# Patient Record
Sex: MALE | Race: WHITE | NOT HISPANIC OR LATINO | Employment: OTHER | URBAN - METROPOLITAN AREA
[De-identification: names, ages, dates, MRNs, and addresses within clinical notes are randomized per-mention and may not be internally consistent; named-entity substitution may affect disease eponyms.]

---

## 2017-10-25 ENCOUNTER — ALLSCRIPTS OFFICE VISIT (OUTPATIENT)
Dept: OTHER | Facility: OTHER | Age: 57
End: 2017-10-25

## 2017-10-25 ENCOUNTER — APPOINTMENT (OUTPATIENT)
Dept: LAB | Facility: MEDICAL CENTER | Age: 57
End: 2017-10-25
Payer: MEDICARE

## 2017-10-25 DIAGNOSIS — R74.02 NONSPECIFIC ELEVATION OF LEVELS OF TRANSAMINASE AND LACTIC ACID DEHYDROGENASE (LDH): ICD-10-CM

## 2017-10-25 DIAGNOSIS — I10 ESSENTIAL (PRIMARY) HYPERTENSION: ICD-10-CM

## 2017-10-25 DIAGNOSIS — Z11.59 ENCOUNTER FOR SCREENING FOR OTHER VIRAL DISEASES (CODE): ICD-10-CM

## 2017-10-25 DIAGNOSIS — Z12.11 ENCOUNTER FOR SCREENING FOR MALIGNANT NEOPLASM OF COLON: ICD-10-CM

## 2017-10-25 DIAGNOSIS — Z12.5 ENCOUNTER FOR SCREENING FOR MALIGNANT NEOPLASM OF PROSTATE: ICD-10-CM

## 2017-10-25 DIAGNOSIS — R01.1 CARDIAC MURMUR: ICD-10-CM

## 2017-10-25 DIAGNOSIS — R74.01 NONSPECIFIC ELEVATION OF LEVELS OF TRANSAMINASE AND LACTIC ACID DEHYDROGENASE (LDH): ICD-10-CM

## 2017-10-25 LAB
ALBUMIN SERPL BCP-MCNC: 2.9 G/DL (ref 3.5–5)
ALP SERPL-CCNC: 116 U/L (ref 46–116)
ALT SERPL W P-5'-P-CCNC: 45 U/L (ref 12–78)
ANION GAP SERPL CALCULATED.3IONS-SCNC: 9 MMOL/L (ref 4–13)
AST SERPL W P-5'-P-CCNC: 70 U/L (ref 5–45)
BILIRUB SERPL-MCNC: 2.7 MG/DL (ref 0.2–1)
BUN SERPL-MCNC: 10 MG/DL (ref 5–25)
CALCIUM SERPL-MCNC: 8.5 MG/DL (ref 8.3–10.1)
CHLORIDE SERPL-SCNC: 106 MMOL/L (ref 100–108)
CHOLEST SERPL-MCNC: 167 MG/DL (ref 50–200)
CO2 SERPL-SCNC: 28 MMOL/L (ref 21–32)
CREAT SERPL-MCNC: 0.82 MG/DL (ref 0.6–1.3)
EST. AVERAGE GLUCOSE BLD GHB EST-MCNC: 80 MG/DL
GFR SERPL CREATININE-BSD FRML MDRD: 99 ML/MIN/1.73SQ M
GLUCOSE P FAST SERPL-MCNC: 91 MG/DL (ref 65–99)
HBA1C MFR BLD: 4.4 % (ref 4.2–6.3)
HCV AB SER QL: NORMAL
HDLC SERPL-MCNC: 61 MG/DL (ref 40–60)
LDLC SERPL CALC-MCNC: 92 MG/DL (ref 0–100)
POTASSIUM SERPL-SCNC: 3.8 MMOL/L (ref 3.5–5.3)
PROT SERPL-MCNC: 6.7 G/DL (ref 6.4–8.2)
PSA SERPL-MCNC: 0.3 NG/ML (ref 0–4)
SODIUM SERPL-SCNC: 143 MMOL/L (ref 136–145)
TRIGL SERPL-MCNC: 72 MG/DL

## 2017-10-25 PROCEDURE — 80053 COMPREHEN METABOLIC PANEL: CPT

## 2017-10-25 PROCEDURE — 83036 HEMOGLOBIN GLYCOSYLATED A1C: CPT

## 2017-10-25 PROCEDURE — 80061 LIPID PANEL: CPT

## 2017-10-25 PROCEDURE — 86803 HEPATITIS C AB TEST: CPT

## 2017-10-25 PROCEDURE — G0103 PSA SCREENING: HCPCS

## 2017-10-25 PROCEDURE — 36415 COLL VENOUS BLD VENIPUNCTURE: CPT

## 2017-10-26 ENCOUNTER — GENERIC CONVERSION - ENCOUNTER (OUTPATIENT)
Dept: OTHER | Facility: OTHER | Age: 57
End: 2017-10-26

## 2017-10-26 NOTE — PROGRESS NOTES
Assessment  1  Hypertension (401 9) (I10)   2  Lower back pain (724 2) (M54 5)   3  Joint pain, knee (719 46) (M25 569)   4  Heart murmur on physical examination (785 2) (R01 1)    Plan  Colon cancer screening    · *1 -  [facility]  *  Status:  Active  Requested for: 01PBD0576  Care Summary provided  : Yes  Flu vaccine need    · Fluzone Quadrivalent Intramuscular Suspension  Health Maintenance    · Follow-up visit in 6 months Evaluation and Treatment  Follow-up  Status: Hold For -  Scheduling  Requested for: 24KCG2730  Heart murmur on physical examination    · ECHO COMPLETE WITH CONTRAST IF INDICATED; Status:Hold For - Scheduling;  Requested MGR:20POP3777;   Hypertension    · (1) COMPREHENSIVE METABOLIC PANEL; Status:Active; Requested YZO:[id number];    · (1) HEMOGLOBIN A1C; Status:Active; Requested LHP:[id number];    · (1) LIPID PANEL, FASTING; Status:Active; Requested GJE:[id number];    · *VB-Depression Screening; Status:Complete;   Done: 02FUP7138 10:40AM  Need for hepatitis C screening test    · (1) HEP C ANTIBODY; Status:Active; Requested UNW:[id number];   Prostate cancer screening    · (1) PSA (SCREEN) (Dx V76 44 Screen for Prostate Cancer); Status:Active; Requested  HHW:[id number];     Discussion/Summary  Discussion Summary:   Hypertension: Well controlled  Continue medication  Check labs  back pain: Likely due to obesity  I recommended patient exercise more  He states he cannot due to the knee pain  I recommend diet  He states him and his wife only once a day  I asked what a normal meal is  He states stable split a large pizza  Patient obviously getting too many calories and binge eating  I told him I would no longer keep him on Celebrex due to his high blood pressure and potential for heart problems  I offered referral back to his back doctor  Patient will think about it   pain: Likely due to obesity again  Again I will not continue his Celebrex   I offered him referral back to his knee doctor  Patient will think about this as well  On exam  Check echo   has not yet had a colonoscopy  Risks and benefits of colonoscopy discussed  Patient agreeable  Order placed  discussed prostate cancer screening with patient  Risks and benefits of obtaining a PSA discussed  Patient agreeable  Order placed  discussed hepatitis-C screening with patient  Risks and benefits of obtaining hepatitis C testing discussed  Patient agreeable  Order placed  in 6 months or sooner if needed  Medication SE Review and Pt Understands Tx: Possible side effects of new medications were reviewed with the patient/guardian today  The treatment plan was reviewed with the patient/guardian  The patient/guardian understands and agrees with the treatment plan      History of Present Illness  HPI: Patient presents to establish care   high blood pressure  Has been on medication since 1993  Currently on lisinopril with hydrochlorothiazide 20/25 mg  Tolerating medication well  Does not need refills at this time  also has back pain  Has had for many years  Was being followed by a back specialist, Dr Radhika Alfonso, but no longer seeing him  Takes Celebrex daily  Was getting shots in the back  bilateral knee pain  Has had this for many years as well  Was being followed by an orthopedic surgeon, Dr Terence Mann, but no longer seen  Has had right knee ACL repair and left knee arthroscopic surgery  Takes Celebrex for this as well  Review of Systems  Complete-Male:   Constitutional: no fever  Cardiovascular: no chest pain  Respiratory: no shortness of breath  Gastrointestinal: no abdominal pain-- and-- no blood in stools  Genitourinary: no dysuria  Neurological: no headache  Psychiatric: no anxiety-- and-- no depression  PHQ-9 Depression Scale: Over the past 2 weeks, how often have you been bothered by the following problems? 1 ) Little interest or pleasure in doing things?  Not at all    2 ) Feeling down, depressed or hopeless? Not at all  Active Problems  1  Hyperlipidemia (272 4) (E78 5)   2  Hypertension (401 9) (I10)   3  Joint pain, knee (719 46) (M25 569)   4  Lower back pain (724 2) (M54 5)   5  Morbid or severe obesity due to excess calories (278 01) (E66 01)   6  Sleep apnea (780 57) (G47 30)   7  Vitamin D deficiency (268 9) (E55 9)    Past Medical History  1  History of Abnormal glucose (790 29) (R73 09)   2  History of Choledocholithiasis (574 50) (K80 50)   3  History of Fatty liver (571 8) (K76 0)   4  History of Feeling tired (780 79) (R53 83)   5  History of Type 2 diabetes mellitus with hyperglycemia (250 00) (E11 65)  Active Problems And Past Medical History Reviewed: The active problems and past medical history were reviewed and updated today  Surgical History  1  History of Knee Surgery  Surgical History Reviewed: The surgical history was reviewed and updated today  Family History  Father    1  Family history of cardiac disorder (V17 49) (Z82 49)   2  Family history of hypertension (V17 49) (Z82 49)  Uncle    3  Family history of malignant neoplasm (V16 9) (Z80 9)  Family History Reviewed: The family history was reviewed and updated today  Social History   · Being A Social Drinker   · Never A Smoker  Social History Reviewed: The social history was reviewed and updated today  Current Meds   1  Lisinopril-Hydrochlorothiazide 20-25 MG Oral Tablet; TAKE 1 TABLET DAILY; Therapy: 95CFL5690 to (Thad Tavarez)  Requested for: 04Tze4391; Last   Rx:[id number] Ordered    Allergies  1  No Known Drug Allergies    Vitals  Vital Signs    Recorded: 47NHF7091 10:04AM   Heart Rate 60   Respiration 20   Systolic 969   Diastolic 62   Height 5 ft 10 in   Weight 324 lb 2 oz   BMI Calculated 46 51   BSA Calculated 2 56     Physical Exam    Constitutional   General appearance: Abnormal   morbidly obese  Eyes   Conjunctiva and lids: No swelling, erythema, or discharge      Pupils and irises: Equal, round and reactive to light  Ears, Nose, Mouth, and Throat   External inspection of ears and nose: Normal     Otoscopic examination: Tympanic membrance translucent with normal light reflex  Canals patent without erythema  Nasal mucosa, septum, and turbinates: Normal without edema or erythema  Oropharynx: Normal with no erythema, edema, exudate or lesions  Pulmonary   Respiratory effort: No increased work of breathing or signs of respiratory distress  Auscultation of lungs: Clear to auscultation, equal breath sounds bilaterally, no wheezes, no rales, no rhonci  Cardiovascular   Auscultation of heart: Abnormal   The heart rate was normal  The rhythm was regular  Heart sounds: normal S1-- and-- normal S2  A grade 2 systolic murmur was heard at the RUSB  Examination of extremities for edema and/or varicosities: Normal     Abdomen   Abdomen: Abnormal   The abdomen was obese  Lymphatic   Palpation of lymph nodes in neck: No lymphadenopathy  Musculoskeletal   Gait and station: Normal     Neurologic   Cranial nerves: Cranial nerves 2-12 intact      Psychiatric   Orientation to person, place and time: Normal     Mood and affect: Normal          Signatures   Electronically signed by : STEPHANIE Turcios ; Oct 25 2017 10:41AM EST                       (Author)

## 2017-11-09 ENCOUNTER — APPOINTMENT (OUTPATIENT)
Dept: LAB | Facility: CLINIC | Age: 57
End: 2017-11-09
Payer: MEDICARE

## 2017-11-09 ENCOUNTER — ALLSCRIPTS OFFICE VISIT (OUTPATIENT)
Dept: OTHER | Facility: OTHER | Age: 57
End: 2017-11-09

## 2017-11-09 DIAGNOSIS — Z12.11 ENCOUNTER FOR SCREENING FOR MALIGNANT NEOPLASM OF COLON: ICD-10-CM

## 2017-11-09 DIAGNOSIS — R74.01 NONSPECIFIC ELEVATION OF LEVELS OF TRANSAMINASE AND LACTIC ACID DEHYDROGENASE (LDH): ICD-10-CM

## 2017-11-09 DIAGNOSIS — R74.02 NONSPECIFIC ELEVATION OF LEVELS OF TRANSAMINASE AND LACTIC ACID DEHYDROGENASE (LDH): ICD-10-CM

## 2017-11-09 LAB
FERRITIN SERPL-MCNC: 170 NG/ML (ref 8–388)
GGT SERPL-CCNC: 74 U/L (ref 5–85)
IRON SATN MFR SERPL: 50 %
IRON SERPL-MCNC: 134 UG/DL (ref 65–175)
TIBC SERPL-MCNC: 267 UG/DL (ref 250–450)

## 2017-11-09 PROCEDURE — 82977 ASSAY OF GGT: CPT

## 2017-11-09 PROCEDURE — 83550 IRON BINDING TEST: CPT

## 2017-11-09 PROCEDURE — 86038 ANTINUCLEAR ANTIBODIES: CPT

## 2017-11-09 PROCEDURE — 86256 FLUORESCENT ANTIBODY TITER: CPT

## 2017-11-09 PROCEDURE — 83540 ASSAY OF IRON: CPT

## 2017-11-09 PROCEDURE — 87340 HEPATITIS B SURFACE AG IA: CPT

## 2017-11-09 PROCEDURE — 82728 ASSAY OF FERRITIN: CPT

## 2017-11-09 PROCEDURE — 86705 HEP B CORE ANTIBODY IGM: CPT

## 2017-11-09 PROCEDURE — 82103 ALPHA-1-ANTITRYPSIN TOTAL: CPT

## 2017-11-09 PROCEDURE — 36415 COLL VENOUS BLD VENIPUNCTURE: CPT

## 2017-11-10 LAB
A1AT SERPL-MCNC: 145 MG/DL (ref 90–200)
HBV CORE IGM SER QL: NORMAL
HBV SURFACE AG SER QL: NORMAL
MITOCHONDRIA M2 IGG SER-ACNC: 3 UNITS (ref 0–20)
RYE IGE QN: NEGATIVE

## 2017-11-14 ENCOUNTER — ANESTHESIA EVENT (OUTPATIENT)
Dept: PERIOP | Facility: HOSPITAL | Age: 57
End: 2017-11-14
Payer: MEDICARE

## 2017-11-14 RX ORDER — LISINOPRIL AND HYDROCHLOROTHIAZIDE 25; 20 MG/1; MG/1
1 TABLET ORAL DAILY
COMMUNITY
End: 2019-08-15 | Stop reason: ALTCHOICE

## 2017-11-14 NOTE — ANESTHESIA PREPROCEDURE EVALUATION
Review of Systems/Medical History  Patient summary reviewed  Chart reviewed  No history of anesthetic complications     Cardiovascular  Exercise tolerance: good,  Hyperlipidemia, Hypertension on > 1 medication,    Pulmonary  Smoker cigarette smoker more than 10 packs per year , No recent URI , Sleep apnea CPAP, ,        GI/Hepatic    Bowel prep       Negative  ROS        Endo/Other  Negative endo/other ROS      GYN       Hematology   Musculoskeletal  Obesity  super morbid obesity, Back pain , lumbar pain,        Neurology  Negative neurology ROS      Psychology   Negative psychology ROS            Physical Exam    Airway    Mallampati score: II  TM Distance: >3 FB  Neck ROM: full     Dental       Cardiovascular  Rhythm: regular, Rate: normal,     Pulmonary  Breath sounds clear to auscultation,     Other Findings        Anesthesia Plan  ASA Score- 3       Anesthesia Type- IV sedation with anesthesia with ASA Monitors  Additional Monitors:   Airway Plan:           Induction- intravenous  Informed Consent- Anesthetic plan and risks discussed with patient  I personally reviewed this patient with the CRNA  Discussed and agreed on the Anesthesia Plan with the CRNA  Antonia Thornton      No results found for: WBC, HGB, HCT, MCV, PLT  Lab Results   Component Value Date    CALCIUM 8 5 10/25/2017     10/25/2017    K 3 8 10/25/2017    CO2 28 10/25/2017     10/25/2017    BUN 10 10/25/2017    CREATININE 0 82 10/25/2017     No results found for: INR, PROTIME  No results found for: PTT

## 2017-11-15 ENCOUNTER — GENERIC CONVERSION - ENCOUNTER (OUTPATIENT)
Dept: OTHER | Facility: OTHER | Age: 57
End: 2017-11-15

## 2017-11-15 ENCOUNTER — HOSPITAL ENCOUNTER (OUTPATIENT)
Facility: HOSPITAL | Age: 57
Setting detail: OUTPATIENT SURGERY
Discharge: HOME | End: 2017-11-15
Attending: INTERNAL MEDICINE | Admitting: INTERNAL MEDICINE
Payer: MEDICARE

## 2017-11-15 ENCOUNTER — ANESTHESIA (OUTPATIENT)
Dept: PERIOP | Facility: HOSPITAL | Age: 57
End: 2017-11-15
Payer: MEDICARE

## 2017-11-15 VITALS
BODY MASS INDEX: 45.1 KG/M2 | HEART RATE: 62 BPM | SYSTOLIC BLOOD PRESSURE: 124 MMHG | WEIGHT: 315 LBS | HEIGHT: 70 IN | OXYGEN SATURATION: 99 % | DIASTOLIC BLOOD PRESSURE: 67 MMHG | TEMPERATURE: 98 F | RESPIRATION RATE: 19 BRPM

## 2017-11-15 RX ORDER — PROPOFOL 10 MG/ML
INJECTION, EMULSION INTRAVENOUS AS NEEDED
Status: DISCONTINUED | OUTPATIENT
Start: 2017-11-15 | End: 2017-11-15 | Stop reason: SURG

## 2017-11-15 RX ORDER — SODIUM CHLORIDE, SODIUM LACTATE, POTASSIUM CHLORIDE, CALCIUM CHLORIDE 600; 310; 30; 20 MG/100ML; MG/100ML; MG/100ML; MG/100ML
75 INJECTION, SOLUTION INTRAVENOUS CONTINUOUS
Status: DISCONTINUED | OUTPATIENT
Start: 2017-11-15 | End: 2017-11-15 | Stop reason: HOSPADM

## 2017-11-15 RX ADMIN — PROPOFOL 30 MG: 10 INJECTION, EMULSION INTRAVENOUS at 07:45

## 2017-11-15 RX ADMIN — SODIUM CHLORIDE, SODIUM LACTATE, POTASSIUM CHLORIDE, AND CALCIUM CHLORIDE: .6; .31; .03; .02 INJECTION, SOLUTION INTRAVENOUS at 07:12

## 2017-11-15 RX ADMIN — PROPOFOL 20 MG: 10 INJECTION, EMULSION INTRAVENOUS at 07:41

## 2017-11-15 RX ADMIN — PROPOFOL 20 MG: 10 INJECTION, EMULSION INTRAVENOUS at 07:43

## 2017-11-15 RX ADMIN — PROPOFOL 130 MG: 10 INJECTION, EMULSION INTRAVENOUS at 07:39

## 2017-11-15 RX ADMIN — SODIUM CHLORIDE, SODIUM LACTATE, POTASSIUM CHLORIDE, AND CALCIUM CHLORIDE 75 ML/HR: .6; .31; .03; .02 INJECTION, SOLUTION INTRAVENOUS at 06:58

## 2017-11-15 NOTE — ANESTHESIA POSTPROCEDURE EVALUATION
Post-Op Assessment Note      CV Status:  Stable    Mental Status:  Alert and awake    Hydration Status:  Stable    PONV Controlled:  None    Airway Patency:  Patent    Post Op Vitals Reviewed: Yes          Staff: CRNA           /60 (11/15/17 0751)    Temp 98 °F (36 7 °C) (11/15/17 0751)    Pulse 65 (11/15/17 0751)   Resp 17 (11/15/17 0751)    SpO2 96 % (11/15/17 0751)    Post procedure VS noted above, SV non obstructed

## 2017-11-15 NOTE — OP NOTE
**** GI/ENDOSCOPY REPORT ****     PATIENT NAME: Jonna Smith ------ VISIT ID:  Patient ID:   [id number] YOB: 1960     INTRODUCTION: Colonoscopy - A 65 male patient presents for an outpatient   Colonoscopy at [facility]  PREVIOUS COLONOSCOPY: none     INDICATIONS: Surveillance  CONSENT:  The benefits, risks, and alternatives to the procedure were   discussed and informed consent was obtained from the patient  PREPARATION: EKG, pulse, pulse oximetry and blood pressure were monitored   throughout the procedure  The patient was identified by myself both   verbally and by visual inspection of ID band  ASA Classification: Class 3   - Patient has severe systemic disturbance that may or may not be related   to the disorder requiring surgery  Airway Assessment Classification:   Airway class 3 - Visualization of the soft palate and the base of the   uvula  MEDICATIONS: Anesthesia-check records     PROCEDURE:  The endoscope was passed without difficulty through the anus   under direct visualization and advanced to the cecum, confirmed by   appendiceal orifice and ileocecal valve  The scope was withdrawn and the   mucosa was carefully examined  The quality of the preparation was adequate   prep  Cecal Intubation Time: 2 minutes(s) Scope Withdrawal Time: 4   minutes(s)     RECTAL EXAM: Normal rectal exam      FINDINGS:  There was evidence of moderately severe diverticulosis in the   sigmoid colon and descending colon  Otherwise, the colon appeared to be   normal  Normal retroversion     COMPLICATIONS: There were no complications  IMPRESSIONS: Moderately severe diverticulosis found in the sigmoid colon   and descending colon  RECOMMENDATIONS: Colonoscopy recommended in 10 years  Yearly FIT test with   PCP Every 3 year Cologuard test with PCP     ESTIMATED BLOOD LOSS: None       PATHOLOGY SPECIMENS: No     PROCEDURE CODES: Colonoscopy     ICD-9 Codes: 562 10 Diverticulosis of colon (without mention of hemorrhage)     ICD-10 Codes: K57 Diverticular disease of intestine     PERFORMED BY: STEPHANIE Dumont  on 11/15/2017  Version 1, electronically signed by STEPHANIE Zamudio , D O  on   11/15/2017 at 07:51

## 2017-11-15 NOTE — DISCHARGE INSTRUCTIONS
Colonoscopy   WHAT YOU NEED TO KNOW:   A colonoscopy is a procedure to examine the inside of your colon (intestine) with a scope  Polyps or tissue growths may have been removed during your colonoscopy  It is normal to feel bloated and to have some abdominal discomfort  You should be passing gas  If you have hemorrhoids or you had polyps removed, you may have a small amount of bleeding  DISCHARGE INSTRUCTIONS:   Seek care immediately if:   · You have a large amount of bright red blood in your bowel movements  · Your abdomen is hard and firm and you have severe pain  · You have sudden trouble breathing  Contact your healthcare provider if:   · You develop a rash or hives  · You have a fever within 24 hours of your procedure       · You have not had a bowel movement for 3 days after your procedure  · You have questions or concerns about your condition or care  Activity:   · Do not lift, strain, or run  for 3 days after your procedure  · Rest after your procedure  You have been given medicine to relax you  Do not  drive or make important decisions until the day after your procedure  Return to your normal activity as directed  · Relieve gas and discomfort from bloating  by lying on your right side with a heating pad on your abdomen  You may need to take short walks to help the gas move out  Eat small meals until bloating is relieved  If you had polyps removed: For 7 days after your procedure:  · Do not  take aspirin  · Do not  go on long car rides  Follow up with your healthcare provider as directed:  Write down your questions so you remember to ask them during your visits  © 2017 [employer] is for End User's use only and may not be sold, redistributed or otherwise used for commercial purposes  All illustrations and images included in CareNotes® are the copyrighted property of [employer]  or Jono Hernandez    The above information is an  only  It is not intended as medical advice for individual conditions or treatments  Talk to your doctor, nurse or pharmacist before following any medical regimen to see if it is safe and effective for you

## 2017-11-22 ENCOUNTER — HOSPITAL ENCOUNTER (OUTPATIENT)
Dept: ULTRASOUND IMAGING | Facility: HOSPITAL | Age: 57
Discharge: HOME | End: 2017-11-22
Attending: INTERNAL MEDICINE
Payer: MEDICARE

## 2017-11-22 DIAGNOSIS — Z12.11 ENCOUNTER FOR SCREENING FOR MALIGNANT NEOPLASM OF COLON: ICD-10-CM

## 2017-11-22 PROCEDURE — 76705 ECHO EXAM OF ABDOMEN: CPT

## 2017-12-12 ENCOUNTER — GENERIC CONVERSION - ENCOUNTER (OUTPATIENT)
Dept: FAMILY MEDICINE CLINIC | Facility: MEDICAL CENTER | Age: 57
End: 2017-12-12

## 2017-12-12 ENCOUNTER — HOSPITAL ENCOUNTER (OUTPATIENT)
Dept: NON INVASIVE DIAGNOSTICS | Facility: HOSPITAL | Age: 57
Discharge: HOME | End: 2017-12-12
Payer: MEDICARE

## 2017-12-12 DIAGNOSIS — R01.1 CARDIAC MURMUR: ICD-10-CM

## 2017-12-12 PROCEDURE — 93306 TTE W/DOPPLER COMPLETE: CPT

## 2017-12-18 ENCOUNTER — GENERIC CONVERSION - ENCOUNTER (OUTPATIENT)
Dept: OTHER | Facility: OTHER | Age: 57
End: 2017-12-18

## 2018-01-04 ENCOUNTER — GENERIC CONVERSION - ENCOUNTER (OUTPATIENT)
Dept: OTHER | Facility: OTHER | Age: 58
End: 2018-01-04

## 2018-01-10 NOTE — CONSULTS
I had the pleasure of evaluating your patient, Vu Scott  My full evaluation follows:      Chief Complaint  Abnormal liver functions screening colonoscopy      History of Present Illness  60-year-old male who was referred for screening colonoscopy  Of note is that his liver functions are abnormal  Bilirubin of 10 7, AST 70, albumin of 2 9  Hepatitis C study was negative  According to the patient has never had any history of significant alcohol use  Absolutely no history of intravenous or intranasal drug use  No travel to an endemic areas  No history of hepatitis or hepatitis prodrome, no yellow jaundice  No history of transfusions  No other exposure to any hepatotoxins that he knows of  No other possible exposure to hepatitis  Patient has no symptomatology referable to his liver  No jaundice, change in the color of urine or stool, confusion, or any other significant problems  He also requires a screening colonoscopy  No family history of colon cancer  Patient has no abdominal pain, change in bowel habits, change in stool caliber, melanotic stool, hematochezia, rectal bleeding, tenesmus, weight loss  Review of Systems    Constitutional: No fever or chills, feels well, no tiredness, no recent weight gain or weight loss  Eyes: No complaints of eye pain, no red eyes, no discharge from eyes, no itchy eyes  ENT: no complaints of earache, no hearing loss, no nosebleeds, no nasal discharge, no sore throat, no hoarseness  Cardiovascular: No complaints of slow heart rate, no fast heart rate, no chest pain, no palpitations, no leg claudication, no lower extremity  Respiratory: No complaints of shortness of breath, no wheezing, no cough, no SOB on exertion, no orthopnea or PND  Gastrointestinal: as noted in HPI  Genitourinary: No complaints of dysuria, no incontinence, no hesitancy, no nocturia, no genital lesion, no testicular pain  Musculoskeletal: arthralgias and Back leg and neck pain  Integumentary: No complaints of skin rash or skin lesions, no itching, no skin wound, no dry skin  Neurological: No compliants of headache, no confusion, no convulsions, no numbness or tingling, no dizziness or fainting, no limb weakness, no difficulty walking  Psychiatric: Is not suicidal, no sleep disturbances, no anxiety or depression, no change in personality, no emotional problems  Endocrine: No complaints of proptosis, no hot flashes, no muscle weakness, no erectile dysfunction, no deepening of the voice, no feelings of weakness  Hematologic/Lymphatic: No complaints of swollen glands, no swollen glands in the neck, does not bleed easily, no easy bruising  Active Problems    1  Colon cancer screening (V76 51) (Z12 11)   2  Elevated AST (SGOT) (790 4) (R74 0)   3  Flu vaccine need (V04 81) (Z23)   4  Heart murmur on physical examination (785 2) (R01 1)   5  Hyperlipidemia (272 4) (E78 5)   6  Hypertension (401 9) (I10)   7  Joint pain, knee (719 46) (M25 569)   8  Lower back pain (724 2) (M54 5)   9  Morbid or severe obesity due to excess calories (278 01) (E66 01)   10  Sleep apnea (780 57) (G47 30)   11  Vitamin D deficiency (268 9) (E55 9)    Past Medical History    · History of Abnormal glucose (790 29) (R73 09)   · History of Choledocholithiasis (574 50) (K80 50)   · History of Fatty liver (571 8) (K76 0)   · History of Feeling tired (780 79) (R53 83)   · History of Type 2 diabetes mellitus with hyperglycemia (250 00) (E11 65)    The active problems and past medical history were reviewed and updated today  Surgical History    · History of Knee Surgery    The surgical history was reviewed and updated today  Family History    · Family history of cardiac disorder (V17 49) (Z82 49)   · Family history of hypertension (V17 49) (Z82 49)    · Family history of malignant neoplasm (V16 9) (Z80 9)    The family history was reviewed and updated today         Social History    · Being A Social Drinker   · Never A Smoker  The social history was reviewed and updated today  The social history was reviewed and is unchanged  Current Meds   1  Lisinopril-Hydrochlorothiazide 20-25 MG Oral Tablet; TAKE 1 TABLET DAILY; Therapy: 73FAW3495 to (Sean Coffee)  Requested for: 55Scv5828; Last   Rx:[id number] Ordered    The medication list was reviewed and updated today  Allergies    1  No Known Drug Allergies    Vitals   Recorded: 04BED4803 21:29YQ   Systolic 354   Diastolic 62   Height 5 ft 10 in   Weight 321 lb    BMI Calculated 46 06   BSA Calculated 2 55     Physical Exam    Constitutional   General appearance: Abnormal   Obese  Eyes   Conjunctiva and lids: No swelling, erythema, or discharge  Pupils and irises: Equal, round and reactive to light  Ears, Nose, Mouth, and Throat   External inspection of ears and nose: Normal     Nasal mucosa, septum, and turbinates: Normal without edema or erythema  Oropharynx: Abnormal   Poor dentition  Pulmonary   Respiratory effort: No increased work of breathing or signs of respiratory distress  Auscultation of lungs: Clear to auscultation, equal breath sounds bilaterally, no wheezes, no rales, no rhonci  Cardiovascular   Auscultation of heart: Normal rate and rhythm, normal S1 and S2, without murmurs  Examination of extremities for edema and/or varicosities: Normal     Carotid pulses: Normal     Abdomen   Abdomen: Non-tender, no masses  Liver and spleen: No hepatomegaly or splenomegaly  Lymphatic   Palpation of lymph nodes in neck: No lymphadenopathy  Musculoskeletal   Gait and station: Normal     Digits and nails: Normal without clubbing or cyanosis  Inspection/palpation of joints, bones, and muscles: Normal     Skin   Skin and subcutaneous tissue: Normal without rashes or lesions  Neurologic No nystagmus or asterixis     Psychiatric   Orientation to person, place and time: Normal     Mood and affect: Normal          Assessment 1  Elevated AST (SGOT) (790 4) (R74 0)   2  Colon cancer screening (V76 51) (Z12 11)    Plan  Colon cancer screening    · (1) ALPHA 1 ANTITRYPSIN; Status:Active; Requested XXW:[id number];    Perform:[facility]; FRT:[id number]; Ordered; For:Colon cancer screening; Ordered By:Stan Jones;   · (1) NADIA SCREEN W/REFLEX TO TITER/PATTERN; Status:Active; Requested  SMW:[id number];    Perform:[facility]; KMJ:[id number]; Ordered; For:Colon cancer screening; Ordered By:Stan Jones;   · (1) HEP B CORE AB, IgM; Status:Active; Requested BHH:[id number];    Perform:[facility]; IRN:[id number]; Ordered; For:Colon cancer screening; Ordered By:Stan Jones;   · (1) HEP B SURFACE ANTIGEN; Status:Active; Requested [id number];    Perform:[facility]; KBT:[id number]; Ordered; For:Colon cancer screening; Ordered By:Stan Jones;   · (1) IRON SATURATION %, TIBC; Status:Active; Requested CYW:[id number];    Perform:[facility]; MWF:[id number]; Ordered; For:Colon cancer screening; Ordered By:Stan Jones;   · (1) MITOCHONDRIAL ANTIBODY; Status:Active; Requested SFZ:[id number];    Perform:[facility]; HNM:[id number]; Ordered; For:Colon cancer screening; Ordered By:Sahara Jones;   · COLONOSCOPY; Status:Active; Requested CZR:[id number];    Perform:[facility]; DSN:[id number]; Ordered; For:Colon cancer screening; Ordered By:Sahara Jones;   · [address] [place] [address]; Status:Hold For - Scheduling; Requested HHH:[id number];    Perform:[facility]; SGE:[id number]; Ordered; For:Colon cancer screening; Ordered By:Sahara Jones;   · ENDOSCOPY - PROCEDURE, RISKS, AND BENEFITS; Status:Complete;   Done:  72NRK5884   Ordered; For:Colon cancer screening; Ordered By:Stan Jones;  Colon cancer screening, Elevated AST (SGOT)    · (1) FERRITIN; Status:Active; Requested ZDA:[id number];    Perform:[facility]; IOV:[id number]; Ordered; For:Colon cancer screening, Elevated AST (SGOT); Ordered By:Stan Jones;   · (1) GGT; Status:Active; Requested DBN:[id number];    Perform:[facility]; AMW:[id number]; Ordered; For:Colon cancer screening, Elevated AST (SGOT); Ordered By:Stan Jones;    Discussion/Summary    Problem 1 , elevated liver functions, decreased albumin  Negative hepatitis C  Plan, liver ultrasound, hepatitis-B surface antigen and core antibody, mitochondrial antibody, antinuclear antibody, GGT, iron studies, alpha-1 antitrypsin  This may turn out to be non alcoholic steatohepatitis, cirrhosis to be ruled  Problem 2 , need for screening colonoscopy  Plan, colonoscopy  Patient asymptomatic  Continue other current medical management  Patient is able to Self-Care  Educational resources provided: Thank you very much for allowing me to participate in the care of this patient  If you have any questions, please do not hesitate to contact me        Future Appointments    Signatures   Electronically signed by : STEPHANIE Giordano ; Nov 9 2017  2:14PM EST                       (Author)

## 2018-01-13 VITALS
HEART RATE: 60 BPM | RESPIRATION RATE: 20 BRPM | HEIGHT: 70 IN | BODY MASS INDEX: 45.1 KG/M2 | DIASTOLIC BLOOD PRESSURE: 62 MMHG | WEIGHT: 315 LBS | SYSTOLIC BLOOD PRESSURE: 128 MMHG

## 2018-01-13 VITALS
WEIGHT: 315 LBS | HEIGHT: 70 IN | BODY MASS INDEX: 45.1 KG/M2 | DIASTOLIC BLOOD PRESSURE: 62 MMHG | SYSTOLIC BLOOD PRESSURE: 126 MMHG

## 2018-01-13 NOTE — RESULT NOTES
Verified Results  (1) HEP C ANTIBODY [id number] 10:57AM [facility] Order Number: [id number]     Test Name Result Flag Reference   HEPATITIS C ANTIBODY Non-reactive  Non-reactive     (1) PSA (SCREEN) (Dx V76 44 Screen for Prostate Cancer) [id number] 10:57AM [facility] Order Number: [id number]     Test Name Result Flag Reference   PROSTATE SPECIFIC ANTIGEN 0 3 ng/mL  0 0-4 0   American Urological Association Guidelines define biochemical recurrence of prostate cancer as a detectable or rising PSA value post-radical prostatectomy that is greater than or equal to 0 2 ng/mL with a second confirmatory level of greater than or equal to 0 2 ng/mL  (1) COMPREHENSIVE METABOLIC PANEL 90FBQ0625 45:16QN [facility] Order Number: [id number]     Test Name Result Flag Reference   SODIUM 143 mmol/L  136-145   POTASSIUM 3 8 mmol/L  3 5-5 3   CHLORIDE 106 mmol/L  100-108   CARBON DIOXIDE 28 mmol/L  21-32   ANION GAP (CALC) 9 mmol/L  4-13   BLOOD UREA NITROGEN 10 mg/dL  5-25   CREATININE 0 82 mg/dL  0 60-1 30   Standardized to IDMS reference method   CALCIUM 8 5 mg/dL  8 3-10 1   BILI, TOTAL 2 70 mg/dL H 0 20-1 00   ALK PHOSPHATAS 116 U/L     ALT (SGPT) 45 U/L  12-78   Specimen collection should occur prior to Sulfasalazine administration due to the potential for falsely depressed results  AST(SGOT) 70 U/L H 5-45   Specimen collection should occur prior to Sulfasalazine administration due to the potential for falsely depressed results  ALBUMIN 2 9 g/dL L 3 5-5 0   TOTAL PROTEIN 6 7 g/dL  6 4-8 2   eGFR 99 ml/min/1 73sq m     National Kidney Disease Education Program recommendations are as follows:  GFR calculation is accurate only with a steady state creatinine  Chronic Kidney disease less than 60 ml/min/1 73 sq  meters  Kidney failure less than 15 ml/min/1 73 sq  meters     GLUCOSE FASTING 91 mg/dL  65-99   Specimen collection should occur prior to Sulfasalazine administration due to the potential for falsely depressed results  Specimen collection should occur prior to Sulfapyridine administration due to the potential for falsely elevated results  (1) LIPID PANEL, FASTING 25Oct2017 10:57AM [facility] Order Number: [id number]     Test Name Result Flag Reference   CHOLESTEROL 167 mg/dL     HDL,DIRECT 61 mg/dL H 40-60   Specimen collection should occur prior to Metamizole administration due to the potential for falsley depressed results  LDL CHOLESTEROL CALCULATED 92 mg/dL  0-100   Triglyceride:        Normal <150 mg/dl   Borderline High 150-199 mg/dl   High 200-499 mg/dl   Very High >499 mg/dl      Cholesterol:       Desirable <200 mg/dl    Borderline High 200-239 mg/dl    High >239 mg/dl      HDL Cholesterol:       High>59 mg/dL    Low <41 mg/dL      This screening LDL is a calculated result  It does not have the accuracy of the Direct Measured LDL in the monitoring of patients with hyperlipidemia and/or statin therapy  Direct Measure LDL (QCQ864) must be ordered separately in these patients  TRIGLYCERIDES 72 mg/dL  <=150   Specimen collection should occur prior to N-Acetylcysteine or Metamizole administration due to the potential for falsely depressed results  (1) HEMOGLOBIN A1C 25Oct2017 10:57AM [facility] Order Number: [id number]     Test Name Result Flag Reference   HEMOGLOBIN A1C 4 4 %  4 2-6 3   EST  AVG   GLUCOSE 80 mg/dl

## 2018-01-23 NOTE — RESULT NOTES
Verified Results  ECHO COMPLETE WITH CONTRAST IF INDICATED 25Svw8022 07:39AM [facility] Order Number: [id number]    - Patient Instructions: To schedule this appointment, please contact Central Scheduling at [phone]  Test Name Result Flag Reference   ECHO COMPLETE WITH CONTRAST IF INDICATED (Report)     [address] [place] [address] Maggie Mckenzie [address]   [phone]     Transthoracic Echocardiogram   2D, M-mode, Doppler, and Color Doppler     Study date: 12-Dec-2017     Patient: Ron Bowman   MR number: [id number]   Account number: [de-identified]   : 1960   Age: 65 years   Gender: Male   Status: Outpatient   Location: Echo lab   Height: 70 in   Weight: 315 lb   BP: 122/ 60 mmHg     Indications: Murmur  Diagnoses: R01 1 - Cardiac murmur, unspecified     Sonographer:  [facility] Millie Fry   Interpreting Physician: Marcie Damon MD   Referring Physician: Apurva De Souza MD   Group: [facility]     SUMMARY     LEFT VENTRICLE:   Ejection fraction was estimated to be 60 %  Although no diagnostic regional wall motion abnormality was identified, this possibility cannot be completely excluded on the basis of this study  LEFT ATRIUM:   The atrium was mildly dilated  RIGHT ATRIUM:   The atrium was mildly dilated  MITRAL VALVE:   There was trace regurgitation  TRICUSPID VALVE:   There was trace regurgitation  PULMONIC VALVE:   There was trace regurgitation  HISTORY: PRIOR HISTORY: murmur, Risk factors: hypertension, hypercholesterolemia, and morbid obesity  PROCEDURE: The procedure was performed in the echo lab  This was a routine study  The transthoracic approach was used  The study included complete 2D imaging, M-mode, complete spectral Doppler, and color Doppler  The heart rate was 62 bpm,   at the start of the study   Images were obtained from the parasternal, apical, subcostal, and suprasternal notch acoustic windows  Echocardiographic views were limited due to poor acoustic window availability, decreased penetration, and   lung interference  This was a technically difficult study  LEFT VENTRICLE: Size was normal  Ejection fraction was estimated to be 60 %  Although no diagnostic regional wall motion abnormality was identified, this possibility cannot be completely excluded on the basis of this study  DOPPLER: Left   ventricular diastolic function parameters were normal      RIGHT VENTRICLE: The size was normal  Systolic function was normal  Wall thickness was normal      LEFT ATRIUM: The atrium was mildly dilated  RIGHT ATRIUM: The atrium was mildly dilated  MITRAL VALVE: Valve structure was normal  There was normal leaflet separation  DOPPLER: The transmitral velocity was within the normal range  There was no evidence for stenosis  There was trace regurgitation  AORTIC VALVE: The valve was probably trileaflet  The valve was not well visualized  DOPPLER: There was no evidence for stenosis  TRICUSPID VALVE: The valve structure was normal  There was normal leaflet separation  DOPPLER: The transtricuspid velocity was within the normal range  There was no evidence for stenosis  There was trace regurgitation  PULMONIC VALVE: Leaflets exhibited normal thickness, no calcification, and normal cuspal separation  DOPPLER: The transpulmonic velocity was within the normal range  There was trace regurgitation  PERICARDIUM: There was no pericardial effusion  The pericardium was normal in appearance  AORTA: The root exhibited normal size       SYSTEM MEASUREMENT TABLES     2D   %FS: 30 7 %   Ao Diam: 3 5 cm   EDV(Teich): 110 6 ml   EF(Teich): 58 1 %   ESV(Teich): 46 3 ml   IVSd: 0 9 cm   LA Area: 30 4 cm2   LA Diam: 3 7 cm   LVEDV MOD A4C: 240 ml   LVEF MOD A4C: 43 %   LVESV MOD A4C: 136 9 ml   LVIDd: 4 9 cm   LVIDs: 3 4 cm   LVLd A4C: 10 9 cm   LVLs A4C: 9 cm   LVPWd: 0 9 cm   RA Area: 21 3 cm2   RVIDd: 3 8 cm   SV MOD A4C: 103 2 ml   SV(Teich): 64 2 ml     CW   AV Env  Ti: 321 6 ms   AV VTI: 41 8 cm   AV Vmax: 1 9 m/s   AV Vmean: 1 3 m/s   AV maxP 4 mmHg   AV meanP 4 mmHg     MM   TAPSE: 2 7 cm     PW   AVC: 390 8 ms   E': 0 1 m/s   E/E': 11   LVOT Env  Ti: 353 9 ms   LVOT VTI: 37 3 cm   LVOT Vmax: 1 6 m/s   LVOT Vmean: 1 1 m/s   LVOT maxPG: 10 3 mmHg   LVOT meanP 1 mmHg   MV A Robin: 0 8 m/s   MV Dec [place]: 3 7 m/s2   MV DecT: 326 4 ms   MV E Robin: 1 2 m/s   MV E/A Ratio: 1 5   MV PHT: 94 7 ms   MVA By PHT: 2 3 cm2     [facility] Accredited Echocardiography Laboratory     Prepared and electronically signed by     Rena Ring MD   Signed 12-Dec-2017 13:10:00

## 2018-01-23 NOTE — MISCELLANEOUS
Dear Laurie Alvarez,  Please call the cardiologist office to schedule an appointment  They have notified us that they have tried to reach you        Electronically signed by:Jud Stevens OM  Jan 4 2018 10:08AM EST

## 2018-11-14 ENCOUNTER — APPOINTMENT (EMERGENCY)
Dept: RADIOLOGY | Facility: HOSPITAL | Age: 58
DRG: 699 | End: 2018-11-14
Payer: COMMERCIAL

## 2018-11-14 ENCOUNTER — HOSPITAL ENCOUNTER (INPATIENT)
Facility: HOSPITAL | Age: 58
LOS: 1 days | Discharge: HOME | DRG: 699 | End: 2018-11-15
Attending: EMERGENCY MEDICINE | Admitting: INTERNAL MEDICINE
Payer: COMMERCIAL

## 2018-11-14 ENCOUNTER — APPOINTMENT (EMERGENCY)
Dept: ULTRASOUND IMAGING | Facility: HOSPITAL | Age: 58
DRG: 699 | End: 2018-11-14
Payer: COMMERCIAL

## 2018-11-14 DIAGNOSIS — R60.1 ANASARCA: Primary | ICD-10-CM

## 2018-11-14 DIAGNOSIS — N17.9 ACUTE RENAL FAILURE (ARF) (HCC): ICD-10-CM

## 2018-11-14 PROBLEM — K75.81 LIVER CIRRHOSIS SECONDARY TO NASH (HCC): Status: ACTIVE | Noted: 2018-10-24

## 2018-11-14 PROBLEM — G47.33 OSA ON CPAP: Status: ACTIVE | Noted: 2018-04-26

## 2018-11-14 PROBLEM — Z99.89 OSA ON CPAP: Status: ACTIVE | Noted: 2018-04-26

## 2018-11-14 PROBLEM — E80.6 HYPERBILIRUBINEMIA: Status: ACTIVE | Noted: 2018-11-14

## 2018-11-14 PROBLEM — K74.60 LIVER CIRRHOSIS SECONDARY TO NASH (HCC): Status: ACTIVE | Noted: 2018-10-24

## 2018-11-14 LAB
ALBUMIN SERPL BCP-MCNC: 3.1 G/DL (ref 3.5–5.7)
ALP SERPL-CCNC: 96 U/L (ref 40–150)
ALT SERPL W P-5'-P-CCNC: 29 U/L (ref 7–52)
AMMONIA PLAS-SCNC: 59 UMOL/L (ref 25–90)
ANION GAP SERPL CALCULATED.3IONS-SCNC: 5 MMOL/L (ref 4–13)
APTT PPP: 30 SECONDS (ref 26–38)
ARTERIAL PATENCY WRIST A: YES
AST SERPL W P-5'-P-CCNC: 64 U/L (ref 13–39)
ATRIAL RATE: 54 BPM
BASE EXCESS BLDA CALC-SCNC: 0.7 MMOL/L (ref -2–3)
BASOPHILS # BLD AUTO: 0 THOUSANDS/ΜL (ref 0–0.1)
BASOPHILS NFR BLD AUTO: 0 % (ref 0–2)
BILIRUB SERPL-MCNC: 5.1 MG/DL (ref 0.2–1)
BILIRUB UR QL STRIP: NEGATIVE
BNP SERPL-MCNC: 365 PG/ML (ref 1–100)
BUN SERPL-MCNC: 18 MG/DL (ref 7–25)
CALCIUM SERPL-MCNC: 8.5 MG/DL (ref 8.6–10.5)
CHLORIDE SERPL-SCNC: 102 MMOL/L (ref 98–107)
CLARITY UR: CLEAR
CO2 SERPL-SCNC: 28 MMOL/L (ref 21–31)
COLOR UR: YELLOW
CREAT SERPL-MCNC: 1.1 MG/DL (ref 0.7–1.3)
EOSINOPHIL # BLD AUTO: 0 THOUSAND/ΜL (ref 0–0.61)
EOSINOPHIL NFR BLD AUTO: 0 % (ref 0–5)
ERYTHROCYTE [DISTWIDTH] IN BLOOD BY AUTOMATED COUNT: 14.8 % (ref 11.5–14.5)
GFR SERPL CREATININE-BSD FRML MDRD: 74 ML/MIN/1.73SQ M
GLUCOSE SERPL-MCNC: 122 MG/DL (ref 65–99)
GLUCOSE UR STRIP-MCNC: NEGATIVE MG/DL
HCO3 BLDA-SCNC: 24.3 MMOL/L (ref 22–28)
HCT VFR BLD AUTO: 37.1 % (ref 36.5–49.3)
HGB BLD-MCNC: 12.7 G/DL (ref 14–18)
HGB UR QL STRIP.AUTO: NEGATIVE
INR PPP: 1.37 (ref 0.9–1.5)
KETONES UR STRIP-MCNC: NEGATIVE MG/DL
LEUKOCYTE ESTERASE UR QL STRIP: NEGATIVE
LYMPHOCYTES # BLD AUTO: 0.3 THOUSANDS/ΜL (ref 0.6–4.47)
LYMPHOCYTES NFR BLD AUTO: 6 % (ref 21–51)
MACROCYTES BLD QL AUTO: PRESENT
MAGNESIUM SERPL-MCNC: 2.1 MG/DL (ref 1.9–2.7)
MCH RBC QN AUTO: 35.6 PG (ref 26–34)
MCHC RBC AUTO-ENTMCNC: 34.3 G/DL (ref 31–37)
MCV RBC AUTO: 104 FL (ref 81–99)
MONOCYTES # BLD AUTO: 0.4 THOUSAND/ΜL (ref 0.17–1.22)
MONOCYTES NFR BLD AUTO: 7 % (ref 2–12)
NEUTROPHILS # BLD AUTO: 4.7 THOUSANDS/ΜL (ref 1.4–6.5)
NEUTS SEG NFR BLD AUTO: 86 % (ref 42–75)
NITRITE UR QL STRIP: NEGATIVE
NON VENT ROOM AIR: 21 %
NRBC BLD AUTO-RTO: 0 /100 WBCS
O2 CT BLDA-SCNC: 14.8 ML/DL
OXYHGB MFR BLDA: 94.4 % (ref 94–100)
P AXIS: 48 DEGREES
PCO2 BLDA: 36.4 MM HG (ref 35–45)
PH BLDA: 7.44 [PH] (ref 7.35–7.45)
PH UR STRIP.AUTO: 6.5 [PH] (ref 5–8)
PLATELET # BLD AUTO: 51 THOUSANDS/UL (ref 149–390)
PLATELET BLD QL SMEAR: ABNORMAL
PMV BLD AUTO: 10.9 FL (ref 8.6–11.7)
PO2 BLDA: 70 MM HG (ref 80–100)
POTASSIUM SERPL-SCNC: 3.7 MMOL/L (ref 3.5–5.5)
PR INTERVAL: 202 MS
PROT SERPL-MCNC: 6.2 G/DL (ref 6.4–8.9)
PROT UR STRIP-MCNC: NEGATIVE MG/DL
PROTHROMBIN TIME: 16 SECONDS (ref 10.2–13)
QRS AXIS: -54 DEGREES
QRSD INTERVAL: 118 MS
QT INTERVAL: 492 MS
QTC INTERVAL: 467 MS
RBC # BLD AUTO: 3.58 MILLION/UL (ref 4.3–5.9)
RBC MORPH BLD: PRESENT
SODIUM SERPL-SCNC: 135 MMOL/L (ref 134–143)
SP GR UR STRIP.AUTO: 1.01 (ref 1–1.03)
SPECIMEN SOURCE: ABNORMAL
T WAVE AXIS: 55 DEGREES
T4 FREE SERPL-MCNC: 0.8 NG/DL (ref 0.76–1.46)
TROPONIN I SERPL-MCNC: <0.03 NG/ML
TSH SERPL DL<=0.05 MIU/L-ACNC: 2.98 UIU/ML (ref 0.45–5.33)
UROBILINOGEN UR QL STRIP.AUTO: 4 E.U./DL
VENTRICULAR RATE: 54 BPM
WBC # BLD AUTO: 5.4 THOUSAND/UL (ref 4.8–10.8)

## 2018-11-14 PROCEDURE — 85025 COMPLETE CBC W/AUTO DIFF WBC: CPT | Performed by: EMERGENCY MEDICINE

## 2018-11-14 PROCEDURE — 84484 ASSAY OF TROPONIN QUANT: CPT | Performed by: EMERGENCY MEDICINE

## 2018-11-14 PROCEDURE — 84439 ASSAY OF FREE THYROXINE: CPT | Performed by: EMERGENCY MEDICINE

## 2018-11-14 PROCEDURE — 82805 BLOOD GASES W/O2 SATURATION: CPT | Performed by: EMERGENCY MEDICINE

## 2018-11-14 PROCEDURE — 99223 1ST HOSP IP/OBS HIGH 75: CPT | Performed by: INTERNAL MEDICINE

## 2018-11-14 PROCEDURE — 85730 THROMBOPLASTIN TIME PARTIAL: CPT | Performed by: EMERGENCY MEDICINE

## 2018-11-14 PROCEDURE — 93005 ELECTROCARDIOGRAM TRACING: CPT

## 2018-11-14 PROCEDURE — 84443 ASSAY THYROID STIM HORMONE: CPT | Performed by: EMERGENCY MEDICINE

## 2018-11-14 PROCEDURE — 82140 ASSAY OF AMMONIA: CPT | Performed by: EMERGENCY MEDICINE

## 2018-11-14 PROCEDURE — 96374 THER/PROPH/DIAG INJ IV PUSH: CPT

## 2018-11-14 PROCEDURE — 76705 ECHO EXAM OF ABDOMEN: CPT

## 2018-11-14 PROCEDURE — 85610 PROTHROMBIN TIME: CPT | Performed by: EMERGENCY MEDICINE

## 2018-11-14 PROCEDURE — 36600 WITHDRAWAL OF ARTERIAL BLOOD: CPT

## 2018-11-14 PROCEDURE — 93010 ELECTROCARDIOGRAM REPORT: CPT | Performed by: INTERNAL MEDICINE

## 2018-11-14 PROCEDURE — 99285 EMERGENCY DEPT VISIT HI MDM: CPT

## 2018-11-14 PROCEDURE — 80053 COMPREHEN METABOLIC PANEL: CPT | Performed by: EMERGENCY MEDICINE

## 2018-11-14 PROCEDURE — 81003 URINALYSIS AUTO W/O SCOPE: CPT | Performed by: EMERGENCY MEDICINE

## 2018-11-14 PROCEDURE — 83735 ASSAY OF MAGNESIUM: CPT | Performed by: EMERGENCY MEDICINE

## 2018-11-14 PROCEDURE — 36415 COLL VENOUS BLD VENIPUNCTURE: CPT | Performed by: EMERGENCY MEDICINE

## 2018-11-14 PROCEDURE — 83880 ASSAY OF NATRIURETIC PEPTIDE: CPT | Performed by: EMERGENCY MEDICINE

## 2018-11-14 PROCEDURE — 71045 X-RAY EXAM CHEST 1 VIEW: CPT

## 2018-11-14 RX ORDER — CELECOXIB 50 MG/1
50 CAPSULE ORAL AS NEEDED
COMMUNITY
End: 2019-06-10 | Stop reason: HOSPADM

## 2018-11-14 RX ORDER — FUROSEMIDE 10 MG/ML
40 INJECTION INTRAMUSCULAR; INTRAVENOUS
Status: DISCONTINUED | OUTPATIENT
Start: 2018-11-15 | End: 2018-11-15 | Stop reason: HOSPADM

## 2018-11-14 RX ORDER — FUROSEMIDE 10 MG/ML
40 INJECTION INTRAMUSCULAR; INTRAVENOUS ONCE
Status: COMPLETED | OUTPATIENT
Start: 2018-11-14 | End: 2018-11-14

## 2018-11-14 RX ORDER — ACETAMINOPHEN 325 MG/1
650 TABLET ORAL EVERY 6 HOURS PRN
Status: DISCONTINUED | OUTPATIENT
Start: 2018-11-14 | End: 2018-11-15 | Stop reason: HOSPADM

## 2018-11-14 RX ORDER — ONDANSETRON 2 MG/ML
4 INJECTION INTRAMUSCULAR; INTRAVENOUS EVERY 6 HOURS PRN
Status: DISCONTINUED | OUTPATIENT
Start: 2018-11-14 | End: 2018-11-15 | Stop reason: HOSPADM

## 2018-11-14 RX ADMIN — FUROSEMIDE 40 MG: 10 INJECTION, SOLUTION INTRAVENOUS at 17:58

## 2018-11-14 NOTE — ED PROVIDER NOTES
History  Chief Complaint   Patient presents with    Shortness of Breath    Eye Swelling     From home, for past week has progressive fluid retention, in legs, abdominal wall, now eyelids, w/fatigue and mild SOB/LOCO, no fever or chest pain, starting apparently after starting two medicaitons (propranolol and torsemide) two weeks ago but details unclear  Denies prior pulmonary edema or renal failure  Family reports he "might have cirrhosis" but may be referring to fatty liver and details again unclear  Prior to Admission Medications   Prescriptions Last Dose Informant Patient Reported? Taking?   celecoxib (CeleBREX) 50 MG capsule 11/13/2018 at Unknown time  Yes Yes   Sig: Take 50 mg by mouth as needed for mild pain   lisinopril-hydrochlorothiazide (PRINZIDE,ZESTORETIC) 20-25 MG per tablet 11/14/2018 at Unknown time  Yes Yes   Sig: Take 1 tablet by mouth daily      Facility-Administered Medications: None       Past Medical History:   Diagnosis Date    Chronic pain disorder     lower back    CPAP (continuous positive airway pressure) dependence     Heart murmur     Hypertension     Myocardial infarction ([facility] [address] )     Sleep apnea     Vitamin D deficiency        Past Surgical History:   Procedure Laterality Date    KNEE ARTHROSCOPY Bilateral     KNEE SURGERY      [place] COLONOSCOPY FLX DX W/COLLJ SPEC WHEN PFRMD N/A 11/15/2017    Procedure: COLONOSCOPY;  Surgeon: Ray Heller MD;  Location: [facility]; Service: Gastroenterology       History reviewed  No pertinent family history  I have reviewed and agree with the history as documented  Social History   Substance Use Topics    Smoking status: Former Smoker     Quit date: 11/15/1990    Smokeless tobacco: Never Used    Alcohol use Yes      Comment: RARE        Review of Systems   Constitutional: Positive for fatigue  Negative for chills and fever  HENT: Negative for rhinorrhea and sore throat  Eyes: Negative for visual disturbance  Respiratory: Positive for shortness of breath  Negative for cough  Cardiovascular: Positive for leg swelling  Negative for chest pain  Gastrointestinal: Negative for abdominal pain, diarrhea, nausea and vomiting  Genitourinary: Negative for dysuria  Musculoskeletal: Negative for back pain and myalgias  Skin: Negative for rash  Neurological: Negative for dizziness and headaches  Psychiatric/Behavioral: Negative for confusion  All other systems reviewed and are negative  Physical Exam  Physical Exam   Constitutional: He is oriented to person, place, and time  He appears well-developed and well-nourished  HENT:   Nose: Nose normal    Mouth/Throat: Oropharynx is clear and moist  No oropharyngeal exudate  Eyes: Pupils are equal, round, and reactive to light  Conjunctivae and EOM are normal  No scleral icterus  Bilateral eyelid edema present    Neck: Normal range of motion  Neck supple  No JVD present  No tracheal deviation present  Cardiovascular: Regular rhythm and normal heart sounds  No murmur heard  Bradycardia at 55   Pulmonary/Chest: Effort normal  No respiratory distress  He has no wheezes  He has no rales  Trace bi-basal rales present    Abdominal: Soft  Bowel sounds are normal  There is no tenderness  There is no guarding  Abdominal wall pitting edema present    Musculoskeletal: Normal range of motion  He exhibits no tenderness  Right shoulder: He exhibits normal range of motion and no tenderness  2+ pitting edema of bilat  LEs   Neurological: He is alert and oriented to person, place, and time  No cranial nerve deficit or sensory deficit  He exhibits normal muscle tone  5/5 motor, nl sens   Skin: Skin is warm and dry  Psychiatric: He has a normal mood and affect  His behavior is normal    Nursing note and vitals reviewed        Vital Signs  ED Triage Vitals [11/14/18 1554]   Temperature Pulse Respirations Blood Pressure SpO2   99 2 °F (37 3 °C) 55 16 (!) 176/86 93 %      Temp Source Heart Rate Source Patient Position - Orthostatic VS BP Location FiO2 (%)   Temporal Monitor Lying Right arm --      Pain Score       No Pain           Vitals:    11/14/18 2340 11/15/18 0304 11/15/18 0708 11/15/18 1211   BP: 109/54 (!) 93/41 123/60 139/64   Pulse: 62 64 62 63   Patient Position - Orthostatic VS: Lying Lying Lying Lying       Visual Acuity      ED Medications  Medications   furosemide (LASIX) injection 40 mg (40 mg Intravenous Given 11/14/18 1758)       Diagnostic Studies  Results Reviewed     Procedure Component Value Units Date/Time    T4, free [[id number]]  (Normal) Collected:  11/14/18 1624    Lab Status:  Final result Specimen:  Blood from Arm, Left Updated:  11/14/18 2305     Free T4 0 80 ng/dL     UA w Reflex to Microscopic [[id number]]  (Abnormal) Collected:  11/14/18 1905    Lab Status:  Final result Specimen:  Urine from Urine, Clean Catch Updated:  11/14/18 1928     Color, UA Yellow     Clarity, UA Clear     Specific Gravity, UA 1 010     pH, UA 6 5     Leukocytes, UA Negative     Nitrite, UA Negative     Protein, UA Negative mg/dl      Glucose, UA Negative mg/dl      Ketones, UA Negative mg/dl      Urobilinogen, UA 4 0 (A) E U /dl      Bilirubin, UA Negative     Blood, UA Negative    Ammonia [[id number]]  (Normal) Collected:  11/14/18 1717    Lab Status:  Final result Specimen:  Blood from Arm, Right Updated:  11/14/18 1745     Ammonia 59 umol/L     TSH [[id number]]  (Normal) Collected:  11/14/18 1624    Lab Status:  Final result Specimen:  Blood from Arm, Left Updated:  11/14/18 1718     TSH 3RD GENERATON 2 980 uIU/mL     Narrative:         Patients undergoing fluorescein dye angiography may retain small amounts of fluorescein in the body for 48-72 hours post procedure  Samples containing fluorescein can produce falsely depressed TSH values  If the patient had this procedure,a specimen should be resubmitted post fluorescein clearance      B-Type Natriuretic Peptide [facility] ONLY) [[id number]]  (Abnormal) Collected:  11/14/18 1624    Lab Status:  Final result Specimen:  Blood from Arm, Left Updated:  11/14/18 1710      (H) pg/mL     Blood gas, arterial [[id number]]  (Abnormal) Collected:  11/14/18 1648    Lab Status:  Final result Specimen:  Blood, Arterial from Radial, Left Updated:  11/14/18 1706     pH, Arterial 7 440     pCO2, Arterial 36 4 mm Hg      pO2, Arterial 70 0 (L) mm Hg      HCO3, Arterial 24 3 mmol/L      Base Excess, Arterial 0 7 mmol/L      O2 Content, Arterial 14 8 mL/dL      O2 HGB,Arterial  94 4 %      SOURCE Radial, Left     SIMONE TEST Yes     ROOM AIR FIO2 21 %     Troponin I [[id number]]  (Normal) Collected:  11/14/18 1624    Lab Status:  Final result Specimen:  Blood from Arm, Left Updated:  11/14/18 1704     Troponin I <0 03 ng/mL     Comprehensive metabolic panel [[id number]]  (Abnormal) Collected:  11/14/18 1624    Lab Status:  Final result Specimen:  Blood from Arm, Left Updated:  11/14/18 1700     Sodium 135 mmol/L      Potassium 3 7 mmol/L      Chloride 102 mmol/L      CO2 28 mmol/L      ANION GAP 5 mmol/L      BUN 18 mg/dL      Creatinine 1 10 mg/dL      Glucose 122 (H) mg/dL      Calcium 8 5 (L) mg/dL      AST 64 (H) U/L      ALT 29 U/L      Alkaline Phosphatase 96 U/L      Total Protein 6 2 (L) g/dL      Albumin 3 1 (L) g/dL      Total Bilirubin 5 10 (H) mg/dL      eGFR 74 ml/min/1 73sq m     Narrative:         National Kidney Disease Education Program recommendations are as follows:  GFR calculation is accurate only with a steady state creatinine  Chronic Kidney disease less than 60 ml/min/1 73 sq  meters  Kidney failure less than 15 ml/min/1 73 sq  meters      Magnesium [[id number]]  (Normal) Collected:  11/14/18 1624    Lab Status:  Final result Specimen:  Blood from Arm, Left Updated:  11/14/18 1700     Magnesium 2 1 mg/dL     APTT [[id number]]  (Normal) Collected:  11/14/18 1624    Lab Status:  Final result Specimen:  Blood from Arm, Left Updated:  11/14/18 1654     PTT 30 seconds     Protime-INR [[id number]]  (Abnormal) Collected:  11/14/18 1624    Lab Status:  Final result Specimen:  Blood from Arm, Left Updated:  11/14/18 1654     Protime 16 0 (H) seconds      INR 1 37    CBC and differential [[id number]]  (Abnormal) Collected:  11/14/18 1624    Lab Status:  Final result Specimen:  Blood from Arm, Left Updated:  11/14/18 1648     WBC 5 40 Thousand/uL      RBC 3 58 (L) Million/uL      Hemoglobin 12 7 (L) g/dL      Hematocrit 37 1 %       (H) fL      MCH 35 6 (H) pg      MCHC 34 3 g/dL      RDW 14 8 (H) %      MPV 10 9 fL      Platelets 51 (L) Thousands/uL      nRBC 0 /100 WBCs      Neutrophils Relative 86 (H) %      Lymphocytes Relative 6 (L) %      Monocytes Relative 7 %      Eosinophils Relative 0 %      Basophils Relative 0 %      Neutrophils Absolute 4 70 Thousands/µL      Lymphocytes Absolute 0 30 (L) Thousands/µL      Monocytes Absolute 0 40 Thousand/µL      Eosinophils Absolute 0 00 Thousand/µL      Basophils Absolute 0 00 Thousands/µL                  US gallbladder   Final Result by Yanni Ferrari (45/98 1907)   Cholelithiasis  No gallbladder wall thickening or biliary dilatation is   appreciated  No significant change compared to the prior ultrasound except that the   mild wall thickening in the gallbladder seen on the prior study is not   seen today  Signed by Karmen Franco MD      XR chest 1 view portable   Final Result by Andrew Gonzalez (11/14 7911)   Cardiomegaly with very small bilateral effusions and increased vascular   congestion  These findings may represent CHF  Clinical correlation is   recommended           Signed by Andrew Gonzalez MD                 Procedures  ECG 12 Lead Documentation  Date/Time: 11/14/2018 4:55 PM  Performed by: Noah Gomez  Authorized by: Noah Gomez     ECG reviewed by me, the ED Provider: yes    Patient location:  ED  Comments:      Sinus bradycardia at 55, w/1st degree AVB, no acute ST segment changes, no peaked T waves, no PVCs           Phone Contacts  ED Phone Contact    ED Course  ED Course as of Nov 18 0818   Wed Nov 14, 2018   Hrbarbara 21 Prior records reviewed: ECHO 12/12/2017 showed LVEF=60%; Labs 10/25/2017 showed T bili-2 7; US abdomen 11/22/2017 suggested cirrhosis     1746 Ammonia: 61   1814 Patient's PMD Dr Jay, notes start of propranolol and torsemine 2 weeks ago for portal hypertension and that recent elevation of T bili likely "indirect" so has concern for hemolytic anemia of unclear etiology; agrees w/admission; requests call back from admitting team at  Stable overall; has CHF/pulmonary edema, normal renal function and TSH, w/mild increase in T bili; discussed w/Dr Clint Brothers (Hospitalist) who will admit here pending US of RUQ to r/o biliary obstruction (if present would need transfer to facility w/appropriate GI capabilities, eg [facility])    1850 Signed out to Dr Memo Santos pending GB ultrasound report, w/plan for admission her if no biliary obstruction                                   MDM  Number of Diagnoses or Management Options  Diagnosis management comments: Initial Impression: progressive generalized edema/anasarca, no hx renal failure, per labs on 10/25/2018 had normal renal function (Cr=0 82); ddx includes acute renal failure, hypothyroid state, CHF; will screen w/CXR and labs, keep on cardiac monitor, then re-evaluate    CritCare Time    Disposition  Final diagnoses:   Anasarca   Acute renal failure (ARF) ([address] )     Time reflects when diagnosis was documented in both MDM as applicable and the Disposition within this note     Time User Action Codes Description Comment    11/14/2018  7:31 PM Dakota Goldstein [R60 1] Anasarca     11/14/2018  7:32 PM Dakota Goldstein [N17 9] Acute renal failure (ARF) [facility])       ED Disposition     ED Disposition Condition Comment    Admit  Case was discussed with Dr Clint Brothers and the patient's admission status was agreed to be Admission Status: inpatient status to the service of Dr Kyaw Dubon   Follow-up Information    None         Discharge Medication List as of 11/15/2018 12:41 PM      START taking these medications    Details   !! furosemide (LASIX) 20 mg tablet Take 1 tablet (20 mg total) by mouth 2 (two) times a day for 4 days, Starting Thu 11/15/2018, Until Mon 11/19/2018, Normal      !! furosemide (LASIX) 20 mg tablet Take 1 tablet (20 mg total) by mouth daily for 4 days, Starting Mon 11/19/2018, Until Fri 11/23/2018, Normal       !! - Potential duplicate medications found  Please discuss with provider        CONTINUE these medications which have NOT CHANGED    Details   celecoxib (CeleBREX) 50 MG capsule Take 50 mg by mouth as needed for mild pain, Historical Med      lisinopril-hydrochlorothiazide (PRINZIDE,ZESTORETIC) 20-25 MG per tablet Take 1 tablet by mouth daily, Historical Med             Outpatient Discharge Orders  Activity as tolerated     Call provider for:         ED Provider  Electronically Signed by           Tonny Jennings MD  11/18/18 3433

## 2018-11-15 VITALS
DIASTOLIC BLOOD PRESSURE: 64 MMHG | SYSTOLIC BLOOD PRESSURE: 139 MMHG | TEMPERATURE: 97.4 F | RESPIRATION RATE: 18 BRPM | BODY MASS INDEX: 44.1 KG/M2 | WEIGHT: 315 LBS | HEIGHT: 71 IN | OXYGEN SATURATION: 96 % | HEART RATE: 63 BPM

## 2018-11-15 PROBLEM — K75.81 LIVER CIRRHOSIS SECONDARY TO NASH (HCC): Chronic | Status: ACTIVE | Noted: 2018-10-24

## 2018-11-15 PROBLEM — Z99.89 OSA ON CPAP: Chronic | Status: ACTIVE | Noted: 2018-04-26

## 2018-11-15 PROBLEM — K76.9 LIVER LESION: Status: ACTIVE | Noted: 2018-11-15

## 2018-11-15 PROBLEM — E80.6 HYPERBILIRUBINEMIA: Chronic | Status: ACTIVE | Noted: 2018-11-14

## 2018-11-15 PROBLEM — R06.02 SHORTNESS OF BREATH: Status: ACTIVE | Noted: 2018-11-15

## 2018-11-15 PROBLEM — K74.60 LIVER CIRRHOSIS SECONDARY TO NASH (HCC): Chronic | Status: ACTIVE | Noted: 2018-10-24

## 2018-11-15 PROBLEM — K76.9 LIVER LESION: Chronic | Status: ACTIVE | Noted: 2018-11-15

## 2018-11-15 PROBLEM — K80.20 CALCULUS OF GALLBLADDER WITHOUT CHOLECYSTITIS WITHOUT OBSTRUCTION: Status: ACTIVE | Noted: 2018-11-15

## 2018-11-15 PROBLEM — G47.33 OSA ON CPAP: Chronic | Status: ACTIVE | Noted: 2018-04-26

## 2018-11-15 PROBLEM — R60.1 ANASARCA: Chronic | Status: ACTIVE | Noted: 2018-11-14

## 2018-11-15 PROBLEM — M15.9 PRIMARY OSTEOARTHRITIS INVOLVING MULTIPLE JOINTS: Chronic | Status: ACTIVE | Noted: 2018-11-15

## 2018-11-15 PROBLEM — R06.02 SHORTNESS OF BREATH: Chronic | Status: ACTIVE | Noted: 2018-11-15

## 2018-11-15 PROBLEM — K80.20 CALCULUS OF GALLBLADDER WITHOUT CHOLECYSTITIS WITHOUT OBSTRUCTION: Chronic | Status: ACTIVE | Noted: 2018-11-15

## 2018-11-15 LAB
ALBUMIN SERPL BCP-MCNC: 2.7 G/DL (ref 3.5–5.7)
ALP SERPL-CCNC: 88 U/L (ref 40–150)
ALT SERPL W P-5'-P-CCNC: 25 U/L (ref 7–52)
ANION GAP SERPL CALCULATED.3IONS-SCNC: 6 MMOL/L (ref 4–13)
ANISOCYTOSIS BLD QL SMEAR: PRESENT
AST SERPL W P-5'-P-CCNC: 49 U/L (ref 13–39)
BILIRUB SERPL-MCNC: 4.2 MG/DL (ref 0.2–1)
BUN SERPL-MCNC: 18 MG/DL (ref 7–25)
CALCIUM SERPL-MCNC: 8.3 MG/DL (ref 8.6–10.5)
CHLORIDE SERPL-SCNC: 104 MMOL/L (ref 98–107)
CO2 SERPL-SCNC: 28 MMOL/L (ref 21–31)
CREAT SERPL-MCNC: 1 MG/DL (ref 0.7–1.3)
ERYTHROCYTE [DISTWIDTH] IN BLOOD BY AUTOMATED COUNT: 15.1 % (ref 11.5–14.5)
GFR SERPL CREATININE-BSD FRML MDRD: 83 ML/MIN/1.73SQ M
GLUCOSE SERPL-MCNC: 104 MG/DL (ref 65–99)
HCT VFR BLD AUTO: 34.2 % (ref 36.5–49.3)
HGB BLD-MCNC: 11.7 G/DL (ref 14–18)
LG PLATELETS BLD QL SMEAR: PRESENT
MAGNESIUM SERPL-MCNC: 2.1 MG/DL (ref 1.9–2.7)
MCH RBC QN AUTO: 35.6 PG (ref 26–34)
MCHC RBC AUTO-ENTMCNC: 34.1 G/DL (ref 31–37)
MCV RBC AUTO: 104 FL (ref 81–99)
PLATELET # BLD AUTO: 45 THOUSANDS/UL (ref 149–390)
PLATELET BLD QL SMEAR: ABNORMAL
PMV BLD AUTO: 10.7 FL (ref 8.6–11.7)
POTASSIUM SERPL-SCNC: 3.2 MMOL/L (ref 3.5–5.5)
PROT SERPL-MCNC: 5.8 G/DL (ref 6.4–8.9)
RBC # BLD AUTO: 3.28 MILLION/UL (ref 4.3–5.9)
RBC MORPH BLD: ABNORMAL
SODIUM SERPL-SCNC: 138 MMOL/L (ref 134–143)
WBC # BLD AUTO: 6 THOUSAND/UL (ref 4.8–10.8)

## 2018-11-15 PROCEDURE — 94760 N-INVAS EAR/PLS OXIMETRY 1: CPT

## 2018-11-15 PROCEDURE — 80053 COMPREHEN METABOLIC PANEL: CPT | Performed by: INTERNAL MEDICINE

## 2018-11-15 PROCEDURE — 99238 HOSP IP/OBS DSCHRG MGMT 30/<: CPT | Performed by: NURSE PRACTITIONER

## 2018-11-15 PROCEDURE — 83735 ASSAY OF MAGNESIUM: CPT | Performed by: INTERNAL MEDICINE

## 2018-11-15 PROCEDURE — 85027 COMPLETE CBC AUTOMATED: CPT | Performed by: INTERNAL MEDICINE

## 2018-11-15 RX ORDER — FUROSEMIDE 20 MG/1
20 TABLET ORAL DAILY
Qty: 4 TABLET | Refills: 0 | Status: SHIPPED | OUTPATIENT
Start: 2018-11-19 | End: 2019-06-10 | Stop reason: HOSPADM

## 2018-11-15 RX ORDER — FUROSEMIDE 20 MG/1
20 TABLET ORAL 2 TIMES DAILY
Qty: 8 TABLET | Refills: 0 | Status: ON HOLD | OUTPATIENT
Start: 2018-11-15 | End: 2019-06-10

## 2018-11-15 RX ADMIN — FUROSEMIDE 40 MG: 10 INJECTION, SOLUTION INTRAVENOUS at 09:07

## 2018-11-15 NOTE — SOCIAL WORK
CM met with pot at bedside and explained role  Pt lives with his wife in a mobile home; 2 MIKA  Pt reports he is independent with ADLs  Pt reports he has CPAP at home, no other DME  Pt PCP is Dr Carlos Melo  He uses [facility] in [place]  Pt denies hte need for any discharge services at this time  He indicates family will transport him home upon discharge  CM to follow as needed  CM reviewed d/c planning process including the following: identifying help at home, patient preference for d/c planning needs, availability of treatment team to discuss questions or concerns patient and/or family may have regarding understanding medications and recognizing signs and symptoms once discharged  CM also encouraged patient to follow up with all recommended appointments after discharge  Patient advised of importance for patient and family to participate in managing patients medical well being

## 2018-11-15 NOTE — PHYSICAL THERAPY NOTE
Physical Therapy Screen    Patient Name: Alberta Jerome    QMHRW'M Date: 11/15/2018     Problem List  Patient Active Problem List   Diagnosis    Anasarca    CROW on CPAP    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 [facility]    Hypertension    Hyperbilirubinemia        Past Medical History  Past Medical History:   Diagnosis Date    Chronic pain disorder     lower back    CPAP (continuous positive airway pressure) dependence     Heart murmur     Hypertension     [facility] infarction [facility]     Sleep apnea     Vitamin D deficiency         Past Surgical History  Past Surgical History:   Procedure Laterality Date    KNEE ARTHROSCOPY Bilateral     KNEE SURGERY      [place] COLONOSCOPY FLX DX W/COLLJ SPEC WHEN PFRMD N/A 11/15/2017    Procedure: COLONOSCOPY;  Surgeon: Gregoria Suazo MD;  Location: [facility]; Service: Gastroenterology        SUBJECT:   "I feel just fine getting around  I've been up to the bathroom a few times today because they are giving me Lasix "  OBJECTIVE:  Pt reporting he is at independent level at this time, does not require therapy services 2/2 such  Pt's RN made aware  PLAN:   D/C PT, please reconsult if patient requires PT services at later time

## 2018-11-15 NOTE — ASSESSMENT & PLAN NOTE
· Unclear etiology, possible diastolic CHF or nephrotic syndrome  · Will monitor I&Os daily weight - patient did have a 3 lb weight loss over night  · Check echo - patient will be discharged prior to echo completion  · Lasix - patient did receive IV Lasix overnight, he will be sent home on oral Lasix  Dosing will be 20 mg twice a day for 4 days, then 20 mg once a day for 4 days  The patient does have a history of eye swelling secondary to a diuretic use in the past, patient educated on signs and symptoms and instructed to stop taking Lasix this occurs

## 2018-11-15 NOTE — ASSESSMENT & PLAN NOTE
· BP initially elevated in the ER however after Lasix dose has improved  · Will continue Lasix  · Lisinopril/HCTZ held

## 2018-11-15 NOTE — ASSESSMENT & PLAN NOTE
· Possibly related to patient's history of cirrhosis  · No abdominal pain or cholangitic signs  · No biliary dilatation noted on right upper quadrant ultrasound  · Ultrasound did show limited focal liver lesion due to poor acoustic window  · Patient should follow up with PCP and GI

## 2018-11-15 NOTE — ASSESSMENT & PLAN NOTE
· Possibly related to patient's history of cirrhosis  · No abdominal pain or cholangitic signs  · No biliary dilatation noted on right upper quadrant ultrasound  · Repeat bilirubin level in the morning

## 2018-11-15 NOTE — PLAN OF CARE
Problem: DISCHARGE PLANNING - CARE MANAGEMENT  Goal: Discharge to post-acute care or home with appropriate resources  INTERVENTIONS:  - Conduct assessment to determine patient/family and health care team treatment goals, and need for post-acute services based on payer coverage, community resources, and patient preferences, and barriers to discharge  - Address psychosocial, clinical, and financial barriers to discharge as identified in assessment in conjunction with the patient/family and health care team  - Arrange appropriate level of post-acute services according to patient's   needs and preference and payer coverage in collaboration with the physician and health care team  - Communicate with and update the patient/family, physician, and health care team regarding progress on the discharge plan  - Arrange appropriate transportation to post-acute venues  - patients goal is to return home with family upon discharge   Outcome: Progressing

## 2018-11-15 NOTE — H&P
H&P- Regan Covert 1960, 63 y o  male MRN: [id number]    Unit/Bed#: -01 Encounter: [id number]    Primary Care Provider: Danish Arechiga DO   Date and time admitted to hospital: 11/14/2018  3:49 PM        * Anasarca   Assessment & Plan    · Unclear etiology, possible diastolic CHF or nephrotic syndrome  · Will monitor I&Os daily weight  · Check echo  · Lasix     Hyperbilirubinemia   Assessment & Plan    · Possibly related to patient's history of cirrhosis  · No abdominal pain or cholangitic signs  · No biliary dilatation noted on right upper quadrant ultrasound  · Repeat bilirubin level in the morning     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 [facility])   Assessment & Plan    · No signs of acute decompensated liver failure at this time  · Will continue supportive care  · Patient states he has not seen a gastroenterologist in the past and is unaware of diagnosis of cirrhosis  Cirrhosis is listed in his past medical history on previous chart review  · Follow up with GI as outpatient     Hypertension   Assessment & Plan    · BP initially elevated in the ER however after Lasix dose has improved  · Will continue Lasix  · Lisinopril/HCTZ held     CROW on CPAP   Assessment & Plan    · Patient noncompliant with CPAP at home  · Will continue as needed       VTE Prophylaxis: Enoxaparin (Lovenox)  Code Status: full  POLST: There is no POLST form on file for this patient (pre-hospital)  Discussion with family: wife    Anticipated Length of Stay:  Patient will be admitted on an Inpatient basis with an anticipated length of stay of  > 2 midnights  Justification for Hospital Stay: anasarca    Total Time for Visit, including Counseling / Coordination of Care: 45 minutes  Greater than 50% of this total time spent on direct patient counseling and coordination of care  Chief Complaint:         Generalized Edema    History of Present Illness:    Regan Stylest is a 63 y o  male who presents with generalized edema   Patient states that over the last week he has noticed increased swelling of his lower extremities that has progressed to include his abdomen as well as face  States that at 1 point his swelling got bad and he could barely open his eyes  Patient recently had some medications adjusted by his PCP and thinks that this may be the reason  Patient was taken off combination pill lisinopril/HCTZ and started on torsemide and propanolol  Patient states he is compliant with low-salt diet  Denies any excessive fluid intake  No history of any heart failure  Review of Systems:  Review of Systems   Constitutional: Positive for activity change, fatigue and unexpected weight change  Respiratory: Negative for cough and shortness of breath  Cardiovascular: Positive for leg swelling  Negative for chest pain and palpitations  Gastrointestinal: Positive for abdominal distention  Negative for abdominal pain and nausea  Genitourinary: Negative for difficulty urinating and dysuria  Neurological: Positive for weakness  All other systems reviewed and are negative  Past Medical and Surgical History:   Past Medical History:   Diagnosis Date    Chronic pain disorder     lower back    CPAP (continuous positive airway pressure) dependence     Heart murmur     Hypertension     Myocardial infarction ([facility] [address] )     Sleep apnea     Vitamin D deficiency        Past Surgical History:   Procedure Laterality Date    KNEE ARTHROSCOPY Bilateral     KNEE SURGERY      AL COLONOSCOPY FLX DX W/COLLJ SPEC WHEN PFRMD N/A 11/15/2017    Procedure: COLONOSCOPY;  Surgeon: Sean Jain MD;  Location: [facility]; Service: Gastroenterology       Meds/Allergies:  Prior to Admission medications    Medication Sig Start Date End Date Taking?  Authorizing Provider   celecoxib (CeleBREX) 50 MG capsule Take 50 mg by mouth as needed for mild pain   Yes Historical Provider, MD   lisinopril-hydrochlorothiazide (PRINZIDE,ZESTORETIC) 20-25 MG per tablet Take 1 tablet by mouth daily   Yes Historical Provider, MD     I have reviewed home medications with patient personally  Allergies: Allergies   Allergen Reactions    Propranolol Eye Swelling    Torsemide Eye Swelling       Social History:  Marital Status: /Civil Union   Patient Pre-hospital Living Situation: lives with wife  Patient Pre-hospital Level of Mobility: ambulatory  Patient Pre-hospital Diet Restrictions: none  Substance Use History:     History   Alcohol Use    Yes     Comment: RARE     History   Smoking Status    Former Smoker    Quit date: 11/15/1990   Smokeless Tobacco    Never Used     History   Drug Use No       Family History:  Father with history of early coronary artery disease    Physical Exam:   Vitals:   Blood Pressure: 124/60 (11/14/18 2043)  Pulse: 60 (11/14/18 2043)  Temperature: 99 2 °F (37 3 °C) (11/14/18 2043)  Temp Source: Temporal (11/14/18 2043)  Respirations: 22 (11/14/18 2043)  Height: 5' 11" (180 3 cm) (Stated) (11/14/18 2043)  Weight - Scale: (!) 159 kg (350 lb 8 5 oz) (11/14/18 2108)  SpO2: 96 % (11/14/18 2043)    Physical Exam   Constitutional: He is oriented to person, place, and time  He appears well-developed  No distress  HENT:   Head: Normocephalic and atraumatic  Eyes: Pupils are equal, round, and reactive to light  Conjunctivae and EOM are normal    Neck: Normal range of motion  Neck supple  Cardiovascular: Normal rate and regular rhythm  Pulmonary/Chest: Effort normal  No respiratory distress  He has no wheezes  Abdominal: Soft  He exhibits distension  There is no tenderness  Musculoskeletal:   Patient has bilateral lower extremity edema as well as abdominal wall edema   Neurological: He is alert and oriented to person, place, and time  No cranial nerve deficit  Skin: Skin is warm and dry  Psychiatric: He has a normal mood and affect  Additional Data:   Lab Results: I have personally reviewed pertinent reports          Results from last 7 days  Lab Units 11/14/18  1624   WBC Thousand/uL 5 40   HEMOGLOBIN g/dL 12 7*   HEMATOCRIT % 37 1   PLATELETS Thousands/uL 51*   NEUTROS PCT % 86*   LYMPHS PCT % 6*   MONOS PCT % 7   EOS PCT % 0       Results from last 7 days  Lab Units 11/14/18  1624   POTASSIUM mmol/L 3 7   CHLORIDE mmol/L 102   CO2 mmol/L 28   BUN mg/dL 18   CREATININE mg/dL 1 10   CALCIUM mg/dL 8 5*   ALK PHOS U/L 96   ALT U/L 29   AST U/L 64*       Results from last 7 days  Lab Units 11/14/18  1624   INR  1 37               Imaging: I have personally reviewed pertinent reports  US gallbladder   Final Result by Bolivar Monreal (34/65 [id number])   Cholelithiasis  No gallbladder wall thickening or biliary dilatation is   appreciated  No significant change compared to the prior ultrasound except that the   mild wall thickening in the gallbladder seen on the prior study is not   seen today  Signed by Demarco Rice MD      XR chest 1 view portable   Final Result by Hemal Wharton (11/14 4001)   Cardiomegaly with very small bilateral effusions and increased vascular   congestion  These findings may represent CHF  Clinical correlation is   recommended  Signed by Hemal Wharton MD          [facility] Reviewed: Yes     ** Please Note: This note has been constructed using a voice recognition system   **

## 2018-11-15 NOTE — ED CARE HANDOFF
Emergency Department Sign Out Note        Sign out and transfer of care from Dr Nestor Torres See Separate Emergency Department note  The patient, Ronnie Guy, was evaluated by the previous provider for anasarca and eye swelling   Workup Completed:  Labs CT and Gallbladder US    ED Course / Workup Pending (followup): Pt was found to have as Dr Maday Schroeder to liver cirrhosis  Ultrasound is normal   Will admit  Procedures  MDM  CritCare Time      Disposition  Final diagnoses:   Anasarca   Acute renal failure (ARF) ([facility] [address] )     Time reflects when diagnosis was documented in both MDM as applicable and the Disposition within this note     Time User Action Codes Description Comment    11/14/2018  7:31 PM Darcy Langford [R60 1] [address]     11/14/2018  7:32 PM Cliff Tegan Add [N17 9] Acute renal failure (ARF) [facility])       ED Disposition     None      Follow-up Information    None       Patient's Medications   Discharge Prescriptions    No medications on file     No discharge procedures on file         ED Provider  Electronically Signed by     Ana Silveira MD  11/14/18 2145

## 2018-11-15 NOTE — PLAN OF CARE
DISCHARGE PLANNING     Discharge to home or other facility with appropriate resources Progressing        INFECTION - ADULT     Absence of fever/infection during neutropenic period Progressing        Knowledge Deficit     Patient/family/caregiver demonstrates understanding of disease process, treatment plan, medications, and discharge instructions Progressing        PAIN - ADULT     Verbalizes/displays adequate comfort level or baseline comfort level Progressing        Potential for Falls     Patient will remain free of falls Progressing        SAFETY ADULT     Patient will remain free of falls Progressing     Maintain or return to baseline ADL function Progressing     Maintain or return mobility status to optimal level Progressing

## 2018-11-15 NOTE — DISCHARGE SUMMARY
Discharge- Good Zuleyma 1960, 63 y o  male MRN: [id number]    Unit/Bed#: -01 Encounter: [id number]    Primary Care Provider: Sharmila Arthur DO   Date and time admitted to hospital: 11/14/2018  3:49 PM        * Anasarca   Assessment & Plan    · Unclear etiology, possible diastolic CHF or nephrotic syndrome  · Will monitor I&Os daily weight - patient did have a 3 lb weight loss over night  · Check echo - patient will be discharged prior to echo completion  · Lasix - patient did receive IV Lasix overnight, he will be sent home on oral Lasix  Dosing will be 20 mg twice a day for 4 days, then 20 mg once a day for 4 days  The patient does have a history of eye swelling secondary to a diuretic use in the past, patient educated on signs and symptoms and instructed to stop taking Lasix this occurs  Hyperbilirubinemia   Assessment & Plan    · Possibly related to patient's history of cirrhosis  · No abdominal pain or cholangitic signs  · No biliary dilatation noted on right upper quadrant ultrasound  · Ultrasound did show limited focal liver lesion due to poor acoustic window  · Patient should follow up with PCP and GI  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 [facility])   Assessment & Plan    · No signs of acute decompensated liver failure at this time  · Will continue supportive care  · Patient states he has not seen a gastroenterologist in the past and is unaware of diagnosis of cirrhosis    Cirrhosis is listed in his past medical history on previous chart review  · Follow up with GI as outpatient     Hypertension   Assessment & Plan    · BP initially elevated in the ER however after Lasix dose has improved  · Will continue Lasix - continue as previously mentioned  · Lisinopril/HCTZ held - will restart as an outpatient     CROW on CPAP   Assessment & Plan    · Patient noncompliant with CPAP at home  · Will continue as needed     Shortness of breath   Assessment & Plan    · Resolved     Liver lesion   Assessment & Plan    · Found on ultrasound of abdomen:  Limited focal liver lesion due to poor acoustic window  · Patient should follow up with outpatient GI     Primary osteoarthritis involving multiple joints   Assessment & Plan    · Patient has been taking p r n  Celebrex           Discharging Physician / Practitioner: Jaden Narvaez  PCP: Christie Mac DO  Admission Date:   Admission Orders     Ordered        11/14/18 1928  Inpatient Admission (expected length of stay for this patient is greater than two midnights)  Once             Discharge Date: 11/15/18    Resolved Problems  Date Reviewed: 11/15/2018    None          Consultations During Hospital Stay:  · None    Procedures Performed:   · Chest x-ray showed:  Cardiomegaly with very small bilateral effusions and increased vascular congestion  These findings may represent CHF  · Ultrasound of the gallbladder:  Cholelithiasis  No gallbladder wall thickening or biliary dilatation is appreciated  No significant change compared to the prior ultrasound except that the mild wall thickening in the gallbladder seen on a prior study is not seen today  Significant Findings / Test Results:   · None    Incidental Findings:   · Limited focal liver lesion due to poor acoustic window with a history of 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     Test Results Pending at Discharge (will require follow up): · None     Outpatient Tests Requested:  · Patient should follow up with outpatient GI    Complications:  None    Reason for Admission:  Generalized edema    Hospital Course:     Johnathon Damon is a 63 y o  male patient who originally presented to the hospital on 11/14/2018 due to generalized edema  Patient states that over the last week he has noticed increased swelling of his lower extremities that has progressed to include his abdomen as well as face  States that at one point his swelling got bad and he could barely open his eyes    Patient recently had some medications adjusted by his PCP and thinks that this may be the reason  Patient was taken off combination pill lisinopril/HCTZ and started on torsemide and propanolol  but lists both as an allergy with eye swelling  Patient states he is compliant with low-salt diet  Denies any excessive fluid intake  No history of any heart failure  Patient also has a history of pancytopenia his current platelet count is 45, white cell count is 6 00  Patient does take Celebrex p r n  For his osteoarthritis  Patient should follow up with outpatient GI for history of 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  Please see above list of diagnoses and related plan for additional information  Condition at Discharge: fair     Discharge Day Visit / Exam:     Subjective:  Patient is sitting in his bed, awaiting lunch and his daughter  Patient states that he is leaving when his daughter gets here because "snow is coming"  The daughter and the patient state that his edema is much less, to the point where yesterday he was unable to even open up his eyes today he has full movement of his eyelids  Vitals: Blood Pressure: 139/64 (11/15/18 1211)  Pulse: 63 (11/15/18 1211)  Temperature: (!) 97 4 °F (36 3 °C) (11/15/18 1211)  Temp Source: Tympanic (11/15/18 1211)  Respirations: 18 (11/15/18 1211)  Height: 5' 11" (180 3 cm) (Stated) (11/14/18 2043)  Weight - Scale: (!) 159 kg (350 lb 4 8 oz) (11/15/18 0600)  SpO2: 96 % (11/15/18 1211)  Exam:   Physical Exam   Constitutional: He is oriented to person, place, and time  Vital signs are normal  He appears well-developed  He is cooperative  HENT:   Head: Normocephalic and atraumatic  Nose: Nose normal    Mouth/Throat: Mucous membranes are normal    Eyes: Pupils are equal, round, and reactive to light  Conjunctivae and EOM are normal    Neck: Normal range of motion and full passive range of motion without pain  Neck supple  Cardiovascular: Regular rhythm  Bradycardia present      Murmur heard    Systolic murmur is present with a grade of 1/6   Pulses:       Dorsalis pedis pulses are 1+ on the right side, and 1+ on the left side  Patient states he no longer has edema, his legs are back to baseline  Pulmonary/Chest: Effort normal  He has decreased breath sounds in the right lower field and the left lower field  Abdominal: Soft  Normal appearance and bowel sounds are normal    Musculoskeletal: Normal range of motion  Neurological: He is alert and oriented to person, place, and time  Skin: Skin is warm and dry  Psychiatric: He has a normal mood and affect  His speech is normal and behavior is normal        Discussion with Family:  Reasons for follow up with outpatient GI, use of Celebrex, use of Lasix  Discharge instructions/Information to patient and family:   See after visit summary for information provided to patient and family  Provisions for Follow-Up Care:  See after visit summary for information related to follow-up care and any pertinent home health orders  Disposition:     Home    For Discharges to [facility] SNF:   · Not Applicable to this Patient - Not Applicable to this Patient    Planned Readmission:  No     Discharge Statement:  I spent 30 minutes discharging the patient  This time was spent on the day of discharge  I had direct contact with the patient on the day of discharge  Greater than 50% of the total time was spent examining patient, answering all patient questions, arranging and discussing plan of care with patient as well as directly providing post-discharge instructions  Additional time then spent on discharge activities  Discharge Medications:  See after visit summary for reconciled discharge medications provided to patient and family        ** Please Note: This note has been constructed using a voice recognition system **

## 2018-11-15 NOTE — ASSESSMENT & PLAN NOTE
· BP initially elevated in the ER however after Lasix dose has improved  · Will continue Lasix - continue as previously mentioned  · Lisinopril/HCTZ held - will restart as an outpatient

## 2018-11-15 NOTE — DISCHARGE INSTRUCTIONS
Furosemide (By mouth)   Furosemide ([id number]-mide)  Treats fluid retention (edema) and high blood pressure  This medicine is a diuretic (water pill)  Brand Name(s): Active-Medicated Specimen Collection Kit, Diuscreen Multi-Drug Medicated Test Kit, Lasix, RX-Specimen Collection Kit, Specimen Collection Kit   There may be other brand names for this medicine  When This Medicine Should Not Be Used: This medicine is not right for everyone  Do not use it if you had an allergic reaction to furosemide  How to Use This Medicine:   Liquid, Tablet  · Take your medicine as directed  Your dose may need to be changed several times to find what works best for you  · You may take this medicine with food if it upsets your stomach  · Oral liquid: Measure the oral liquid medicine with a marked measuring spoon, oral syringe, or medicine cup  · Tablet: Swallow the tablet whole  Do not crush, break, or chew it  · Missed dose: Take a dose as soon as you remember  If it is almost time for your next dose, wait until then and take a regular dose  Do not take extra medicine to make up for a missed dose  · Store the medicine in a closed container at room temperature, away from heat, moisture, and direct light  Drugs and Foods to Avoid:   Ask your doctor or pharmacist before using any other medicine, including over-the-counter medicines, vitamins, and herbal products  · Some medicines can affect how furosemide works   Tell your doctor if you are also using any of the following:  ¨ Cisplatin, cyclosporine, digoxin, ethacrynic acid, licorice, lithium, methotrexate, or phenytoin  ¨ Adrenocorticotropic hormone (ACTH)  ¨ Laxative  ¨ Medicine to treat an infection  ¨ NSAID pain or arthritis medicine (including aspirin, diclofenac, ibuprofen, indomethacin, naproxen)  ¨ Other blood pressure medicines  ¨ Steroid medicine (including dexamethasone, hydrocortisone, methylprednisolone, prednisolone, prednisone)  ¨ Thyroid medicine  · If you also take sucralfate, allow at least 2 hours between the time you take furosemide and the time you take sucralfate  · Alcohol, narcotic pain medicine, or sleeping pills may cause you to feel more lightheaded, dizzy, or faint when used with this medicine  Warnings While Using This Medicine:   · Tell your doctor if you are pregnant or breastfeeding, or if you have kidney disease, liver disease (including cirrhosis), diabetes, gout, low blood pressure, lupus, an enlarged prostate, trouble urinating, or an allergy to sulfa drugs  Tell your doctor if you are on a low-salt diet  · This medicine may cause the following problems:   ¨ Low levels of minerals in your blood, such as potassium and sodium  ¨ Blood sugar level changes  ¨ Hearing problems  · Make sure any doctor or dentist who treats you knows that you are using this medicine  · This medicine could lower your blood pressure too much, especially when you first use it or if you are dehydrated  Stand or sit up slowly if you feel lightheaded or dizzy  · This medicine may make your skin more sensitive to sunlight  Wear sunscreen  Do not use sunlamps or tanning beds  · Your doctor will do lab tests at regular visits to check on the effects of this medicine  Keep all appointments  · Keep all medicine out of the reach of children  Never share your medicine with anyone    Possible Side Effects While Using This Medicine:   Call your doctor right away if you notice any of these side effects:  · Allergic reaction: Itching or hives, swelling in your face or hands, swelling or tingling in your mouth or throat, chest tightness, trouble breathing  · Blistering, peeling, red skin rash  · Confusion, weakness, muscle twitching  · Dry mouth, increased thirst, muscle cramps, uneven heartbeat  · Sudden and severe stomach pain, nausea, vomiting, fever, lightheadedness  · Hearing loss, ringing in the ears  · Lightheadedness, dizziness, fainting  · Severe diarrhea  · Unusual bleeding or bruising  · Yellow skin or eyes  If you notice these less serious side effects, talk with your doctor:   · Loss of appetite, stomach cramps  If you notice other side effects that you think are caused by this medicine, tell your doctor  Call your doctor for medical advice about side effects  You may report side effects to FDA at 4-266-FDA-3923  © 2017 [employer] Information is for End User's use only and may not be sold, redistributed or otherwise used for commercial purposes  The above information is an  only  It is not intended as medical advice for individual conditions or treatments  Talk to your doctor, nurse or pharmacist before following any medical regimen to see if it is safe and effective for you

## 2018-11-15 NOTE — NURSING NOTE
Pt anxious about being d/c'd to home and doesn't want to stay in the hospital, hospitalist was in to see and assess the pt and he is ok for d/c to home, iv site removed with the tip intact and the tele monitor was removed, nsr noted prior to d/c, avs reviewed with the pt, all questions answered and the pt verbalized understanding, taken to his family car via w/c by the pca and the pt was d/c'd from the hospital

## 2018-11-15 NOTE — ASSESSMENT & PLAN NOTE
· No signs of acute decompensated liver failure at this time  · Will continue supportive care  · Patient states he has not seen a gastroenterologist in the past and is unaware of diagnosis of cirrhosis    Cirrhosis is listed in his past medical history on previous chart review  · Follow up with GI as outpatient

## 2018-11-15 NOTE — PLAN OF CARE
DISCHARGE PLANNING     Discharge to home or other facility with appropriate resources Adequate for Discharge        DISCHARGE PLANNING - CARE MANAGEMENT     Discharge to post-acute care or home with appropriate resources Adequate for Discharge        INFECTION - ADULT     Absence of fever/infection during neutropenic period Adequate for Discharge        Knowledge Deficit     Patient/family/caregiver demonstrates understanding of disease process, treatment plan, medications, and discharge instructions Adequate for Discharge        PAIN - ADULT     Verbalizes/displays adequate comfort level or baseline comfort level Adequate for Discharge    No pain    Potential for Falls     Patient will remain free of falls Adequate for Discharge    No falls    SAFETY ADULT     Patient will remain free of falls Adequate for Discharge     Maintain or return to baseline ADL function Adequate for Discharge     Maintain or return mobility status to optimal level Adequate for Discharge

## 2018-11-15 NOTE — ASSESSMENT & PLAN NOTE
· Found on ultrasound of abdomen:  Limited focal liver lesion due to poor acoustic window  · Patient should follow up with outpatient GI

## 2018-11-15 NOTE — ASSESSMENT & PLAN NOTE
· Unclear etiology, possible diastolic CHF or nephrotic syndrome  · Will monitor I&Os daily weight  · Check echo  · Lasix

## 2018-11-15 NOTE — UTILIZATION REVIEW
Noemi authorization attempted without success  Noemi unable to select facility     Initial Clinical Review    Admission: Date/Time/Statement: 11/14/18 @ 1928     Orders Placed This Encounter   Procedures    Inpatient Admission (expected length of stay for this patient is greater than two midnights)     Standing Status:   Standing     Number of Occurrences:   1     Order Specific Question:   Admitting Physician     Answer:   Reyna Crew     Order Specific Question:   Level of Care     Answer:   Med Surg [16]     Order Specific Question:   Estimated length of stay     Answer:   More than 2 Midnights     Order Specific Question:   Certification     Answer:   I certify that inpatient services are medically necessary for this patient for a duration of greater than two midnights  See H&P and MD Progress Notes for additional information about the patient's course of treatment  ED: Date/Time/Mode of Arrival:   ED Arrival Information     Expected Arrival Acuity Means of Arrival Escorted By Service Admission Type    - 11/14/2018 15:41 Emergent [facility] General Medicine Emergency    Arrival Complaint    allergic reaction to meds          Chief Complaint   Patient presents with    Shortness of Breath    Eye Swelling       History of Illness:  63year old male presents to ed with severe generalized edema affecting his legs abdomen and face  Recent medication adjustment by his PCP  Lisinopril and hct discontinued and torsemide and propanolol started  He is compliant with low salt diet and flid restrictions          ED Triage Vitals [11/14/18 1554]   99 2 °F (37 3 °C) 55 16 (!) 176/86 93 %      No Pain       11/15/18 (!) 159 kg (350 lb 4 8 oz)       Vital Signs (abnormal):     11/14/18 1800  55   27  110/57  91 %       11/15/18 0304  64  18   93/41   64 %           Abnormal Labs/Diagnostic Test Results:       XR chest 1 view portable   Cardiomegaly with very small bilateral effusions and increased vascular congestion  These findings may represent CHF  Clinical correlation is recommended  US gallbladder   Cholelithiasis  No gallbladder wall thickening or biliary dilatation is  appreciated  No significant change compared to the prior ultrasound except that the mild wall thickening in the gallbladder seen on the prior study is not  seen today          TOTAL BILIRUBIN  5 10    ABG  PO2  70 0    PLATELET ESTIMATE  DECREASED    PLATELETS  45       ED Treatment:   Medication Administration from 11/14/2018 1541 to 11/14/2018 2042       Date/Time Order Dose Route     11/14/2018 9638 furosemide (LASIX) injection 40 mg 40 mg Intravenous           Past Medical History:    Chronic pain disorder    CPAP (continuous positive airway pressure) dependence    Heart murmur    Hypertension    Myocardial infarction ([facility] [address] )    Sleep apnea    Vitamin D deficiency       Admitting Diagnosis: Anasarca [R60 1]  Acute renal failure (ARF) (HCC) [N17 9]  Shortness of breath [R06 02]    Age/Sex: 63 y o  male    Assessment/Plan:    Anasarca   Assessment & Plan     · Unclear etiology, possible diastolic CHF or nephrotic syndrome  · Will monitor I&Os daily weight  · Check echo  · Lasix         Admission Orders:    Telemetry  PT eval and treat  ECHO      Scheduled Meds:   acetaminophen 650 mg Oral Q6H PRN   furosemide 40 mg Intravenous BID (diuretic)   ondansetron 4 mg Intravenous Q6H PRN

## 2018-11-16 NOTE — UTILIZATION REVIEW
Notification of Discharge  This is a Notification of Discharge from our facility [address]  Please be advised that this patient has been discharge from our facility  Below you will find the admission and discharge date and time including the patients disposition  PRESENTATION DATE: 11/14/2018  3:49 PM  IP ADMISSION DATE: 11/14/18 1928  DISCHARGE DATE: 11/15/2018 12:50 PM  DISPOSITION: [id number] Utilization Review Department  Phone: [phone]; Fax [phone]  ATTENTION: Please call with any questions or concerns to [phone]  and carefully listen to the prompts so that you are directed to the right person  Send all requests for admission clinical reviews, approved or denied determinations and any other requests to fax [phone]   All voicemails are confidential

## 2018-11-26 ENCOUNTER — TRANSCRIBE ORDERS (OUTPATIENT)
Dept: ADMINISTRATIVE | Facility: HOSPITAL | Age: 58
End: 2018-11-26

## 2018-11-26 DIAGNOSIS — Z23 NEED FOR PROPHYLACTIC VACCINATION AND INOCULATION AGAINST CHOLERA ALONE: Primary | ICD-10-CM

## 2018-12-04 ENCOUNTER — HOSPITAL ENCOUNTER (OUTPATIENT)
Dept: NON INVASIVE DIAGNOSTICS | Facility: HOSPITAL | Age: 58
Discharge: HOME | End: 2018-12-04
Payer: COMMERCIAL

## 2018-12-04 DIAGNOSIS — Z23 NEED FOR PROPHYLACTIC VACCINATION AND INOCULATION AGAINST CHOLERA ALONE: ICD-10-CM

## 2018-12-04 PROCEDURE — 93306 TTE W/DOPPLER COMPLETE: CPT | Performed by: INTERNAL MEDICINE

## 2018-12-04 PROCEDURE — 93306 TTE W/DOPPLER COMPLETE: CPT

## 2019-05-05 ENCOUNTER — HOSPITAL ENCOUNTER (EMERGENCY)
Facility: HOSPITAL | Age: 59
Discharge: HOME | End: 2019-05-05
Payer: COMMERCIAL

## 2019-05-05 ENCOUNTER — APPOINTMENT (EMERGENCY)
Dept: NON INVASIVE DIAGNOSTICS | Facility: HOSPITAL | Age: 59
End: 2019-05-05
Payer: COMMERCIAL

## 2019-05-05 VITALS
OXYGEN SATURATION: 97 % | RESPIRATION RATE: 16 BRPM | BODY MASS INDEX: 44.1 KG/M2 | WEIGHT: 315 LBS | HEIGHT: 71 IN | DIASTOLIC BLOOD PRESSURE: 63 MMHG | HEART RATE: 69 BPM | TEMPERATURE: 98.1 F | SYSTOLIC BLOOD PRESSURE: 136 MMHG
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
## 2019-05-05 DIAGNOSIS — M79.604 BILATERAL LEG PAIN: Primary | ICD-10-CM

## 2019-05-05 DIAGNOSIS — M79.605 BILATERAL LEG PAIN: Primary | ICD-10-CM

## 2019-05-05 PROCEDURE — 99283 EMERGENCY DEPT VISIT LOW MDM: CPT

## 2019-05-05 PROCEDURE — 93970 EXTREMITY STUDY: CPT

## 2019-05-05 RX ORDER — DOXYCYCLINE HYCLATE 100 MG/1
100 CAPSULE ORAL 2 TIMES DAILY
Qty: 28 CAPSULE | Refills: 0 | Status: SHIPPED | OUTPATIENT
Start: 2019-05-05 | End: 2019-05-19

## 2019-05-06 PROCEDURE — 93970 EXTREMITY STUDY: CPT | Performed by: SURGERY

## 2019-06-07 ENCOUNTER — APPOINTMENT (EMERGENCY)
Dept: CT IMAGING | Facility: HOSPITAL | Age: 59
DRG: 433 | End: 2019-06-07
Payer: COMMERCIAL

## 2019-06-07 ENCOUNTER — APPOINTMENT (INPATIENT)
Dept: ULTRASOUND IMAGING | Facility: HOSPITAL | Age: 59
DRG: 433 | End: 2019-06-07
Payer: COMMERCIAL

## 2019-06-07 ENCOUNTER — HOSPITAL ENCOUNTER (INPATIENT)
Facility: HOSPITAL | Age: 59
LOS: 3 days | Discharge: HOME | DRG: 433 | End: 2019-06-10
Attending: EMERGENCY MEDICINE | Admitting: INTERNAL MEDICINE
Payer: COMMERCIAL

## 2019-06-07 DIAGNOSIS — K74.60 LIVER CIRRHOSIS SECONDARY TO NASH (HCC): Chronic | ICD-10-CM

## 2019-06-07 DIAGNOSIS — I10 ESSENTIAL HYPERTENSION: Chronic | ICD-10-CM

## 2019-06-07 DIAGNOSIS — D64.9 ANEMIA: ICD-10-CM

## 2019-06-07 DIAGNOSIS — E80.6 HYPERBILIRUBINEMIA: Chronic | ICD-10-CM

## 2019-06-07 DIAGNOSIS — K75.81 LIVER CIRRHOSIS SECONDARY TO NASH (HCC): Chronic | ICD-10-CM

## 2019-06-07 DIAGNOSIS — R60.1 ANASARCA: ICD-10-CM

## 2019-06-07 DIAGNOSIS — R60.9 FLUID RETENTION: Primary | ICD-10-CM

## 2019-06-07 PROBLEM — N28.9 RENAL INSUFFICIENCY: Status: ACTIVE | Noted: 2019-06-07

## 2019-06-07 PROBLEM — E66.01 MORBID OBESITY (HCC): Status: ACTIVE | Noted: 2019-06-07

## 2019-06-07 PROBLEM — D69.6 THROMBOCYTOPENIA (HCC): Status: ACTIVE | Noted: 2019-06-07

## 2019-06-07 LAB
ALBUMIN SERPL BCP-MCNC: 2.6 G/DL (ref 3.5–5)
ALP SERPL-CCNC: 116 U/L (ref 46–116)
ALT SERPL W P-5'-P-CCNC: 63 U/L (ref 12–78)
ANION GAP SERPL CALCULATED.3IONS-SCNC: 7 MMOL/L (ref 4–13)
APTT PPP: 37 SECONDS (ref 26–38)
AST SERPL W P-5'-P-CCNC: 77 U/L (ref 5–45)
ATRIAL RATE: 72 BPM
BASOPHILS # BLD AUTO: 0.01 THOUSANDS/ΜL (ref 0–0.1)
BASOPHILS NFR BLD AUTO: 0 % (ref 0–1)
BILIRUB DIRECT SERPL-MCNC: 1.42 MG/DL (ref 0–0.2)
BILIRUB SERPL-MCNC: 4.2 MG/DL (ref 0.2–1)
BILIRUB UR QL STRIP: NEGATIVE
BUN SERPL-MCNC: 28 MG/DL (ref 5–25)
CALCIUM SERPL-MCNC: 8.6 MG/DL (ref 8.3–10.1)
CHLORIDE SERPL-SCNC: 100 MMOL/L (ref 100–108)
CLARITY UR: CLEAR
CO2 SERPL-SCNC: 29 MMOL/L (ref 21–32)
COLOR UR: YELLOW
CREAT SERPL-MCNC: 1.34 MG/DL (ref 0.6–1.3)
EOSINOPHIL # BLD AUTO: 0.15 THOUSAND/ΜL (ref 0–0.61)
EOSINOPHIL NFR BLD AUTO: 3 % (ref 0–6)
ERYTHROCYTE [DISTWIDTH] IN BLOOD BY AUTOMATED COUNT: 15.3 % (ref 11.6–15.1)
GFR SERPL CREATININE-BSD FRML MDRD: 58 ML/MIN/1.73SQ M
GLUCOSE SERPL-MCNC: 141 MG/DL (ref 65–140)
GLUCOSE UR STRIP-MCNC: NEGATIVE MG/DL
HCT VFR BLD AUTO: 29.5 % (ref 36.5–49.3)
HGB BLD-MCNC: 9.8 G/DL (ref 12–17)
HGB UR QL STRIP.AUTO: NEGATIVE
IMM GRANULOCYTES # BLD AUTO: 0.04 THOUSAND/UL (ref 0–0.2)
IMM GRANULOCYTES NFR BLD AUTO: 1 % (ref 0–2)
INR PPP: 1.52 (ref 0.86–1.17)
KETONES UR STRIP-MCNC: NEGATIVE MG/DL
LEUKOCYTE ESTERASE UR QL STRIP: NEGATIVE
LYMPHOCYTES # BLD AUTO: 0.62 THOUSANDS/ΜL (ref 0.6–4.47)
LYMPHOCYTES NFR BLD AUTO: 13 % (ref 14–44)
MCH RBC QN AUTO: 35.4 PG (ref 26.8–34.3)
MCHC RBC AUTO-ENTMCNC: 33.2 G/DL (ref 31.4–37.4)
MCV RBC AUTO: 107 FL (ref 82–98)
MONOCYTES # BLD AUTO: 0.6 THOUSAND/ΜL (ref 0.17–1.22)
MONOCYTES NFR BLD AUTO: 13 % (ref 4–12)
NEUTROPHILS # BLD AUTO: 3.32 THOUSANDS/ΜL (ref 1.85–7.62)
NEUTS SEG NFR BLD AUTO: 70 % (ref 43–75)
NITRITE UR QL STRIP: NEGATIVE
NRBC BLD AUTO-RTO: 0 /100 WBCS
NT-PROBNP SERPL-MCNC: 535 PG/ML
P AXIS: 52 DEGREES
PH UR STRIP.AUTO: 5.5 [PH]
PLATELET # BLD AUTO: 57 THOUSANDS/UL (ref 149–390)
PMV BLD AUTO: 11.7 FL (ref 8.9–12.7)
POTASSIUM SERPL-SCNC: 3.7 MMOL/L (ref 3.5–5.3)
PR INTERVAL: 204 MS
PROT SERPL-MCNC: 6.3 G/DL (ref 6.4–8.2)
PROT UR STRIP-MCNC: NEGATIVE MG/DL
PROTHROMBIN TIME: 18.1 SECONDS (ref 11.8–14.2)
QRS AXIS: -42 DEGREES
QRSD INTERVAL: 116 MS
QT INTERVAL: 434 MS
QTC INTERVAL: 475 MS
RBC # BLD AUTO: 2.77 MILLION/UL (ref 3.88–5.62)
SODIUM SERPL-SCNC: 136 MMOL/L (ref 136–145)
SP GR UR STRIP.AUTO: 1.01 (ref 1–1.03)
T WAVE AXIS: 53 DEGREES
T4 FREE SERPL-MCNC: 0.89 NG/DL (ref 0.76–1.46)
TROPONIN I SERPL-MCNC: 0.03 NG/ML
TSH SERPL DL<=0.05 MIU/L-ACNC: 3.84 UIU/ML (ref 0.36–3.74)
TSH SERPL DL<=0.05 MIU/L-ACNC: 4.19 UIU/ML (ref 0.36–3.74)
UROBILINOGEN UR QL STRIP.AUTO: 0.2 E.U./DL
VENTRICULAR RATE: 72 BPM
WBC # BLD AUTO: 4.74 THOUSAND/UL (ref 4.31–10.16)

## 2019-06-07 PROCEDURE — 85610 PROTHROMBIN TIME: CPT | Performed by: INTERNAL MEDICINE

## 2019-06-07 PROCEDURE — 85025 COMPLETE CBC W/AUTO DIFF WBC: CPT | Performed by: EMERGENCY MEDICINE

## 2019-06-07 PROCEDURE — 93005 ELECTROCARDIOGRAM TRACING: CPT

## 2019-06-07 PROCEDURE — 80048 BASIC METABOLIC PNL TOTAL CA: CPT | Performed by: EMERGENCY MEDICINE

## 2019-06-07 PROCEDURE — 93971 EXTREMITY STUDY: CPT

## 2019-06-07 PROCEDURE — 82607 VITAMIN B-12: CPT | Performed by: INTERNAL MEDICINE

## 2019-06-07 PROCEDURE — 74177 CT ABD & PELVIS W/CONTRAST: CPT

## 2019-06-07 PROCEDURE — 85730 THROMBOPLASTIN TIME PARTIAL: CPT | Performed by: INTERNAL MEDICINE

## 2019-06-07 PROCEDURE — 94760 N-INVAS EAR/PLS OXIMETRY 1: CPT

## 2019-06-07 PROCEDURE — 99223 1ST HOSP IP/OBS HIGH 75: CPT | Performed by: INTERNAL MEDICINE

## 2019-06-07 PROCEDURE — 82747 ASSAY OF FOLIC ACID RBC: CPT | Performed by: INTERNAL MEDICINE

## 2019-06-07 PROCEDURE — 80076 HEPATIC FUNCTION PANEL: CPT | Performed by: EMERGENCY MEDICINE

## 2019-06-07 PROCEDURE — 84443 ASSAY THYROID STIM HORMONE: CPT | Performed by: INTERNAL MEDICINE

## 2019-06-07 PROCEDURE — 83550 IRON BINDING TEST: CPT | Performed by: INTERNAL MEDICINE

## 2019-06-07 PROCEDURE — 82728 ASSAY OF FERRITIN: CPT | Performed by: INTERNAL MEDICINE

## 2019-06-07 PROCEDURE — 99285 EMERGENCY DEPT VISIT HI MDM: CPT | Performed by: EMERGENCY MEDICINE

## 2019-06-07 PROCEDURE — 81003 URINALYSIS AUTO W/O SCOPE: CPT | Performed by: EMERGENCY MEDICINE

## 2019-06-07 PROCEDURE — 93971 EXTREMITY STUDY: CPT | Performed by: SURGERY

## 2019-06-07 PROCEDURE — 83880 ASSAY OF NATRIURETIC PEPTIDE: CPT | Performed by: INTERNAL MEDICINE

## 2019-06-07 PROCEDURE — 93010 ELECTROCARDIOGRAM REPORT: CPT | Performed by: INTERNAL MEDICINE

## 2019-06-07 PROCEDURE — 84443 ASSAY THYROID STIM HORMONE: CPT | Performed by: EMERGENCY MEDICINE

## 2019-06-07 PROCEDURE — 83540 ASSAY OF IRON: CPT | Performed by: INTERNAL MEDICINE

## 2019-06-07 PROCEDURE — 71260 CT THORAX DX C+: CPT

## 2019-06-07 PROCEDURE — 99285 EMERGENCY DEPT VISIT HI MDM: CPT

## 2019-06-07 PROCEDURE — 84439 ASSAY OF FREE THYROXINE: CPT | Performed by: EMERGENCY MEDICINE

## 2019-06-07 PROCEDURE — 84484 ASSAY OF TROPONIN QUANT: CPT | Performed by: EMERGENCY MEDICINE

## 2019-06-07 PROCEDURE — 36415 COLL VENOUS BLD VENIPUNCTURE: CPT | Performed by: EMERGENCY MEDICINE

## 2019-06-07 RX ORDER — LISINOPRIL 10 MG/1
10 TABLET ORAL DAILY
Status: DISCONTINUED | OUTPATIENT
Start: 2019-06-08 | End: 2019-06-10 | Stop reason: HOSPADM

## 2019-06-07 RX ORDER — PANTOPRAZOLE SODIUM 40 MG/1
40 TABLET, DELAYED RELEASE ORAL
Status: DISCONTINUED | OUTPATIENT
Start: 2019-06-08 | End: 2019-06-10 | Stop reason: HOSPADM

## 2019-06-07 RX ORDER — BUMETANIDE 0.25 MG/ML
1 INJECTION, SOLUTION INTRAMUSCULAR; INTRAVENOUS EVERY 8 HOURS SCHEDULED
Status: DISCONTINUED | OUTPATIENT
Start: 2019-06-08 | End: 2019-06-08

## 2019-06-07 RX ORDER — FUROSEMIDE 10 MG/ML
80 INJECTION INTRAMUSCULAR; INTRAVENOUS ONCE
Status: COMPLETED | OUTPATIENT
Start: 2019-06-07 | End: 2019-06-07

## 2019-06-07 RX ORDER — LISINOPRIL 10 MG/1
10 TABLET ORAL DAILY
Status: DISCONTINUED | OUTPATIENT
Start: 2019-06-08 | End: 2019-06-07

## 2019-06-07 RX ORDER — BUMETANIDE 0.25 MG/ML
1 INJECTION, SOLUTION INTRAMUSCULAR; INTRAVENOUS EVERY 8 HOURS SCHEDULED
Status: DISCONTINUED | OUTPATIENT
Start: 2019-06-07 | End: 2019-06-07

## 2019-06-07 RX ADMIN — IOHEXOL 100 ML: 350 INJECTION, SOLUTION INTRAVENOUS at 13:06

## 2019-06-07 RX ADMIN — FUROSEMIDE 80 MG: 10 INJECTION, SOLUTION INTRAMUSCULAR; INTRAVENOUS at 15:48

## 2019-06-08 ENCOUNTER — APPOINTMENT (INPATIENT)
Dept: MRI IMAGING | Facility: HOSPITAL | Age: 59
DRG: 433 | End: 2019-06-08
Payer: COMMERCIAL

## 2019-06-08 LAB
AFP-TM SERPL-MCNC: 3.7 NG/ML (ref 0.5–8)
ALBUMIN SERPL BCP-MCNC: 2.5 G/DL (ref 3.5–5)
ALP SERPL-CCNC: 102 U/L (ref 46–116)
ALT SERPL W P-5'-P-CCNC: 54 U/L (ref 12–78)
ANION GAP SERPL CALCULATED.3IONS-SCNC: 6 MMOL/L (ref 4–13)
AST SERPL W P-5'-P-CCNC: 72 U/L (ref 5–45)
BILIRUB SERPL-MCNC: 4.4 MG/DL (ref 0.2–1)
BUN SERPL-MCNC: 24 MG/DL (ref 5–25)
CALCIUM SERPL-MCNC: 8.2 MG/DL (ref 8.3–10.1)
CHLORIDE SERPL-SCNC: 98 MMOL/L (ref 100–108)
CO2 SERPL-SCNC: 28 MMOL/L (ref 21–32)
CREAT SERPL-MCNC: 1.19 MG/DL (ref 0.6–1.3)
ERYTHROCYTE [DISTWIDTH] IN BLOOD BY AUTOMATED COUNT: 15.4 % (ref 11.6–15.1)
EST. AVERAGE GLUCOSE BLD GHB EST-MCNC: 68 MG/DL
FERRITIN SERPL-MCNC: 335 NG/ML (ref 8–388)
FOLATE BLD-MCNC: >620 NG/ML
FOLATE RBC-MCNC: >2230 NG/ML
GFR SERPL CREATININE-BSD FRML MDRD: 67 ML/MIN/1.73SQ M
GLUCOSE SERPL-MCNC: 106 MG/DL (ref 65–140)
HBA1C MFR BLD: 4 % (ref 4.2–6.3)
HCT VFR BLD AUTO: 27.7 % (ref 36.5–49.3)
HCT VFR BLD AUTO: 27.8 % (ref 37.5–51)
HEMOCCULT STL QL: NEGATIVE
HGB BLD-MCNC: 9.5 G/DL (ref 12–17)
IRON SATN MFR SERPL: 75 %
IRON SERPL-MCNC: 158 UG/DL (ref 65–175)
MCH RBC QN AUTO: 36.1 PG (ref 26.8–34.3)
MCHC RBC AUTO-ENTMCNC: 34.3 G/DL (ref 31.4–37.4)
MCV RBC AUTO: 105 FL (ref 82–98)
PLATELET # BLD AUTO: 56 THOUSANDS/UL (ref 149–390)
PMV BLD AUTO: 11.8 FL (ref 8.9–12.7)
POTASSIUM SERPL-SCNC: 3.6 MMOL/L (ref 3.5–5.3)
PROT SERPL-MCNC: 6 G/DL (ref 6.4–8.2)
RBC # BLD AUTO: 2.63 MILLION/UL (ref 3.88–5.62)
SODIUM SERPL-SCNC: 132 MMOL/L (ref 136–145)
T4 FREE SERPL-MCNC: 0.95 NG/DL (ref 0.76–1.46)
TIBC SERPL-MCNC: 211 UG/DL (ref 250–450)
VIT B12 SERPL-MCNC: 2597 PG/ML (ref 100–900)
WBC # BLD AUTO: 5.04 THOUSAND/UL (ref 4.31–10.16)

## 2019-06-08 PROCEDURE — 80053 COMPREHEN METABOLIC PANEL: CPT | Performed by: INTERNAL MEDICINE

## 2019-06-08 PROCEDURE — 82272 OCCULT BLD FECES 1-3 TESTS: CPT | Performed by: INTERNAL MEDICINE

## 2019-06-08 PROCEDURE — BW30Y0Z MAGNETIC RESONANCE IMAGING (MRI) OF ABDOMEN USING OTHER CONTRAST, UNENHANCED AND ENHANCED: ICD-10-PCS | Performed by: INTERNAL MEDICINE

## 2019-06-08 PROCEDURE — 82105 ALPHA-FETOPROTEIN SERUM: CPT | Performed by: PHYSICIAN ASSISTANT

## 2019-06-08 PROCEDURE — A9585 GADOBUTROL INJECTION: HCPCS | Performed by: INTERNAL MEDICINE

## 2019-06-08 PROCEDURE — 99222 1ST HOSP IP/OBS MODERATE 55: CPT | Performed by: INTERNAL MEDICINE

## 2019-06-08 PROCEDURE — 84439 ASSAY OF FREE THYROXINE: CPT | Performed by: PHYSICIAN ASSISTANT

## 2019-06-08 PROCEDURE — 85027 COMPLETE CBC AUTOMATED: CPT | Performed by: INTERNAL MEDICINE

## 2019-06-08 PROCEDURE — 83036 HEMOGLOBIN GLYCOSYLATED A1C: CPT | Performed by: PHYSICIAN ASSISTANT

## 2019-06-08 PROCEDURE — 99233 SBSQ HOSP IP/OBS HIGH 50: CPT | Performed by: INTERNAL MEDICINE

## 2019-06-08 PROCEDURE — 74183 MRI ABD W/O CNTR FLWD CNTR: CPT

## 2019-06-08 RX ORDER — BUMETANIDE 0.25 MG/ML
1 INJECTION, SOLUTION INTRAMUSCULAR; INTRAVENOUS EVERY 8 HOURS SCHEDULED
Status: DISCONTINUED | OUTPATIENT
Start: 2019-06-08 | End: 2019-06-10

## 2019-06-08 RX ADMIN — BUMETANIDE 1 MG: 0.25 INJECTION INTRAMUSCULAR; INTRAVENOUS at 19:51

## 2019-06-08 RX ADMIN — PANTOPRAZOLE SODIUM 40 MG: 40 TABLET, DELAYED RELEASE ORAL at 06:04

## 2019-06-08 RX ADMIN — BUMETANIDE 1 MG: 0.25 INJECTION INTRAMUSCULAR; INTRAVENOUS at 06:04

## 2019-06-08 RX ADMIN — GADOBUTROL 15 ML: 604.72 INJECTION INTRAVENOUS at 15:47

## 2019-06-09 ENCOUNTER — APPOINTMENT (INPATIENT)
Dept: NON INVASIVE DIAGNOSTICS | Facility: HOSPITAL | Age: 59
DRG: 433 | End: 2019-06-09
Payer: COMMERCIAL

## 2019-06-09 LAB
ALBUMIN SERPL BCP-MCNC: 2.5 G/DL (ref 3.5–5)
ALP SERPL-CCNC: 105 U/L (ref 46–116)
ALT SERPL W P-5'-P-CCNC: 54 U/L (ref 12–78)
ANION GAP SERPL CALCULATED.3IONS-SCNC: 4 MMOL/L (ref 4–13)
AST SERPL W P-5'-P-CCNC: 65 U/L (ref 5–45)
BILIRUB SERPL-MCNC: 4.7 MG/DL (ref 0.2–1)
BUN SERPL-MCNC: 27 MG/DL (ref 5–25)
CALCIUM SERPL-MCNC: 8.3 MG/DL (ref 8.3–10.1)
CHLORIDE SERPL-SCNC: 100 MMOL/L (ref 100–108)
CHOLEST SERPL-MCNC: 133 MG/DL (ref 50–200)
CO2 SERPL-SCNC: 30 MMOL/L (ref 21–32)
CREAT SERPL-MCNC: 1.25 MG/DL (ref 0.6–1.3)
ERYTHROCYTE [DISTWIDTH] IN BLOOD BY AUTOMATED COUNT: 15.2 % (ref 11.6–15.1)
GFR SERPL CREATININE-BSD FRML MDRD: 64 ML/MIN/1.73SQ M
GLUCOSE SERPL-MCNC: 99 MG/DL (ref 65–140)
HBV CORE AB SER QL: NORMAL
HBV CORE IGM SER QL: NORMAL
HBV SURFACE AG SER QL: NORMAL
HCT VFR BLD AUTO: 28.1 % (ref 36.5–49.3)
HCV AB SER QL: NORMAL
HDLC SERPL-MCNC: 53 MG/DL (ref 40–60)
HEMOCCULT STL QL: NEGATIVE
HGB BLD-MCNC: 9.5 G/DL (ref 12–17)
LDLC SERPL CALC-MCNC: 72 MG/DL (ref 0–100)
MCH RBC QN AUTO: 35.7 PG (ref 26.8–34.3)
MCHC RBC AUTO-ENTMCNC: 33.8 G/DL (ref 31.4–37.4)
MCV RBC AUTO: 106 FL (ref 82–98)
NONHDLC SERPL-MCNC: 80 MG/DL
PLATELET # BLD AUTO: 55 THOUSANDS/UL (ref 149–390)
PMV BLD AUTO: 11.2 FL (ref 8.9–12.7)
POTASSIUM SERPL-SCNC: 4 MMOL/L (ref 3.5–5.3)
PROT SERPL-MCNC: 6 G/DL (ref 6.4–8.2)
RBC # BLD AUTO: 2.66 MILLION/UL (ref 3.88–5.62)
SODIUM SERPL-SCNC: 134 MMOL/L (ref 136–145)
TRIGL SERPL-MCNC: 40 MG/DL
WBC # BLD AUTO: 4.77 THOUSAND/UL (ref 4.31–10.16)

## 2019-06-09 PROCEDURE — 82272 OCCULT BLD FECES 1-3 TESTS: CPT | Performed by: INTERNAL MEDICINE

## 2019-06-09 PROCEDURE — 82784 ASSAY IGA/IGD/IGG/IGM EACH: CPT | Performed by: PHYSICIAN ASSISTANT

## 2019-06-09 PROCEDURE — 86705 HEP B CORE ANTIBODY IGM: CPT | Performed by: PHYSICIAN ASSISTANT

## 2019-06-09 PROCEDURE — 80061 LIPID PANEL: CPT | Performed by: PHYSICIAN ASSISTANT

## 2019-06-09 PROCEDURE — 86255 FLUORESCENT ANTIBODY SCREEN: CPT | Performed by: PHYSICIAN ASSISTANT

## 2019-06-09 PROCEDURE — 85027 COMPLETE CBC AUTOMATED: CPT | Performed by: INTERNAL MEDICINE

## 2019-06-09 PROCEDURE — 86803 HEPATITIS C AB TEST: CPT | Performed by: PHYSICIAN ASSISTANT

## 2019-06-09 PROCEDURE — 80053 COMPREHEN METABOLIC PANEL: CPT | Performed by: INTERNAL MEDICINE

## 2019-06-09 PROCEDURE — 93306 TTE W/DOPPLER COMPLETE: CPT

## 2019-06-09 PROCEDURE — 83516 IMMUNOASSAY NONANTIBODY: CPT | Performed by: PHYSICIAN ASSISTANT

## 2019-06-09 PROCEDURE — 99232 SBSQ HOSP IP/OBS MODERATE 35: CPT | Performed by: INTERNAL MEDICINE

## 2019-06-09 PROCEDURE — 87340 HEPATITIS B SURFACE AG IA: CPT | Performed by: PHYSICIAN ASSISTANT

## 2019-06-09 PROCEDURE — 86235 NUCLEAR ANTIGEN ANTIBODY: CPT | Performed by: PHYSICIAN ASSISTANT

## 2019-06-09 PROCEDURE — 86704 HEP B CORE ANTIBODY TOTAL: CPT | Performed by: PHYSICIAN ASSISTANT

## 2019-06-09 PROCEDURE — 81256 HFE GENE: CPT | Performed by: PHYSICIAN ASSISTANT

## 2019-06-09 RX ORDER — SPIRONOLACTONE 25 MG/1
100 TABLET ORAL DAILY
Status: DISCONTINUED | OUTPATIENT
Start: 2019-06-09 | End: 2019-06-10 | Stop reason: HOSPADM

## 2019-06-09 RX ADMIN — LISINOPRIL 10 MG: 10 TABLET ORAL at 08:39

## 2019-06-09 RX ADMIN — PANTOPRAZOLE SODIUM 40 MG: 40 TABLET, DELAYED RELEASE ORAL at 06:02

## 2019-06-09 RX ADMIN — BUMETANIDE 1 MG: 0.25 INJECTION INTRAMUSCULAR; INTRAVENOUS at 21:56

## 2019-06-09 RX ADMIN — BUMETANIDE 1 MG: 0.25 INJECTION INTRAMUSCULAR; INTRAVENOUS at 13:42

## 2019-06-09 RX ADMIN — SPIRONOLACTONE 100 MG: 25 TABLET ORAL at 11:49

## 2019-06-09 RX ADMIN — BUMETANIDE 1 MG: 0.25 INJECTION INTRAMUSCULAR; INTRAVENOUS at 06:02

## 2019-06-10 ENCOUNTER — APPOINTMENT (INPATIENT)
Dept: NON INVASIVE DIAGNOSTICS | Facility: HOSPITAL | Age: 59
DRG: 433 | End: 2019-06-10
Payer: COMMERCIAL

## 2019-06-10 ENCOUNTER — TELEPHONE (OUTPATIENT)
Dept: GASTROENTEROLOGY | Facility: CLINIC | Age: 59
End: 2019-06-10

## 2019-06-10 VITALS
WEIGHT: 315 LBS | HEART RATE: 71 BPM | TEMPERATURE: 97.5 F | DIASTOLIC BLOOD PRESSURE: 45 MMHG | BODY MASS INDEX: 44.1 KG/M2 | SYSTOLIC BLOOD PRESSURE: 105 MMHG | OXYGEN SATURATION: 96 % | HEIGHT: 71 IN | RESPIRATION RATE: 18 BRPM

## 2019-06-10 LAB
ALBUMIN SERPL BCP-MCNC: 2.5 G/DL (ref 3.5–5)
ALP SERPL-CCNC: 105 U/L (ref 46–116)
ALT SERPL W P-5'-P-CCNC: 49 U/L (ref 12–78)
ANION GAP SERPL CALCULATED.3IONS-SCNC: 7 MMOL/L (ref 4–13)
AST SERPL W P-5'-P-CCNC: 62 U/L (ref 5–45)
BILIRUB SERPL-MCNC: 3.6 MG/DL (ref 0.2–1)
BUN SERPL-MCNC: 32 MG/DL (ref 5–25)
CALCIUM SERPL-MCNC: 8 MG/DL (ref 8.3–10.1)
CHLORIDE SERPL-SCNC: 98 MMOL/L (ref 100–108)
CO2 SERPL-SCNC: 27 MMOL/L (ref 21–32)
CREAT SERPL-MCNC: 1.51 MG/DL (ref 0.6–1.3)
ERYTHROCYTE [DISTWIDTH] IN BLOOD BY AUTOMATED COUNT: 15.1 % (ref 11.6–15.1)
GFR SERPL CREATININE-BSD FRML MDRD: 51 ML/MIN/1.73SQ M
GLUCOSE SERPL-MCNC: 95 MG/DL (ref 65–140)
HCT VFR BLD AUTO: 28.3 % (ref 36.5–49.3)
HEMOCCULT STL QL: NEGATIVE
HGB BLD-MCNC: 9.5 G/DL (ref 12–17)
MCH RBC QN AUTO: 36 PG (ref 26.8–34.3)
MCHC RBC AUTO-ENTMCNC: 33.6 G/DL (ref 31.4–37.4)
MCV RBC AUTO: 107 FL (ref 82–98)
NEGATIVE CONTROL: NEGATIVE
PLATELET # BLD AUTO: 54 THOUSANDS/UL (ref 149–390)
PMV BLD AUTO: 11.4 FL (ref 8.9–12.7)
POSITIVE CONTROL: POSITIVE
POTASSIUM SERPL-SCNC: 4.4 MMOL/L (ref 3.5–5.3)
PROT SERPL-MCNC: 6 G/DL (ref 6.4–8.2)
RBC # BLD AUTO: 2.64 MILLION/UL (ref 3.88–5.62)
SODIUM SERPL-SCNC: 132 MMOL/L (ref 136–145)
WBC # BLD AUTO: 5.09 THOUSAND/UL (ref 4.31–10.16)

## 2019-06-10 PROCEDURE — 80053 COMPREHEN METABOLIC PANEL: CPT | Performed by: INTERNAL MEDICINE

## 2019-06-10 PROCEDURE — 93308 TTE F-UP OR LMTD: CPT

## 2019-06-10 PROCEDURE — 82272 OCCULT BLD FECES 1-3 TESTS: CPT | Performed by: INTERNAL MEDICINE

## 2019-06-10 PROCEDURE — 99239 HOSP IP/OBS DSCHRG MGMT >30: CPT | Performed by: INTERNAL MEDICINE

## 2019-06-10 PROCEDURE — 93321 DOPPLER ECHO F-UP/LMTD STD: CPT | Performed by: INTERNAL MEDICINE

## 2019-06-10 PROCEDURE — B24BZZZ ULTRASONOGRAPHY OF HEART WITH AORTA: ICD-10-PCS | Performed by: INTERNAL MEDICINE

## 2019-06-10 PROCEDURE — 85027 COMPLETE CBC AUTOMATED: CPT | Performed by: INTERNAL MEDICINE

## 2019-06-10 PROCEDURE — 93325 DOPPLER ECHO COLOR FLOW MAPG: CPT | Performed by: INTERNAL MEDICINE

## 2019-06-10 PROCEDURE — 93308 TTE F-UP OR LMTD: CPT | Performed by: INTERNAL MEDICINE

## 2019-06-10 PROCEDURE — 93306 TTE W/DOPPLER COMPLETE: CPT | Performed by: INTERNAL MEDICINE

## 2019-06-10 RX ORDER — SPIRONOLACTONE 100 MG/1
100 TABLET, FILM COATED ORAL DAILY
Qty: 30 TABLET | Refills: 2 | Status: SHIPPED | OUTPATIENT
Start: 2019-06-10 | End: 2019-08-21 | Stop reason: HOSPADM

## 2019-06-10 RX ORDER — PANTOPRAZOLE SODIUM 40 MG/1
40 TABLET, DELAYED RELEASE ORAL
Qty: 30 TABLET | Refills: 2 | Status: SHIPPED | OUTPATIENT
Start: 2019-06-11 | End: 2019-08-22 | Stop reason: SDUPTHER

## 2019-06-10 RX ORDER — FUROSEMIDE 40 MG/1
40 TABLET ORAL 2 TIMES DAILY
Qty: 60 TABLET | Refills: 2 | Status: SHIPPED | OUTPATIENT
Start: 2019-06-10 | End: 2019-08-21 | Stop reason: HOSPADM

## 2019-06-10 RX ORDER — FUROSEMIDE 40 MG/1
40 TABLET ORAL
Status: DISCONTINUED | OUTPATIENT
Start: 2019-06-11 | End: 2019-06-10 | Stop reason: HOSPADM

## 2019-06-10 RX ADMIN — BUMETANIDE 1 MG: 0.25 INJECTION INTRAMUSCULAR; INTRAVENOUS at 06:01

## 2019-06-10 RX ADMIN — PERFLUTREN 0.8 ML/MIN: 6.52 INJECTION, SUSPENSION INTRAVENOUS at 14:10

## 2019-06-10 RX ADMIN — SPIRONOLACTONE 100 MG: 25 TABLET ORAL at 13:34

## 2019-06-10 RX ADMIN — PANTOPRAZOLE SODIUM 40 MG: 40 TABLET, DELAYED RELEASE ORAL at 06:01

## 2019-06-11 LAB
ACTIN IGG SERPL-ACNC: 21 UNITS (ref 0–19)
ENDOMYSIUM IGA SER QL: NEGATIVE
GLIADIN PEPTIDE IGA SER-ACNC: 12 UNITS (ref 0–19)
GLIADIN PEPTIDE IGG SER-ACNC: 3 UNITS (ref 0–19)
IGA SERPL-MCNC: 560 MG/DL (ref 90–386)
TTG IGA SER-ACNC: <2 U/ML (ref 0–3)
TTG IGG SER-ACNC: 6 U/ML (ref 0–5)

## 2019-06-17 LAB — HFE GENE MUT ANL BLD/T: NORMAL

## 2019-08-15 ENCOUNTER — HOSPITAL ENCOUNTER (INPATIENT)
Facility: HOSPITAL | Age: 59
LOS: 6 days | Discharge: HOME HEALTH | DRG: 433 | End: 2019-08-21
Attending: EMERGENCY MEDICINE | Admitting: STUDENT IN AN ORGANIZED HEALTH CARE EDUCATION/TRAINING PROGRAM
Payer: COMMERCIAL

## 2019-08-15 ENCOUNTER — APPOINTMENT (EMERGENCY)
Dept: RADIOLOGY | Facility: HOSPITAL | Age: 59
DRG: 433 | End: 2019-08-15
Payer: COMMERCIAL

## 2019-08-15 DIAGNOSIS — M79.604 BILATERAL LEG PAIN: ICD-10-CM

## 2019-08-15 DIAGNOSIS — D64.9 ANEMIA: ICD-10-CM

## 2019-08-15 DIAGNOSIS — E66.01 MORBID OBESITY (HCC): ICD-10-CM

## 2019-08-15 DIAGNOSIS — M15.9 PRIMARY OSTEOARTHRITIS INVOLVING MULTIPLE JOINTS: Chronic | ICD-10-CM

## 2019-08-15 DIAGNOSIS — R60.1 ANASARCA: Primary | ICD-10-CM

## 2019-08-15 DIAGNOSIS — R53.1 GENERALIZED WEAKNESS: ICD-10-CM

## 2019-08-15 DIAGNOSIS — M79.605 BILATERAL LEG PAIN: ICD-10-CM

## 2019-08-15 DIAGNOSIS — S81.802A WOUND OF LEFT LOWER EXTREMITY, INITIAL ENCOUNTER: ICD-10-CM

## 2019-08-15 PROBLEM — R78.81 BACTEREMIA: Status: ACTIVE | Noted: 2019-08-15

## 2019-08-15 LAB
ALBUMIN SERPL BCP-MCNC: 2.6 G/DL (ref 3.5–5)
ALP SERPL-CCNC: 89 U/L (ref 46–116)
ALT SERPL W P-5'-P-CCNC: 26 U/L (ref 12–78)
ANION GAP SERPL CALCULATED.3IONS-SCNC: 8 MMOL/L (ref 4–13)
APTT PPP: 39 SECONDS (ref 23–37)
AST SERPL W P-5'-P-CCNC: 54 U/L (ref 5–45)
BACTERIA UR QL AUTO: ABNORMAL /HPF
BASOPHILS # BLD MANUAL: 0 THOUSAND/UL (ref 0–0.1)
BASOPHILS NFR MAR MANUAL: 0 % (ref 0–1)
BILIRUB DIRECT SERPL-MCNC: 1.52 MG/DL (ref 0–0.2)
BILIRUB SERPL-MCNC: 4.5 MG/DL (ref 0.2–1)
BILIRUB UR QL STRIP: NEGATIVE
BUN SERPL-MCNC: 30 MG/DL (ref 5–25)
CALCIUM SERPL-MCNC: 8.3 MG/DL (ref 8.3–10.1)
CHLORIDE SERPL-SCNC: 102 MMOL/L (ref 100–108)
CLARITY UR: CLEAR
CO2 SERPL-SCNC: 23 MMOL/L (ref 21–32)
COLOR UR: YELLOW
CREAT SERPL-MCNC: 1.32 MG/DL (ref 0.6–1.3)
EOSINOPHIL # BLD MANUAL: 0.37 THOUSAND/UL (ref 0–0.4)
EOSINOPHIL NFR BLD MANUAL: 5 % (ref 0–6)
ERYTHROCYTE [DISTWIDTH] IN BLOOD BY AUTOMATED COUNT: 16.9 % (ref 11.6–15.1)
GFR SERPL CREATININE-BSD FRML MDRD: 59 ML/MIN/1.73SQ M
GLUCOSE SERPL-MCNC: 161 MG/DL (ref 65–140)
GLUCOSE UR STRIP-MCNC: NEGATIVE MG/DL
HCT VFR BLD AUTO: 24.7 % (ref 36.5–49.3)
HGB BLD-MCNC: 8 G/DL (ref 12–17)
HGB UR QL STRIP.AUTO: ABNORMAL
INR PPP: 1.82 (ref 0.84–1.19)
KETONES UR STRIP-MCNC: NEGATIVE MG/DL
LEUKOCYTE ESTERASE UR QL STRIP: ABNORMAL
LYMPHOCYTES # BLD AUTO: 0.82 THOUSAND/UL (ref 0.6–4.47)
LYMPHOCYTES # BLD AUTO: 11 % (ref 14–44)
MAGNESIUM SERPL-MCNC: 1.9 MG/DL (ref 1.6–2.6)
MCH RBC QN AUTO: 35.6 PG (ref 26.8–34.3)
MCHC RBC AUTO-ENTMCNC: 32.4 G/DL (ref 31.4–37.4)
MCV RBC AUTO: 110 FL (ref 82–98)
METAMYELOCYTES NFR BLD MANUAL: 1 % (ref 0–1)
MONOCYTES # BLD AUTO: 0.37 THOUSAND/UL (ref 0–1.22)
MONOCYTES NFR BLD: 5 % (ref 4–12)
NEUTROPHILS # BLD MANUAL: 5.81 THOUSAND/UL (ref 1.85–7.62)
NEUTS BAND NFR BLD MANUAL: 3 % (ref 0–8)
NEUTS SEG NFR BLD AUTO: 75 % (ref 43–75)
NITRITE UR QL STRIP: NEGATIVE
NON-SQ EPI CELLS URNS QL MICRO: ABNORMAL /HPF
NRBC BLD AUTO-RTO: 0 /100 WBCS
NT-PROBNP SERPL-MCNC: 1227 PG/ML
PH UR STRIP.AUTO: 6 [PH]
PLATELET # BLD AUTO: 50 THOUSANDS/UL (ref 149–390)
PLATELET BLD QL SMEAR: ABNORMAL
PMV BLD AUTO: 10.6 FL (ref 8.9–12.7)
POTASSIUM SERPL-SCNC: 3.4 MMOL/L (ref 3.5–5.3)
PROT SERPL-MCNC: 6 G/DL (ref 6.4–8.2)
PROT UR STRIP-MCNC: NEGATIVE MG/DL
PROTHROMBIN TIME: 21.2 SECONDS (ref 11.6–14.5)
RBC # BLD AUTO: 2.25 MILLION/UL (ref 3.88–5.62)
RBC #/AREA URNS AUTO: ABNORMAL /HPF
SODIUM SERPL-SCNC: 133 MMOL/L (ref 136–145)
SP GR UR STRIP.AUTO: 1.01 (ref 1–1.03)
TOTAL CELLS COUNTED SPEC: 100
TROPONIN I SERPL-MCNC: 0.03 NG/ML
UROBILINOGEN UR QL STRIP.AUTO: 1 E.U./DL
WBC # BLD AUTO: 7.45 THOUSAND/UL (ref 4.31–10.16)
WBC #/AREA URNS AUTO: ABNORMAL /HPF

## 2019-08-15 PROCEDURE — 85007 BL SMEAR W/DIFF WBC COUNT: CPT | Performed by: EMERGENCY MEDICINE

## 2019-08-15 PROCEDURE — 99285 EMERGENCY DEPT VISIT HI MDM: CPT

## 2019-08-15 PROCEDURE — 84484 ASSAY OF TROPONIN QUANT: CPT | Performed by: EMERGENCY MEDICINE

## 2019-08-15 PROCEDURE — 85027 COMPLETE CBC AUTOMATED: CPT | Performed by: EMERGENCY MEDICINE

## 2019-08-15 PROCEDURE — 83735 ASSAY OF MAGNESIUM: CPT | Performed by: EMERGENCY MEDICINE

## 2019-08-15 PROCEDURE — 71046 X-RAY EXAM CHEST 2 VIEWS: CPT

## 2019-08-15 PROCEDURE — 99223 1ST HOSP IP/OBS HIGH 75: CPT | Performed by: INTERNAL MEDICINE

## 2019-08-15 PROCEDURE — 85610 PROTHROMBIN TIME: CPT | Performed by: EMERGENCY MEDICINE

## 2019-08-15 PROCEDURE — 87040 BLOOD CULTURE FOR BACTERIA: CPT | Performed by: PHYSICIAN ASSISTANT

## 2019-08-15 PROCEDURE — 85730 THROMBOPLASTIN TIME PARTIAL: CPT | Performed by: EMERGENCY MEDICINE

## 2019-08-15 PROCEDURE — 36415 COLL VENOUS BLD VENIPUNCTURE: CPT | Performed by: EMERGENCY MEDICINE

## 2019-08-15 PROCEDURE — 80076 HEPATIC FUNCTION PANEL: CPT | Performed by: EMERGENCY MEDICINE

## 2019-08-15 PROCEDURE — 99285 EMERGENCY DEPT VISIT HI MDM: CPT | Performed by: EMERGENCY MEDICINE

## 2019-08-15 PROCEDURE — 96374 THER/PROPH/DIAG INJ IV PUSH: CPT

## 2019-08-15 PROCEDURE — 83880 ASSAY OF NATRIURETIC PEPTIDE: CPT | Performed by: EMERGENCY MEDICINE

## 2019-08-15 PROCEDURE — 81001 URINALYSIS AUTO W/SCOPE: CPT | Performed by: EMERGENCY MEDICINE

## 2019-08-15 PROCEDURE — 80048 BASIC METABOLIC PNL TOTAL CA: CPT | Performed by: EMERGENCY MEDICINE

## 2019-08-15 PROCEDURE — 94660 CPAP INITIATION&MGMT: CPT

## 2019-08-15 PROCEDURE — 94760 N-INVAS EAR/PLS OXIMETRY 1: CPT

## 2019-08-15 RX ORDER — LACTULOSE 10 G/15ML
20 SOLUTION ORAL 2 TIMES DAILY
COMMUNITY
End: 2019-09-25 | Stop reason: HOSPADM

## 2019-08-15 RX ORDER — LACTULOSE 20 G/30ML
20 SOLUTION ORAL 2 TIMES DAILY
Status: DISCONTINUED | OUTPATIENT
Start: 2019-08-15 | End: 2019-08-21 | Stop reason: HOSPADM

## 2019-08-15 RX ORDER — ALBUMIN (HUMAN) 12.5 G/50ML
25 SOLUTION INTRAVENOUS EVERY 12 HOURS SCHEDULED
Status: COMPLETED | OUTPATIENT
Start: 2019-08-15 | End: 2019-08-18

## 2019-08-15 RX ORDER — PANTOPRAZOLE SODIUM 40 MG/1
40 TABLET, DELAYED RELEASE ORAL
Status: DISCONTINUED | OUTPATIENT
Start: 2019-08-16 | End: 2019-08-21 | Stop reason: HOSPADM

## 2019-08-15 RX ORDER — ACETAMINOPHEN 325 MG/1
650 TABLET ORAL EVERY 6 HOURS PRN
Status: DISCONTINUED | OUTPATIENT
Start: 2019-08-15 | End: 2019-08-21 | Stop reason: HOSPADM

## 2019-08-15 RX ORDER — HEPARIN SODIUM 5000 [USP'U]/ML
5000 INJECTION, SOLUTION INTRAVENOUS; SUBCUTANEOUS EVERY 8 HOURS SCHEDULED
Status: DISCONTINUED | OUTPATIENT
Start: 2019-08-15 | End: 2019-08-18

## 2019-08-15 RX ORDER — POLYETHYLENE GLYCOL 3350 17 G/17G
17 POWDER, FOR SOLUTION ORAL DAILY PRN
Status: DISCONTINUED | OUTPATIENT
Start: 2019-08-15 | End: 2019-08-21 | Stop reason: HOSPADM

## 2019-08-15 RX ORDER — POTASSIUM CHLORIDE 20 MEQ/1
40 TABLET, EXTENDED RELEASE ORAL 2 TIMES DAILY
Status: DISCONTINUED | OUTPATIENT
Start: 2019-08-15 | End: 2019-08-21 | Stop reason: HOSPADM

## 2019-08-15 RX ORDER — BUMETANIDE 0.25 MG/ML
2 INJECTION, SOLUTION INTRAMUSCULAR; INTRAVENOUS 2 TIMES DAILY
Status: DISCONTINUED | OUTPATIENT
Start: 2019-08-15 | End: 2019-08-18

## 2019-08-15 RX ORDER — SPIRONOLACTONE 25 MG/1
100 TABLET ORAL DAILY
Status: DISCONTINUED | OUTPATIENT
Start: 2019-08-15 | End: 2019-08-21 | Stop reason: HOSPADM

## 2019-08-15 RX ORDER — FUROSEMIDE 10 MG/ML
40 INJECTION INTRAMUSCULAR; INTRAVENOUS ONCE
Status: COMPLETED | OUTPATIENT
Start: 2019-08-15 | End: 2019-08-15

## 2019-08-15 RX ORDER — ONDANSETRON 2 MG/ML
4 INJECTION INTRAMUSCULAR; INTRAVENOUS EVERY 6 HOURS PRN
Status: DISCONTINUED | OUTPATIENT
Start: 2019-08-15 | End: 2019-08-21 | Stop reason: HOSPADM

## 2019-08-15 RX ORDER — MAGNESIUM HYDROXIDE/ALUMINUM HYDROXICE/SIMETHICONE 120; 1200; 1200 MG/30ML; MG/30ML; MG/30ML
30 SUSPENSION ORAL EVERY 6 HOURS PRN
Status: DISCONTINUED | OUTPATIENT
Start: 2019-08-15 | End: 2019-08-21 | Stop reason: HOSPADM

## 2019-08-15 RX ORDER — POTASSIUM CHLORIDE 20 MEQ/1
20 TABLET, EXTENDED RELEASE ORAL 3 TIMES DAILY
COMMUNITY
End: 2019-09-25 | Stop reason: HOSPADM

## 2019-08-15 RX ORDER — POTASSIUM CHLORIDE 20 MEQ/1
40 TABLET, EXTENDED RELEASE ORAL ONCE
Status: COMPLETED | OUTPATIENT
Start: 2019-08-15 | End: 2019-08-15

## 2019-08-15 RX ADMIN — BUMETANIDE 2 MG: 0.25 INJECTION INTRAMUSCULAR; INTRAVENOUS at 18:00

## 2019-08-15 RX ADMIN — SPIRONOLACTONE 100 MG: 25 TABLET ORAL at 17:53

## 2019-08-15 RX ADMIN — HEPARIN SODIUM 5000 UNITS: 5000 INJECTION INTRAVENOUS; SUBCUTANEOUS at 17:54

## 2019-08-15 RX ADMIN — HEPARIN SODIUM 5000 UNITS: 5000 INJECTION INTRAVENOUS; SUBCUTANEOUS at 22:01

## 2019-08-15 RX ADMIN — POTASSIUM CHLORIDE 40 MEQ: 1500 TABLET, EXTENDED RELEASE ORAL at 17:53

## 2019-08-15 RX ADMIN — POTASSIUM CHLORIDE 40 MEQ: 1500 TABLET, EXTENDED RELEASE ORAL at 14:03

## 2019-08-15 RX ADMIN — ALBUMIN (HUMAN) 25 G: 12.5 SOLUTION INTRAVENOUS at 22:01

## 2019-08-15 RX ADMIN — FUROSEMIDE 40 MG: 10 INJECTION, SOLUTION INTRAMUSCULAR; INTRAVENOUS at 14:05

## 2019-08-15 NOTE — ASSESSMENT & PLAN NOTE
· Recurrent, patient reports he continues to gain weight despite use of diuretics at home  Recently switched from torsemide to furosemide without any improvement    · Admit patient, telemetry monitor for aggressive IV diuresis  · Switch from furosemide to Bumex 2 mg b i d , will also albumin for several doses secondary to hypoalbuminemia  · If no significant output, will consult nephrology for additional fluid management  · Strict I/O, daily STANDING weights, low-sodium diet, fluid restrict to 1500 mL  · Continue to monitor electrolytes closely, replete as needed  · PT and OT consults for deconditioning

## 2019-08-15 NOTE — H&P
H&P- Lo Leblanc 1960, 63 y o  male MRN: [id number]    Unit/Bed#: ED 15 Encounter: [id number]    Primary Care Provider: No primary care provider on file  Date and time admitted to hospital: 8/15/2019 11:49 AM      * Anasarca  Assessment & Plan  · Recurrent, patient reports he continues to gain weight despite use of diuretics at home  Recently switched from torsemide to furosemide without any improvement    · Admit patient, telemetry monitor for aggressive IV diuresis  · Switch from furosemide to Bumex 2 mg b i d , will also albumin for several doses secondary to hypoalbuminemia  · If no significant output, will consult nephrology for additional fluid management  · Strict I/O, daily STANDING weights, low-sodium diet, fluid restrict to 1500 mL  · Continue to monitor electrolytes closely, replete as needed  · PT and OT consults for deconditioning    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 [facility])  Assessment & Plan  · Stable, in no indication for GI consultation at this time  · Continue to monitor daily CMP, platelets, INR  · If any acute worsening, will consult GI for further recommendations    Essential hypertension  Assessment & Plan  · Blood pressure well controlled, continue to monitor in the setting of aggressive diuresis    Bacteremia  Assessment & Plan  · Patient reportedly diagnosed with bacteremia and discharged on Friday 8/9 with PICC line to the left upper extremity  · Continue IV ceftriaxone 1000 mg daily  · We will need to obtain the records from [facility]  · Monitor for any fevers, he denies any at home    CROW on CPAP  Assessment & Plan  · Continue CPAP at night    Hyperbilirubinemia  Assessment & Plan  · Secondary to liver cirrhosis, stable, continue to monitor    Thrombocytopenia ([facility] [address] )  Assessment & Plan  · Secondary to liver cirrhosis, stable, continue to monitor on heparin prophylaxis    Morbid obesity ([facility] [address] )  Assessment & Plan  · Nutrition consult, dietary and lifestyle modifications recommended    Renal insufficiency  Assessment & Plan  · Baseline unclear, continue to monitor closely with daily renal functions      VTE Prophylaxis: Heparin  / sequential compression device   Code Status: FULL CODE   POLST: POLST form is not discussed and not completed at this time  Discussion with family: wife present at bedside     Anticipated Length of Stay:  Patient will be admitted on an Inpatient basis with an anticipated length of stay of  > 2 midnights  Justification for Hospital Stay: anasarca     Total Time for Visit, including Counseling / Coordination of Care: 45 minutes  Greater than 50% of this total time spent on direct patient counseling and coordination of care  Chief Complaint:   Swelling all over    History of Present Illness:    Rm Galdamez is a 63 y o  male who presents with diffuse anasarca  Patient's past medical history significant for cirrhosis secondary to ROSALES, essential hypertension, morbid obesity, obstructive sleep apnea on CPAP, previously diagnosed with anasarca  He presents the hospital secondary to worsening swelling and weight gain along with inability to move and mild shortness of breath  His wife is present at the bedside, they report he was just discharged last Friday after being diagnosed with a blood infection which she was told was secondary to diabetes, of which the patient has no diagnosis  They have been doing well from an infectious standpoint at home, he has a PICC line to the left upper extremity and his wife has been giving him the ceftriaxone every morning  He reports continued weight gain, he saw his PCP within the last few days and his diuretic regimen was changed however the patient saw no improvement  They presented to the hospital for further evaluation workup, stating that they have been to multiple offices and have had multiple hospitalizations at outside facilities without any improvement      Patient's chart does have listed that he has heart disease and CHF, however he adamantly denies any history of heart problems  Review of Systems:    Review of Systems   Constitutional: Positive for activity change and unexpected weight change (gain)  Respiratory: Positive for shortness of breath  Cardiovascular: Positive for leg swelling  Negative for chest pain and palpitations  Gastrointestinal: Positive for abdominal distention and diarrhea (on lactulose)  Negative for abdominal pain  Endocrine: Negative for polydipsia and polyuria  Genitourinary: Positive for scrotal swelling  Negative for difficulty urinating  Skin: Positive for wound (LLE, present on admit)  Neurological: Positive for weakness (generalized)  All other systems reviewed and are negative  Past Medical and Surgical History:     Past Medical History:   Diagnosis Date    Chronic pain disorder     lower back    CPAP (continuous positive airway pressure) dependence     Heart murmur     Hypertension     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)     [facility] [address] [facility]     Renal insufficiency 6/7/2019    Sleep apnea     Vitamin D deficiency        Past Surgical History:   Procedure Laterality Date    KNEE ARTHROSCOPY Bilateral     KNEE SURGERY      MA COLONOSCOPY FLX DX W/COLLJ SPEC WHEN PFRMD N/A 11/15/2017    Procedure: COLONOSCOPY;  Surgeon: Delmar Olivas MD;  Location: [facility]; Service: Gastroenterology       Meds/Allergies:    Prior to Admission medications    Medication Sig Start Date End Date Taking?  Authorizing Provider   cefTRIAXone 1,000 mg in dextrose 5 % 50 mL IVPB Infuse 1,000 mg into a venous catheter every 24 hours Patient gives 30mL through port   Yes Historical Provider, MD   furosemide (LASIX) 40 mg tablet Take 1 tablet (40 mg total) by mouth 2 (two) times a day  Patient taking differently: Take 80 mg by mouth 3 (three) times a day  6/10/19  Yes Chante Mustafa MD   lactulose (CONSTULOSE) 10 g/15 mL solution Take 20 g by mouth 2 (two) times a day   Yes Historical Provider, MD   pantoprazole (PROTONIX) 40 mg tablet Take 1 tablet (40 mg total) by mouth daily in the early morning 19  Yes Avis Olivera MD   potassium chloride (K-DUR,KLOR-CON) 20 mEq tablet Take 20 mEq by mouth 3 (three) times a day   Yes Historical Provider, MD   spironolactone (ALDACTONE) 100 mg tablet Take 1 tablet (100 mg total) by mouth daily  Patient not taking: Reported on 8/15/2019 6/10/19   Avis Olivera MD   lisinopril-hydrochlorothiazide (PRINZIDE,ZESTORETIC) 20-25 MG per tablet Take 1 tablet by mouth daily  8/15/19  Historical Provider, MD     I have reviewed home medications with patient personally  Allergies: Allergies   Allergen Reactions    Propranolol Eye Swelling    Torsemide Eye Swelling       Social History:     Marital Status: /Civil Union   Occupation:   Patient Pre-hospital Living Situation:  Home  Patient Pre-hospital Level of Mobility:  Previously pretty independent  Patient Pre-hospital Diet Restrictions:  Low-sodium  Substance Use History:   Social History     Substance and Sexual Activity   Alcohol Use Yes    Comment: RARE     Social History     Tobacco Use   Smoking Status Former Smoker    Last attempt to quit: 11/15/1990    Years since quittin 7   Smokeless Tobacco Never Used     Social History     Substance and Sexual Activity   Drug Use No       Family History:    History reviewed  No pertinent family history  Physical Exam:     Vitals:   Blood Pressure: 131/59 (08/15/19 1408)  Pulse: 64 (08/15/19 1408)  Temperature: 97 8 °F (36 6 °C) (08/15/19 1037)  Temp Source: Oral (08/15/19 1037)  Respirations: 21 (08/15/19 1408)  Height: 5' 11" (180 3 cm) (08/15/19 1037)  SpO2: 97 % (08/15/19 1408)    Physical Exam   Constitutional: He is oriented to person, place, and time  He appears well-developed and well-nourished  No distress  HENT:   Head: Normocephalic and atraumatic     Mouth/Throat: Mucous membranes are normal  Eyes: Pupils are equal, round, and reactive to light  EOM are normal    Neck: Neck supple  Cardiovascular: Normal rate, regular rhythm, S1 normal, S2 normal, normal heart sounds and intact distal pulses  Pulmonary/Chest: Effort normal and breath sounds normal  No respiratory distress  He has no wheezes  He has no rhonchi  He has no rales  Abdominal: Soft  Normal appearance and bowel sounds are normal  He exhibits no distension  There is no tenderness  Musculoskeletal: He exhibits edema (diffuse 4+ pitting edema BLEs, scrotum, abdomen)  Lymphadenopathy:     He has no cervical adenopathy  Neurological: He is alert and oriented to person, place, and time  No cranial nerve deficit  Skin: Skin is warm, dry and intact  There is erythema (BLEs without increased warmth, mild serous weeping)  Psychiatric: He has a normal mood and affect  Nursing note and vitals reviewed  Additional Data:     Lab Results: I have personally reviewed pertinent reports  Results from last 7 days   Lab Units 08/15/19  1225   WBC Thousand/uL 7 45   HEMOGLOBIN g/dL 8 0*   HEMATOCRIT % 24 7*   PLATELETS Thousands/uL 50*   BANDS PCT % 3   LYMPHO PCT % 11*   MONO PCT % 5   EOS PCT % 5     Results from last 7 days   Lab Units 08/15/19  1225   SODIUM mmol/L 133*   POTASSIUM mmol/L 3 4*   CHLORIDE mmol/L 102   CO2 mmol/L 23   BUN mg/dL 30*   CREATININE mg/dL 1 32*   ANION GAP mmol/L 8   CALCIUM mg/dL 8 3   ALBUMIN g/dL 2 6*   TOTAL BILIRUBIN mg/dL 4 50*   ALK PHOS U/L 89   ALT U/L 26   AST U/L 54*   GLUCOSE RANDOM mg/dL 161*     Results from last 7 days   Lab Units 08/15/19  1225   INR  1 82*                   Imaging: I have personally reviewed pertinent reports  XR chest 2 views   Final Result by Tamara Christina MD (08/15 1608)      No active pulmonary disease on examination which is somewhat limited secondary to low lung volumes              Workstation performed: [id number]             EKG, Pathology, and Other Studies Reviewed on Admission:   · EKG:  Sinus rhythm    Allscripts / Epic Records Reviewed: Yes     ** Please Note: This note has been constructed using a voice recognition system   **

## 2019-08-15 NOTE — ASSESSMENT & PLAN NOTE
· Stable, in no indication for GI consultation at this time  · Continue to monitor daily CMP, platelets, INR  · If any acute worsening, will consult GI for further recommendations

## 2019-08-15 NOTE — ASSESSMENT & PLAN NOTE
· Patient reportedly diagnosed with bacteremia and discharged on Friday 8/9 with PICC line to the left upper extremity  · Continue IV ceftriaxone 1000 mg daily  · We will need to obtain the records from [facility]  · Monitor for any fevers, he denies any at home

## 2019-08-15 NOTE — ED NOTES
CC-  Testicle swelling  Admission related to injury?- no  Orientation status- A&Ox4  Abnormal labs/abnormal focused assessment/vitals- bnp 1227, albumin 2 6, creat 1 32, BUN 30, glucose 161, bilirubin 4 5, K 3 4, AST 54, testicle swelling; lower extremity edema +2  Medication/drips- bumex, potassium, heparin, aldactone, lasix,  Last time narcotics given- n/a  IV lines/drains/etc-  PICC L arm  Isolation status- n/a  Skin- n/a  Ambulation- assist x2  ED nurse's name and phone numberAltacasa Marquis RN  08/15/19 [id number]

## 2019-08-15 NOTE — ED PROVIDER NOTES
History  Chief Complaint   Patient presents with    Testicle Swelling     Pt presents to ED with c/o b/l testicle swelling, pt being treated at home with IV abx for spesis, pt also has b/l LE swelling which he reports is new to him  Patient is a 50-year-old male with past medical history of cirrhosis, chronic kidney disease, chronic low back pain, coronary artery disease, hypertension, questionable congestive heart failure history, sleep apnea on CPAP at night, presents to the emergency department complaining of diffuse lower extremity, scrotal and abdominal swelling  Patient states he recently got out of [facility] for sepsis (unclear what the source of infection was)  He was sent home with a PICC line for IV Rocephin which she continues to take up until 8/19  He states he has chronic lower extremity edema but over the past 1 month it is been getting progressively worse  He states since Monday he has noticed progressively worsening scrotal swelling and this is new when he denies ever having scrotal swelling before  He states now he feels as though his abdomen is more swollen and distended but he denies any abdominal pain  He states he is having difficulty walking due to the swelling and is essentially bed-bound because of it  He also feels generally weak and fatigued  He reports recently being switched off of Lasix to torsemide however since yesterday he was switched back to Lasix  He is compliant with lactulose  He denies any fever, shaking chills, chest pain, dyspnea worse than baseline, headache, dizziness or near syncope, cough or URI symptoms, palpitations, abdominal pain, nausea, vomiting, blood per rectum or melena  He does have diarrhea secondary to lactulose  He reports he has been having more difficulty urinating and has had decreased urination overall over the past several days  Denies any burning sensation with urination or hematuria    He does report slightly darkened urine   He denies any lateralizing weakness or extremity paresthesia or other focal neurologic deficits  History provided by:  Patient, medical records and spouse   used: No        Prior to Admission Medications   Prescriptions Last Dose Informant Patient Reported? Taking? cefTRIAXone 1,000 mg in dextrose 5 % 50 mL IVPB 8/15/2019 at Unknown time  Yes Yes   Sig: Infuse 1,000 mg into a venous catheter every 24 hours Patient gives 30mL through port   furosemide (LASIX) 40 mg tablet 8/15/2019 at Unknown time  No Yes   Sig: Take 1 tablet (40 mg total) by mouth 2 (two) times a day   Patient taking differently: Take 80 mg by mouth 3 (three) times a day    lactulose (CONSTULOSE) 10 g/15 mL solution 8/15/2019 at Unknown time  Yes Yes   Sig: Take 20 g by mouth 2 (two) times a day   pantoprazole (PROTONIX) 40 mg tablet 8/15/2019 at Unknown time  No Yes   Sig: Take 1 tablet (40 mg total) by mouth daily in the early morning   potassium chloride (K-DUR,KLOR-CON) 20 mEq tablet 8/15/2019 at Unknown time  Yes Yes   Sig: Take 20 mEq by mouth 3 (three) times a day   spironolactone (ALDACTONE) 100 mg tablet Not Taking at Unknown time  No No   Sig: Take 1 tablet (100 mg total) by mouth daily   Patient not taking: Reported on 8/15/2019      Facility-Administered Medications: None       Past Medical History:   Diagnosis Date    Chronic pain disorder     lower back    CPAP (continuous positive airway pressure) dependence     Heart murmur     Hypertension     Liver cirrhosis [facility] [address] [facility]     Myocardial infarction ([facility] [address] [facility]     Renal insufficiency 6/7/2019    Sleep apnea     Vitamin D deficiency        Past Surgical History:   Procedure Laterality Date    KNEE ARTHROSCOPY Bilateral     KNEE SURGERY      MD COLONOSCOPY FLX DX W/COLLJ SPEC WHEN PFRMD N/A 11/15/2017    Procedure: COLONOSCOPY;  Surgeon: Macho Alfonso MD;  Location: [facility]; Service: Gastroenterology       History reviewed   No pertinent family history  I have reviewed and agree with the history as documented  Social History     Tobacco Use    Smoking status: Former Smoker     Last attempt to quit: 11/15/1990     Years since quittin 7    Smokeless tobacco: Never Used   Substance Use Topics    Alcohol use: Yes     Comment: RARE    Drug use: No        Review of Systems   Constitutional: Positive for fatigue  Negative for chills and fever  HENT: Negative for congestion, ear pain, rhinorrhea and sore throat  Eyes: Negative for photophobia, pain and visual disturbance  Respiratory: Negative for cough, chest tightness, shortness of breath and wheezing  Cardiovascular: Positive for leg swelling  Negative for chest pain and palpitations  Gastrointestinal: Negative for abdominal pain, constipation, diarrhea, nausea and vomiting  Genitourinary: Positive for decreased urine volume, difficulty urinating and scrotal swelling  Negative for dysuria, flank pain, frequency and hematuria  Musculoskeletal: Negative for back pain, neck pain and neck stiffness  Skin: Negative for color change, pallor, rash and wound  Allergic/Immunologic: Negative for immunocompromised state  Neurological: Positive for weakness  Negative for dizziness, syncope, facial asymmetry, speech difficulty, light-headedness, numbness and headaches  Hematological: Negative for adenopathy  Psychiatric/Behavioral: Negative for confusion and decreased concentration  All other systems reviewed and are negative  Physical Exam  Physical Exam   Constitutional: He is oriented to person, place, and time  He appears well-developed and well-nourished  No distress  HENT:   Head: Normocephalic and atraumatic  Mouth/Throat: Oropharynx is clear and moist    Eyes: Pupils are equal, round, and reactive to light  Conjunctivae and EOM are normal    Neck: Normal range of motion  Neck supple  No JVD present     Cardiovascular: Normal rate, regular rhythm and intact distal pulses  Exam reveals no gallop and no friction rub  Murmur heard  Pulmonary/Chest: Effort normal  No respiratory distress  He has no wheezes  He has rales  He exhibits no tenderness  Abdominal: Soft  Bowel sounds are normal  He exhibits distension  There is no tenderness  There is no rebound and no guarding  Musculoskeletal: Normal range of motion  He exhibits edema  He exhibits no tenderness  Neurological: He is alert and oriented to person, place, and time  No cranial nerve deficit  No focal gross motor or sensory deficits  5/5 strength bilateral upper extremities  3/5 strength bilateral lower extremities  Skin: Skin is warm and dry  No rash noted  He is not diaphoretic  There is erythema  No pallor  There is 3+ pitting edema bilateral feet, bilateral lower extremities, scrotum, abdominal wall and posterior trunk  There is mild erythema and warmth as well as skin weeping over bilateral anterior lower legs  Psychiatric: He has a normal mood and affect  His behavior is normal    Nursing note and vitals reviewed        Vital Signs  ED Triage Vitals [08/15/19 1037]   Temperature Pulse Respirations Blood Pressure SpO2   97 8 °F (36 6 °C) 66 20 115/55 99 %      Temp Source Heart Rate Source Patient Position - Orthostatic VS BP Location FiO2 (%)   Oral Monitor Sitting Right arm --      Pain Score       --         Vitals:    08/15/19 1037 08/15/19 1242 08/15/19 1408   BP: 115/55 132/62 131/59   BP Location: Right arm Right arm Right arm   Pulse: 66 67 64   Resp: 20 22 21   Temp: 97 8 °F (36 6 °C)     TempSrc: Oral     SpO2: 99% 96% 97%   Height: 5' 11" (1 803 m)         Visual Acuity      ED Medications  Medications   bumetanide (BUMEX) injection 2 mg (has no administration in time range)   furosemide (LASIX) injection 40 mg (40 mg Intravenous Given 8/15/19 1405)   potassium chloride (K-DUR,KLOR-CON) CR tablet 40 mEq (40 mEq Oral Given 8/15/19 1403)       Diagnostic Studies  Results Reviewed     Procedure Component Value Units Date/Time    Blood culture #1 [[id number]]     Lab Status:  No result Specimen:  Blood     Blood culture #2 [[id number]]     Lab Status:  No result Specimen:  Blood     Troponin I [[id number]]  (Normal) Collected:  08/15/19 1225    Lab Status:  Final result Specimen:  Blood from Arm, Left Updated:  08/15/19 1350     Troponin I 0 03 ng/mL     Basic metabolic panel [[id number]]  (Abnormal) Collected:  08/15/19 1225    Lab Status:  Final result Specimen:  Blood from Arm, Left Updated:  08/15/19 1321     Sodium 133 mmol/L      Potassium 3 4 mmol/L      Chloride 102 mmol/L      CO2 23 mmol/L      ANION GAP 8 mmol/L      BUN 30 mg/dL      Creatinine 1 32 mg/dL      Glucose 161 mg/dL      Calcium 8 3 mg/dL      eGFR 59 ml/min/1 73sq m     Narrative:       [place] guidelines for Chronic Kidney Disease (CKD):     Stage 1 with normal or high GFR (GFR > 90 mL/min/1 73 square meters)    Stage 2 Mild CKD (GFR = 60-89 mL/min/1 73 square meters)    Stage 3A Moderate CKD (GFR = 45-59 mL/min/1 73 square meters)    Stage 3B Moderate CKD (GFR = 30-44 mL/min/1 73 square meters)    Stage 4 Severe CKD (GFR = 15-29 mL/min/1 73 square meters)    Stage 5 End Stage CKD (GFR <15 mL/min/1 73 square meters)  Note: GFR calculation is accurate only with a steady state creatinine    Hepatic function panel [[id number]]  (Abnormal) Collected:  08/15/19 1225    Lab Status:  Final result Specimen:  Blood from Arm, Left Updated:  08/15/19 1321     Total Bilirubin 4 50 mg/dL      Bilirubin, Direct 1 52 mg/dL      Alkaline Phosphatase 89 U/L      AST 54 U/L      ALT 26 U/L      Total Protein 6 0 g/dL      Albumin 2 6 g/dL     Magnesium [[id number]]  (Normal) Collected:  08/15/19 1225    Lab Status:  Final result Specimen:  Blood from Arm, Left Updated:  08/15/19 1321     Magnesium 1 9 mg/dL     B-type natriuretic peptide [[id number]]  (Abnormal) Collected:  08/15/19 1225    Lab Status:  Final result Specimen:  Blood from Arm, Left Updated:  08/15/19 1321     NT-proBNP 1,227 pg/mL     CBC and differential [[id number]]  (Abnormal) Collected:  08/15/19 1225    Lab Status:  Final result Specimen:  Blood from Arm, Left Updated:  08/15/19 1302     WBC 7 45 Thousand/uL      RBC 2 25 Million/uL      Hemoglobin 8 0 g/dL      Hematocrit 24 7 %       fL      MCH 35 6 pg      MCHC 32 4 g/dL      RDW 16 9 %      MPV 10 6 fL      Platelets 50 Thousands/uL      nRBC 0 /100 WBCs     Urine Microscopic [[id number]]  (Abnormal) Collected:  08/15/19 1240    Lab Status:  Final result Specimen:  Urine, Other Updated:  08/15/19 1259     RBC, UA 0-1 /hpf      WBC, UA 2-4 /hpf      Epithelial Cells None Seen /hpf      Bacteria, UA Occasional /hpf     Protime-INR [[id number]]  (Abnormal) Collected:  08/15/19 1225    Lab Status:  Final result Specimen:  Blood from Arm, Left Updated:  08/15/19 1257     Protime 21 2 seconds      INR 1 82    APTT [[id number]]  (Abnormal) Collected:  08/15/19 1225    Lab Status:  Final result Specimen:  Blood from Arm, Left Updated:  08/15/19 1257     PTT 39 seconds     UA w Reflex to Microscopic [[id number]]  (Abnormal) Collected:  08/15/19 1240    Lab Status:  Final result Specimen:  Urine, Other Updated:  08/15/19 1247     Color, UA Yellow     Clarity, UA Clear     Specific Gravity, UA 1 015     pH, UA 6 0     Leukocytes, UA Trace     Nitrite, UA Negative     Protein, UA Negative mg/dl      Glucose, UA Negative mg/dl      Ketones, UA Negative mg/dl      Urobilinogen, UA 1 0 E U /dl      Bilirubin, UA Negative     Blood, UA Trace-Intact                 XR chest 2 views   Final Result by Adair Grady MD (08/15 2538)      No active pulmonary disease on examination which is somewhat limited secondary to low lung volumes              Workstation performed: [id number]                    Procedures  ECG 12 Lead Documentation Only  Date/Time: 8/15/2019 12:39 PM  Performed by: Cody Serrano Cameron Salazar DO  Authorized by: Libby Browne DO     ECG reviewed by me, the ED Provider: yes    Patient location:  ED  Previous ECG:     Previous ECG:  Compared to current    Comparison ECG info:  Nonspecific intraventricular conduction block is new compared to 06/07/19  Rate:     ECG rate:  65    ECG rate assessment: normal    Rhythm:     Rhythm: sinus rhythm    Ectopy:     Ectopy: none    QRS:     QRS axis:  Left    QRS intervals: Wide  Conduction:     Conduction: abnormal      Abnormal conduction: non-specific intraventricular conduction delay    ST segments:     ST segments:  Normal  T waves:     T waves: normal             ED Course  ED Course as of Aug 15 1635   Thu Aug 15, 2019   1339 Significantly more elevated than 2 months ago  NT-proBNP(!): 1,227   1339 LFTs and bilirubin chronically elevated  MDM  Number of Diagnoses or Management Options  Diagnosis management comments: 61-year-old male presents for worsening edema involving the lower extremities, scrotum and abdominal wall consistent with anasarca  According to medical records he had been admitted for similar symptoms several months ago to [facility]   According to patient he was recently discharged from [facility] for infection and sepsis and is currently receiving IV ceftriaxone via PICC line  Most likely this is uncontrolled/decompensated liver disease, possibly renal disease or heart failure  Will do full cardiac workup, chest x-ray  Pending kidney function will give IV Lasix  Patient to be admitted         Amount and/or Complexity of Data Reviewed  Clinical lab tests: ordered and reviewed  Tests in the radiology section of CPT®: ordered and reviewed  Tests in the medicine section of CPT®: ordered and reviewed  Independent visualization of images, tracings, or specimens: yes        Disposition  Final diagnoses:   Anasarca   Generalized weakness     Time reflects when diagnosis was documented in both MDM as applicable and the Disposition within this note     Time User Action Codes Description Comment    8/15/2019  2:33 PM Lisbeth WOLFE Add [R60 1] Anasarca     8/15/2019  2:33 PM Lisbeth WOLFE Add [R53 1] Generalized weakness       ED Disposition     ED Disposition Condition Date/Time Comment    Admit Stable Thu Aug 15, 2019  2:33 PM Case was discussed with JUVENTINO and the patient's admission status was agreed to be Admission Status: inpatient status to the service of Dr Ronald Johnson   Follow-up Information    None         Patient's Medications   Discharge Prescriptions    No medications on file     No discharge procedures on file      ED Provider  Electronically Signed by           Serapio Schilder, DO  08/15/19 8177

## 2019-08-16 ENCOUNTER — APPOINTMENT (INPATIENT)
Dept: CT IMAGING | Facility: HOSPITAL | Age: 59
DRG: 433 | End: 2019-08-16
Payer: COMMERCIAL

## 2019-08-16 LAB
ALBUMIN SERPL BCP-MCNC: 2.8 G/DL (ref 3.5–5)
ALP SERPL-CCNC: 88 U/L (ref 46–116)
ALT SERPL W P-5'-P-CCNC: 24 U/L (ref 12–78)
ANION GAP SERPL CALCULATED.3IONS-SCNC: 8 MMOL/L (ref 4–13)
AST SERPL W P-5'-P-CCNC: 52 U/L (ref 5–45)
BILIRUB SERPL-MCNC: 5 MG/DL (ref 0.2–1)
BUN SERPL-MCNC: 29 MG/DL (ref 5–25)
CALCIUM SERPL-MCNC: 8.7 MG/DL (ref 8.3–10.1)
CHLORIDE SERPL-SCNC: 101 MMOL/L (ref 100–108)
CO2 SERPL-SCNC: 24 MMOL/L (ref 21–32)
CREAT SERPL-MCNC: 1.24 MG/DL (ref 0.6–1.3)
ERYTHROCYTE [DISTWIDTH] IN BLOOD BY AUTOMATED COUNT: 17 % (ref 11.6–15.1)
GFR SERPL CREATININE-BSD FRML MDRD: 64 ML/MIN/1.73SQ M
GLUCOSE SERPL-MCNC: 106 MG/DL (ref 65–140)
HCT VFR BLD AUTO: 24.2 % (ref 36.5–49.3)
HGB BLD-MCNC: 7.8 G/DL (ref 12–17)
INR PPP: 1.91 (ref 0.84–1.19)
MAGNESIUM SERPL-MCNC: 2 MG/DL (ref 1.6–2.6)
MCH RBC QN AUTO: 35.3 PG (ref 26.8–34.3)
MCHC RBC AUTO-ENTMCNC: 32.2 G/DL (ref 31.4–37.4)
MCV RBC AUTO: 110 FL (ref 82–98)
PHOSPHATE SERPL-MCNC: 3.2 MG/DL (ref 2.7–4.5)
PLATELET # BLD AUTO: 54 THOUSANDS/UL (ref 149–390)
PMV BLD AUTO: 10.9 FL (ref 8.9–12.7)
POTASSIUM SERPL-SCNC: 3.9 MMOL/L (ref 3.5–5.3)
PROT SERPL-MCNC: 6.2 G/DL (ref 6.4–8.2)
PROTHROMBIN TIME: 22 SECONDS (ref 11.6–14.5)
RBC # BLD AUTO: 2.21 MILLION/UL (ref 3.88–5.62)
SODIUM SERPL-SCNC: 133 MMOL/L (ref 136–145)
WBC # BLD AUTO: 6.96 THOUSAND/UL (ref 4.31–10.16)

## 2019-08-16 PROCEDURE — 80053 COMPREHEN METABOLIC PANEL: CPT | Performed by: PHYSICIAN ASSISTANT

## 2019-08-16 PROCEDURE — 99232 SBSQ HOSP IP/OBS MODERATE 35: CPT | Performed by: PHYSICIAN ASSISTANT

## 2019-08-16 PROCEDURE — 85027 COMPLETE CBC AUTOMATED: CPT | Performed by: PHYSICIAN ASSISTANT

## 2019-08-16 PROCEDURE — 83735 ASSAY OF MAGNESIUM: CPT | Performed by: PHYSICIAN ASSISTANT

## 2019-08-16 PROCEDURE — 74176 CT ABD & PELVIS W/O CONTRAST: CPT

## 2019-08-16 PROCEDURE — 85610 PROTHROMBIN TIME: CPT | Performed by: INTERNAL MEDICINE

## 2019-08-16 PROCEDURE — 84100 ASSAY OF PHOSPHORUS: CPT | Performed by: PHYSICIAN ASSISTANT

## 2019-08-16 PROCEDURE — 99223 1ST HOSP IP/OBS HIGH 75: CPT | Performed by: INTERNAL MEDICINE

## 2019-08-16 RX ADMIN — ACETAMINOPHEN 650 MG: 325 TABLET, FILM COATED ORAL at 16:45

## 2019-08-16 RX ADMIN — SPIRONOLACTONE 100 MG: 25 TABLET ORAL at 08:24

## 2019-08-16 RX ADMIN — POTASSIUM CHLORIDE 40 MEQ: 1500 TABLET, EXTENDED RELEASE ORAL at 17:05

## 2019-08-16 RX ADMIN — BUMETANIDE 2 MG: 0.25 INJECTION INTRAMUSCULAR; INTRAVENOUS at 17:05

## 2019-08-16 RX ADMIN — HEPARIN SODIUM 5000 UNITS: 5000 INJECTION INTRAVENOUS; SUBCUTANEOUS at 21:30

## 2019-08-16 RX ADMIN — BUMETANIDE 2 MG: 0.25 INJECTION INTRAMUSCULAR; INTRAVENOUS at 10:51

## 2019-08-16 RX ADMIN — ALBUMIN (HUMAN) 25 G: 12.5 SOLUTION INTRAVENOUS at 08:25

## 2019-08-16 RX ADMIN — ALBUMIN (HUMAN) 25 G: 12.5 SOLUTION INTRAVENOUS at 21:30

## 2019-08-16 RX ADMIN — CEFTRIAXONE SODIUM 1000 MG: 10 INJECTION, POWDER, FOR SOLUTION INTRAVENOUS at 11:36

## 2019-08-16 RX ADMIN — POTASSIUM CHLORIDE 40 MEQ: 1500 TABLET, EXTENDED RELEASE ORAL at 10:52

## 2019-08-16 RX ADMIN — HEPARIN SODIUM 5000 UNITS: 5000 INJECTION INTRAVENOUS; SUBCUTANEOUS at 15:19

## 2019-08-16 RX ADMIN — PANTOPRAZOLE SODIUM 40 MG: 40 TABLET, DELAYED RELEASE ORAL at 06:08

## 2019-08-16 RX ADMIN — HEPARIN SODIUM 5000 UNITS: 5000 INJECTION INTRAVENOUS; SUBCUTANEOUS at 06:08

## 2019-08-16 RX ADMIN — LACTULOSE 20 G: 10 SOLUTION ORAL at 08:25

## 2019-08-16 NOTE — DISCHARGE INSTR - OTHER ORDERS
1  Apply hydraguard to b/l heels, buttocks and sacrum BID and PRN for prevention and protection  2  Apply skin nourishing cream the entire skin daily for moisture  3  Turn and reposition patient every  2 hours   4  Elevate heels off of bed with pillows to offload pressure   5  Apply EHOB waffle cushion to chair when OOB, if able  6  Elevate b/l lower extremities as patient tolerates  7  Apply ACE wraps to b/l lower extremities  Apply 4inch ace wrap beginning at base of toes to ankle  Then apply 6inch ace wrap beginning at the ankle to the knee gatch  Apply in the morning and remove at bedtime  8  Cleanse L posterior lower extremity wound with NSS, pat dry, apply small bordered foam dressing every other day and as needed for soilage/dislodgement

## 2019-08-16 NOTE — PLAN OF CARE
Problem: Potential for Falls  Goal: Patient will remain free of falls  Description  INTERVENTIONS:  - Assess patient frequently for physical needs  -  Identify cognitive and physical deficits and behaviors that affect risk of falls    -  [place] fall precautions as indicated by assessment   - Educate patient/family on patient safety including physical limitations  - Instruct patient to call for assistance with activity based on assessment  - Modify environment to reduce risk of injury  - Consider OT/PT consult to assist with strengthening/mobility  8/15/2019 2330 by Grecia Cali RN  Outcome: Progressing  8/15/2019 2329 by Grecia Cali RN  Outcome: Progressing     Problem: PAIN - ADULT  Goal: Verbalizes/displays adequate comfort level or baseline comfort level  Description  Interventions:  - Encourage patient to monitor pain and request assistance  - Assess pain using appropriate pain scale  - Administer analgesics based on type and severity of pain and evaluate response  - Implement non-pharmacological measures as appropriate and evaluate response  - Consider cultural and social influences on pain and pain management  - Notify physician/advanced practitioner if interventions unsuccessful or patient reports new pain  8/15/2019 2330 by Grecia Cali RN  Outcome: Progressing  8/15/2019 2329 by Grecia Cali RN  Outcome: Progressing     Problem: INFECTION - ADULT  Goal: Absence or prevention of progression during hospitalization  Description  INTERVENTIONS:  - Assess and monitor for signs and symptoms of infection  - Monitor lab/diagnostic results  - Monitor all insertion sites, i e  indwelling lines, tubes, and drains  - Monitor endotracheal if appropriate and nasal secretions for changes in amount and color  - [place] appropriate cooling/warming therapies per order  - Administer medications as ordered  - Instruct and encourage patient and family to use good hand hygiene technique  - Identify and instruct in appropriate isolation precautions for identified infection/condition  8/15/2019 2330 by Sandra Cassidy RN  Outcome: Progressing  8/15/2019 2329 by Sandra Cassidy RN  Outcome: Progressing     Problem: SAFETY ADULT  Goal: Patient will remain free of falls  Description  INTERVENTIONS:  - Assess patient frequently for physical needs  -  Identify cognitive and physical deficits and behaviors that affect risk of falls  -  [place] fall precautions as indicated by assessment   - Educate patient/family on patient safety including physical limitations  - Instruct patient to call for assistance with activity based on assessment  - Modify environment to reduce risk of injury  - Consider OT/PT consult to assist with strengthening/mobility  8/15/2019 2330 by Sandra Cassidy RN  Outcome: Progressing  8/15/2019 2329 by Sandra Cassidy RN  Outcome: Progressing     Problem: DISCHARGE PLANNING  Goal: Discharge to home or other facility with appropriate resources  Description  INTERVENTIONS:  - Identify barriers to discharge w/patient and caregiver  - Arrange for needed discharge resources and transportation as appropriate  - Identify discharge learning needs (meds, wound care, etc )  - Arrange for interpretive services to assist at discharge as needed  - Refer to Case Management Department for coordinating discharge planning if the patient needs post-hospital services based on physician/advanced practitioner order or complex needs related to functional status, cognitive ability, or social support system  8/15/2019 2330 by Sandra Cassidy RN  Outcome: Progressing  8/15/2019 2329 by Sandra Cassidy RN  Outcome: Progressing     Problem: Knowledge Deficit  Goal: Patient/family/caregiver demonstrates understanding of disease process, treatment plan, medications, and discharge instructions  Description  Complete learning assessment and assess knowledge base    Interventions:  - Provide teaching at level of understanding  - Provide teaching via preferred learning methods  8/15/2019 2330 by Lidia Desai RN  Outcome: Progressing  8/15/2019 2329 by Lidia Desai RN  Outcome: Progressing     Problem: NEUROSENSORY - ADULT  Goal: Achieves stable or improved neurological status  Description  INTERVENTIONS  - Monitor and report changes in neurological status  - Monitor vital signs such as temperature, blood pressure, glucose, and any other labs ordered   - Initiate measures to prevent increased intracranial pressure  - Monitor for seizure activity and implement precautions if appropriate      8/15/2019 2330 by Lidia Desai RN  Outcome: Progressing  8/15/2019 2329 by Lidia Desai RN  Outcome: Progressing     Problem: CARDIOVASCULAR - ADULT  Goal: Maintains optimal cardiac output and hemodynamic stability  Description  INTERVENTIONS:  - Monitor I/O, vital signs and rhythm  - Monitor for S/S and trends of decreased cardiac output  - Administer and titrate ordered vasoactive medications to optimize hemodynamic stability  - Assess quality of pulses, skin color and temperature  - Assess for signs of decreased coronary artery perfusion  - Instruct patient to report change in severity of symptoms  8/15/2019 2330 by Lidia Desai RN  Outcome: Progressing  8/15/2019 2329 by Lidia Desai RN  Outcome: Progressing     Problem: SKIN/TISSUE INTEGRITY - ADULT  Goal: Incision(s), wounds(s) or drain site(s) healing without S/S of infection  Description  INTERVENTIONS  - Assess and document risk factors for skin impairment   - Assess and document dressing, incision, wound bed, drain sites and surrounding tissue  - Consider nutrition services referral as needed  - Oral mucous membranes remain intact  - Provide patient/ family education  8/15/2019 2330 by Lidia Desai RN  Outcome: Progressing  8/15/2019 2329 by Lidia Desai RN  Outcome: Progressing     Problem: MUSCULOSKELETAL - ADULT  Goal: Maintain or return mobility to safest level of function  Description  INTERVENTIONS:  - Assess patient's ability to carry out ADLs; assess patient's baseline for ADL function and identify physical deficits which impact ability to perform ADLs (bathing, care of mouth/teeth, toileting, grooming, dressing, etc )  - Assess/evaluate cause of self-care deficits   - Assess range of motion  - Assess patient's mobility  - Assess patient's need for assistive devices and provide as appropriate  - Encourage maximum independence but intervene and supervise when necessary  - Involve family in performance of ADLs  - Assess for home care needs following discharge   - Consider OT consult to assist with ADL evaluation and planning for discharge  - Provide patient education as appropriate  8/15/2019 2330 by Ronny Arango RN  Outcome: Progressing  8/15/2019 2329 by Ronny Arango RN  Outcome: Progressing
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
## 2019-08-16 NOTE — CONSULTS
NEPHROLOGY CONSULTATION NOTE    Patient: Baldemar Lance               Sex: male          DOA: 8/15/2019 11:49 AM   Date of Birth: @        Age: @        LOS:  LOS: 1 day     REFERRING PHYSICIAN: Caridad Prasad PA-C     [address] / CONSULTATION:  Further management of anasarca    DATE OF CONSULTATION / SERVICE: 8/16/2019    ADMISSION DIAGNOSIS: Anasarca     CHIEF COMPLAINT     I have worsening leg swelling    HPI     This is a 60-year-old male with past medical history of ROSALES liver cirrhosis, sleep apnea, came into the ER for further evaluation of worsening leg swelling  Nephrology were consulted for further management of anasarca  Upon review of old medical record patient's previously known baseline creatinine seems to be fluctuating between 1 1-1 2 and current creatinine is 1 24 which is close to baseline  Urine analysis done on admission did not show proteinuria either  Patient said that he was recently admitted to [facility] where he was treated with blood stream infection  Patient was discharge almost 1 week back and since that time he has been noticing to have worsening leg swelling, patient said that he has call his primary care doctor and was started on some diuretics orally but seems that it has not worked and decided to come to the ER for further evaluation  According to patient he has also been followed by GI and currently does not need liver transplant  Currently patient denies nausea, vomiting, headache, dizziness, abdominal pain, constipation or rash      PAST MEDICAL HISTORY     Past Medical History:   Diagnosis Date    Chronic pain disorder     lower back    CPAP (continuous positive airway pressure) dependence     Heart murmur     Hypertension     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 [facility] [address] [facility]     Myocardial infarction ([facility] [address] [facility]     Renal insufficiency 6/7/2019    Sleep apnea     Vitamin D deficiency        PAST SURGICAL HISTORY     Past Surgical History: Procedure Laterality Date    KNEE ARTHROSCOPY Bilateral     KNEE SURGERY      SD COLONOSCOPY FLX DX W/COLLJ SPEC WHEN PFRMD N/A 11/15/2017    Procedure: COLONOSCOPY;  Surgeon: Sav Ratliff MD;  Location: [facility]; Service: Gastroenterology       ALLERGIES     Allergies   Allergen Reactions    Propranolol Eye Swelling    Torsemide Eye Swelling       SOCIAL HISTORY     Social History     Substance and Sexual Activity   Alcohol Use Yes    Comment: RARE     Social History     Substance and Sexual Activity   Drug Use No     Social History     Tobacco Use   Smoking Status Former Smoker    Last attempt to quit: 11/15/1990    Years since quittin 7   Smokeless Tobacco Never Used       FAMILY HISTORY     History reviewed  No pertinent family history      CURRENT MEDICATIONS       Current Facility-Administered Medications:     acetaminophen (TYLENOL) tablet 650 mg, 650 mg, Oral, Q6H PRN, BIENVENIDO Silva-MIRTA    albumin human (FLEXBUMIN) 25 % injection 25 g, 25 g, Intravenous, Q12H DANA, BIENVENIDO Silva-C, 25 g at 19 0825    aluminum-magnesium hydroxide-simethicone (MYLANTA) 200-200-20 mg/5 mL oral suspension 30 mL, 30 mL, Oral, Q6H PRN, Kasey Saldivar PA-C    bumetanide (BUMEX) injection 2 mg, 2 mg, Intravenous, BID, BIENVENIDO Silva-MIRTA, 2 mg at 19 1051    cefTRIAXone (ROCEPHIN) 1,000 mg in dextrose 5 % 50 mL IVPB, 1,000 mg, Intravenous, Q24H, Kasey Saldivar PA-C, Last Rate: 100 mL/hr at 19 1136, 1,000 mg at 19 1136    heparin (porcine) subcutaneous injection 5,000 Units, 5,000 Units, Subcutaneous, Q8H [address], 5,000 Units at 19 0608 **AND** Platelet count, , , Once, Kasey Saldivar PA-C    lactulose 20 g/30 mL oral solution 20 g, 20 g, Oral, BID, BIENVENIDO Silva-C, 20 g at 19 0825    ondansetron (ZOFRAN) injection 4 mg, 4 mg, Intravenous, Q6H PRN, BIENVENIDO Silva-C    pantoprazole (PROTONIX) EC tablet 40 mg, 40 mg, Oral, Early Morning, Charolett Locks, PA-C, 40 mg at 08/16/19 0608    polyethylene glycol (MIRALAX) packet 17 g, 17 g, Oral, Daily PRN, Kasey Saldivar PA-C    potassium chloride (K-DUR,KLOR-CON) CR tablet 40 mEq, 40 mEq, Oral, BID, Kasey YANETH Saldivar, PA-C, 40 mEq at 08/16/19 1052    spironolactone (ALDACTONE) tablet 100 mg, 100 mg, Oral, Daily, Kasey WOLFE BIENVENIDO Saldivar-C, 100 mg at 08/16/19 0372    REVIEW OF SYSTEMS     Complete 10 points of review of systems were obtained and discussed in length with patient today  Complete 10 points of review of systems were negative/unremarkable except mentioned in the HPI section  OBJECTIVE     Current Weight: Weight - Scale: (!) 180 kg (397 lb 11 4 oz)  Vitals:    08/16/19 0656   BP: 117/52   Pulse: 72   Resp: 18   Temp: 98 °F (36 7 °C)   SpO2: 95%     Body mass index is 55 47 kg/m²  Intake/Output Summary (Last 24 hours) at 8/16/2019 1143  Last data filed at 8/16/2019 0900  Gross per 24 hour   Intake 240 ml   Output 1345 ml   Net -1105 ml       PHYSICAL EXAMINATION     Physical Exam   Constitutional: No distress  Obese   HENT:   Head: Normocephalic and atraumatic  Eyes: No scleral icterus  Neck: Neck supple  No JVD present  Cardiovascular: Normal rate  Pulmonary/Chest: No accessory muscle usage  No respiratory distress  He has no wheezes  Abdominal: Soft  There is no tenderness  Musculoskeletal: He exhibits edema  Right hand: He exhibits no laceration  Left hand: He exhibits no laceration  Neurological: He is alert  Skin: Skin is warm  No cyanosis  Psychiatric: He is not combative  He expresses no suicidal ideation           LAB RESULTS        Results from last 7 days   Lab Units 08/16/19  0443 08/15/19  1225   WBC Thousand/uL 6 96 7 45   HEMOGLOBIN g/dL 7 8* 8 0*   HEMATOCRIT % 24 2* 24 7*   PLATELETS Thousands/uL 54* 50*   SODIUM mmol/L 133* 133*   POTASSIUM mmol/L 3 9 3 4*   CHLORIDE mmol/L 101 102   CO2 mmol/L 24 23   BUN mg/dL 29* 30*   CREATININE mg/dL 1 24 1 32*   EGFR ml/min/1 73sq m 64 59   CALCIUM mg/dL 8 7 8 3   MAGNESIUM mg/dL 2 0 1 9   PHOSPHORUS mg/dL 3 2  --        I have personally reviewed the old medical records and patient's previously known baseline creatinine level is ~ 1 1-1 2    RADIOLOGY RESULTS     XR chest 2 views   Final Result by Albin Lam MD (08/15 1744)      No active pulmonary disease on examination which is somewhat limited secondary to low lung volumes  Workstation performed: [id number]           2D echocardiogram done on 6/10/2019 showed EF of 60%  PLAN / RECOMMENDATIONS      1  Anasarca  Suspected due to hypoalbuminemia in the setting of underlying ROSALES liver cirrhosis  Patient was admitted with worsening anasarca  According to patient he was taking oral Lasix at home which has not worked since recent discharge from [facility] 1 week back  Patient's current albumin level is 2 8  Urine analysis done on admission did not show proteinuria suggested anasarca is not renal in origin  I am suspecting anasarca/edema would be most likely secondary to underlying ROSALES liver cirrhosis leading to hypoalbuminemia  Currently patient is receiving Bumex 2 mg IV BID along with spironolactone 100 mg PO daily and was also started on IV albumin 25 g q 12 hours x4 doses  Continue 1 5 L of fluid restriction  Check bladder scan to rule out urinary retention  Monitor strict Is&Os  Consider bilateral thigh-high compression stocking  Upon review of old medical record patient's baseline creatinine is between 1 1-1 2 with current creatinine of 1 24 which is close to baseline but if found to have worsening renal function, may need to back off on diuretic dose     Plan to recheck renal function with AM labs  Plan to check BNP with AM labs    Thank you for the consultation to participate in patient's care  I have personally discussed my overall above mentioned plan with the current [address] [place] [address]       Dano Ybarra, MD  Nephrology  8/16/2019        Portions of the record may have been created with voice recognition software  Occasional wrong word or "sound a like" substitutions may have occurred due to the inherent limitations of voice recognition software  Read the chart carefully and recognize, using context, where substitutions have occurred

## 2019-08-16 NOTE — PROGRESS NOTES
Progress Note [place] Exon 1960, 63 y o  male MRN: [id number]    Unit/Bed#: -01 Encounter: [id number]    Primary Care Provider: No primary care provider on file  Date and time admitted to hospital: 8/15/2019 11:49 AM        * Anasarca  Assessment & Plan  · Recurrent severe anasarca: patient reports he continues to gain weight despite use of diuretics at home  Recently switched from Torsemide to Furosemide without any improvement  In the setting of decompensated ROSALES cirrhosis  · Switched from Furosemide to Bumex 2 mg IV BID, will also albumin for several doses secondary to hypoalbuminemia  Continue Aldactone  · Nephrology input appreciated  · Strict I/Os, daily STANDING weights, low-sodium diet, fluid restriction  · Continue to monitor electrolytes closely, replete as needed  · PT and OT consults for deconditioning  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 [facility])  Assessment & Plan  · Patient with decompensated ROSALES cirrhosis  MELD calculated at 24  · Ascites: Continue diuresis of anasarca with Bumex IV and Aldactone  Low sodium diet  Follow up CT   · HE: AO and no asterixis  Continue Lactulose for goal of 3 soft BMs a day  · Varices: Will need EGD as outpatient  · [facility] [address]  Screen: AFP in June normal   Follow up CT  · Transplant: No evaluation at transplant center has been performed; needs outpatient follow up  Bacteremia  Assessment & Plan  · Patient reportedly diagnosed with a bacteremia and discharged on Friday 8/9 from [facility] with PICC line to the left upper extremity  · Continue IV Ceftriaxone 1000 mg daily - he reports he is on this medication through the 19th  Will need to clarify/confirm with [facility] ID  Renal insufficiency  Assessment & Plan  · Baseline unclear, continue to monitor closely with daily renal functions in setting of aggressive diuresis  Morbid obesity ([address] )  Assessment & Plan  · Nutrition consult, dietary and lifestyle modifications recommended      Essential hypertension  Assessment & Plan  · Blood pressure well controlled, continue to monitor in the setting of aggressive diuresis  CROW on CPAP  Assessment & Plan  · Continue CPAP at night  VTE Pharmacologic Prophylaxis:   Pharmacologic: Heparin    Patient Centered Rounds: Discussed with nurse outside of patient's room  Discussions with Specialists or Other Care Team Provider: Discussed with nephrology  Discussed with wound care  Education and Discussions with Family / Patient: Discussed with patient and family at bedside  Time Spent for Care: 30 minutes  More than 50% of total time spent on counseling and coordination of care as described above  Current Length of Stay: 1 day(s)    Current Patient Status: Inpatient   Certification Statement: The patient will continue to require additional inpatient hospital stay due to treatment of anasarca with IV Bumex  Discharge Plan: Not medically cleared  Code Status: Level 1 - Full Code      Subjective:   Patient reports increasing edema up through his scrotum prior to admission  He reports some improvement today  No SOB  No abdominal pain  No vomiting  No fevers  Family at bedside  Objective:     Vitals:   Temp (24hrs), Av 1 °F (36 7 °C), Min:98 °F (36 7 °C), Max:98 2 °F (36 8 °C)    Temp:  [98 °F (36 7 °C)-98 2 °F (36 8 °C)] 98 °F (36 7 °C)  HR:  [67-73] 72  Resp:  [18] 18  BP: (113-143)/(52-65) 117/52  SpO2:  [95 %-98 %] 95 %  Body mass index is 55 47 kg/m²  Input and Output Summary (last 24 hours): Intake/Output Summary (Last 24 hours) at 2019 1454  Last data filed at 2019 0900  Gross per 24 hour   Intake 240 ml   Output 1025 ml   Net -785 ml       Physical Exam:     Physical Exam   Constitutional: He is oriented to person, place, and time  No distress  Morbidly obese  HENT:   Head: Normocephalic and atraumatic  Eyes:   Scant Icterus  Cardiovascular: Normal rate and regular rhythm     Pulmonary/Chest: Effort normal  No respiratory distress  He has no wheezes  He has no rales  Abdominal: Soft  Bowel sounds are normal  He exhibits distension  There is no tenderness  Obese  Musculoskeletal:   Severe edema bilaterally/ACE wrapped  Neurological: He is alert and oriented to person, place, and time  No asterixis  Skin: He is not diaphoretic  Vitals reviewed  Additional Data:     Labs:    Results from last 7 days   Lab Units 08/16/19  0443 08/15/19  1225   WBC Thousand/uL 6 96 7 45   HEMOGLOBIN g/dL 7 8* 8 0*   HEMATOCRIT % 24 2* 24 7*   PLATELETS Thousands/uL 54* 50*   LYMPHO PCT %  --  11*   MONO PCT %  --  5   EOS PCT %  --  5     Results from last 7 days   Lab Units 08/16/19  0443   POTASSIUM mmol/L 3 9   CHLORIDE mmol/L 101   CO2 mmol/L 24   BUN mg/dL 29*   CREATININE mg/dL 1 24   CALCIUM mg/dL 8 7   ALK PHOS U/L 88   ALT U/L 24   AST U/L 52*     Results from last 7 days   Lab Units 08/16/19  0443   INR  1 91*       * I Have Reviewed All Lab Data Listed Above  * Additional Pertinent Lab Tests Reviewed: All Labs Within Last 24 Hours Reviewed    Imaging:    Imaging Reports Reviewed Today Include: CT A/P pending      Recent Cultures (last 7 days):           Last 24 Hours Medication List:     Current Facility-Administered Medications:  acetaminophen 650 mg Oral Q6H PRN Adrian Forte PA-C    albumin human 25 g Intravenous Q12H [facility] Kasey Saldivar PA-C    aluminum-magnesium hydroxide-simethicone 30 mL Oral Q6H PRN Kasey Saldivar PA-C    bumetanide 2 mg Intravenous BID Kasey Saldivar PA-C    cefTRIAXone (ROCEPHIN) 1g in 50 mL IVPB 1,000 mg Intravenous Q24H Kasey Saldivar PA-C Last Rate: 1,000 mg (08/16/19 1136)   heparin (porcine) 5,000 Units Subcutaneous Q8H [facility] Kasey Saldivar PA-C    lactulose 20 g Oral BID Kasey Saldivar PA-C    ondansetron 4 mg Intravenous Q6H PRN Kasey Saldivar PA-C    pantoprazole 40 mg Oral Early Morning Kasey Saldivar PA-C    polyethylene glycol 17 g Oral Daily PRN Marte Deeth YANTEH MARILYN Saldivar    potassium chloride 40 mEq Oral BID Kasey Saldivar PA-C    spironolactone 100 mg Oral Daily Reji Moreland PA-C         Today, Patient Was Seen By: Beatrice Mcmahon PA-C    ** Please Note: Dictation voice to text software may have been used in the creation of this document   **

## 2019-08-16 NOTE — PHYSICAL THERAPY NOTE
Physical Therapy Cancellation Note    PT orders received + chart review completed  Pt currently off floor for CT  Will cont to follow + perform PT eval as appropriate       Char Alexander, PT, DPT

## 2019-08-16 NOTE — ASSESSMENT & PLAN NOTE
· Baseline unclear, continue to monitor closely with daily renal functions in setting of aggressive diuresis

## 2019-08-16 NOTE — ASSESSMENT & PLAN NOTE
· Patient with decompensated ROSALES cirrhosis  MELD calculated at 24  · Ascites: Continue diuresis of anasarca with Bumex IV and Aldactone  Low sodium diet  Follow up CT   · HE: AO and no asterixis  Continue Lactulose for goal of 3 soft BMs a day  · Varices: Will need EGD as outpatient  · [facility] [address]  Screen: AFP in June normal   Follow up CT  · Transplant: No evaluation at transplant center has been performed; needs outpatient follow up

## 2019-08-16 NOTE — NURSING NOTE
BLE too swollen to apply compression stockings  At this time they are ACE wrapped and patient has been taught to elevate his legs to aid in decreasing swelling

## 2019-08-16 NOTE — ASSESSMENT & PLAN NOTE
· Recurrent severe anasarca: patient reports he continues to gain weight despite use of diuretics at home  Recently switched from Torsemide to Furosemide without any improvement  In the setting of decompensated ROSALES cirrhosis  · Switched from Furosemide to Bumex 2 mg IV BID, will also albumin for several doses secondary to hypoalbuminemia  Continue Aldactone  · Nephrology input appreciated  · Strict I/Os, daily STANDING weights, low-sodium diet, fluid restriction  · Continue to monitor electrolytes closely, replete as needed  · PT and OT consults for deconditioning

## 2019-08-16 NOTE — PLAN OF CARE
Problem: Potential for Falls  Goal: Patient will remain free of falls  Description  INTERVENTIONS:  - Assess patient frequently for physical needs  -  Identify cognitive and physical deficits and behaviors that affect risk of falls    -  [place] fall precautions as indicated by assessment   - Educate patient/family on patient safety including physical limitations  - Instruct patient to call for assistance with activity based on assessment  - Modify environment to reduce risk of injury  - Consider OT/PT consult to assist with strengthening/mobility  Outcome: Progressing     Problem: PAIN - ADULT  Goal: Verbalizes/displays adequate comfort level or baseline comfort level  Description  Interventions:  - Encourage patient to monitor pain and request assistance  - Assess pain using appropriate pain scale  - Administer analgesics based on type and severity of pain and evaluate response  - Implement non-pharmacological measures as appropriate and evaluate response  - Consider cultural and social influences on pain and pain management  - Notify physician/advanced practitioner if interventions unsuccessful or patient reports new pain  Outcome: Progressing     Problem: INFECTION - ADULT  Goal: Absence or prevention of progression during hospitalization  Description  INTERVENTIONS:  - Assess and monitor for signs and symptoms of infection  - Monitor lab/diagnostic results  - Monitor all insertion sites, i e  indwelling lines, tubes, and drains  - Monitor endotracheal if appropriate and nasal secretions for changes in amount and color  - [place] appropriate cooling/warming therapies per order  - Administer medications as ordered  - Instruct and encourage patient and family to use good hand hygiene technique  - Identify and instruct in appropriate isolation precautions for identified infection/condition  Outcome: Progressing     Problem: SAFETY ADULT  Goal: Patient will remain free of falls  Description  INTERVENTIONS:  - Assess patient frequently for physical needs  -  Identify cognitive and physical deficits and behaviors that affect risk of falls  -  [place] fall precautions as indicated by assessment   - Educate patient/family on patient safety including physical limitations  - Instruct patient to call for assistance with activity based on assessment  - Modify environment to reduce risk of injury  - Consider OT/PT consult to assist with strengthening/mobility  Outcome: Progressing     Problem: DISCHARGE PLANNING  Goal: Discharge to home or other facility with appropriate resources  Description  INTERVENTIONS:  - Identify barriers to discharge w/patient and caregiver  - Arrange for needed discharge resources and transportation as appropriate  - Identify discharge learning needs (meds, wound care, etc )  - Arrange for interpretive services to assist at discharge as needed  - Refer to Case Management Department for coordinating discharge planning if the patient needs post-hospital services based on physician/advanced practitioner order or complex needs related to functional status, cognitive ability, or social support system  Outcome: Progressing     Problem: Knowledge Deficit  Goal: Patient/family/caregiver demonstrates understanding of disease process, treatment plan, medications, and discharge instructions  Description  Complete learning assessment and assess knowledge base    Interventions:  - Provide teaching at level of understanding  - Provide teaching via preferred learning methods  Outcome: Progressing     Problem: NEUROSENSORY - ADULT  Goal: Achieves stable or improved neurological status  Description  INTERVENTIONS  - Monitor and report changes in neurological status  - Monitor vital signs such as temperature, blood pressure, glucose, and any other labs ordered   - Initiate measures to prevent increased intracranial pressure  - Monitor for seizure activity and implement precautions if appropriate      Outcome: Progressing     Problem: CARDIOVASCULAR - ADULT  Goal: Maintains optimal cardiac output and hemodynamic stability  Description  INTERVENTIONS:  - Monitor I/O, vital signs and rhythm  - Monitor for S/S and trends of decreased cardiac output  - Administer and titrate ordered vasoactive medications to optimize hemodynamic stability  - Assess quality of pulses, skin color and temperature  - Assess for signs of decreased coronary artery perfusion  - Instruct patient to report change in severity of symptoms  Outcome: Progressing     Problem: SKIN/TISSUE INTEGRITY - ADULT  Goal: Incision(s), wounds(s) or drain site(s) healing without S/S of infection  Description  INTERVENTIONS  - Assess and document risk factors for skin impairment   - Assess and document dressing, incision, wound bed, drain sites and surrounding tissue  - Consider nutrition services referral as needed  - Oral mucous membranes remain intact  - Provide patient/ family education  Outcome: Progressing     Problem: MUSCULOSKELETAL - ADULT  Goal: Maintain or return mobility to safest level of function  Description  INTERVENTIONS:  - Assess patient's ability to carry out ADLs; assess patient's baseline for ADL function and identify physical deficits which impact ability to perform ADLs (bathing, care of mouth/teeth, toileting, grooming, dressing, etc )  - Assess/evaluate cause of self-care deficits   - Assess range of motion  - Assess patient's mobility  - Assess patient's need for assistive devices and provide as appropriate  - Encourage maximum independence but intervene and supervise when necessary  - Involve family in performance of ADLs  - Assess for home care needs following discharge   - Consider OT consult to assist with ADL evaluation and planning for discharge  - Provide patient education as appropriate  Outcome: Progressing

## 2019-08-16 NOTE — UTILIZATION REVIEW
Initial Clinical Review    Admission: Date/Time/Statement: 8/15/19 @ 1435     Orders Placed This Encounter   Procedures    Inpatient Admission     Standing Status:   Standing     Number of Occurrences:   1     Order Specific Question:   Admitting Physician     Answer:   Gamal Jennings     Order Specific Question:   Level of Care     Answer:   Med Surg [16]     Order Specific Question:   Bed request comments     Answer:   tele     Order Specific Question:   Estimated length of stay     Answer:   More than 2 Midnights     Order Specific Question:   Certification     Answer:   I certify that inpatient services are medically necessary for this patient for a duration of greater than two midnights  See H&P and MD Progress Notes for additional information about the patient's course of treatment  ED Arrival Information     Expected Arrival Acuity Means of Arrival Escorted By Service Admission Type    - 8/15/2019 10:29 Urgent Wheelchair Spouse General Medicine Urgent    Arrival Complaint    TESTICULAR SWELLING        Chief Complaint   Patient presents with    Testicle Swelling     Pt presents to ED with c/o b/l testicle swelling, pt being treated at home with IV abx for spesis, pt also has b/l LE swelling which he reports is new to him  Assessment/Plan: 62 yo male to ED from home w/ diffuse anasarca   C/o worsening swelling and weight gain along w/ inability to move and mild SOB   Recent admission for blood infection for which he has a PICC line and is getting iv ceftriaxone QD   Admitted IP status for anasarca plan to admit to tele , treat w/ iv diuresis, switch from lasix to bumex and give albumin   Strict I&O and fluid restriction and monitor labs closely   Liver cirrhosis sec to ROSALES con to monitor and if worsens consult GI > bacteremia cont iv ceftriaxone        PE : + 4 pitting diffuse edema BLEs , scrotum and adb, erythema BLEs w/o inc warmth mild serous weeping , rales    ED Triage Vitals Temperature Pulse Respirations Blood Pressure SpO2   08/15/19 1037 08/15/19 1037 08/15/19 1037 08/15/19 1037 08/15/19 1037   97 8 °F (36 6 °C) 66 20 115/55 99 %      Temp Source Heart Rate Source Patient Position - Orthostatic VS BP Location FiO2 (%)   08/15/19 1037 08/15/19 1037 08/15/19 1037 08/15/19 1037 --   Oral Monitor Sitting Right arm       Pain Score       08/15/19 1905       No Pain        Wt Readings from Last 1 Encounters:   08/16/19 (!) 180 kg (397 lb 11 4 oz)     Additional Vital Signs:   08/16/19 0656  98 °F (36 7 °C)  72  18  117/52    95 %  None (Room air)  Lying   08/16/19 0300  98 1 °F (36 7 °C)  73  18  118/54    95 %  None (Room air)  Lying   08/15/19 2246            96 %       08/15/19 2231  98 2 °F (36 8 °C)  72  18  113/53  76  96 %  None (Room air)  Lying   08/15/19 1905  98 °F (36 7 °C)  70  18  143/65  94  98 %  None (Room air)  Lying   08/15/19 1800    67  18  120/56    97 %  None (Room air)  Lying   08/15/19 1408    64  21  131/59    97 %  None (Room air)  Lying   08/15/19 1242    67  22  132/62    96 %  None (Room air)  Sitting       Pertinent Labs/Diagnostic Test Results:   8/15 CXR -   No active pulmonary disease on examination which is somewhat limited secondary to low lung volumes        8/15 EKG NSR     Results from last 7 days   Lab Units 08/16/19  0443 08/15/19  1225   WBC Thousand/uL 6 96 7 45   HEMOGLOBIN g/dL 7 8* 8 0*   HEMATOCRIT % 24 2* 24 7*   PLATELETS Thousands/uL 54* 50*   BANDS PCT %  --  3     Results from last 7 days   Lab Units 08/16/19  0443 08/15/19  1225   SODIUM mmol/L 133* 133*   POTASSIUM mmol/L 3 9 3 4*   CHLORIDE mmol/L 101 102   CO2 mmol/L 24 23   ANION GAP mmol/L 8 8   BUN mg/dL 29* 30*   CREATININE mg/dL 1 24 1 32*   EGFR ml/min/1 73sq m 64 59   CALCIUM mg/dL 8 7 8 3   MAGNESIUM mg/dL 2 0 1 9   PHOSPHORUS mg/dL 3 2  --      Results from last 7 days   Lab Units 08/16/19  0443 08/15/19  1225   AST U/L 52* 54*   ALT U/L 24 26   ALK PHOS U/L 88 89   TOTAL PROTEIN g/dL 6 2* 6 0*   ALBUMIN g/dL 2 8* 2 6*   TOTAL BILIRUBIN mg/dL 5 00* 4 50*   BILIRUBIN DIRECT mg/dL  --  1 52*     Results from last 7 days   Lab Units 08/16/19  0443 08/15/19  1225   GLUCOSE RANDOM mg/dL 106 161*     Results from last 7 days   Lab Units 08/15/19  1225   TROPONIN I ng/mL 0 03     Results from last 7 days   Lab Units 08/16/19  0443 08/15/19  1225   PROTIME seconds 22 0* 21 2*   INR  1 91* 1 82*   PTT seconds  --  39*     Results from last 7 days   Lab Units 08/15/19  1225   NT-PRO BNP pg/mL 1,227*     Results from last 7 days   Lab Units 08/15/19  1240   CLARITY UA  Clear   COLOR UA  Yellow   SPEC GRAV UA  1 015   PH UA  6 0   GLUCOSE UA mg/dl Negative   KETONES UA mg/dl Negative   BLOOD UA  Trace-Intact*   PROTEIN UA mg/dl Negative   NITRITE UA  Negative   BILIRUBIN UA  Negative   UROBILINOGEN UA E U /dl 1 0   LEUKOCYTES UA  Trace*   WBC UA /hpf 2-4*   RBC UA /hpf 0-1*   BACTERIA UA /hpf Occasional   EPITHELIAL CELLS WET PREP /hpf None Seen       Results from last 7 days   Lab Units 08/15/19  1225   TOTAL COUNTED  100       ED Treatment:   Medication Administration from 08/15/2019 1029 to 08/15/2019 1902       Date/Time Order Dose Route Action     08/15/2019 1405 furosemide (LASIX) injection 40 mg 40 mg Intravenous Given     08/15/2019 1403 potassium chloride (K-DUR,KLOR-CON) CR tablet 40 mEq 40 mEq Oral Given     08/15/2019 1754 heparin (porcine) subcutaneous injection 5,000 Units 5,000 Units Subcutaneous Given     08/15/2019 1753 spironolactone (ALDACTONE) tablet 100 mg 100 mg Oral Given     08/15/2019 1800 bumetanide (BUMEX) injection 2 mg 2 mg Intravenous Given     08/15/2019 1753 potassium chloride (K-DUR,KLOR-CON) CR tablet 40 mEq 40 mEq Oral Given        Past Medical History:   Diagnosis Date    Chronic pain disorder     lower back    CPAP (continuous positive airway pressure) dependence     Heart murmur     Hypertension     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 ([address] )  Myocardial infarction [facility])     Renal insufficiency 6/7/2019    Sleep apnea     Vitamin D deficiency      Present on Admission:   Anasarca   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 ([address] )   Essential hypertension   Hyperbilirubinemia   Thrombocytopenia (HCC)   Morbid obesity (HCC)   Renal insufficiency   Bacteremia      Admitting Diagnosis: Testicular swelling [N50 89]  Anasarca [R60 1]  Generalized weakness [R53 1]  Wound of left lower extremity, initial encounter [S81 592A]  Age/Sex: 63 y o  male  Admission Orders:    Current Facility-Administered Medications:  acetaminophen 650 mg Oral Q6H PRN    albumin human 25 g Intravenous Q12H [address]    aluminum-magnesium hydroxide-simethicone 30 mL Oral Q6H PRN    bumetanide 2 mg Intravenous BID    cefTRIAXone (ROCEPHIN) 1g in 50 mL IVPB 1,000 mg Intravenous Q24H    heparin (porcine) 5,000 Units Subcutaneous Q8H [address]    lactulose 20 g Oral BID    ondansetron 4 mg Intravenous Q6H PRN    pantoprazole 40 mg Oral Early Morning    polyethylene glycol 17 g Oral Daily PRN    potassium chloride 40 mEq Oral BID    spironolactone 100 mg Oral Daily      Wound care   Reg diet   SCD  I&O   Daily weight   PT OT eval   Tele   Fluid restriction 1500 ml     IP CONSULT TO WOUND CARE  IP CONSULT TO NEPHROLOGY    Network Utilization Review Department  Phone: [phone]; Fax [phone]  [email or web address]  ATTENTION: Please call with any questions or concerns to [phone]  and carefully listen to the prompts so that you are directed to the right person  Send all requests for admission clinical reviews, approved or denied determinations and any other requests to fax [phone]   All voicemails are confidential

## 2019-08-16 NOTE — CONSULTS
Progress Note - Wound   Ethyl Harry 63 y o  male MRN: [id number]  Unit/Bed#: -01 Encounter: [id number]      Assessment:  Wound care consulted for assessment of lower extremity wound  Patient seen in bed, cooperative with assessment  Reports he is continent and ambulatory at baseline  Declined full skin assessment  B/l heels are intact with no redness or wounds noted  Recommend offloading and hydraguard cream      Patient admitted with anasarca - b/l lower extremities with +4 pitting edema  Bounding pedal pulses  Patient states he sustained the wound to his LLE from "hitting it on the darn RV on my way here!"  B/l lower extremities with pink colored skin that has no increased warmth  L posterior lower extremity with partial thickness wound  Irregular shaped  Wound bed is 100% pink  Edges fragile and dry  Sol-wound with edema  Small serous drainage noted on old dressing  Will recommend foam dressing to protect / absorb  Dr Perlita Grimaldo with nephrology at bedside - recommend compression via ACE wraps to help  Patient reports he uses 2 layer wraps at home for chronic edema issues, states, "I can wear them when I can get them on"  Will recommend elevation of the lower extremities and ACE wraps for gentle compression during hospital stay  Educated patient on the importance of frequent offloading of pressure via turning, repositioning and weight-shifting  Verbalized understanding of plan of care  No induration, fluctuance, odor, redness, or purulence noted to the above noted wounds  New dressings applied  Patient tolerated well - denies pain to wound or lower extremities  Primary nurse aware of plan of care  See flow sheets for more detailed assessment findings  Will follow along  Discussed assessment and plan of care with Dr Perlita Grimaldo (nephrology) and Aaron Oliva  Plan:   1  Apply hydraguard to b/l heels, buttocks and sacrum BID and PRN for prevention and protection  2   Apply skin nourishing cream the entire skin daily for moisture  3  Turn and reposition patient every  2 hours   4  Elevate heels off of bed with pillows to offload pressure   5  Apply EHOB waffle cushion to chair when OOB, if able  6  Elevate b/l lower extremities as patient tolerates  7  Apply ACE wraps to b/l lower extremities  Apply 4inch ace wrap beginning at base of toes to ankle  Then apply 6inch ace wrap beginning at the ankle to the knee gatch  Apply in the morning and remove at bedtime  8  Cleanse L posterior lower extremity wound with NSS, pat dry, apply small bordered foam dressing every other day and as needed for soilage/dislodgement  9  Wound care will follow along with patient weekly, please call with questions and concerns    Objective:    Vitals: Blood pressure 117/52, pulse 72, temperature 98 °F (36 7 °C), temperature source Oral, resp  rate 18, height 5' 11" (1 803 m), weight (!) 180 kg (397 lb 11 4 oz), SpO2 95 %  ,Body mass index is 55 47 kg/m²  Wound 08/16/19 Skin tear Abrasion(s) Leg Left;Posterior (Active)   Wound Description Pink 8/16/2019  9:00 AM   Sol-wound Assessment Dry; Intact;Edema 8/16/2019  9:00 AM   Wound Length (cm) 1 cm 8/16/2019  9:00 AM   Wound Width (cm) 1 cm 8/16/2019  9:00 AM   Wound Depth (cm) 0 1 8/16/2019  9:00 AM   Calculated Wound Area (cm^2) 1 cm^2 8/16/2019  9:00 AM   Calculated Wound Volume (cm^3) 0 1 cm^3 8/16/2019  9:00 AM   Tunneling 0 cm 8/16/2019  9:00 AM   Tunneling in depth located at 0 8/16/2019  9:00 AM   Undermining 0 8/16/2019  9:00 AM   Undermining is depth extending from 0 8/16/2019  9:00 AM   Closure None 8/16/2019  9:00 AM   Drainage Amount Small 8/16/2019  9:00 AM   Drainage Description Serous 8/16/2019  9:00 AM   Non-staged Wound Description Partial thickness 8/16/2019  9:00 AM   Treatments Cleansed;Site care;Elevated 8/16/2019  9:00 AM   Dressing Foam, Silicon (eg  Allevyn, etc) 8/16/2019  9:00 AM   Wound packed?  No 8/16/2019  9:00 AM   Packing- # removed 0 8/16/2019  9:00 AM   Packing- # inserted 0 8/16/2019  9:00 AM   Dressing Changed New 8/16/2019  9:00 AM   Patient Tolerance Tolerated well 8/16/2019  9:00 AM   Dressing Status Clean;Dry; Intact 8/16/2019  9:00 AM     Recommendations written as orders  AVS updated    Donita Escobedo RN BSN CWON

## 2019-08-16 NOTE — ASSESSMENT & PLAN NOTE
· Patient reportedly diagnosed with a bacteremia and discharged on Friday 8/9 from [facility] with PICC line to the left upper extremity  · Continue IV Ceftriaxone 1000 mg daily - he reports he is on this medication through the 19th    Will need to clarify/confirm with [facility] ID

## 2019-08-17 PROBLEM — E87.1 HYPONATREMIA: Status: ACTIVE | Noted: 2019-08-17

## 2019-08-17 LAB
ALBUMIN SERPL BCP-MCNC: 3.2 G/DL (ref 3.5–5)
ALP SERPL-CCNC: 87 U/L (ref 46–116)
ALT SERPL W P-5'-P-CCNC: 25 U/L (ref 12–78)
ANION GAP SERPL CALCULATED.3IONS-SCNC: 10 MMOL/L (ref 4–13)
AST SERPL W P-5'-P-CCNC: 49 U/L (ref 5–45)
BASOPHILS # BLD AUTO: 0.01 THOUSANDS/ΜL (ref 0–0.1)
BASOPHILS NFR BLD AUTO: 0 % (ref 0–1)
BILIRUB DIRECT SERPL-MCNC: 1.61 MG/DL (ref 0–0.2)
BILIRUB SERPL-MCNC: 5.6 MG/DL (ref 0.2–1)
BUN SERPL-MCNC: 31 MG/DL (ref 5–25)
CALCIUM SERPL-MCNC: 8.6 MG/DL (ref 8.3–10.1)
CHLORIDE SERPL-SCNC: 100 MMOL/L (ref 100–108)
CO2 SERPL-SCNC: 23 MMOL/L (ref 21–32)
CREAT SERPL-MCNC: 1.38 MG/DL (ref 0.6–1.3)
EOSINOPHIL # BLD AUTO: 0.25 THOUSAND/ΜL (ref 0–0.61)
EOSINOPHIL NFR BLD AUTO: 4 % (ref 0–6)
ERYTHROCYTE [DISTWIDTH] IN BLOOD BY AUTOMATED COUNT: 17.1 % (ref 11.6–15.1)
GFR SERPL CREATININE-BSD FRML MDRD: 56 ML/MIN/1.73SQ M
GLUCOSE SERPL-MCNC: 97 MG/DL (ref 65–140)
HCT VFR BLD AUTO: 23.6 % (ref 36.5–49.3)
HGB BLD-MCNC: 7.8 G/DL (ref 12–17)
IMM GRANULOCYTES # BLD AUTO: 0.1 THOUSAND/UL (ref 0–0.2)
IMM GRANULOCYTES NFR BLD AUTO: 2 % (ref 0–2)
INR PPP: 1.87 (ref 0.84–1.19)
LYMPHOCYTES # BLD AUTO: 0.61 THOUSANDS/ΜL (ref 0.6–4.47)
LYMPHOCYTES NFR BLD AUTO: 11 % (ref 14–44)
MCH RBC QN AUTO: 36.4 PG (ref 26.8–34.3)
MCHC RBC AUTO-ENTMCNC: 33.1 G/DL (ref 31.4–37.4)
MCV RBC AUTO: 110 FL (ref 82–98)
MONOCYTES # BLD AUTO: 0.91 THOUSAND/ΜL (ref 0.17–1.22)
MONOCYTES NFR BLD AUTO: 16 % (ref 4–12)
NEUTROPHILS # BLD AUTO: 3.8 THOUSANDS/ΜL (ref 1.85–7.62)
NEUTS SEG NFR BLD AUTO: 67 % (ref 43–75)
NRBC BLD AUTO-RTO: 0 /100 WBCS
NT-PROBNP SERPL-MCNC: 1875 PG/ML
PLATELET # BLD AUTO: 51 THOUSANDS/UL (ref 149–390)
PMV BLD AUTO: 10.9 FL (ref 8.9–12.7)
POTASSIUM SERPL-SCNC: 4.3 MMOL/L (ref 3.5–5.3)
PROT SERPL-MCNC: 6.4 G/DL (ref 6.4–8.2)
PROTHROMBIN TIME: 21.7 SECONDS (ref 11.6–14.5)
RBC # BLD AUTO: 2.14 MILLION/UL (ref 3.88–5.62)
SODIUM SERPL-SCNC: 133 MMOL/L (ref 136–145)
WBC # BLD AUTO: 5.68 THOUSAND/UL (ref 4.31–10.16)

## 2019-08-17 PROCEDURE — 97163 PT EVAL HIGH COMPLEX 45 MIN: CPT

## 2019-08-17 PROCEDURE — G8979 MOBILITY GOAL STATUS: HCPCS

## 2019-08-17 PROCEDURE — 99232 SBSQ HOSP IP/OBS MODERATE 35: CPT | Performed by: PHYSICIAN ASSISTANT

## 2019-08-17 PROCEDURE — G8978 MOBILITY CURRENT STATUS: HCPCS

## 2019-08-17 PROCEDURE — 80048 BASIC METABOLIC PNL TOTAL CA: CPT | Performed by: INTERNAL MEDICINE

## 2019-08-17 PROCEDURE — 99233 SBSQ HOSP IP/OBS HIGH 50: CPT | Performed by: INTERNAL MEDICINE

## 2019-08-17 PROCEDURE — 85025 COMPLETE CBC W/AUTO DIFF WBC: CPT | Performed by: PHYSICIAN ASSISTANT

## 2019-08-17 PROCEDURE — 80076 HEPATIC FUNCTION PANEL: CPT | Performed by: PHYSICIAN ASSISTANT

## 2019-08-17 PROCEDURE — 83880 ASSAY OF NATRIURETIC PEPTIDE: CPT | Performed by: INTERNAL MEDICINE

## 2019-08-17 PROCEDURE — 85610 PROTHROMBIN TIME: CPT | Performed by: PHYSICIAN ASSISTANT

## 2019-08-17 RX ADMIN — CEFTRIAXONE SODIUM 1000 MG: 10 INJECTION, POWDER, FOR SOLUTION INTRAVENOUS at 09:45

## 2019-08-17 RX ADMIN — BUMETANIDE 2 MG: 0.25 INJECTION INTRAMUSCULAR; INTRAVENOUS at 18:17

## 2019-08-17 RX ADMIN — HEPARIN SODIUM 5000 UNITS: 5000 INJECTION INTRAVENOUS; SUBCUTANEOUS at 21:27

## 2019-08-17 RX ADMIN — LACTULOSE 20 G: 10 SOLUTION ORAL at 13:06

## 2019-08-17 RX ADMIN — HEPARIN SODIUM 5000 UNITS: 5000 INJECTION INTRAVENOUS; SUBCUTANEOUS at 13:33

## 2019-08-17 RX ADMIN — POTASSIUM CHLORIDE 40 MEQ: 1500 TABLET, EXTENDED RELEASE ORAL at 09:07

## 2019-08-17 RX ADMIN — HEPARIN SODIUM 5000 UNITS: 5000 INJECTION INTRAVENOUS; SUBCUTANEOUS at 05:07

## 2019-08-17 RX ADMIN — SPIRONOLACTONE 100 MG: 25 TABLET ORAL at 09:07

## 2019-08-17 RX ADMIN — POTASSIUM CHLORIDE 40 MEQ: 1500 TABLET, EXTENDED RELEASE ORAL at 18:17

## 2019-08-17 RX ADMIN — ALBUMIN (HUMAN) 25 G: 12.5 SOLUTION INTRAVENOUS at 10:49

## 2019-08-17 RX ADMIN — PANTOPRAZOLE SODIUM 40 MG: 40 TABLET, DELAYED RELEASE ORAL at 05:05

## 2019-08-17 RX ADMIN — BUMETANIDE 2 MG: 0.25 INJECTION INTRAMUSCULAR; INTRAVENOUS at 09:07

## 2019-08-17 NOTE — PLAN OF CARE
Problem: PHYSICAL THERAPY ADULT  Goal: Performs mobility at highest level of function for planned discharge setting  See evaluation for individualized goals  Description  Treatment/Interventions: Functional transfer training, LE strengthening/ROM, Elevations, Therapeutic exercise, Endurance training, Patient/family training, Equipment eval/education, Bed mobility, Gait training, Spoke to nursing, Spoke to case management  Equipment Recommended: Walker(Bariatric Rolling walker)       See flowsheet documentation for full assessment, interventions and recommendations  Note:   Prognosis: Good  Problem List: Decreased strength, Decreased endurance, Impaired balance, Decreased mobility, Obesity, Decreased skin integrity  Assessment: Pt is 63 y o  male seen for PT evaluation s/p admit to [facility] on 8/15/2019 w/ Anasarca  PT consulted to assess pt's functional mobility and d/c needs  Order placed for PT eval and tx, w/ up w/ A order  Performed at least 2 patient identifiers during session: Name and   Comorbidities affecting pt's physical performance at time of assessment include: CROW on CPAP (Chronic), essential hypertension (Chronic), bacteremia, shortness of breath (Chronic), morbid obesity, Primary osteoarthritis involving multiple joints, Renal insufficiency  PTA, pt was independent w/ all functional mobility w/ QC, ambulates community distances and elevations, has 3 MIKA, lives w/ spouse in one level house (RV) and retired  Personal factors affecting pt at time of IE include: ambulating w/ assistive device, stairs to enter home, inability to navigate community distances, inability to navigate level surfaces w/o external assistance, unable to perform dynamic tasks in community, inability to perform IADLs and inability to perform ADLs   Please find objective findings from PT assessment regarding body systems outlined above with impairments and limitations including weakness, impaired balance, decreased endurance, gait deviations, decreased activity tolerance, decreased functional mobility tolerance, fall risk, SOB upon exertion, decreased skin integrity and obesity  The following objective measures performed on IE also reveal limitations: Barthel Index: 50/100 and Modified Almas: 4 (moderate/severe disability)  Pt's clinical presentation is currently unstable/unpredictable seen in pt's presentation of ongoing medical management/monitoring, evident in need for assist w/ all phases of mobility when usually mobilizing independently, and fatigue impacting overall mobility status  Pt to benefit from continued PT tx to address deficits as defined above and maximize level of functional independent mobility and consistency  From PT/mobility standpoint, recommendation at time of d/c would be STR pending progress in order to facilitate return to PLOF  Barriers to Discharge: Inaccessible home environment     Recommendation: Short-term skilled PT     PT - OK to Discharge: Yes(when medically cleared; if to STR)    See flowsheet documentation for full assessment

## 2019-08-17 NOTE — ASSESSMENT & PLAN NOTE
· Patient reportedly diagnosed with a bacteremia and discharged on Friday 8/9 from [facility] with PICC line to the left upper extremity  · Continue IV Ceftriaxone 1000 mg daily - he reports he is on this medication through the 19th  Will need to clarify/confirm with [facility] ID Monday  · Repeat blood cultures here show no growth at 24 hours

## 2019-08-17 NOTE — ASSESSMENT & PLAN NOTE
· Recurrent severe anasarca: patient reports he continues to gain weight despite use of diuretics at home  Recently switched from Torsemide to Furosemide without any improvement  In the setting of decompensated ROSALES cirrhosis  · Switched from Furosemide to Bumex 2 mg IV BID, will also albumin for several doses secondary to hypoalbuminemia  Continue Aldactone  Monitor Cr closely - mild bump up to 1 38 today  · Nephrology input appreciated  · Strict I/Os, daily STANDING weights, low-sodium diet, fluid restriction  · Continue to monitor electrolytes closely, replete as needed  · PT and OT consults for deconditioning

## 2019-08-17 NOTE — ASSESSMENT & PLAN NOTE
· Baseline unclear, continue to monitor closely with daily renal functions in setting of aggressive diuresis  Mild increase in Cr to 1 38 today

## 2019-08-17 NOTE — PROGRESS NOTES
NEPHROLOGY PROGRESS NOTE    Patient: Walter Cheney               Sex: male          DOA: 8/15/2019 11:49 AM   Date of Birth: @        Age: @        LOS:  LOS: 2 days   8/17/2019    REASON FOR THE CONSULTATION:  Further management of anasarca  HPI     This is a 63 y o  male admitted for [facility]     - reviewed physical therapist note with the recommendations  Patient seems nonoliguric overnight and also told me that he feels better and able to walk better now  Patient denies nausea, vomiting, headache or dizziness today    - Reviewed last 24 hrs events     CURRENT MEDICATIONS       Current Facility-Administered Medications:     acetaminophen (TYLENOL) tablet 650 mg, 650 mg, Oral, Q6H PRN, Kasey Saldivar PA-C, 650 mg at 08/16/19 1645    aluminum-magnesium hydroxide-simethicone (MYLANTA) 200-200-20 mg/5 mL oral suspension 30 mL, 30 mL, Oral, Q6H PRN, Kasey Saldivar PA-C    bumetanide (BUMEX) injection 2 mg, 2 mg, Intravenous, BID, Kasey Saldivar PA-C, 2 mg at 08/17/19 0907    cefTRIAXone (ROCEPHIN) 1,000 mg in dextrose 5 % 50 mL IVPB, 1,000 mg, Intravenous, Q24H, Kasey Saldivar PA-C, Last Rate: 100 mL/hr at 08/17/19 0945, 1,000 mg at 08/17/19 0945    heparin (porcine) subcutaneous injection 5,000 Units, 5,000 Units, Subcutaneous, Q8H [address], 5,000 Units at 08/17/19 0507 **AND** Platelet count, , , Once, Kasey Saldivar PA-C    lactulose 20 g/30 mL oral solution 20 g, 20 g, Oral, BID, Kasey Saldivar PA-C, 20 g at 08/16/19 0825    ondansetron (ZOFRAN) injection 4 mg, 4 mg, Intravenous, Q6H PRN, Kasey Saldivar PA-C    pantoprazole (PROTONIX) EC tablet 40 mg, 40 mg, Oral, Early Morning, Kasey Saldivar PA-C, 40 mg at 08/17/19 0505    polyethylene glycol (MIRALAX) packet 17 g, 17 g, Oral, Daily PRN, Kasey Saldivar PA-C    potassium chloride (K-DUR,KLOR-CON) CR tablet 40 mEq, 40 mEq, Oral, BID, Kasey Saldivar PA-C, 40 mEq at 08/17/19 0907    spironolactone (ALDACTONE) tablet 100 mg, 100 mg, Oral, Daily, BIENVENIDO Guzmán-C, 100 mg at 08/17/19 2511    OBJECTIVE     Current Weight: Weight - Scale: (!) 180 kg (397 lb 7 8 oz)  Vitals:    08/17/19 1100   BP: 142/78   Pulse: 72   Resp: 20   Temp: 97 7 °F (36 5 °C)   SpO2: 95%     Body mass index is 55 44 kg/m²  Intake/Output Summary (Last 24 hours) at 8/17/2019 1301  Last data filed at 8/17/2019 6804  Gross per 24 hour   Intake 360 ml   Output 1523 ml   Net -1163 ml       PHYSICAL EXAMINATION     Physical Exam   Constitutional: No distress  Obese   HENT:   Head: Normocephalic and atraumatic  Eyes: No scleral icterus  Neck: Neck supple  No JVD present  Cardiovascular: Normal rate  Pulmonary/Chest: No accessory muscle usage  No respiratory distress  He has no wheezes  Abdominal: Soft  He exhibits no distension  Musculoskeletal: He exhibits edema  Right hand: He exhibits no laceration  Left hand: He exhibits no laceration  Neurological: He is alert  Skin: Skin is warm  No cyanosis  Psychiatric: He is not combative  He expresses no suicidal ideation  LAB RESULTS     Results from last 7 days   Lab Units 08/17/19  0513 08/16/19  0443 08/15/19  1225   WBC Thousand/uL 5 68 6 96 7 45   HEMOGLOBIN g/dL 7 8* 7 8* 8 0*   HEMATOCRIT % 23 6* 24 2* 24 7*   PLATELETS Thousands/uL 51* 54* 50*   SODIUM mmol/L 133* 133* 133*   POTASSIUM mmol/L 4 3 3 9 3 4*   CHLORIDE mmol/L 100 101 102   CO2 mmol/L 23 24 23   BUN mg/dL 31* 29* 30*   CREATININE mg/dL 1 38* 1 24 1 32*   EGFR ml/min/1 73sq m 56 64 59   CALCIUM mg/dL 8 6 8 7 8 3   MAGNESIUM mg/dL  --  2 0 1 9   PHOSPHORUS mg/dL  --  3 2  --        I have personally reviewed the old medical records and patient's previously known baseline creatinine level is ~ 1 -1 2    RADIOLOGY RESULTS      CT abdomen pelvis wo contrast   Final Result by Moreno Andrade MD (08/16 2697)      1  Appearance of the liver consistent with severe cirrhotic change    Splenomegaly an additional findings suggestive of portal hypertension  2  Small amount of ascites  Diffuse body wall edema  Markedly enlargement of the scrotum likely related to hydroceles and scrotal wall edema and small right-sided pleural effusion  Retroperitoneal and mesenteric root edema  Findings most consistent    with anasarca  3  No evidence of bowel obstruction   4  Bilateral nephrolithiasis but no evidence of hydronephrosis  5  Cholelithiasis               Workstation performed: [id number]         XR chest 2 views   Final Result by Savage Slade MD (08/15 1608)      No active pulmonary disease on examination which is somewhat limited secondary to low lung volumes  Workstation performed: [id number]             PLAN / RECOMMENDATIONS      1  Anasarca  Multifactorial and was suspected due to hypoalbuminemia in the setting of ROSALES liver cirrhosis  Patient did not have proteinuria and anasarca is not renal in origin  Currently receiving IV Bumex along with spironolactone and also receiving IV albumin q 12 hours  Overnight patient has voided 1 8 L of urine  Edema looks slightly better  Will continue current dose of diuretics today  Follow 1 5 L of fluid restriction  Upon review of old medical records patient's previously known baseline creatinine is around 1 1-1 2 and noticed to have slight rise in creatinine of 1 38 today  As mentioned earlier, plan to continue current dose of diuretics but if renal function continue to worsen, would consider decreasing the dose of diuretics  2  Anemia  Multifactorial with current hemoglobin of 7 8, no active signs of bleeding seen overnight  Consider blood transfusion if hemoglobin level drops below 7     3  Hyponatremia  Multifactorial and suspected due to underlying ROSALES liver cirrhosis  Current sodium is 133 which is stable  Continue diuretics as mentioned earlier and monitor sodium level today  Overall above mentioned plan was also d/w [facility] physician      Dimitrios Vo Anitha Jaimes MD  Nephrology  8/17/2019        Portions of the record may have been created with voice recognition software  Occasional wrong word or "sound a like" substitutions may have occurred due to the inherent limitations of voice recognition software  Read the chart carefully and recognize, using context, where substitutions have occurred

## 2019-08-17 NOTE — ASSESSMENT & PLAN NOTE
· Patient with decompensated ROSALES cirrhosis  MELD calculated at 25  · Ascites: Continue diuresis of anasarca with Bumex IV and Aldactone  Low sodium diet  Mild Ascites on CT   · HE: AO and no asterixis  Continue Lactulose for goal of 3 soft BMs a day  · Varices: Will need EGD as outpatient  · [facility] [address]  Screen: AFP in June normal  CT A/P showed no obvious focal mass on unenhanced CT  MRI in June did not show a suspicious lesion in the liver but was limited by motion artifact  · Transplant: No evaluation at transplant center has been performed; needs close outpatient follow up/evaluation  Consider inpatient GI consult if MELD continues to rise (increasing TB and INR)

## 2019-08-17 NOTE — PLAN OF CARE
Problem: Potential for Falls  Goal: Patient will remain free of falls  Description  INTERVENTIONS:  - Assess patient frequently for physical needs  -  Identify cognitive and physical deficits and behaviors that affect risk of falls    -  [place] fall precautions as indicated by assessment   - Educate patient/family on patient safety including physical limitations  - Instruct patient to call for assistance with activity based on assessment  - Modify environment to reduce risk of injury  - Consider OT/PT consult to assist with strengthening/mobility  Outcome: Progressing     Problem: PAIN - ADULT  Goal: Verbalizes/displays adequate comfort level or baseline comfort level  Description  Interventions:  - Encourage patient to monitor pain and request assistance  - Assess pain using appropriate pain scale  - Administer analgesics based on type and severity of pain and evaluate response  - Implement non-pharmacological measures as appropriate and evaluate response  - Consider cultural and social influences on pain and pain management  - Notify physician/advanced practitioner if interventions unsuccessful or patient reports new pain  Outcome: Progressing     Problem: INFECTION - ADULT  Goal: Absence or prevention of progression during hospitalization  Description  INTERVENTIONS:  - Assess and monitor for signs and symptoms of infection  - Monitor lab/diagnostic results  - Monitor all insertion sites, i e  indwelling lines, tubes, and drains  - Monitor endotracheal if appropriate and nasal secretions for changes in amount and color  - [place] appropriate cooling/warming therapies per order  - Administer medications as ordered  - Instruct and encourage patient and family to use good hand hygiene technique  - Identify and instruct in appropriate isolation precautions for identified infection/condition  Outcome: Progressing     Problem: SAFETY ADULT  Goal: Patient will remain free of falls  Description  INTERVENTIONS:  - Assess patient frequently for physical needs  -  Identify cognitive and physical deficits and behaviors that affect risk of falls  -  [place] fall precautions as indicated by assessment   - Educate patient/family on patient safety including physical limitations  - Instruct patient to call for assistance with activity based on assessment  - Modify environment to reduce risk of injury  - Consider OT/PT consult to assist with strengthening/mobility  Outcome: Progressing     Problem: DISCHARGE PLANNING  Goal: Discharge to home or other facility with appropriate resources  Description  INTERVENTIONS:  - Identify barriers to discharge w/patient and caregiver  - Arrange for needed discharge resources and transportation as appropriate  - Identify discharge learning needs (meds, wound care, etc )  - Arrange for interpretive services to assist at discharge as needed  - Refer to Case Management Department for coordinating discharge planning if the patient needs post-hospital services based on physician/advanced practitioner order or complex needs related to functional status, cognitive ability, or social support system  Outcome: Progressing     Problem: Knowledge Deficit  Goal: Patient/family/caregiver demonstrates understanding of disease process, treatment plan, medications, and discharge instructions  Description  Complete learning assessment and assess knowledge base    Interventions:  - Provide teaching at level of understanding  - Provide teaching via preferred learning methods  Outcome: Progressing     Problem: NEUROSENSORY - ADULT  Goal: Achieves stable or improved neurological status  Description  INTERVENTIONS  - Monitor and report changes in neurological status  - Monitor vital signs such as temperature, blood pressure, glucose, and any other labs ordered   - Initiate measures to prevent increased intracranial pressure  - Monitor for seizure activity and implement precautions if appropriate      Outcome: Progressing     Problem: CARDIOVASCULAR - ADULT  Goal: Maintains optimal cardiac output and hemodynamic stability  Description  INTERVENTIONS:  - Monitor I/O, vital signs and rhythm  - Monitor for S/S and trends of decreased cardiac output  - Administer and titrate ordered vasoactive medications to optimize hemodynamic stability  - Assess quality of pulses, skin color and temperature  - Assess for signs of decreased coronary artery perfusion  - Instruct patient to report change in severity of symptoms  Outcome: Progressing     Problem: SKIN/TISSUE INTEGRITY - ADULT  Goal: Incision(s), wounds(s) or drain site(s) healing without S/S of infection  Description  INTERVENTIONS  - Assess and document risk factors for skin impairment   - Assess and document dressing, incision, wound bed, drain sites and surrounding tissue  - Consider nutrition services referral as needed  - Oral mucous membranes remain intact  - Provide patient/ family education  Outcome: Progressing     Problem: MUSCULOSKELETAL - ADULT  Goal: Maintain or return mobility to safest level of function  Description  INTERVENTIONS:  - Assess patient's ability to carry out ADLs; assess patient's baseline for ADL function and identify physical deficits which impact ability to perform ADLs (bathing, care of mouth/teeth, toileting, grooming, dressing, etc )  - Assess/evaluate cause of self-care deficits   - Assess range of motion  - Assess patient's mobility  - Assess patient's need for assistive devices and provide as appropriate  - Encourage maximum independence but intervene and supervise when necessary  - Involve family in performance of ADLs  - Assess for home care needs following discharge   - Consider OT consult to assist with ADL evaluation and planning for discharge  - Provide patient education as appropriate  Outcome: Progressing     Problem: Prexisting or High Potential for Compromised Skin Integrity  Goal: Skin integrity is maintained or improved  Description  INTERVENTIONS:  - Identify patients at risk for skin breakdown  - Assess and monitor skin integrity  - Assess and monitor nutrition and hydration status  - Monitor labs   - Assess for incontinence   - Turn and reposition patient  - Assist with mobility/ambulation  - Relieve pressure over bony prominences  - Avoid friction and shearing  - Provide appropriate hygiene as needed including keeping skin clean and dry  - Evaluate need for skin moisturizer/barrier cream  - Collaborate with interdisciplinary team   - Patient/family teaching  - Consider wound care consult   Outcome: Progressing

## 2019-08-17 NOTE — PROGRESS NOTES
Progress Note Radu Friend 1960, 63 y o  male MRN: [id number]    Unit/Bed#: -01 Encounter: [id number]    Primary Care Provider: No primary care provider on file  Date and time admitted to hospital: 8/15/2019 11:49 AM        * Anasarca  Assessment & Plan  · Recurrent severe anasarca: patient reports he continues to gain weight despite use of diuretics at home  Recently switched from Torsemide to Furosemide without any improvement  In the setting of decompensated ROSALES cirrhosis  · Switched from Furosemide to Bumex 2 mg IV BID, will also albumin for several doses secondary to hypoalbuminemia  Continue Aldactone  Monitor Cr closely - mild bump up to 1 38 today  · Nephrology input appreciated  · Strict I/Os, daily STANDING weights, low-sodium diet, fluid restriction  · Continue to monitor electrolytes closely, replete as needed  · PT and OT consults for deconditioning  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 [facility])  Assessment & Plan  · Patient with decompensated ROSALES cirrhosis  MELD calculated at 25  · Ascites: Continue diuresis of anasarca with Bumex IV and Aldactone  Low sodium diet  Mild Ascites on CT   · HE: AO and no asterixis  Continue Lactulose for goal of 3 soft BMs a day  · Varices: Will need EGD as outpatient  · [facility] [address]  Screen: AFP in June normal  CT A/P showed no obvious focal mass on unenhanced CT  MRI in June did not show a suspicious lesion in the liver but was limited by motion artifact  · Transplant: No evaluation at transplant center has been performed; needs close outpatient follow up/evaluation  Consider inpatient GI consult if MELD continues to rise (increasing TB and INR)  Bacteremia  Assessment & Plan  · Patient reportedly diagnosed with a bacteremia and discharged on Friday 8/9 from [facility] with PICC line to the left upper extremity  · Continue IV Ceftriaxone 1000 mg daily - he reports he is on this medication through the 19th    Will need to clarify/confirm with [facility] ID Monday  · Repeat blood cultures here show no growth at 24 hours  Renal insufficiency  Assessment & Plan  · Baseline unclear, continue to monitor closely with daily renal functions in setting of aggressive diuresis  Mild increase in Cr to 1 38 today  Essential hypertension  Assessment & Plan  · Blood pressure well controlled, continue to monitor in the setting of aggressive diuresis  CROW on CPAP  Assessment & Plan  · Continue CPAP at night  VTE Pharmacologic Prophylaxis:   Pharmacologic: Heparin    Patient Centered Rounds: Discussed with nurse outside of patient's room  Discussions with Specialists or Other Care Team Provider: Discussed with Nephrology  Education and Discussions with Family / Patient: Discussed with patient  Time Spent for Care: 30 minutes  More than 50% of total time spent on counseling and coordination of care as described above  Current Length of Stay: 2 day(s)    Current Patient Status: Inpatient   Certification Statement: The patient will continue to require additional inpatient hospital stay due to treamtment of above/IV diuresis  Discharge Plan: Not medically cleared  Code Status: Level 1 - Full Code      Subjective:   Patient denies SOB or CP  No abdominal pain  No vomiting  He is down 2 2 L since admission  Objective:     Vitals:   Temp (24hrs), Av 1 °F (36 7 °C), Min:97 7 °F (36 5 °C), Max:98 5 °F (36 9 °C)    Temp:  [97 7 °F (36 5 °C)-98 5 °F (36 9 °C)] 97 7 °F (36 5 °C)  HR:  [69-73] 72  Resp:  [18-22] 20  BP: (109-157)/(49-78) 142/78  SpO2:  [95 %-97 %] 95 %  Body mass index is 55 44 kg/m²  Input and Output Summary (last 24 hours): Intake/Output Summary (Last 24 hours) at 2019 1249  Last data filed at 2019 7458  Gross per 24 hour   Intake 360 ml   Output 1523 ml   Net -1163 ml       Physical Exam:     Physical Exam   Constitutional: He is oriented to person, place, and time  No distress  Morbidly obese     Eyes: Scant icterus  Pulmonary/Chest: Effort normal  No respiratory distress  He has no wheezes  He has no rales  Abdominal: Soft  Bowel sounds are normal  He exhibits distension  There is no tenderness  Musculoskeletal:   Severe edema/ACE wrapped  Neurological: He is alert and oriented to person, place, and time  No asterixis  Skin: He is not diaphoretic  Vitals reviewed  Additional Data:     Labs:    Results from last 7 days   Lab Units 08/17/19  0513   WBC Thousand/uL 5 68   HEMOGLOBIN g/dL 7 8*   HEMATOCRIT % 23 6*   PLATELETS Thousands/uL 51*   NEUTROS PCT % 67   LYMPHS PCT % 11*   MONOS PCT % 16*   EOS PCT % 4     Results from last 7 days   Lab Units 08/17/19  0513   POTASSIUM mmol/L 4 3   CHLORIDE mmol/L 100   CO2 mmol/L 23   BUN mg/dL 31*   CREATININE mg/dL 1 38*   CALCIUM mg/dL 8 6   ALK PHOS U/L 87   ALT U/L 25   AST U/L 49*     Results from last 7 days   Lab Units 08/17/19  0513   INR  1 87*       * I Have Reviewed All Lab Data Listed Above  * Additional Pertinent Lab Tests Reviewed: All Labs Within Last 24 Hours Reviewed    Imaging:    Imaging Reports Reviewed Today Include: CT A/P  Recent Cultures (last 7 days):     Results from last 7 days   Lab Units 08/15/19  2132   BLOOD CULTURE  No Growth at 24 hrs  No Growth at 24 hrs         Last 24 Hours Medication List:     Current Facility-Administered Medications:  acetaminophen 650 mg Oral Q6H PRN BIENVENIDO Lozada-MIRTA    aluminum-magnesium hydroxide-simethicone 30 mL Oral Q6H PRN Leroy Dominguez PA-C    bumetanide 2 mg Intravenous BID BIENVENIDO Silva-MIRTA    cefTRIAXone (ROCEPHIN) 1g in 50 mL IVPB 1,000 mg Intravenous Q24H Kasey Saldivar PA-C Last Rate: 1,000 mg (08/17/19 0945)   heparin (porcine) 5,000 Units Subcutaneous Q8H [address] BIENVENIDO Silva-MIRTA    lactulose 20 g Oral BID BIENVENIDO Silva-C    ondansetron 4 mg Intravenous Q6H PRN BIENVENIDO Silva-C    pantoprazole 40 mg Oral Early Morning BIENVENIDO Silva-MIRTA    polyethylene glycol 17 g Oral Daily PRN Malika Jensen PA-C    potassium chloride 40 mEq Oral BID Kasey Saldivar PA-C    spironolactone 100 mg Oral Daily Malika Jensen PA-C         Today, Patient Was Seen By: Everlyn Sandhoff, PA-C    ** Please Note: Dictation voice to text software may have been used in the creation of this document   **

## 2019-08-17 NOTE — PLAN OF CARE
Problem: Potential for Falls  Goal: Patient will remain free of falls  Description  INTERVENTIONS:  - Assess patient frequently for physical needs  -  Identify cognitive and physical deficits and behaviors that affect risk of falls    -  [place] fall precautions as indicated by assessment   - Educate patient/family on patient safety including physical limitations  - Instruct patient to call for assistance with activity based on assessment  - Modify environment to reduce risk of injury  - Consider OT/PT consult to assist with strengthening/mobility  Outcome: Progressing     Problem: PAIN - ADULT  Goal: Verbalizes/displays adequate comfort level or baseline comfort level  Description  Interventions:  - Encourage patient to monitor pain and request assistance  - Assess pain using appropriate pain scale  - Administer analgesics based on type and severity of pain and evaluate response  - Implement non-pharmacological measures as appropriate and evaluate response  - Consider cultural and social influences on pain and pain management  - Notify physician/advanced practitioner if interventions unsuccessful or patient reports new pain  Outcome: Progressing     Problem: INFECTION - ADULT  Goal: Absence or prevention of progression during hospitalization  Description  INTERVENTIONS:  - Assess and monitor for signs and symptoms of infection  - Monitor lab/diagnostic results  - Monitor all insertion sites, i e  indwelling lines, tubes, and drains  - Monitor endotracheal if appropriate and nasal secretions for changes in amount and color  - [place] appropriate cooling/warming therapies per order  - Administer medications as ordered  - Instruct and encourage patient and family to use good hand hygiene technique  - Identify and instruct in appropriate isolation precautions for identified infection/condition  Outcome: Progressing     Problem: SAFETY ADULT  Goal: Patient will remain free of falls  Description  INTERVENTIONS:  - Assess patient frequently for physical needs  -  Identify cognitive and physical deficits and behaviors that affect risk of falls  -  [place] fall precautions as indicated by assessment   - Educate patient/family on patient safety including physical limitations  - Instruct patient to call for assistance with activity based on assessment  - Modify environment to reduce risk of injury  - Consider OT/PT consult to assist with strengthening/mobility  Outcome: Progressing     Problem: DISCHARGE PLANNING  Goal: Discharge to home or other facility with appropriate resources  Description  INTERVENTIONS:  - Identify barriers to discharge w/patient and caregiver  - Arrange for needed discharge resources and transportation as appropriate  - Identify discharge learning needs (meds, wound care, etc )  - Arrange for interpretive services to assist at discharge as needed  - Refer to Case Management Department for coordinating discharge planning if the patient needs post-hospital services based on physician/advanced practitioner order or complex needs related to functional status, cognitive ability, or social support system  Outcome: Progressing     Problem: Knowledge Deficit  Goal: Patient/family/caregiver demonstrates understanding of disease process, treatment plan, medications, and discharge instructions  Description  Complete learning assessment and assess knowledge base    Interventions:  - Provide teaching at level of understanding  - Provide teaching via preferred learning methods  Outcome: Progressing     Problem: NEUROSENSORY - ADULT  Goal: Achieves stable or improved neurological status  Description  INTERVENTIONS  - Monitor and report changes in neurological status  - Monitor vital signs such as temperature, blood pressure, glucose, and any other labs ordered   - Initiate measures to prevent increased intracranial pressure  - Monitor for seizure activity and implement precautions if appropriate      Outcome: Progressing     Problem: CARDIOVASCULAR - ADULT  Goal: Maintains optimal cardiac output and hemodynamic stability  Description  INTERVENTIONS:  - Monitor I/O, vital signs and rhythm  - Monitor for S/S and trends of decreased cardiac output  - Administer and titrate ordered vasoactive medications to optimize hemodynamic stability  - Assess quality of pulses, skin color and temperature  - Assess for signs of decreased coronary artery perfusion  - Instruct patient to report change in severity of symptoms  Outcome: Progressing     Problem: SKIN/TISSUE INTEGRITY - ADULT  Goal: Incision(s), wounds(s) or drain site(s) healing without S/S of infection  Description  INTERVENTIONS  - Assess and document risk factors for skin impairment   - Assess and document dressing, incision, wound bed, drain sites and surrounding tissue  - Consider nutrition services referral as needed  - Oral mucous membranes remain intact  - Provide patient/ family education  Outcome: Progressing     Problem: MUSCULOSKELETAL - ADULT  Goal: Maintain or return mobility to safest level of function  Description  INTERVENTIONS:  - Assess patient's ability to carry out ADLs; assess patient's baseline for ADL function and identify physical deficits which impact ability to perform ADLs (bathing, care of mouth/teeth, toileting, grooming, dressing, etc )  - Assess/evaluate cause of self-care deficits   - Assess range of motion  - Assess patient's mobility  - Assess patient's need for assistive devices and provide as appropriate  - Encourage maximum independence but intervene and supervise when necessary  - Involve family in performance of ADLs  - Assess for home care needs following discharge   - Consider OT consult to assist with ADL evaluation and planning for discharge  - Provide patient education as appropriate  Outcome: Progressing     Problem: Prexisting or High Potential for Compromised Skin Integrity  Goal: Skin integrity is maintained or improved  Description  INTERVENTIONS:  - Identify patients at risk for skin breakdown  - Assess and monitor skin integrity  - Assess and monitor nutrition and hydration status  - Monitor labs   - Assess for incontinence   - Turn and reposition patient  - Assist with mobility/ambulation  - Relieve pressure over bony prominences  - Avoid friction and shearing  - Provide appropriate hygiene as needed including keeping skin clean and dry  - Evaluate need for skin moisturizer/barrier cream  - Collaborate with interdisciplinary team   - Patient/family teaching  - Consider wound care consult   Outcome: Progressing

## 2019-08-17 NOTE — PHYSICAL THERAPY NOTE
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 ([facility]    Essential hypertension    Hyperbilirubinemia    Shortness of breath    Calculus of gallbladder without cholecystitis without obstruction    Liver lesion    Primary osteoarthritis involving multiple joints    Bilateral leg pain    Tick bite    Morbid obesity ([facility]    [facility]ocytopenia [facility]    Anemia    Renal insufficiency    Bacteremia       Past Medical History  Past Medical History:   Diagnosis Date    Chronic pain disorder     lower back    CPAP (continuous positive airway pressure) dependence     Heart murmur     Hypertension     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 [facility]     Myocardial infarction ([facility] )     Renal insufficiency 6/7/2019    Sleep apnea     Vitamin D deficiency        Past Surgical History  Past Surgical History:   Procedure Laterality Date    KNEE ARTHROSCOPY Bilateral     KNEE SURGERY      [place] COLONOSCOPY FLX DX W/COLLJ SPEC WHEN PFRMD N/A 11/15/2017    Procedure: COLONOSCOPY;  Surgeon: Evan Palacios MD;  Location: [facility]; Service: Gastroenterology          08/17/19 1017   Note Type   Note type Eval only   Pain Assessment   Pain Assessment No/denies pain   Pain Score No Pain   Home Living   Type of Home Mobile home  (RV)   Home Layout One level;Performs ADLs on one level; Able to live on main level with bedroom/bathroom;Stairs to enter with rails  (3 MIKA)   Bathroom Shower/Tub Walk-in shower   Bathroom Toilet Standard   Bathroom Equipment Other (Comment)  (no DME)   [address] Quad cane   Prior Function   Level of [place] Independent with ADLs and functional mobility   Lives With Spouse   Receives Help From Family   ADL Assistance Independent   IADLs Independent   Falls in the last 6 months 0   Vocational Retired   Comments pt drives   Restrictions/Precautions   [place] Bearing Precautions Per Order No   Braces or Orthoses Other (Comment)  (H/O compression stockings)   Other Precautions Limb alert; Fall Risk;Multiple lines   General   Family/Caregiver Present No   Cognition   Overall Cognitive Status WFL   Arousal/Participation Cooperative   Orientation Level Oriented X4   Memory Within functional limits   Following Commands Follows all commands and directions without difficulty   Comments pt agreeable to PT eval   RUE Assessment   RUE Assessment WFL   LUE Assessment   LUE Assessment WFL   RLE Assessment   RLE Assessment WFL  (grossly assessed with functional mobility: at least 3+/5)   LLE Assessment   LLE Assessment WFL  (grossly assessed with functional mobility: at least 3+/5)   Coordination   Movements are Fluid and Coordinated 1   Sensation [facility]   Light Touch   RLE Light Touch Grossly intact   LLE Light Touch Grossly intact   Bed Mobility   Additional Comments pt sitting OOB in recliner chair upon arrival   Transfers   Sit to Stand 4  Minimal assistance   Additional items Assist x 1; Increased time required;Verbal cues;Armrests   Stand to Sit 4  Minimal assistance   Additional items Assist x 1; Increased time required;Verbal cues;Armrests   Ambulation/Elevation   Gait pattern Excessively slow; Step to;Short stride;Decreased foot clearance; Wide YUMIKO; Improper Weight shift   Gait Assistance 4  Minimal assist   Additional items Assist x 1;Verbal cues   Assistive Device Bariatric Rolling walker   Distance 20'   Stair Management Assistance Not tested   Balance   Static Sitting Good   Dynamic Sitting Fair +   Static Standing Fair   Dynamic Standing Fair -   Ambulatory Poor +   Endurance Deficit   Endurance Deficit Yes   Activity Tolerance   Activity Tolerance Patient limited by fatigue  (SOB upon exertion)   Nurse Made Aware RN [address] verbalized pt appropriate for PT eval; present and aware of session outcomes; post-session: pt returned to recliner chair, all needs within reach and pt educated on use of call bell for enhanced safety/fall prevention w/ verbal understanding   Assessment   Prognosis Good   Problem List Decreased strength;Decreased endurance; Impaired balance;Decreased mobility;Obesity; Decreased skin integrity   Assessment Pt is 63 y o  male seen for PT evaluation s/p admit to [facility] on 8/15/2019 w/ Anasarca  PT consulted to assess pt's functional mobility and d/c needs  Order placed for PT eval and tx, w/ up w/ A order  Performed at least 2 patient identifiers during session: Name and   Comorbidities affecting pt's physical performance at time of assessment include: CROW on CPAP (Chronic), essential hypertension (Chronic), bacteremia, shortness of breath (Chronic), morbid obesity, Primary osteoarthritis involving multiple joints, Renal insufficiency  PTA, pt was independent w/ all functional mobility w/ QC, ambulates community distances and elevations, has 3 MIKA, lives w/ spouse in one level house (RV) and retired  Personal factors affecting pt at time of IE include: ambulating w/ assistive device, stairs to enter home, inability to navigate community distances, inability to navigate level surfaces w/o external assistance, unable to perform dynamic tasks in community, inability to perform IADLs and inability to perform ADLs  Please find objective findings from PT assessment regarding body systems outlined above with impairments and limitations including weakness, impaired balance, decreased endurance, gait deviations, decreased activity tolerance, decreased functional mobility tolerance, fall risk, SOB upon exertion, decreased skin integrity and obesity   The following objective measures performed on IE also reveal limitations: Barthel Index: 50/100 and Modified [place]: 4 (moderate/severe disability)  Pt's clinical presentation is currently unstable/unpredictable seen in pt's presentation of ongoing medical management/monitoring, evident in need for assist w/ all phases of mobility when usually mobilizing independently, and fatigue impacting overall mobility status  Pt to benefit from continued PT tx to address deficits as defined above and maximize level of functional independent mobility and consistency  From PT/mobility standpoint, recommendation at time of d/c would be STR pending progress in order to facilitate return to PLOF  Barriers to Discharge Inaccessible home environment   Goals   Patient Goals to return home   STG Expiration Date 08/27/19   Short Term Goal #1 In 7-10 days: Increase bilateral LE strength 1/2 grade to facilitate independent mobility, Perform all bed mobility tasks modified independent to decrease caregiver burden, Perform all transfers modified independent to improve independence, Ambulate > 150 ft  with Bariatric Rolling Walker modified independent w/o LOB and w/ normalized gait pattern 100% of the time, Navigate 3 stairs modified independent with unilateral handrail to facilitate return to previous living environment, Increase all balance 1 grade to decrease risk for falls, Complete exercise program independently and Complete % of the time   Treatment Day 0   Plan   Treatment/Interventions Functional transfer training;LE strengthening/ROM; Elevations; Therapeutic exercise; Endurance training;Patient/family training;Equipment eval/education; Bed mobility;Gait training;Spoke to nursing;Spoke to case management   PT Frequency Other (Comment)  (3-5x/wk)   Recommendation   Recommendation Short-term skilled PT   Equipment Recommended Walker  (Bariatric Rolling walker)   PT - OK to Discharge Yes  (when medically cleared; if to STR)   Modified Crockett Scale   Modified Crockett Scale 4   Barthel Index   Feeding 10   Bathing 0   Grooming Score 5   Dressing Score 5   Bladder Score 10   Bowels Score 10   Toilet Use Score 5   Transfers (Bed/Chair) Score 5   Mobility (Level Surface) Score 0   Stairs Score 0   Barthel Index Score 50         Trav Beatty, PT, DPT

## 2019-08-18 LAB
ANION GAP SERPL CALCULATED.3IONS-SCNC: 9 MMOL/L (ref 4–13)
BASOPHILS # BLD AUTO: 0.02 THOUSANDS/ΜL (ref 0–0.1)
BASOPHILS NFR BLD AUTO: 0 % (ref 0–1)
BUN SERPL-MCNC: 35 MG/DL (ref 5–25)
CALCIUM SERPL-MCNC: 8.6 MG/DL (ref 8.3–10.1)
CHLORIDE SERPL-SCNC: 101 MMOL/L (ref 100–108)
CO2 SERPL-SCNC: 25 MMOL/L (ref 21–32)
CREAT SERPL-MCNC: 1.38 MG/DL (ref 0.6–1.3)
EOSINOPHIL # BLD AUTO: 0.17 THOUSAND/ΜL (ref 0–0.61)
EOSINOPHIL NFR BLD AUTO: 3 % (ref 0–6)
ERYTHROCYTE [DISTWIDTH] IN BLOOD BY AUTOMATED COUNT: 17.2 % (ref 11.6–15.1)
GFR SERPL CREATININE-BSD FRML MDRD: 56 ML/MIN/1.73SQ M
GLUCOSE SERPL-MCNC: 94 MG/DL (ref 65–140)
HCT VFR BLD AUTO: 24 % (ref 36.5–49.3)
HGB BLD-MCNC: 7.8 G/DL (ref 12–17)
IMM GRANULOCYTES # BLD AUTO: 0.09 THOUSAND/UL (ref 0–0.2)
IMM GRANULOCYTES NFR BLD AUTO: 2 % (ref 0–2)
LYMPHOCYTES # BLD AUTO: 0.45 THOUSANDS/ΜL (ref 0.6–4.47)
LYMPHOCYTES NFR BLD AUTO: 8 % (ref 14–44)
MCH RBC QN AUTO: 36.8 PG (ref 26.8–34.3)
MCHC RBC AUTO-ENTMCNC: 32.5 G/DL (ref 31.4–37.4)
MCV RBC AUTO: 113 FL (ref 82–98)
MONOCYTES # BLD AUTO: 1.04 THOUSAND/ΜL (ref 0.17–1.22)
MONOCYTES NFR BLD AUTO: 18 % (ref 4–12)
NEUTROPHILS # BLD AUTO: 4.14 THOUSANDS/ΜL (ref 1.85–7.62)
NEUTS SEG NFR BLD AUTO: 69 % (ref 43–75)
NRBC BLD AUTO-RTO: 0 /100 WBCS
PLATELET # BLD AUTO: 45 THOUSANDS/UL (ref 149–390)
PMV BLD AUTO: 11.1 FL (ref 8.9–12.7)
POTASSIUM SERPL-SCNC: 4.6 MMOL/L (ref 3.5–5.3)
RBC # BLD AUTO: 2.12 MILLION/UL (ref 3.88–5.62)
SODIUM SERPL-SCNC: 135 MMOL/L (ref 136–145)
WBC # BLD AUTO: 5.91 THOUSAND/UL (ref 4.31–10.16)

## 2019-08-18 PROCEDURE — 99232 SBSQ HOSP IP/OBS MODERATE 35: CPT | Performed by: PHYSICIAN ASSISTANT

## 2019-08-18 PROCEDURE — 85025 COMPLETE CBC W/AUTO DIFF WBC: CPT | Performed by: PHYSICIAN ASSISTANT

## 2019-08-18 PROCEDURE — 99232 SBSQ HOSP IP/OBS MODERATE 35: CPT | Performed by: INTERNAL MEDICINE

## 2019-08-18 PROCEDURE — 80048 BASIC METABOLIC PNL TOTAL CA: CPT | Performed by: INTERNAL MEDICINE

## 2019-08-18 RX ORDER — BUMETANIDE 1 MG/1
2 TABLET ORAL
Status: DISCONTINUED | OUTPATIENT
Start: 2019-08-18 | End: 2019-08-21 | Stop reason: HOSPADM

## 2019-08-18 RX ADMIN — PANTOPRAZOLE SODIUM 40 MG: 40 TABLET, DELAYED RELEASE ORAL at 05:34

## 2019-08-18 RX ADMIN — LACTULOSE 20 G: 10 SOLUTION ORAL at 08:19

## 2019-08-18 RX ADMIN — LACTULOSE 20 G: 10 SOLUTION ORAL at 17:06

## 2019-08-18 RX ADMIN — HEPARIN SODIUM 5000 UNITS: 5000 INJECTION INTRAVENOUS; SUBCUTANEOUS at 05:34

## 2019-08-18 RX ADMIN — BUMETANIDE 2 MG: 1 TABLET ORAL at 17:06

## 2019-08-18 RX ADMIN — CEFTRIAXONE SODIUM 1000 MG: 10 INJECTION, POWDER, FOR SOLUTION INTRAVENOUS at 08:19

## 2019-08-18 RX ADMIN — SPIRONOLACTONE 100 MG: 25 TABLET ORAL at 08:19

## 2019-08-18 RX ADMIN — BUMETANIDE 2 MG: 0.25 INJECTION INTRAMUSCULAR; INTRAVENOUS at 08:19

## 2019-08-18 RX ADMIN — POTASSIUM CHLORIDE 40 MEQ: 1500 TABLET, EXTENDED RELEASE ORAL at 17:06

## 2019-08-18 RX ADMIN — POTASSIUM CHLORIDE 40 MEQ: 1500 TABLET, EXTENDED RELEASE ORAL at 08:19

## 2019-08-18 NOTE — PROGRESS NOTES
Progress Note Joie Nolasco 1960, 63 y o  male MRN: [id number]    Unit/Bed#: -01 Encounter: [id number]    Primary Care Provider: No primary care provider on file  Date and time admitted to hospital: 8/15/2019 11:49 AM        * Anasarca  Assessment & Plan  · Recurrent severe anasarca: patient reports he continues to gain weight despite use of diuretics at home  Recently switched from Torsemide to Furosemide without any improvement  In the setting of decompensated ROSALES cirrhosis  · Switched from Furosemide to Bumex 2 mg IV BID on admission  Continue Aldactone  Monitor Cr closely - mild bump up to 1 38 yesterday but stable today  Plan to switch to Bumex 2 mg po BID today  · Nephrology input appreciated  · Strict I/Os, daily STANDING weights, low-sodium diet, fluid restriction  · Continue to monitor electrolytes closely, replete as needed  · PT/OT recommending STR  Patient and wife refuse STR  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 [facility])  Assessment & Plan  · Patient with decompensated ROSALES cirrhosis  MELD calculated at 25  · Ascites: On diuresis of anasarca with Bumex IV and Aldactone (Bumex switching to po today)  Low sodium diet  Mild Ascites on CT   · HE: AO and no asterixis  Continue Lactulose for goal of 3 soft BMs a day  · Varices: Will need EGD as outpatient  · [facility]  Screen: AFP in June normal  CT A/P showed no obvious focal mass on unenhanced CT  MRI in June did not show a suspicious lesion in the liver but was limited by motion artifact  · Transplant: No evaluation at transplant center has been performed; needs close outpatient GI follow up  Consider inpatient GI consult if MELD continues to rise  Bacteremia  Assessment & Plan  · Patient reportedly diagnosed with a bacteremia and discharged on Friday 8/9 from [facility] with PICC line to the left upper extremity  · Continue IV Ceftriaxone 1000 mg daily - he reports he is on this medication through the 19th    Will need to clarify/confirm with [facility] ID Monday  · Repeat blood cultures here show no growth at 48 hours  Renal insufficiency  Assessment & Plan  · Baseline unclear, continue to monitor closely with daily renal functions in setting of aggressive diuresis  Cr stable at 1 38 today  Morbid obesity ([address] )  Assessment & Plan  · Nutrition consult, dietary and lifestyle modifications recommended  Essential hypertension  Assessment & Plan  · Blood pressure well controlled, continue to monitor  CROW on CPAP  Assessment & Plan  · Continue CPAP at night  VTE Pharmacologic Prophylaxis:   Pharmacologic: Held today due to worsening thrombocytopenia  Patient Centered Rounds: I have performed bedside rounds with nursing staff today  Discussions with Specialists or Other Care Team Provider: Appreciate Nephrology input  Education and Discussions with Family / Patient: Patient and wife at bedside  Time Spent for Care: 30 minutes  More than 50% of total time spent on counseling and coordination of care as described above  Current Length of Stay: 3 day(s)    Current Patient Status: Inpatient   Certification Statement: The patient will continue to require additional inpatient hospital stay due to treatment of anasarca  Discharge Plan: Not medically cleared  Code Status: Level 1 - Full Code      Subjective:   Patient reports SOB at rest   No CP  No Abdominal pain  No nausea or vomiting  He does report LOCO at baseline  He is urinating well  Objective:     Vitals:   Temp (24hrs), Av 3 °F (36 8 °C), Min:98 °F (36 7 °C), Max:98 7 °F (37 1 °C)    Temp:  [98 °F (36 7 °C)-98 7 °F (37 1 °C)] 98 7 °F (37 1 °C)  HR:  [69-78] 78  Resp:  [18-22] 22  BP: (118-137)/(53-76) 124/53  SpO2:  [92 %-97 %] 92 %  Body mass index is 55 5 kg/m²  Input and Output Summary (last 24 hours):        Intake/Output Summary (Last 24 hours) at 2019 1126  Last data filed at 2019 1045  Gross per 24 hour   Intake 1450 ml Output 1955 ml   Net -505 ml       Physical Exam:     Physical Exam   Constitutional: No distress  Morbidly obese  HENT:   Head: Normocephalic and atraumatic  Eyes:   Scant icterus  Cardiovascular: Normal rate and regular rhythm  Pulmonary/Chest: Effort normal  No respiratory distress  He has no wheezes  Decreased at bases  Abdominal: Soft  Bowel sounds are normal  He exhibits distension  There is no tenderness  Obese  Musculoskeletal:   Significant edema  LE ACE wrapped  Skin: He is not diaphoretic  Vitals reviewed  Additional Data:     Labs:    Results from last 7 days   Lab Units 08/18/19  0535   WBC Thousand/uL 5 91   HEMOGLOBIN g/dL 7 8*   HEMATOCRIT % 24 0*   PLATELETS Thousands/uL 45*   NEUTROS PCT % 69   LYMPHS PCT % 8*   MONOS PCT % 18*   EOS PCT % 3     Results from last 7 days   Lab Units 08/18/19  0535 08/17/19  0513   POTASSIUM mmol/L 4 6 4 3   CHLORIDE mmol/L 101 100   CO2 mmol/L 25 23   BUN mg/dL 35* 31*   CREATININE mg/dL 1 38* 1 38*   CALCIUM mg/dL 8 6 8 6   ALK PHOS U/L  --  87   ALT U/L  --  25   AST U/L  --  49*     Results from last 7 days   Lab Units 08/17/19  0513   INR  1 87*       * I Have Reviewed All Lab Data Listed Above  * Additional Pertinent Lab Tests Reviewed: All Labs Within Last 24 Hours Reviewed    Imaging:    Imaging Reports Reviewed Today Include: No new imaging today  Recent Cultures (last 7 days):     Results from last 7 days   Lab Units 08/15/19  2132   BLOOD CULTURE  No Growth at 48 hrs  No Growth at 48 hrs         Last 24 Hours Medication List:     Current Facility-Administered Medications:  acetaminophen 650 mg Oral Q6H PRN Paulina Mckeon PA-C    aluminum-magnesium hydroxide-simethicone 30 mL Oral Q6H PRN Kasey Saldivar PA-C    bumetanide 2 mg Oral BID (diuretic) Linnea Mcintyre MD    cefTRIAXone (ROCEPHIN) 1g in 50 mL IVPB 1,000 mg Intravenous Q24H Kasey Saldivar PA-C Last Rate: 1,000 mg (08/18/19 0819)   lactulose 20 g Oral BID Ivon Saldivar PA-C    ondansetron 4 mg Intravenous Q6H PRN Kasey Saldivar PA-C    pantoprazole 40 mg Oral Early Morning Kasey Saldivar PA-C    polyethylene glycol 17 g Oral Daily PRN Kasey Saldivar PA-C    potassium chloride 40 mEq Oral BID Kasey Saldivar PA-C    spironolactone 100 mg Oral Daily Abhilash Mccracken PA-C         Today, Patient Was Seen By: Adolfo Rangel PA-C    ** Please Note: Dictation voice to text software may have been used in the creation of this document   **

## 2019-08-18 NOTE — ASSESSMENT & PLAN NOTE
· Patient reportedly diagnosed with a bacteremia and discharged on Friday 8/9 from [facility] with PICC line to the left upper extremity  · Continue IV Ceftriaxone 1000 mg daily - he reports he is on this medication through the 19th  Will need to clarify/confirm with [facility] ID Monday  · Repeat blood cultures here show no growth at 48 hours

## 2019-08-18 NOTE — PROGRESS NOTES
NEPHROLOGY PROGRESS NOTE    Patient: Ana Mayen               Sex: male          DOA: 8/15/2019 11:49 AM   Date of Birth: @        Age: @        LOS:  LOS: 3 days   8/18/2019    REASON FOR THE CONSULTATION:  Further management of anasarca  HPI     This is a 63 y o  male admitted for Anasarca     SUBJECTIVE     - updated patient's wife at bedside today  Overnight patient has remained nonoliguric and breathing is also currently stable and at baseline  Patient denies nausea, vomiting, headache or dizziness today    - Reviewed last 24 hrs events     CURRENT MEDICATIONS       Current Facility-Administered Medications:     acetaminophen (TYLENOL) tablet 650 mg, 650 mg, Oral, Q6H PRN, BIENVENIDO Silva-MIRTA, 650 mg at 08/16/19 1645    aluminum-magnesium hydroxide-simethicone (MYLANTA) 200-200-20 mg/5 mL oral suspension 30 mL, 30 mL, Oral, Q6H PRN, Kasey Saldivar PA-C    bumetanide (BUMEX) tablet 2 mg, 2 mg, Oral, BID (diuretic), Lori Bojorquez MD    cefTRIAXone (ROCEPHIN) 1,000 mg in dextrose 5 % 50 mL IVPB, 1,000 mg, Intravenous, Q24H, Kasey Saldivar PA-C, Last Rate: 100 mL/hr at 08/18/19 0819, 1,000 mg at 08/18/19 0819    lactulose 20 g/30 mL oral solution 20 g, 20 g, Oral, BID, BIENVENIDO Silva-C, 20 g at 08/18/19 0819    ondansetron (ZOFRAN) injection 4 mg, 4 mg, Intravenous, Q6H PRN, Kasey Saldivar PA-C    pantoprazole (PROTONIX) EC tablet 40 mg, 40 mg, Oral, Early Morning, Kasey Saldivar PA-C, 40 mg at 08/18/19 9604    polyethylene glycol (MIRALAX) packet 17 g, 17 g, Oral, Daily PRN, Kasey Saldivar PA-C    potassium chloride (K-DUR,KLOR-CON) CR tablet 40 mEq, 40 mEq, Oral, BID, Kasey Saldivar PA-C, 40 mEq at 08/18/19 6185    spironolactone (ALDACTONE) tablet 100 mg, 100 mg, Oral, Daily, Kasey Saldivar PA-C, 100 mg at 08/18/19 0819    OBJECTIVE     Current Weight: Weight - Scale: (!) 180 kg (397 lb 14 9 oz)  Vitals:    08/18/19 0736   BP: 124/53   Pulse: 78   Resp: 22   Temp: 98 7 °F (37 1 °C)   SpO2: 92%     Body mass index is 55 5 kg/m²  Intake/Output Summary (Last 24 hours) at 8/18/2019 1105  Last data filed at 8/18/2019 1045  Gross per 24 hour   Intake 1450 ml   Output 1955 ml   Net -505 ml       PHYSICAL EXAMINATION     Physical Exam   Constitutional: No distress  Obese   HENT:   Head: Normocephalic and atraumatic  Eyes: No scleral icterus  Neck: Neck supple  No JVD present  Cardiovascular: Normal rate  Pulmonary/Chest: No accessory muscle usage  No respiratory distress  He has no wheezes  Abdominal: Soft  He exhibits no distension  Musculoskeletal: He exhibits edema  Right hand: He exhibits no laceration  Left hand: He exhibits no laceration  Neurological: He is alert  Skin: Skin is warm  No cyanosis  Psychiatric: He is not combative  He expresses no suicidal ideation  LAB RESULTS     Results from last 7 days   Lab Units 08/18/19  0535 08/17/19  0513 08/16/19  0443 08/15/19  1225   WBC Thousand/uL 5 91 5 68 6 96 7 45   HEMOGLOBIN g/dL 7 8* 7 8* 7 8* 8 0*   HEMATOCRIT % 24 0* 23 6* 24 2* 24 7*   PLATELETS Thousands/uL 45* 51* 54* 50*   SODIUM mmol/L 135* 133* 133* 133*   POTASSIUM mmol/L 4 6 4 3 3 9 3 4*   CHLORIDE mmol/L 101 100 101 102   CO2 mmol/L 25 23 24 23   BUN mg/dL 35* 31* 29* 30*   CREATININE mg/dL 1 38* 1 38* 1 24 1 32*   EGFR ml/min/1 73sq m 56 56 64 59   CALCIUM mg/dL 8 6 8 6 8 7 8 3   MAGNESIUM mg/dL  --   --  2 0 1 9   PHOSPHORUS mg/dL  --   --  3 2  --        I have personally reviewed the old medical records and patient's previously known baseline creatinine level is ~ 1 0-1 2    RADIOLOGY RESULTS      CT abdomen pelvis wo contrast   Final Result by Jaime Melchor MD (08/16 1535)      1  Appearance of the liver consistent with severe cirrhotic change  Splenomegaly an additional findings suggestive of portal hypertension  2  Small amount of ascites  Diffuse body wall edema    Markedly enlargement of the scrotum likely related to hydroceles and scrotal wall edema and small right-sided pleural effusion  Retroperitoneal and mesenteric root edema  Findings most consistent    with anasarca  3  No evidence of bowel obstruction   4  Bilateral nephrolithiasis but no evidence of hydronephrosis  5  Cholelithiasis               Workstation performed: [id number]         XR chest 2 views   Final Result by Agustin Barragan MD (08/15 1608)      No active pulmonary disease on examination which is somewhat limited secondary to low lung volumes  Workstation performed: [id number]             PLAN / RECOMMENDATIONS      1  Anasarca  Multifactorial, suspected due to hypoalbuminemia in the setting of ROSALES liver cirrhosis  Patient does not have proteinuria and anasarca is not renal in origin  Currently patient is on IV Bumex along with spironolactone  Patient has voided 1 3 L of urine in last 24 hour and clinically edema also looks better  Plan to change Bumex to 2 mg PO BID today and continue current dose of spironolactone  Continue 1 5 L of fluid restriction  Upon review of old medical record patient's previously known baseline creatinine is between 1 1-1 3 and overnight renal function has also remained stable with current creatinine of 1 38  Monitor renal function while in the hospital     2  Anemia  Multifactorial, overnight hemoglobin level has remained stable at 7 8  No active signs of bleeding seen overnight either  Monitor hemoglobin level and consider blood transfusion if hemoglobin level drops below 7     3  Hyponatremia  Multifactorial, suspected due to underlying ROSALES liver cirrhosis  Currently receiving IV fluid and overnight sodium level has improved to 135  Continue current dose of diuretics and fluid restriction and monitor sodium level      Overall above mentioned plan was also d/w Ni Rivera MD  Nephrology  8/18/2019        Portions of the record may have been created with voice recognition software  Occasional wrong word or "sound a like" substitutions may have occurred due to the inherent limitations of voice recognition software  Read the chart carefully and recognize, using context, where substitutions have occurred

## 2019-08-18 NOTE — ASSESSMENT & PLAN NOTE
· Recurrent severe anasarca: patient reports he continues to gain weight despite use of diuretics at home  Recently switched from Torsemide to Furosemide without any improvement  In the setting of decompensated ROSALES cirrhosis  · Switched from Furosemide to Bumex 2 mg IV BID on admission  Continue Aldactone  Monitor Cr closely - mild bump up to 1 38 yesterday but stable today  Plan to switch to Bumex 2 mg po BID today  · Nephrology input appreciated  · Strict I/Os, daily STANDING weights, low-sodium diet, fluid restriction  · Continue to monitor electrolytes closely, replete as needed  · PT/OT recommending STR  Patient and wife refuse STR

## 2019-08-18 NOTE — ASSESSMENT & PLAN NOTE
· Baseline unclear, continue to monitor closely with daily renal functions in setting of aggressive diuresis  Cr stable at 1 38 today

## 2019-08-19 LAB
ALBUMIN SERPL BCP-MCNC: 3.2 G/DL (ref 3.5–5)
ALP SERPL-CCNC: 74 U/L (ref 46–116)
ALT SERPL W P-5'-P-CCNC: 21 U/L (ref 12–78)
ANION GAP SERPL CALCULATED.3IONS-SCNC: 11 MMOL/L (ref 4–13)
AST SERPL W P-5'-P-CCNC: 42 U/L (ref 5–45)
BASOPHILS # BLD AUTO: 0.02 THOUSANDS/ΜL (ref 0–0.1)
BASOPHILS NFR BLD AUTO: 0 % (ref 0–1)
BILIRUB SERPL-MCNC: 6.9 MG/DL (ref 0.2–1)
BUN SERPL-MCNC: 37 MG/DL (ref 5–25)
CALCIUM SERPL-MCNC: 9 MG/DL (ref 8.3–10.1)
CHLORIDE SERPL-SCNC: 102 MMOL/L (ref 100–108)
CO2 SERPL-SCNC: 24 MMOL/L (ref 21–32)
CREAT SERPL-MCNC: 1.59 MG/DL (ref 0.6–1.3)
EOSINOPHIL # BLD AUTO: 0.13 THOUSAND/ΜL (ref 0–0.61)
EOSINOPHIL NFR BLD AUTO: 2 % (ref 0–6)
ERYTHROCYTE [DISTWIDTH] IN BLOOD BY AUTOMATED COUNT: 17.2 % (ref 11.6–15.1)
GFR SERPL CREATININE-BSD FRML MDRD: 47 ML/MIN/1.73SQ M
GLUCOSE SERPL-MCNC: 98 MG/DL (ref 65–140)
HCT VFR BLD AUTO: 24.3 % (ref 36.5–49.3)
HGB BLD-MCNC: 7.7 G/DL (ref 12–17)
IMM GRANULOCYTES # BLD AUTO: 0.12 THOUSAND/UL (ref 0–0.2)
IMM GRANULOCYTES NFR BLD AUTO: 2 % (ref 0–2)
INR PPP: 1.81 (ref 0.84–1.19)
LYMPHOCYTES # BLD AUTO: 0.41 THOUSANDS/ΜL (ref 0.6–4.47)
LYMPHOCYTES NFR BLD AUTO: 6 % (ref 14–44)
MCH RBC QN AUTO: 35.8 PG (ref 26.8–34.3)
MCHC RBC AUTO-ENTMCNC: 31.7 G/DL (ref 31.4–37.4)
MCV RBC AUTO: 113 FL (ref 82–98)
MONOCYTES # BLD AUTO: 1.31 THOUSAND/ΜL (ref 0.17–1.22)
MONOCYTES NFR BLD AUTO: 20 % (ref 4–12)
NEUTROPHILS # BLD AUTO: 4.56 THOUSANDS/ΜL (ref 1.85–7.62)
NEUTS SEG NFR BLD AUTO: 70 % (ref 43–75)
NRBC BLD AUTO-RTO: 0 /100 WBCS
PLATELET # BLD AUTO: 42 THOUSANDS/UL (ref 149–390)
PMV BLD AUTO: 11.3 FL (ref 8.9–12.7)
POTASSIUM SERPL-SCNC: 4.7 MMOL/L (ref 3.5–5.3)
PROT SERPL-MCNC: 6.3 G/DL (ref 6.4–8.2)
PROTHROMBIN TIME: 21.1 SECONDS (ref 11.6–14.5)
RBC # BLD AUTO: 2.15 MILLION/UL (ref 3.88–5.62)
SODIUM SERPL-SCNC: 137 MMOL/L (ref 136–145)
WBC # BLD AUTO: 6.55 THOUSAND/UL (ref 4.31–10.16)

## 2019-08-19 PROCEDURE — 99232 SBSQ HOSP IP/OBS MODERATE 35: CPT | Performed by: INTERNAL MEDICINE

## 2019-08-19 PROCEDURE — 80053 COMPREHEN METABOLIC PANEL: CPT | Performed by: PHYSICIAN ASSISTANT

## 2019-08-19 PROCEDURE — 99232 SBSQ HOSP IP/OBS MODERATE 35: CPT | Performed by: NURSE PRACTITIONER

## 2019-08-19 PROCEDURE — 85610 PROTHROMBIN TIME: CPT | Performed by: PHYSICIAN ASSISTANT

## 2019-08-19 PROCEDURE — 85025 COMPLETE CBC W/AUTO DIFF WBC: CPT | Performed by: PHYSICIAN ASSISTANT

## 2019-08-19 RX ADMIN — PANTOPRAZOLE SODIUM 40 MG: 40 TABLET, DELAYED RELEASE ORAL at 05:45

## 2019-08-19 RX ADMIN — POTASSIUM CHLORIDE 40 MEQ: 1500 TABLET, EXTENDED RELEASE ORAL at 10:04

## 2019-08-19 RX ADMIN — CEFTRIAXONE SODIUM 1000 MG: 10 INJECTION, POWDER, FOR SOLUTION INTRAVENOUS at 10:01

## 2019-08-19 RX ADMIN — POTASSIUM CHLORIDE 40 MEQ: 1500 TABLET, EXTENDED RELEASE ORAL at 17:32

## 2019-08-19 RX ADMIN — BUMETANIDE 2 MG: 1 TABLET ORAL at 10:07

## 2019-08-19 RX ADMIN — SPIRONOLACTONE 100 MG: 25 TABLET ORAL at 10:03

## 2019-08-19 RX ADMIN — BUMETANIDE 2 MG: 1 TABLET ORAL at 17:31

## 2019-08-19 NOTE — PROGRESS NOTES
Progress Note Tylor Lnua 1960, 63 y o  male MRN: [id number]    Unit/Bed#: -01 Encounter: [id number]    Primary Care Provider: No primary care provider on file  Date and time admitted to hospital: 8/15/2019 11:49 AM        * Anasarca  Assessment & Plan  · Recurrent severe anasarca: patient reports he continues to gain weight despite use of diuretics at home  Recently switched from Torsemide to Furosemide without any improvement  In the setting of decompensated ROSALES cirrhosis  · Switched from Furosemide to Bumex 2 mg IV BID on admission  Continue Aldactone  Monitor Cr closely - creatinine has bumped up overnight to 1 59 appreciate Nephrology recommendations  · Patient has been transition to Bumex 2 mg po BID 8/18  · Nephrology input appreciated  · Strict I/Os, daily STANDING weights, low-sodium diet, fluid restriction  · Continue to monitor electrolytes closely, replete as needed  · PT/OT recommending STR  Patient and wife refuse STR  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 [facility])  Assessment & Plan  · Patient with decompensated ROSALES cirrhosis  MELD calculated and remains at 25  · Ascites: On diuresis of anasarca initially with Bumex IV and Aldactone and was switched to oral Bumex 8/18  · Continue Low sodium diet  Mild Ascites on CT  · Continue Lactulose for goal of 3 soft BMs a day  · Varices: Will need EGD as outpatient  · [facility] [address]  Screen: AFP in June normal  CT A/P showed no obvious focal mass on unenhanced CT  MRI in June did not show a suspicious lesion in the liver but was limited by motion artifact  · Transplant: No evaluation at transplant center has been performed; needs close outpatient GI follow up  Consider inpatient GI consult if MELD continues to rise  Bacteremia  Assessment & Plan  · Patient reportedly diagnosed with a bacteremia and discharged on Friday 8/9 from [facility] with PICC line to the left upper extremity    · Continue IV Ceftriaxone 1000 mg daily - he reports he is on this medication through the 19th  · Confirmed via epic records and call longer from [facility] Infectious Disease last dose of antibiotics are to be today and PICC line can be discontinued  · Repeat blood cultures here show no growth at 72 hours  Hyperbilirubinemia  Assessment & Plan  · Secondary to liver cirrhosis  · Patient had a slight bump to 6 90  · MELD score remains at 25  · Continue to monitor for now  · CMP in a m  · Appreciate Nephrology recommendations    Thrombocytopenia [facility])  Assessment & Plan  · Secondary to liver cirrhosis, stable, continue to monitor   · CBC in a m  Renal insufficiency  Assessment & Plan  · Baseline unclear, continue to monitor closely with daily renal functions in setting of aggressive diuresis  · Cr stable at 1 59  · Recommendations per Nephrology    Morbid obesity [facility])  Assessment & Plan  · Lifestyle modifications recommended  · Nutrition consult recommendations appreciated    Essential hypertension  Assessment & Plan  · Blood pressure well controlled, continue to monitor  CROW on CPAP  Assessment & Plan  · Continue CPAP at night  VTE Pharmacologic Prophylaxis:   Pharmacologic: Pharmacologic VTE Prophylaxis contraindicated due to Thrombocytopenia  Mechanical VTE Prophylaxis in Place: Yes    Patient Centered Rounds: I have performed bedside rounds with nursing staff today  Discussions with Specialists or Other Care Team Provider:  Nephrology note reviewed will have further discussion to get updated plan of care    Education and Discussions with Family / Patient:  Patient and significant other at bedside    Time Spent for Care: 30 minutes  More than 50% of total time spent on counseling and coordination of care as described above      Current Length of Stay: 4 day(s)    Current Patient Status: Inpatient   Certification Statement: The patient will continue to require additional inpatient hospital stay due to Ongoing treatment in setting of anasarca acute kidney injury and hyperbilirubinemia/liver cirrhosis    Discharge Plan:  Not medically cleared    Code Status: Level 1 - Full Code      Subjective:   Patient reports his breathing and his swelling or continue to improve especially in his scrotal region  Patient reports he is still having shortness of breath when he walks but overall was clinically starting to feel better  Patient denies fever or chills patient made aware that he would need ongoing care in setting of his renal functioning bumping up ongoing need for diuresis and Nephrology recommendations  Objective:     Vitals:   Temp (24hrs), Av 6 °F (37 °C), Min:98 5 °F (36 9 °C), Max:98 8 °F (37 1 °C)    Temp:  [98 5 °F (36 9 °C)-98 8 °F (37 1 °C)] 98 5 °F (36 9 °C)  HR:  [74-82] 82  Resp:  [20-22] 20  BP: (118-134)/(57-60) 118/57  SpO2:  [92 %-94 %] 94 %  Body mass index is 55 68 kg/m²  Input and Output Summary (last 24 hours): Intake/Output Summary (Last 24 hours) at 2019 1112  Last data filed at 2019 0929  Gross per 24 hour   Intake 1320 ml   Output 1925 ml   Net -605 ml       Physical Exam:     Physical Exam   Constitutional: He is oriented to person, place, and time  He appears well-developed and well-nourished  HENT:   Head: Normocephalic and atraumatic  Eyes: Conjunctivae and EOM are normal    Neck: Normal range of motion  Cardiovascular: Normal rate, regular rhythm and normal heart sounds  Pulmonary/Chest: Effort normal  He has decreased breath sounds in the right lower field and the left lower field  Abdominal: Soft  Bowel sounds are normal    Morbidly obese   Genitourinary:   Genitourinary Comments: Scrotal edema   Musculoskeletal: Normal range of motion  He exhibits tenderness  Neurological: He is alert and oriented to person, place, and time  Skin: Skin is warm and dry  Generalized jaundice   Psychiatric: He has a normal mood and affect   His behavior is normal          Additional Data: Labs:    Results from last 7 days   Lab Units 08/19/19  0610  08/15/19  1225   WBC Thousand/uL 6 55   < > 7 45   HEMOGLOBIN g/dL 7 7*   < > 8 0*   HEMATOCRIT % 24 3*   < > 24 7*   PLATELETS Thousands/uL 42*   < > 50*   BANDS PCT %  --   --  3   NEUTROS PCT % 70   < >  --    LYMPHS PCT % 6*   < >  --    LYMPHO PCT %  --   --  11*   MONOS PCT % 20*   < >  --    MONO PCT %  --   --  5   EOS PCT % 2   < > 5    < > = values in this interval not displayed  Results from last 7 days   Lab Units 08/19/19  0610   SODIUM mmol/L 137   POTASSIUM mmol/L 4 7   CHLORIDE mmol/L 102   CO2 mmol/L 24   BUN mg/dL 37*   CREATININE mg/dL 1 59*   ANION GAP mmol/L 11   CALCIUM mg/dL 9 0   ALBUMIN g/dL 3 2*   TOTAL BILIRUBIN mg/dL 6 90*   ALK PHOS U/L 74   ALT U/L 21   AST U/L 42   GLUCOSE RANDOM mg/dL 98     Results from last 7 days   Lab Units 08/19/19  0610   INR  1 81*                       * I Have Reviewed All Lab Data Listed Above  * Additional Pertinent Lab Tests Reviewed: All Labs Within Last 24 Hours Reviewed      Recent Cultures (last 7 days):     Results from last 7 days   Lab Units 08/15/19  2132   BLOOD CULTURE  No Growth at 72 hrs  No Growth at 72 hrs         Last 24 Hours Medication List:     Current Facility-Administered Medications:  acetaminophen 650 mg Oral Q6H PRN BIENVENIDO Schrader-MIRTA   aluminum-magnesium hydroxide-simethicone 30 mL Oral Q6H PRN Maia Obrien PA-C   bumetanide 2 mg Oral BID (diuretic) Micki Wilkerson MD   lactulose 20 g Oral BID Kasey Saldivar PA-C   ondansetron 4 mg Intravenous Q6H PRN BIENVENIDO Silva-MIRTA   pantoprazole 40 mg Oral Early Morning BIENVENIDO Silva-MIRTA   polyethylene glycol 17 g Oral Daily PRN BIENVENIDO Silva-C   potassium chloride 40 mEq Oral BID BIENVENDIO Silva-MIRTA   spironolactone 100 mg Oral Daily Maia Obrien PA-C        Today, Patient Was Seen By: MARKOS Zhang    ** Please Note: Dictation voice to text software may have been used in the creation of this document   **

## 2019-08-19 NOTE — ASSESSMENT & PLAN NOTE
· Patient reportedly diagnosed with a bacteremia and discharged on Friday 8/9 from [facility] with PICC line to the left upper extremity  · Continue IV Ceftriaxone 1000 mg daily - he reports he is on this medication through the 19th  · Confirmed via [facility] records and call longer from [facility] Infectious Disease last dose of antibiotics are to be today and PICC line can be discontinued  · Repeat blood cultures here show no growth at 72 hours

## 2019-08-19 NOTE — PROGRESS NOTES
NEPHROLOGY PROGRESS NOTE    Patient: Meena Nails               Sex: male          DOA: 8/15/2019 11:49 AM   YOB: 1960        Age:  63 y o         LOS:  LOS: 4 days       HPI     Patient with cirrhosis of liver admitted with anasarca    SUBJECTIVE     Feeling better  Diuresing well with IV diuretic    No chest pain no palpitation  Still appear to be having anasarca    CURRENT MEDICATIONS       Current Facility-Administered Medications:     acetaminophen (TYLENOL) tablet 650 mg, 650 mg, Oral, Q6H PRN, BIENVENIDO Silva-C, 650 mg at 08/16/19 1645    aluminum-magnesium hydroxide-simethicone (MYLANTA) 200-200-20 mg/5 mL oral suspension 30 mL, 30 mL, Oral, Q6H PRN, BIENVENIDO Silva-C    bumetanide (BUMEX) tablet 2 mg, 2 mg, Oral, BID (diuretic), Margie Rowe MD, 2 mg at 08/19/19 1007    lactulose 20 g/30 mL oral solution 20 g, 20 g, Oral, BID, Kasey Saldivar PA-C, 20 g at 08/18/19 1706    ondansetron (ZOFRAN) injection 4 mg, 4 mg, Intravenous, Q6H PRN, Kasey Saldivar PA-C    pantoprazole (PROTONIX) EC tablet 40 mg, 40 mg, Oral, Early Morning, Kasey Saldivar PA-C, 40 mg at 08/19/19 0545    polyethylene glycol (MIRALAX) packet 17 g, 17 g, Oral, Daily PRN, Kasey Saldivar PA-C    potassium chloride (K-DUR,KLOR-CON) CR tablet 40 mEq, 40 mEq, Oral, BID, Kasey Saldivar PA-C, 40 mEq at 08/19/19 1004    spironolactone (ALDACTONE) tablet 100 mg, 100 mg, Oral, Daily, Kasey Saldivar PA-C, 100 mg at 08/19/19 1003    OBJECTIVE     Current Weight: Weight - Scale: (!) 181 kg (399 lb 4 1 oz)  Vitals:    08/19/19 0924   BP: 118/57   Pulse: 82   Resp: 20   Temp: 98 5 °F (36 9 °C)   SpO2: 94%       Intake/Output Summary (Last 24 hours) at 8/19/2019 1448  Last data filed at 8/19/2019 7089  Gross per 24 hour   Intake 910 ml   Output 1925 ml   Net -1015 ml       PHYSICAL EXAMINATION     Physical Exam   Constitutional: He is oriented to person, place, and time  He appears well-developed  No distress  HENT:   Head: Normocephalic  Mouth/Throat: Oropharynx is clear and moist    Eyes: Conjunctivae are normal  No scleral icterus  Neck: Neck supple  No JVD present  Cardiovascular: Normal rate and normal heart sounds  Pulmonary/Chest: Effort normal and breath sounds normal  He has no wheezes  Abdominal: Soft  Bowel sounds are normal  There is no tenderness  Musculoskeletal: Normal range of motion  He exhibits edema  Neurological: He is alert and oriented to person, place, and time  Skin: Skin is warm  No rash noted  Psychiatric: He has a normal mood and affect  His behavior is normal         LAB RESULTS     Results from last 7 days   Lab Units 08/19/19  0610 08/18/19  0535 08/17/19  0513 08/16/19  0443 08/15/19  1225   WBC Thousand/uL 6 55 5 91 5 68 6 96 7 45   HEMOGLOBIN g/dL 7 7* 7 8* 7 8* 7 8* 8 0*   HEMATOCRIT % 24 3* 24 0* 23 6* 24 2* 24 7*   PLATELETS Thousands/uL 42* 45* 51* 54* 50*   POTASSIUM mmol/L 4 7 4 6 4 3 3 9 3 4*   CHLORIDE mmol/L 102 101 100 101 102   CO2 mmol/L 24 25 23 24 23   BUN mg/dL 37* 35* 31* 29* 30*   CREATININE mg/dL 1 59* 1 38* 1 38* 1 24 1 32*   EGFR ml/min/1 73sq m 47 56 56 64 59   CALCIUM mg/dL 9 0 8 6 8 6 8 7 8 3   MAGNESIUM mg/dL  --   --   --  2 0 1 9   PHOSPHORUS mg/dL  --   --   --  3 2  --        RADIOLOGY RESULTS      Results for orders placed during the hospital encounter of 11/14/18   XR chest 1 view portable    Narrative INDICATION:  Shortness of breath and swelling over entire body  ORDERING PROVIDER:  Meseret Rueda  TECHNIQUE:  Frontal chest was obtained at 16:33 hours  COMPARISON:  7/22/17 XR  FINDINGS:    The cardiomediastinal silhouette is enlarged in size  Increased vascular  congestion There is no consolidation or atelectasis in either lung  There  may be small bilateral effusions  There is no pneumothorax  No acute  osseous process          Impression Cardiomegaly with very small bilateral effusions and increased vascular  congestion  These findings may represent CHF  Clinical correlation is  recommended  Signed by Christopher Long MD     Results for orders placed during the hospital encounter of 08/15/19   XR chest 2 views    Narrative CHEST     INDICATION:   anasarca  Pt presents to ED with c/o b/l testicle swelling, pt being treated at home with IV abx for spesis, pt also has b/l LE swelling which he reports is new to him            COMPARISON:  11/14/2018    EXAM PERFORMED/VIEWS:  XR CHEST PA & LATERAL      FINDINGS:    Cardiomediastinal silhouette appears unremarkable  Lung markings are crowded secondary to low lung volumes  Within limitations of this examination there is no focal airspace opacity to suggest pneumonia  No pneumothorax or pleural effusion  Age-appropriate degenerative changes are noted in the spine  Impression No active pulmonary disease on examination which is somewhat limited secondary to low lung volumes  Workstation performed: [id number]       No results found for this or any previous visit  No results found for this or any previous visit  Results for orders placed during the hospital encounter of 08/15/19   CT abdomen pelvis wo contrast    Narrative CT ABDOMEN AND PELVIS WITHOUT IV CONTRAST    INDICATION:   Abdominal distension  History of cirrhosis  Testicular swelling  COMPARISON:  6/7/2019    TECHNIQUE:  CT examination of the abdomen and pelvis was performed without intravenous contrast   Axial, sagittal, and coronal 2D reformatted images were created from the source data and submitted for interpretation  Radiation dose length product (DLP) for this visit:  5827 mGy-cm   This examination, like all CT scans performed in the [facility], was performed utilizing techniques to minimize radiation dose exposure, including the use of iterative   reconstruction and automated exposure control       No oral contrast FINDINGS:    ABDOMEN    LOWER CHEST:  Small right-sided pleural effusion noted    LIVER/BILIARY TREE:  Changes of severe hepatic cirrhosis identified  There is no obvious focal mass appreciated on this unenhanced examination  GALLBLADDER:  There are gallstone(s) within the gallbladder, without pericholecystic inflammatory changes  SPLEEN:  The spleen is enlarged, measuring almost 20 cm in cephalocaudad length    PANCREAS:  Limited assessment without IV contrast   Although there is peripancreatic edema, this also involves the root of the mesentery, and was also present on the 6/7/2019 study  It is not felt to be related to pancreatitis  ADRENAL GLANDS:  Unremarkable  KIDNEYS/URETERS:  Bilateral nonobstructing nephrolithiasis  The largest individual calculus is seen in the right interpolar aspect measuring approximately 7 mm  STOMACH AND BOWEL:  Unremarkable  APPENDIX:  No findings to suggest appendicitis  ABDOMINOPELVIC CAVITY:  Perihepatic ascites  Retroperitoneal and mesenteric root edema  VESSELS:  No AAA    PELVIS    REPRODUCTIVE ORGANS:  Unremarkable for patient's age  URINARY BLADDER:  Unremarkable  ABDOMINAL WALL/INGUINAL REGIONS:  There is diffuse body wall edema  The scrotum is diffusely enlarged and of water density, which may represent a combination of both hydroceles and scrotal wall edema  Dilated anterior abdominal wall collateral vessels  OSSEOUS STRUCTURES:  Advanced multilevel degenerative changes throughout the lumbar spine  Grade 2 anterolisthesis L4-L5      Impression 1  Appearance of the liver consistent with severe cirrhotic change  Splenomegaly an additional findings suggestive of portal hypertension  2  Small amount of ascites  Diffuse body wall edema  Markedly enlargement of the scrotum likely related to hydroceles and scrotal wall edema and small right-sided pleural effusion  Retroperitoneal and mesenteric root edema    Findings most consistent with anasarca  3  No evidence of bowel obstruction  4  Bilateral nephrolithiasis but no evidence of hydronephrosis  5  Cholelithiasis          Workstation performed: [id number]       No results found for this or any previous visit  PLAN / RECOMMENDATIONS      Anasarca:  Likely due to cirrhosis of liver  Responding to diuretic which I will continue    Acute kidney injury:  Likely due to diuresis  I will continue diuretic and monitor kidney function closely    Cirrhosis of liver:  Seems to be stable at this point Will continue to monitor    Kaleigh Wilkins MD  Nephrology  8/19/2019        Portions of the record may have been created with voice recognition software  Occasional wrong word or "sound a like" substitutions may have occurred due to the inherent limitations of voice recognition software  Read the chart carefully and recognize, using context, where substitutions have occurred

## 2019-08-19 NOTE — PLAN OF CARE
Problem: Potential for Falls  Goal: Patient will remain free of falls  Description  INTERVENTIONS:  - Assess patient frequently for physical needs  -  Identify cognitive and physical deficits and behaviors that affect risk of falls    -  Early fall precautions as indicated by assessment   - Educate patient/family on patient safety including physical limitations  - Instruct patient to call for assistance with activity based on assessment  - Modify environment to reduce risk of injury  - Consider OT/PT consult to assist with strengthening/mobility  Outcome: Progressing     Problem: PAIN - ADULT  Goal: Verbalizes/displays adequate comfort level or baseline comfort level  Description  Interventions:  - Encourage patient to monitor pain and request assistance  - Assess pain using appropriate pain scale  - Administer analgesics based on type and severity of pain and evaluate response  - Implement non-pharmacological measures as appropriate and evaluate response  - Consider cultural and social influences on pain and pain management  - Notify physician/advanced practitioner if interventions unsuccessful or patient reports new pain  Outcome: Progressing     Problem: INFECTION - ADULT  Goal: Absence or prevention of progression during hospitalization  Description  INTERVENTIONS:  - Assess and monitor for signs and symptoms of infection  - Monitor lab/diagnostic results  - Monitor all insertion sites, i e  indwelling lines, tubes, and drains  - Monitor endotracheal if appropriate and nasal secretions for changes in amount and color  - Early appropriate cooling/warming therapies per order  - Administer medications as ordered  - Instruct and encourage patient and family to use good hand hygiene technique  - Identify and instruct in appropriate isolation precautions for identified infection/condition  Outcome: Progressing  Goal: Absence of fever/infection during neutropenic period  Description  INTERVENTIONS:  - Monitor WBC    Outcome: Progressing     Problem: SAFETY ADULT  Goal: Patient will remain free of falls  Description  INTERVENTIONS:  - Assess patient frequently for physical needs  -  Identify cognitive and physical deficits and behaviors that affect risk of falls    -  [place] fall precautions as indicated by assessment   - Educate patient/family on patient safety including physical limitations  - Instruct patient to call for assistance with activity based on assessment  - Modify environment to reduce risk of injury  - Consider OT/PT consult to assist with strengthening/mobility  Outcome: Progressing  Goal: Maintain or return to baseline ADL function  Description  INTERVENTIONS:  -  Assess patient's ability to carry out ADLs; assess patient's baseline for ADL function and identify physical deficits which impact ability to perform ADLs (bathing, care of mouth/teeth, toileting, grooming, dressing, etc )  - Assess/evaluate cause of self-care deficits   - Assess range of motion  - Assess patient's mobility; develop plan if impaired  - Assess patient's need for assistive devices and provide as appropriate  - Encourage maximum independence but intervene and supervise when necessary  - Involve family in performance of ADLs  - Assess for home care needs following discharge   - Consider OT consult to assist with ADL evaluation and planning for discharge  - Provide patient education as appropriate  Outcome: Progressing  Goal: Maintain or return mobility status to optimal level  Description  INTERVENTIONS:  - Assess patient's baseline mobility status (ambulation, transfers, stairs, etc )    - Identify cognitive and physical deficits and behaviors that affect mobility  - Identify mobility aids required to assist with transfers and/or ambulation (gait belt, sit-to-stand, lift, walker, cane, etc )  - [place] fall precautions as indicated by assessment  - Record patient progress and toleration of activity level on Mobility SBAR; progress patient to next Phase/Stage  - Instruct patient to call for assistance with activity based on assessment  - Consider rehabilitation consult to assist with strengthening/weightbearing, etc   Outcome: Progressing     Problem: DISCHARGE PLANNING  Goal: Discharge to home or other facility with appropriate resources  Description  INTERVENTIONS:  - Identify barriers to discharge w/patient and caregiver  - Arrange for needed discharge resources and transportation as appropriate  - Identify discharge learning needs (meds, wound care, etc )  - Arrange for interpretive services to assist at discharge as needed  - Refer to Case Management Department for coordinating discharge planning if the patient needs post-hospital services based on physician/advanced practitioner order or complex needs related to functional status, cognitive ability, or social support system  Outcome: Progressing     Problem: Knowledge Deficit  Goal: Patient/family/caregiver demonstrates understanding of disease process, treatment plan, medications, and discharge instructions  Description  Complete learning assessment and assess knowledge base    Interventions:  - Provide teaching at level of understanding  - Provide teaching via preferred learning methods  Outcome: Progressing     Problem: NEUROSENSORY - ADULT  Goal: Achieves stable or improved neurological status  Description  INTERVENTIONS  - Monitor and report changes in neurological status  - Monitor vital signs such as temperature, blood pressure, glucose, and any other labs ordered   - Initiate measures to prevent increased intracranial pressure  - Monitor for seizure activity and implement precautions if appropriate      Outcome: Progressing     Problem: CARDIOVASCULAR - ADULT  Goal: Maintains optimal cardiac output and hemodynamic stability  Description  INTERVENTIONS:  - Monitor I/O, vital signs and rhythm  - Monitor for S/S and trends of decreased cardiac output  - Administer and titrate ordered vasoactive medications to optimize hemodynamic stability  - Assess quality of pulses, skin color and temperature  - Assess for signs of decreased coronary artery perfusion  - Instruct patient to report change in severity of symptoms  Outcome: Progressing     Problem: SKIN/TISSUE INTEGRITY - ADULT  Goal: Incision(s), wounds(s) or drain site(s) healing without S/S of infection  Description  INTERVENTIONS  - Assess and document risk factors for skin impairment   - Assess and document dressing, incision, wound bed, drain sites and surrounding tissue  - Consider nutrition services referral as needed  - Oral mucous membranes remain intact  - Provide patient/ family education  Outcome: Progressing     Problem: MUSCULOSKELETAL - ADULT  Goal: Maintain or return mobility to safest level of function  Description  INTERVENTIONS:  - Assess patient's ability to carry out ADLs; assess patient's baseline for ADL function and identify physical deficits which impact ability to perform ADLs (bathing, care of mouth/teeth, toileting, grooming, dressing, etc )  - Assess/evaluate cause of self-care deficits   - Assess range of motion  - Assess patient's mobility  - Assess patient's need for assistive devices and provide as appropriate  - Encourage maximum independence but intervene and supervise when necessary  - Involve family in performance of ADLs  - Assess for home care needs following discharge   - Consider OT consult to assist with ADL evaluation and planning for discharge  - Provide patient education as appropriate  Outcome: Progressing     Problem: Prexisting or High Potential for Compromised Skin Integrity  Goal: Skin integrity is maintained or improved  Description  INTERVENTIONS:  - Identify patients at risk for skin breakdown  - Assess and monitor skin integrity  - Assess and monitor nutrition and hydration status  - Monitor labs   - Assess for incontinence   - Turn and reposition patient  - Assist with mobility/ambulation  - Relieve pressure over bony prominences  - Avoid friction and shearing  - Provide appropriate hygiene as needed including keeping skin clean and dry  - Evaluate need for skin moisturizer/barrier cream  - Collaborate with interdisciplinary team   - Patient/family teaching  - Consider wound care consult   Outcome: Progressing

## 2019-08-19 NOTE — ASSESSMENT & PLAN NOTE
· Secondary to liver cirrhosis  · Patient had a slight bump to 6 90  · MELD score remains at 25  · Continue to monitor for now  · CMP in a m    · Appreciate Nephrology recommendations

## 2019-08-19 NOTE — PLAN OF CARE
Problem: Potential for Falls  Goal: Patient will remain free of falls  Description  INTERVENTIONS:  - Assess patient frequently for physical needs  -  Identify cognitive and physical deficits and behaviors that affect risk of falls    -  [place] fall precautions as indicated by assessment   - Educate patient/family on patient safety including physical limitations  - Instruct patient to call for assistance with activity based on assessment  - Modify environment to reduce risk of injury  - Consider OT/PT consult to assist with strengthening/mobility  Outcome: Progressing     Problem: PAIN - ADULT  Goal: Verbalizes/displays adequate comfort level or baseline comfort level  Description  Interventions:  - Encourage patient to monitor pain and request assistance  - Assess pain using appropriate pain scale  - Administer analgesics based on type and severity of pain and evaluate response  - Implement non-pharmacological measures as appropriate and evaluate response  - Consider cultural and social influences on pain and pain management  - Notify physician/advanced practitioner if interventions unsuccessful or patient reports new pain  Outcome: Progressing     Problem: INFECTION - ADULT  Goal: Absence or prevention of progression during hospitalization  Description  INTERVENTIONS:  - Assess and monitor for signs and symptoms of infection  - Monitor lab/diagnostic results  - Monitor all insertion sites, i e  indwelling lines, tubes, and drains  - Monitor endotracheal if appropriate and nasal secretions for changes in amount and color  - [place] appropriate cooling/warming therapies per order  - Administer medications as ordered  - Instruct and encourage patient and family to use good hand hygiene technique  - Identify and instruct in appropriate isolation precautions for identified infection/condition  Outcome: Progressing  Goal: Absence of fever/infection during neutropenic period  Description  INTERVENTIONS:  - Monitor WBC    Outcome: Progressing     Problem: SAFETY ADULT  Goal: Patient will remain free of falls  Description  INTERVENTIONS:  - Assess patient frequently for physical needs  -  Identify cognitive and physical deficits and behaviors that affect risk of falls    -  [place] fall precautions as indicated by assessment   - Educate patient/family on patient safety including physical limitations  - Instruct patient to call for assistance with activity based on assessment  - Modify environment to reduce risk of injury  - Consider OT/PT consult to assist with strengthening/mobility  Outcome: Progressing  Goal: Maintain or return to baseline ADL function  Description  INTERVENTIONS:  -  Assess patient's ability to carry out ADLs; assess patient's baseline for ADL function and identify physical deficits which impact ability to perform ADLs (bathing, care of mouth/teeth, toileting, grooming, dressing, etc )  - Assess/evaluate cause of self-care deficits   - Assess range of motion  - Assess patient's mobility; develop plan if impaired  - Assess patient's need for assistive devices and provide as appropriate  - Encourage maximum independence but intervene and supervise when necessary  - Involve family in performance of ADLs  - Assess for home care needs following discharge   - Consider OT consult to assist with ADL evaluation and planning for discharge  - Provide patient education as appropriate  Outcome: Progressing  Goal: Maintain or return mobility status to optimal level  Description  INTERVENTIONS:  - Assess patient's baseline mobility status (ambulation, transfers, stairs, etc )    - Identify cognitive and physical deficits and behaviors that affect mobility  - Identify mobility aids required to assist with transfers and/or ambulation (gait belt, sit-to-stand, lift, walker, cane, etc )  - [place] fall precautions as indicated by assessment  - Record patient progress and toleration of activity level on Mobility SBAR; progress patient to next Phase/Stage  - Instruct patient to call for assistance with activity based on assessment  - Consider rehabilitation consult to assist with strengthening/weightbearing, etc   Outcome: Progressing     Problem: DISCHARGE PLANNING  Goal: Discharge to home or other facility with appropriate resources  Description  INTERVENTIONS:  - Identify barriers to discharge w/patient and caregiver  - Arrange for needed discharge resources and transportation as appropriate  - Identify discharge learning needs (meds, wound care, etc )  - Arrange for interpretive services to assist at discharge as needed  - Refer to Case Management Department for coordinating discharge planning if the patient needs post-hospital services based on physician/advanced practitioner order or complex needs related to functional status, cognitive ability, or social support system  Outcome: Progressing     Problem: Knowledge Deficit  Goal: Patient/family/caregiver demonstrates understanding of disease process, treatment plan, medications, and discharge instructions  Description  Complete learning assessment and assess knowledge base    Interventions:  - Provide teaching at level of understanding  - Provide teaching via preferred learning methods  Outcome: Progressing     Problem: NEUROSENSORY - ADULT  Goal: Achieves stable or improved neurological status  Description  INTERVENTIONS  - Monitor and report changes in neurological status  - Monitor vital signs such as temperature, blood pressure, glucose, and any other labs ordered   - Initiate measures to prevent increased intracranial pressure  - Monitor for seizure activity and implement precautions if appropriate      Outcome: Progressing     Problem: CARDIOVASCULAR - ADULT  Goal: Maintains optimal cardiac output and hemodynamic stability  Description  INTERVENTIONS:  - Monitor I/O, vital signs and rhythm  - Monitor for S/S and trends of decreased cardiac output  - Administer and titrate ordered vasoactive medications to optimize hemodynamic stability  - Assess quality of pulses, skin color and temperature  - Assess for signs of decreased coronary artery perfusion  - Instruct patient to report change in severity of symptoms  Outcome: Progressing     Problem: SKIN/TISSUE INTEGRITY - ADULT  Goal: Incision(s), wounds(s) or drain site(s) healing without S/S of infection  Description  INTERVENTIONS  - Assess and document risk factors for skin impairment   - Assess and document dressing, incision, wound bed, drain sites and surrounding tissue  - Consider nutrition services referral as needed  - Oral mucous membranes remain intact  - Provide patient/ family education  Outcome: Progressing     Problem: MUSCULOSKELETAL - ADULT  Goal: Maintain or return mobility to safest level of function  Description  INTERVENTIONS:  - Assess patient's ability to carry out ADLs; assess patient's baseline for ADL function and identify physical deficits which impact ability to perform ADLs (bathing, care of mouth/teeth, toileting, grooming, dressing, etc )  - Assess/evaluate cause of self-care deficits   - Assess range of motion  - Assess patient's mobility  - Assess patient's need for assistive devices and provide as appropriate  - Encourage maximum independence but intervene and supervise when necessary  - Involve family in performance of ADLs  - Assess for home care needs following discharge   - Consider OT consult to assist with ADL evaluation and planning for discharge  - Provide patient education as appropriate  Outcome: Progressing     Problem: Prexisting or High Potential for Compromised Skin Integrity  Goal: Skin integrity is maintained or improved  Description  INTERVENTIONS:  - Identify patients at risk for skin breakdown  - Assess and monitor skin integrity  - Assess and monitor nutrition and hydration status  - Monitor labs   - Assess for incontinence   - Turn and reposition patient  - Assist with mobility/ambulation  - Relieve pressure over bony prominences  - Avoid friction and shearing  - Provide appropriate hygiene as needed including keeping skin clean and dry  - Evaluate need for skin moisturizer/barrier cream  - Collaborate with interdisciplinary team   - Patient/family teaching  - Consider wound care consult   Outcome: Progressing

## 2019-08-19 NOTE — ASSESSMENT & PLAN NOTE
· Baseline unclear, continue to monitor closely with daily renal functions in setting of aggressive diuresis      · Cr stable at 1 59  · Recommendations per Nephrology

## 2019-08-19 NOTE — NUTRITION
08/19/19 1551   Assessment   Timepoint Initial  (consult: morbid obesity, low Na diet education)   Labs   List Completed Labs   (BUN 37 (H), creat 1 59 (H), alb 3 2 (L), total bilirubin 6 90 (H); meds- bumex, protonix, lactulose, k-dur, aldactone)   Feeding Route   PO Independent   Adequacy of Intake   Nutrition Modality PO  (2g Na, 1500ml FR)   Intake Meals %   Estimated Calorie Intake %   Estimated Protein Intake  %   Estimated Fluid Intake %   Estimated calorie intake compared to estimated need pt has adequate po intake at this time  he was eating a tuna fish sandwich for lunch this afternoon  Nutrition Prognosis   Nutrition Concerns   (per EMR pt admitted for anasarca: +PICC for antibiotics, +3, +4 LE edema, skin tear leg, no GI issues reported per RN assessment)   Comorbid Concerns   (cirrhosis, HTN, morbid obesity, gallstones, ROSALES)   Nutrition Considerations   (provided pt and wife at bedside low Na diet education with handouts and verbal discussion)   PES Statement   Problem Behavioral-Environmental   Knowledge and Beliefs (1) Food- and nutrition-related knowledge deficit NB-1 1   Related to Other (comment)  (CHF exacerbation and low Na diet)   As evidenced by: Per patient interview  (pt reported confusion towards low Na diet; consumption of high sodium, high fat foods)   Patient Nutrition Goals   Goal comprehend education   Goal Status initiated   Timeframe to complete goal by d/c   Recommendations/Interventions   Summary 2g Na, 1500ml FR remains appropriate  Provided pt and wife at bedside with low Na diet education with verbal discussion and handouts for at home use  Pt and wife both asked appropriate questions and verbalized understanding of information provided   Will continue to monitor po intake and any further diet education needs that may arise   Malnutrition/BMI Present Yes  (morbid obesity previously documented in chart by MD)   BMI Classifications Morbid Obesity 50-59 9   Interventions Diet: continued as ordered;Education initiated/completed   Nutrition Recommendations Continue diet as ordered   Nutrition Complexity Risk   Nutrition complexity level Moderate risk   Follow up date 08/26/19

## 2019-08-19 NOTE — ASSESSMENT & PLAN NOTE
· Patient with decompensated ROSALES cirrhosis  MELD calculated and remains at 25  · Ascites: On diuresis of anasarca initially with Bumex IV and Aldactone and was switched to oral Bumex 8/18  · Continue Low sodium diet  Mild Ascites on CT  · Continue Lactulose for goal of 3 soft BMs a day  · Varices: Will need EGD as outpatient  · [facility] [address]  Screen: AFP in June normal  CT A/P showed no obvious focal mass on unenhanced CT  MRI in June did not show a suspicious lesion in the liver but was limited by motion artifact  · Transplant: No evaluation at transplant center has been performed; needs close outpatient GI follow up  Consider inpatient GI consult if MELD continues to rise

## 2019-08-19 NOTE — SOCIAL WORK
CM spoke to PCP's office-Dr Loan Swift and was told that they see the pt on an almost daily basis due to his frequent issues and hospitalizations  Dr Loan Swift wants pt to see a nephrologist-Dr Juan Carlos Lomax before seeing him again  Pt has an appointment with Dr Juan Carlos Lomax on 9/10/10 and that office is reviewing to see if they can see him sooner  CM wanted to set up an appointment with Dr Loan Swift and they will call back with the date and time

## 2019-08-19 NOTE — ASSESSMENT & PLAN NOTE
· Recurrent severe anasarca: patient reports he continues to gain weight despite use of diuretics at home  Recently switched from Torsemide to Furosemide without any improvement  In the setting of decompensated ROSALES cirrhosis  · Switched from Furosemide to Bumex 2 mg IV BID on admission  Continue Aldactone  Monitor Cr closely - creatinine has bumped up overnight to 1 59 appreciate Nephrology recommendations  · Patient has been transition to Bumex 2 mg po BID 8/18  · Nephrology input appreciated  · Strict I/Os, daily STANDING weights, low-sodium diet, fluid restriction  · Continue to monitor electrolytes closely, replete as needed  · PT/OT recommending STR  Patient and wife refuse STR

## 2019-08-20 LAB
ALBUMIN SERPL BCP-MCNC: 3.1 G/DL (ref 3.5–5)
ALP SERPL-CCNC: 73 U/L (ref 46–116)
ALT SERPL W P-5'-P-CCNC: 22 U/L (ref 12–78)
ANION GAP SERPL CALCULATED.3IONS-SCNC: 9 MMOL/L (ref 4–13)
AST SERPL W P-5'-P-CCNC: 42 U/L (ref 5–45)
BILIRUB SERPL-MCNC: 7 MG/DL (ref 0.2–1)
BUN SERPL-MCNC: 37 MG/DL (ref 5–25)
CALCIUM SERPL-MCNC: 9 MG/DL (ref 8.3–10.1)
CHLORIDE SERPL-SCNC: 102 MMOL/L (ref 100–108)
CO2 SERPL-SCNC: 25 MMOL/L (ref 21–32)
CREAT SERPL-MCNC: 1.5 MG/DL (ref 0.6–1.3)
ERYTHROCYTE [DISTWIDTH] IN BLOOD BY AUTOMATED COUNT: 17 % (ref 11.6–15.1)
GFR SERPL CREATININE-BSD FRML MDRD: 51 ML/MIN/1.73SQ M
GLUCOSE SERPL-MCNC: 90 MG/DL (ref 65–140)
HCT VFR BLD AUTO: 24.5 % (ref 36.5–49.3)
HGB BLD-MCNC: 7.8 G/DL (ref 12–17)
INR PPP: 1.73 (ref 0.84–1.19)
MCH RBC QN AUTO: 36.1 PG (ref 26.8–34.3)
MCHC RBC AUTO-ENTMCNC: 31.8 G/DL (ref 31.4–37.4)
MCV RBC AUTO: 113 FL (ref 82–98)
PLATELET # BLD AUTO: 46 THOUSANDS/UL (ref 149–390)
PMV BLD AUTO: 11.4 FL (ref 8.9–12.7)
POTASSIUM SERPL-SCNC: 4.8 MMOL/L (ref 3.5–5.3)
PROT SERPL-MCNC: 6.5 G/DL (ref 6.4–8.2)
PROTHROMBIN TIME: 20.4 SECONDS (ref 11.6–14.5)
RBC # BLD AUTO: 2.16 MILLION/UL (ref 3.88–5.62)
SODIUM SERPL-SCNC: 136 MMOL/L (ref 136–145)
WBC # BLD AUTO: 5.7 THOUSAND/UL (ref 4.31–10.16)

## 2019-08-20 PROCEDURE — 99233 SBSQ HOSP IP/OBS HIGH 50: CPT | Performed by: NURSE PRACTITIONER

## 2019-08-20 PROCEDURE — 85027 COMPLETE CBC AUTOMATED: CPT | Performed by: NURSE PRACTITIONER

## 2019-08-20 PROCEDURE — 99232 SBSQ HOSP IP/OBS MODERATE 35: CPT | Performed by: INTERNAL MEDICINE

## 2019-08-20 PROCEDURE — 85610 PROTHROMBIN TIME: CPT | Performed by: NURSE PRACTITIONER

## 2019-08-20 PROCEDURE — 80053 COMPREHEN METABOLIC PANEL: CPT | Performed by: NURSE PRACTITIONER

## 2019-08-20 RX ADMIN — BUMETANIDE 2 MG: 1 TABLET ORAL at 16:01

## 2019-08-20 RX ADMIN — POTASSIUM CHLORIDE 40 MEQ: 1500 TABLET, EXTENDED RELEASE ORAL at 08:05

## 2019-08-20 RX ADMIN — SPIRONOLACTONE 100 MG: 25 TABLET ORAL at 08:05

## 2019-08-20 RX ADMIN — BUMETANIDE 2 MG: 1 TABLET ORAL at 08:11

## 2019-08-20 RX ADMIN — POTASSIUM CHLORIDE 40 MEQ: 1500 TABLET, EXTENDED RELEASE ORAL at 17:52

## 2019-08-20 RX ADMIN — PANTOPRAZOLE SODIUM 40 MG: 40 TABLET, DELAYED RELEASE ORAL at 05:59

## 2019-08-20 NOTE — ASSESSMENT & PLAN NOTE
· Patient reportedly diagnosed with a bacteremia and discharged on Friday 8/9 from [facility] with PICC line to the left upper extremity  · Continue IV Ceftriaxone 1000 mg daily - he reports he is on this medication through the 19th  IV antibiotics discontinued yesterday   · Repeat blood cultures here show no growth at 72 hours

## 2019-08-20 NOTE — PLAN OF CARE
Problem: Potential for Falls  Goal: Patient will remain free of falls  Description  INTERVENTIONS:  - Assess patient frequently for physical needs  -  Identify cognitive and physical deficits and behaviors that affect risk of falls    -  [facility] fall precautions as indicated by assessment   - Educate patient/family on patient safety including physical limitations  - Instruct patient to call for assistance with activity based on assessment  - Modify environment to reduce risk of injury  - Consider OT/PT consult to assist with strengthening/mobility  Outcome: Progressing     Problem: PAIN - ADULT  Goal: Verbalizes/displays adequate comfort level or baseline comfort level  Description  Interventions:  - Encourage patient to monitor pain and request assistance  - Assess pain using appropriate pain scale  - Administer analgesics based on type and severity of pain and evaluate response  - Implement non-pharmacological measures as appropriate and evaluate response  - Consider cultural and social influences on pain and pain management  - Notify physician/advanced practitioner if interventions unsuccessful or patient reports new pain  Outcome: Progressing     Problem: INFECTION - ADULT  Goal: Absence or prevention of progression during hospitalization  Description  INTERVENTIONS:  - Assess and monitor for signs and symptoms of infection  - Monitor lab/diagnostic results  - Monitor all insertion sites, i e  indwelling lines, tubes, and drains  - Monitor endotracheal if appropriate and nasal secretions for changes in amount and color  - [facility] appropriate cooling/warming therapies per order  - Administer medications as ordered  - Instruct and encourage patient and family to use good hand hygiene technique  - Identify and instruct in appropriate isolation precautions for identified infection/condition  Outcome: Progressing  Goal: Absence of fever/infection during neutropenic period  Description  INTERVENTIONS:  - Monitor WBC    Outcome: Progressing     Problem: SAFETY ADULT  Goal: Patient will remain free of falls  Description  INTERVENTIONS:  - Assess patient frequently for physical needs  -  Identify cognitive and physical deficits and behaviors that affect risk of falls    -  [facility] fall precautions as indicated by assessment   - Educate patient/family on patient safety including physical limitations  - Instruct patient to call for assistance with activity based on assessment  - Modify environment to reduce risk of injury  - Consider OT/PT consult to assist with strengthening/mobility  Outcome: Progressing  Goal: Maintain or return to baseline ADL function  Description  INTERVENTIONS:  -  Assess patient's ability to carry out ADLs; assess patient's baseline for ADL function and identify physical deficits which impact ability to perform ADLs (bathing, care of mouth/teeth, toileting, grooming, dressing, etc )  - Assess/evaluate cause of self-care deficits   - Assess range of motion  - Assess patient's mobility; develop plan if impaired  - Assess patient's need for assistive devices and provide as appropriate  - Encourage maximum independence but intervene and supervise when necessary  - Involve family in performance of ADLs  - Assess for home care needs following discharge   - Consider OT consult to assist with ADL evaluation and planning for discharge  - Provide patient education as appropriate  Outcome: Progressing  Goal: Maintain or return mobility status to optimal level  Description  INTERVENTIONS:  - Assess patient's baseline mobility status (ambulation, transfers, stairs, etc )    - Identify cognitive and physical deficits and behaviors that affect mobility  - Identify mobility aids required to assist with transfers and/or ambulation (gait belt, sit-to-stand, lift, walker, cane, etc )  - [facility] fall precautions as indicated by assessment  - Record patient progress and toleration of activity level on Mobility SBAR; progress patient to next Phase/Stage  - Instruct patient to call for assistance with activity based on assessment  - Consider rehabilitation consult to assist with strengthening/weightbearing, etc   Outcome: Progressing     Problem: DISCHARGE PLANNING  Goal: Discharge to home or other facility with appropriate resources  Description  INTERVENTIONS:  - Identify barriers to discharge w/patient and caregiver  - Arrange for needed discharge resources and transportation as appropriate  - Identify discharge learning needs (meds, wound care, etc )  - Arrange for interpretive services to assist at discharge as needed  - Refer to Case Management Department for coordinating discharge planning if the patient needs post-hospital services based on physician/advanced practitioner order or complex needs related to functional status, cognitive ability, or social support system  Outcome: Progressing     Problem: Knowledge Deficit  Goal: Patient/family/caregiver demonstrates understanding of disease process, treatment plan, medications, and discharge instructions  Description  Complete learning assessment and assess knowledge base    Interventions:  - Provide teaching at level of understanding  - Provide teaching via preferred learning methods  Outcome: Progressing     Problem: NEUROSENSORY - ADULT  Goal: Achieves stable or improved neurological status  Description  INTERVENTIONS  - Monitor and report changes in neurological status  - Monitor vital signs such as temperature, blood pressure, glucose, and any other labs ordered   - Initiate measures to prevent increased intracranial pressure  - Monitor for seizure activity and implement precautions if appropriate      Outcome: Progressing     Problem: CARDIOVASCULAR - ADULT  Goal: Maintains optimal cardiac output and hemodynamic stability  Description  INTERVENTIONS:  - Monitor I/O, vital signs and rhythm  - Monitor for S/S and trends of decreased cardiac output  - Administer and titrate ordered vasoactive medications to optimize hemodynamic stability  - Assess quality of pulses, skin color and temperature  - Assess for signs of decreased coronary artery perfusion  - Instruct patient to report change in severity of symptoms  Outcome: Progressing     Problem: SKIN/TISSUE INTEGRITY - ADULT  Goal: Incision(s), wounds(s) or drain site(s) healing without S/S of infection  Description  INTERVENTIONS  - Assess and document risk factors for skin impairment   - Assess and document dressing, incision, wound bed, drain sites and surrounding tissue  - Consider nutrition services referral as needed  - Oral mucous membranes remain intact  - Provide patient/ family education  Outcome: Progressing     Problem: MUSCULOSKELETAL - ADULT  Goal: Maintain or return mobility to safest level of function  Description  INTERVENTIONS:  - Assess patient's ability to carry out ADLs; assess patient's baseline for ADL function and identify physical deficits which impact ability to perform ADLs (bathing, care of mouth/teeth, toileting, grooming, dressing, etc )  - Assess/evaluate cause of self-care deficits   - Assess range of motion  - Assess patient's mobility  - Assess patient's need for assistive devices and provide as appropriate  - Encourage maximum independence but intervene and supervise when necessary  - Involve family in performance of ADLs  - Assess for home care needs following discharge   - Consider OT consult to assist with ADL evaluation and planning for discharge  - Provide patient education as appropriate  Outcome: Progressing     Problem: Prexisting or High Potential for Compromised Skin Integrity  Goal: Skin integrity is maintained or improved  Description  INTERVENTIONS:  - Identify patients at risk for skin breakdown  - Assess and monitor skin integrity  - Assess and monitor nutrition and hydration status  - Monitor labs   - Assess for incontinence   - Turn and reposition patient  - Assist with mobility/ambulation  - Relieve pressure over bony prominences  - Avoid friction and shearing  - Provide appropriate hygiene as needed including keeping skin clean and dry  - Evaluate need for skin moisturizer/barrier cream  - Collaborate with interdisciplinary team   - Patient/family teaching  - Consider wound care consult   Outcome: Progressing     Problem: Nutrition/Hydration-ADULT  Goal: Nutrient/Hydration intake appropriate for improving, restoring or maintaining nutritional needs  Description  Monitor and assess patient's nutrition/hydration status for malnutrition  Collaborate with interdisciplinary team and initiate plan and interventions as ordered  Monitor patient's weight and dietary intake as ordered or per policy  Utilize nutrition screening tool and intervene as necessary  Determine patient's food preferences and provide high-protein, high-caloric foods as appropriate       INTERVENTIONS:  - Monitor oral intake, urinary output, labs, and treatment plans  - Assess nutrition and hydration status and recommend course of action  - Evaluate amount of meals eaten  - Assist patient with eating if necessary   - Allow adequate time for meals  - Recommend/ encourage appropriate diets, oral nutritional supplements, and vitamin/mineral supplements  - Order, calculate, and assess calorie counts as needed  - Recommend, monitor, and adjust tube feedings and TPN/PPN based on assessed needs  - Assess need for intravenous fluids  - Provide specific nutrition/hydration education as appropriate  - Include patient/family/caregiver in decisions related to nutrition  Outcome: Progressing

## 2019-08-20 NOTE — ASSESSMENT & PLAN NOTE
· Baseline unclear, continue to monitor closely with daily renal functions in setting of aggressive diuresis      · Cr stable at 1 50  · Recommendations per Nephrology

## 2019-08-20 NOTE — UTILIZATION REVIEW
Continued Stay Review    Date: 8/20                          Current Patient Class: IP   Current Level of Care: Tele    HPI:59 y o  male initially admitted on 8/15    Assessment/Plan: admitted w/ recurrent severe anasarca on bumex and aldactone and monitoring creat closely   Pt on fluid restriction   Liver cirrhosis sec to ROSALES ascites cont lactulose , bilirubin stable 7 00 if elevated wo;; discuss w/ GI and cont to trend CMP   IV abx stopped yesterday , bld cx no growth   Cont w/ dec BS , edema and anasarca       Pertinent Labs/Diagnostic Results:   Results from last 7 days   Lab Units 08/20/19  0600 08/19/19  0610 08/18/19  0535 08/17/19  0513 08/16/19  0443 08/15/19  1225   WBC Thousand/uL 5 70 6 55 5 91 5 68 6 96 7 45   HEMOGLOBIN g/dL 7 8* 7 7* 7 8* 7 8* 7 8* 8 0*   HEMATOCRIT % 24 5* 24 3* 24 0* 23 6* 24 2* 24 7*   PLATELETS Thousands/uL 46* 42* 45* 51* 54* 50*   NEUTROS ABS Thousands/µL  --  4 56 4 14 3 80  --   --    BANDS PCT %  --   --   --   --   --  3     Results from last 7 days   Lab Units 08/20/19  0600 08/19/19  0610 08/18/19  0535 08/17/19  0513 08/16/19 0443 08/15/19  1225   SODIUM mmol/L 136 137 135* 133* 133* 133*   POTASSIUM mmol/L 4 8 4 7 4 6 4 3 3 9 3 4*   CHLORIDE mmol/L 102 102 101 100 101 102   CO2 mmol/L 25 24 25 23 24 23   ANION GAP mmol/L 9 11 9 10 8 8   BUN mg/dL 37* 37* 35* 31* 29* 30*   CREATININE mg/dL 1 50* 1 59* 1 38* 1 38* 1 24 1 32*   EGFR ml/min/1 73sq m 51 47 56 56 64 59   CALCIUM mg/dL 9 0 9 0 8 6 8 6 8 7 8 3   MAGNESIUM mg/dL  --   --   --   --  2 0 1 9   PHOSPHORUS mg/dL  --   --   --   --  3 2  --      Results from last 7 days   Lab Units 08/20/19  0600 08/19/19  0610 08/17/19  0513 08/16/19  0443 08/15/19  1225   AST U/L 42 42 49* 52* 54*   ALT U/L 22 21 25 24 26   ALK PHOS U/L 73 74 87 88 89   TOTAL PROTEIN g/dL 6 5 6 3* 6 4 6 2* 6 0*   ALBUMIN g/dL 3 1* 3 2* 3 2* 2 8* 2 6*   TOTAL BILIRUBIN mg/dL 7 00* 6 90* 5 60* 5 00* 4 50*   BILIRUBIN DIRECT mg/dL  --   --  1 61*  -- 1 52*     Results from last 7 days   Lab Units 08/20/19  0600 08/19/19  0610 08/18/19  0535 08/17/19  0513 08/16/19  0443 08/15/19  1225   GLUCOSE RANDOM mg/dL 90 98 94 97 106 161*     Results from last 7 days   Lab Units 08/15/19  1225   TROPONIN I ng/mL 0 03      Results from last 7 days   Lab Units 08/20/19  0600 08/19/19  0610 08/17/19  0513  08/15/19  1225   PROTIME seconds 20 4* 21 1* 21 7*   < > 21 2*   INR  1 73* 1 81* 1 87*   < > 1 82*   PTT seconds  --   --   --   --  39*    < > = values in this interval not displayed  Results from last 7 days   Lab Units 08/17/19  0513 08/15/19  1225   NT-PRO BNP pg/mL 1,875* 1,227*     Results from last 7 days   Lab Units 08/15/19  1240   CLARITY UA  Clear   COLOR UA  Yellow   SPEC GRAV UA  1 015   PH UA  6 0   GLUCOSE UA mg/dl Negative   KETONES UA mg/dl Negative   BLOOD UA  Trace-Intact*   PROTEIN UA mg/dl Negative   NITRITE UA  Negative   BILIRUBIN UA  Negative   UROBILINOGEN UA E U /dl 1 0   LEUKOCYTES UA  Trace*   WBC UA /hpf 2-4*   RBC UA /hpf 0-1*   BACTERIA UA /hpf Occasional   EPITHELIAL CELLS WET PREP /hpf None Seen       Results from last 7 days   Lab Units 08/15/19  2132   BLOOD CULTURE  No Growth After 4 Days  No Growth After 4 Days  Results from last 7 days   Lab Units 08/15/19  1225   TOTAL COUNTED  100     Vital Signs:   08/20/19 0721  97 5 °F (36 4 °C)  74  18  130/63    94 %  None (Room air)         Medications:   Scheduled Meds:   Current Facility-Administered Medications:  acetaminophen 650 mg Oral Q6H PRN    aluminum-magnesium hydroxide-simethicone 30 mL Oral Q6H PRN    bumetanide 2 mg Oral BID (diuretic)    lactulose 20 g Oral BID    ondansetron 4 mg Intravenous Q6H PRN    pantoprazole 40 mg Oral Early Morning    polyethylene glycol 17 g Oral Daily PRN    potassium chloride 40 mEq Oral BID    spironolactone 100 mg Oral Daily        Discharge Plan: TBD     Network Utilization Review Department  Phone: [phone];  Fax [phone]  [email or web address]  ATTENTION: Please call with any questions or concerns to [phone]  and carefully listen to the prompts so that you are directed to the right person  Send all requests for admission clinical reviews, approved or denied determinations and any other requests to fax [phone]   All voicemails are confidential

## 2019-08-20 NOTE — PROGRESS NOTES
Progress Note Jean Suero 1960, 63 y o  male MRN: [id number]    Unit/Bed#: -01 Encounter: [id number]    Primary Care Provider: No primary care provider on file  Date and time admitted to hospital: 8/15/2019 11:49 AM        * Anasarca  Assessment & Plan  · Recurrent severe anasarca: patient reports he continues to gain weight despite use of diuretics at home  Recently switched from Torsemide to Furosemide without any improvement  In the setting of decompensated ROSALES cirrhosis  · Switched from Furosemide to Bumex 2 mg IV BID on admission  Continue Aldactone  Monitor Cr closely - creatinine remained stable currently at 1 50 appreciate Nephrology recommendations  · Patient has been transition to Bumex 2 mg po BID 8/18  · Nephrology input appreciated  · Strict I/Os, daily STANDING weights, low-sodium diet, fluid restriction  · Continue to monitor electrolytes closely, replete as needed  · PT/OT recommending STR  Patient and wife initially refused after likely discussion with patient, wife, and daughter at bedside explaining now with clinical stability after discussion with Nephrology and he is stable to go home realizing that he has some notable limitation he is open to hearing about short-term rehab and options he may have moving forward  Discussion with case management will discuss short-term placement with the patient anticipate discharge in next 24 hours  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 [facility])  Assessment & Plan  · Patient with decompensated ROSALES cirrhosis  MELD calculated and remains at 25  · Ascites: On diuresis of anasarca initially with Bumex IV and Aldactone and was switched to oral Bumex 8/18  · Continue Low sodium diet  Mild Ascites on CT  · Continue Lactulose for goal of 3 soft BMs a day  · Varices: Will need EGD as outpatient  · [facility] [address]  Screen: AFP in June normal  CT A/P showed no obvious focal mass on unenhanced CT    MRI in June did not show a suspicious lesion in the liver but was limited by motion artifact  · Transplant: No evaluation at transplant center has been performed; needs close outpatient GI follow up  Consider inpatient GI consult if MELD continues to rise  · Bilirubin has remained somewhat stable at 7 00 if elevate the next 24 hours will have a discussion with GI continue to trend CMP in a m  Bacteremia  Assessment & Plan  · Patient reportedly diagnosed with a bacteremia and discharged on Friday 8/9 from [facility] with PICC line to the left upper extremity  · Continue IV Ceftriaxone 1000 mg daily - he reports he is on this medication through the 19th  IV antibiotics discontinued yesterday   · Repeat blood cultures here show no growth at 72 hours  Hyperbilirubinemia  Assessment & Plan  · Secondary to liver cirrhosis  · Patient had a slight bump to 7 00  · If continues to trend significantly will have a discussion with GI with possible consultation  · MELD score remains at 25  · Continue to monitor for now  · CMP in a m  · Appreciate Nephrology recommendations    [facility])  Assessment & Plan  · Secondary to liver cirrhosis, stable, continue to monitor   · CBC in a m  Renal insufficiency  Assessment & Plan  · Baseline unclear, continue to monitor closely with daily renal functions in setting of aggressive diuresis  · Cr stable at 1 50  · Recommendations per Nephrology    Morbid obesity [facility])  Assessment & Plan  · Lifestyle modifications recommended  · Nutrition consult recommendations appreciated    Essential hypertension  Assessment & Plan  · Blood pressure well controlled, continue to monitor  CROW on CPAP  Assessment & Plan  · Continue CPAP at night  VTE Pharmacologic Prophylaxis:   Pharmacologic: Pharmacologic VTE Prophylaxis contraindicated due to Thrombocytopenia  Mechanical VTE Prophylaxis in Place: Yes    Patient Centered Rounds: I have performed bedside rounds with nursing staff today      Discussions with Specialists or Other Care Team Provider:  Nephrology    Education and Discussions with Family / Patient:  Patient, wife, daughter at bedside    Time Spent for Care: 40 minutes  More than 50% of total time spent on counseling and coordination of care as described above  Current Length of Stay: 5 day(s)    Current Patient Status: Inpatient   Certification Statement: The patient will continue to require additional inpatient hospital stay due to Need for ongoing treatment for anasarca    Discharge Plan:  Pending safe discharge plan STR versus home PT to be determined    Code Status: Level 1 - Full Code      Subjective:   Patient reports that his scrotal edema is going down his legs continue to feel better  When patient educated that from a nephrology perspective he is stable to go home patient is concerned about his home setting  Patient educated along with the wife and the daughter at the bedside given his ongoing fluid issues which will be a long-standing problem for the patient the benefits of him having therapy would be more extensive in a short-term rehab than him going home where he would only get and likely 3 times a week for 1/2 hour  Patient was not aware that there would be limited access in further going to outpatient would be very difficult given his mobility issues  After much discussion with the patient, wife, and daughter patient is open to considering short-term rehab be near his daughter and if the wife could possibly stay with him  Patient educated that Case Management would have to discuss these options with them and get them a list of facilities near his daughter and if his request of his wife staying with him being an option  Patient stated understanding understands the need for safe discharge planning in that he is clinically stable for discharge      Objective:     Vitals:   Temp (24hrs), Av °F (36 7 °C), Min:97 5 °F (36 4 °C), Max:98 4 °F (36 9 °C)    Temp:  [97 5 °F (36 4 °C)-98 4 °F (36 9 °C)] 97 5 °F (36 4 °C)  HR:  [73-75] 74  Resp:  [18-20] 18  BP: (130-132)/(60-63) 130/63  SpO2:  [94 %-98 %] 94 %  Body mass index is 55 65 kg/m²  Input and Output Summary (last 24 hours): Intake/Output Summary (Last 24 hours) at 8/20/2019 1301  Last data filed at 8/20/2019 1137  Gross per 24 hour   Intake 440 ml   Output 3500 ml   Net -3060 ml       Physical Exam:     Physical Exam   Constitutional: He is oriented to person, place, and time  He appears well-developed and well-nourished  HENT:   Head: Normocephalic and atraumatic  Eyes: Conjunctivae and EOM are normal    Neck: Normal range of motion  Cardiovascular: Normal rate, regular rhythm and normal heart sounds  Pulmonary/Chest: Effort normal  No accessory muscle usage  He has decreased breath sounds in the right lower field and the left lower field  Abdominal: Soft  Bowel sounds are normal    Upper quadrants of the abdomen are soft lower quadrants remained firm in setting of his anasarca   Musculoskeletal: Normal range of motion  He exhibits edema  He exhibits no tenderness  Ace wraps intact bilateral lower extremity continue with persistent firm edema   Neurological: He is alert and oriented to person, place, and time  Skin: Skin is warm and dry  Psychiatric: He has a normal mood and affect  His behavior is normal        Additional Data:     Labs:    Results from last 7 days   Lab Units 08/20/19  0600 08/19/19  0610  08/15/19  1225   WBC Thousand/uL 5 70 6 55   < > 7 45   HEMOGLOBIN g/dL 7 8* 7 7*   < > 8 0*   HEMATOCRIT % 24 5* 24 3*   < > 24 7*   PLATELETS Thousands/uL 46* 42*   < > 50*   BANDS PCT %  --   --   --  3   NEUTROS PCT %  --  70   < >  --    LYMPHS PCT %  --  6*   < >  --    LYMPHO PCT %  --   --   --  11*   MONOS PCT %  --  20*   < >  --    MONO PCT %  --   --   --  5   EOS PCT %  --  2   < > 5    < > = values in this interval not displayed       Results from last 7 days   Lab Units 08/20/19  0600   SODIUM mmol/L 136 POTASSIUM mmol/L 4 8   CHLORIDE mmol/L 102   CO2 mmol/L 25   BUN mg/dL 37*   CREATININE mg/dL 1 50*   ANION GAP mmol/L 9   CALCIUM mg/dL 9 0   ALBUMIN g/dL 3 1*   TOTAL BILIRUBIN mg/dL 7 00*   ALK PHOS U/L 73   ALT U/L 22   AST U/L 42   GLUCOSE RANDOM mg/dL 90     Results from last 7 days   Lab Units 08/20/19  0600   INR  1 73*                       * I Have Reviewed All Lab Data Listed Above  * Additional Pertinent Lab Tests Reviewed: All Labs Within Last 24 Hours Reviewed        Recent Cultures (last 7 days):     Results from last 7 days   Lab Units 08/15/19  2132   BLOOD CULTURE  No Growth After 4 Days  No Growth After 4 Days  Last 24 Hours Medication List:     Current Facility-Administered Medications:  acetaminophen 650 mg Oral Q6H PRN Tali Copping, PA-C   aluminum-magnesium hydroxide-simethicone 30 mL Oral Q6H PRN Tali Copping, PA-C   bumetanide 2 mg Oral BID (diuretic) Dustin Tavarez MD   lactulose 20 g Oral BID Kasey Saldivar, PA-C   ondansetron 4 mg Intravenous Q6H PRN Kasey E Yariel, PA-C   pantoprazole 40 mg Oral Early Morning Kaseyankita Saldivar, PA-C   polyethylene glycol 17 g Oral Daily PRN Kasey E Yariel, PA-C   potassium chloride 40 mEq Oral BID Kaseyankita Saldivar, PA-C   spironolactone 100 mg Oral Daily Tali Copping, PA-C        Today, Patient Was Seen By: MARKOS Peña    ** Please Note: Dictation voice to text software may have been used in the creation of this document   **

## 2019-08-20 NOTE — ASSESSMENT & PLAN NOTE
· Recurrent severe anasarca: patient reports he continues to gain weight despite use of diuretics at home  Recently switched from Torsemide to Furosemide without any improvement  In the setting of decompensated ROSALES cirrhosis  · Switched from Furosemide to Bumex 2 mg IV BID on admission  Continue Aldactone  Monitor Cr closely - creatinine remained stable currently at 1 50 appreciate Nephrology recommendations  · Patient has been transition to Bumex 2 mg po BID 8/18  · Nephrology input appreciated  · Strict I/Os, daily STANDING weights, low-sodium diet, fluid restriction  · Continue to monitor electrolytes closely, replete as needed  · PT/OT recommending STR  Patient and wife initially refused after likely discussion with patient, wife, and daughter at bedside explaining now with clinical stability after discussion with Nephrology and he is stable to go home realizing that he has some notable limitation he is open to hearing about short-term rehab and options he may have moving forward  Discussion with case management will discuss short-term placement with the patient anticipate discharge in next 24 hours

## 2019-08-20 NOTE — SOCIAL WORK
CM spoke to Michael Meeks who reports that pt may be open to STR    Caitlin Levine from [address] [place] [address] will speak to pt regarding their [address]

## 2019-08-20 NOTE — PLAN OF CARE
Problem: Potential for Falls  Goal: Patient will remain free of falls  Description  INTERVENTIONS:  - Assess patient frequently for physical needs  -  Identify cognitive and physical deficits and behaviors that affect risk of falls    -  [place] fall precautions as indicated by assessment   - Educate patient/family on patient safety including physical limitations  - Instruct patient to call for assistance with activity based on assessment  - Modify environment to reduce risk of injury  - Consider OT/PT consult to assist with strengthening/mobility  Outcome: Progressing     Problem: PAIN - ADULT  Goal: Verbalizes/displays adequate comfort level or baseline comfort level  Description  Interventions:  - Encourage patient to monitor pain and request assistance  - Assess pain using appropriate pain scale  - Administer analgesics based on type and severity of pain and evaluate response  - Implement non-pharmacological measures as appropriate and evaluate response  - Consider cultural and social influences on pain and pain management  - Notify physician/advanced practitioner if interventions unsuccessful or patient reports new pain  Outcome: Progressing     Problem: INFECTION - ADULT  Goal: Absence or prevention of progression during hospitalization  Description  INTERVENTIONS:  - Assess and monitor for signs and symptoms of infection  - Monitor lab/diagnostic results  - Monitor all insertion sites, i e  indwelling lines, tubes, and drains  - Monitor endotracheal if appropriate and nasal secretions for changes in amount and color  - [place] appropriate cooling/warming therapies per order  - Administer medications as ordered  - Instruct and encourage patient and family to use good hand hygiene technique  - Identify and instruct in appropriate isolation precautions for identified infection/condition  Outcome: Progressing  Goal: Absence of fever/infection during neutropenic period  Description  INTERVENTIONS:  - Monitor WBC    Outcome: Progressing     Problem: SAFETY ADULT  Goal: Patient will remain free of falls  Description  INTERVENTIONS:  - Assess patient frequently for physical needs  -  Identify cognitive and physical deficits and behaviors that affect risk of falls    -  [place] fall precautions as indicated by assessment   - Educate patient/family on patient safety including physical limitations  - Instruct patient to call for assistance with activity based on assessment  - Modify environment to reduce risk of injury  - Consider OT/PT consult to assist with strengthening/mobility  Outcome: Progressing  Goal: Maintain or return to baseline ADL function  Description  INTERVENTIONS:  -  Assess patient's ability to carry out ADLs; assess patient's baseline for ADL function and identify physical deficits which impact ability to perform ADLs (bathing, care of mouth/teeth, toileting, grooming, dressing, etc )  - Assess/evaluate cause of self-care deficits   - Assess range of motion  - Assess patient's mobility; develop plan if impaired  - Assess patient's need for assistive devices and provide as appropriate  - Encourage maximum independence but intervene and supervise when necessary  - Involve family in performance of ADLs  - Assess for home care needs following discharge   - Consider OT consult to assist with ADL evaluation and planning for discharge  - Provide patient education as appropriate  Outcome: Progressing  Goal: Maintain or return mobility status to optimal level  Description  INTERVENTIONS:  - Assess patient's baseline mobility status (ambulation, transfers, stairs, etc )    - Identify cognitive and physical deficits and behaviors that affect mobility  - Identify mobility aids required to assist with transfers and/or ambulation (gait belt, sit-to-stand, lift, walker, cane, etc )  - [place] fall precautions as indicated by assessment  - Record patient progress and toleration of activity level on Mobility SBAR; progress patient to next Phase/Stage  - Instruct patient to call for assistance with activity based on assessment  - Consider rehabilitation consult to assist with strengthening/weightbearing, etc   Outcome: Progressing     Problem: DISCHARGE PLANNING  Goal: Discharge to home or other facility with appropriate resources  Description  INTERVENTIONS:  - Identify barriers to discharge w/patient and caregiver  - Arrange for needed discharge resources and transportation as appropriate  - Identify discharge learning needs (meds, wound care, etc )  - Arrange for interpretive services to assist at discharge as needed  - Refer to Case Management Department for coordinating discharge planning if the patient needs post-hospital services based on physician/advanced practitioner order or complex needs related to functional status, cognitive ability, or social support system  Outcome: Progressing     Problem: Knowledge Deficit  Goal: Patient/family/caregiver demonstrates understanding of disease process, treatment plan, medications, and discharge instructions  Description  Complete learning assessment and assess knowledge base    Interventions:  - Provide teaching at level of understanding  - Provide teaching via preferred learning methods  Outcome: Progressing     Problem: NEUROSENSORY - ADULT  Goal: Achieves stable or improved neurological status  Description  INTERVENTIONS  - Monitor and report changes in neurological status  - Monitor vital signs such as temperature, blood pressure, glucose, and any other labs ordered   - Initiate measures to prevent increased intracranial pressure  - Monitor for seizure activity and implement precautions if appropriate      Outcome: Progressing     Problem: CARDIOVASCULAR - ADULT  Goal: Maintains optimal cardiac output and hemodynamic stability  Description  INTERVENTIONS:  - Monitor I/O, vital signs and rhythm  - Monitor for S/S and trends of decreased cardiac output  - Administer and titrate ordered vasoactive medications to optimize hemodynamic stability  - Assess quality of pulses, skin color and temperature  - Assess for signs of decreased coronary artery perfusion  - Instruct patient to report change in severity of symptoms  Outcome: Progressing     Problem: SKIN/TISSUE INTEGRITY - ADULT  Goal: Incision(s), wounds(s) or drain site(s) healing without S/S of infection  Description  INTERVENTIONS  - Assess and document risk factors for skin impairment   - Assess and document dressing, incision, wound bed, drain sites and surrounding tissue  - Consider nutrition services referral as needed  - Oral mucous membranes remain intact  - Provide patient/ family education  Outcome: Progressing     Problem: MUSCULOSKELETAL - ADULT  Goal: Maintain or return mobility to safest level of function  Description  INTERVENTIONS:  - Assess patient's ability to carry out ADLs; assess patient's baseline for ADL function and identify physical deficits which impact ability to perform ADLs (bathing, care of mouth/teeth, toileting, grooming, dressing, etc )  - Assess/evaluate cause of self-care deficits   - Assess range of motion  - Assess patient's mobility  - Assess patient's need for assistive devices and provide as appropriate  - Encourage maximum independence but intervene and supervise when necessary  - Involve family in performance of ADLs  - Assess for home care needs following discharge   - Consider OT consult to assist with ADL evaluation and planning for discharge  - Provide patient education as appropriate  Outcome: Progressing     Problem: Prexisting or High Potential for Compromised Skin Integrity  Goal: Skin integrity is maintained or improved  Description  INTERVENTIONS:  - Identify patients at risk for skin breakdown  - Assess and monitor skin integrity  - Assess and monitor nutrition and hydration status  - Monitor labs   - Assess for incontinence   - Turn and reposition patient  - Assist with mobility/ambulation  - Relieve pressure over bony prominences  - Avoid friction and shearing  - Provide appropriate hygiene as needed including keeping skin clean and dry  - Evaluate need for skin moisturizer/barrier cream  - Collaborate with interdisciplinary team   - Patient/family teaching  - Consider wound care consult   Outcome: Progressing     Problem: Nutrition/Hydration-ADULT  Goal: Nutrient/Hydration intake appropriate for improving, restoring or maintaining nutritional needs  Description  Monitor and assess patient's nutrition/hydration status for malnutrition  Collaborate with interdisciplinary team and initiate plan and interventions as ordered  Monitor patient's weight and dietary intake as ordered or per policy  Utilize nutrition screening tool and intervene as necessary  Determine patient's food preferences and provide high-protein, high-caloric foods as appropriate       INTERVENTIONS:  - Monitor oral intake, urinary output, labs, and treatment plans  - Assess nutrition and hydration status and recommend course of action  - Evaluate amount of meals eaten  - Assist patient with eating if necessary   - Allow adequate time for meals  - Recommend/ encourage appropriate diets, oral nutritional supplements, and vitamin/mineral supplements  - Order, calculate, and assess calorie counts as needed  - Recommend, monitor, and adjust tube feedings and TPN/PPN based on assessed needs  - Assess need for intravenous fluids  - Provide specific nutrition/hydration education as appropriate  - Include patient/family/caregiver in decisions related to nutrition  Outcome: Progressing

## 2019-08-20 NOTE — SOCIAL WORK
Muna from [facility] cannot accommodate a jorge bed at the present time  CM spoke to pt and family and they are requesting a referral to first choice[facility] and 2nd choice would be [facility]  CM will place referrals to check on bed availability

## 2019-08-20 NOTE — PROGRESS NOTES
NEPHROLOGY PROGRESS NOTE    Patient: Ho Miles               Sex: male          DOA: 8/15/2019 11:49 AM   YOB: 1960        Age:  63 y o         LOS:  LOS: 5 days       HPI     Patient with cirrhosis of liver and anasarca    SUBJECTIVE     Doing quite well  Diuresing well with help of diuretic    No chest pain no palpitation or shortness of breath still has quite a bit of leg swelling    CURRENT MEDICATIONS       Current Facility-Administered Medications:     acetaminophen (TYLENOL) tablet 650 mg, 650 mg, Oral, Q6H PRN, BIENVENIDO Silva-C, 650 mg at 08/16/19 1645    aluminum-magnesium hydroxide-simethicone (MYLANTA) 200-200-20 mg/5 mL oral suspension 30 mL, 30 mL, Oral, Q6H PRN, BIENVENIDO Silva-C    bumetanide (BUMEX) tablet 2 mg, 2 mg, Oral, BID (diuretic), Don De Leon MD, 2 mg at 08/20/19 7303    lactulose 20 g/30 mL oral solution 20 g, 20 g, Oral, BID, Kasey Saldivar PA-C, 20 g at 08/18/19 1706    ondansetron (ZOFRAN) injection 4 mg, 4 mg, Intravenous, Q6H PRN, Kasey Saldivar PA-C    pantoprazole (PROTONIX) EC tablet 40 mg, 40 mg, Oral, Early Morning, Kasey Saldivar PA-C, 40 mg at 08/20/19 0559    polyethylene glycol (MIRALAX) packet 17 g, 17 g, Oral, Daily PRN, BIENVENIDO Silva-C    potassium chloride (K-DUR,KLOR-CON) CR tablet 40 mEq, 40 mEq, Oral, BID, Kasey Saldivar PA-C, 40 mEq at 08/20/19 0805    spironolactone (ALDACTONE) tablet 100 mg, 100 mg, Oral, Daily, Kasey Saldivar PA-C, 100 mg at 08/20/19 0805    OBJECTIVE     Current Weight: Weight - Scale: (!) 181 kg (399 lb 0 5 oz)  Vitals:    08/20/19 0721   BP: 130/63   Pulse: 74   Resp: 18   Temp: 97 5 °F (36 4 °C)   SpO2: 94%       Intake/Output Summary (Last 24 hours) at 8/20/2019 1144  Last data filed at 8/20/2019 1137  Gross per 24 hour   Intake 440 ml   Output 3500 ml   Net -3060 ml       PHYSICAL EXAMINATION     Physical Exam   Constitutional: He is oriented to person, place, and time   He appears well-developed  No distress  HENT:   Head: Normocephalic  Mouth/Throat: Oropharynx is clear and moist    Eyes: Conjunctivae are normal  No scleral icterus  Neck: Neck supple  No JVD present  Cardiovascular: Normal rate and normal heart sounds  Pulmonary/Chest: Effort normal  He has no wheezes  Abdominal: Soft  There is no tenderness  Musculoskeletal: Normal range of motion  He exhibits edema  Neurological: He is alert and oriented to person, place, and time  Skin: Skin is warm  No rash noted  Psychiatric: He has a normal mood and affect  His behavior is normal         LAB RESULTS     Results from last 7 days   Lab Units 08/20/19  0600 08/19/19  0610 08/18/19  0535 08/17/19  0513 08/16/19  0443 08/15/19  1225   WBC Thousand/uL 5 70 6 55 5 91 5 68 6 96 7 45   HEMOGLOBIN g/dL 7 8* 7 7* 7 8* 7 8* 7 8* 8 0*   HEMATOCRIT % 24 5* 24 3* 24 0* 23 6* 24 2* 24 7*   PLATELETS Thousands/uL 46* 42* 45* 51* 54* 50*   POTASSIUM mmol/L 4 8 4 7 4 6 4 3 3 9 3 4*   CHLORIDE mmol/L 102 102 101 100 101 102   CO2 mmol/L 25 24 25 23 24 23   BUN mg/dL 37* 37* 35* 31* 29* 30*   CREATININE mg/dL 1 50* 1 59* 1 38* 1 38* 1 24 1 32*   EGFR ml/min/1 73sq m 51 47 56 56 64 59   CALCIUM mg/dL 9 0 9 0 8 6 8 6 8 7 8 3   MAGNESIUM mg/dL  --   --   --   --  2 0 1 9   PHOSPHORUS mg/dL  --   --   --   --  3 2  --        RADIOLOGY RESULTS      Results for orders placed during the hospital encounter of 11/14/18   XR chest 1 view portable    Narrative INDICATION:  Shortness of breath and swelling over entire body  ORDERING PROVIDER:  Meseret Rueda  TECHNIQUE:  Frontal chest was obtained at 16:33 hours  COMPARISON:  7/22/17 XR  FINDINGS:    The cardiomediastinal silhouette is enlarged in size  Increased vascular  congestion There is no consolidation or atelectasis in either lung  There  may be small bilateral effusions  There is no pneumothorax  No acute  osseous process          Impression Cardiomegaly with very small bilateral effusions and increased vascular  congestion  These findings may represent CHF  Clinical correlation is  recommended  Signed by Yane Molina MD     Results for orders placed during the hospital encounter of 08/15/19   XR chest 2 views    Narrative CHEST     INDICATION:   anasarca  Pt presents to ED with c/o b/l testicle swelling, pt being treated at home with IV abx for spesis, pt also has b/l LE swelling which he reports is new to him            COMPARISON:  11/14/2018    EXAM PERFORMED/VIEWS:  XR CHEST PA & LATERAL      FINDINGS:    Cardiomediastinal silhouette appears unremarkable  Lung markings are crowded secondary to low lung volumes  Within limitations of this examination there is no focal airspace opacity to suggest pneumonia  No pneumothorax or pleural effusion  Age-appropriate degenerative changes are noted in the spine  Impression No active pulmonary disease on examination which is somewhat limited secondary to low lung volumes  Workstation performed: [id number]       No results found for this or any previous visit  No results found for this or any previous visit  Results for orders placed during the hospital encounter of 08/15/19   CT abdomen pelvis wo contrast    Narrative CT ABDOMEN AND PELVIS WITHOUT IV CONTRAST    INDICATION:   Abdominal distension  History of cirrhosis  Testicular swelling  COMPARISON:  6/7/2019    TECHNIQUE:  CT examination of the abdomen and pelvis was performed without intravenous contrast   Axial, sagittal, and coronal 2D reformatted images were created from the source data and submitted for interpretation  Radiation dose length product (DLP) for this visit:  3730 mGy-cm   This examination, like all CT scans performed in the [facility], was performed utilizing techniques to minimize radiation dose exposure, including the use of iterative   reconstruction and automated exposure control       No oral contrast     FINDINGS:    ABDOMEN    LOWER CHEST:  Small right-sided pleural effusion noted    LIVER/BILIARY TREE:  Changes of severe hepatic cirrhosis identified  There is no obvious focal mass appreciated on this unenhanced examination  GALLBLADDER:  There are gallstone(s) within the gallbladder, without pericholecystic inflammatory changes  SPLEEN:  The spleen is enlarged, measuring almost 20 cm in cephalocaudad length    PANCREAS:  Limited assessment without IV contrast   Although there is peripancreatic edema, this also involves the root of the mesentery, and was also present on the 6/7/2019 study  It is not felt to be related to pancreatitis  ADRENAL GLANDS:  Unremarkable  KIDNEYS/URETERS:  Bilateral nonobstructing nephrolithiasis  The largest individual calculus is seen in the right interpolar aspect measuring approximately 7 mm  STOMACH AND BOWEL:  Unremarkable  APPENDIX:  No findings to suggest appendicitis  ABDOMINOPELVIC CAVITY:  Perihepatic ascites  Retroperitoneal and mesenteric root edema  VESSELS:  No AAA    PELVIS    REPRODUCTIVE ORGANS:  Unremarkable for patient's age  URINARY BLADDER:  Unremarkable  ABDOMINAL WALL/INGUINAL REGIONS:  There is diffuse body wall edema  The scrotum is diffusely enlarged and of water density, which may represent a combination of both hydroceles and scrotal wall edema  Dilated anterior abdominal wall collateral vessels  OSSEOUS STRUCTURES:  Advanced multilevel degenerative changes throughout the lumbar spine  Grade 2 anterolisthesis L4-L5      Impression 1  Appearance of the liver consistent with severe cirrhotic change  Splenomegaly an additional findings suggestive of portal hypertension  2  Small amount of ascites  Diffuse body wall edema  Markedly enlargement of the scrotum likely related to hydroceles and scrotal wall edema and small right-sided pleural effusion  Retroperitoneal and mesenteric root edema    Findings most consistent   with anasarca  3  No evidence of bowel obstruction  4  Bilateral nephrolithiasis but no evidence of hydronephrosis  5  Cholelithiasis          Workstation performed: [id number]       No results found for this or any previous visit  PLAN / RECOMMENDATIONS      Acute kidney injury:  Stable at creatinine 1 5 likely secondary to diuresis    Anasarca:  Likely secondary to cirrhosis of liver and will continue diuretic at present dose    Cirrhosis of liver:  Seems to be stable the bilirubin is fluctuating    Will continue to monitor    Katherine Packer MD  Nephrology  8/20/2019        Portions of the record may have been created with voice recognition software  Occasional wrong word or "sound a like" substitutions may have occurred due to the inherent limitations of voice recognition software  Read the chart carefully and recognize, using context, where substitutions have occurred

## 2019-08-20 NOTE — ASSESSMENT & PLAN NOTE
· Secondary to liver cirrhosis  · Patient had a slight bump to 7 00  · If continues to trend significantly will have a discussion with GI with possible consultation  · MELD score remains at 25  · Continue to monitor for now  · CMP in a m    · Appreciate Nephrology recommendations

## 2019-08-20 NOTE — ASSESSMENT & PLAN NOTE
· Patient with decompensated ROSALES cirrhosis  MELD calculated and remains at 25  · Ascites: On diuresis of anasarca initially with Bumex IV and Aldactone and was switched to oral Bumex 8/18  · Continue Low sodium diet  Mild Ascites on CT  · Continue Lactulose for goal of 3 soft BMs a day  · Varices: Will need EGD as outpatient  · [facility] [address]  Screen: AFP in June normal  CT A/P showed no obvious focal mass on unenhanced CT  MRI in June did not show a suspicious lesion in the liver but was limited by motion artifact  · Transplant: No evaluation at transplant center has been performed; needs close outpatient GI follow up  Consider inpatient GI consult if MELD continues to rise  · Bilirubin has remained somewhat stable at 7 00 if elevate the next 24 hours will have a discussion with GI continue to trend NAEEM in [place]

## 2019-08-21 VITALS
WEIGHT: 315 LBS | HEART RATE: 71 BPM | BODY MASS INDEX: 44.1 KG/M2 | TEMPERATURE: 98.4 F | DIASTOLIC BLOOD PRESSURE: 56 MMHG | RESPIRATION RATE: 22 BRPM | HEIGHT: 71 IN | SYSTOLIC BLOOD PRESSURE: 129 MMHG | OXYGEN SATURATION: 94 %

## 2019-08-21 LAB
ALBUMIN SERPL BCP-MCNC: 3.1 G/DL (ref 3.5–5)
ALP SERPL-CCNC: 80 U/L (ref 46–116)
ALT SERPL W P-5'-P-CCNC: 23 U/L (ref 12–78)
ANION GAP SERPL CALCULATED.3IONS-SCNC: 10 MMOL/L (ref 4–13)
AST SERPL W P-5'-P-CCNC: 43 U/L (ref 5–45)
BACTERIA BLD CULT: NORMAL
BACTERIA BLD CULT: NORMAL
BILIRUB SERPL-MCNC: 6.3 MG/DL (ref 0.2–1)
BUN SERPL-MCNC: 37 MG/DL (ref 5–25)
CALCIUM SERPL-MCNC: 9.1 MG/DL (ref 8.3–10.1)
CHLORIDE SERPL-SCNC: 101 MMOL/L (ref 100–108)
CO2 SERPL-SCNC: 26 MMOL/L (ref 21–32)
CREAT SERPL-MCNC: 1.61 MG/DL (ref 0.6–1.3)
ERYTHROCYTE [DISTWIDTH] IN BLOOD BY AUTOMATED COUNT: 17.1 % (ref 11.6–15.1)
GFR SERPL CREATININE-BSD FRML MDRD: 46 ML/MIN/1.73SQ M
GLUCOSE SERPL-MCNC: 96 MG/DL (ref 65–140)
HCT VFR BLD AUTO: 25.1 % (ref 36.5–49.3)
HGB BLD-MCNC: 7.9 G/DL (ref 12–17)
INR PPP: 1.78 (ref 0.84–1.19)
MCH RBC QN AUTO: 35.9 PG (ref 26.8–34.3)
MCHC RBC AUTO-ENTMCNC: 31.5 G/DL (ref 31.4–37.4)
MCV RBC AUTO: 114 FL (ref 82–98)
PLATELET # BLD AUTO: 51 THOUSANDS/UL (ref 149–390)
PMV BLD AUTO: 11.4 FL (ref 8.9–12.7)
POTASSIUM SERPL-SCNC: 5.1 MMOL/L (ref 3.5–5.3)
PROT SERPL-MCNC: 6.4 G/DL (ref 6.4–8.2)
PROTHROMBIN TIME: 20.9 SECONDS (ref 11.6–14.5)
RBC # BLD AUTO: 2.2 MILLION/UL (ref 3.88–5.62)
SODIUM SERPL-SCNC: 137 MMOL/L (ref 136–145)
WBC # BLD AUTO: 5.57 THOUSAND/UL (ref 4.31–10.16)

## 2019-08-21 PROCEDURE — 99232 SBSQ HOSP IP/OBS MODERATE 35: CPT | Performed by: INTERNAL MEDICINE

## 2019-08-21 PROCEDURE — 80053 COMPREHEN METABOLIC PANEL: CPT | Performed by: NURSE PRACTITIONER

## 2019-08-21 PROCEDURE — 85610 PROTHROMBIN TIME: CPT | Performed by: NURSE PRACTITIONER

## 2019-08-21 PROCEDURE — G8987 SELF CARE CURRENT STATUS: HCPCS

## 2019-08-21 PROCEDURE — 99239 HOSP IP/OBS DSCHRG MGMT >30: CPT | Performed by: NURSE PRACTITIONER

## 2019-08-21 PROCEDURE — 97530 THERAPEUTIC ACTIVITIES: CPT

## 2019-08-21 PROCEDURE — G8988 SELF CARE GOAL STATUS: HCPCS

## 2019-08-21 PROCEDURE — 85027 COMPLETE CBC AUTOMATED: CPT | Performed by: NURSE PRACTITIONER

## 2019-08-21 PROCEDURE — 97116 GAIT TRAINING THERAPY: CPT

## 2019-08-21 PROCEDURE — 97167 OT EVAL HIGH COMPLEX 60 MIN: CPT

## 2019-08-21 RX ORDER — ALBUMIN (HUMAN) 12.5 G/50ML
25 SOLUTION INTRAVENOUS 3 TIMES DAILY
Status: DISCONTINUED | OUTPATIENT
Start: 2019-08-21 | End: 2019-08-21 | Stop reason: HOSPADM

## 2019-08-21 RX ORDER — BUMETANIDE 2 MG/1
2 TABLET ORAL
Qty: 60 TABLET | Refills: 0 | Status: SHIPPED | OUTPATIENT
Start: 2019-08-21 | End: 2019-09-25 | Stop reason: HOSPADM

## 2019-08-21 RX ADMIN — PANTOPRAZOLE SODIUM 40 MG: 40 TABLET, DELAYED RELEASE ORAL at 05:35

## 2019-08-21 RX ADMIN — BUMETANIDE 2 MG: 1 TABLET ORAL at 09:53

## 2019-08-21 RX ADMIN — ALBUMIN (HUMAN) 25 G: 12.5 SOLUTION INTRAVENOUS at 11:49

## 2019-08-21 RX ADMIN — POTASSIUM CHLORIDE 40 MEQ: 1500 TABLET, EXTENDED RELEASE ORAL at 09:54

## 2019-08-21 RX ADMIN — SPIRONOLACTONE 100 MG: 25 TABLET ORAL at 09:54

## 2019-08-21 NOTE — ASSESSMENT & PLAN NOTE
· Baseline unclear, continue to monitor closely with daily renal functions in setting of aggressive diuresis      · Cr stable at 1 61  · Recommendations per Nephrology

## 2019-08-21 NOTE — PROGRESS NOTES
NEPHROLOGY PROGRESS NOTE    Patient: Soledad Pinto               Sex: male          DOA: 8/15/2019 11:49 AM   YOB: 1960        Age:  63 y o         LOS:  LOS: 6 days       HPI     Patient with cirrhosis of liver and anasarca    SUBJECTIVE     Diuresing well with help of diuretic  Overall tolerating well with no complaint    No chest pain no palpitation or shortness of Breath    CURRENT MEDICATIONS       Current Facility-Administered Medications:     acetaminophen (TYLENOL) tablet 650 mg, 650 mg, Oral, Q6H PRN, BIENVENIDO Silva-C, 650 mg at 08/16/19 1645    aluminum-magnesium hydroxide-simethicone (MYLANTA) 200-200-20 mg/5 mL oral suspension 30 mL, 30 mL, Oral, Q6H PRN, BIENVENIDO Silva-C    bumetanide (BUMEX) tablet 2 mg, 2 mg, Oral, BID (diuretic), Reza Conway MD, 2 mg at 08/21/19 0953    lactulose 20 g/30 mL oral solution 20 g, 20 g, Oral, BID, Kasey Saldivar PA-C, 20 g at 08/18/19 1706    ondansetron (ZOFRAN) injection 4 mg, 4 mg, Intravenous, Q6H PRN, Kasey Saldivar PA-C    pantoprazole (PROTONIX) EC tablet 40 mg, 40 mg, Oral, Early Morning, Kasey Saldivar PA-C, 40 mg at 08/21/19 0535    polyethylene glycol (MIRALAX) packet 17 g, 17 g, Oral, Daily PRN, Kasey Saldivar PA-C    potassium chloride (K-DUR,KLOR-CON) CR tablet 40 mEq, 40 mEq, Oral, BID, Kasey Saldivar PA-C, 40 mEq at 08/21/19 1321    spironolactone (ALDACTONE) tablet 100 mg, 100 mg, Oral, Daily, Kasey Saldivar PA-C, 100 mg at 08/21/19 0954    OBJECTIVE     Current Weight: Weight - Scale: (!) 182 kg (400 lb 2 2 oz)  Vitals:    08/21/19 0700   BP: 129/56   Pulse: 71   Resp: 22   Temp: 98 4 °F (36 9 °C)   SpO2: 94%       Intake/Output Summary (Last 24 hours) at 8/21/2019 1025  Last data filed at 8/21/2019 0529  Gross per 24 hour   Intake 1920 ml   Output 2775 ml   Net -855 ml       PHYSICAL EXAMINATION     Physical Exam   Constitutional: He is oriented to person, place, and time  He appears well-developed   No distress  HENT:   Head: Normocephalic  Mouth/Throat: Oropharynx is clear and moist    Eyes: Conjunctivae are normal  No scleral icterus  Neck: Neck supple  No JVD present  Cardiovascular: Normal rate and normal heart sounds  Pulmonary/Chest: Effort normal and breath sounds normal  He has no wheezes  Abdominal: Soft  Bowel sounds are normal  There is no tenderness  Musculoskeletal: Normal range of motion  He exhibits edema  Neurological: He is alert and oriented to person, place, and time  Skin: Skin is warm  No rash noted  Psychiatric: He has a normal mood and affect  His behavior is normal         LAB RESULTS     Results from last 7 days   Lab Units 08/21/19  0442 08/20/19  0600 08/19/19  0610 08/18/19  0535 08/17/19  0513 08/16/19  0443 08/15/19  1225   WBC Thousand/uL 5 57 5 70 6 55 5 91 5 68 6 96 7 45   HEMOGLOBIN g/dL 7 9* 7 8* 7 7* 7 8* 7 8* 7 8* 8 0*   HEMATOCRIT % 25 1* 24 5* 24 3* 24 0* 23 6* 24 2* 24 7*   PLATELETS Thousands/uL 51* 46* 42* 45* 51* 54* 50*   POTASSIUM mmol/L 5 1 4 8 4 7 4 6 4 3 3 9 3 4*   CHLORIDE mmol/L 101 102 102 101 100 101 102   CO2 mmol/L 26 25 24 25 23 24 23   BUN mg/dL 37* 37* 37* 35* 31* 29* 30*   CREATININE mg/dL 1 61* 1 50* 1 59* 1 38* 1 38* 1 24 1 32*   EGFR ml/min/1 73sq m 46 51 47 56 56 64 59   CALCIUM mg/dL 9 1 9 0 9 0 8 6 8 6 8 7 8 3   MAGNESIUM mg/dL  --   --   --   --   --  2 0 1 9   PHOSPHORUS mg/dL  --   --   --   --   --  3 2  --        RADIOLOGY RESULTS      Results for orders placed during the hospital encounter of 11/14/18   XR chest 1 view portable    Narrative INDICATION:  Shortness of breath and swelling over entire body  ORDERING PROVIDER:  Meseret Rueda  TECHNIQUE:  Frontal chest was obtained at 16:33 hours  COMPARISON:  7/22/17 XR  FINDINGS:    The cardiomediastinal silhouette is enlarged in size  Increased vascular  congestion There is no consolidation or atelectasis in either lung  There  may be small bilateral effusions  There is no pneumothorax  No acute  osseous process  Impression Cardiomegaly with very small bilateral effusions and increased vascular  congestion  These findings may represent CHF  Clinical correlation is  recommended  Signed by Tyrell Oglesby MD     Results for orders placed during the hospital encounter of 08/15/19   XR chest 2 views    Narrative CHEST     INDICATION:   anasarca  Pt presents to ED with c/o b/l testicle swelling, pt being treated at home with IV abx for spesis, pt also has b/l LE swelling which he reports is new to him            COMPARISON:  11/14/2018    EXAM PERFORMED/VIEWS:  XR CHEST PA & LATERAL      FINDINGS:    Cardiomediastinal silhouette appears unremarkable  Lung markings are crowded secondary to low lung volumes  Within limitations of this examination there is no focal airspace opacity to suggest pneumonia  No pneumothorax or pleural effusion  Age-appropriate degenerative changes are noted in the spine  Impression No active pulmonary disease on examination which is somewhat limited secondary to low lung volumes  Workstation performed: [id number]       No results found for this or any previous visit  No results found for this or any previous visit  Results for orders placed during the hospital encounter of 08/15/19   CT abdomen pelvis wo contrast    Narrative CT ABDOMEN AND PELVIS WITHOUT IV CONTRAST    INDICATION:   Abdominal distension  History of cirrhosis  Testicular swelling  COMPARISON:  6/7/2019    TECHNIQUE:  CT examination of the abdomen and pelvis was performed without intravenous contrast   Axial, sagittal, and coronal 2D reformatted images were created from the source data and submitted for interpretation  Radiation dose length product (DLP) for this visit:  8189 mGy-cm     This examination, like all CT scans performed in the [facility], was performed utilizing techniques to minimize radiation dose exposure, including the use of iterative   reconstruction and automated exposure control  No oral contrast     FINDINGS:    ABDOMEN    LOWER CHEST:  Small right-sided pleural effusion noted    LIVER/BILIARY TREE:  Changes of severe hepatic cirrhosis identified  There is no obvious focal mass appreciated on this unenhanced examination  GALLBLADDER:  There are gallstone(s) within the gallbladder, without pericholecystic inflammatory changes  SPLEEN:  The spleen is enlarged, measuring almost 20 cm in cephalocaudad length    PANCREAS:  Limited assessment without IV contrast   Although there is peripancreatic edema, this also involves the root of the mesentery, and was also present on the 6/7/2019 study  It is not felt to be related to pancreatitis  ADRENAL GLANDS:  Unremarkable  KIDNEYS/URETERS:  Bilateral nonobstructing nephrolithiasis  The largest individual calculus is seen in the right interpolar aspect measuring approximately 7 mm  STOMACH AND BOWEL:  Unremarkable  APPENDIX:  No findings to suggest appendicitis  ABDOMINOPELVIC CAVITY:  Perihepatic ascites  Retroperitoneal and mesenteric root edema  VESSELS:  No AAA    PELVIS    REPRODUCTIVE ORGANS:  Unremarkable for patient's age  URINARY BLADDER:  Unremarkable  ABDOMINAL WALL/INGUINAL REGIONS:  There is diffuse body wall edema  The scrotum is diffusely enlarged and of water density, which may represent a combination of both hydroceles and scrotal wall edema  Dilated anterior abdominal wall collateral vessels  OSSEOUS STRUCTURES:  Advanced multilevel degenerative changes throughout the lumbar spine  Grade 2 anterolisthesis L4-L5      Impression 1  Appearance of the liver consistent with severe cirrhotic change  Splenomegaly an additional findings suggestive of portal hypertension  2  Small amount of ascites  Diffuse body wall edema    Markedly enlargement of the scrotum likely related to hydroceles and scrotal wall edema and small right-sided pleural effusion  Retroperitoneal and mesenteric root edema  Findings most consistent   with anasarca  3  No evidence of bowel obstruction  4  Bilateral nephrolithiasis but no evidence of hydronephrosis  5  Cholelithiasis          Workstation performed: [id number]       No results found for this or any previous visit  PLAN / RECOMMENDATIONS      Acute kidney injury:  Likely due to diuresis  Will continue diuretic as I think he needs it because of anasarca    Anasarca:  Likely secondary to cirrhosis of liver  I will give IV albumin at this point as part of the volume management    Cirrhosis of liver:  Seems to be stable    Will continue to monitor    Zoe Beasley MD  Nephrology  8/21/2019        Portions of the record may have been created with voice recognition software  Occasional wrong word or "sound a like" substitutions may have occurred due to the inherent limitations of voice recognition software  Read the chart carefully and recognize, using context, where substitutions have occurred
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
## 2019-08-21 NOTE — PHYSICAL THERAPY NOTE
Physical Therapy Treatment Note       08/21/19 1346   Pain Assessment   Pain Assessment No/denies pain   Pain Score No Pain   Restrictions/Precautions   Weight Bearing Precautions Per Order No   Braces or Orthoses Other (Comment)  (H/O compression stockings)   Other Precautions Fall Risk;Multiple lines; Bed Alarm; Chair Alarm;Limb alert  (back safety precautions)   General   Chart Reviewed Yes   Response to Previous Treatment Patient with no complaints from previous session  Family/Caregiver Present Yes   Cognition   Overall Cognitive Status WFL   Arousal/Participation Cooperative   Attention Within functional limits   Orientation Level Oriented X4   Memory Within functional limits   Following Commands Follows all commands and directions without difficulty   Comments pt agreeable to PT treatment   Subjective   Subjective "I will probably stay at my daughter's house"   Bed Mobility   Supine to Sit 5  Supervision  (SBA)   Additional items Assist x 1; Increased time required;Verbal cues;HOB elevated   Transfers   Sit to Stand 4  Minimal assistance  (CGA)   Additional items Assist x 1; Increased time required;Verbal cues;Armrests   Stand to Sit 4  Minimal assistance   Additional items Assist x 1; Increased time required;Verbal cues;Armrests   Ambulation/Elevation   Gait pattern Excessively slow; Short stride;Decreased foot clearance; Wide YUMIKO   Gait Assistance 4  Minimal assist  (CGA)   Additional items Assist x 1;Verbal cues   Assistive Device Bariatric Rolling walker   Distance 5' x 2 + 25' x 2  (pt requiring seated rest breaks between trials)   Stair Management Assistance Not tested   Balance   Static Sitting Good   Dynamic Sitting Fair +   Static Standing Fair   Dynamic Standing Fair -   Ambulatory Fair -   Endurance Deficit   Endurance Deficit Yes   Activity Tolerance   Activity Tolerance Patient limited by fatigue   Nurse Made Aware MADIE Carter verbalized pt appropriate for PT treatment   Assessment   Prognosis Good Problem List Decreased strength;Decreased endurance; Impaired balance;Decreased mobility;Obesity; Decreased skin integrity;Orthopedic restrictions  (back safety precautions)   Assessment Pt seen for PT treatment session this date with interventions consisting of gait training w/ emphasis on improving pt's ability to ambulate level surfaces x 5 ft x 2 + 25 ft x 2 with CGA provided by therapist with bariatric RW and therapeutic activity consisting of training: supine to sit transfers and sit<>stand transfers  Pt agreeable to PT treatment session upon arrival, pt found supine in bed w/ HOB elevated, in no apparent distress, A&O x 4 and responsive  In comparison to previous session, pt with improvements in endurance as evidenced by distance ambulated on level surfaces using bariatric RW  Post session: pt returned back to recliner, chair alarm engaged and all needs in reach  Continue to recommend STR at time of d/c in order to maximize pt's functional independence and safety w/ mobility  Pt continues to be functioning below baseline level, and remains limited 2* factors listed above  PT will continue to see pt while here in order to address the deficits listed above and provide interventions consistent w/ POC in effort to achieve STGs  Barriers to Discharge Inaccessible home environment   Goals   Patient Goals to return home/stay at his daughter's house   STG Expiration Date 08/27/19   Treatment Day 1   Plan   Treatment/Interventions Functional transfer training;LE strengthening/ROM; Elevations; Therapeutic exercise; Endurance training;Patient/family training;Equipment eval/education; Bed mobility;Gait training;Spoke to nursing;OT;Family   Progress Slow progress, decreased activity tolerance   PT Frequency Other (Comment)  (3-5x/wk)   Recommendation   Recommendation Short-term skilled PT   Equipment Recommended Walker  (Bariatric Rolling walker)   PT - OK to Discharge Yes  (when medically cleared; if to STR)       Bk Perry LOURDES Ga, DPT    Time of PT treatment session: 13:04-13:46  42 minutes

## 2019-08-21 NOTE — SOCIAL WORK
CM spoke to numerous local STR's, but they cannot accept because they have no jorge-bed avail  [facility] cannot accept because of the size of the bed and wheelchair is too large to fit through their doorways  CM did get acceptance for a jorge-bed at [facility]  CM had Kit Peoples come in and explain and answer any questions about their facility  Pt is refusing to go to this facility due to the distance his family would have to travel  Pt has decided to go home today w/[facility]  CM contacted [facility] at [phone] of this discharge

## 2019-08-21 NOTE — ASSESSMENT & PLAN NOTE
· Secondary to liver cirrhosis  · Patient had a slight bump to 6 30  · If continues to trend significantly will have a discussion with GI with possible consultation  · MELD score remains at 25  · Continue to monitor for now  · CMP in a m    · Appreciate Nephrology recommendations

## 2019-08-21 NOTE — ASSESSMENT & PLAN NOTE
· Patient with decompensated ROSALES cirrhosis  MELD calculated and remains at 25  · Ascites: On diuresis of anasarca initially with Bumex IV and Aldactone and was switched to oral Bumex 8/18  · Continue Low sodium diet  Mild Ascites on CT  · Continue Lactulose for goal of 3 soft BMs a day  · Varices: Will need EGD as outpatient  · [facility] [address]  Screen: AFP in June normal  CT A/P showed no obvious focal mass on unenhanced CT  MRI in June did not show a suspicious lesion in the liver but was limited by motion artifact  · Transplant: No evaluation at transplant center has been performed; needs close outpatient GI follow up  Consider inpatient GI consult if MELD continues to rise    · Bilirubin has remains stable at 6 30   · Outpatient follow-up
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
## 2019-08-21 NOTE — ASSESSMENT & PLAN NOTE
· Recurrent severe anasarca: patient reports he continues to gain weight despite use of diuretics at home  Recently switched from Torsemide to Furosemide without any improvement  In the setting of decompensated ROSALES cirrhosis  · Switched from Furosemide to Bumex 2 mg IV BID on admission  Continue Aldactone  Monitor Cr closely - creatinine remains stable currently at 1 50 appreciate Nephrology recommendations  · Patient has been transition to Bumex 2 mg po BID 8/18  · Nephrology input appreciated  · Strict I/Os, daily STANDING weights, low-sodium diet, fluid restriction  · Continue to monitor electrolytes closely, replete as needed  · PT/OT recommending STR  Patient and wife initially refused after likely discussion with patient, wife, and daughter at bedside explaining now with clinical stability after discussion with Nephrology and he is stable to go home realizing that he has some notable limitation he is open to hearing about short-term rehab and options he may have moving forward      · Overall medically stable for discharge pending discussion of short-term rehab versus home PT
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
## 2019-08-21 NOTE — PLAN OF CARE
Problem: PHYSICAL THERAPY ADULT  Goal: Performs mobility at highest level of function for planned discharge setting  See evaluation for individualized goals  Description  Treatment/Interventions: Functional transfer training, LE strengthening/ROM, Elevations, Therapeutic exercise, Endurance training, Patient/family training, Equipment eval/education, Bed mobility, Gait training, Spoke to nursing, Spoke to case management  Equipment Recommended: Walker(Bariatric Rolling walker)       See flowsheet documentation for full assessment, interventions and recommendations  Outcome: Progressing  Note:   Prognosis: Good  Problem List: Decreased strength, Decreased endurance, Impaired balance, Decreased mobility, Obesity, Decreased skin integrity, Orthopedic restrictions(back safety precautions)  Assessment: Pt seen for PT treatment session this date with interventions consisting of gait training w/ emphasis on improving pt's ability to ambulate level surfaces x 5 ft x 2 + 25 ft x 2 with CGA provided by therapist with bariatric RW and therapeutic activity consisting of training: supine to sit transfers and sit<>stand transfers  Pt agreeable to PT treatment session upon arrival, pt found supine in bed w/ HOB elevated, in no apparent distress, A&O x 4 and responsive  In comparison to previous session, pt with improvements in endurance as evidenced by distance ambulated on level surfaces using bariatric RW  Post session: pt returned back to recliner, chair alarm engaged and all needs in reach  Continue to recommend STR at time of d/c in order to maximize pt's functional independence and safety w/ mobility  Pt continues to be functioning below baseline level, and remains limited 2* factors listed above  PT will continue to see pt while here in order to address the deficits listed above and provide interventions consistent w/ POC in effort to achieve STGs    Barriers to Discharge: Inaccessible home environment Recommendation: Short-term skilled PT     PT - OK to Discharge: Yes(when medically cleared; if to STR)    See flowsheet documentation for full assessment

## 2019-08-21 NOTE — ASSESSMENT & PLAN NOTE
· Patient reportedly diagnosed with a bacteremia and discharged on Friday 8/9 from [facility] with PICC line to the left upper extremity  · Continue IV Ceftriaxone 1000 mg daily - he reports he is on this medication through the 19th      · Repeat blood cultures here show no growth at 4 days

## 2019-08-22 RX ORDER — PANTOPRAZOLE SODIUM 40 MG/1
40 TABLET, DELAYED RELEASE ORAL
Qty: 30 TABLET | Refills: 0 | Status: SHIPPED | OUTPATIENT
Start: 2019-08-22 | End: 2019-09-25 | Stop reason: HOSPADM

## 2019-08-22 NOTE — UTILIZATION REVIEW
Notification of Discharge  This is a Notification of Discharge from our facility [address]  Please be advised that this patient has been discharge from our facility  Below you will find the admission and discharge date and time including the patients disposition  PRESENTATION DATE: 8/15/2019 11:49 AM  OBS ADMISSION DATE:   IP ADMISSION DATE: 8/15/19 1435   DISCHARGE DATE: 8/21/2019  5:30 PM  DISPOSITION: Home with Critical access hospital with [facility]   Admission Orders listed below:  Admission Orders (From admission, onward)     Ordered        08/15/19 1435  Inpatient Admission  Once                   Please contact the UR Department if additional information is required to close this patient's authorization/case  [address]  Utilization Review Department  Phone: [phone]; Fax [phone]  [email or web address]  ATTENTION: Please call with any questions or concerns to [phone]  and carefully listen to the prompts so that you are directed to the right person  Send all requests for admission clinical reviews, approved or denied determinations and any other requests to fax [phone]   All voicemails are confidential

## 2019-08-29 ENCOUNTER — EVALUATION (OUTPATIENT)
Dept: PHYSICAL THERAPY | Age: 59
End: 2019-08-29
Payer: COMMERCIAL

## 2019-08-29 DIAGNOSIS — R29.898 WEAKNESS OF BOTH LEGS: Primary | ICD-10-CM

## 2019-08-29 DIAGNOSIS — R60.0 LOWER EXTREMITY EDEMA: ICD-10-CM

## 2019-08-29 PROCEDURE — 97110 THERAPEUTIC EXERCISES: CPT | Performed by: PHYSICAL THERAPIST

## 2019-08-29 PROCEDURE — 97163 PT EVAL HIGH COMPLEX 45 MIN: CPT | Performed by: PHYSICAL THERAPIST

## 2019-08-29 NOTE — PROGRESS NOTES
PT Evaluation     Today's date: 2019  Patient name: Christie Colon  : 1960  MRN: [id number]  Referring provider: Matthew Rodriguez MD  Dx:   Encounter Diagnosis     ICD-10-CM    1  Weakness of both legs R29 898    2  Lower extremity edema R60 0        Start Time: 1015  Stop Time: 1115  Total time in clinic (min): 60 minutes    Assessment  Assessment details: Christie Colon is a 63 y o  male who presents with pain, decreased strength, decreased ROM and ambulatory dysfunction  Due to these impairments, Patient has difficulty performing a/iadls  Patient's clinical presentation is consistent with their referring diagnosis of LE weakness with swelling in both LE's  Therapist called and spoke with referring MD regarding patient symptoms and presentation today  MD to see patient this afternoon  Recommended patient to lymphedema therapy and wound care as patient developed another wound on right lateral lower leg  Patient has small blister anterior left lower leg, and weeping of serous fluid right lower leg noted  Patient's RLE is red, hard from increased fluid which limits his ROM and ability to walk and move  Patient was given some HEP in supine, sitting and standing to perform as able  At this time, I feel patient is not appropriate for OPT and informed MD of such via phone conversation  Impairments: abnormal gait, abnormal or restricted ROM, activity intolerance, impaired balance, impaired physical strength, lacks appropriate home exercise program, pain with function, safety issue, weight-bearing intolerance, poor posture  and poor body mechanics  Understanding of Dx/Px/POC: fair   Prognosis: poor    Goals  ST-3 WEEKS  1  Decrease pain in groin by 2 points on VAS at its worst   2   Increase ROM BLE's to [facility]  3  Decrease Swelling in LE's to minimal       LTG 4-6 WEEKS    1  Patient to be independent with a/iadls  2  Increase functional activities for leisure and home activities to previous LOF    3  Independent with HEP and/or fitness program     Plan  Plan details: Spoke with MD regarding patient symptoms with advise to refer to wound care and lymphedema therapy  Patient has a large copayment for OPT also  Patient would benefit from: lymphedema eval  Planned modality interventions: cryotherapy and thermotherapy: hydrocollator packs  Planned therapy interventions: activity modification, body mechanics training, flexibility, functional ROM exercises, home exercise program, IADL retraining, joint mobilization, manual therapy, neuromuscular re-education, patient education, postural training, strengthening, stretching, therapeutic activities, therapeutic exercise and balance/weight bearing training  Frequency: 2-3x week  Duration in weeks: 4  Plan of Care beginning date: 2019  Plan of Care expiration date: 2019  Treatment plan discussed with: patient and family        Subjective Evaluation    History of Present Illness  Mechanism of injury: Patient reports he developed swelling and fluid retention about 2 weeks ago, and was in the hospital but unable to control his swelling  Patient got a wound posterior left lower leg treated in hospital  Patient c/o weakness in both legs and decreased balance  Not a recurrent problem   Pain  Current pain ratin  Location: groin     Social Support  Steps to enter house: yes  3  Stairs in house: no   Lives in: [facility] ()  Lives with: spouse    Employment status: not working  Treatments  Discharged from (in last 30 days): home health care  Patient Goals  Patient goals for therapy: decreased edema, increased strength, increased motion and improved balance  Patient goal: walk better, return to prior life        Objective     Static Posture   General Observations  Asymmetrical weight bearing, flexed and out toed       Comments  Patient obese  See gait section    Postural Observations  Seated posture: poor  Standing posture: poor        Strength/Myotome Testing     Left Hip   Planes of Motion   Flexion: 2+  Abduction: 2  Adduction: 3-    Right Hip   Planes of Motion   Flexion: 2  Abduction: 2  Adduction: 3-    Left Knee   Flexion: 4-  Extension: 4    Right Knee   Flexion: 4-  Extension: 4    Ambulation   Weight-Bearing Status   Weight-Bearing Status (Left): weight-bearing as tolerated   Weight-Bearing Status (Right): weight-bearing as tolerated    Distance in feet: 50  Assistive device used: two-wheeled walker    Ambulation: Level Surfaces   Ambulation with assistive device: contact guard assist    Observational Gait   Gait: antalgic, circumduction and crouched   Decreased walking speed and stride length  Additional Observational Gait Details  LE's open wide due to swelling in groin area    Quality of Movement During Gait   Trunk  Forward lean  Comments   Poor technique with rollator with legs open wide, decreased knee flexion  General Comments:      Lumbar Comments  Patient has multiple wounds on lower legs with weeping skin on lower legs  Patient has large amount of fluid in LE's and especially his testicles making it difficult for patient to sit, or to stand with legs together  Knee Comments  ROM limited due to size but [facility] for ambulation and transfers  Flowsheet Rows      Most Recent Value   PT/OT G-Codes   Current Score  12   Projected Score  44             Precautions: HTN, wounds BLE's,           Exercise Diary  8/29            HEP 15                                                                                                                                                                                                                                                                     Patient instructed in HEP as he is able with blue Tband given for UE's and LE's, sitting, supine and standing  Advised patient to perform as able and to be safe with standing exercises

## 2019-09-03 ENCOUNTER — TRANSCRIBE ORDERS (OUTPATIENT)
Dept: PHYSICAL THERAPY | Age: 59
End: 2019-09-03

## 2019-09-03 DIAGNOSIS — R60.0 LOWER EXTREMITY EDEMA: ICD-10-CM

## 2019-09-03 DIAGNOSIS — R29.898 WEAKNESS OF BOTH LEGS: Primary | ICD-10-CM

## 2019-09-06 ENCOUNTER — HOSPITAL ENCOUNTER (INPATIENT)
Facility: HOSPITAL | Age: 59
LOS: 19 days | Discharge: ANOTHER HOSPITAL | DRG: 177 | End: 2019-09-25
Attending: EMERGENCY MEDICINE | Admitting: STUDENT IN AN ORGANIZED HEALTH CARE EDUCATION/TRAINING PROGRAM
Payer: COMMERCIAL

## 2019-09-06 ENCOUNTER — APPOINTMENT (EMERGENCY)
Dept: RADIOLOGY | Facility: HOSPITAL | Age: 59
DRG: 177 | End: 2019-09-06
Payer: COMMERCIAL

## 2019-09-06 ENCOUNTER — APPOINTMENT (EMERGENCY)
Dept: CT IMAGING | Facility: HOSPITAL | Age: 59
DRG: 177 | End: 2019-09-06
Payer: COMMERCIAL

## 2019-09-06 DIAGNOSIS — K75.81 LIVER CIRRHOSIS SECONDARY TO NASH (HCC): Chronic | ICD-10-CM

## 2019-09-06 DIAGNOSIS — K74.60 LIVER CIRRHOSIS SECONDARY TO NASH (HCC): Chronic | ICD-10-CM

## 2019-09-06 DIAGNOSIS — Z99.89 OSA ON CPAP: ICD-10-CM

## 2019-09-06 DIAGNOSIS — E87.1 HYPONATREMIA: ICD-10-CM

## 2019-09-06 DIAGNOSIS — R60.1 ANASARCA: ICD-10-CM

## 2019-09-06 DIAGNOSIS — K76.7 HEPATORENAL SYNDROME (HCC): ICD-10-CM

## 2019-09-06 DIAGNOSIS — G47.33 OSA ON CPAP: ICD-10-CM

## 2019-09-06 DIAGNOSIS — I95.3 HEMODIALYSIS-ASSOCIATED HYPOTENSION: ICD-10-CM

## 2019-09-06 DIAGNOSIS — L03.115 CELLULITIS OF RIGHT LOWER EXTREMITY: ICD-10-CM

## 2019-09-06 DIAGNOSIS — N18.9 ACUTE ON CHRONIC KIDNEY FAILURE (HCC): Primary | ICD-10-CM

## 2019-09-06 DIAGNOSIS — N49.2 CELLULITIS OF SCROTUM: ICD-10-CM

## 2019-09-06 DIAGNOSIS — J18.9 BILATERAL PNEUMONIA: ICD-10-CM

## 2019-09-06 DIAGNOSIS — N17.9 ACUTE ON CHRONIC KIDNEY FAILURE (HCC): Primary | ICD-10-CM

## 2019-09-06 LAB
ALBUMIN SERPL BCP-MCNC: 3 G/DL (ref 3.5–5)
ALP SERPL-CCNC: 112 U/L (ref 46–116)
ALT SERPL W P-5'-P-CCNC: 37 U/L (ref 12–78)
AMMONIA PLAS-SCNC: 22 UMOL/L (ref 11–35)
ANION GAP SERPL CALCULATED.3IONS-SCNC: 9 MMOL/L (ref 4–13)
APTT PPP: 40 SECONDS (ref 23–37)
AST SERPL W P-5'-P-CCNC: 63 U/L (ref 5–45)
BACTERIA UR QL AUTO: NORMAL /HPF
BASOPHILS # BLD MANUAL: 0.1 THOUSAND/UL (ref 0–0.1)
BASOPHILS NFR MAR MANUAL: 1 % (ref 0–1)
BILIRUB DIRECT SERPL-MCNC: 2.5 MG/DL (ref 0–0.2)
BILIRUB SERPL-MCNC: 6.9 MG/DL (ref 0.2–1)
BILIRUB UR QL STRIP: NEGATIVE
BUN SERPL-MCNC: 47 MG/DL (ref 5–25)
CALCIUM SERPL-MCNC: 8.4 MG/DL (ref 8.3–10.1)
CHLORIDE SERPL-SCNC: 94 MMOL/L (ref 100–108)
CLARITY UR: CLEAR
CO2 SERPL-SCNC: 24 MMOL/L (ref 21–32)
COLOR UR: YELLOW
CREAT SERPL-MCNC: 2.41 MG/DL (ref 0.6–1.3)
EOSINOPHIL # BLD MANUAL: 0.6 THOUSAND/UL (ref 0–0.4)
EOSINOPHIL NFR BLD MANUAL: 6 % (ref 0–6)
ERYTHROCYTE [DISTWIDTH] IN BLOOD BY AUTOMATED COUNT: 15.5 % (ref 11.6–15.1)
GFR SERPL CREATININE-BSD FRML MDRD: 29 ML/MIN/1.73SQ M
GLUCOSE SERPL-MCNC: 126 MG/DL (ref 65–140)
GLUCOSE UR STRIP-MCNC: NEGATIVE MG/DL
HCT VFR BLD AUTO: 26.4 % (ref 36.5–49.3)
HGB BLD-MCNC: 8.6 G/DL (ref 12–17)
HGB UR QL STRIP.AUTO: NEGATIVE
INR PPP: 1.77 (ref 0.84–1.19)
KETONES UR STRIP-MCNC: NEGATIVE MG/DL
LACTATE SERPL-SCNC: 1.3 MMOL/L (ref 0.5–2)
LEUKOCYTE ESTERASE UR QL STRIP: NEGATIVE
LYMPHOCYTES # BLD AUTO: 0.9 THOUSAND/UL (ref 0.6–4.47)
LYMPHOCYTES # BLD AUTO: 9 % (ref 14–44)
MAGNESIUM SERPL-MCNC: 1.9 MG/DL (ref 1.6–2.6)
MCH RBC QN AUTO: 36.3 PG (ref 26.8–34.3)
MCHC RBC AUTO-ENTMCNC: 32.6 G/DL (ref 31.4–37.4)
MCV RBC AUTO: 111 FL (ref 82–98)
METAMYELOCYTES NFR BLD MANUAL: 2 % (ref 0–1)
MONOCYTES # BLD AUTO: 1.3 THOUSAND/UL (ref 0–1.22)
MONOCYTES NFR BLD: 13 % (ref 4–12)
NEUTROPHILS # BLD MANUAL: 6.91 THOUSAND/UL (ref 1.85–7.62)
NEUTS BAND NFR BLD MANUAL: 2 % (ref 0–8)
NEUTS SEG NFR BLD AUTO: 67 % (ref 43–75)
NITRITE UR QL STRIP: NEGATIVE
NON-SQ EPI CELLS URNS QL MICRO: NORMAL /HPF
NRBC BLD AUTO-RTO: 0 /100 WBCS
NRBC BLD AUTO-RTO: 1 /100 WBC (ref 0–2)
NT-PROBNP SERPL-MCNC: 3108 PG/ML
PH UR STRIP.AUTO: 5 [PH]
PLATELET # BLD AUTO: 64 THOUSANDS/UL (ref 149–390)
PLATELET BLD QL SMEAR: ABNORMAL
PMV BLD AUTO: 11.2 FL (ref 8.9–12.7)
POTASSIUM SERPL-SCNC: 4.6 MMOL/L (ref 3.5–5.3)
PROT SERPL-MCNC: 6.6 G/DL (ref 6.4–8.2)
PROT UR STRIP-MCNC: ABNORMAL MG/DL
PROTHROMBIN TIME: 20.8 SECONDS (ref 11.6–14.5)
RBC # BLD AUTO: 2.37 MILLION/UL (ref 3.88–5.62)
RBC #/AREA URNS AUTO: NORMAL /HPF
SODIUM SERPL-SCNC: 127 MMOL/L (ref 136–145)
SP GR UR STRIP.AUTO: 1.01 (ref 1–1.03)
TOTAL CELLS COUNTED SPEC: 100
TROPONIN I SERPL-MCNC: 0.02 NG/ML
UROBILINOGEN UR QL STRIP.AUTO: 0.2 E.U./DL
WBC # BLD AUTO: 10.02 THOUSAND/UL (ref 4.31–10.16)
WBC #/AREA URNS AUTO: NORMAL /HPF

## 2019-09-06 PROCEDURE — 96365 THER/PROPH/DIAG IV INF INIT: CPT

## 2019-09-06 PROCEDURE — 93005 ELECTROCARDIOGRAM TRACING: CPT

## 2019-09-06 PROCEDURE — 82140 ASSAY OF AMMONIA: CPT | Performed by: EMERGENCY MEDICINE

## 2019-09-06 PROCEDURE — 96367 TX/PROPH/DG ADDL SEQ IV INF: CPT

## 2019-09-06 PROCEDURE — 71250 CT THORAX DX C-: CPT

## 2019-09-06 PROCEDURE — 83735 ASSAY OF MAGNESIUM: CPT | Performed by: EMERGENCY MEDICINE

## 2019-09-06 PROCEDURE — 73700 CT LOWER EXTREMITY W/O DYE: CPT

## 2019-09-06 PROCEDURE — 80048 BASIC METABOLIC PNL TOTAL CA: CPT | Performed by: EMERGENCY MEDICINE

## 2019-09-06 PROCEDURE — 36415 COLL VENOUS BLD VENIPUNCTURE: CPT | Performed by: EMERGENCY MEDICINE

## 2019-09-06 PROCEDURE — 85007 BL SMEAR W/DIFF WBC COUNT: CPT | Performed by: EMERGENCY MEDICINE

## 2019-09-06 PROCEDURE — 81001 URINALYSIS AUTO W/SCOPE: CPT | Performed by: EMERGENCY MEDICINE

## 2019-09-06 PROCEDURE — 85610 PROTHROMBIN TIME: CPT | Performed by: EMERGENCY MEDICINE

## 2019-09-06 PROCEDURE — 71046 X-RAY EXAM CHEST 2 VIEWS: CPT

## 2019-09-06 PROCEDURE — 85027 COMPLETE CBC AUTOMATED: CPT | Performed by: EMERGENCY MEDICINE

## 2019-09-06 PROCEDURE — 74176 CT ABD & PELVIS W/O CONTRAST: CPT

## 2019-09-06 PROCEDURE — 84484 ASSAY OF TROPONIN QUANT: CPT | Performed by: EMERGENCY MEDICINE

## 2019-09-06 PROCEDURE — 99285 EMERGENCY DEPT VISIT HI MDM: CPT | Performed by: EMERGENCY MEDICINE

## 2019-09-06 PROCEDURE — 80076 HEPATIC FUNCTION PANEL: CPT | Performed by: EMERGENCY MEDICINE

## 2019-09-06 PROCEDURE — 99285 EMERGENCY DEPT VISIT HI MDM: CPT

## 2019-09-06 PROCEDURE — 85730 THROMBOPLASTIN TIME PARTIAL: CPT | Performed by: EMERGENCY MEDICINE

## 2019-09-06 PROCEDURE — 99223 1ST HOSP IP/OBS HIGH 75: CPT | Performed by: INTERNAL MEDICINE

## 2019-09-06 PROCEDURE — 83930 ASSAY OF BLOOD OSMOLALITY: CPT | Performed by: INTERNAL MEDICINE

## 2019-09-06 PROCEDURE — 83880 ASSAY OF NATRIURETIC PEPTIDE: CPT | Performed by: EMERGENCY MEDICINE

## 2019-09-06 PROCEDURE — 87040 BLOOD CULTURE FOR BACTERIA: CPT | Performed by: EMERGENCY MEDICINE

## 2019-09-06 PROCEDURE — 83605 ASSAY OF LACTIC ACID: CPT | Performed by: EMERGENCY MEDICINE

## 2019-09-06 RX ORDER — GUAIFENESIN 600 MG
600 TABLET, EXTENDED RELEASE 12 HR ORAL EVERY 12 HOURS SCHEDULED
Status: DISCONTINUED | OUTPATIENT
Start: 2019-09-06 | End: 2019-09-14

## 2019-09-06 RX ORDER — HEPARIN SODIUM 5000 [USP'U]/ML
5000 INJECTION, SOLUTION INTRAVENOUS; SUBCUTANEOUS EVERY 8 HOURS SCHEDULED
Status: DISCONTINUED | OUTPATIENT
Start: 2019-09-06 | End: 2019-09-08

## 2019-09-06 RX ORDER — PANTOPRAZOLE SODIUM 40 MG/1
40 TABLET, DELAYED RELEASE ORAL
Status: DISCONTINUED | OUTPATIENT
Start: 2019-09-07 | End: 2019-09-16

## 2019-09-06 RX ORDER — LACTULOSE 20 G/30ML
20 SOLUTION ORAL 2 TIMES DAILY
Status: DISCONTINUED | OUTPATIENT
Start: 2019-09-07 | End: 2019-09-10

## 2019-09-06 RX ORDER — ACETAMINOPHEN 325 MG/1
650 TABLET ORAL EVERY 6 HOURS PRN
Status: DISCONTINUED | OUTPATIENT
Start: 2019-09-06 | End: 2019-09-16

## 2019-09-06 RX ADMIN — CEFEPIME HYDROCHLORIDE 2000 MG: 2 INJECTION, POWDER, FOR SOLUTION INTRAVENOUS at 19:27

## 2019-09-06 RX ADMIN — GUAIFENESIN 600 MG: 600 TABLET, EXTENDED RELEASE ORAL at 23:00

## 2019-09-06 RX ADMIN — VANCOMYCIN HYDROCHLORIDE 1750 MG: 1 INJECTION, POWDER, LYOPHILIZED, FOR SOLUTION INTRAVENOUS at 20:41

## 2019-09-06 RX ADMIN — HEPARIN SODIUM 5000 UNITS: 5000 INJECTION INTRAVENOUS; SUBCUTANEOUS at 23:00

## 2019-09-06 NOTE — ED PROVIDER NOTES
History  Chief Complaint   Patient presents with    Leg Swelling     pt d/c from [facility] 1 week ago; ongoing sob and swelling    Shortness of Breath     Patient is a 52-year-old male with past medical history of chronic low back pain, 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, coronary artery disease status post MI, chronic kidney disease, hypertension, sleep apnea with CPAP dependence, morbid obesity, presents to the emergency department complaining of bilateral lower extremity swelling and pain in the right lower extremity as well as dyspnea  Patient was recently admitted to the hospital from 08/15 to 8/21 for anasarca and presented very similarly at that time complaining of dyspnea and bilateral leg and scrotal swelling  Patient reports he did improve upon discharge but within the past 2 weeks, he has gained 20+lbs, and has had worsening swelling of both legs, R>L  He also has been feeling progressively more short of breath and now any movement even turning in bed causes him to become dyspneic  He states he cannot lay flat due to breathing  He states for the past 2 days he has noticed that his right foot has been very swollen, reddened and painful  He has been wrapping both of his legs because they have been weeping  He also states that his scrotum has swelling as well  He reports his abdomen seems more distended but he denies any abdominal pain  He denies any fever, chills, headache, dizziness or near syncope, cough or URI symptoms, hemoptysis, chest pain, palpitations, abdominal pain, nausea, vomiting, blood per rectum or melena, constipation (has diarrhea from lactulose), dysuria, change in urinary frequency, hematuria, flank pain, extremity weakness or paresthesia or other focal neurologic deficits        History provided by:  Patient, medical records and spouse   used: No    Shortness of Breath   Associated symptoms: no abdominal pain, no chest pain, no cough, no ear pain, no fever, no headaches, no neck pain, no rash, no sore throat, no vomiting and no wheezing        Prior to Admission Medications   Prescriptions Last Dose Informant Patient Reported? Taking? bumetanide (BUMEX) 2 mg tablet   No No   Sig: Take 1 tablet (2 mg total) by mouth 2 (two) times a day   lactulose (CONSTULOSE) 10 g/15 mL solution   Yes No   Sig: Take 20 g by mouth 2 (two) times a day   pantoprazole (PROTONIX) 40 mg tablet   No No   Sig: Take 1 tablet (40 mg total) by mouth daily in the early morning   potassium chloride (K-DUR,KLOR-CON) 20 mEq tablet   Yes No   Sig: Take 20 mEq by mouth 3 (three) times a day      Facility-Administered Medications: None       Past Medical History:   Diagnosis Date    Anasarca     Chronic pain disorder     lower back    CPAP (continuous positive airway pressure) dependence     Heart murmur     Hypertension     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 [facility] [address]     [facility]ocardial infarction ([facility] )     Renal insufficiency 2019    Sleep apnea     Vitamin D deficiency        Past Surgical History:   Procedure Laterality Date    KNEE ARTHROSCOPY Bilateral     KNEE SURGERY      MT COLONOSCOPY FLX DX W/COLLJ SPEC WHEN PFRMD N/A 11/15/2017    Procedure: COLONOSCOPY;  Surgeon: Tejinder Marshall MD;  Location: [facility]; Service: Gastroenterology       History reviewed  No pertinent family history  I have reviewed and agree with the history as documented  Social History     Tobacco Use    Smoking status: Former Smoker     Last attempt to quit: 11/15/1990     Years since quittin 8    Smokeless tobacco: Never Used   Substance Use Topics    Alcohol use: Yes     Comment: RARE    Drug use: No        Review of Systems   Constitutional: Positive for unexpected weight change  Negative for chills and fever  HENT: Negative for congestion, ear pain, rhinorrhea and sore throat  Eyes: Negative for photophobia, pain and visual disturbance  Respiratory: Positive for shortness of breath  Negative for cough, chest tightness and wheezing  Cardiovascular: Positive for leg swelling  Negative for chest pain and palpitations  Gastrointestinal: Positive for diarrhea  Negative for abdominal distention, abdominal pain, blood in stool, constipation, nausea and vomiting  Genitourinary: Positive for scrotal swelling and testicular pain  Negative for dysuria, flank pain, frequency and hematuria  Musculoskeletal: Negative for back pain, neck pain and neck stiffness  Skin: Positive for color change  Negative for pallor, rash and wound  Allergic/Immunologic: Negative for immunocompromised state  Neurological: Negative for dizziness, syncope, weakness, light-headedness, numbness and headaches  Hematological: Negative for adenopathy  Does not bruise/bleed easily  Psychiatric/Behavioral: Negative for confusion and decreased concentration  All other systems reviewed and are negative  Physical Exam  Physical Exam   Constitutional: He is oriented to person, place, and time  He appears well-developed and well-nourished  No distress  Patient morbidly obese  HENT:   Head: Normocephalic and atraumatic  Mouth/Throat: Oropharynx is clear and moist    Eyes: Pupils are equal, round, and reactive to light  Conjunctivae and EOM are normal  Scleral icterus is present  Neck: Normal range of motion  Neck supple  No JVD present  Cardiovascular: Normal rate, regular rhythm, normal heart sounds and intact distal pulses  Exam reveals no gallop and no friction rub  No murmur heard  Pulmonary/Chest: Effort normal  No respiratory distress  He has no wheezes  He has rales  He exhibits no tenderness  Bilateral rales  Abdominal: Soft  Bowel sounds are normal  He exhibits no distension  There is no tenderness  There is no rebound and no guarding  Musculoskeletal: Normal range of motion  He exhibits edema  He exhibits no tenderness  Neurological: He is alert and oriented to person, place, and time  No cranial nerve deficit  No gross motor or sensory deficits  3/5 strength bilateral lower extremities  5/5 strength bilateral upper extremities  Skin: Skin is warm and dry  No rash noted  He is not diaphoretic  There is erythema  No pallor  The entire right lower extremity is erythematous and warm to touch  Erythema extends into the scrotum  Small superficial ulceration on the posterior aspect of the scrotum but no evidence of Cresencio gangrene  There is 3+ pitting edema bilateral feet, lower and upper legs, scrotum and genitalia and abdominal wall  Pitting edema extends to the posterior trunk  Psychiatric: He has a normal mood and affect  His behavior is normal  Thought content normal    Nursing note and vitals reviewed        Vital Signs  ED Triage Vitals [09/06/19 1710]   Temperature Pulse Respirations Blood Pressure SpO2   97 5 °F (36 4 °C) 71 16 125/56 99 %      Temp Source Heart Rate Source Patient Position - Orthostatic VS BP Location FiO2 (%)   Oral Monitor Sitting Left arm --      Pain Score       9         Vitals:    09/06/19 1710 09/06/19 1811 09/06/19 1830 09/06/19 2042   BP: 125/56 131/60 125/57 138/60   BP Location: Left arm Left arm Left arm Right arm   Pulse: 71 72 69 73   Resp: 16 20 20 20   Temp: 97 5 °F (36 4 °C)      TempSrc: Oral      SpO2: 99% 97% 96% 97%   Weight: (!) 185 kg (407 lb 13 6 oz)      Height: 5' 11" (1 803 m)          Visual Acuity      ED Medications  Medications   vancomycin (VANCOCIN) 1,750 mg in sodium chloride 0 9 % 500 mL IVPB (1,750 mg Intravenous New Bag 9/6/19 2041)   vancomycin (VANCOCIN) 2,750 mg in sodium chloride 0 9 % 500 mL IVPB (has no administration in time range)   cefepime (MAXIPIME) 2,000 mg in dextrose 5 % 50 mL IVPB (has no administration in time range)   cefepime (MAXIPIME) 2,000 mg in dextrose 5 % 50 mL IVPB (0 mg Intravenous Stopped 9/6/19 2009)       Diagnostic Studies  Results Reviewed     Procedure Component Value Units Date/Time    Urine Microscopic [[id number]]  (Normal) Collected:  09/06/19 1926    Lab Status:  Final result Specimen:  Urine, Clean Catch Updated:  09/06/19 1955     RBC, UA None Seen /hpf      WBC, UA None Seen /hpf      Epithelial Cells None Seen /hpf      Bacteria, UA None Seen /hpf     CBC and differential [[id number]]  (Abnormal) Collected:  09/06/19 1906    Lab Status:  Final result Specimen:  Blood from Arm, Right Updated:  09/06/19 1952     WBC 10 02 Thousand/uL      RBC 2 37 Million/uL      Hemoglobin 8 6 g/dL      Hematocrit 26 4 %       fL      MCH 36 3 pg      MCHC 32 6 g/dL      RDW 15 5 %      MPV 11 2 fL      Platelets 64 Thousands/uL      nRBC 0 /100 WBCs     Basic metabolic panel [[id number]]  (Abnormal) Collected:  09/06/19 1905    Lab Status:  Final result Specimen:  Blood from Arm, Right Updated:  09/06/19 1949     Sodium 127 mmol/L      Potassium 4 6 mmol/L      Chloride 94 mmol/L      CO2 24 mmol/L      ANION GAP 9 mmol/L      BUN 47 mg/dL      Creatinine 2 41 mg/dL      Glucose 126 mg/dL      Calcium 8 4 mg/dL      eGFR 29 ml/min/1 73sq m     Narrative:       [place] guidelines for Chronic Kidney Disease (CKD):     Stage 1 with normal or high GFR (GFR > 90 mL/min/1 73 square meters)    Stage 2 Mild CKD (GFR = 60-89 mL/min/1 73 square meters)    Stage 3A Moderate CKD (GFR = 45-59 mL/min/1 73 square meters)    Stage 3B Moderate CKD (GFR = 30-44 mL/min/1 73 square meters)    Stage 4 Severe CKD (GFR = 15-29 mL/min/1 73 square meters)    Stage 5 End Stage CKD (GFR <15 mL/min/1 73 square meters)  Note: GFR calculation is accurate only with a steady state creatinine    Hepatic function panel [[id number]]  (Abnormal) Collected:  09/06/19 1905    Lab Status:  Final result Specimen:  Blood from Arm, Right Updated:  09/06/19 1949     Total Bilirubin 6 90 mg/dL      Bilirubin, Direct 2 50 mg/dL      Alkaline Phosphatase 112 U/L      AST 63 U/L      ALT 37 U/L      Total Protein 6 6 g/dL Albumin 3 0 g/dL     Magnesium [[id number]]  (Normal) Collected:  09/06/19 1905    Lab Status:  Final result Specimen:  Blood from Arm, Right Updated:  09/06/19 1949     Magnesium 1 9 mg/dL     B-type natriuretic peptide [[id number]]  (Abnormal) Collected:  09/06/19 1905    Lab Status:  Final result Specimen:  Blood from Arm, Right Updated:  09/06/19 1949     NT-proBNP 3,108 pg/mL     Protime-INR [[id number]]  (Abnormal) Collected:  09/06/19 1905    Lab Status:  Final result Specimen:  Blood from Arm, Right Updated:  09/06/19 1939     Protime 20 8 seconds      INR 1 77    APTT [[id number]]  (Abnormal) Collected:  09/06/19 1905    Lab Status:  Final result Specimen:  Blood from Arm, Right Updated:  09/06/19 1939     PTT 40 seconds     Troponin I [[id number]]  (Normal) Collected:  09/06/19 1906    Lab Status:  Final result Specimen:  Blood from Arm, Right Updated:  09/06/19 1938     Troponin I 0 02 ng/mL     Lactic acid, plasma [[id number]]  (Normal) Collected:  09/06/19 1909    Lab Status:  Final result Specimen:  Blood from Arm, Left Updated:  09/06/19 1937     LACTIC ACID 1 3 mmol/L     Narrative:       Result may be elevated if tourniquet was used during collection  UA w Reflex to Microscopic [[id number]]  (Abnormal) Collected:  09/06/19 1926    Lab Status:  Final result Specimen:  Urine, Clean Catch Updated:  09/06/19 1936     Color, UA Yellow     Clarity, UA Clear     Specific Gravity, UA 1 010     pH, UA 5 0     Leukocytes, UA Negative     Nitrite, UA Negative     Protein, UA Trace mg/dl      Glucose, UA Negative mg/dl      Ketones, UA Negative mg/dl      Urobilinogen, UA 0 2 E U /dl      Bilirubin, UA Negative     Blood, UA Negative    Ammonia [[id number]]  (Normal) Collected:  09/06/19 1910    Lab Status:  Final result Specimen:  Blood from Arm, Left Updated:  09/06/19 1929     Ammonia 22 umol/L     Blood culture #1 [[id number]] Collected:  09/06/19 1904    Lab Status:   In process Specimen:  Blood from Arm, Right Updated:  09/06/19 1914    Blood culture #2 [[id number]] Collected:  09/06/19 1905    Lab Status: In process Specimen:  Blood from Arm, Left Updated:  09/06/19 1914                 CT tibia fibula right wo contrast   Final Result by Nidhi Xavier MD (09/06 2128)   Soft tissue evaluation is limited without IV contrast    1    No evidence of soft tissue gas  2   Generalized subcutaneous edema with associated skin thickening  Edema noted adjacent to the calf musculature  Findings are nonspecific and may be related to generalized anasarca  3   The absence of soft tissue gas does not exclude necrotizing fasciitis particularly at the early stages  If there is persistent concern for necrotizing fasciitis, consider follow-up with MRI with and without contrast          Workstation performed: [id number]         CT femur right wo contrast   Final Result by Nidhi Xavier MD (09/06 2128)   Soft tissue evaluation is limited without IV contrast    1    No evidence of soft tissue gas  2   Generalized subcutaneous edema with associated skin thickening  Edema noted adjacent to the calf musculature  Findings are nonspecific and may be related to generalized anasarca  3   The absence of soft tissue gas does not exclude necrotizing fasciitis particularly at the early stages  If there is persistent concern for necrotizing fasciitis, consider follow-up with MRI with and without contrast          Workstation performed: [id number]         CT chest abdomen pelvis wo contrast   Final Result by Nidhi Xavier MD (09/06 2116)   Exam is limited without IV and oral contrast particularly for soft tissue and bowel evaluation  1   Scattered mild patchy opacities bilaterally suspicious for pneumonia  2  Cirrhotic appearance to the liver  Splenomegaly  3   Mild ascites  4   Diffuse subcutaneous edema compatible with anasarca              Workstation performed: [id number]         XR chest 2 views   ED Interpretation by Fouzia Sam DO (09/06 1949)   Limited film due to body habitus and poor inspiratory effort  Cardiomegaly present  Increased vascular congestion bilaterally  Procedures  ECG 12 Lead Documentation Only  Date/Time: 9/6/2019 8:21 PM  Performed by: Fouzia Sam DO  Authorized by: Fouzia Sam DO     ECG reviewed by me, the ED Provider: yes    Patient location:  ED  Previous ECG:     Previous ECG:  Compared to current    Comparison ECG info:  8-15-19; QRS duration has decreased  Rate:     ECG rate:  73    ECG rate assessment: normal    Rhythm:     Rhythm: sinus rhythm    Ectopy:     Ectopy: none    QRS:     QRS axis:  Left    QRS intervals:  Normal  Conduction:     Conduction: normal    ST segments:     ST segments:  Normal  T waves:     T waves: normal    Comments:      Low voltage QRS complex  ED Course  ED Course as of Sep 06 2144   Fri Sep 06, 2019   1938 LACTIC ACID: 1 3   1948 Stable  TOTAL BILIRUBIN(!): 6 90   1949 Acute on chronic renal failure present  Creatinine(!): 2 41   1950 3x more elevated than normal    NT-proBNP(!): 3,108   2141 Reviewed CT imaging and updated patient of all results thus far  Patient already receiving cefepime and vancomycin which will cover for pneumonia as well  Initial Sepsis Screening     Row Name 09/06/19 2143                Is the patient's history suggestive of a new or worsening infection? (!) Yes (Proceed)  -CE        Suspected source of infection  soft tissue  -CE        Are two or more of the following signs & symptoms of infection both present and new to the patient?           Indicate SIRS criteria          If the answer is yes to both questions, suspicion of sepsis is present          If severe sepsis is present AND tissue hypoperfusion perists in the hour after fluid resuscitation or lactate > 4, the patient meets criteria for SEPTIC SHOCK          Are any of the following organ dysfunction criteria present within 6 hours of suspected infection and SIRS criteria that are NOT considered to be chronic conditions?         Organ dysfunction          Date of presentation of severe sepsis          Time of presentation of severe sepsis          Tissue hypoperfusion persists in the hour after crystalloid fluid administration, evidenced, by either:          Was hypotension present within one hour of the conclusion of crystalloid fluid administration?         Date of presentation of septic shock          Time of presentation of septic shock            User Key  (r) = Recorded By, (t) = Taken By, (c) = Cosigned By    Initials Name Provider Verenice Faith MD Physician                  MDM  Number of Diagnoses or Management Options  Diagnosis management comments: 81-year-old male presents with anasarca  He was recently admitted for the same but has had recurrence of symptoms  He does now also have right lower extremity and scrotal cellulitis  Will obtain CT imaging to rule out neck fascia and do full cardiac and septic workup  Will start cefepime and vancomycin  Patient to be admitted         Amount and/or Complexity of Data Reviewed  Clinical lab tests: ordered and reviewed  Tests in the radiology section of CPT®: ordered and reviewed  Tests in the medicine section of CPT®: ordered and reviewed  Independent visualization of images, tracings, or specimens: yes        Disposition  Final diagnoses:   Anasarca   Acute on chronic kidney failure (HCC)   Bilateral pneumonia   Cellulitis of right lower extremity   Cellulitis of scrotum     Time reflects when diagnosis was documented in both MDM as applicable and the Disposition within this note     Time User Action Codes Description Comment    9/6/2019  9:42 PM Jodean Neighbors E Add [R60 1] Anasarca     9/6/2019  9:42 PM Jodean Neighbors E Add [N17 9,  N18 9] Acute on chronic kidney failure ([facility] [address] )     9/6/2019  9:42 PM Jodean Neighbors E Add [J18 9] Bilateral pneumonia     9/6/2019  9:43 PM Juan WOLFE Add [L03 115] Cellulitis of right lower extremity     9/6/2019  9:43 PM Juan Good [N49 2] Cellulitis of scrotum       ED Disposition     ED Disposition Condition Date/Time Comment    Admit Stable Fri Sep 6, 2019  9:42 PM Case was discussed with JUVENTINO and the patient's admission status was agreed to be Admission Status: inpatient status to the service of Dr Kathy Corey   Follow-up Information    None         Patient's Medications   Discharge Prescriptions    No medications on file     No discharge procedures on file      ED Provider  Electronically Signed by           Shelley Davenport DO  09/06/19 7439

## 2019-09-07 PROBLEM — E87.5 HYPERKALEMIA: Status: ACTIVE | Noted: 2019-09-07

## 2019-09-07 LAB
ALBUMIN SERPL BCP-MCNC: 2.9 G/DL (ref 3.5–5)
ALP SERPL-CCNC: 107 U/L (ref 46–116)
ALT SERPL W P-5'-P-CCNC: 41 U/L (ref 12–78)
ANION GAP SERPL CALCULATED.3IONS-SCNC: 7 MMOL/L (ref 4–13)
AST SERPL W P-5'-P-CCNC: 62 U/L (ref 5–45)
BASOPHILS # BLD AUTO: 0.03 THOUSANDS/ΜL (ref 0–0.1)
BASOPHILS NFR BLD AUTO: 0 % (ref 0–1)
BILIRUB SERPL-MCNC: 7.1 MG/DL (ref 0.2–1)
BUN SERPL-MCNC: 48 MG/DL (ref 5–25)
CALCIUM SERPL-MCNC: 8.7 MG/DL (ref 8.3–10.1)
CHLORIDE SERPL-SCNC: 96 MMOL/L (ref 100–108)
CO2 SERPL-SCNC: 26 MMOL/L (ref 21–32)
CREAT SERPL-MCNC: 2.45 MG/DL (ref 0.6–1.3)
EOSINOPHIL # BLD AUTO: 0.52 THOUSAND/ΜL (ref 0–0.61)
EOSINOPHIL NFR BLD AUTO: 6 % (ref 0–6)
ERYTHROCYTE [DISTWIDTH] IN BLOOD BY AUTOMATED COUNT: 15.4 % (ref 11.6–15.1)
GFR SERPL CREATININE-BSD FRML MDRD: 28 ML/MIN/1.73SQ M
GLUCOSE SERPL-MCNC: 107 MG/DL (ref 65–140)
HCT VFR BLD AUTO: 26.1 % (ref 36.5–49.3)
HGB BLD-MCNC: 8.4 G/DL (ref 12–17)
IMM GRANULOCYTES # BLD AUTO: >0.5 THOUSAND/UL (ref 0–0.2)
IMM GRANULOCYTES NFR BLD AUTO: 9 % (ref 0–2)
L PNEUMO1 AG UR QL IA.RAPID: NEGATIVE
LYMPHOCYTES # BLD AUTO: 0.49 THOUSANDS/ΜL (ref 0.6–4.47)
LYMPHOCYTES NFR BLD AUTO: 5 % (ref 14–44)
MAGNESIUM SERPL-MCNC: 2.1 MG/DL (ref 1.6–2.6)
MCH RBC QN AUTO: 35.7 PG (ref 26.8–34.3)
MCHC RBC AUTO-ENTMCNC: 32.2 G/DL (ref 31.4–37.4)
MCV RBC AUTO: 111 FL (ref 82–98)
MONOCYTES # BLD AUTO: 1.44 THOUSAND/ΜL (ref 0.17–1.22)
MONOCYTES NFR BLD AUTO: 16 % (ref 4–12)
NEUTROPHILS # BLD AUTO: 5.96 THOUSANDS/ΜL (ref 1.85–7.62)
NEUTS SEG NFR BLD AUTO: 64 % (ref 43–75)
NRBC BLD AUTO-RTO: 0 /100 WBCS
OSMOLALITY UR/SERPL-RTO: 282 MMOL/KG (ref 282–298)
OSMOLALITY UR: 303 MMOL/KG
PLATELET # BLD AUTO: 68 THOUSANDS/UL (ref 149–390)
PMV BLD AUTO: 11 FL (ref 8.9–12.7)
POTASSIUM SERPL-SCNC: 5.2 MMOL/L (ref 3.5–5.3)
PROT SERPL-MCNC: 6.5 G/DL (ref 6.4–8.2)
RBC # BLD AUTO: 2.35 MILLION/UL (ref 3.88–5.62)
S PNEUM AG UR QL: NEGATIVE
SODIUM 24H UR-SCNC: 5 MOL/L
SODIUM SERPL-SCNC: 129 MMOL/L (ref 136–145)
WBC # BLD AUTO: 9.27 THOUSAND/UL (ref 4.31–10.16)

## 2019-09-07 PROCEDURE — 99232 SBSQ HOSP IP/OBS MODERATE 35: CPT | Performed by: PHYSICIAN ASSISTANT

## 2019-09-07 PROCEDURE — 99223 1ST HOSP IP/OBS HIGH 75: CPT | Performed by: INTERNAL MEDICINE

## 2019-09-07 PROCEDURE — 87449 NOS EACH ORGANISM AG IA: CPT | Performed by: PHYSICIAN ASSISTANT

## 2019-09-07 PROCEDURE — 83935 ASSAY OF URINE OSMOLALITY: CPT | Performed by: INTERNAL MEDICINE

## 2019-09-07 PROCEDURE — 80053 COMPREHEN METABOLIC PANEL: CPT | Performed by: INTERNAL MEDICINE

## 2019-09-07 PROCEDURE — 85025 COMPLETE CBC W/AUTO DIFF WBC: CPT | Performed by: INTERNAL MEDICINE

## 2019-09-07 PROCEDURE — 84300 ASSAY OF URINE SODIUM: CPT | Performed by: INTERNAL MEDICINE

## 2019-09-07 PROCEDURE — 83735 ASSAY OF MAGNESIUM: CPT | Performed by: INTERNAL MEDICINE

## 2019-09-07 RX ORDER — ALBUMIN (HUMAN) 12.5 G/50ML
25 SOLUTION INTRAVENOUS EVERY 6 HOURS
Status: COMPLETED | OUTPATIENT
Start: 2019-09-07 | End: 2019-09-08

## 2019-09-07 RX ORDER — SODIUM POLYSTYRENE SULFONATE 4.1 MEQ/G
30 POWDER, FOR SUSPENSION ORAL; RECTAL ONCE
Status: COMPLETED | OUTPATIENT
Start: 2019-09-07 | End: 2019-09-07

## 2019-09-07 RX ADMIN — GUAIFENESIN 600 MG: 600 TABLET, EXTENDED RELEASE ORAL at 20:29

## 2019-09-07 RX ADMIN — ALBUMIN (HUMAN) 25 G: 12.5 SOLUTION INTRAVENOUS at 15:18

## 2019-09-07 RX ADMIN — CEFEPIME 2000 MG: 2 INJECTION, POWDER, FOR SOLUTION INTRAVENOUS at 20:29

## 2019-09-07 RX ADMIN — SODIUM POLYSTYRENE SULFONATE 30 G: 1 POWDER ORAL; RECTAL at 09:27

## 2019-09-07 RX ADMIN — HEPARIN SODIUM 5000 UNITS: 5000 INJECTION INTRAVENOUS; SUBCUTANEOUS at 05:35

## 2019-09-07 RX ADMIN — PANTOPRAZOLE SODIUM 40 MG: 40 TABLET, DELAYED RELEASE ORAL at 05:35

## 2019-09-07 RX ADMIN — GUAIFENESIN 600 MG: 600 TABLET, EXTENDED RELEASE ORAL at 09:26

## 2019-09-07 RX ADMIN — CEFEPIME 2000 MG: 2 INJECTION, POWDER, FOR SOLUTION INTRAVENOUS at 09:25

## 2019-09-07 RX ADMIN — ALBUMIN (HUMAN) 25 G: 12.5 SOLUTION INTRAVENOUS at 23:00

## 2019-09-07 RX ADMIN — VANCOMYCIN HYDROCHLORIDE 1500 MG: 1 INJECTION, POWDER, LYOPHILIZED, FOR SOLUTION INTRAVENOUS at 10:44

## 2019-09-07 RX ADMIN — ALBUMIN (HUMAN) 25 G: 12.5 SOLUTION INTRAVENOUS at 09:26

## 2019-09-07 RX ADMIN — LACTULOSE 20 G: 10 SOLUTION ORAL at 09:26

## 2019-09-07 RX ADMIN — HEPARIN SODIUM 5000 UNITS: 5000 INJECTION INTRAVENOUS; SUBCUTANEOUS at 21:45

## 2019-09-07 RX ADMIN — VANCOMYCIN HYDROCHLORIDE 1500 MG: 1 INJECTION, POWDER, LYOPHILIZED, FOR SOLUTION INTRAVENOUS at 21:24

## 2019-09-07 RX ADMIN — HEPARIN SODIUM 5000 UNITS: 5000 INJECTION INTRAVENOUS; SUBCUTANEOUS at 15:18

## 2019-09-07 NOTE — ASSESSMENT & PLAN NOTE
· Patient with decompensated ROSALES cirrhosis  MELD calculated to be up to 32 this admission (calculated at 25 last admission)  · Ascites: CT shows mild ascites and diffuse anasarca  Patient was on diuresis as outpatient however, patient developed BOBBY and diuretics have been stopped at present  · Continue Lactulose for goal of 3 soft BMs a day  · Varices: Will need EGD in future  · [facility] [address]  Screen: AFP in June normal  CT A/P showed no obvious focal mass on unenhanced CT  MRI in June did not show a suspicious lesion in the liver but was limited by motion artifact  · Transplant: No evaluation at transplant center has been performed  GI consult given rising MELD score to 32 (was 25 last admission)

## 2019-09-07 NOTE — PROGRESS NOTES
Vancomycin Assessment    Mathew Nguyen is a 63 y o  male who is currently receiving vancomycin 2750 mg q 12 for skin-soft tissue infection     Relevant clinical data and objective history reviewed:  Creatinine   Date Value Ref Range Status   09/06/2019 2 41 (H) 0 60 - 1 30 mg/dL Final     Comment:     Specimen Icteric; Results May be Affected   08/21/2019 1 61 (H) 0 60 - 1 30 mg/dL Final     Comment:     Specimen Icteric; Results May be Affected   08/20/2019 1 50 (H) 0 60 - 1 30 mg/dL Final     Comment:     Specimen Icteric; Results May be Affected     /60 (BP Location: Right arm)   Pulse 73   Temp 97 5 °F (36 4 °C) (Oral)   Resp 20   Ht 5' 11" (1 803 m)   Wt (!) 185 kg (407 lb 13 6 oz)   SpO2 97%   BMI 56 88 kg/m²   No intake/output data recorded  Lab Results   Component Value Date/Time    BUN 47 (H) 09/06/2019 07:05 PM    WBC 10 02 09/06/2019 07:06 PM    HGB 8 6 (L) 09/06/2019 07:06 PM    HCT 26 4 (L) 09/06/2019 07:06 PM     (H) 09/06/2019 07:06 PM    PLT 64 (L) 09/06/2019 07:06 PM     Temp Readings from Last 3 Encounters:   09/06/19 97 5 °F (36 4 °C) (Oral)   08/21/19 98 4 °F (36 9 °C) (Oral)   06/10/19 97 5 °F (36 4 °C)     Vancomycin Days of Therapy: 1    Assessment/Plan  The patient is currently on vancomycin utilizing scheduled dosing based on adjusted body weight (due to obesity)  Baseline risks associated with therapy include: pre-existing renal impairment and concomitant nephrotoxic medications  The patient is currently receiving 2750 mg q 12 and after clinical evaluation will be changed to 1500 mg q 12  Pharmacy will also follow closely for s/sx of nephrotoxicity, infusion reactions and appropriateness of therapy  BMP and CBC will be ordered per protocol  Plan for trough as patient approaches steady state, prior to the 4th  dose at approximately 0830 9/8  Due to infection severity, will target a trough of 15-20 (appropriate for most indications)     Pharmacy will continue to follow the patients culture results and clinical progress daily      Grace Dickinson, Pharmacist

## 2019-09-07 NOTE — ASSESSMENT & PLAN NOTE
· Patient has been struggling with anasarca in the setting of decompensated cirrhosis  · He was on Bumex which has currently been discontinued due to BOBBY

## 2019-09-07 NOTE — ED NOTES
1  CC- pt c/o worsening leg swelling since he was d/c from here a week ago, pt also c/o worsening sob     2  Orientation status- A&OX4     3  Abnormal labs/ focused assessment/ vitals- BUN/CR 47/2 41, BNP 2108, abnormal CT     4  Medication/drips-  2g cefepime @ 2009, 1750 of vanco @ 2041     5  Narcotic time/ pain medication- n/a     6  IV lines/drain/etc  - #20 R AC     7  Isolation status - n/a     8  Skin- legs were red/swollen and weeping     9  Ambulation status - did not ambulate     10   Phone number- [phone] YANETH Rueda RN  09/06/19 [id number]

## 2019-09-07 NOTE — ASSESSMENT & PLAN NOTE
· CT suggests possible pneumonia  · Patient is already on broad-spectrum antibiotics for presentation with right lower extremity cellulitis  · Supplemental O2 prn, check Procalcitonin, Strep PNA and Legionella, sputum culture

## 2019-09-07 NOTE — H&P
H&P- Pamela [place] 1960, 63 y o  male MRN: [id number]    Unit/Bed#: ED 15 Encounter: [id number]    Primary Care Provider: No primary care provider on file  Date and time admitted to hospital: 9/6/2019  5:35 PM        * Anasarca  Assessment & Plan  Hold diuretic due to BOBBY  Await nephrology consultation and recommendations  Monitor ins and outs    HCAP (healthcare-associated pneumonia)  Assessment & Plan  Patient already on broad-spectrum antibiotics for presentation with right lower extremity cellulitis  Expectorant  O2 sat monitoring with supplemental O2 as needed  Monitor for leukocytosis with CBC in the a m  Monitor for febrile episodes  Supportive care    Cellulitis of right lower extremity  Assessment & Plan  IV vancomycin cefepime  Pharmacy consultation to manage vancomycin levels  CBC in the a m  To monitor leukocytosis  P r n  Antipyretic  P r n  Pain control  Follow-up cultures    Acute kidney injury [facility])  Assessment & Plan  Patient with baseline renal function approximately 1 5 creatinine, now 2 4 presentation  Fluid restriction for hyponatremia, will hold nephrotoxins including diuretic  BMP in the a m   To monitor renal function  Nephrology consultation    Hyponatremia  Assessment & Plan  Fluid restrict 1 5 L daily  Nephrology consultation for anasarca with acute kidney injury as well as hyponatremia  Monitor sodium  Urine and serum osmolality  Check a spot urine sodium    Thrombocytopenia (HCC)  Assessment & Plan  Chronic and likely secondary to liver issues  Monitor platelets    Morbid obesity (HCC)  Assessment & Plan  Dietary and lifestyle modifications recommended    Essential hypertension  Assessment & Plan  Currently normotensive  Hold Bumex as the patient is presenting with acute kidney injury and hyponatremia, await nephrology consultation and recommendations  Monitor blood pressure with routine vitals      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 [facility])  Assessment & Plan  Monitor LFTs  Minimize use of hepatic toxins  Continue lactulose at home dosing  Monitor INR    CROW on CPAP  Assessment & Plan  Continue CPAP HS as an inpatient      VTE Prophylaxis: Heparin  / reason for no mechanical VTE prophylaxis Anasarca   Code Status:  Full code  POLST: There is no POLST form on file for this patient (pre-hospital)  Discussion with family:  Bedside    Anticipated Length of Stay:  Patient will be admitted on an Inpatient basis with an anticipated length of stay of  more 2 midnights  Justification for Hospital Stay:  Hcap, anasarca, right lower extremity cellulitis    Total Time for Visit, including Counseling / Coordination of Care: 30 minutes  Greater than 50% of this total time spent on direct patient counseling and coordination of care  Chief Complaint:   Swelling, right lower extremity red    History of Present Illness:    Ana Mcdonald is a 63 y o  male past medical history in this case significant for history of anasarca in the recent past who was admitted to the hospital approximately 2 weeks ago for treatment of the same as well as a history of hepatic cirrhosis, chronic thrombocytopenia, morbid obesity who presents with complaint of generalized swelling and redness of his right lower extremity with some associated shortness of breath  Patient developed generalized swelling approximately a week ago, he followed up with his family doctor we did laboratory studies and have his Bumex pills due to kidney issues, 3 days ago he then developed redness and warmth of his right lower extremity  Patient denies fevers chills he denies urinary complaints reports making good urine to even half of his Bumex pill  He reports dietary compliance however then he began to develop some shortness of breath  The patient then had more blood work done in which his kidney function appeared to worsen again, as family doctor told to stop the Bumex yesterday    Patient presents today with right lower extremity swelling warmth and redness, generalized swelling up to the abdomen and shortness of breath  He denies chest pain nausea vomiting he denies fevers chills urinary complaints  He also denies lightheadedness confusion focal numbness or weakness    Review of Systems:    Review of Systems   Constitutional: Negative  Negative for activity change, appetite change, chills, diaphoresis, fatigue, fever and unexpected weight change  HENT: Negative  Negative for congestion, facial swelling, rhinorrhea, sinus pressure, sinus pain and sore throat  Eyes: Negative  Negative for photophobia, pain, discharge and visual disturbance  Respiratory: Positive for shortness of breath  Negative for cough, chest tightness, wheezing and stridor  Cardiovascular: Positive for leg swelling  Negative for chest pain and palpitations  Gastrointestinal: Negative  Negative for abdominal distention, abdominal pain, constipation, diarrhea, nausea and vomiting  Endocrine: Negative  Negative for cold intolerance, heat intolerance and polyuria  Genitourinary: Negative  Negative for decreased urine volume, difficulty urinating, dysuria, flank pain, frequency, hematuria and urgency  Musculoskeletal: Negative  Negative for arthralgias, gait problem, myalgias and neck stiffness  Skin: Negative  Negative for color change, pallor, rash and wound  Allergic/Immunologic: Negative  Negative for immunocompromised state  Neurological: Negative  Negative for dizziness, seizures, syncope, weakness, light-headedness and numbness  Hematological: Negative  Negative for adenopathy  Does not bruise/bleed easily  Psychiatric/Behavioral: Negative  Negative for confusion and hallucinations  The patient is not nervous/anxious          Past Medical and Surgical History:     Past Medical History:   Diagnosis Date    Anasarca     Chronic pain disorder     lower back    CPAP (continuous positive airway pressure) dependence     Heart murmur     Hypertension     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 ([facility] [address] [facility]     Myocardial infarction ([facility] [address] [facility]     Renal insufficiency 2019    Sleep apnea     Vitamin D deficiency        Past Surgical History:   Procedure Laterality Date    KNEE ARTHROSCOPY Bilateral     KNEE SURGERY      HI COLONOSCOPY FLX DX W/COLLJ SPEC WHEN PFRMD N/A 11/15/2017    Procedure: COLONOSCOPY;  Surgeon: Alex North MD;  Location: [facility]; Service: Gastroenterology       Meds/Allergies:    Prior to Admission medications    Medication Sig Start Date End Date Taking? Authorizing Provider   bumetanide (BUMEX) 2 mg tablet Take 1 tablet (2 mg total) by mouth 2 (two) times a day 19  Yes [place] Mague, CRNP   lactulose (CONSTULOSE) 10 g/15 mL solution Take 20 g by mouth 2 (two) times a day    Historical Provider, MD   pantoprazole (PROTONIX) 40 mg tablet Take 1 tablet (40 mg total) by mouth daily in the early morning 19   [place] Mague, CRNP   potassium chloride (K-DUR,KLOR-CON) 20 mEq tablet Take 20 mEq by mouth 3 (three) times a day    Historical Provider, MD     I have reviewed home medications using allscripts  Allergies:    Allergies   Allergen Reactions    Lactose     Propranolol Eye Swelling    Torsemide Eye Swelling       Social History:     Marital Status: /Civil Union   Occupation:  Disabled  Patient Pre-hospital Living Situation:  Independent  Patient Pre-hospital Level of Mobility:  Prior to his edema he can ambulate without assist device  Patient Pre-hospital Diet Restrictions:  Low-salt  Substance Use History:   Social History     Substance and Sexual Activity   Alcohol Use Yes    Comment: RARE     Social History     Tobacco Use   Smoking Status Former Smoker    Last attempt to quit: 11/15/1990    Years since quittin 8   Smokeless Tobacco Never Used     Social History     Substance and Sexual Activity   Drug Use No       Family History:    non-contributory    Physical Exam:     Vitals: Blood Pressure: 138/60 (09/06/19 2042)  Pulse: 73 (09/06/19 2042)  Temperature: 97 5 °F (36 4 °C) (09/06/19 1710)  Temp Source: Oral (09/06/19 1710)  Respirations: 20 (09/06/19 2042)  Height: 5' 11" (180 3 cm) (09/06/19 1710)  Weight - Scale: (!) 185 kg (407 lb 13 6 oz) (09/06/19 1710)  SpO2: 97 % (09/06/19 2042)    Physical Exam   Constitutional: He is oriented to person, place, and time  He appears well-developed and well-nourished  No distress  HENT:   Head: Normocephalic and atraumatic  Right Ear: External ear normal    Left Ear: External ear normal    Nose: Nose normal    Mouth/Throat: Oropharynx is clear and moist  No oropharyngeal exudate  Eyes: Conjunctivae are normal  Right eye exhibits no discharge  Left eye exhibits no discharge  No scleral icterus  Neck: Normal range of motion  No JVD present  No tracheal deviation present  No thyromegaly present  Cardiovascular: Normal rate, regular rhythm and intact distal pulses  Exam reveals no gallop and no friction rub  Murmur ( LEANNE 2/6) heard  Pulmonary/Chest: Effort normal  No stridor  No respiratory distress  He has no wheezes  He has no rales  Diminished breath sounds in all lung fields   Abdominal: Soft  Bowel sounds are normal  He exhibits no distension  There is no tenderness  There is no rebound and no guarding  Musculoskeletal: Normal range of motion  He exhibits edema (3+ pitting edema to the abdomen)  He exhibits no tenderness or deformity  Neurological: He is alert and oriented to person, place, and time  He has normal reflexes  He exhibits normal muscle tone  Coordination normal    Skin: Skin is warm and dry  No rash noted  He is not diaphoretic  There is erythema ( right lower extremity to the thigh is diffusely erythematous and warm)  No pallor  Psychiatric: He has a normal mood and affect  His behavior is normal  Judgment and thought content normal    Nursing note and vitals reviewed            Additional Data:     Lab Results: I have personally reviewed pertinent reports  Results from last 7 days   Lab Units 09/06/19  1906   WBC Thousand/uL 10 02   HEMOGLOBIN g/dL 8 6*   HEMATOCRIT % 26 4*   PLATELETS Thousands/uL 64*   BANDS PCT % 2   LYMPHO PCT % 9*   MONO PCT % 13*   EOS PCT % 6     Results from last 7 days   Lab Units 09/06/19  1905   SODIUM mmol/L 127*   POTASSIUM mmol/L 4 6   CHLORIDE mmol/L 94*   CO2 mmol/L 24   BUN mg/dL 47*   CREATININE mg/dL 2 41*   ANION GAP mmol/L 9   CALCIUM mg/dL 8 4   ALBUMIN g/dL 3 0*   TOTAL BILIRUBIN mg/dL 6 90*   ALK PHOS U/L 112   ALT U/L 37   AST U/L 63*   GLUCOSE RANDOM mg/dL 126     Results from last 7 days   Lab Units 09/06/19  1905   INR  1 77*             Results from last 7 days   Lab Units 09/06/19  1909   LACTIC ACID mmol/L 1 3       Imaging: I have personally reviewed pertinent reports  CT tibia fibula right wo contrast   Final Result by Jolene Talamantes MD (09/06 2128)   Soft tissue evaluation is limited without IV contrast    1    No evidence of soft tissue gas  2   Generalized subcutaneous edema with associated skin thickening  Edema noted adjacent to the calf musculature  Findings are nonspecific and may be related to generalized anasarca  3   The absence of soft tissue gas does not exclude necrotizing fasciitis particularly at the early stages  If there is persistent concern for necrotizing fasciitis, consider follow-up with MRI with and without contrast          Workstation performed: [id number]         CT femur right wo contrast   Final Result by Jolene Talamantes MD (09/06 2128)   Soft tissue evaluation is limited without IV contrast    1    No evidence of soft tissue gas  2   Generalized subcutaneous edema with associated skin thickening  Edema noted adjacent to the calf musculature  Findings are nonspecific and may be related to generalized anasarca  3   The absence of soft tissue gas does not exclude necrotizing fasciitis particularly at the early stages    If there is persistent concern for necrotizing fasciitis, consider follow-up with MRI with and without contrast          Workstation performed: [id number]         CT chest abdomen pelvis wo contrast   Final Result by Adela Llanos MD (09/06 2116)   Exam is limited without IV and oral contrast particularly for soft tissue and bowel evaluation  1   Scattered mild patchy opacities bilaterally suspicious for pneumonia  2  Cirrhotic appearance to the liver  Splenomegaly  3   Mild ascites  4   Diffuse subcutaneous edema compatible with anasarca  Workstation performed: [id number]         XR chest 2 views   ED Interpretation by Yousif Rogers DO (09/06 1949)   Limited film due to body habitus and poor inspiratory effort  Cardiomegaly present  Increased vascular congestion bilaterally  EKG, Pathology, and Other Studies Reviewed on Admission:   · EKG:      Allscripts / Epic Records Reviewed: Yes     ** Please Note: This note has been constructed using a voice recognition system   **

## 2019-09-07 NOTE — ASSESSMENT & PLAN NOTE
Currently normotensive  Hold Bumex as the patient is presenting with acute kidney injury and hyponatremia, await nephrology consultation and recommendations  Monitor blood pressure with routine vitals

## 2019-09-07 NOTE — PLAN OF CARE
Problem: PAIN - ADULT  Goal: Verbalizes/displays adequate comfort level or baseline comfort level  Description  Interventions:  - Encourage patient to monitor pain and request assistance  - Assess pain using appropriate pain scale  - Administer analgesics based on type and severity of pain and evaluate response  - Implement non-pharmacological measures as appropriate and evaluate response  - Consider cultural and social influences on pain and pain management  - Notify physician/advanced practitioner if interventions unsuccessful or patient reports new pain  Outcome: Progressing     Problem: INFECTION - ADULT  Goal: Absence or prevention of progression during hospitalization  Description  INTERVENTIONS:  - Assess and monitor for signs and symptoms of infection  - Monitor lab/diagnostic results  - Monitor all insertion sites, i e  indwelling lines, tubes, and drains  - Monitor endotracheal if appropriate and nasal secretions for changes in amount and color  - [place] appropriate cooling/warming therapies per order  - Administer medications as ordered  - Instruct and encourage patient and family to use good hand hygiene technique  - Identify and instruct in appropriate isolation precautions for identified infection/condition  Outcome: Progressing  Goal: Absence of fever/infection during neutropenic period  Description  INTERVENTIONS:  - Monitor WBC    Outcome: Progressing     Problem: SAFETY ADULT  Goal: Patient will remain free of falls  Description  INTERVENTIONS:  - Assess patient frequently for physical needs  -  Identify cognitive and physical deficits and behaviors that affect risk of falls    -  [place] fall precautions as indicated by assessment   - Educate patient/family on patient safety including physical limitations  - Instruct patient to call for assistance with activity based on assessment  - Modify environment to reduce risk of injury  - Consider OT/PT consult to assist with strengthening/mobility  Outcome: Progressing  Goal: Maintain or return to baseline ADL function  Description  INTERVENTIONS:  -  Assess patient's ability to carry out ADLs; assess patient's baseline for ADL function and identify physical deficits which impact ability to perform ADLs (bathing, care of mouth/teeth, toileting, grooming, dressing, etc )  - Assess/evaluate cause of self-care deficits   - Assess range of motion  - Assess patient's mobility; develop plan if impaired  - Assess patient's need for assistive devices and provide as appropriate  - Encourage maximum independence but intervene and supervise when necessary  - Involve family in performance of ADLs  - Assess for home care needs following discharge   - Consider OT consult to assist with ADL evaluation and planning for discharge  - Provide patient education as appropriate  Outcome: Progressing  Goal: Maintain or return mobility status to optimal level  Description  INTERVENTIONS:  - Assess patient's baseline mobility status (ambulation, transfers, stairs, etc )    - Identify cognitive and physical deficits and behaviors that affect mobility  - Identify mobility aids required to assist with transfers and/or ambulation (gait belt, sit-to-stand, lift, walker, cane, etc )  - [place] fall precautions as indicated by assessment  - Record patient progress and toleration of activity level on Mobility SBAR; progress patient to next Phase/Stage  - Instruct patient to call for assistance with activity based on assessment  - Consider rehabilitation consult to assist with strengthening/weightbearing, etc   Outcome: Progressing     Problem: DISCHARGE PLANNING  Goal: Discharge to home or other facility with appropriate resources  Description  INTERVENTIONS:  - Identify barriers to discharge w/patient and caregiver  - Arrange for needed discharge resources and transportation as appropriate  - Identify discharge learning needs (meds, wound care, etc )  - Arrange for interpretive services to assist at discharge as needed  - Refer to Case Management Department for coordinating discharge planning if the patient needs post-hospital services based on physician/advanced practitioner order or complex needs related to functional status, cognitive ability, or social support system  Outcome: Progressing     Problem: Knowledge Deficit  Goal: Patient/family/caregiver demonstrates understanding of disease process, treatment plan, medications, and discharge instructions  Description  Complete learning assessment and assess knowledge base    Interventions:  - Provide teaching at level of understanding  - Provide teaching via preferred learning methods  Outcome: Progressing

## 2019-09-07 NOTE — PROGRESS NOTES
Progress Note Joie Nolasco 1960, 63 y o  male MRN: [id number]    Unit/Bed#: -01 Encounter: [id number]    Primary Care Provider: No primary care provider on file  Date and time admitted to hospital: 9/6/2019  5:35 PM        Acute kidney injury [facility])  Assessment & Plan  · Patient was admitted with BOBBY on top of CKD stage 2   · Patient with baseline Cr of approximately 1 3 creatinine, now 2 45 on presentation  · Suspected secondary to intravascular volume depletion (in the setting of cirrhosis/hypoalbuminemia plus aggressive diuretics) versus hepatorenal syndrome  · Albumin IV  · Bumex discontinued  · Monitor BMP  Follow up urine studies  · Nephrology input appreciated  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 [facility])  Assessment & Plan  · Patient with decompensated ROSALES cirrhosis  MELD calculated to be up to 32 this admission (calculated at 25 last admission)  · Ascites: CT shows mild ascites and diffuse anasarca  Patient was on diuresis as outpatient however, patient developed BOBBY and diuretics have been stopped at present  · Continue Lactulose for goal of 3 soft BMs a day  · Varices: Will need EGD in future  · [facility] [address]  Screen: AFP in June normal  CT A/P showed no obvious focal mass on unenhanced CT  MRI in June did not show a suspicious lesion in the liver but was limited by motion artifact  · Transplant: No evaluation at transplant center has been performed  GI consult given rising MELD score to 32 (was 25 last admission)  Cellulitis of right lower extremity  Assessment & Plan  · Patient developed new erythema of the RLE  · On IV Vancomycin and Cefepime  · Pharmacy consultation to manage Vancomycin levels  · Serial exams  · Monitor CBC, vitals  Check Procalcitonin  · Follow up blood cultures  * Anasarca  Assessment & Plan  · Patient has been struggling with anasarca in the setting of decompensated cirrhosis    · He was on Bumex which has currently been discontinued due to BOBBY     HCAP (healthcare-associated pneumonia)  Assessment & Plan  · CT suggests possible pneumonia  · Patient is already on broad-spectrum antibiotics for presentation with right lower extremity cellulitis  · Supplemental O2 prn, check Procalcitonin, Strep PNA and Legionella, sputum culture  Hyponatremia  Assessment & Plan  · Suspected in setting of cirrhosis  · Monitor  · Nephrology input appreciated  Morbid obesity ([address] )  Assessment & Plan  · Dietary and lifestyle modifications recommended  CROW on CPAP  Assessment & Plan  · Continue CPAP q hs  VTE Pharmacologic Prophylaxis:   Pharmacologic: Heparin    Patient Centered Rounds: I have performed bedside rounds with nursing staff today  Discussions with Specialists or Other Care Team Provider: Discussed with Nephrology  Education and Discussions with Family / Patient: Patient  Time Spent for Care: 30 minutes  More than 50% of total time spent on counseling and coordination of care as described above  Current Length of Stay: 1 day(s)    Current Patient Status: Inpatient   Certification Statement: The patient will continue to require additional inpatient hospital stay due to further treatment of BOBBY  Discharge Plan: Not medically cleared  Code Status: Level 1 - Full Code      Subjective:   Patient reports the development of redness and discomfort of his RLE prior to admission  He also reports some SOB  He continues to struggle with anasarca  Objective:     Vitals:   Temp (24hrs), Av 5 °F (36 4 °C), Min:97 5 °F (36 4 °C), Max:97 5 °F (36 4 °C)    Temp:  [97 5 °F (36 4 °C)] 97 5 °F (36 4 °C)  HR:  [69-74] 74  Resp:  [16-20] 18  BP: (123-138)/(46-64) 123/46  SpO2:  [96 %-100 %] 96 %  Body mass index is 56 76 kg/m²  Input and Output Summary (last 24 hours):        Intake/Output Summary (Last 24 hours) at 2019 1401  Last data filed at 2019 1101  Gross per 24 hour   Intake 530 ml   Output 250 ml   Net 280 ml Physical Exam:     Physical Exam   Constitutional: He is oriented to person, place, and time  No distress  Morbidly Obese  HENT:   Head: Normocephalic and atraumatic  Eyes: Scleral icterus is present  Cardiovascular: Normal rate and regular rhythm  Pulmonary/Chest: Effort normal    Decreased breath sounds  Abdominal: Soft  Bowel sounds are normal  He exhibits distension  There is no tenderness  Musculoskeletal:   Severe edema  +Erythema of the RLE  Neurological: He is alert and oriented to person, place, and time  No asterixis  Skin: He is not diaphoretic  Vitals reviewed  Additional Data:     Labs:    Results from last 7 days   Lab Units 09/07/19  0526   WBC Thousand/uL 9 27   HEMOGLOBIN g/dL 8 4*   HEMATOCRIT % 26 1*   PLATELETS Thousands/uL 68*   NEUTROS PCT % 64   LYMPHS PCT % 5*   MONOS PCT % 16*   EOS PCT % 6     Results from last 7 days   Lab Units 09/07/19  0526   POTASSIUM mmol/L 5 2   CHLORIDE mmol/L 96*   CO2 mmol/L 26   BUN mg/dL 48*   CREATININE mg/dL 2 45*   CALCIUM mg/dL 8 7   ALK PHOS U/L 107   ALT U/L 41   AST U/L 62*     Results from last 7 days   Lab Units 09/06/19  1905   INR  1 77*       * I Have Reviewed All Lab Data Listed Above  * Additional Pertinent Lab Tests Reviewed: All Labs Within Last 24 Hours Reviewed    Imaging:    Imaging Reports Reviewed Today Include: CT chest/abdomen/pelvis      Recent Cultures (last 7 days):           Last 24 Hours Medication List:     Current Facility-Administered Medications:  acetaminophen 650 mg Oral Q6H PRN Edith Edward MD    albumin human 25 g Intravenous Q6H Marian Dickens MD    cefepime 2,000 mg Intravenous Q12H Edith Edward MD Last Rate: 2,000 mg (09/07/19 0925)   guaiFENesin 600 mg Oral Q12H Uvaldo Cunningham MD    heparin (porcine) 5,000 Units Subcutaneous [facility] Edith Edward MD    lactulose 20 g Oral BID Edith Edward MD    pantoprazole 40 mg Oral Early Morning Edith Edward MD vancomycin 12 5 mg/kg (Adjusted) Intravenous Q12H Jelena Pearce MD Last Rate: 1,500 mg (09/07/19 1044)        Today, Patient Was Seen By: Deandre Johnson PA-C    ** Please Note: Dictation voice to text software may have been used in the creation of this document   **

## 2019-09-07 NOTE — ASSESSMENT & PLAN NOTE
Patient already on broad-spectrum antibiotics for presentation with right lower extremity cellulitis  Expectorant  O2 sat monitoring with supplemental O2 as needed  Monitor for leukocytosis with CBC in the a m    Monitor for febrile episodes  Supportive care

## 2019-09-07 NOTE — ASSESSMENT & PLAN NOTE
Fluid restrict 1 5 L daily  Nephrology consultation for anasarca with acute kidney injury as well as hyponatremia  Monitor sodium  Urine and serum osmolality  Check a spot urine sodium

## 2019-09-07 NOTE — CONSULTS
NEPHROLOGY CONSULTATION NOTE    Patient: Anish Ring               Sex: male          DOA: 9/6/2019  5:35 PM   Date of Birth: @        Age: @        LOS:  LOS: 1 day     REFERRING PHYSICIAN: Karlo Yoo MD      [address] / CONSULTATION:  Further management of BOBBY    DATE OF CONSULTATION / SERVICE: 9/7/2019    ADMISSION DIAGNOSIS: Anasarca     CHIEF COMPLAINT     I have right lower extremity redness    HPI     This is a 60-year-old male with past medical history of anasarca, liver cirrhosis, chronic thrombocytopenia, morbid obesity who came into the ER for further evaluation of right lower extremity redness  On admission patient was found to have elevated creatinine of 2 41 and Nephrology were consulted for further management of BOBBY  Patient was previously seen during last hospital stay by Nephrology team for anasarca which was suspected due to underlying liver cirrhosis on top of hypoalbuminemia and patient was started on Bumex  Upon review of old medical record patient's baseline creatinine is around 1 1-1 3  Patient's discharge creatinine on 8/21/2019 was 1 61  I have reviewed records from Care everywhere and patient was noticed to have slowly worsening renal function on diuretics up to creatinine of 2 8 earlier this month  Patient's admission creatinine was 2 41  According to patient he also have abdominal swelling and his primary care doctor has ordered paracentesis but radiologist were not able to remove any fluid  Patient's wife was also present at the time of consultation and history was also obtained from her and all the questions were answered  Patient was admitted with right lower extremity cellulitis and currently receiving empiric antibiotic and according to patient his erythema is already improving  Patient was also suspected to have healthcare associated pneumonia in the light of elevated WBC count on admission      Patient also has hyponatremia which seems chronic which was suspected due to underlying liver cirrhosis  Patient also have chronic thrombocytopenia and according to patient previous GI physician has not commented on liver transplant status  Patient also has underlying obstructive sleep apnea and uses CPAP at night     Currently patient denies nausea, vomiting, headache, dizziness, abdominal pain, constipation or rash  PAST MEDICAL HISTORY     Past Medical History:   Diagnosis Date    Anasarca     Chronic pain disorder     lower back    CPAP (continuous positive airway pressure) dependence     Heart murmur     Hypertension     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)     Myocardial infarction ([facility] [address] )     Renal insufficiency 2019    Sleep apnea     Vitamin D deficiency        PAST SURGICAL HISTORY     Past Surgical History:   Procedure Laterality Date    KNEE ARTHROSCOPY Bilateral     KNEE SURGERY      KY COLONOSCOPY FLX DX W/COLLJ SPEC WHEN PFRMD N/A 11/15/2017    Procedure: COLONOSCOPY;  Surgeon: Aide Nunez MD;  Location: [facility]; Service: Gastroenterology       ALLERGIES     Allergies   Allergen Reactions    Lactose     Propranolol Eye Swelling    Torsemide Eye Swelling       SOCIAL HISTORY     Social History     Substance and Sexual Activity   Alcohol Use Yes    Comment: RARE     Social History     Substance and Sexual Activity   Drug Use No     Social History     Tobacco Use   Smoking Status Former Smoker    Last attempt to quit: 11/15/1990    Years since quittin 8   Smokeless Tobacco Never Used       FAMILY HISTORY     History reviewed  No pertinent family history      CURRENT MEDICATIONS       Current Facility-Administered Medications:     acetaminophen (TYLENOL) tablet 650 mg, 650 mg, Oral, Q6H PRN, Bong Persaud MD    albumin human (FLEXBUMIN) 25 % injection 25 g, 25 g, Intravenous, Q6H, Earlene Pascal MD, 25 g at 19 0926    cefepime (MAXIPIME) 2,000 mg in dextrose 5 % 50 mL IVPB, 2,000 mg, Intravenous, Q12H, Donovan Parry MD, Last Rate: 100 mL/hr at 09/07/19 0925, 2,000 mg at 09/07/19 0925    guaiFENesin (MUCINEX) 12 hr tablet 600 mg, 600 mg, Oral, Q12H [address], Donovan Parry MD, 600 mg at 09/07/19 0926    heparin (porcine) subcutaneous injection 5,000 Units, 5,000 Units, Subcutaneous, Q8H [address], 5,000 Units at 09/07/19 0535 **AND** [CANCELED] Platelet count, , , Once, Donovan Parry MD    lactulose 20 g/30 mL oral solution 20 g, 20 g, Oral, BID, Donovan Parry MD, 20 g at 09/07/19 0926    pantoprazole (PROTONIX) EC tablet 40 mg, 40 mg, Oral, Early Morning, Donovan Parry MD, 40 mg at 09/07/19 0535    vancomycin (VANCOCIN) 1,500 mg in sodium chloride 0 9 % 250 mL IVPB, 12 5 mg/kg (Adjusted), Intravenous, Q12H, Donovan Parry MD, Last Rate: 166 7 mL/hr at 09/07/19 1044, 1,500 mg at 09/07/19 1044    REVIEW OF SYSTEMS     Complete 10 points of review of systems were obtained and discussed in length with patient today  Complete 10 points of review of systems were negative/unremarkable except mentioned in the HPI section  OBJECTIVE     Current Weight: Weight - Scale: (!) 185 kg (407 lb 13 6 oz)  Vitals:    09/07/19 0804   BP: (!) 123/46   Pulse: 74   Resp: 18   Temp: 97 5 °F (36 4 °C)   SpO2: 96%     Body mass index is 56 88 kg/m²  Intake/Output Summary (Last 24 hours) at 9/7/2019 1051  Last data filed at 9/7/2019 0500  Gross per 24 hour   Intake 530 ml   Output    Net 530 ml       PHYSICAL EXAMINATION     Physical Exam   Constitutional: No distress  Obese   HENT:   Head: Normocephalic and atraumatic  Eyes: Scleral icterus is present  Neck: Neck supple  No JVD present  Cardiovascular: Normal rate  Pulmonary/Chest: No accessory muscle usage  No respiratory distress  He has no wheezes  Abdominal: Soft  He exhibits distension  There is no tenderness  Musculoskeletal: He exhibits edema  Right hand: He exhibits no laceration          Left hand: He exhibits no laceration  Neurological: He is alert  Skin: Skin is warm  No cyanosis  Psychiatric: He is not combative  He expresses no suicidal ideation  LAB RESULTS        Results from last 7 days   Lab Units 09/07/19  0526 09/06/19  1906 09/06/19  1905   WBC Thousand/uL 9 27 10 02  --    HEMOGLOBIN g/dL 8 4* 8 6*  --    HEMATOCRIT % 26 1* 26 4*  --    PLATELETS Thousands/uL 68* 64*  --    SODIUM mmol/L 129*  --  127*   POTASSIUM mmol/L 5 2  --  4 6   CHLORIDE mmol/L 96*  --  94*   CO2 mmol/L 26  --  24   BUN mg/dL 48*  --  47*   CREATININE mg/dL 2 45*  --  2 41*   EGFR ml/min/1 73sq m 28  --  29   CALCIUM mg/dL 8 7  --  8 4   MAGNESIUM mg/dL 2 1  --  1 9       I have personally reviewed the old medical records and patient's previously known baseline creatinine level is ~ 1 1-1 3    RADIOLOGY RESULTS     CT tibia fibula right wo contrast   Final Result by Fidelina Mcginnis MD (09/06 2128)   Soft tissue evaluation is limited without IV contrast    1    No evidence of soft tissue gas  2   Generalized subcutaneous edema with associated skin thickening  Edema noted adjacent to the calf musculature  Findings are nonspecific and may be related to generalized anasarca  3   The absence of soft tissue gas does not exclude necrotizing fasciitis particularly at the early stages  If there is persistent concern for necrotizing fasciitis, consider follow-up with MRI with and without contrast          Workstation performed: [id number]         CT femur right wo contrast   Final Result by Fidelina Mcginnis MD (09/06 2128)   Soft tissue evaluation is limited without IV contrast    1    No evidence of soft tissue gas  2   Generalized subcutaneous edema with associated skin thickening  Edema noted adjacent to the calf musculature  Findings are nonspecific and may be related to generalized anasarca  3   The absence of soft tissue gas does not exclude necrotizing fasciitis particularly at the early stages    If there is persistent concern for necrotizing fasciitis, consider follow-up with MRI with and without contrast          Workstation performed: [id number]         CT chest abdomen pelvis wo contrast   Final Result by Carole Dotson MD (09/06 2116)   Exam is limited without IV and oral contrast particularly for soft tissue and bowel evaluation  1   Scattered mild patchy opacities bilaterally suspicious for pneumonia  2  Cirrhotic appearance to the liver  Splenomegaly  3   Mild ascites  4   Diffuse subcutaneous edema compatible with anasarca  Workstation performed: [id number]         XR chest 2 views   ED Interpretation by Kristyn Britton DO (09/06 1949)   Limited film due to body habitus and poor inspiratory effort  Cardiomegaly present  Increased vascular congestion bilaterally  Final Result by Chiara Galvin MD (09/07 3230)   Persistent cardiomegaly      Increased diffuse vascular congestion            Workstation performed: [id number]           12 lead EKG done on 9/6/2019 showed sinus rhythm with ventricular rate 73/min  PLAN / RECOMMENDATIONS      1  BOBBY on top of CKD stage 2  Present on admission  Multifactorial and suspected due to intravascular volume depletion (in the setting of underlying liver cirrhosis and hypoalbuminemia plus use of diuretics) versus hepatorenal syndrome    Upon review of old medical record patient's previously known baseline creatinine was around 1 1-1 3  Patient was seen during previous hospital stay for anasarca and was started on diuretics and patient's discharge creatinine on 8/21/2019 was 1 61  I have reviewed labs from Care everywhere and patient was noticed to have slowly worsening renal function since discharge with peak creatinine level of 2 8 prior to admission  Admission creatinine was 2 41 and overnight renal function has failed to improve and current creatinine is 2 45    Admission chest x-ray showed cardiomegaly with increased pulmonary vascular congestion although clinically I am suspecting that patient's BOBBY is secondary to intravascular volume depletion versus hepatorenal syndrome  Patient was receiving diuretics as outpatient and will plan to hold diuretics for time being  Plan to start patient on albumin 25 g q 6 hours x3 doses  Pending urine sodium and urine osmolality  Check BNP and CK level  Will also check serial bladder scan to rule out urinary retention  Discussed consideration of dialysis with patient if renal function continue to worsen  Will plan to recheck renal function with AM labs  Patient has anasarca and it is difficult to manage volume status  Patient has developed BOBBY with the use of diuretics and we are holding diuretics for time being as mentioned earlier  Patient also have underlying liver cirrhosis  2  Hyponatremia  Exact onset of hyponatremia is unknown on will consider as a chronic hyponatremia  Patient has underlying liver cirrhosis and hyponatremia due to underlying liver cirrhosis cannot be ruled out  Current sodium is 129  Pending urine sodium and urine osmolality  Plan to recheck sodium level with AM labs  3  Hyperkalemia  Current potassium is 5 2  Plan to give Kayexalate 30 g PO x1 dose today  Recheck potassium level with AM labs  Thank you for the consultation to participate in patient's care  I have personally discussed my overall above mentioned plan with the referring physician  Estefany Garcia MD  Nephrology  9/7/2019        Portions of the record may have been created with voice recognition software  Occasional wrong word or "sound a like" substitutions may have occurred due to the inherent limitations of voice recognition software  Read the chart carefully and recognize, using context, where substitutions have occurred

## 2019-09-07 NOTE — PROGRESS NOTES
Vancomycin IV Pharmacy-to-Dose Consultation    Jluis Burgess is a 63 y o  male who is currently receiving Vancomycin IV with management by the Pharmacy Consult service  Assessment/Plan:  The patient was reviewed  Renal function is stable and no signs or symptoms of nephrotoxicity and/or infusion reactions were documented in the chart  Based on todays assessment, continue current vancomycin (day # 2) dosing of 1500 mg IV q 12 H, with a plan for trough to be drawn at 0830 on 9/8/19  We will continue to follow the patients culture results and clinical progress daily      Chen Connolly, Pharmacist

## 2019-09-07 NOTE — ASSESSMENT & PLAN NOTE
· Patient developed new erythema of the RLE  · On IV Vancomycin and Cefepime  · Pharmacy consultation to manage Vancomycin levels  · Serial exams  · Monitor CBC, vitals  Check Procalcitonin  · Follow up blood cultures

## 2019-09-07 NOTE — ASSESSMENT & PLAN NOTE
· Patient was admitted with BOBBY on top of CKD stage 2   · Patient with baseline Cr of approximately 1 3 creatinine, now 2 45 on presentation  · Suspected secondary to intravascular volume depletion (in the setting of cirrhosis/hypoalbuminemia plus aggressive diuretics) versus hepatorenal syndrome  · Albumin IV  · Bumex discontinued  · Monitor BMP  Follow up urine studies  · Nephrology input appreciated

## 2019-09-07 NOTE — RESPIRATORY THERAPY NOTE
Patient called for CPAP set up  Pt  Has own CPAP and only needed water for the reservoir  Patient able to set up on their own

## 2019-09-07 NOTE — ASSESSMENT & PLAN NOTE
Patient with baseline renal function approximately 1 5 creatinine, now 2 4 presentation  Fluid restriction for hyponatremia, will hold nephrotoxins including diuretic  BMP in the a m   To monitor renal function  Nephrology consultation

## 2019-09-07 NOTE — ASSESSMENT & PLAN NOTE
IV vancomycin cefepime  Pharmacy consultation to manage vancomycin levels  CBC in the a m  To monitor leukocytosis  P r n  Antipyretic  P r n   Pain control  Follow-up cultures

## 2019-09-08 LAB
ANION GAP SERPL CALCULATED.3IONS-SCNC: 11 MMOL/L (ref 4–13)
BASOPHILS # BLD MANUAL: 0 THOUSAND/UL (ref 0–0.1)
BASOPHILS NFR MAR MANUAL: 0 % (ref 0–1)
BUN SERPL-MCNC: 51 MG/DL (ref 5–25)
CALCIUM SERPL-MCNC: 8.5 MG/DL (ref 8.3–10.1)
CHLORIDE SERPL-SCNC: 94 MMOL/L (ref 100–108)
CK SERPL-CCNC: 101 U/L (ref 39–308)
CO2 SERPL-SCNC: 23 MMOL/L (ref 21–32)
CREAT SERPL-MCNC: 2.41 MG/DL (ref 0.6–1.3)
EOSINOPHIL # BLD MANUAL: 0.45 THOUSAND/UL (ref 0–0.4)
EOSINOPHIL NFR BLD MANUAL: 5 % (ref 0–6)
ERYTHROCYTE [DISTWIDTH] IN BLOOD BY AUTOMATED COUNT: 15.6 % (ref 11.6–15.1)
GFR SERPL CREATININE-BSD FRML MDRD: 29 ML/MIN/1.73SQ M
GLUCOSE SERPL-MCNC: 100 MG/DL (ref 65–140)
HCT VFR BLD AUTO: 24 % (ref 36.5–49.3)
HGB BLD-MCNC: 7.7 G/DL (ref 12–17)
LYMPHOCYTES # BLD AUTO: 0.9 THOUSAND/UL (ref 0.6–4.47)
LYMPHOCYTES # BLD AUTO: 10 % (ref 14–44)
MCH RBC QN AUTO: 36 PG (ref 26.8–34.3)
MCHC RBC AUTO-ENTMCNC: 32.1 G/DL (ref 31.4–37.4)
MCV RBC AUTO: 112 FL (ref 82–98)
METAMYELOCYTES NFR BLD MANUAL: 3 % (ref 0–1)
MONOCYTES # BLD AUTO: 0.63 THOUSAND/UL (ref 0–1.22)
MONOCYTES NFR BLD: 7 % (ref 4–12)
NEUTROPHILS # BLD MANUAL: 6.73 THOUSAND/UL (ref 1.85–7.62)
NEUTS BAND NFR BLD MANUAL: 3 % (ref 0–8)
NEUTS SEG NFR BLD AUTO: 72 % (ref 43–75)
NRBC BLD AUTO-RTO: 0 /100 WBCS
NT-PROBNP SERPL-MCNC: 2919 PG/ML
PLATELET # BLD AUTO: 51 THOUSANDS/UL (ref 149–390)
PLATELET BLD QL SMEAR: ABNORMAL
PMV BLD AUTO: 11.1 FL (ref 8.9–12.7)
POTASSIUM SERPL-SCNC: 4.8 MMOL/L (ref 3.5–5.3)
PROCALCITONIN SERPL-MCNC: 0.33 NG/ML
RBC # BLD AUTO: 2.14 MILLION/UL (ref 3.88–5.62)
SODIUM SERPL-SCNC: 128 MMOL/L (ref 136–145)
TOTAL CELLS COUNTED SPEC: 100
VANCOMYCIN TROUGH SERPL-MCNC: 22.6 UG/ML (ref 10–20)
WBC # BLD AUTO: 8.97 THOUSAND/UL (ref 4.31–10.16)

## 2019-09-08 PROCEDURE — 85027 COMPLETE CBC AUTOMATED: CPT | Performed by: INTERNAL MEDICINE

## 2019-09-08 PROCEDURE — 82550 ASSAY OF CK (CPK): CPT | Performed by: INTERNAL MEDICINE

## 2019-09-08 PROCEDURE — 99233 SBSQ HOSP IP/OBS HIGH 50: CPT | Performed by: INTERNAL MEDICINE

## 2019-09-08 PROCEDURE — 87185 SC STD ENZYME DETCJ PER NZM: CPT | Performed by: PHYSICIAN ASSISTANT

## 2019-09-08 PROCEDURE — 87205 SMEAR GRAM STAIN: CPT | Performed by: PHYSICIAN ASSISTANT

## 2019-09-08 PROCEDURE — 84145 PROCALCITONIN (PCT): CPT | Performed by: PHYSICIAN ASSISTANT

## 2019-09-08 PROCEDURE — 99232 SBSQ HOSP IP/OBS MODERATE 35: CPT | Performed by: PHYSICIAN ASSISTANT

## 2019-09-08 PROCEDURE — 87106 FUNGI IDENTIFICATION YEAST: CPT | Performed by: PHYSICIAN ASSISTANT

## 2019-09-08 PROCEDURE — 87070 CULTURE OTHR SPECIMN AEROBIC: CPT | Performed by: PHYSICIAN ASSISTANT

## 2019-09-08 PROCEDURE — 87077 CULTURE AEROBIC IDENTIFY: CPT | Performed by: PHYSICIAN ASSISTANT

## 2019-09-08 PROCEDURE — 99223 1ST HOSP IP/OBS HIGH 75: CPT | Performed by: INTERNAL MEDICINE

## 2019-09-08 PROCEDURE — 80202 ASSAY OF VANCOMYCIN: CPT | Performed by: INTERNAL MEDICINE

## 2019-09-08 PROCEDURE — 83880 ASSAY OF NATRIURETIC PEPTIDE: CPT | Performed by: INTERNAL MEDICINE

## 2019-09-08 PROCEDURE — 85007 BL SMEAR W/DIFF WBC COUNT: CPT | Performed by: INTERNAL MEDICINE

## 2019-09-08 PROCEDURE — 80048 BASIC METABOLIC PNL TOTAL CA: CPT | Performed by: INTERNAL MEDICINE

## 2019-09-08 RX ORDER — ALBUMIN (HUMAN) 12.5 G/50ML
25 SOLUTION INTRAVENOUS EVERY 6 HOURS
Status: COMPLETED | OUTPATIENT
Start: 2019-09-08 | End: 2019-09-09

## 2019-09-08 RX ORDER — OCTREOTIDE ACETATE 100 UG/ML
100 INJECTION, SOLUTION INTRAVENOUS; SUBCUTANEOUS EVERY 8 HOURS SCHEDULED
Status: DISCONTINUED | OUTPATIENT
Start: 2019-09-08 | End: 2019-09-25 | Stop reason: HOSPADM

## 2019-09-08 RX ORDER — VANCOMYCIN HYDROCHLORIDE 1 G/200ML
1000 INJECTION, SOLUTION INTRAVENOUS EVERY 12 HOURS
Status: DISCONTINUED | OUTPATIENT
Start: 2019-09-08 | End: 2019-09-10

## 2019-09-08 RX ADMIN — HEPARIN SODIUM 5000 UNITS: 5000 INJECTION INTRAVENOUS; SUBCUTANEOUS at 14:55

## 2019-09-08 RX ADMIN — HEPARIN SODIUM 5000 UNITS: 5000 INJECTION INTRAVENOUS; SUBCUTANEOUS at 05:36

## 2019-09-08 RX ADMIN — LACTULOSE 20 G: 10 SOLUTION ORAL at 08:04

## 2019-09-08 RX ADMIN — PANTOPRAZOLE SODIUM 40 MG: 40 TABLET, DELAYED RELEASE ORAL at 05:37

## 2019-09-08 RX ADMIN — ALBUMIN (HUMAN) 25 G: 12.5 SOLUTION INTRAVENOUS at 11:00

## 2019-09-08 RX ADMIN — GUAIFENESIN 600 MG: 600 TABLET, EXTENDED RELEASE ORAL at 08:04

## 2019-09-08 RX ADMIN — GUAIFENESIN 600 MG: 600 TABLET, EXTENDED RELEASE ORAL at 23:05

## 2019-09-08 RX ADMIN — ALBUMIN (HUMAN) 25 G: 12.5 SOLUTION INTRAVENOUS at 15:10

## 2019-09-08 RX ADMIN — OCTREOTIDE ACETATE 100 MCG: 100 INJECTION, SOLUTION INTRAVENOUS; SUBCUTANEOUS at 14:56

## 2019-09-08 RX ADMIN — CEFEPIME 2000 MG: 2 INJECTION, POWDER, FOR SOLUTION INTRAVENOUS at 23:15

## 2019-09-08 RX ADMIN — OCTREOTIDE ACETATE 100 MCG: 100 INJECTION, SOLUTION INTRAVENOUS; SUBCUTANEOUS at 23:13

## 2019-09-08 RX ADMIN — CEFEPIME 2000 MG: 2 INJECTION, POWDER, FOR SOLUTION INTRAVENOUS at 08:04

## 2019-09-08 NOTE — PROGRESS NOTES
Vancomycin Assessment    Soledad Pinto is a 63 y o  male who is currently receiving vancomycin 1500 mg IV q 12 H for skin-soft tissue infection     Relevant clinical data and objective history reviewed:  Creatinine   Date Value Ref Range Status   09/08/2019 2 41 (H) 0 60 - 1 30 mg/dL Final     Comment:     Specimen Icteric; Results May be Affected   09/07/2019 2 45 (H) 0 60 - 1 30 mg/dL Final     Comment:     Specimen Icteric; Results May be Affected   09/06/2019 2 41 (H) 0 60 - 1 30 mg/dL Final     Comment:     Specimen Icteric; Results May be Affected     /61   Pulse 74   Temp 97 5 °F (36 4 °C)   Resp 18   Ht 5' 11" (1 803 m)   Wt (!) 185 kg (407 lb)   SpO2 98%   BMI 56 76 kg/m²   I/O last 3 completed shifts: In: 3666 [P O :1480; IV Piggyback:50]  Out: 1050 [Urine:1050]  Lab Results   Component Value Date/Time    BUN 51 (H) 09/08/2019 05:58 AM    WBC 8 97 09/08/2019 05:58 AM    HGB 7 7 (L) 09/08/2019 05:58 AM    HCT 24 0 (L) 09/08/2019 05:58 AM     (H) 09/08/2019 05:58 AM    PLT 51 (L) 09/08/2019 05:58 AM     Temp Readings from Last 3 Encounters:   09/08/19 97 5 °F (36 4 °C)   08/21/19 98 4 °F (36 9 °C) (Oral)   06/10/19 97 5 °F (36 4 °C)     Vancomycin Days of Therapy: 2    Assessment/Plan  The patient is currently on vancomycin utilizing scheduled dosing  Baseline risks associated with therapy include: pre-existing renal impairment and concomitant nephrotoxic medications  The patient is receiving 1500 mg IV q 12 H with the most recent vancomycin level being not at steady-state and supratherapeutic based on a goal of 15-20 (appropriate for most indications) ; therefore, after clinical evaluation will be changed to 1000 mg IV q 12 H   Pharmacy will continue to follow closely for s/sx of nephrotoxicity, infusion reactions and appropriateness of therapy  BMP and CBC will be ordered per protocol    Plan for trough as patient approaches steady state, prior to the 4th  dose at approximately 0830 on 9/10/19  Pharmacy will continue to follow the patients culture results and clinical progress daily      Loulou Carrizales, Pharmacist

## 2019-09-08 NOTE — ASSESSMENT & PLAN NOTE
· Patient was admitted with BOBBY on top of CKD stage 2   · Patient with baseline Cr of approximately 1 3 creatinine, now 2 45 on presentation and 2 41 today  · Etiology suspected to be secondary to hepatorenal syndrome  · Continue Albumin IV and Octreotide added today  (Midodrine not added at present per Nephrology as systolic BP >868)  · Bumex discontinued  · Monitor BMP  · Nephrology input appreciated  May need dialysis in near future  · Prognosis poor

## 2019-09-08 NOTE — PROGRESS NOTES
NEPHROLOGY PROGRESS NOTE    Patient: Rm Galdamez               Sex: male          DOA: 9/6/2019  5:35 PM   Date of Birth: @        Age: @        LOS:  LOS: 2 days   9/8/2019    REASON FOR THE CONSULTATION:  Further management of BOBBY    HPI     This is a 63 y o  male admitted for Anasarca     SUBJECTIVE     - updated patient's wife at bedside today  Patient denies nausea, vomiting, headache or dizziness today  - Reviewed last 24 hrs events     CURRENT MEDICATIONS       Current Facility-Administered Medications:     acetaminophen (TYLENOL) tablet 650 mg, 650 mg, Oral, Q6H PRN, Alfa Pickett MD    albumin human (FLEXBUMIN) 25 % injection 25 g, 25 g, Intravenous, Q6H, Carlito Ogden MD, 25 g at 09/08/19 1100    cefepime (MAXIPIME) 2,000 mg in dextrose 5 % 50 mL IVPB, 2,000 mg, Intravenous, Q12H, Alfa Pickett MD, Last Rate: 100 mL/hr at 09/08/19 0804, 2,000 mg at 09/08/19 0804    guaiFENesin (MUCINEX) 12 hr tablet 600 mg, 600 mg, Oral, Q12H [facility], Alfa Pickett MD, 600 mg at 09/08/19 0804    heparin (porcine) subcutaneous injection 5,000 Units, 5,000 Units, Subcutaneous, Q8H [facility], 5,000 Units at 09/08/19 0536 **AND** [CANCELED] Platelet count, , , Once, Alfa Pickett MD    lactulose 20 g/30 mL oral solution 20 g, 20 g, Oral, BID, Alfa Pickett MD, 20 g at 09/08/19 0804    octreotide (SandoSTATIN) injection 100 mcg, 100 mcg, Subcutaneous, Q8H [facility], Carlito Ogden MD    pantoprazole (PROTONIX) EC tablet 40 mg, 40 mg, Oral, Early Morning, Alfa Pickett MD, 40 mg at 09/08/19 0537    vancomycin (VANCOCIN) IVPB (premix) 1,000 mg, 1,000 mg, Intravenous, Q12H, Alfa Pickett MD    OBJECTIVE     Current Weight: Weight - Scale: (!) 185 kg (407 lb)  Vitals:    09/08/19 0735   BP: 122/61   Pulse: 74   Resp: 18   Temp: 97 5 °F (36 4 °C)   SpO2: 98%     Body mass index is 56 76 kg/m²      Intake/Output Summary (Last 24 hours) at 9/8/2019 1159  Last data filed at 9/8/2019 0900  Gross per 24 hour Intake 1000 ml   Output 800 ml   Net 200 ml       PHYSICAL EXAMINATION     Physical Exam   Constitutional: No distress  Obese   HENT:   Head: Normocephalic and atraumatic  Eyes: Scleral icterus is present  Neck: Neck supple  No JVD present  Cardiovascular: Normal rate  Pulmonary/Chest: No accessory muscle usage  No respiratory distress  He has no wheezes  Abdominal: Soft  He exhibits distension  Musculoskeletal: He exhibits edema  Right hand: He exhibits no laceration  Left hand: He exhibits no laceration  Neurological: He is alert  Skin: Skin is warm  No cyanosis  Psychiatric: He is not combative  He expresses no suicidal ideation  LAB RESULTS     Results from last 7 days   Lab Units 09/08/19  0558 09/07/19  0526 09/06/19  1906 09/06/19  1905   WBC Thousand/uL 8 97 9 27 10 02  --    HEMOGLOBIN g/dL 7 7* 8 4* 8 6*  --    HEMATOCRIT % 24 0* 26 1* 26 4*  --    PLATELETS Thousands/uL 51* 68* 64*  --    SODIUM mmol/L 128* 129*  --  127*   POTASSIUM mmol/L 4 8 5 2  --  4 6   CHLORIDE mmol/L 94* 96*  --  94*   CO2 mmol/L 23 26  --  24   BUN mg/dL 51* 48*  --  47*   CREATININE mg/dL 2 41* 2 45*  --  2 41*   EGFR ml/min/1 73sq m 29 28  --  29   CALCIUM mg/dL 8 5 8 7  --  8 4   MAGNESIUM mg/dL  --  2 1  --  1 9       I have personally reviewed the old medical records and patient's previously known baseline creatinine level is ~ 1 1-1 3    RADIOLOGY RESULTS      CT tibia fibula right wo contrast   Final Result by Juan M Leal MD (09/06 2128)   Soft tissue evaluation is limited without IV contrast    1    No evidence of soft tissue gas  2   Generalized subcutaneous edema with associated skin thickening  Edema noted adjacent to the calf musculature  Findings are nonspecific and may be related to generalized anasarca  3   The absence of soft tissue gas does not exclude necrotizing fasciitis particularly at the early stages    If there is persistent concern for necrotizing fasciitis, consider follow-up with MRI with and without contrast          Workstation performed: [id number]         CT femur right wo contrast   Final Result by Agusto Taylor MD (09/06 2128)   Soft tissue evaluation is limited without IV contrast    1    No evidence of soft tissue gas  2   Generalized subcutaneous edema with associated skin thickening  Edema noted adjacent to the calf musculature  Findings are nonspecific and may be related to generalized anasarca  3   The absence of soft tissue gas does not exclude necrotizing fasciitis particularly at the early stages  If there is persistent concern for necrotizing fasciitis, consider follow-up with MRI with and without contrast          Workstation performed: [id number]         CT chest abdomen pelvis wo contrast   Final Result by Agusto Taylor MD (09/06 2116)   Exam is limited without IV and oral contrast particularly for soft tissue and bowel evaluation  1   Scattered mild patchy opacities bilaterally suspicious for pneumonia  2  Cirrhotic appearance to the liver  Splenomegaly  3   Mild ascites  4   Diffuse subcutaneous edema compatible with anasarca  Workstation performed: [id number]         XR chest 2 views   ED Interpretation by Tomas Andrade DO (09/06 1949)   Limited film due to body habitus and poor inspiratory effort  Cardiomegaly present  Increased vascular congestion bilaterally  Final Result by Marck Rizzo MD (01/37 9477)   Persistent cardiomegaly      Increased diffuse vascular congestion            Workstation performed: [id number]             PLAN / RECOMMENDATIONS      1  BOBBY on top of CKD stage 2  Present on admission, suspected due to underlying hepatorenal syndrome  Upon review of old medical record patient's previously known baseline creatinine was around 1 1-1 3    Earlier BOBBY was suspected due to intravascular volume depletion in the setting of underlying liver cirrhosis +hypoalbuminemia and use of diuretics and patient has received IV albumin yesterday  Patient's renal function has failed to improve and current creatinine is 2 41 despite of IV albumin suggesting intravascular volume depletion is not the etiology of BOBBY  Patient's urine sodium is 5, and in current clinical setting, hepatorenal syndrome cannot be ruled out  Will plan to continue albumin 25 g q 6 hours x3 doses today but plan to start patient on octreotide 100 mcg SubQ TID  Patient's SBP is > 110 and would not consider use of midodrine for time being  Need GI evaluation for further evaluation of underlying liver cirrhosis  I am reluctant to start diuretics at this point in current clinical picture and it is difficult to manage volume status and afraid that patient may end up needing dialysis in near future  Serial bladder scan did not show urinary retention and will stop serial bladder scans now  Plan to recheck renal function with AM labs  2  Hyponatremia  Secondary to underlying liver cirrhosis  Hyponatremia is chronic since exact onset of hyponatremia is unknown  Current sodium is 128  Urine sodium is 5 and urine osmolality is 303 which is also suggestive of underlying liver cirrhosis as a etiology of hyponatremia  Will plan to start patient on 1 5 L of fluid restriction and recheck sodium level with AM labs  Reluctant to start diuretics as mentioned earlier  Hyponatremia in current clinical setting is a poor prognostic indicator  Need GI evaluation for further management of underlying liver cirrhosis  3  Anemia  Multifactorial   Overnight hemoglobin level has worsened to 7 7  Monitor hemoglobin level and consider blood transfusion if hemoglobin level drops below 7  Overall above mentioned plan was also d/w Heidi Lovelace MD  Nephrology  9/8/2019        Portions of the record may have been created with voice recognition software   Occasional wrong word or "sound a like" substitutions may have occurred due to the inherent limitations of voice recognition software  Read the chart carefully and recognize, using context, where substitutions have occurred

## 2019-09-08 NOTE — ASSESSMENT & PLAN NOTE
· CT suggests possible pneumonia  · Patient is already on broad-spectrum antibiotics for presentation with right lower extremity cellulitis  · Supplemental O2 prn, check Procalcitonin, Strep PNA and Legionella negative

## 2019-09-08 NOTE — UTILIZATION REVIEW
Initial Clinical Review    Admission: Date/Time/Statement: Inpatient Admission Orders (From admission, onward)     Ordered        09/06/19 2143  Inpatient Admission  Once                   Orders Placed This Encounter   Procedures    Inpatient Admission     Standing Status:   Standing     Number of Occurrences:   1     Order Specific Question:   Admitting Physician     Answer:   Miguel Coronel [[id number]]     Order Specific Question:   Level of Care     Answer:   Med Surg [[id number]]     Order Specific Question:   Estimated length of stay     Answer:   More than 2 Midnights     Order Specific Question:   Certification     Answer:   I certify that inpatient services are medically necessary for this patient for a duration of greater than two midnights  See H&P and MD Progress Notes for additional information about the patient's course of treatment  ED Arrival Information     Expected Arrival Acuity Means of Arrival Escorted By Service Admission Type    - 9/6/2019 17:05 Emergent Wheelchair Spouse Hospitalist Emergency    Arrival Complaint    Leg Swelling / SOB        Chief Complaint   Patient presents with    Leg Swelling     pt d/c from [facility] [place] [place] 1 week ago; ongoing sob and swelling    Shortness of Breath     Assessment/Plan:   64y Male to ED presents generalized swelling and redness of his right lower extremity 3 days ago with some associated shortness of breath  On Bumex for kidney issues, kidney function worsening and Bumex was d/c  PMH of Thrombocytopenia, Morbid obesity, Hypertension and CROW on CPAP  Admit Inpatient level of care for Anasarca, HCAP, Cellulitis of RLE, BOBBY (Acute kidney injury) with CKD stage 2, Hyponatremia and 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  Hold diuretic due to BOBBY, Nephrology consult, I&Os, Cbc in am, Iv antibiotics, monitor for leukocytosis and febrile episodes   Pain control/antipyretic prn, f/u cultures, baseline creat 1 1-1 3 - now 2 4,  fluid restriction 1 5L, BMP in AM, urine serum osmolality and urine sodium  Monitoro LFTs, continue lactulose and monitor INR   9/7 Nephrology cons; Pt with anasarca and it is difficult to manage volume status  Hold diuretics for now  Plan to start albumin 25 g q 6 hours x3 doses  Pending urine sodium and urine osmolality  Check BNP and CK level  Will also check serial bladder scan to rule out urinary retention Na 129, recheck Na level with am labs, K 5 2 - kayexalate po x1 today, recheck level with am labs  Consider dialysis if renal function continues to worsen  9/7 Progress notes; Albumin IV, d/c bumex, monitor BMP, f/u urine studies, continue lactulose, Gastroenterology consult given rising MELD score to 32 (25 last admission), continue Iv antibiotics and monitor Cbc and procalcitonin  ED Triage Vitals [09/06/19 1710]   Temperature Pulse Respirations Blood Pressure SpO2   97 5 °F (36 4 °C) 71 16 125/56 99 %      Temp Source Heart Rate Source Patient Position - Orthostatic VS BP Location FiO2 (%)   Oral Monitor Sitting Left arm --      Pain Score       9        Wt Readings from Last 1 Encounters:   09/07/19 (!) 185 kg (407 lb)     Additional Vital Signs:   O2 2L N/C 98%  9/7 @ 0804 97 5 - 74 - 18 - 123/46  9/6 @ 2315 97 5 - 74 - 20 - 138/60    Pertinent Labs/Diagnostic Test Results:   9/6 CXR - Persistent cardiomegaly  Increased diffuse vascular congestion    9/6 CT Chest/ Abd/ Pelvis -  Scattered mild patchy opacities bilaterally suspicious for pneumonia  Cirrhotic appearance to the liver  Splenomegaly  Mild ascites  Diffuse subcutaneous edema compatible with anasarca  9/6 CT Right Femur - Generalized subcutaneous edema with associated skin thickening  Edema noted adjacent to the calf musculature    Findings are nonspecific and may be related to generalized anasarca     Results from last 7 days   Lab Units 09/07/19  0526 09/06/19  1906   WBC Thousand/uL 9 27 10 02   HEMOGLOBIN g/dL 8 4* 8 6*   HEMATOCRIT % 26 1* 26 4*   PLATELETS Thousands/uL 68* 64*   NEUTROS ABS Thousands/µL 5 96  --    BANDS PCT %  --  2     Results from last 7 days   Lab Units 09/07/19  0526 09/06/19  1905   SODIUM mmol/L 129* 127*   POTASSIUM mmol/L 5 2 4 6   CHLORIDE mmol/L 96* 94*   CO2 mmol/L 26 24   ANION GAP mmol/L 7 9   BUN mg/dL 48* 47*   CREATININE mg/dL 2 45* 2 41*   EGFR ml/min/1 73sq m 28 29   CALCIUM mg/dL 8 7 8 4   MAGNESIUM mg/dL 2 1 1 9     Results from last 7 days   Lab Units 09/07/19  0526 09/06/19  1910 09/06/19  1905   AST U/L 62*  --  63*   ALT U/L 41  --  37   ALK PHOS U/L 107  --  112   TOTAL PROTEIN g/dL 6 5  --  6 6   ALBUMIN g/dL 2 9*  --  3 0*   TOTAL BILIRUBIN mg/dL 7 10*  --  6 90*   BILIRUBIN DIRECT mg/dL  --   --  2 50*   AMMONIA umol/L  --  22  --          Results from last 7 days   Lab Units 09/07/19  0526 09/06/19  1905   GLUCOSE RANDOM mg/dL 107 126     Results from last 7 days   Lab Units 09/07/19  1208   OSMOLALITY, SERUM mmol/     Results from last 7 days   Lab Units 09/06/19  1906   TROPONIN I ng/mL 0 02         Results from last 7 days   Lab Units 09/06/19  1905   PROTIME seconds 20 8*   INR  1 77*   PTT seconds 40*             Results from last 7 days   Lab Units 09/06/19  1909   LACTIC ACID mmol/L 1 3     Results from last 7 days   Lab Units 09/06/19  1905   NT-PRO BNP pg/mL 3,108*     Results from last 7 days   Lab Units 09/07/19  0341 09/06/19  1926   CLARITY UA   --  Clear   COLOR UA   --  Yellow   SPEC GRAV UA   --  1 010   PH UA   --  5 0   GLUCOSE UA mg/dl  --  Negative   KETONES UA mg/dl  --  Negative   BLOOD UA   --  Negative   PROTEIN UA mg/dl  --  Trace*   NITRITE UA   --  Negative   BILIRUBIN UA   --  Negative   UROBILINOGEN UA E U /dl  --  0 2   LEUKOCYTES UA   --  Negative   WBC UA /hpf  --  None Seen   RBC UA /hpf  --  None Seen   BACTERIA UA /hpf  --  None Seen   EPITHELIAL CELLS WET PREP /hpf  --  None Seen   SODIUM UR  5  --      Results from last 7 days   Lab Units 09/07/19 1944   STREP PNEUMONIAE ANTIGEN, URINE Negative   LEGIONELLA URINARY ANTIGEN  Negative     Results from last 7 days   Lab Units 09/06/19  1905 09/06/19  1904   BLOOD CULTURE  No Growth at 24 hrs  No Growth at 24 hrs       Results from last 7 days   Lab Units 09/06/19  1906   TOTAL COUNTED  100     ED Treatment:   Medication Administration from 09/06/2019 1705 to 09/06/2019 2309       Date/Time Order Dose Route Action     09/06/2019 1927 cefepime (MAXIPIME) 2,000 mg in dextrose 5 % 50 mL IVPB 2,000 mg Intravenous New Bag     09/06/2019 2041 vancomycin (VANCOCIN) 1,750 mg in sodium chloride 0 9 % 500 mL IVPB 1,750 mg Intravenous New Bag     09/06/2019 2300 heparin (porcine) subcutaneous injection 5,000 Units 5,000 Units Subcutaneous Given     09/06/2019 2300 guaiFENesin (MUCINEX) 12 hr tablet 600 mg 600 mg Oral Given        Past Medical History:   Diagnosis Date    Anasarca     Chronic pain disorder     lower back    CPAP (continuous positive airway pressure) dependence     Heart murmur     Hypertension     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 [facility] [address] [facility]     Myocardial infarction [facility] [address] [facility]     Renal insufficiency 6/7/2019    Sleep apnea     Vitamin D deficiency      Present on Admission:   Morbid obesity ([facility] [address] [facility]   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 ([facility] [address] [facility]   Essential hypertension   Hyponatremia   Anasarca   Acute kidney injury ([facility] [address] [facility]   Cellulitis of right lower extremity   HCAP (healthcare-associated pneumonia)    Admitting Diagnosis: Cellulitis of scrotum [N49 2]  Anasarca [R60 1]  Hyponatremia [E87 1]  SOB (shortness of breath) [R06 02]  Leg swelling [M79 89]  Acute on chronic kidney failure ([facility] [address] ) [N17 9, N18 9]  Bilateral pneumonia [J18 9]  Cellulitis of right lower extremity [L03 115]     Age/Sex: 63 y o  male     Admission Orders:  Gastroenterology cosn  Nephrology cons  Sputum culture and Gram stain  Bld culture x2    Current Facility-Administered Medications:  acetaminophen 650 mg Oral Q6H PRN   albumin human 25 g Intravenous Q6H   cefepime 2,000 mg Intravenous Q12H   guaiFENesin 600 mg Oral Q12H [address]   heparin (porcine) 5,000 Units Subcutaneous Q8H [address]   lactulose 20 g Oral BID   octreotide 100 mcg Subcutaneous Q8H [address]   pantoprazole 40 mg Oral Early Morning   vancomycin 1,000 mg Intravenous Q12H           Network Utilization Review Department  Phone: [phone]; Fax [phone]  [email or web address]  ATTENTION: Please call with any questions or concerns to [phone]  and carefully listen to the prompts so that you are directed to the right person  Send all requests for admission clinical reviews, approved or denied determinations and any other requests to fax [phone]   All voicemails are confidential

## 2019-09-08 NOTE — PROGRESS NOTES
Progress Note Radu Friend 1960, 63 y o  male MRN: [id number]    Unit/Bed#: -01 Encounter: [id number]    Primary Care Provider: No primary care provider on file  Date and time admitted to hospital: 9/6/2019  5:35 PM        Acute kidney injury [facility])  Assessment & Plan  · Patient was admitted with BOBBY on top of CKD stage 2   · Patient with baseline Cr of approximately 1 3 creatinine, now 2 45 on presentation and 2 41 today  · Etiology suspected to be secondary to hepatorenal syndrome  · Continue Albumin IV and Octreotide added today  (Midodrine not added at present per Nephrology as systolic BP >061)  · Bumex discontinued  · Monitor BMP  · Nephrology input appreciated  May need dialysis in near future  · Prognosis poor  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 [facility])  Assessment & Plan  · Patient with decompensated ROSALES cirrhosis  MELD calculated to be up to 32 this admission (calculated at 25 last admission and 20 back in June)  · Ascites: CT shows mild ascites and diffuse anasarca  Patient was on diuresis as outpatient however, patient developed BOBBY and diuretics have been stopped at present  · Continue Lactulose for goal of 3 soft BMs a day  Currently AOx3 and no asterixis  · Varices: Will need EGD in future for variceal screening  · [facility] [address]  Screen: AFP in June normal  CT A/P showed no obvious focal mass on unenhanced CT  MRI in June did not show a suspicious lesion in the liver but was limited by motion artifact  · Transplant: No evaluation at transplant center has been performed to date  · Patient has a poor prognosis  · GI following  Cellulitis of right lower extremity  Assessment & Plan  · Patient developed new erythema of the RLE  · On broad spectrum coverage with IV Vancomycin and Cefepime  · Pharmacy consultation to manage Vancomycin levels  · Serial exams  · Monitor CBC, vitals  Check Procalcitonin  · Follow up blood cultures      * Anasarca  Assessment & Plan  · Patient has been struggling with anasarca in the setting of decompensated cirrhosis  · He was on Bumex which has currently been discontinued due to BOBBY/HRS  HCAP (healthcare-associated pneumonia)  Assessment & Plan  · CT suggests possible pneumonia  · Patient is already on broad-spectrum antibiotics for presentation with right lower extremity cellulitis  · Supplemental O2 prn, check Procalcitonin, Strep PNA and Legionella negative  Hyponatremia  Assessment & Plan  · Secondary to cirrhosis  · Monitor  · Nephrology input appreciated  CROW on CPAP  Assessment & Plan  · Continue CPAP q hs  VTE Pharmacologic Prophylaxis:   Pharmacologic: Contraindicated secondary to severe anemia/thrombocytopenia  Patient Centered Rounds: I have performed bedside rounds with nursing staff today  Discussions with Specialists or Other Care Team Provider: Discussed with Nephrology and GI  Education and Discussions with Family / Patient: Patient and extensive discussion with family  Time Spent for Care: 45 minutes  More than 50% of total time spent on counseling and coordination of care as described above  Current Length of Stay: 2 day(s)    Current Patient Status: Inpatient   Certification Statement: The patient will continue to require additional inpatient hospital stay due to treatment of BOBBY/HRS  Discharge Plan: Not medically cleared  Code Status: Level 1 - Full Code      Subjective:   Patient complains of some SOB earlier but currently improved  No abdominal pain  No nausea or vomiting  No melena or rectal bleeding  No CP  Family at bedside  Objective:     Vitals:   Temp (24hrs), Av 5 °F (36 4 °C), Min:97 4 °F (36 3 °C), Max:97 6 °F (36 4 °C)    Temp:  [97 4 °F (36 3 °C)-97 6 °F (36 4 °C)] 97 5 °F (36 4 °C)  HR:  [74-75] 74  Resp:  [18-21] 18  BP: ()/(44-61) 122/61  SpO2:  [97 %-99 %] 98 %  Body mass index is 56 76 kg/m²  Input and Output Summary (last 24 hours):        Intake/Output Summary (Last 24 hours) at 9/8/2019 1503  Last data filed at 9/8/2019 1201  Gross per 24 hour   Intake 640 ml   Output 1050 ml   Net -410 ml       Physical Exam:     Physical Exam   Constitutional: He is oriented to person, place, and time  No distress  Morbidly obese  HENT:   Head: Normocephalic and atraumatic  Eyes: Scleral icterus is present  Pulmonary/Chest: No respiratory distress  He has no wheezes  Decreased at bases  Abdominal: Soft  Bowel sounds are normal  There is no tenderness  Obese  Musculoskeletal:   Severe edema of LEs up to scrotal region  Erythema RLE  Neurological: He is alert and oriented to person, place, and time  No asterixis  Skin: He is not diaphoretic  Vitals reviewed  Additional Data:     Labs:    Results from last 7 days   Lab Units 09/08/19  0558 09/07/19  0526   WBC Thousand/uL 8 97 9 27   HEMOGLOBIN g/dL 7 7* 8 4*   HEMATOCRIT % 24 0* 26 1*   PLATELETS Thousands/uL 51* 68*   NEUTROS PCT %  --  64   LYMPHS PCT %  --  5*   LYMPHO PCT % 10*  --    MONOS PCT %  --  16*   MONO PCT % 7  --    EOS PCT % 5 6     Results from last 7 days   Lab Units 09/08/19  0558 09/07/19  0526   POTASSIUM mmol/L 4 8 5 2   CHLORIDE mmol/L 94* 96*   CO2 mmol/L 23 26   BUN mg/dL 51* 48*   CREATININE mg/dL 2 41* 2 45*   CALCIUM mg/dL 8 5 8 7   ALK PHOS U/L  --  107   ALT U/L  --  41   AST U/L  --  62*     Results from last 7 days   Lab Units 09/06/19  1905   INR  1 77*       * I Have Reviewed All Lab Data Listed Above  * Additional Pertinent Lab Tests Reviewed: All Labs Within Last 24 Hours Reviewed    Imaging:    Imaging Reports Reviewed Today Include: No new imaging  Recent Cultures (last 7 days):     Results from last 7 days   Lab Units 09/07/19 1944 09/06/19 1905 09/06/19 1904   BLOOD CULTURE   --  No Growth at 24 hrs  No Growth at 24 hrs     LEGIONELLA URINARY ANTIGEN  Negative  --   --        Last 24 Hours Medication List:     Current Facility-Administered Medications:  acetaminophen 650 mg Oral Q6H PRN Solange Valverde MD    albumin human 25 g Intravenous Q6H Marizol Alvarez MD    cefepime 2,000 mg Intravenous Q12H Solange Valverde MD Last Rate: 2,000 mg (09/08/19 0804)   guaiFENesin 600 mg Oral Q12H Jennifer Kauffman MD    lactulose 20 g Oral BID Solange Valverde MD    octreotide 100 mcg Subcutaneous [facility] Marizol Alvarez MD    pantoprazole 40 mg Oral Early Morning Solange Valverde MD    vancomycin 1,000 mg Intravenous Q12H Solange Valverde MD         Today, Patient Was Seen By: Alisha Barrera PA-C    ** Please Note: Dictation voice to text software may have been used in the creation of this document   **

## 2019-09-08 NOTE — ASSESSMENT & PLAN NOTE
· Patient with decompensated ROSALES cirrhosis  MELD calculated to be up to 32 this admission (calculated at 25 last admission and 20 back in June)  · Ascites: CT shows mild ascites and diffuse anasarca  Patient was on diuresis as outpatient however, patient developed BOBBY and diuretics have been stopped at present  · Continue Lactulose for goal of 3 soft BMs a day  Currently AOx3 and no asterixis  · Varices: Will need EGD in future for variceal screening  · [facility] [address]  Screen: AFP in June normal  CT A/P showed no obvious focal mass on unenhanced CT  MRI in June did not show a suspicious lesion in the liver but was limited by motion artifact  · Transplant: No evaluation at transplant center has been performed to date  · Patient has a poor prognosis  · GI following

## 2019-09-08 NOTE — ASSESSMENT & PLAN NOTE
· Patient has been struggling with anasarca in the setting of decompensated cirrhosis  · He was on Bumex which has currently been discontinued due to BOBBY/HRS

## 2019-09-08 NOTE — ASSESSMENT & PLAN NOTE
· Patient developed new erythema of the RLE  · On broad spectrum coverage with IV Vancomycin and Cefepime  · Pharmacy consultation to manage Vancomycin levels  · Serial exams  · Monitor CBC, vitals  Check Procalcitonin  · Follow up blood cultures

## 2019-09-08 NOTE — CONSULTS
Consultation - [facility]  Lo Leblanc 63 y o  male MRN: [id number]  Unit/Bed#: -01 Encounter: [id number]      Inpatient consult to gastroenterology  Consult performed by: Alexey Alcala MD  Consult ordered by: Susan Machado PA-C          Reason for Consult / Principal Problem:  Anasarca and fatty liver cirrhosis    HPI: Lo Leblanc is a 63y o  year old male who presented to the hospital with severe scrotal edema lower extremity edema and abdominal distention  The patient has been unable to function  He has gone to his primary care physician and the [facility] Emergency room twice and was refused admission there  He then came here  The patient is a very ill  It appears that he is in renal failure and his creatinine has jumped to 2 4  His diuretics have had to be stopped secondary to his drop in creatinine  The nephrology service is seeing him  The patient has known cirrhosis which was recently diagnosed  We saw him in consultation in June and did a liver lab evaluation all of which was negative  The patient is morbidly obese and has multiple health problems related to his obesity  He was diagnosed with anasarca at that point and diuretics were started  He was diagnosed with fatty liver cirrhosis and an outpatient referral was made to Dr Savanna Mensah who has seen before  The patient did not have follow-up  The patient denies melena hematochezia  His hemoglobin is 7 7  His B12 and folate are normal   It is macrocytic  He had a colonoscopy in the past but has never had endoscopy for varices screening  His BNP is elevated indicating that he is in heart failure  His meld score has jumped significantly to 31  This portends a poor prognosis and I did tell him this with his family at the bedside  REVIEW OF SYSTEMS:     CONSTITUTIONAL: Denies any fever, chills, or rigors  Good appetite, and no recent weight loss  HEENT: No earache or tinnitus   Denies hearing loss or visual disturbances  CARDIOVASCULAR: No chest pain or palpitations  RESPIRATORY: Denies any cough, hemoptysis, shortness of breath or dyspnea on exertion  GASTROINTESTINAL: As noted in the History of Present Illness  GENITOURINARY: No problems with urination  Denies any hematuria or dysuria  NEUROLOGIC: No dizziness or vertigo, denies headaches  MUSCULOSKELETAL: Denies any muscle or joint pain  SKIN: Denies skin rashes or itching  ENDOCRINE: Denies excessive thirst  Denies intolerance to heat or cold  PSYCHOSOCIAL: Denies depression or anxiety  Denies any recent memory loss  Historical Information   Past Medical History:   Diagnosis Date    Anasarca     Chronic pain disorder     lower back    CPAP (continuous positive airway pressure) dependence     Heart murmur     Hypertension     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)     Myocardial infarction ([facility] [address] )     Renal insufficiency 2019    Sleep apnea     Vitamin D deficiency      Past Surgical History:   Procedure Laterality Date    KNEE ARTHROSCOPY Bilateral     KNEE SURGERY      GA COLONOSCOPY FLX DX W/COLLJ SPEC WHEN PFRMD N/A 11/15/2017    Procedure: COLONOSCOPY;  Surgeon: Robinson Corley MD;  Location: [facility]; Service: Gastroenterology     Social History   Social History     Substance and Sexual Activity   Alcohol Use Yes    Comment: RARE     Social History     Substance and Sexual Activity   Drug Use No     Social History     Tobacco Use   Smoking Status Former Smoker    Last attempt to quit: 11/15/1990    Years since quittin 8   Smokeless Tobacco Never Used     History reviewed  No pertinent family history      Meds/Allergies     Medications Prior to Admission   Medication    bumetanide (BUMEX) 2 mg tablet    lactulose (CONSTULOSE) 10 g/15 mL solution    pantoprazole (PROTONIX) 40 mg tablet    potassium chloride (K-DUR,KLOR-CON) 20 mEq tablet     Current Facility-Administered Medications Medication Dose Route Frequency    acetaminophen (TYLENOL) tablet 650 mg  650 mg Oral Q6H PRN    albumin human (FLEXBUMIN) 25 % injection 25 g  25 g Intravenous Q6H    cefepime (MAXIPIME) 2,000 mg in dextrose 5 % 50 mL IVPB  2,000 mg Intravenous Q12H    guaiFENesin (MUCINEX) 12 hr tablet 600 mg  600 mg Oral Q12H DANA    heparin (porcine) subcutaneous injection 5,000 Units  5,000 Units Subcutaneous Q8H [facility]    lactulose 20 g/30 mL oral solution 20 g  20 g Oral BID    octreotide (SandoSTATIN) injection 100 mcg  100 mcg Subcutaneous Q8H [facility]    pantoprazole (PROTONIX) EC tablet 40 mg  40 mg Oral Early Morning    vancomycin (VANCOCIN) IVPB (premix) 1,000 mg  1,000 mg Intravenous Q12H       Allergies   Allergen Reactions    Lactose     Propranolol Eye Swelling    Torsemide Eye Swelling       Objective   Blood pressure 122/61, pulse 74, temperature 97 5 °F (36 4 °C), resp  rate 18, height 5' 11" (1 803 m), weight (!) 185 kg (407 lb), SpO2 98 %  Intake/Output Summary (Last 24 hours) at 9/8/2019 1418  Last data filed at 9/8/2019 1201  Gross per 24 hour   Intake 640 ml   Output 1050 ml   Net -410 ml         PHYSICAL EXAM:      General Appearance:   Alert, he is morbidly obese and lying in bed   HEENT:   Normocephalic, atraumatic, anicteric      Neck:  Supple, symmetrical, trachea midline, no adenopathy;    thyroid: no enlargement/tenderness/nodules; no carotid  bruit or JVD    Lungs:   Clear to auscultation bilaterally; no rales, rhonchi or wheezing; respirations unlabored    Heart[de-identified]   S1 and S2 normal; regular rate and rhythm; no murmur, rub, or gallop     Abdomen:   Soft, non-tender, distended; normal bowel sounds; no masses, no organomegaly    Genitalia:   Deferred    Rectal:   Deferred    Extremities:  He is severe edema with skin changes and cellulitis; he has scrotal edema   Pulses:  2+ and symmetric all extremities    Skin:  Skin color, texture, turgor normal, he has cellulitis and venous stasis changes Lymph nodes:  No palpable cervical, axillary or inguinal lymphadenopathy        Lab Results:   Admission on 09/06/2019   Component Date Value    WBC 09/06/2019 10 02     RBC 09/06/2019 2 37*    Hemoglobin 09/06/2019 8 6*    Hematocrit 09/06/2019 26 4*    MCV 09/06/2019 111*    MCH 09/06/2019 36 3*    MCHC 09/06/2019 32 6     RDW 09/06/2019 15 5*    MPV 09/06/2019 11 2     Platelets 50/47/8826 64*    nRBC 09/06/2019 0     Protime 09/06/2019 20 8*    INR 09/06/2019 1 77*    PTT 09/06/2019 40*    Blood Culture 09/06/2019 No Growth at 24 hrs   Blood Culture 09/06/2019 No Growth at 24 hrs       Sodium 09/06/2019 127*    Potassium 09/06/2019 4 6     Chloride 09/06/2019 94*    CO2 09/06/2019 24     ANION GAP 09/06/2019 9     BUN 09/06/2019 47*    Creatinine 09/06/2019 2 41*    Glucose 09/06/2019 126     Calcium 09/06/2019 8 4     eGFR 09/06/2019 29     Total Bilirubin 09/06/2019 6 90*    Bilirubin, Direct 09/06/2019 2 50*    Alkaline Phosphatase 09/06/2019 112     AST 09/06/2019 63*    ALT 09/06/2019 37     Total Protein 09/06/2019 6 6     Albumin 09/06/2019 3 0*    Magnesium 09/06/2019 1 9     Ammonia 09/06/2019 22     LACTIC ACID 09/06/2019 1 3     Troponin I 09/06/2019 0 02     NT-proBNP 09/06/2019 3,108*    Color, UA 09/06/2019 Yellow     Clarity, UA 09/06/2019 Clear     Specific Gravity, UA 09/06/2019 1 010     pH, UA 09/06/2019 5 0     Leukocytes, UA 09/06/2019 Negative     Nitrite, UA 09/06/2019 Negative     Protein, UA 09/06/2019 Trace*    Glucose, UA 09/06/2019 Negative     Ketones, UA 09/06/2019 Negative     Urobilinogen, UA 09/06/2019 0 2     Bilirubin, UA 09/06/2019 Negative     Blood, UA 09/06/2019 Negative     RBC, UA 09/06/2019 None Seen     WBC, UA 09/06/2019 None Seen     Epithelial Cells 09/06/2019 None Seen     Bacteria, UA 09/06/2019 None Seen     Segmented % 09/06/2019 67     Bands % 09/06/2019 2     Lymphocytes % 09/06/2019 9*    Monocytes % 09/06/2019 13*    Eosinophils, % 09/06/2019 6     Basophils % 09/06/2019 1     Metamyelocytes% 09/06/2019 2*    Absolute Neutrophils 09/06/2019 6 91     Lymphocytes Absolute 09/06/2019 0 90     Monocytes Absolute 09/06/2019 1 30*    Eosinophils Absolute 09/06/2019 0 60*    Basophils Absolute 09/06/2019 0 10     Total Counted 09/06/2019 100     nRBC 09/06/2019 1     Platelet Estimate 97/79/1288 Decreased*    Sodium, Ur 09/07/2019 5     Osmolality Serum 09/07/2019 282     Osmolality, Ur 09/07/2019 303     Sodium 09/07/2019 129*    Potassium 09/07/2019 5 2     Chloride 09/07/2019 96*    CO2 09/07/2019 26     ANION GAP 09/07/2019 7     BUN 09/07/2019 48*    Creatinine 09/07/2019 2 45*    Glucose 09/07/2019 107     Calcium 09/07/2019 8 7     AST 09/07/2019 62*    ALT 09/07/2019 41     Alkaline Phosphatase 09/07/2019 107     Total Protein 09/07/2019 6 5     Albumin 09/07/2019 2 9*    Total Bilirubin 09/07/2019 7 10*    eGFR 09/07/2019 28     WBC 09/07/2019 9 27     RBC 09/07/2019 2 35*    Hemoglobin 09/07/2019 8 4*    Hematocrit 09/07/2019 26 1*    MCV 09/07/2019 111*    MCH 09/07/2019 35 7*    MCHC 09/07/2019 32 2     RDW 09/07/2019 15 4*    MPV 09/07/2019 11 0     Platelets 07/72/1378 68*    nRBC 09/07/2019 0     Neutrophils Relative 09/07/2019 64     Immat GRANS % 09/07/2019 9*    Lymphocytes Relative 09/07/2019 5*    Monocytes Relative 09/07/2019 16*    Eosinophils Relative 09/07/2019 6     Basophils Relative 09/07/2019 0     Neutrophils Absolute 09/07/2019 5 96     Immature Grans Absolute 09/07/2019 >0 50*    Lymphocytes Absolute 09/07/2019 0 49*    Monocytes Absolute 09/07/2019 1 44*    Eosinophils Absolute 09/07/2019 0 52     Basophils Absolute 09/07/2019 0 03     Magnesium 09/07/2019 2 1     Strep pneumoniae antigen* 09/07/2019 Negative     Legionella Urinary Antig* 09/07/2019 Negative     Sodium 09/08/2019 128*    Potassium 09/08/2019 4 8     Chloride 09/08/2019 94*    CO2 09/08/2019 23     ANION GAP 09/08/2019 11     BUN 09/08/2019 51*    Creatinine 09/08/2019 2 41*    Glucose 09/08/2019 100     Calcium 09/08/2019 8 5     eGFR 09/08/2019 29     WBC 09/08/2019 8 97     RBC 09/08/2019 2 14*    Hemoglobin 09/08/2019 7 7*    Hematocrit 09/08/2019 24 0*    MCV 09/08/2019 112*    MCH 09/08/2019 36 0*    MCHC 09/08/2019 32 1     RDW 09/08/2019 15 6*    MPV 09/08/2019 11 1     Platelets 09/67/4900 51*    nRBC 09/08/2019 0     Total CK 09/08/2019 101     NT-proBNP 09/08/2019 2,919*    Vancomycin Tr 09/08/2019 22 6*    Segmented % 09/08/2019 72     Bands % 09/08/2019 3     Lymphocytes % 09/08/2019 10*    Monocytes % 09/08/2019 7     Eosinophils, % 09/08/2019 5     Basophils % 09/08/2019 0     Metamyelocytes% 09/08/2019 3*    Absolute Neutrophils 09/08/2019 6 73     Lymphocytes Absolute 09/08/2019 0 90     Monocytes Absolute 09/08/2019 0 63     Eosinophils Absolute 09/08/2019 0 45*    Basophils Absolute 09/08/2019 0 00     Total Counted 09/08/2019 100     Platelet Estimate 86/38/7708 Decreased*       Imaging Studies: I have personally reviewed pertinent imaging studies  I reviewed his CT scan of the abdomen and pelvis which shows a cirrhotic appearing liver with splenomegaly and mild ascites    ASSESSMENT & PLAN:  Principal Problem:    Anasarca  Active Problems:    CROW on CPAP    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 [facility])    Essential hypertension    Morbid obesity (HCC)    Hyponatremia    Acute kidney injury ([facility] [address] )    Cellulitis of right lower extremity    HCAP (healthcare-associated pneumonia)    The patient is a 64-year-old male with morbid obesity with fatty liver cirrhosis diagnosed in June with resultant anasarca lower extremity edema and severe scrotal edema  The patient is very ill and has a poor prognosis indicated by his meld score of 31; his meld score was 20 when we saw him in June    The patient appears to be in acute renal failure possibly due to cardiorenal syndrome versus hepatorenal syndrome  His diuretics have been stopped  His family is at the bedside and I had a long discussion with him but there is a marked lack of understanding regarding his chronic medical illnesses related is obesity  1 nephrology is seeing him for his acute kidney injury or renal failure  He is on albumin, Midodrine, and octreotide for a hepatorenal syndrome  His diuretics are on hold; the patient does not have enough ascites for paracentesis  2 he has grade 0 hepatic encephalopathy  3 he has negative [facility] [address]  screening  4 he will require an endoscopy at some point for varices screening  5 the patient has a macrocytic anemia with hemoglobin of 7 7 without overt gastrointestinal blood loss    He had a colonoscopy with Dr Abundio Beyer in 2017 that was normal   And has never had endoscopy  6 cardiology evaluation    Fred Yeh MD  9/8/2019,2:18 PM

## 2019-09-09 LAB
ALBUMIN SERPL BCP-MCNC: 3.6 G/DL (ref 3.5–5)
ALP SERPL-CCNC: 77 U/L (ref 46–116)
ALT SERPL W P-5'-P-CCNC: 37 U/L (ref 12–78)
ANION GAP SERPL CALCULATED.3IONS-SCNC: 10 MMOL/L (ref 4–13)
AST SERPL W P-5'-P-CCNC: 51 U/L (ref 5–45)
BASOPHILS # BLD AUTO: 0.02 THOUSANDS/ΜL (ref 0–0.1)
BASOPHILS NFR BLD AUTO: 0 % (ref 0–1)
BILIRUB DIRECT SERPL-MCNC: 2.32 MG/DL (ref 0–0.2)
BILIRUB SERPL-MCNC: 7.4 MG/DL (ref 0.2–1)
BUN SERPL-MCNC: 53 MG/DL (ref 5–25)
CALCIUM SERPL-MCNC: 8.8 MG/DL (ref 8.3–10.1)
CHLORIDE SERPL-SCNC: 92 MMOL/L (ref 100–108)
CO2 SERPL-SCNC: 25 MMOL/L (ref 21–32)
CREAT SERPL-MCNC: 2.44 MG/DL (ref 0.6–1.3)
EOSINOPHIL # BLD AUTO: 0.48 THOUSAND/ΜL (ref 0–0.61)
EOSINOPHIL NFR BLD AUTO: 6 % (ref 0–6)
ERYTHROCYTE [DISTWIDTH] IN BLOOD BY AUTOMATED COUNT: 15.8 % (ref 11.6–15.1)
GFR SERPL CREATININE-BSD FRML MDRD: 28 ML/MIN/1.73SQ M
GLUCOSE SERPL-MCNC: 116 MG/DL (ref 65–140)
HCT VFR BLD AUTO: 23.7 % (ref 36.5–49.3)
HGB BLD-MCNC: 7.5 G/DL (ref 12–17)
IMM GRANULOCYTES # BLD AUTO: >0.5 THOUSAND/UL (ref 0–0.2)
IMM GRANULOCYTES NFR BLD AUTO: 8 % (ref 0–2)
INR PPP: 1.79 (ref 0.84–1.19)
LYMPHOCYTES # BLD AUTO: 0.49 THOUSANDS/ΜL (ref 0.6–4.47)
LYMPHOCYTES NFR BLD AUTO: 6 % (ref 14–44)
MCH RBC QN AUTO: 35.9 PG (ref 26.8–34.3)
MCHC RBC AUTO-ENTMCNC: 31.6 G/DL (ref 31.4–37.4)
MCV RBC AUTO: 113 FL (ref 82–98)
MONOCYTES # BLD AUTO: 0.98 THOUSAND/ΜL (ref 0.17–1.22)
MONOCYTES NFR BLD AUTO: 12 % (ref 4–12)
NEUTROPHILS # BLD AUTO: 5.7 THOUSANDS/ΜL (ref 1.85–7.62)
NEUTS SEG NFR BLD AUTO: 68 % (ref 43–75)
NRBC BLD AUTO-RTO: 0 /100 WBCS
PLATELET # BLD AUTO: 51 THOUSANDS/UL (ref 149–390)
PMV BLD AUTO: 10.9 FL (ref 8.9–12.7)
POTASSIUM SERPL-SCNC: 4.7 MMOL/L (ref 3.5–5.3)
PROT SERPL-MCNC: 6.9 G/DL (ref 6.4–8.2)
PROTHROMBIN TIME: 20.9 SECONDS (ref 11.6–14.5)
RBC # BLD AUTO: 2.09 MILLION/UL (ref 3.88–5.62)
SODIUM SERPL-SCNC: 127 MMOL/L (ref 136–145)
WBC # BLD AUTO: 8.33 THOUSAND/UL (ref 4.31–10.16)

## 2019-09-09 PROCEDURE — 85025 COMPLETE CBC W/AUTO DIFF WBC: CPT | Performed by: INTERNAL MEDICINE

## 2019-09-09 PROCEDURE — 99232 SBSQ HOSP IP/OBS MODERATE 35: CPT | Performed by: NURSE PRACTITIONER

## 2019-09-09 PROCEDURE — 80076 HEPATIC FUNCTION PANEL: CPT | Performed by: PHYSICIAN ASSISTANT

## 2019-09-09 PROCEDURE — 85610 PROTHROMBIN TIME: CPT | Performed by: PHYSICIAN ASSISTANT

## 2019-09-09 PROCEDURE — 99233 SBSQ HOSP IP/OBS HIGH 50: CPT | Performed by: INTERNAL MEDICINE

## 2019-09-09 PROCEDURE — 99232 SBSQ HOSP IP/OBS MODERATE 35: CPT | Performed by: INTERNAL MEDICINE

## 2019-09-09 PROCEDURE — 80048 BASIC METABOLIC PNL TOTAL CA: CPT | Performed by: INTERNAL MEDICINE

## 2019-09-09 RX ORDER — ALBUMIN (HUMAN) 12.5 G/50ML
25 SOLUTION INTRAVENOUS EVERY 6 HOURS
Status: COMPLETED | OUTPATIENT
Start: 2019-09-09 | End: 2019-09-10

## 2019-09-09 RX ORDER — FUROSEMIDE 10 MG/ML
80 INJECTION INTRAMUSCULAR; INTRAVENOUS
Status: DISCONTINUED | OUTPATIENT
Start: 2019-09-09 | End: 2019-09-13

## 2019-09-09 RX ADMIN — OCTREOTIDE ACETATE 100 MCG: 100 INJECTION, SOLUTION INTRAVENOUS; SUBCUTANEOUS at 15:36

## 2019-09-09 RX ADMIN — ALBUMIN (HUMAN) 25 G: 12.5 SOLUTION INTRAVENOUS at 01:44

## 2019-09-09 RX ADMIN — FUROSEMIDE 80 MG: 10 INJECTION, SOLUTION INTRAMUSCULAR; INTRAVENOUS at 11:28

## 2019-09-09 RX ADMIN — VANCOMYCIN HYDROCHLORIDE 1000 MG: 1 INJECTION, SOLUTION INTRAVENOUS at 00:21

## 2019-09-09 RX ADMIN — OCTREOTIDE ACETATE 100 MCG: 100 INJECTION, SOLUTION INTRAVENOUS; SUBCUTANEOUS at 21:36

## 2019-09-09 RX ADMIN — ALBUMIN (HUMAN) 25 G: 12.5 SOLUTION INTRAVENOUS at 12:21

## 2019-09-09 RX ADMIN — VANCOMYCIN HYDROCHLORIDE 1000 MG: 1 INJECTION, SOLUTION INTRAVENOUS at 13:20

## 2019-09-09 RX ADMIN — PANTOPRAZOLE SODIUM 40 MG: 40 TABLET, DELAYED RELEASE ORAL at 05:27

## 2019-09-09 RX ADMIN — OCTREOTIDE ACETATE 100 MCG: 100 INJECTION, SOLUTION INTRAVENOUS; SUBCUTANEOUS at 05:26

## 2019-09-09 RX ADMIN — FUROSEMIDE 80 MG: 10 INJECTION, SOLUTION INTRAMUSCULAR; INTRAVENOUS at 18:25

## 2019-09-09 RX ADMIN — VANCOMYCIN HYDROCHLORIDE 1000 MG: 1 INJECTION, SOLUTION INTRAVENOUS at 21:37

## 2019-09-09 RX ADMIN — ALBUMIN (HUMAN) 25 G: 12.5 SOLUTION INTRAVENOUS at 18:25

## 2019-09-09 RX ADMIN — GUAIFENESIN 600 MG: 600 TABLET, EXTENDED RELEASE ORAL at 21:37

## 2019-09-09 RX ADMIN — GUAIFENESIN 600 MG: 600 TABLET, EXTENDED RELEASE ORAL at 11:23

## 2019-09-09 RX ADMIN — CEFEPIME 2000 MG: 2 INJECTION, POWDER, FOR SOLUTION INTRAVENOUS at 21:37

## 2019-09-09 RX ADMIN — CEFEPIME 2000 MG: 2 INJECTION, POWDER, FOR SOLUTION INTRAVENOUS at 11:23

## 2019-09-09 NOTE — ASSESSMENT & PLAN NOTE
· CT suggests possible pneumonia  · Patient is already on broad-spectrum antibiotics for presentation with right lower extremity cellulitis  · Supplemental O2 prn, procalcitonin elevated at 0 33 will repeat for trending, Strep PNA and Legionella negative  · Procalcitonin in a m

## 2019-09-09 NOTE — PROGRESS NOTES
Progress Note Gregory Buenrostro 1960, 63 y o  male MRN: [id number]    Unit/Bed#: -01 Encounter: [id number]    Primary Care Provider: No primary care provider on file  Date and time admitted to hospital: 9/6/2019  5:35 PM        * Anasarca  Assessment & Plan  · Patient has been struggling with anasarca in the setting of decompensated cirrhosis  · Patient was on Bumex which has currently been discontinued due to BOBBY/HRS  · Continue recommendations per GI and Nephrology    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 [facility])  Assessment & Plan  · Patient with decompensated ROSALES cirrhosis  MELD calculated to be up to 32 this admission (calculated at 25 last admission and 20 back in June)  · Ascites: CT shows mild ascites and diffuse anasarca  Patient was on diuresis as outpatient however, patient developed BOBBY and diuretics have been stopped at present  · Continue Lactulose for goal of 3 soft BMs a day  Currently AOx3 and no asterixis  · Varices: Will need EGD in future for variceal screening  · [facility]  Screen: AFP in June normal  CT A/P showed no obvious focal mass on unenhanced CT  MRI in June did not show a suspicious lesion in the liver but was limited by motion artifact  · Hepatic function panel showing direct bilirubin 2 32  · Transplant: No evaluation at transplant center has been performed to date  · Patient has a poor prognosis  · GI following recommendations appreciated    Acute kidney injury [facility])  Assessment & Plan  · Patient was admitted with BOBBY on top of CKD stage 2   · Patient with baseline Cr of approximately 1 3 creatinine, now 2 45 on presentation and 2 44 today  · Etiology suspected to be secondary to hepatorenal syndrome  · Continue Albumin IV and Octreotide added yesterday  (Midodrine not added at present per Nephrology as systolic BP >974)  · Bumex discontinued  · Monitor BMP  · Nephrology input appreciated  Patient may need dialysis in near future  · Prognosis poor      HCAP (healthcare-associated pneumonia)  Assessment & Plan  · CT suggests possible pneumonia  · Patient is already on broad-spectrum antibiotics for presentation with right lower extremity cellulitis  · Supplemental O2 prn, procalcitonin elevated at 0 33 will repeat for trending, Strep PNA and Legionella negative  · Procalcitonin in a m  Cellulitis of right lower extremity  Assessment & Plan  · Patient developed new erythema of the RLE  · On broad spectrum coverage with IV Vancomycin and Cefepime  · Pharmacy consultation to manage Vancomycin levels  · Serial exams  · Monitor CBC, vitals  Procalcitonin 0 33  Repeat in a m  · Blood cultures x2 negative for growth at 48 hours    Hyponatremia  Assessment & Plan  · Secondary to cirrhosis  · Monitor  · Nephrology input appreciated  Essential hypertension  Assessment & Plan  Patient remains normotensive  Hold Bumex as the patient is presenting with acute kidney injury and hyponatremia, await nephrology consultation and recommendations  Monitor blood pressure with routine vitals      CROW on CPAP  Assessment & Plan  · Continue CPAP q hs  Morbid obesity ([address] )  Assessment & Plan  · Dietary and lifestyle modifications recommended  VTE Pharmacologic Prophylaxis:   Pharmacologic: Pharmacologic VTE Prophylaxis contraindicated due to Thrombocytopenia  Mechanical VTE Prophylaxis in Place: Yes    Patient Centered Rounds: I have performed bedside rounds with nursing staff today  Discussions with Specialists or Other Care Team Provider:  Gastroenterology, nephrology note reviewed    Education and Discussions with Family / Patient:  Patient wife at bedside    Time Spent for Care: 35 minutes  More than 50% of total time spent on counseling and coordination of care as described above      Current Length of Stay: 3 day(s)    Current Patient Status: Inpatient   Certification Statement: The patient will continue to require additional inpatient hospital stay due to Ongoing treatment of liver cirrhosis, volume overload, acute kidney injury on CKD    Discharge Plan:  Not medically cleared    Code Status: Level 1 - Full Code      Subjective:   Patient reports his fluid continues he does not have shortness of breath currently denies fevers or chills  The leg is a little less painful  Patient and wife educated on the fact his renal function is stable currently however will be dependent on nephrology is finding it regarding the dialysis along with his liver cirrhosis and GI is recommendations they stated understanding  Objective:     Vitals:   Temp (24hrs), Av 4 °F (36 3 °C), Min:97 2 °F (36 2 °C), Max:97 5 °F (36 4 °C)    Temp:  [97 2 °F (36 2 °C)-97 5 °F (36 4 °C)] 97 4 °F (36 3 °C)  HR:  [67-68] 67  Resp:  [18-20] 20  BP: (115-120)/(60) 115/60  SpO2:  [95 %-99 %] 95 %  Body mass index is 56 76 kg/m²  Input and Output Summary (last 24 hours): Intake/Output Summary (Last 24 hours) at 2019 0957  Last data filed at 2019 0645  Gross per 24 hour   Intake 620 ml   Output 1030 ml   Net -410 ml       Physical Exam:     Physical Exam   Constitutional: He is oriented to person, place, and time  HENT:   Head: Normocephalic and atraumatic  Eyes: Conjunctivae and EOM are normal    Neck: Normal range of motion  Cardiovascular: Normal rate, regular rhythm and normal heart sounds  Pulmonary/Chest: Effort normal  He has decreased breath sounds in the right lower field and the left lower field  Abdominal: Soft  Bowel sounds are normal  He exhibits distension and ascites  Noted anasarca   Musculoskeletal: He exhibits edema and tenderness  +3 pitting edema bilateral lower extremities   Neurological: He is alert and oriented to person, place, and time  Skin: Skin is warm and dry  There is erythema  Psychiatric: He has a normal mood and affect   His behavior is normal          Additional Data:     Labs:    Results from last 7 days   Lab Units 19  6020 09/08/19  0558   WBC Thousand/uL 8 33 8 97   HEMOGLOBIN g/dL 7 5* 7 7*   HEMATOCRIT % 23 7* 24 0*   PLATELETS Thousands/uL 51* 51*   BANDS PCT %  --  3   NEUTROS PCT % 68  --    LYMPHS PCT % 6*  --    LYMPHO PCT %  --  10*   MONOS PCT % 12  --    MONO PCT %  --  7   EOS PCT % 6 5     Results from last 7 days   Lab Units 09/09/19  0524   SODIUM mmol/L 127*   POTASSIUM mmol/L 4 7   CHLORIDE mmol/L 92*   CO2 mmol/L 25   BUN mg/dL 53*   CREATININE mg/dL 2 44*   ANION GAP mmol/L 10   CALCIUM mg/dL 8 8   ALBUMIN g/dL 3 6   TOTAL BILIRUBIN mg/dL 7 40*   ALK PHOS U/L 77   ALT U/L 37   AST U/L 51*   GLUCOSE RANDOM mg/dL 116     Results from last 7 days   Lab Units 09/09/19  0524   INR  1 79*             Results from last 7 days   Lab Units 09/08/19  0558 09/06/19  1909   LACTIC ACID mmol/L  --  1 3   PROCALCITONIN ng/ml 0 33*  --            * I Have Reviewed All Lab Data Listed Above  * Additional Pertinent Lab Tests Reviewed: Brett 66 Admission Reviewed    Imaging:    Imaging Reports Reviewed Today Include:  As mentioned above      Recent Cultures (last 7 days):     Results from last 7 days   Lab Units 09/08/19  1121 09/07/19  1944 09/06/19  1905 09/06/19  1904   BLOOD CULTURE   --   --  No Growth at 48 hrs  No Growth at 48 hrs     SPUTUM CULTURE  Culture too young- will reincubate  --   --   --    GRAM STAIN RESULT  2+ Polys*  2+ Gram negative rods*  --   --   --    LEGIONELLA URINARY ANTIGEN   --  Negative  --   --        Last 24 Hours Medication List:     Current Facility-Administered Medications:  acetaminophen 650 mg Oral Q6H PRN Quintin Durbin MD    albumin human 25 g Intravenous Q6H Marya Lopez MD    cefepime 2,000 mg Intravenous Q12H Quintin Durbin MD Last Rate: 2,000 mg (09/08/19 2315)   furosemide 80 mg Intravenous TID (diuretic) Marya Lopez MD    guaiFENesin 600 mg Oral Q12H Tad Park MD    lactulose 20 g Oral BID Quintin Durbin MD    octreotide 100 mcg Subcutaneous Q8H Ralph Fermin MD    pantoprazole 40 mg Oral Early Morning Susie Shearer MD    vancomycin 1,000 mg Intravenous Q12H Susie Shearer MD Last Rate: 1,000 mg (09/09/19 0021)        Today, Patient Was Seen By: MARKOS Ramos    ** Please Note: Dictation voice to text software may have been used in the creation of this document   **

## 2019-09-09 NOTE — PROGRESS NOTES
Vancomycin IV Pharmacy-to-Dose Consultation    Lo Leblanc is a 63 y o  male who is currently receiving Vancomycin IV with management by the Pharmacy Consult service  Assessment/Plan:  The patient was reviewed  Renal function is stable and no signs or symptoms of nephrotoxicity and/or infusion reactions were documented in the chart  Based on todays assessment, continue current vancomycin (day # 3) dosing of vancomycin 1000 mg IV Q12H , with a plan for trough to be drawn on 9/10/19 at 0830  We will continue to follow the patients culture results and clinical progress daily      Rob Escobedo, Pharmacist

## 2019-09-09 NOTE — PROGRESS NOTES
GI Progress Note - Walter Cheney 63 y o  male MRN: [id number]    Unit/Bed#: -01 Encounter: [id number]    Subjective: Mr  Morena Montano is frustrated that he cannot be more ambulatory due to his anasarca  Denies any pain or confusion  Objective:     Vitals: Blood pressure 115/60, pulse 67, temperature (!) 97 4 °F (36 3 °C), resp  rate 20, height 5' 11" (1 803 m), weight (!) 185 kg (407 lb), SpO2 95 %  ,Body mass index is 56 76 kg/m²  Intake/Output Summary (Last 24 hours) at 9/9/2019 1214  Last data filed at 9/9/2019 0645  Gross per 24 hour   Intake 620 ml   Output 780 ml   Net -160 ml       Physical Exam:     General Appearance: Alert, oriented x3, appears chronically ill  Lungs: Clear to auscultation bilaterally, no rales or rhonchi, no labored breathing/accessory muscle use  Heart: RRR, no murmur  Abdomen: Obese, BS active, soft, NTTP  Extremities: No cyanosis, 2+ LE edema    Invasive Devices     Peripheral Intravenous Line            Peripheral IV 09/06/19 Right Antecubital 2 days                Lab Results:  Results from last 7 days   Lab Units 09/09/19  0524   WBC Thousand/uL 8 33   HEMOGLOBIN g/dL 7 5*   HEMATOCRIT % 23 7*   PLATELETS Thousands/uL 51*   NEUTROS PCT % 68   LYMPHS PCT % 6*   MONOS PCT % 12   EOS PCT % 6     Results from last 7 days   Lab Units 09/09/19  0524   POTASSIUM mmol/L 4 7   CHLORIDE mmol/L 92*   CO2 mmol/L 25   BUN mg/dL 53*   CREATININE mg/dL 2 44*   CALCIUM mg/dL 8 8   ALK PHOS U/L 77   ALT U/L 37   AST U/L 51*     Results from last 7 days   Lab Units 09/09/19  0524   INR  1 79*           Imaging Studies: I have personally reviewed pertinent imaging studies  Ct Chest Abdomen Pelvis Wo Contrast  Result Date: 9/6/2019  Impression: Exam is limited without IV and oral contrast particularly for soft tissue and bowel evaluation  1   Scattered mild patchy opacities bilaterally suspicious for pneumonia  2  Cirrhotic appearance to the liver  Splenomegaly  3   Mild ascites    4  Diffuse subcutaneous edema compatible with anasarca  Workstation performed: [id number]     Xr Chest 2 Views  Result Date: 9/7/2019  Impression: Persistent cardiomegaly Increased diffuse vascular congestion Workstation performed: [id number]     Ct Femur Right Wo Contrast    Result Date: 9/6/2019  Impression: Soft tissue evaluation is limited without IV contrast  1    No evidence of soft tissue gas  2   Generalized subcutaneous edema with associated skin thickening  Edema noted adjacent to the calf musculature  Findings are nonspecific and may be related to generalized anasarca  3   The absence of soft tissue gas does not exclude necrotizing fasciitis particularly at the early stages  If there is persistent concern for necrotizing fasciitis, consider follow-up with MRI with and without contrast  Workstation performed: [id number]     Ct Tibia Fibula Right Wo Contrast    Result Date: 9/6/2019  Impression: Soft tissue evaluation is limited without IV contrast  1    No evidence of soft tissue gas  2   Generalized subcutaneous edema with associated skin thickening  Edema noted adjacent to the calf musculature  Findings are nonspecific and may be related to generalized anasarca  3   The absence of soft tissue gas does not exclude necrotizing fasciitis particularly at the early stages    If there is persistent concern for necrotizing fasciitis, consider follow-up with MRI with and without contrast  Workstation performed: [id number]       Assessment and Plan:     Decompensated ROSALES Cirrhosis  - MELD 31 on admission, Child Class C  - Full liver workup negative, etiology is cryptogenic/ROSALES  - Grade 0 hepatic encephalopathy  - Currently with BOBBY and anasarca concerning for cardiorenal v hepatorenal syndrome, trialing octreotide/albumin currently as well as lasix  - HCC screen with CT A/P and MRI/MRCP in June 2019 showed 2 small indeterminate low density lesions in the R lobe of the liver but given his body habitus, these were unable to be differentiated, AFP was normal  - He will need an EGD in the near future once is further stabilized to screen for esophageal varices, he does have a macrocytic anemia without any reported overt bleeding  - Pt has not had a liver transplant evaluation, he is unlikely to be a candidate given his morbid obesity/poor social status currently and chronic illnesses but may need to consider outpatient evaluation      The patient will be seen by Dr Kaylyn Christianson

## 2019-09-09 NOTE — ASSESSMENT & PLAN NOTE
· Patient has been struggling with anasarca in the setting of decompensated cirrhosis  · Patient was on Bumex which has currently been discontinued due to BOBBY/HRS    · Continue recommendations per GI and Nephrology

## 2019-09-09 NOTE — ASSESSMENT & PLAN NOTE
Patient remains normotensive  Hold Bumex as the patient is presenting with acute kidney injury and hyponatremia, await nephrology consultation and recommendations  Monitor blood pressure with routine vitals

## 2019-09-09 NOTE — PROGRESS NOTES
NEPHROLOGY PROGRESS NOTE    Patient: Valerie Forbes               Sex: male          DOA: 9/6/2019  5:35 PM   Date of Birth: @        Age: @        LOS:  LOS: 3 days   9/9/2019    REASON FOR THE CONSULTATION:  Further management of BOBBY    HPI     This is a 63 y o  male admitted for Anasarca     SUBJECTIVE     - reviewed GI note with the recommendations  Patient denies nausea, vomiting, headache or dizziness today  - Reviewed last 24 hrs events     CURRENT MEDICATIONS       Current Facility-Administered Medications:     acetaminophen (TYLENOL) tablet 650 mg, 650 mg, Oral, Q6H PRN, Jovanni Hampton MD    albumin human (FLEXBUMIN) 25 % injection 25 g, 25 g, Intravenous, Q6H, Daquan Centeno MD, 25 g at 09/09/19 1221    cefepime (MAXIPIME) 2,000 mg in dextrose 5 % 50 mL IVPB, 2,000 mg, Intravenous, Q12H, Jovanni Hampton MD, Last Rate: 100 mL/hr at 09/09/19 1123, 2,000 mg at 09/09/19 1123    furosemide (LASIX) injection 80 mg, 80 mg, Intravenous, TID (diuretic), Daquan Centeno MD, 80 mg at 09/09/19 1128    guaiFENesin (MUCINEX) 12 hr tablet 600 mg, 600 mg, Oral, Q12H [facility], Jovanni Hampton MD, 600 mg at 09/09/19 1123    lactulose 20 g/30 mL oral solution 20 g, 20 g, Oral, BID, Jovanni Hampton MD, 20 g at 09/08/19 0804    octreotide (SandoSTATIN) injection 100 mcg, 100 mcg, Subcutaneous, Q8H [facility], Daquan Centeno MD, 100 mcg at 09/09/19 0526    pantoprazole (PROTONIX) EC tablet 40 mg, 40 mg, Oral, Early Morning, Jovanni Hampton MD, 40 mg at 09/09/19 0527    vancomycin (VANCOCIN) IVPB (premix) 1,000 mg, 1,000 mg, Intravenous, Q12H, Jovanni Hampton MD, Last Rate: 200 mL/hr at 09/09/19 0021, 1,000 mg at 09/09/19 0021    OBJECTIVE     Current Weight: Weight - Scale: (!) 185 kg (407 lb)  Vitals:    09/09/19 0854   BP: 115/60   Pulse: 67   Resp: 20   Temp: (!) 97 4 °F (36 3 °C)   SpO2: 95%     Body mass index is 56 76 kg/m²      Intake/Output Summary (Last 24 hours) at 9/9/2019 1247  Last data filed at 9/9/2019 1123  Gross per 24 hour   Intake 620 ml   Output 930 ml   Net -310 ml       PHYSICAL EXAMINATION     Physical Exam   Constitutional: No distress  Obese   HENT:   Head: Normocephalic and atraumatic  Eyes: Scleral icterus is present  Neck: Neck supple  No JVD present  Cardiovascular: Normal rate  Pulmonary/Chest: No accessory muscle usage  No respiratory distress  He has no wheezes  Abdominal: Soft  He exhibits distension  Musculoskeletal: He exhibits edema  Right hand: He exhibits no laceration  Left hand: He exhibits no laceration  Neurological: He is alert  Skin: Skin is warm  No cyanosis  Psychiatric: He is not combative  He expresses no suicidal ideation  LAB RESULTS     Results from last 7 days   Lab Units 09/09/19  0524 09/08/19  0558 09/07/19  0526 09/06/19  1906 09/06/19  1905   WBC Thousand/uL 8 33 8 97 9 27 10 02  --    HEMOGLOBIN g/dL 7 5* 7 7* 8 4* 8 6*  --    HEMATOCRIT % 23 7* 24 0* 26 1* 26 4*  --    PLATELETS Thousands/uL 51* 51* 68* 64*  --    SODIUM mmol/L 127* 128* 129*  --  127*   POTASSIUM mmol/L 4 7 4 8 5 2  --  4 6   CHLORIDE mmol/L 92* 94* 96*  --  94*   CO2 mmol/L 25 23 26  --  24   BUN mg/dL 53* 51* 48*  --  47*   CREATININE mg/dL 2 44* 2 41* 2 45*  --  2 41*   EGFR ml/min/1 73sq m 28 29 28  --  29   CALCIUM mg/dL 8 8 8 5 8 7  --  8 4   MAGNESIUM mg/dL  --   --  2 1  --  1 9       I have personally reviewed the old medical records and patient's previously known baseline creatinine level is ~ 1 1-1 3    RADIOLOGY RESULTS      CT tibia fibula right wo contrast   Final Result by Germaine Gramajo MD (09/06 2128)   Soft tissue evaluation is limited without IV contrast    1    No evidence of soft tissue gas  2   Generalized subcutaneous edema with associated skin thickening  Edema noted adjacent to the calf musculature  Findings are nonspecific and may be related to generalized anasarca     3   The absence of soft tissue gas does not exclude necrotizing fasciitis particularly at the early stages  If there is persistent concern for necrotizing fasciitis, consider follow-up with MRI with and without contrast          Workstation performed: [id number]         CT femur right wo contrast   Final Result by Jolene Talamantes MD (09/06 2128)   Soft tissue evaluation is limited without IV contrast    1    No evidence of soft tissue gas  2   Generalized subcutaneous edema with associated skin thickening  Edema noted adjacent to the calf musculature  Findings are nonspecific and may be related to generalized anasarca  3   The absence of soft tissue gas does not exclude necrotizing fasciitis particularly at the early stages  If there is persistent concern for necrotizing fasciitis, consider follow-up with MRI with and without contrast          Workstation performed: [id number]         CT chest abdomen pelvis wo contrast   Final Result by Jolene Talamantes MD (09/06 2116)   Exam is limited without IV and oral contrast particularly for soft tissue and bowel evaluation  1   Scattered mild patchy opacities bilaterally suspicious for pneumonia  2  Cirrhotic appearance to the liver  Splenomegaly  3   Mild ascites  4   Diffuse subcutaneous edema compatible with anasarca  Workstation performed: [id number]         XR chest 2 views   ED Interpretation by Jesus Alberto Wills DO (09/06 1949)   Limited film due to body habitus and poor inspiratory effort  Cardiomegaly present  Increased vascular congestion bilaterally  Final Result by Sue Pereira MD (83/10 3930)   Persistent cardiomegaly      Increased diffuse vascular congestion            Workstation performed: [id number]             PLAN / RECOMMENDATIONS      1  BOBBY on top of CKD stage 2  Present on admission, suspected due to underlying hepatorenal syndrome  Upon review of old medical records, patient's previously known baseline creatinine is around 1 1-1 3    Earlier BOBBY was suspected due to intravascular volume depletion but renal function has failed to improve despite of IV albumin and octreotide was added yesterday  Patient's SBP is > 110 and does not need midodrine at this point  Overnight patient's renal function has failed to improved to current creatinine of 2 44  Plan to give IV albumin 25 g q 6 hours x3 doses today    Patient's volume status is difficult to manage  Patient has anasarca and would trial of Lasix 80 mg IV TID today     Recheck renal function with AM labs  Looking at overall clinical picture, I am worried that patient's renal function may starts to worsen further and may end up needing dialysis soon  2  Hyponatremia  Secondary to underlying liver cirrhosis  Hyponatremia is chronic since duration is > 48 hours  Overnight sodium level has failed to improve and current sodium is 127  Urine sodium was 5 and urine osmolality of 303 suggestive of liver cirrhosis as the etiology of hyponatremia  Follow 1 5 L of fluid restriction  Plan to start Lasix 80 mg IV TID as mentioned earlier  Hyponatremia in current clinical setting is of poor prognostic indicator  Recheck sodium level with AM labs  3  Anemia  Multifactorial, overnight hemoglobin level has slightly worsened to 7 5 although no active signs of bleeding seen  Consider blood transfusion if hemoglobin level drops below 7  Overall above mentioned plan was also d/w Bell Oliva MD  Nephrology  9/9/2019        Portions of the record may have been created with voice recognition software  Occasional wrong word or "sound a like" substitutions may have occurred due to the inherent limitations of voice recognition software  Read the chart carefully and recognize, using context, where substitutions have occurred

## 2019-09-09 NOTE — PLAN OF CARE
Problem: SAFETY ADULT  Goal: Patient will remain free of falls  Description  INTERVENTIONS:  - Assess patient frequently for physical needs  -  Identify cognitive and physical deficits and behaviors that affect risk of falls    -  [place] fall precautions as indicated by assessment   - Educate patient/family on patient safety including physical limitations  - Instruct patient to call for assistance with activity based on assessment  - Modify environment to reduce risk of injury  - Consider OT/PT consult to assist with strengthening/mobility  Outcome: Progressing  Goal: Maintain or return to baseline ADL function  Description  INTERVENTIONS:  -  Assess patient's ability to carry out ADLs; assess patient's baseline for ADL function and identify physical deficits which impact ability to perform ADLs (bathing, care of mouth/teeth, toileting, grooming, dressing, etc )  - Assess/evaluate cause of self-care deficits   - Assess range of motion  - Assess patient's mobility; develop plan if impaired  - Assess patient's need for assistive devices and provide as appropriate  - Encourage maximum independence but intervene and supervise when necessary  - Involve family in performance of ADLs  - Assess for home care needs following discharge   - Consider OT consult to assist with ADL evaluation and planning for discharge  - Provide patient education as appropriate  Outcome: Progressing  Goal: Maintain or return mobility status to optimal level  Description  INTERVENTIONS:  - Assess patient's baseline mobility status (ambulation, transfers, stairs, etc )    - Identify cognitive and physical deficits and behaviors that affect mobility  - Identify mobility aids required to assist with transfers and/or ambulation (gait belt, sit-to-stand, lift, walker, cane, etc )  - [place] fall precautions as indicated by assessment  - Record patient progress and toleration of activity level on Mobility SBAR; progress patient to next Phase/Stage  - Instruct patient to call for assistance with activity based on assessment  - Consider rehabilitation consult to assist with strengthening/weightbearing, etc   Outcome: Progressing     Problem: INFECTION - ADULT  Goal: Absence or prevention of progression during hospitalization  Description  INTERVENTIONS:  - Assess and monitor for signs and symptoms of infection  - Monitor lab/diagnostic results  - Monitor all insertion sites, i e  indwelling lines, tubes, and drains  - Monitor endotracheal if appropriate and nasal secretions for changes in amount and color  - [place] appropriate cooling/warming therapies per order  - Administer medications as ordered  - Instruct and encourage patient and family to use good hand hygiene technique  - Identify and instruct in appropriate isolation precautions for identified infection/condition  Outcome: Progressing  Goal: Absence of fever/infection during neutropenic period  Description  INTERVENTIONS:  - Monitor WBC    Outcome: Progressing     Problem: Potential for Falls  Goal: Patient will remain free of falls  Description  INTERVENTIONS:  - Assess patient frequently for physical needs  -  Identify cognitive and physical deficits and behaviors that affect risk of falls    -  [place] fall precautions as indicated by assessment   - Educate patient/family on patient safety including physical limitations  - Instruct patient to call for assistance with activity based on assessment  - Modify environment to reduce risk of injury  - Consider OT/PT consult to assist with strengthening/mobility  Outcome: Progressing

## 2019-09-09 NOTE — ASSESSMENT & PLAN NOTE
· Patient was admitted with BOBBY on top of CKD stage 2   · Patient with baseline Cr of approximately 1 3 creatinine, now 2 45 on presentation and 2 44 today  · Etiology suspected to be secondary to hepatorenal syndrome  · Continue Albumin IV and Octreotide added yesterday  (Midodrine not added at present per Nephrology as systolic BP >369)  · Bumex discontinued  · Monitor BMP  · Nephrology input appreciated  Patient may need dialysis in near future  · Prognosis poor

## 2019-09-09 NOTE — ASSESSMENT & PLAN NOTE
· Patient with decompensated ROSALES cirrhosis  MELD calculated to be up to 32 this admission (calculated at 25 last admission and 20 back in June)  · Ascites: CT shows mild ascites and diffuse anasarca  Patient was on diuresis as outpatient however, patient developed BOBBY and diuretics have been stopped at present  · Continue Lactulose for goal of 3 soft BMs a day  Currently AOx3 and no asterixis  · Varices: Will need EGD in future for variceal screening  · [facility] [address]  Screen: AFP in June normal  CT A/P showed no obvious focal mass on unenhanced CT  MRI in June did not show a suspicious lesion in the liver but was limited by motion artifact  · Hepatic function panel showing direct bilirubin 2 32  · Transplant: No evaluation at transplant center has been performed to date  · Patient has a poor prognosis    · GI following recommendations appreciated

## 2019-09-09 NOTE — ASSESSMENT & PLAN NOTE
· Patient developed new erythema of the RLE  · On broad spectrum coverage with IV Vancomycin and Cefepime  · Pharmacy consultation to manage Vancomycin levels  · Serial exams  · Monitor CBC, vitals  Procalcitonin 0 33  Repeat in a m    · Blood cultures x2 negative for growth at 48 hours

## 2019-09-10 ENCOUNTER — APPOINTMENT (INPATIENT)
Dept: RADIOLOGY | Facility: HOSPITAL | Age: 59
DRG: 177 | End: 2019-09-10
Payer: COMMERCIAL

## 2019-09-10 ENCOUNTER — APPOINTMENT (INPATIENT)
Dept: DIALYSIS | Facility: HOSPITAL | Age: 59
DRG: 177 | End: 2019-09-10
Payer: COMMERCIAL

## 2019-09-10 ENCOUNTER — APPOINTMENT (INPATIENT)
Dept: INTERVENTIONAL RADIOLOGY/VASCULAR | Facility: HOSPITAL | Age: 59
DRG: 177 | End: 2019-09-10
Attending: INTERNAL MEDICINE
Payer: COMMERCIAL

## 2019-09-10 LAB
ALBUMIN SERPL BCP-MCNC: 3.8 G/DL (ref 3.5–5)
ALP SERPL-CCNC: 68 U/L (ref 46–116)
ALT SERPL W P-5'-P-CCNC: 31 U/L (ref 12–78)
ANION GAP SERPL CALCULATED.3IONS-SCNC: 9 MMOL/L (ref 4–13)
AST SERPL W P-5'-P-CCNC: 44 U/L (ref 5–45)
ATRIAL RATE: 73 BPM
BASOPHILS # BLD MANUAL: 0.09 THOUSAND/UL (ref 0–0.1)
BASOPHILS NFR MAR MANUAL: 1 % (ref 0–1)
BILIRUB DIRECT SERPL-MCNC: 2.25 MG/DL (ref 0–0.2)
BILIRUB SERPL-MCNC: 7 MG/DL (ref 0.2–1)
BUN SERPL-MCNC: 54 MG/DL (ref 5–25)
CALCIUM SERPL-MCNC: 8.3 MG/DL (ref 8.3–10.1)
CHLORIDE SERPL-SCNC: 92 MMOL/L (ref 100–108)
CO2 SERPL-SCNC: 24 MMOL/L (ref 21–32)
CREAT SERPL-MCNC: 2.66 MG/DL (ref 0.6–1.3)
EOSINOPHIL # BLD MANUAL: 0.92 THOUSAND/UL (ref 0–0.4)
EOSINOPHIL NFR BLD MANUAL: 10 % (ref 0–6)
ERYTHROCYTE [DISTWIDTH] IN BLOOD BY AUTOMATED COUNT: 15.9 % (ref 11.6–15.1)
GFR SERPL CREATININE-BSD FRML MDRD: 25 ML/MIN/1.73SQ M
GLUCOSE SERPL-MCNC: 111 MG/DL (ref 65–140)
HCT VFR BLD AUTO: 22.3 % (ref 36.5–49.3)
HGB BLD-MCNC: 7.2 G/DL (ref 12–17)
INR PPP: 1.88 (ref 0.84–1.19)
LYMPHOCYTES # BLD AUTO: 0.55 THOUSAND/UL (ref 0.6–4.47)
LYMPHOCYTES # BLD AUTO: 6 % (ref 14–44)
MACROCYTES BLD QL AUTO: PRESENT
MCH RBC QN AUTO: 36.5 PG (ref 26.8–34.3)
MCHC RBC AUTO-ENTMCNC: 32.3 G/DL (ref 31.4–37.4)
MCV RBC AUTO: 113 FL (ref 82–98)
METAMYELOCYTES NFR BLD MANUAL: 1 % (ref 0–1)
MONOCYTES # BLD AUTO: 0.92 THOUSAND/UL (ref 0–1.22)
MONOCYTES NFR BLD: 10 % (ref 4–12)
NEUTROPHILS # BLD MANUAL: 6.65 THOUSAND/UL (ref 1.85–7.62)
NEUTS BAND NFR BLD MANUAL: 2 % (ref 0–8)
NEUTS SEG NFR BLD AUTO: 70 % (ref 43–75)
NRBC BLD AUTO-RTO: 0 /100 WBCS
P AXIS: 55 DEGREES
PLATELET # BLD AUTO: 42 THOUSANDS/UL (ref 149–390)
PLATELET BLD QL SMEAR: ABNORMAL
PMV BLD AUTO: 11.3 FL (ref 8.9–12.7)
POTASSIUM SERPL-SCNC: 5.1 MMOL/L (ref 3.5–5.3)
PR INTERVAL: 190 MS
PROT SERPL-MCNC: 6.9 G/DL (ref 6.4–8.2)
PROTHROMBIN TIME: 21.8 SECONDS (ref 11.6–14.5)
QRS AXIS: -38 DEGREES
QRSD INTERVAL: 106 MS
QT INTERVAL: 414 MS
QTC INTERVAL: 456 MS
RBC # BLD AUTO: 1.97 MILLION/UL (ref 3.88–5.62)
SODIUM SERPL-SCNC: 125 MMOL/L (ref 136–145)
T WAVE AXIS: 62 DEGREES
TOTAL CELLS COUNTED SPEC: 100
VENTRICULAR RATE: 73 BPM
WBC # BLD AUTO: 9.24 THOUSAND/UL (ref 4.31–10.16)

## 2019-09-10 PROCEDURE — 36556 INSERT NON-TUNNEL CV CATH: CPT | Performed by: RADIOLOGY

## 2019-09-10 PROCEDURE — 99233 SBSQ HOSP IP/OBS HIGH 50: CPT | Performed by: NURSE PRACTITIONER

## 2019-09-10 PROCEDURE — 93010 ELECTROCARDIOGRAM REPORT: CPT | Performed by: INTERNAL MEDICINE

## 2019-09-10 PROCEDURE — 87340 HEPATITIS B SURFACE AG IA: CPT | Performed by: INTERNAL MEDICINE

## 2019-09-10 PROCEDURE — 86706 HEP B SURFACE ANTIBODY: CPT | Performed by: INTERNAL MEDICINE

## 2019-09-10 PROCEDURE — 85007 BL SMEAR W/DIFF WBC COUNT: CPT | Performed by: INTERNAL MEDICINE

## 2019-09-10 PROCEDURE — 99233 SBSQ HOSP IP/OBS HIGH 50: CPT | Performed by: INTERNAL MEDICINE

## 2019-09-10 PROCEDURE — 77001 FLUOROGUIDE FOR VEIN DEVICE: CPT | Performed by: RADIOLOGY

## 2019-09-10 PROCEDURE — C1752 CATH,HEMODIALYSIS,SHORT-TERM: HCPCS

## 2019-09-10 PROCEDURE — 80048 BASIC METABOLIC PNL TOTAL CA: CPT | Performed by: INTERNAL MEDICINE

## 2019-09-10 PROCEDURE — 99232 SBSQ HOSP IP/OBS MODERATE 35: CPT | Performed by: INTERNAL MEDICINE

## 2019-09-10 PROCEDURE — 85610 PROTHROMBIN TIME: CPT | Performed by: PHYSICIAN ASSISTANT

## 2019-09-10 PROCEDURE — 86803 HEPATITIS C AB TEST: CPT | Performed by: INTERNAL MEDICINE

## 2019-09-10 PROCEDURE — 76937 US GUIDE VASCULAR ACCESS: CPT

## 2019-09-10 PROCEDURE — 80076 HEPATIC FUNCTION PANEL: CPT | Performed by: PHYSICIAN ASSISTANT

## 2019-09-10 PROCEDURE — 86705 HEP B CORE ANTIBODY IGM: CPT | Performed by: INTERNAL MEDICINE

## 2019-09-10 PROCEDURE — 76937 US GUIDE VASCULAR ACCESS: CPT | Performed by: RADIOLOGY

## 2019-09-10 PROCEDURE — 85027 COMPLETE CBC AUTOMATED: CPT | Performed by: INTERNAL MEDICINE

## 2019-09-10 PROCEDURE — 36556 INSERT NON-TUNNEL CV CATH: CPT

## 2019-09-10 PROCEDURE — 86704 HEP B CORE ANTIBODY TOTAL: CPT | Performed by: INTERNAL MEDICINE

## 2019-09-10 PROCEDURE — 02HV33Z INSERTION OF INFUSION DEVICE INTO SUPERIOR VENA CAVA, PERCUTANEOUS APPROACH: ICD-10-PCS | Performed by: RADIOLOGY

## 2019-09-10 PROCEDURE — NC001 PR NO CHARGE: Performed by: RADIOLOGY

## 2019-09-10 PROCEDURE — 71045 X-RAY EXAM CHEST 1 VIEW: CPT

## 2019-09-10 RX ORDER — LIDOCAINE WITH 8.4% SOD BICARB 0.9%(10ML)
SYRINGE (ML) INJECTION CODE/TRAUMA/SEDATION MEDICATION
Status: COMPLETED | OUTPATIENT
Start: 2019-09-10 | End: 2019-09-10

## 2019-09-10 RX ORDER — LACTULOSE 20 G/30ML
20 SOLUTION ORAL DAILY
Status: DISCONTINUED | OUTPATIENT
Start: 2019-09-10 | End: 2019-09-12

## 2019-09-10 RX ORDER — MIDODRINE HYDROCHLORIDE 5 MG/1
5 TABLET ORAL
Status: DISCONTINUED | OUTPATIENT
Start: 2019-09-10 | End: 2019-09-11

## 2019-09-10 RX ADMIN — LIDOCAINE HYDROCHLORIDE 10 ML: 10 INJECTION, SOLUTION INFILTRATION; PERINEURAL at 13:42

## 2019-09-10 RX ADMIN — OCTREOTIDE ACETATE 100 MCG: 100 INJECTION, SOLUTION INTRAVENOUS; SUBCUTANEOUS at 18:38

## 2019-09-10 RX ADMIN — ALBUMIN (HUMAN) 25 G: 12.5 SOLUTION INTRAVENOUS at 00:12

## 2019-09-10 RX ADMIN — ACETAMINOPHEN 650 MG: 325 TABLET, FILM COATED ORAL at 01:17

## 2019-09-10 RX ADMIN — FUROSEMIDE 80 MG: 10 INJECTION, SOLUTION INTRAMUSCULAR; INTRAVENOUS at 18:38

## 2019-09-10 RX ADMIN — OCTREOTIDE ACETATE 100 MCG: 100 INJECTION, SOLUTION INTRAVENOUS; SUBCUTANEOUS at 07:12

## 2019-09-10 RX ADMIN — GUAIFENESIN 600 MG: 600 TABLET, EXTENDED RELEASE ORAL at 09:08

## 2019-09-10 RX ADMIN — CEFEPIME 2000 MG: 2 INJECTION, POWDER, FOR SOLUTION INTRAVENOUS at 21:59

## 2019-09-10 RX ADMIN — OCTREOTIDE ACETATE 100 MCG: 100 INJECTION, SOLUTION INTRAVENOUS; SUBCUTANEOUS at 22:22

## 2019-09-10 RX ADMIN — MIDODRINE HYDROCHLORIDE 5 MG: 5 TABLET ORAL at 12:34

## 2019-09-10 RX ADMIN — FUROSEMIDE 80 MG: 10 INJECTION, SOLUTION INTRAMUSCULAR; INTRAVENOUS at 12:34

## 2019-09-10 RX ADMIN — FUROSEMIDE 80 MG: 10 INJECTION, SOLUTION INTRAMUSCULAR; INTRAVENOUS at 05:59

## 2019-09-10 RX ADMIN — PANTOPRAZOLE SODIUM 40 MG: 40 TABLET, DELAYED RELEASE ORAL at 05:59

## 2019-09-10 RX ADMIN — CEFEPIME 2000 MG: 2 INJECTION, POWDER, FOR SOLUTION INTRAVENOUS at 09:08

## 2019-09-10 RX ADMIN — GUAIFENESIN 600 MG: 600 TABLET, EXTENDED RELEASE ORAL at 22:19

## 2019-09-10 RX ADMIN — MIDODRINE HYDROCHLORIDE 5 MG: 5 TABLET ORAL at 18:38

## 2019-09-10 NOTE — PROGRESS NOTES
NEPHROLOGY PROGRESS NOTE    Patient: Eleanor Vuong               Sex: male          DOA: 9/6/2019  5:35 PM   Date of Birth: @        Age: @        LOS:  LOS: 4 days   9/10/2019    REASON FOR THE CONSULTATION:  Further management of BOBBY    HPI     This is a 63 y o  male admitted for Anasarca     SUBJECTIVE     - updated patient's wife at bedside  Patient seems uncomfortable and complains of shortness of breath  Patient voided around 800 mL of urine despite of IV diuretics  Updated patient's wife at bedside today  Patient denies nausea, vomiting, headache or dizziness today    - Reviewed last 24 hrs events     CURRENT MEDICATIONS       Current Facility-Administered Medications:     acetaminophen (TYLENOL) tablet 650 mg, 650 mg, Oral, Q6H PRN, Clint Abel MD, 650 mg at 09/10/19 0117    cefepime (MAXIPIME) 2,000 mg in dextrose 5 % 50 mL IVPB, 2,000 mg, Intravenous, Q12H, Clint Abel MD, Last Rate: 100 mL/hr at 09/10/19 0908, 2,000 mg at 09/10/19 0908    furosemide (LASIX) injection 80 mg, 80 mg, Intravenous, TID (diuretic), Kaye Montero MD, 80 mg at 09/10/19 0559    guaiFENesin (MUCINEX) 12 hr tablet 600 mg, 600 mg, Oral, Q12H [address], Clint Abel MD, 600 mg at 09/10/19 0908    lactulose 20 g/30 mL oral solution 20 g, 20 g, Oral, Daily, MARKOS Phelan    midodrine (PROAMATINE) tablet 5 mg, 5 mg, Oral, TID AC, Kaye Montero MD    octreotide (SandoSTATIN) injection 100 mcg, 100 mcg, Subcutaneous, Q8H [address], Kaye Montero MD, 100 mcg at 09/10/19 7862    pantoprazole (PROTONIX) EC tablet 40 mg, 40 mg, Oral, Early Morning, Clint Abel MD, 40 mg at 09/10/19 0559    vancomycin (VANCOCIN) IVPB (premix) 1,000 mg, 1,000 mg, Intravenous, Q12H, Clint Abel MD, Last Rate: 200 mL/hr at 09/09/19 2137, 1,000 mg at 09/09/19 2137    OBJECTIVE     Current Weight: Weight - Scale: (!) 185 kg (407 lb)  Vitals:    09/10/19 0744   BP: 119/56   Pulse: 68   Resp: 20   Temp: (!) 97 3 °F (36 3 °C)   SpO2: 96%     Body mass index is 56 76 kg/m²  Intake/Output Summary (Last 24 hours) at 9/10/2019 1046  Last data filed at 9/10/2019 0828  Gross per 24 hour   Intake 360 ml   Output 850 ml   Net -490 ml       PHYSICAL EXAMINATION     Physical Exam   Constitutional: No distress  Obese   HENT:   Head: Normocephalic and atraumatic  Eyes: Scleral icterus is present  Neck: Neck supple  No JVD present  Cardiovascular: Normal rate  Pulmonary/Chest: No accessory muscle usage  No respiratory distress  He has no wheezes  Abdominal: Soft  He exhibits distension  Musculoskeletal: He exhibits edema  Right hand: He exhibits no laceration  Left hand: He exhibits no laceration  Neurological: He is alert  Skin: Skin is warm  No cyanosis  Psychiatric: He is not combative  He expresses no suicidal ideation           LAB RESULTS     Results from last 7 days   Lab Units 09/10/19  0541 09/10/19  0540 09/09/19  0524 09/08/19  0558 09/07/19  0526 09/06/19  1906 09/06/19  1905   WBC Thousand/uL  --  9 24 8 33 8 97 9 27 10 02  --    HEMOGLOBIN g/dL  --  7 2* 7 5* 7 7* 8 4* 8 6*  --    HEMATOCRIT %  --  22 3* 23 7* 24 0* 26 1* 26 4*  --    PLATELETS Thousands/uL  --  42* 51* 51* 68* 64*  --    SODIUM mmol/L 125*  --  127* 128* 129*  --  127*   POTASSIUM mmol/L 5 1  --  4 7 4 8 5 2  --  4 6   CHLORIDE mmol/L 92*  --  92* 94* 96*  --  94*   CO2 mmol/L 24  --  25 23 26  --  24   BUN mg/dL 54*  --  53* 51* 48*  --  47*   CREATININE mg/dL 2 66*  --  2 44* 2 41* 2 45*  --  2 41*   EGFR ml/min/1 73sq m 25  --  28 29 28  --  29   CALCIUM mg/dL 8 3  --  8 8 8 5 8 7  --  8 4   MAGNESIUM mg/dL  --   --   --   --  2 1  --  1 9       I have personally reviewed the old medical records and patient's previously known baseline creatinine level is ~ 1 1-1 3    RADIOLOGY RESULTS      CT tibia fibula right wo contrast   Final Result by Darleen Dolan MD (09/06 2128)   Soft tissue evaluation is limited without IV contrast  1    No evidence of soft tissue gas  2   Generalized subcutaneous edema with associated skin thickening  Edema noted adjacent to the calf musculature  Findings are nonspecific and may be related to generalized anasarca  3   The absence of soft tissue gas does not exclude necrotizing fasciitis particularly at the early stages  If there is persistent concern for necrotizing fasciitis, consider follow-up with MRI with and without contrast          Workstation performed: [id number]         CT femur right wo contrast   Final Result by Wm Hartman MD (09/06 2128)   Soft tissue evaluation is limited without IV contrast    1    No evidence of soft tissue gas  2   Generalized subcutaneous edema with associated skin thickening  Edema noted adjacent to the calf musculature  Findings are nonspecific and may be related to generalized anasarca  3   The absence of soft tissue gas does not exclude necrotizing fasciitis particularly at the early stages  If there is persistent concern for necrotizing fasciitis, consider follow-up with MRI with and without contrast          Workstation performed: [id number]         CT chest abdomen pelvis wo contrast   Final Result by Wm Hartman MD (09/06 2116)   Exam is limited without IV and oral contrast particularly for soft tissue and bowel evaluation  1   Scattered mild patchy opacities bilaterally suspicious for pneumonia  2  Cirrhotic appearance to the liver  Splenomegaly  3   Mild ascites  4   Diffuse subcutaneous edema compatible with anasarca  Workstation performed: [id number]         XR chest 2 views   ED Interpretation by Jr Orozco DO (09/06 1949)   Limited film due to body habitus and poor inspiratory effort  Cardiomegaly present  Increased vascular congestion bilaterally        Final Result by Charlotte Raza MD (09/07 1488)   Persistent cardiomegaly      Increased diffuse vascular congestion            Workstation performed: [id number]         IR temp HD cath    (Results Pending)       PLAN / RECOMMENDATIONS      1  BOBBY on top of CKD stage 2  Present on admission, suspected due to hepatorenal syndrome  Upon review of old medical records, patient's baseline creatinine was around 1 1-1 3  Patient's renal function has failed to improve with IV albumin and suspected to have underlying hepatorenal syndrome  Patient has received octreotide along with IV albumin and overnight patient's renal function has worsened to current creatinine of 2 66  Patient has anasarca and patient is not voiding much despite of IV diuretics and volume status is difficult to manage at this point especially in the light of underlying hepatorenal syndrome /worsening BOBBY  Discussed overall case in length with patient and wife today and I personally think next best step at this point for volume management would be initiation of hemodialysis  Discussed consideration of dialysis with patient and wife today who are agreeable for initiation of hemodialysis  Dialysis procedure has been fully reviewed with the patient and written informed consent has been obtained  Will consult intervention radiologist for placement of temporary hemodialysis catheter for initiation of hemodialysis  I would plan to start patient on midodrine 5 mg PO TID to help maintain higher blood pressure so we can able to achieve ultrafiltration with hemodialysis  Plan to check renal hepatitis panel today  Also discussed overall case with dialysis nurse  2  Hyponatremia  Secondary to underlying liver cirrhosis  Chronic since duration is > 48 hours  Currently following 1 5 L of fluid restriction but overnight sodium level has worsened to 125  Continue IV diuretics for time being  As mentioned earlier will plan to start hemodialysis today  3  Anemia  Multifactorial, current hemoglobin 7 2 which is lower than yesterday    Consider blood transfusion if hemoglobin level drops below 7     Overall above mentioned plan was also d/w Duane Beach MD  Nephrology  9/10/2019        Portions of the record may have been created with voice recognition software  Occasional wrong word or "sound a like" substitutions may have occurred due to the inherent limitations of voice recognition software  Read the chart carefully and recognize, using context, where substitutions have occurred

## 2019-09-10 NOTE — CONSULTS
The patient's vancomycin therapy has been completed/discontinued   Thank you for this consult; pharmacy will sign-off now

## 2019-09-10 NOTE — ASSESSMENT & PLAN NOTE
· Patient developed new erythema of the RLE  · Patient initially started on broad-spectrum antibiotics of IV vancomycin and cefepime will downgrade patient IV cefepime only  · Serial exams  · Monitor CBC, vitals  Procalcitonin 0 33  Repeat broke out in a m    · Blood cultures x2 negative for growth at 72 hours

## 2019-09-10 NOTE — ASSESSMENT & PLAN NOTE
· Likely in setting of renal disease  · Hemoglobin stable transfuse if less than 7 symptomatic  · Continue to monitor  · CBC in a m

## 2019-09-10 NOTE — ASSESSMENT & PLAN NOTE
· Patient has been struggling with anasarca in the setting of decompensated cirrhosis  · Patient was on Bumex which has currently been discontinued due to BOBBY/HRS  · Creatinine/GFR worsening nephrology following appreciate recommendation given declining renal function patient may require dialysis  · Appreciate Nephrology is ongoing recommendations  · Continue daily weight I/O  · Clinically patient appears ill and reports he wants to fluid gone  Discussion with Nephrology given patient's clinical presentation and ongoing volume issues with minimal output will have patient evaluated to have HD catheter placed and dialyzed the patient to assist with his volume status

## 2019-09-10 NOTE — RESPIRATORY THERAPY NOTE
09/09/19 3579   Respiratory Assessment   Resp Comments pt still awake and will go on when he needs wife at bedside and stated she will apply, pt self applies and is not ready yet

## 2019-09-10 NOTE — ASSESSMENT & PLAN NOTE
· CT suggests possible pneumonia  · Patient is already on broad-spectrum antibiotics for presentation with right lower extremity cellulitis  · Supplemental O2 prn, procalcitonin elevated at 0 33 will repeat in a m , Strep PNA and Legionella negative  · Sputum culture pending continue to trend  · Procalcitonin in a m

## 2019-09-10 NOTE — PROGRESS NOTES
Progress Note Julianna Godoy 1960, 63 y o  male MRN: [id number]    Unit/Bed#: -01 Encounter: [id number]    Primary Care Provider: No primary care provider on file  Date and time admitted to hospital: 9/6/2019  5:35 PM        * Anasarca  Assessment & Plan  · Patient has been struggling with anasarca in the setting of decompensated cirrhosis  · Patient was on Bumex which has currently been discontinued due to BOBBY/HRS  · Creatinine/GFR worsening nephrology following appreciate recommendation given declining renal function patient may require dialysis  · Appreciate Nephrology is ongoing recommendations  · Continue daily weight I/O  · Clinically patient appears ill and reports he wants to fluid gone  Discussion with Nephrology given patient's clinical presentation and ongoing volume issues with minimal output will have patient evaluated to have HD catheter placed and dialyzed the patient to assist with his volume status  Anemia  Assessment & Plan  · Likely in setting of renal disease  · Hemoglobin stable transfuse if less than 7 symptomatic  · Continue to monitor  · CBC in a m  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 [facility])  Assessment & Plan  · Patient with decompensated ROSALES cirrhosis  MELD calculated to be up to 32 this admission (calculated at 25 last admission and 20 back in June)  · Ascites: CT shows mild ascites and diffuse anasarca  Patient was on diuresis as outpatient however, patient developed BOBBY and diuretics have been stopped at present  · Continue Lactulose for goal of 3 soft BMs a day  RN reporting frequent bowel movements will decrease lactulose to daily and discuss with GI  · Patient remains AOx3 and no asterixis  · Varices: Will need EGD in future for variceal screening  · [facility] [address]  Screen: AFP in June normal  CT A/P showed no obvious focal mass on unenhanced CT  MRI in June did not show any suspicious lesion in the liver but was limited by motion artifact    · Hepatic function panel showing direct bilirubin improved slightly to 2 25  · Transplant: No evaluation at transplant center has been performed to date  · Patient has a poor prognosis  · GI following recommendations appreciated    Acute kidney injury [facility])  Assessment & Plan  · Patient was admitted with BOBBY on top of CKD stage 2    · Patient with baseline Cr of approximately 1 3 creatinine, on admission was 2 45 and has now worsened to 2 66 with a GFR of 25  · Etiology suspected to be secondary to hepatorenal syndrome  · Continue Albumin IV and Octreotide added this admission  (Midodrine not added at present per Nephrology as systolic BP >473)  · Bumex discontinued  · Monitor BMP  · Nephrology input appreciated  · Patient may need dialysis in near future  · Prognosis poor  HCAP (healthcare-associated pneumonia)  Assessment & Plan  · CT suggests possible pneumonia  · Patient is already on broad-spectrum antibiotics for presentation with right lower extremity cellulitis  · Supplemental O2 prn, procalcitonin elevated at 0 33 will repeat in a m , Strep PNA and Legionella negative  · Sputum culture pending continue to trend  · Procalcitonin in a m  Cellulitis of right lower extremity  Assessment & Plan  · Patient developed new erythema of the RLE  · Patient initially started on broad-spectrum antibiotics of IV vancomycin and cefepime will downgrade patient IV cefepime only  · Serial exams  · Monitor CBC, vitals  Procalcitonin 0 33  Repeat broke out in a m  · Blood cultures x2 negative for growth at 72 hours    Hyponatremia  Assessment & Plan  · Secondary to cirrhosis  · Na 125  · Nephrology/GI input appreciated      Essential hypertension  Assessment & Plan  Patient remains normotensive  Hold Bumex as the patient is presenting with acute kidney injury and hyponatremia, await nephrology consultation and recommendations  Monitor blood pressure with routine vitals      CROW on CPAP  Assessment & Plan  · Continue CPAP q hs     Morbid obesity ([facility] [address] )  Assessment & Plan  · Dietary and lifestyle modifications recommended  VTE Pharmacologic Prophylaxis:   Pharmacologic: Pharmacologic VTE Prophylaxis contraindicated due to On hold due to need for IR intervention for HD catheter  Mechanical VTE Prophylaxis in Place: No    Patient Centered Rounds: I have performed bedside rounds with nursing staff today  Discussions with Specialists or Other Care Team Provider:  Nephrology    Education and Discussions with Family / Patient:  Patient and wife at bedside    Time Spent for Care: 40 minutes  More than 50% of total time spent on counseling and coordination of care as described above  Current Length of Stay: 4 day(s)    Current Patient Status: Inpatient   Certification Statement: The patient will continue to require additional inpatient hospital stay due to Ongoing treatment of anasarca and liver cirrhosis    Discharge Plan:  Not medically cleared    Code Status: Level 1 - Full Code      Subjective:   Patient overall CC does not feel well states he is afraid to go to sleep in fear that he will wake up  Patient reports he just feels of all the fluid that he feels like he is heavy weight sitting on his chest which makes it difficult to breathe  Patient denies cough denies fevers or chills in that sense  Patient reports he the pain his like is feeling better the patient is generally states he does not feel well  Long discussion with wife and patient at bedside that given his volume status concern for his kidneys and further intervention from Nephrology would be likely  Objective:     Vitals:   Temp (24hrs), Av 3 °F (36 3 °C), Min:97 2 °F (36 2 °C), Max:97 3 °F (36 3 °C)    Temp:  [97 2 °F (36 2 °C)-97 3 °F (36 3 °C)] 97 3 °F (36 3 °C)  HR:  [61-68] 68  Resp:  [18-20] 20  BP: (110-119)/(54-58) 119/56  SpO2:  [96 %-97 %] 96 %  Body mass index is 56 76 kg/m²  Input and Output Summary (last 24 hours):        Intake/Output Summary (Last 24 hours) at 9/10/2019 1217  Last data filed at 9/10/2019 5556  Gross per 24 hour   Intake 360 ml   Output 700 ml   Net -340 ml       Physical Exam:     Physical Exam   Constitutional: He is oriented to person, place, and time  HENT:   Head: Normocephalic and atraumatic  Eyes: Conjunctivae and EOM are normal    Neck: Normal range of motion  Cardiovascular: Normal rate, regular rhythm and normal heart sounds  Pulmonary/Chest: Effort normal  He has decreased breath sounds in the right lower field and the left lower field  Abdominal: Soft  Bowel sounds are normal  He exhibits distension  Musculoskeletal: Normal range of motion  He exhibits edema and tenderness  Neurological: He is alert and oriented to person, place, and time  Skin: Skin is warm and dry  There is erythema  Erythema to left lower extremity improved   Psychiatric: He has a normal mood and affect  His behavior is normal          Additional Data:     Labs:    Results from last 7 days   Lab Units 09/10/19  0540 09/09/19  0524   WBC Thousand/uL 9 24 8 33   HEMOGLOBIN g/dL 7 2* 7 5*   HEMATOCRIT % 22 3* 23 7*   PLATELETS Thousands/uL 42* 51*   BANDS PCT % 2  --    NEUTROS PCT %  --  68   LYMPHS PCT %  --  6*   LYMPHO PCT % 6*  --    MONOS PCT %  --  12   MONO PCT % 10  --    EOS PCT % 10* 6     Results from last 7 days   Lab Units 09/10/19  0541   SODIUM mmol/L 125*   POTASSIUM mmol/L 5 1   CHLORIDE mmol/L 92*   CO2 mmol/L 24   BUN mg/dL 54*   CREATININE mg/dL 2 66*   ANION GAP mmol/L 9   CALCIUM mg/dL 8 3   ALBUMIN g/dL 3 8   TOTAL BILIRUBIN mg/dL 7 00*   ALK PHOS U/L 68   ALT U/L 31   AST U/L 44   GLUCOSE RANDOM mg/dL 111     Results from last 7 days   Lab Units 09/10/19  0541   INR  1 88*             Results from last 7 days   Lab Units 09/08/19  0558 09/06/19  1909   LACTIC ACID mmol/L  --  1 3   PROCALCITONIN ng/ml 0 33*  --            * I Have Reviewed All Lab Data Listed Above    * Additional Pertinent Lab Tests Reviewed: Brett [address] Admission Reviewed        Recent Cultures (last 7 days):     Results from last 7 days   Lab Units 09/08/19  1121 09/07/19  1944 09/06/19  1905 09/06/19  1904   BLOOD CULTURE   --   --  No Growth at 72 hrs  No Growth at 72 hrs  SPUTUM CULTURE  Culture results to follow  --   --   --    GRAM STAIN RESULT  2+ Polys*  2+ Gram negative rods*  --   --   --    LEGIONELLA URINARY ANTIGEN   --  Negative  --   --        Last 24 Hours Medication List:     Current Facility-Administered Medications:  acetaminophen 650 mg Oral Q6H PRN Radha Vaca MD    cefepime 2,000 mg Intravenous Q12H Radha Vaca MD Last Rate: 2,000 mg (09/10/19 0908)   furosemide 80 mg Intravenous TID (diuretic) Linnea Mcintyre MD    guaiFENesin 600 mg Oral Q12H [address] Radha Vaca MD    lactulose 20 g Oral Daily MARKOS Bell    midodrine 5 mg Oral TID AC Linnea Mcintyre MD    octreotide 100 mcg Subcutaneous Q8H Diana Finn MD    pantoprazole 40 mg Oral Early Morning Radha Vaca MD         Today, Patient Was Seen By: MARKOS Bell    ** Please Note: Dictation voice to text software may have been used in the creation of this document   **

## 2019-09-10 NOTE — ASSESSMENT & PLAN NOTE
· Patient with decompensated ROSALES cirrhosis  MELD calculated to be up to 32 this admission (calculated at 25 last admission and 20 back in June)  · Ascites: CT shows mild ascites and diffuse anasarca  Patient was on diuresis as outpatient however, patient developed BOBBY and diuretics have been stopped at present  · Continue Lactulose for goal of 3 soft BMs a day  RN reporting frequent bowel movements will decrease lactulose to daily and discuss with GI  · Patient remains AOx3 and no asterixis  · Varices: Will need EGD in future for variceal screening  · [facility] [address]  Screen: AFP in June normal  CT A/P showed no obvious focal mass on unenhanced CT  MRI in June did not show any suspicious lesion in the liver but was limited by motion artifact  · Hepatic function panel showing direct bilirubin improved slightly to 2 25  · Transplant: No evaluation at transplant center has been performed to date  · Patient has a poor prognosis    · GI following recommendations appreciated

## 2019-09-10 NOTE — PLAN OF CARE
First hemodialysis treatment to be 2 hours in length  Using 2 mEq/L dialysate solution for serum potassium 5 1  Fluid goal 1000 ml

## 2019-09-10 NOTE — HEMODIALYSIS
First hemodialysis treatment lasting 2 hours    Right temporary catheter maintains 200 bfr     -volume removal 1000 ml  -pre weight none  -post weight 198 6 kg

## 2019-09-10 NOTE — ASSESSMENT & PLAN NOTE
· Patient was admitted with BOBBY on top of CKD stage 2    · Patient with baseline Cr of approximately 1 3 creatinine, on admission was 2 45 and has now worsened to 2 66 with a GFR of 25  · Etiology suspected to be secondary to hepatorenal syndrome  · Continue Albumin IV and Octreotide added this admission  (Midodrine not added at present per Nephrology as systolic BP >128)  · Bumex discontinued  · Monitor BMP  · Nephrology input appreciated  · Patient may need dialysis in near future  · Prognosis poor

## 2019-09-10 NOTE — PROCEDURES
Central Line Insertion  Date/Time: 9/10/2019 1:52 PM  Performed by: Brayan Ponce MD  Authorized by: Brayan Ponce MD     Patient location:  IR  Consent:     Consent obtained:  Written    Consent given by:  Patient    Risks discussed:  Bleeding and infection    Alternatives discussed:  No treatment and delayed treatment  Universal protocol:     Procedure explained and questions answered to patient or proxy's satisfaction: yes      Relevant documents present and verified: yes      Test results available and properly labeled: yes      Radiology Images displayed and confirmed  If images not available, report reviewed: yes      Required blood products, implants, devices, and special equipment available: yes      Site/side marked: yes      Immediately prior to procedure, a time out was called: yes      Patient identity confirmed:  Verbally with patient and arm band  Pre-procedure details:     Hand hygiene: Hand hygiene performed prior to insertion      Sterile barrier technique: All elements of maximal sterile technique followed      Skin preparation:  2% chlorhexidine    Skin preparation agent: Skin preparation agent completely dried prior to procedure    Indications:     Central line indications: dialysis    Anesthesia (see MAR for exact dosages):      Anesthesia method:  Local infiltration    Local anesthetic:  Lidocaine 1% w/o epi  Procedure details:     Location:  Right internal jugular    Vessel type: vein      Laterality:  Right    Approach: percutaneous technique used      Patient position:  Flat    Catheter type:  Double lumen    Catheter size:  14 Fr    Landmarks identified: yes      Ultrasound guidance: yes      Sterile ultrasound techniques: Sterile gel and sterile probe covers were used      Number of attempts:  1    Successful placement: yes    Post-procedure details:     Post-procedure:  Dressing applied and line sutured    Assessment:  Blood return through all ports and placement verified by x-ray    Post-procedure complications: none      Patient tolerance of procedure:   Tolerated well, no immediate complications

## 2019-09-10 NOTE — PLAN OF CARE
Problem: PAIN - ADULT  Goal: Verbalizes/displays adequate comfort level or baseline comfort level  Description  Interventions:  - Encourage patient to monitor pain and request assistance  - Assess pain using appropriate pain scale  - Administer analgesics based on type and severity of pain and evaluate response  - Implement non-pharmacological measures as appropriate and evaluate response  - Consider cultural and social influences on pain and pain management  - Notify physician/advanced practitioner if interventions unsuccessful or patient reports new pain  Outcome: Progressing     Problem: INFECTION - ADULT  Goal: Absence or prevention of progression during hospitalization  Description  INTERVENTIONS:  - Assess and monitor for signs and symptoms of infection  - Monitor lab/diagnostic results  - Monitor all insertion sites, i e  indwelling lines, tubes, and drains  - Monitor endotracheal if appropriate and nasal secretions for changes in amount and color  - [place] appropriate cooling/warming therapies per order  - Administer medications as ordered  - Instruct and encourage patient and family to use good hand hygiene technique  - Identify and instruct in appropriate isolation precautions for identified infection/condition  Outcome: Progressing  Goal: Absence of fever/infection during neutropenic period  Description  INTERVENTIONS:  - Monitor WBC    Outcome: Progressing     Problem: SAFETY ADULT  Goal: Patient will remain free of falls  Description  INTERVENTIONS:  - Assess patient frequently for physical needs  -  Identify cognitive and physical deficits and behaviors that affect risk of falls    -  [place] fall precautions as indicated by assessment   - Educate patient/family on patient safety including physical limitations  - Instruct patient to call for assistance with activity based on assessment  - Modify environment to reduce risk of injury  - Consider OT/PT consult to assist with strengthening/mobility  Outcome: Progressing  Goal: Maintain or return to baseline ADL function  Description  INTERVENTIONS:  -  Assess patient's ability to carry out ADLs; assess patient's baseline for ADL function and identify physical deficits which impact ability to perform ADLs (bathing, care of mouth/teeth, toileting, grooming, dressing, etc )  - Assess/evaluate cause of self-care deficits   - Assess range of motion  - Assess patient's mobility; develop plan if impaired  - Assess patient's need for assistive devices and provide as appropriate  - Encourage maximum independence but intervene and supervise when necessary  - Involve family in performance of ADLs  - Assess for home care needs following discharge   - Consider OT consult to assist with ADL evaluation and planning for discharge  - Provide patient education as appropriate  Outcome: Progressing  Goal: Maintain or return mobility status to optimal level  Description  INTERVENTIONS:  - Assess patient's baseline mobility status (ambulation, transfers, stairs, etc )    - Identify cognitive and physical deficits and behaviors that affect mobility  - Identify mobility aids required to assist with transfers and/or ambulation (gait belt, sit-to-stand, lift, walker, cane, etc )  - [place] fall precautions as indicated by assessment  - Record patient progress and toleration of activity level on Mobility SBAR; progress patient to next Phase/Stage  - Instruct patient to call for assistance with activity based on assessment  - Consider rehabilitation consult to assist with strengthening/weightbearing, etc   Outcome: Progressing     Problem: DISCHARGE PLANNING  Goal: Discharge to home or other facility with appropriate resources  Description  INTERVENTIONS:  - Identify barriers to discharge w/patient and caregiver  - Arrange for needed discharge resources and transportation as appropriate  - Identify discharge learning needs (meds, wound care, etc )  - Arrange for interpretive services to assist at discharge as needed  - Refer to Case Management Department for coordinating discharge planning if the patient needs post-hospital services based on physician/advanced practitioner order or complex needs related to functional status, cognitive ability, or social support system  Outcome: Progressing     Problem: Knowledge Deficit  Goal: Patient/family/caregiver demonstrates understanding of disease process, treatment plan, medications, and discharge instructions  Description  Complete learning assessment and assess knowledge base  Interventions:  - Provide teaching at level of understanding  - Provide teaching via preferred learning methods  Outcome: Progressing     Problem: Potential for Falls  Goal: Patient will remain free of falls  Description  INTERVENTIONS:  - Assess patient frequently for physical needs  -  Identify cognitive and physical deficits and behaviors that affect risk of falls    -  [place] fall precautions as indicated by assessment   - Educate patient/family on patient safety including physical limitations  - Instruct patient to call for assistance with activity based on assessment  - Modify environment to reduce risk of injury  - Consider OT/PT consult to assist with strengthening/mobility  Outcome: Progressing     Problem: Prexisting or High Potential for Compromised Skin Integrity  Goal: Skin integrity is maintained or improved  Description  INTERVENTIONS:  - Identify patients at risk for skin breakdown  - Assess and monitor skin integrity  - Assess and monitor nutrition and hydration status  - Monitor labs   - Assess for incontinence   - Turn and reposition patient  - Assist with mobility/ambulation  - Relieve pressure over bony prominences  - Avoid friction and shearing  - Provide appropriate hygiene as needed including keeping skin clean and dry  - Evaluate need for skin moisturizer/barrier cream  - Collaborate with interdisciplinary team   - Patient/family teaching  - Consider wound care consult   Outcome: Progressing     Problem: Nutrition/Hydration-ADULT  Goal: Nutrient/Hydration intake appropriate for improving, restoring or maintaining nutritional needs  Description  Monitor and assess patient's nutrition/hydration status for malnutrition  Collaborate with interdisciplinary team and initiate plan and interventions as ordered  Monitor patient's weight and dietary intake as ordered or per policy  Utilize nutrition screening tool and intervene as necessary  Determine patient's food preferences and provide high-protein, high-caloric foods as appropriate       INTERVENTIONS:  - Monitor oral intake, urinary output, labs, and treatment plans  - Assess nutrition and hydration status and recommend course of action  - Evaluate amount of meals eaten  - Assist patient with eating if necessary   - Allow adequate time for meals  - Recommend/ encourage appropriate diets, oral nutritional supplements, and vitamin/mineral supplements  - Order, calculate, and assess calorie counts as needed  - Recommend, monitor, and adjust tube feedings and TPN/PPN based on assessed needs  - Assess need for intravenous fluids  - Provide specific nutrition/hydration education as appropriate  - Include patient/family/caregiver in decisions related to nutrition  Outcome: Progressing     Problem: METABOLIC, FLUID AND ELECTROLYTES - ADULT  Goal: Electrolytes maintained within normal limits  Description  INTERVENTIONS:  - Monitor labs and assess patient for signs and symptoms of electrolyte imbalances  - Administer electrolyte replacement as ordered  - Monitor response to electrolyte replacements, including repeat lab results as appropriate  - Instruct patient on fluid and nutrition as appropriate  Outcome: Progressing  Goal: Fluid balance maintained  Description  INTERVENTIONS:  - Monitor labs   - Monitor I/O and WT  - Instruct patient on fluid and nutrition as appropriate  - Assess for signs & symptoms of volume excess or deficit  Outcome: Progressing
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
## 2019-09-11 ENCOUNTER — APPOINTMENT (INPATIENT)
Dept: DIALYSIS | Facility: HOSPITAL | Age: 59
DRG: 177 | End: 2019-09-11
Payer: COMMERCIAL

## 2019-09-11 LAB
ABO GROUP BLD: NORMAL
ALBUMIN SERPL BCP-MCNC: 3.4 G/DL (ref 3.5–5)
ALP SERPL-CCNC: 64 U/L (ref 46–116)
ALT SERPL W P-5'-P-CCNC: 30 U/L (ref 12–78)
AMMONIA PLAS-SCNC: 74 UMOL/L (ref 11–35)
ANION GAP SERPL CALCULATED.3IONS-SCNC: 8 MMOL/L (ref 4–13)
ANISOCYTOSIS BLD QL SMEAR: PRESENT
ARTERIAL PATENCY WRIST A: YES
AST SERPL W P-5'-P-CCNC: 39 U/L (ref 5–45)
BACTERIA BLD CULT: NORMAL
BACTERIA BLD CULT: NORMAL
BASE EXCESS BLDA CALC-SCNC: -3.9 MMOL/L
BASOPHILS # BLD MANUAL: 0 THOUSAND/UL (ref 0–0.1)
BASOPHILS NFR MAR MANUAL: 0 % (ref 0–1)
BILIRUB DIRECT SERPL-MCNC: 2.3 MG/DL (ref 0–0.2)
BILIRUB SERPL-MCNC: 7.1 MG/DL (ref 0.2–1)
BLD GP AB SCN SERPL QL: NEGATIVE
BUN SERPL-MCNC: 54 MG/DL (ref 5–25)
CALCIUM SERPL-MCNC: 8.7 MG/DL (ref 8.3–10.1)
CHLORIDE SERPL-SCNC: 94 MMOL/L (ref 100–108)
CO2 SERPL-SCNC: 25 MMOL/L (ref 21–32)
CREAT SERPL-MCNC: 2.93 MG/DL (ref 0.6–1.3)
EOSINOPHIL # BLD MANUAL: 0.62 THOUSAND/UL (ref 0–0.4)
EOSINOPHIL NFR BLD MANUAL: 6 % (ref 0–6)
ERYTHROCYTE [DISTWIDTH] IN BLOOD BY AUTOMATED COUNT: 15.9 % (ref 11.6–15.1)
GFR SERPL CREATININE-BSD FRML MDRD: 23 ML/MIN/1.73SQ M
GLUCOSE SERPL-MCNC: 123 MG/DL (ref 65–140)
GLUCOSE SERPL-MCNC: 126 MG/DL (ref 65–140)
HBV CORE AB SER QL: NORMAL
HBV CORE IGM SER QL: NORMAL
HBV SURFACE AB SER-ACNC: <3.1 MIU/ML
HBV SURFACE AG SER QL: NORMAL
HCO3 BLDA-SCNC: 21.9 MMOL/L (ref 22–28)
HCT VFR BLD AUTO: 22.3 % (ref 36.5–49.3)
HCV AB SER QL: NORMAL
HGB BLD-MCNC: 6.9 G/DL (ref 12–17)
INR PPP: 2.04 (ref 0.84–1.19)
LYMPHOCYTES # BLD AUTO: 0.51 THOUSAND/UL (ref 0.6–4.47)
LYMPHOCYTES # BLD AUTO: 5 % (ref 14–44)
MCH RBC QN AUTO: 35.6 PG (ref 26.8–34.3)
MCHC RBC AUTO-ENTMCNC: 30.9 G/DL (ref 31.4–37.4)
MCV RBC AUTO: 115 FL (ref 82–98)
METAMYELOCYTES NFR BLD MANUAL: 1 % (ref 0–1)
MONOCYTES # BLD AUTO: 0.82 THOUSAND/UL (ref 0–1.22)
MONOCYTES NFR BLD: 8 % (ref 4–12)
MYELOCYTES NFR BLD MANUAL: 2 % (ref 0–1)
NASAL CANNULA: ABNORMAL
NEUTROPHILS # BLD MANUAL: 8.02 THOUSAND/UL (ref 1.85–7.62)
NEUTS BAND NFR BLD MANUAL: 1 % (ref 0–8)
NEUTS SEG NFR BLD AUTO: 77 % (ref 43–75)
NRBC BLD AUTO-RTO: 0 /100 WBCS
O2 CT BLDA-SCNC: 8.6 ML/DL (ref 16–23)
OXYHGB MFR BLDA: 76.1 % (ref 94–97)
PCO2 BLDA: 42.7 MM HG (ref 36–44)
PH BLDA: 7.33 [PH] (ref 7.35–7.45)
PLATELET # BLD AUTO: 36 THOUSANDS/UL (ref 149–390)
PLATELET BLD QL SMEAR: ABNORMAL
PMV BLD AUTO: 11.2 FL (ref 8.9–12.7)
PO2 BLDA: 44.2 MM HG (ref 75–129)
POTASSIUM SERPL-SCNC: 5.1 MMOL/L (ref 3.5–5.3)
PROT SERPL-MCNC: 6.6 G/DL (ref 6.4–8.2)
PROTHROMBIN TIME: 23.2 SECONDS (ref 11.6–14.5)
RBC # BLD AUTO: 1.94 MILLION/UL (ref 3.88–5.62)
RH BLD: NEGATIVE
SODIUM SERPL-SCNC: 127 MMOL/L (ref 136–145)
SPECIMEN EXPIRATION DATE: NORMAL
SPECIMEN SOURCE: ABNORMAL
TOTAL CELLS COUNTED SPEC: 100
WBC # BLD AUTO: 10.28 THOUSAND/UL (ref 4.31–10.16)

## 2019-09-11 PROCEDURE — P9016 RBC LEUKOCYTES REDUCED: HCPCS

## 2019-09-11 PROCEDURE — 86900 BLOOD TYPING SEROLOGIC ABO: CPT | Performed by: PHYSICIAN ASSISTANT

## 2019-09-11 PROCEDURE — 82805 BLOOD GASES W/O2 SATURATION: CPT | Performed by: PHYSICIAN ASSISTANT

## 2019-09-11 PROCEDURE — 86901 BLOOD TYPING SEROLOGIC RH(D): CPT | Performed by: PHYSICIAN ASSISTANT

## 2019-09-11 PROCEDURE — 80048 BASIC METABOLIC PNL TOTAL CA: CPT | Performed by: INTERNAL MEDICINE

## 2019-09-11 PROCEDURE — 86850 RBC ANTIBODY SCREEN: CPT | Performed by: PHYSICIAN ASSISTANT

## 2019-09-11 PROCEDURE — 36600 WITHDRAWAL OF ARTERIAL BLOOD: CPT

## 2019-09-11 PROCEDURE — 90935 HEMODIALYSIS ONE EVALUATION: CPT | Performed by: INTERNAL MEDICINE

## 2019-09-11 PROCEDURE — 85610 PROTHROMBIN TIME: CPT | Performed by: PHYSICIAN ASSISTANT

## 2019-09-11 PROCEDURE — 99233 SBSQ HOSP IP/OBS HIGH 50: CPT | Performed by: PHYSICIAN ASSISTANT

## 2019-09-11 PROCEDURE — 82948 REAGENT STRIP/BLOOD GLUCOSE: CPT

## 2019-09-11 PROCEDURE — 82140 ASSAY OF AMMONIA: CPT | Performed by: PHYSICIAN ASSISTANT

## 2019-09-11 PROCEDURE — 85027 COMPLETE CBC AUTOMATED: CPT | Performed by: INTERNAL MEDICINE

## 2019-09-11 PROCEDURE — 85007 BL SMEAR W/DIFF WBC COUNT: CPT | Performed by: INTERNAL MEDICINE

## 2019-09-11 PROCEDURE — 80076 HEPATIC FUNCTION PANEL: CPT | Performed by: PHYSICIAN ASSISTANT

## 2019-09-11 PROCEDURE — 86920 COMPATIBILITY TEST SPIN: CPT

## 2019-09-11 RX ORDER — MIDODRINE HYDROCHLORIDE 5 MG/1
10 TABLET ORAL
Status: DISCONTINUED | OUTPATIENT
Start: 2019-09-11 | End: 2019-09-16

## 2019-09-11 RX ORDER — ALBUMIN, HUMAN INJ 5% 5 %
12.5 SOLUTION INTRAVENOUS ONCE
Status: COMPLETED | OUTPATIENT
Start: 2019-09-11 | End: 2019-09-12

## 2019-09-11 RX ADMIN — OCTREOTIDE ACETATE 100 MCG: 100 INJECTION, SOLUTION INTRAVENOUS; SUBCUTANEOUS at 15:10

## 2019-09-11 RX ADMIN — ALBUMIN (HUMAN) 12.5 G: 12.5 SOLUTION INTRAVENOUS at 15:56

## 2019-09-11 RX ADMIN — OCTREOTIDE ACETATE 100 MCG: 100 INJECTION, SOLUTION INTRAVENOUS; SUBCUTANEOUS at 22:33

## 2019-09-11 RX ADMIN — FUROSEMIDE 80 MG: 10 INJECTION, SOLUTION INTRAMUSCULAR; INTRAVENOUS at 15:16

## 2019-09-11 RX ADMIN — FUROSEMIDE 80 MG: 10 INJECTION, SOLUTION INTRAMUSCULAR; INTRAVENOUS at 06:32

## 2019-09-11 RX ADMIN — LACTULOSE 20 G: 10 SOLUTION ORAL at 15:09

## 2019-09-11 RX ADMIN — CEFEPIME 2000 MG: 2 INJECTION, POWDER, FOR SOLUTION INTRAVENOUS at 15:09

## 2019-09-11 RX ADMIN — MIDODRINE HYDROCHLORIDE 5 MG: 5 TABLET ORAL at 06:32

## 2019-09-11 RX ADMIN — GUAIFENESIN 600 MG: 600 TABLET, EXTENDED RELEASE ORAL at 15:09

## 2019-09-11 RX ADMIN — MIDODRINE HYDROCHLORIDE 10 MG: 5 TABLET ORAL at 15:09

## 2019-09-11 RX ADMIN — PANTOPRAZOLE SODIUM 40 MG: 40 TABLET, DELAYED RELEASE ORAL at 06:32

## 2019-09-11 RX ADMIN — GUAIFENESIN 600 MG: 600 TABLET, EXTENDED RELEASE ORAL at 21:57

## 2019-09-11 NOTE — RESPIRATORY THERAPY NOTE
09/11/19 0054   Non-Invasive Information   Resp Comments pt remains off home unit, wife will assisst pt with application when pt is ready

## 2019-09-11 NOTE — PLAN OF CARE
Problem: PAIN - ADULT  Goal: Verbalizes/displays adequate comfort level or baseline comfort level  Description  Interventions:  - Encourage patient to monitor pain and request assistance  - Assess pain using appropriate pain scale  - Administer analgesics based on type and severity of pain and evaluate response  - Implement non-pharmacological measures as appropriate and evaluate response  - Consider cultural and social influences on pain and pain management  - Notify physician/advanced practitioner if interventions unsuccessful or patient reports new pain  Outcome: Progressing     Problem: INFECTION - ADULT  Goal: Absence or prevention of progression during hospitalization  Description  INTERVENTIONS:  - Assess and monitor for signs and symptoms of infection  - Monitor lab/diagnostic results  - Monitor all insertion sites, i e  indwelling lines, tubes, and drains  - Monitor endotracheal if appropriate and nasal secretions for changes in amount and color  - [place] appropriate cooling/warming therapies per order  - Administer medications as ordered  - Instruct and encourage patient and family to use good hand hygiene technique  - Identify and instruct in appropriate isolation precautions for identified infection/condition  Outcome: Progressing  Goal: Absence of fever/infection during neutropenic period  Description  INTERVENTIONS:  - Monitor WBC    Outcome: Progressing     Problem: SAFETY ADULT  Goal: Patient will remain free of falls  Description  INTERVENTIONS:  - Assess patient frequently for physical needs  -  Identify cognitive and physical deficits and behaviors that affect risk of falls    -  [place] fall precautions as indicated by assessment   - Educate patient/family on patient safety including physical limitations  - Instruct patient to call for assistance with activity based on assessment  - Modify environment to reduce risk of injury  - Consider OT/PT consult to assist with strengthening/mobility  Outcome: Progressing  Goal: Maintain or return to baseline ADL function  Description  INTERVENTIONS:  -  Assess patient's ability to carry out ADLs; assess patient's baseline for ADL function and identify physical deficits which impact ability to perform ADLs (bathing, care of mouth/teeth, toileting, grooming, dressing, etc )  - Assess/evaluate cause of self-care deficits   - Assess range of motion  - Assess patient's mobility; develop plan if impaired  - Assess patient's need for assistive devices and provide as appropriate  - Encourage maximum independence but intervene and supervise when necessary  - Involve family in performance of ADLs  - Assess for home care needs following discharge   - Consider OT consult to assist with ADL evaluation and planning for discharge  - Provide patient education as appropriate  Outcome: Progressing  Goal: Maintain or return mobility status to optimal level  Description  INTERVENTIONS:  - Assess patient's baseline mobility status (ambulation, transfers, stairs, etc )    - Identify cognitive and physical deficits and behaviors that affect mobility  - Identify mobility aids required to assist with transfers and/or ambulation (gait belt, sit-to-stand, lift, walker, cane, etc )  - [place] fall precautions as indicated by assessment  - Record patient progress and toleration of activity level on Mobility SBAR; progress patient to next Phase/Stage  - Instruct patient to call for assistance with activity based on assessment  - Consider rehabilitation consult to assist with strengthening/weightbearing, etc   Outcome: Progressing     Problem: DISCHARGE PLANNING  Goal: Discharge to home or other facility with appropriate resources  Description  INTERVENTIONS:  - Identify barriers to discharge w/patient and caregiver  - Arrange for needed discharge resources and transportation as appropriate  - Identify discharge learning needs (meds, wound care, etc )  - Arrange for interpretive services to assist at discharge as needed  - Refer to Case Management Department for coordinating discharge planning if the patient needs post-hospital services based on physician/advanced practitioner order or complex needs related to functional status, cognitive ability, or social support system  Outcome: Progressing     Problem: Knowledge Deficit  Goal: Patient/family/caregiver demonstrates understanding of disease process, treatment plan, medications, and discharge instructions  Description  Complete learning assessment and assess knowledge base  Interventions:  - Provide teaching at level of understanding  - Provide teaching via preferred learning methods  Outcome: Progressing     Problem: Potential for Falls  Goal: Patient will remain free of falls  Description  INTERVENTIONS:  - Assess patient frequently for physical needs  -  Identify cognitive and physical deficits and behaviors that affect risk of falls    -  [place] fall precautions as indicated by assessment   - Educate patient/family on patient safety including physical limitations  - Instruct patient to call for assistance with activity based on assessment  - Modify environment to reduce risk of injury  - Consider OT/PT consult to assist with strengthening/mobility  Outcome: Progressing     Problem: Prexisting or High Potential for Compromised Skin Integrity  Goal: Skin integrity is maintained or improved  Description  INTERVENTIONS:  - Identify patients at risk for skin breakdown  - Assess and monitor skin integrity  - Assess and monitor nutrition and hydration status  - Monitor labs   - Assess for incontinence   - Turn and reposition patient  - Assist with mobility/ambulation  - Relieve pressure over bony prominences  - Avoid friction and shearing  - Provide appropriate hygiene as needed including keeping skin clean and dry  - Evaluate need for skin moisturizer/barrier cream  - Collaborate with interdisciplinary team   - Patient/family teaching  - Consider wound care consult   Outcome: Progressing     Problem: Nutrition/Hydration-ADULT  Goal: Nutrient/Hydration intake appropriate for improving, restoring or maintaining nutritional needs  Description  Monitor and assess patient's nutrition/hydration status for malnutrition  Collaborate with interdisciplinary team and initiate plan and interventions as ordered  Monitor patient's weight and dietary intake as ordered or per policy  Utilize nutrition screening tool and intervene as necessary  Determine patient's food preferences and provide high-protein, high-caloric foods as appropriate       INTERVENTIONS:  - Monitor oral intake, urinary output, labs, and treatment plans  - Assess nutrition and hydration status and recommend course of action  - Evaluate amount of meals eaten  - Assist patient with eating if necessary   - Allow adequate time for meals  - Recommend/ encourage appropriate diets, oral nutritional supplements, and vitamin/mineral supplements  - Order, calculate, and assess calorie counts as needed  - Recommend, monitor, and adjust tube feedings and TPN/PPN based on assessed needs  - Assess need for intravenous fluids  - Provide specific nutrition/hydration education as appropriate  - Include patient/family/caregiver in decisions related to nutrition  Outcome: Progressing     Problem: METABOLIC, FLUID AND ELECTROLYTES - ADULT  Goal: Electrolytes maintained within normal limits  Description  INTERVENTIONS:  - Monitor labs and assess patient for signs and symptoms of electrolyte imbalances  - Administer electrolyte replacement as ordered  - Monitor response to electrolyte replacements, including repeat lab results as appropriate  - Instruct patient on fluid and nutrition as appropriate  Outcome: Progressing  Goal: Fluid balance maintained  Description  INTERVENTIONS:  - Monitor labs   - Monitor I/O and WT  - Instruct patient on fluid and nutrition as appropriate  - Assess for signs & symptoms of volume excess or deficit  Outcome: Progressing

## 2019-09-11 NOTE — QUICK NOTE
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, anasarca, CKD stage 2, anasarca, and hepatic encephalopathy on chronic lactulose  Patient was admitted on 09/06 and unfortunately started on dialysis for hepatorenal syndrome  Over the course of hospital stay, patient has had a decline in blood pressure as well and was started on midodrine  This afternoon, patient more somnolent  Ammonia checked and was 74  Patient also had lower blood pressure during dialysis today and was noted to have a hemoglobin of 6 9  An ABG was drawn which I believe to be a VBG, and showed a slightly low pH at 7 327  Patient was started on his home CPAP and we called to evaluate the patient  Interventions:   Went to bedside and evaluated patient  Patient on CPAP, does awaken to voice  Knows he is in the hospital and that his kidneys are failing, but is unable to say the date  On exam he is diffusely anasarcic with 4+ pitting edema bilateral lower extremities, but not pale  Blood pressure recycle done showed a systolic blood pressure of 118  Triage Assessment:   I believe patient is okay to remain on med [facility] floor at this time  Internal Medicine is attempting to get a hold of patient's wife for consent for blood as patient is not able to give consent himself  Recommend albumin in the interim  Patient's arterial blood gas is likely consistent with a venous blood gas and therefore I do not feel that patient needs CPAP at this time, however patient appears comfortable so can continue for now  Will also increase patient's lactulose due to hyperammonemia  Unfortunately, current MELD score is 35 and patient now has dialysis dependent a paddle renal failure    For GI note, poor candidate for liver transplant, so would recommend palliative care consultation while continuing workup      If patient further decompensates, lethargy worsens, hypotension worsens, or any other questions or concerns, please call Critical Care for re-evaluation at [phone] at any time    Patient may remain med surge level of care    Recommendations discussed with MARILYN eDluca PA-C

## 2019-09-11 NOTE — HEMODIALYSIS
Pt completed second HD treatment with no adverse effects  Good BFR for catheter, Pt tolerated treatment well  Orders for UF 1 0 kg  Pre treatment weight undetermined  Treatment scheduled for tomorrow again

## 2019-09-11 NOTE — PLAN OF CARE
Problem: METABOLIC, FLUID AND ELECTROLYTES - ADULT  Goal: Electrolytes maintained within normal limits  Description  INTERVENTIONS:  - Monitor labs and assess patient for signs and symptoms of electrolyte imbalances  - Administer electrolyte replacement as ordered  - Monitor response to electrolyte replacements, including repeat lab results as appropriate  - Instruct patient on fluid and nutrition as appropriate  Outcome: Progressing  Goal: Fluid balance maintained  Description  INTERVENTIONS:  - Monitor labs   - Monitor I/O and WT  - Instruct patient on fluid and nutrition as appropriate  - Assess for signs & symptoms of volume excess or deficit  Outcome: Progressing

## 2019-09-11 NOTE — PROGRESS NOTES
Progress Note Jett Hughes 1960, 63 y o  male MRN: [id number]    Unit/Bed#: -01 Encounter: [id number]    Primary Care Provider: No primary care provider on file  Date and time admitted to hospital: 9/6/2019  5:35 PM      * Anasarca  Assessment & Plan  · Patient has been struggling with anasarca in the setting of decompensated cirrhosis secondary to ROSALES  · Patient was on Bumex which has currently been discontinued due to BOBBY/HRS  · Creatinine/GFR worsening nephrology following appreciate recommendation given declining renal function - started HD 9/10  · [facility] is ongoing recommendations  · Continue daily weight I/O  · Clinically patient appears ill, he is more lethargic today and his ammonia is noted to be elevated at 74, compared to 22 a few days ago  ABG also appears to show a metabolic acidosis  · Wife not present, multiple attempts to contact unsuccessful, he states she had an appointment    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 [facility])  Assessment & Plan  · Patient with decompensated ROSALES cirrhosis  MELD calculated to be up to 32 this admission (calculated at 25 last admission and 20 back in June)  · Ascites: CT shows mild ascites and diffuse anasarca  Patient was on diuresis as outpatient however, patient developed BOBBY and diuretics have been stopped at present  · Continue Lactulose for goal of 3 soft BMs a day  Was decreased due to frequent BMs however today is noted up to 76, will give retention enema and monitor mental status  · Confused today, likely sequela of the hyperammonemia   · Varices: Will need EGD in future for variceal screening  · [facility], [place] [address]  Screen: AFP in June normal  CT A/P showed no obvious focal mass on unenhanced CT  MRI in June did not show any suspicious lesion in the liver but was limited by motion artifact    · Hepatic function panel showing direct bilirubin improved slightly to 2 3  · Transplant: No evaluation at transplant center has been performed to date  · Patient has a poor prognosis  · GI following recommendations appreciated    Essential hypertension  Assessment & Plan  · Patient hypotensive today  · ICU input appreciated  · Give albumin, monitor   · Could be contributing to his mentation    CROW on CPAP  Assessment & Plan  · Continue CPAP q hs  Morbid obesity ([address] )  Assessment & Plan  · Dietary and lifestyle modifications recommended  HCAP (healthcare-associated pneumonia)  Assessment & Plan  · CT suggests possible pneumonia, on cefepime  Sputum showing yeast   · Supplemental O2 prn, strep PNA and legionella negative  · Procalcitonin in a m  Cellulitis of right lower extremity  Assessment & Plan  · Patient developed new erythema of the RLE  · Patient initially started on broad-spectrum antibiotics of IV vancomycin and cefepime will downgrade patient IV cefepime only  · Serial exams  · Monitor CBC, vitals  Procalcitonin 0 33  Repeat tomorrow  · Blood cultures x2 negative for growth at 4 days    Acute kidney injury [facility])  Assessment & Plan  · Patient was admitted with BOBBY on top of CKD stage 2    · Continue Albumin IV and Octreotide added this admission  Midodrine initiated  · Bumex discontinued  · Nephrology input appreciated, started on HD 9/10  · Overall his prognosis is poor  Hyponatremia  Assessment & Plan  · Secondary to cirrhosis  · Na 125-127  · Nephrology/GI input appreciated  Anemia  Assessment & Plan  · Likely in setting of renal disease, trending down since admit and now <7  · Await wife for blood transfusion consent  · Platelets also low now 36 likely secondary to liver disease  · Continue to monitor  · Post transfusion h/h      VTE Pharmacologic Prophylaxis:   Pharmacologic: Pharmacologic VTE Prophylaxis contraindicated due to anemia, thrombocytopenia  Mechanical VTE Prophylaxis in Place: Yes    Patient Centered Rounds: I have performed bedside rounds with nursing staff today      Discussions with Specialists or Other Care Team Provider: nephro, GI, CM, ICU    Education and Discussions with Family / Patient: patient, wife     Time Spent for Care: 45 minutes  More than 50% of total time spent on counseling and coordination of care as described above  Current Length of Stay: 5 day(s)    Current Patient Status: Inpatient   Certification Statement: The patient will continue to require additional inpatient hospital stay due to anemia, cirrhosis, hepatorenal syndrome     Discharge Plan: not medically stable, likely another 3+ days     Code Status: Level 1 - Full Code      Subjective:   Patient seen after completion of HD, incredibly lethargic and confused  He was seen again in his room and similar  ICU consulted to eval     Objective:     Vitals:   Temp (24hrs), Av 3 °F (36 3 °C), Min:97 1 °F (36 2 °C), Max:97 6 °F (36 4 °C)    Temp:  [97 1 °F (36 2 °C)-97 6 °F (36 4 °C)] 97 6 °F (36 4 °C)  HR:  [58-74] 74  Resp:  [16-18] 18  BP: ()/(36-66) 90/36  SpO2:  [94 %-98 %] 94 %  Body mass index is 56 76 kg/m²  Input and Output Summary (last 24 hours): Intake/Output Summary (Last 24 hours) at 2019 1505  Last data filed at 2019 1239  Gross per 24 hour   Intake 1980 ml   Output 2700 ml   Net -720 ml       Physical Exam:     Physical Exam   Constitutional: Vital signs are normal  He appears well-developed  He appears ill  Cardiovascular: Normal rate, regular rhythm, S1 normal, S2 normal and normal heart sounds  Pulmonary/Chest: Effort normal  Bradypnea noted  No respiratory distress  He has decreased breath sounds  He has no rhonchi  Abdominal: Soft  Bowel sounds are normal  He exhibits no distension  Musculoskeletal: He exhibits edema (diffuse anasarca)  Skin: There is erythema (per report, improving)  Nursing note and vitals reviewed        Additional Data:     Labs:    Results from last 7 days   Lab Units 19  1012  19  0524   WBC Thousand/uL 10 28*   < > 8 33   HEMOGLOBIN g/dL 6 9*   < > 7 5*   HEMATOCRIT % 22 3*   < > 23 7*   PLATELETS Thousands/uL 36*   < > 51*   BANDS PCT % 1   < >  --    NEUTROS PCT %  --   --  68   LYMPHS PCT %  --   --  6*   LYMPHO PCT % 5*   < >  --    MONOS PCT %  --   --  12   MONO PCT % 8   < >  --    EOS PCT % 6   < > 6    < > = values in this interval not displayed  Results from last 7 days   Lab Units 09/11/19  1012   SODIUM mmol/L 127*   POTASSIUM mmol/L 5 1   CHLORIDE mmol/L 94*   CO2 mmol/L 25   BUN mg/dL 54*   CREATININE mg/dL 2 93*   ANION GAP mmol/L 8   CALCIUM mg/dL 8 7   ALBUMIN g/dL 3 4*   TOTAL BILIRUBIN mg/dL 7 10*   ALK PHOS U/L 64   ALT U/L 30   AST U/L 39   GLUCOSE RANDOM mg/dL 123     Results from last 7 days   Lab Units 09/11/19  1012   INR  2 04*     Results from last 7 days   Lab Units 09/11/19  1109   POC GLUCOSE mg/dl 126         Results from last 7 days   Lab Units 09/08/19  0558 09/06/19  1909   LACTIC ACID mmol/L  --  1 3   PROCALCITONIN ng/ml 0 33*  --            * I Have Reviewed All Lab Data Listed Above  * Additional Pertinent Lab Tests Reviewed: All Labs Within Last 24 Hours Reviewed      Recent Cultures (last 7 days):     Results from last 7 days   Lab Units 09/08/19  1121 09/07/19  1944 09/06/19  1905 09/06/19  1904   BLOOD CULTURE   --   --  No Growth After 4 Days  No Growth After 4 Days     SPUTUM CULTURE  2+ Growth of Candida albicans*  --   --   --    GRAM STAIN RESULT  2+ Polys*  2+ Gram negative rods*  --   --   --    LEGIONELLA URINARY ANTIGEN   --  Negative  --   --        Last 24 Hours Medication List:     Current Facility-Administered Medications:  acetaminophen 650 mg Oral Q6H PRN Edith Edward MD    albumin human 12 5 g Intravenous Once Elieser Funez PA-C    cefepime 2,000 mg Intravenous Q12H Edith Edward MD Last Rate: Stopped (09/10/19 9128)   furosemide 80 mg Intravenous TID (diuretic) Marian Dickens MD    guaiFENesin 600 mg Oral Q12H Uvaldo Cunningham MD    lactulose 20 g Oral Daily Jeannie Langford PA-C lactulose 200 g Rectal Once Kasey Saldivar PA-C    midodrine 10 mg Oral TID AC Shannan Vargas MD    octreotide 100 mcg Subcutaneous Q8H Rosa Mcmillan MD    pantoprazole 40 mg Oral Early Morning Yenni Nieto MD         Today, Patient Was Seen By: Kirk Reynolds PA-C    ** Please Note: Dictation voice to text software may have been used in the creation of this document   **

## 2019-09-11 NOTE — ASSESSMENT & PLAN NOTE
· Likely in setting of renal disease, trending down since admit and now <7  · Await wife for blood transfusion consent  · Platelets also low now 36 likely secondary to liver disease  · Continue to monitor  · Post transfusion h/h

## 2019-09-11 NOTE — ASSESSMENT & PLAN NOTE
· Patient has been struggling with anasarca in the setting of decompensated cirrhosis secondary to ROSALES  · Patient was on Bumex which has currently been discontinued due to BOBBY/HRS  · Creatinine/GFR worsening nephrology following appreciate recommendation given declining renal function - started HD 9/10  · [facility]rec[facility] Nephrology is ongoing recommendations  · Continue daily weight I/O  · Clinically patient appears ill, he is more lethargic today and his ammonia is noted to be elevated at 74, compared to 22 a few days ago    ABG also appears to show a metabolic acidosis  · Wife not present, multiple attempts to contact unsuccessful, he states she had an appointment

## 2019-09-11 NOTE — ASSESSMENT & PLAN NOTE
· Patient hypotensive today  · ICU input appreciated  · Give albumin, monitor   · Could be contributing to his mentation

## 2019-09-11 NOTE — OCCUPATIONAL THERAPY NOTE
Occupational Therapy Cancellation Note        Patient Name: Anish Ring  [id number] Date: 9/11/2019    OT order received  Chart review performed  At this time, OT evaluation cancelled secondary to patient not medically appropriate to participate; patient with hemoglobin 6 9  OT will follow and evaluate as appropriate      CRISTOBAL Valentin/CHRISTINE

## 2019-09-11 NOTE — ASSESSMENT & PLAN NOTE
· Patient was admitted with BOBBY on top of CKD stage 2    · Continue Albumin IV and Octreotide added this admission  Midodrine initiated  · Bumex discontinued  · Nephrology input appreciated, started on HD 9/10  · Overall his prognosis is poor

## 2019-09-11 NOTE — ASSESSMENT & PLAN NOTE
· CT suggests possible pneumonia, on cefepime  Sputum showing yeast   · Supplemental O2 prn, strep PNA and legionella negative  · Procalcitonin in a m

## 2019-09-11 NOTE — PHYSICAL THERAPY NOTE
Physical Therapy Cancellation Note    PT order received  Chart review performed  At this time, PT evaluation cancelled secondary to patient not medically appropriate to participate; patient with hemoglobin 6 9  PT will follow and evaluate as appropriate      Frankie Alberts, PT, DPT

## 2019-09-11 NOTE — UTILIZATION REVIEW
Continued Stay Review    Date: 9/11                          Current Patient Class: IP Current Level of Care: Tele    HPI:59 y o  male initially admitted on 9/6    Assessment/Plan: admitted w/ BOBBY multifactorial and suspected r/t hepatorenal syndrome  Started on HD yesterday ,plan for 2nd HD treatment today   Increase midodrine to TID , BP on the low side  Pertinent Labs/Diagnostic Results:   9/10 PCXR -Persistent cardiomegaly  Improving pulmonary edema  Satisfactory right jugular central line placement  No pneumothorax  Right rib fractures    9/10 EKG - NSR  Results from last 7 days   Lab Units 09/11/19  1012 09/10/19  0540 09/09/19  0524 09/08/19  0558 09/07/19  0526   WBC Thousand/uL 10 28* 9 24 8 33 8 97 9 27   HEMOGLOBIN g/dL 6 9* 7 2* 7 5* 7 7* 8 4*   HEMATOCRIT % 22 3* 22 3* 23 7* 24 0* 26 1*   PLATELETS Thousands/uL 36* 42* 51* 51* 68*   NEUTROS ABS Thousands/µL  --   --  5 70  --  5 96   BANDS PCT % 1 2  --  3  --      Results from last 7 days   Lab Units 09/11/19  1012 09/10/19  0541 09/09/19  0524 09/08/19  0558 09/07/19  0526 09/06/19  1905   SODIUM mmol/L 127* 125* 127* 128* 129* 127*   POTASSIUM mmol/L 5 1 5 1 4 7 4 8 5 2 4 6   CHLORIDE mmol/L 94* 92* 92* 94* 96* 94*   CO2 mmol/L 25 24 25 23 26 24   ANION GAP mmol/L 8 9 10 11 7 9   BUN mg/dL 54* 54* 53* 51* 48* 47*   CREATININE mg/dL 2 93* 2 66* 2 44* 2 41* 2 45* 2 41*   EGFR ml/min/1 73sq m 23 25 28 29 28 29   CALCIUM mg/dL 8 7 8 3 8 8 8 5 8 7 8 4   MAGNESIUM mg/dL  --   --   --   --  2 1 1 9     Results from last 7 days   Lab Units 09/11/19  1012 09/10/19  0541 09/09/19  0524 09/07/19  0526 09/06/19  1910 09/06/19  1905   AST U/L 39 44 51* 62*  --  63*   ALT U/L 30 31 37 41  --  37   ALK PHOS U/L 64 68 77 107  --  112   TOTAL PROTEIN g/dL 6 6 6 9 6 9 6 5  --  6 6   ALBUMIN g/dL 3 4* 3 8 3 6 2 9*  --  3 0*   TOTAL BILIRUBIN mg/dL 7 10* 7 00* 7 40* 7 10*  --  6 90*   BILIRUBIN DIRECT mg/dL 2 30* 2 25* 2 32*  --   --  2 50*   AMMONIA umol/L  -- --   --   --  22  --      Results from last 7 days   Lab Units 09/11/19  1109   POC GLUCOSE mg/dl 126     Results from last 7 days   Lab Units 09/11/19  1012 09/10/19  0541 09/09/19  0524 09/08/19  0558 09/07/19  0526 09/06/19  1905   GLUCOSE RANDOM mg/dL 123 111 116 100 107 126     Results from last 7 days   Lab Units 09/07/19  1208   OSMOLALITY, SERUM mmol/     Results from last 7 days   Lab Units 09/08/19  0558   CK TOTAL U/L 101     Results from last 7 days   Lab Units 09/06/19  1906   TROPONIN I ng/mL 0 02     Results from last 7 days   Lab Units 09/11/19  1012 09/10/19  0541 09/09/19  0524 09/06/19  1905   PROTIME seconds 23 2* 21 8* 20 9* 20 8*   INR  2 04* 1 88* 1 79* 1 77*   PTT seconds  --   --   --  40*     Results from last 7 days   Lab Units 09/08/19  0558   PROCALCITONIN ng/ml 0 33*     Results from last 7 days   Lab Units 09/06/19  1909   LACTIC ACID mmol/L 1 3     Results from last 7 days   Lab Units 09/08/19  0558 09/06/19  1905   NT-PRO BNP pg/mL 2,919* 3,108*     Results from last 7 days   Lab Units 09/10/19  1552   HEP B S AG  Non-reactive   HEP C AB  Non-reactive   HEP B C IGM  Non-reactive   HEP B C TOTAL AB  Non-reactive     Results from last 7 days   Lab Units 09/07/19  0341 09/06/19  1926   CLARITY UA   --  Clear   COLOR UA   --  Yellow   SPEC GRAV UA   --  1 010   PH UA   --  5 0   GLUCOSE UA mg/dl  --  Negative   KETONES UA mg/dl  --  Negative   BLOOD UA   --  Negative   PROTEIN UA mg/dl  --  Trace*   NITRITE UA   --  Negative   BILIRUBIN UA   --  Negative   UROBILINOGEN UA E U /dl  --  0 2   LEUKOCYTES UA   --  Negative   WBC UA /hpf  --  None Seen   RBC UA /hpf  --  None Seen   BACTERIA UA /hpf  --  None Seen   EPITHELIAL CELLS WET PREP /hpf  --  None Seen   SODIUM UR  5  --      Results from last 7 days   Lab Units 09/07/19  1944   STREP PNEUMONIAE ANTIGEN, URINE  Negative   LEGIONELLA URINARY ANTIGEN  Negative     Results from last 7 days   Lab Units 09/08/19  1121 09/06/19  4257 09/06/19  1904   BLOOD CULTURE   --  No Growth After 4 Days  No Growth After 4 Days  SPUTUM CULTURE  2+ Growth of Candida albicans*  --   --    GRAM STAIN RESULT  2+ Polys*  2+ Gram negative rods*  --   --      Results from last 7 days   Lab Units 09/11/19  1012 09/10/19  0540 09/08/19  0558 09/06/19  1906   TOTAL COUNTED  100 100 100 100     Vital Signs:   09/11/19 1240    74  18  90/36Abnormal          09/11/19 1230    73  18  91/39Abnormal          09/11/19 1200    73  18  99/38Abnormal          09/11/19 1130    71  18  104/42Abnormal          09/11/19 1100    70  18  94/42Abnormal          09/11/19 1030    67  18  92/38Abnormal          09/11/19 1000  97 6 °F (36 4 °C)  60  18  112/51         09/11/19 06:47:01    62    114/53  73  97 %     09/11/19 06:45:35    61    112/54  73  97 %     09/11/19 0642        112/54               Medications:   Scheduled Meds:   Current Facility-Administered Medications:  acetaminophen 650 mg Oral Q6H PRN     cefepime 2,000 mg Intravenous Q12H     furosemide 80 mg Intravenous TID (diuretic)     guaiFENesin 600 mg Oral Q12H DANA     lactulose 20 g Oral Daily     midodrine 10 mg Oral TID AC     octreotide 100 mcg Subcutaneous Q8H [address]     pantoprazole 40 mg Oral Early Morning         Discharge Plan: TBD     Network Utilization Review Department  Phone: [phone]; Fax [phone]  [email or web address]  ATTENTION: Please call with any questions or concerns to [phone]  and carefully listen to the prompts so that you are directed to the right person  Send all requests for admission clinical reviews, approved or denied determinations and any other requests to fax [phone]   All voicemails are confidential

## 2019-09-11 NOTE — ASSESSMENT & PLAN NOTE
· Patient developed new erythema of the RLE  · Patient initially started on broad-spectrum antibiotics of IV vancomycin and cefepime will downgrade patient IV cefepime only  · Serial exams  · Monitor CBC, vitals  Procalcitonin 0 33    Repeat tomorrow  · Blood cultures x2 negative for growth at 4 days

## 2019-09-11 NOTE — PROGRESS NOTES
HEMODIALYSIS ROUNDING NOTE    Patient: Melissa Patient               Sex: male          DOA: 9/6/2019  5:35 PM   Date of Birth: @        Age: @        LOS:  LOS: 5 days   9/11/2019    REASON FOR THE CONSULTATION:  Further management of BOBBY    HPI     This is a 63 y o  male admitted for Anasarca     SUBJECTIVE     - Patient was seen during hemodialysis today  Patient denies nausea / vomiting / headache / dizziness during hemodialysis  - Reviewed last 24 hrs events     CURRENT MEDICATIONS       Current Facility-Administered Medications:     acetaminophen (TYLENOL) tablet 650 mg, 650 mg, Oral, Q6H PRN, Tanika Negron MD, 650 mg at 09/10/19 0117    cefepime (MAXIPIME) 2,000 mg in dextrose 5 % 50 mL IVPB, 2,000 mg, Intravenous, Q12H, Tanika Negron MD, Stopped at 09/10/19 2338    furosemide (LASIX) injection 80 mg, 80 mg, Intravenous, TID (diuretic), Bruce Miller MD, 80 mg at 09/11/19 8877    guaiFENesin (MUCINEX) 12 hr tablet 600 mg, 600 mg, Oral, Q12H [address], Tanika Negron MD, 600 mg at 09/10/19 2219    lactulose 20 g/30 mL oral solution 20 g, 20 g, Oral, Daily, MARKOS Valdez    midodrine (PROAMATINE) tablet 10 mg, 10 mg, Oral, TID AC, Bruce Miller MD    octreotide (SandoSTATIN) injection 100 mcg, 100 mcg, Subcutaneous, Q8H [address], Bruce Miller MD, 100 mcg at 09/10/19 2222    pantoprazole (PROTONIX) EC tablet 40 mg, 40 mg, Oral, Early Morning, Tanika Negron MD, 40 mg at 09/11/19 0331    OBJECTIVE     Current Weight: Weight - Scale: (!) 185 kg (407 lb)  Vitals:    09/11/19 1030   BP: (!) 92/38   Pulse: 67   Resp: 18   Temp:    SpO2:      Body mass index is 56 76 kg/m²  Intake/Output Summary (Last 24 hours) at 9/11/2019 1047  Last data filed at 9/11/2019 1000  Gross per 24 hour   Intake 1980 ml   Output 1200 ml   Net 780 ml       PHYSICAL EXAMINATION     Physical Exam   Constitutional: No distress  Obese   HENT:   Head: Normocephalic and atraumatic  Neck: Neck supple   No JVD present  Cardiovascular: Normal rate  Pulmonary/Chest: No accessory muscle usage  No respiratory distress  He has no wheezes  Abdominal: Soft  He exhibits no distension  Musculoskeletal: He exhibits edema  Right hand: He exhibits no laceration  Left hand: He exhibits no laceration  Neurological: He is alert  Skin: Skin is warm  No cyanosis  Psychiatric: He is not combative  He expresses no suicidal ideation  LAB RESULTS     Results from last 7 days   Lab Units 09/10/19  0541 09/10/19  0540 09/09/19  0524 09/08/19  0558 09/07/19  0526 09/06/19  1906 09/06/19  1905   WBC Thousand/uL  --  9 24 8 33 8 97 9 27 10 02  --    HEMOGLOBIN g/dL  --  7 2* 7 5* 7 7* 8 4* 8 6*  --    HEMATOCRIT %  --  22 3* 23 7* 24 0* 26 1* 26 4*  --    PLATELETS Thousands/uL  --  42* 51* 51* 68* 64*  --    SODIUM mmol/L 125*  --  127* 128* 129*  --  127*   POTASSIUM mmol/L 5 1  --  4 7 4 8 5 2  --  4 6   CHLORIDE mmol/L 92*  --  92* 94* 96*  --  94*   CO2 mmol/L 24  --  25 23 26  --  24   BUN mg/dL 54*  --  53* 51* 48*  --  47*   CREATININE mg/dL 2 66*  --  2 44* 2 41* 2 45*  --  2 41*   EGFR ml/min/1 73sq m 25  --  28 29 28  --  29   CALCIUM mg/dL 8 3  --  8 8 8 5 8 7  --  8 4   MAGNESIUM mg/dL  --   --   --   --  2 1  --  1 9       RADIOLOGY RESULTS     CXR 1 view PA portable stat   Final Result by Ilana Stevens DO (09/10 1516)   Persistent cardiomegaly  Improving pulmonary edema  Satisfactory right jugular central line placement  No pneumothorax  Right rib fractures  Workstation performed: [id number]         IR temp HD cath   Final Result by Gali Hawthorne MD (09/10 1541)   Impression:   1  Successful ultrasound and fluoroscopically guided placement of a double-lumen temporary dialysis central venous catheter via the right internal jugular vein  The tip of the catheter is at the cavoatrial junction and may be used immediately         Workstation performed: [id number]         CT tibia fibula right wo contrast   Final Result by Carina Bartholomew MD (09/06 2128)   Soft tissue evaluation is limited without IV contrast    1    No evidence of soft tissue gas  2   Generalized subcutaneous edema with associated skin thickening  Edema noted adjacent to the calf musculature  Findings are nonspecific and may be related to generalized anasarca  3   The absence of soft tissue gas does not exclude necrotizing fasciitis particularly at the early stages  If there is persistent concern for necrotizing fasciitis, consider follow-up with MRI with and without contrast          Workstation performed: [id number]         CT femur right wo contrast   Final Result by Carina Bartholomew MD (09/06 2128)   Soft tissue evaluation is limited without IV contrast    1    No evidence of soft tissue gas  2   Generalized subcutaneous edema with associated skin thickening  Edema noted adjacent to the calf musculature  Findings are nonspecific and may be related to generalized anasarca  3   The absence of soft tissue gas does not exclude necrotizing fasciitis particularly at the early stages  If there is persistent concern for necrotizing fasciitis, consider follow-up with MRI with and without contrast          Workstation performed: [id number]         CT chest abdomen pelvis wo contrast   Final Result by Carina Bartholomew MD (09/06 2116)   Exam is limited without IV and oral contrast particularly for soft tissue and bowel evaluation  1   Scattered mild patchy opacities bilaterally suspicious for pneumonia  2  Cirrhotic appearance to the liver  Splenomegaly  3   Mild ascites  4   Diffuse subcutaneous edema compatible with anasarca  Workstation performed: [id number]         XR chest 2 views   ED Interpretation by Padmini Musa DO (09/06 1949)   Limited film due to body habitus and poor inspiratory effort  Cardiomegaly present  Increased vascular congestion bilaterally        Final Result by Ant Blue Rafiq Betancourt MD (09/07 7408)   Persistent cardiomegaly      Increased diffuse vascular congestion            Workstation performed: [id number]             PLAN / RECOMMENDATIONS     1  BOBBY  Multifactorial and suspected due to hepatorenal syndrome  Patient was started on hemodialysis yesterday  Plan 2nd hemodialysis today  Patient was seen during hemodialysis and blood pressure has been on the lower side  Plan to increase midodrine to 10 mg PO TID today  At 10:47 AM on 9/11/2019, I saw and examined patient during 2nd hemodialysis treatment  The patient was receiving hemodialysis for treatment of acute kidney injury  I have also reviewed vital signs, intake and output, lab results and recent events, and agreed with today's dialysis orders  Access:  Right IJ temporary hemodialysis catheter- No issue    Danica Padgett MD  Nephrology  9/11/2019        Portions of the record may have been created with voice recognition software  Occasional wrong word or "sound a like" substitutions may have occurred due to the inherent limitations of voice recognition software  Read the chart carefully and recognize, using context, where substitutions have occurred

## 2019-09-11 NOTE — ASSESSMENT & PLAN NOTE
· Patient with decompensated ROSALES cirrhosis  MELD calculated to be up to 32 this admission (calculated at 25 last admission and 20 back in June)  · Ascites: CT shows mild ascites and diffuse anasarca  Patient was on diuresis as outpatient however, patient developed BOBBY and diuretics have been stopped at present  · Continue Lactulose for goal of 3 soft BMs a day  Was decreased due to frequent BMs however today is noted up to 76, will give retention enema and monitor mental status  · Confused today, likely sequela of the hyperammonemia   · Varices: Will need EGD in future for variceal screening  · [facility] [address]  Screen: AFP in June normal  CT A/P showed no obvious focal mass on unenhanced CT  MRI in June did not show any suspicious lesion in the liver but was limited by motion artifact    · Hepatic function panel showing direct bilirubin improved slightly to 2 3  · Transplant: No evaluation at transplant center has been performed to date  · Patient has a poor prognosis  · GI following recommendations appreciated

## 2019-09-12 ENCOUNTER — APPOINTMENT (INPATIENT)
Dept: DIALYSIS | Facility: HOSPITAL | Age: 59
DRG: 177 | End: 2019-09-12
Payer: COMMERCIAL

## 2019-09-12 PROBLEM — L03.115 CELLULITIS OF RIGHT LOWER EXTREMITY: Status: RESOLVED | Noted: 2019-09-06 | Resolved: 2019-09-12

## 2019-09-12 PROBLEM — M79.605 BILATERAL LEG PAIN: Status: RESOLVED | Noted: 2019-05-05 | Resolved: 2019-09-12

## 2019-09-12 PROBLEM — G93.40 ENCEPHALOPATHY ACUTE: Status: ACTIVE | Noted: 2019-09-12

## 2019-09-12 PROBLEM — N28.9 RENAL INSUFFICIENCY: Status: RESOLVED | Noted: 2019-06-07 | Resolved: 2019-09-12

## 2019-09-12 PROBLEM — J18.9 COMMUNITY ACQUIRED PNEUMONIA: Status: RESOLVED | Noted: 2019-09-06 | Resolved: 2019-09-12

## 2019-09-12 PROBLEM — K80.20 CALCULUS OF GALLBLADDER WITHOUT CHOLECYSTITIS WITHOUT OBSTRUCTION: Chronic | Status: RESOLVED | Noted: 2018-11-15 | Resolved: 2019-09-12

## 2019-09-12 PROBLEM — I95.9 HYPOTENSION: Status: ACTIVE | Noted: 2019-09-12

## 2019-09-12 PROBLEM — K76.7 HEPATORENAL SYNDROME (HCC): Status: ACTIVE | Noted: 2019-09-12

## 2019-09-12 PROBLEM — R78.81 BACTEREMIA: Status: RESOLVED | Noted: 2019-08-15 | Resolved: 2019-09-12

## 2019-09-12 PROBLEM — M79.604 BILATERAL LEG PAIN: Status: RESOLVED | Noted: 2019-05-05 | Resolved: 2019-09-12
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
## 2019-09-12 PROBLEM — R06.02 SHORTNESS OF BREATH: Chronic | Status: RESOLVED | Noted: 2018-11-15 | Resolved: 2019-09-12

## 2019-09-12 LAB
ABO GROUP BLD BPU: NORMAL
AMMONIA PLAS-SCNC: 47 UMOL/L (ref 11–35)
ANION GAP SERPL CALCULATED.3IONS-SCNC: 8 MMOL/L (ref 4–13)
ANISOCYTOSIS BLD QL SMEAR: PRESENT
BASOPHILS # BLD MANUAL: 0 THOUSAND/UL (ref 0–0.1)
BASOPHILS NFR MAR MANUAL: 0 % (ref 0–1)
BPU ID: NORMAL
BUN SERPL-MCNC: 52 MG/DL (ref 5–25)
CALCIUM SERPL-MCNC: 8.7 MG/DL (ref 8.3–10.1)
CHLORIDE SERPL-SCNC: 96 MMOL/L (ref 100–108)
CO2 SERPL-SCNC: 26 MMOL/L (ref 21–32)
CREAT SERPL-MCNC: 3.07 MG/DL (ref 0.6–1.3)
CROSSMATCH: NORMAL
EOSINOPHIL # BLD MANUAL: 0.51 THOUSAND/UL (ref 0–0.4)
EOSINOPHIL NFR BLD MANUAL: 5 % (ref 0–6)
ERYTHROCYTE [DISTWIDTH] IN BLOOD BY AUTOMATED COUNT: 17.1 % (ref 11.6–15.1)
GFR SERPL CREATININE-BSD FRML MDRD: 21 ML/MIN/1.73SQ M
GLUCOSE SERPL-MCNC: 99 MG/DL (ref 65–140)
HCT VFR BLD AUTO: 23.9 % (ref 36.5–49.3)
HGB BLD-MCNC: 7.6 G/DL (ref 12–17)
LYMPHOCYTES # BLD AUTO: 0.62 THOUSAND/UL (ref 0.6–4.47)
LYMPHOCYTES # BLD AUTO: 6 % (ref 14–44)
MCH RBC QN AUTO: 36.2 PG (ref 26.8–34.3)
MCHC RBC AUTO-ENTMCNC: 31.8 G/DL (ref 31.4–37.4)
MCV RBC AUTO: 114 FL (ref 82–98)
MONOCYTES # BLD AUTO: 0.41 THOUSAND/UL (ref 0–1.22)
MONOCYTES NFR BLD: 4 % (ref 4–12)
NEUTROPHILS # BLD MANUAL: 8.75 THOUSAND/UL (ref 1.85–7.62)
NEUTS BAND NFR BLD MANUAL: 2 % (ref 0–8)
NEUTS SEG NFR BLD AUTO: 83 % (ref 43–75)
NRBC BLD AUTO-RTO: 0 /100 WBCS
PLATELET # BLD AUTO: 28 THOUSANDS/UL (ref 149–390)
PLATELET BLD QL SMEAR: ABNORMAL
PMV BLD AUTO: 11.2 FL (ref 8.9–12.7)
POTASSIUM SERPL-SCNC: 4.8 MMOL/L (ref 3.5–5.3)
PROCALCITONIN SERPL-MCNC: 0.42 NG/ML
RBC # BLD AUTO: 2.1 MILLION/UL (ref 3.88–5.62)
SODIUM SERPL-SCNC: 130 MMOL/L (ref 136–145)
TOTAL CELLS COUNTED SPEC: 100
UNIT DISPENSE STATUS: NORMAL
UNIT PRODUCT CODE: NORMAL
UNIT RH: NORMAL
WBC # BLD AUTO: 10.29 THOUSAND/UL (ref 4.31–10.16)

## 2019-09-12 PROCEDURE — 85027 COMPLETE CBC AUTOMATED: CPT | Performed by: INTERNAL MEDICINE

## 2019-09-12 PROCEDURE — 94760 N-INVAS EAR/PLS OXIMETRY 1: CPT

## 2019-09-12 PROCEDURE — 90935 HEMODIALYSIS ONE EVALUATION: CPT | Performed by: INTERNAL MEDICINE

## 2019-09-12 PROCEDURE — 82140 ASSAY OF AMMONIA: CPT | Performed by: PHYSICIAN ASSISTANT

## 2019-09-12 PROCEDURE — 99291 CRITICAL CARE FIRST HOUR: CPT | Performed by: STUDENT IN AN ORGANIZED HEALTH CARE EDUCATION/TRAINING PROGRAM

## 2019-09-12 PROCEDURE — 99233 SBSQ HOSP IP/OBS HIGH 50: CPT | Performed by: PHYSICIAN ASSISTANT

## 2019-09-12 PROCEDURE — 94660 CPAP INITIATION&MGMT: CPT

## 2019-09-12 PROCEDURE — 99222 1ST HOSP IP/OBS MODERATE 55: CPT | Performed by: INTERNAL MEDICINE

## 2019-09-12 PROCEDURE — 84145 PROCALCITONIN (PCT): CPT | Performed by: PHYSICIAN ASSISTANT

## 2019-09-12 PROCEDURE — 80048 BASIC METABOLIC PNL TOTAL CA: CPT | Performed by: INTERNAL MEDICINE

## 2019-09-12 PROCEDURE — 99232 SBSQ HOSP IP/OBS MODERATE 35: CPT | Performed by: INTERNAL MEDICINE

## 2019-09-12 PROCEDURE — 85007 BL SMEAR W/DIFF WBC COUNT: CPT | Performed by: INTERNAL MEDICINE

## 2019-09-12 RX ORDER — LACTULOSE 20 G/30ML
20 SOLUTION ORAL 3 TIMES DAILY
Status: DISCONTINUED | OUTPATIENT
Start: 2019-09-12 | End: 2019-09-13

## 2019-09-12 RX ORDER — LACTULOSE 20 G/30ML
20 SOLUTION ORAL 2 TIMES DAILY
Status: DISCONTINUED | OUTPATIENT
Start: 2019-09-12 | End: 2019-09-12

## 2019-09-12 RX ADMIN — MIDODRINE HYDROCHLORIDE 10 MG: 5 TABLET ORAL at 06:13

## 2019-09-12 RX ADMIN — OCTREOTIDE ACETATE 100 MCG: 100 INJECTION, SOLUTION INTRAVENOUS; SUBCUTANEOUS at 16:03

## 2019-09-12 RX ADMIN — CEFEPIME 2000 MG: 2 INJECTION, POWDER, FOR SOLUTION INTRAVENOUS at 03:22

## 2019-09-12 RX ADMIN — FUROSEMIDE 80 MG: 10 INJECTION, SOLUTION INTRAMUSCULAR; INTRAVENOUS at 18:11

## 2019-09-12 RX ADMIN — GUAIFENESIN 600 MG: 600 TABLET, EXTENDED RELEASE ORAL at 11:11

## 2019-09-12 RX ADMIN — LACTULOSE 20 G: 10 SOLUTION ORAL at 16:04

## 2019-09-12 RX ADMIN — LACTULOSE 20 G: 10 SOLUTION ORAL at 21:12

## 2019-09-12 RX ADMIN — GUAIFENESIN 600 MG: 600 TABLET, EXTENDED RELEASE ORAL at 21:12

## 2019-09-12 RX ADMIN — OCTREOTIDE ACETATE 100 MCG: 100 INJECTION, SOLUTION INTRAVENOUS; SUBCUTANEOUS at 21:13

## 2019-09-12 RX ADMIN — RIFAXIMIN 550 MG: 550 TABLET ORAL at 16:03

## 2019-09-12 RX ADMIN — PANTOPRAZOLE SODIUM 40 MG: 40 TABLET, DELAYED RELEASE ORAL at 06:13

## 2019-09-12 RX ADMIN — CEFEPIME 2000 MG: 2 INJECTION, POWDER, FOR SOLUTION INTRAVENOUS at 16:04

## 2019-09-12 RX ADMIN — OCTREOTIDE ACETATE 100 MCG: 100 INJECTION, SOLUTION INTRAVENOUS; SUBCUTANEOUS at 06:16

## 2019-09-12 RX ADMIN — MIDODRINE HYDROCHLORIDE 10 MG: 5 TABLET ORAL at 11:11

## 2019-09-12 RX ADMIN — MIDODRINE HYDROCHLORIDE 10 MG: 5 TABLET ORAL at 16:03

## 2019-09-12 NOTE — ASSESSMENT & PLAN NOTE
· CT suggests possible pneumonia, on cefepime  Sputum showing yeast   · Supplemental O2 prn, strep PNA and legionella negative    · On cefepime

## 2019-09-12 NOTE — ASSESSMENT & PLAN NOTE
· Patient developed new erythema of the RLE  · Patient initially started on broad-spectrum antibiotics of IV vancomycin and cefepime will downgrade patient IV cefepime only  · Serial exams  · Monitor CBC, vitals  Procalcitonin 0 33 ->0 42 unsure if this represents worsening infection vs more likely worsening renal function    Repeat tomorrow recommended  · Blood cultures x2 negative for growth at 5 days

## 2019-09-12 NOTE — PROGRESS NOTES
Progress Note [place] Exon 1960, 63 y o  male MRN: [id number]    Unit/Bed#: -01 Encounter: [id number]    Primary Care Provider: No primary care provider on file  Date and time admitted to hospital: 9/6/2019  5:35 PM        * Anasarca  Assessment & Plan  · Patient has been struggling with anasarca in the setting of decompensated cirrhosis secondary to ROSALES  · Bumex switched to lasix, started on HD, still doing quite poorly  Prognosis is grim  Family meeting tomorrow at 10am to discuss goals of care and consideration for hospice  · Nephrology managing fluid status, BPs are not tolerating significant diuresis  · GI following, he is NOT a candidate for transplant  · Continue daily weight, I/Os  · Clinically patient appears very ill, ICU to re-evaluate today for StepDown2 level of care    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 [facility])  Assessment & Plan  · Patient with decompensated ROSALES cirrhosis  MELD calculated to be up to 32 this admission (calculated at 25 last admission and 20 back in June)  · Ascites: CT shows mild ascites and diffuse anasarca  Patient was on diuresis as outpatient however, patient developed BOBBY and diuretics have been stopped at present  · Continue Lactulose for goal of 3 soft BMs a day  Was decreased due to frequent BMs however today is noted up to 74, frequency increased again and ammonia down to 47  · Varices: Will need EGD in future for variceal screening  · [facility] [address]  Screen: AFP in June normal  CT A/P showed no obvious focal mass on unenhanced CT  MRI in June did not show any suspicious lesion in the liver but was limited by motion artifact    · Transplant: No evaluation at transplant center has been performed to date - GI to reach out to [facility], but we do feel his a poor candidate  · Patient has a poor prognosis overall    Essential hypertension  Assessment & Plan  · Patient hypotensive today again after dialysis,   · Continue midodrine    CROW on CPAP  Assessment & Plan  · Continue CPAP treatment     Morbid obesity ([facility] [address] )  Assessment & Plan  · Dietary and lifestyle modifications recommended  HCAP (healthcare-associated pneumonia)  Assessment & Plan  · CT suggests possible pneumonia, on cefepime  Sputum showing yeast   · Supplemental O2 prn, strep PNA and legionella negative  · On cefepime    Cellulitis of right lower extremity  Assessment & Plan  · Patient developed new erythema of the RLE  · Patient initially started on broad-spectrum antibiotics of IV vancomycin and cefepime will downgrade patient IV cefepime only  · Serial exams  · Monitor CBC, vitals  Procalcitonin 0 33 ->0 42 unsure if this represents worsening infection vs more likely worsening renal function  Repeat tomorrow recommended  · Blood cultures x2 negative for growth at 5 days    Acute kidney injury [facility])  Assessment & Plan  · Patient was admitted with BOBBY on top of CKD stage 2    · Continue Albumin IV and Octreotide added this admission  Midodrine initiated  · Bumex discontinued  Lasix started  · Nephrology input appreciated, started on HD 9/10  Hyponatremia  Assessment & Plan  · Secondary to cirrhosis  · Na 125-127, currently 130  · Nephrology/GI input appreciated  Anemia  Assessment & Plan  · Likely in setting of renal disease, trending down since admit and now <7  · S/P 1u PRBCs 9/11 and will likely end up needing platelet transfusion, currently 28  · Continue to monitor closely      VTE Pharmacologic Prophylaxis:   Pharmacologic: Pharmacologic VTE Prophylaxis contraindicated due to anemia  Mechanical VTE Prophylaxis in Place: No    Patient Centered Rounds: I have performed bedside rounds with nursing staff today  Discussions with Specialists or Other Care Team Provider: GI, nephro, palliative, ICU, case management     Education and Discussions with Family / Patient: patient, wife     Time Spent for Care: 45 minutes    More than 50% of total time spent on counseling and coordination of care as described above  Current Length of Stay: 6 day(s)    Current Patient Status: Inpatient   Certification Statement: The patient will continue to require additional inpatient hospital stay due to critical illness, transfer to SD unit    Discharge Plan: not medically stable     Code Status: Level 1 - Full Code      Subjective:   Patient seen on dialysis, still appears critically ill  On CPAP for comfort  Denies pain  Reports bowel movement last night  He is only answering in 1-2 word responses  Patient's wife interviewed individually, she is tearful and worried that her  will not make it out of the hospital   She understands the for palliative consult, possible transfer to step-down unit which will now occur  Patient understands that her  is very critically ill, and that if these interventions are not successful, patient will likely pass  She does request that we do everything possible, and informed them previously that we do not feel he is a good candidate for liver transplant  Objective:     Vitals:   Temp (24hrs), Av 5 °F (36 4 °C), Min:97 3 °F (36 3 °C), Max:98 °F (36 7 °C)    Temp:  [97 3 °F (36 3 °C)-98 °F (36 7 °C)] 97 6 °F (36 4 °C)  HR:  [61-72] 72  Resp:  [18-22] 20  BP: ()/(41-59) 89/41  SpO2:  [91 %-99 %] 91 %  Body mass index is 56 76 kg/m²  Input and Output Summary (last 24 hours): Intake/Output Summary (Last 24 hours) at 2019 1417  Last data filed at 2019 1046  Gross per 24 hour   Intake 1270 ml   Output 2000 ml   Net -730 ml       Physical Exam:     Physical Exam   Constitutional: Vital signs are normal  He appears well-developed and well-nourished  He appears ill  No distress  Cardiovascular: Normal rate, regular rhythm, S1 normal, S2 normal and normal heart sounds  Pulmonary/Chest: Effort normal and breath sounds normal  No respiratory distress  On CPAP   Abdominal: Soft  Bowel sounds are normal  He exhibits distension  edematous   Musculoskeletal: He exhibits edema (diffuse anasarca)  Neurological: He is alert  Skin: There is erythema (bilateral lower legs, similar to prior examination)  Nursing note and vitals reviewed  Additional Data:     Labs:    Results from last 7 days   Lab Units 09/12/19  0735  09/09/19  0524   WBC Thousand/uL 10 29*   < > 8 33   HEMOGLOBIN g/dL 7 6*   < > 7 5*   HEMATOCRIT % 23 9*   < > 23 7*   PLATELETS Thousands/uL 28*   < > 51*   BANDS PCT % 2   < >  --    NEUTROS PCT %  --   --  68   LYMPHS PCT %  --   --  6*   LYMPHO PCT % 6*   < >  --    MONOS PCT %  --   --  12   MONO PCT % 4   < >  --    EOS PCT % 5   < > 6    < > = values in this interval not displayed  Results from last 7 days   Lab Units 09/12/19  0735 09/11/19  1012   SODIUM mmol/L 130* 127*   POTASSIUM mmol/L 4 8 5 1   CHLORIDE mmol/L 96* 94*   CO2 mmol/L 26 25   BUN mg/dL 52* 54*   CREATININE mg/dL 3 07* 2 93*   ANION GAP mmol/L 8 8   CALCIUM mg/dL 8 7 8 7   ALBUMIN g/dL  --  3 4*   TOTAL BILIRUBIN mg/dL  --  7 10*   ALK PHOS U/L  --  64   ALT U/L  --  30   AST U/L  --  39   GLUCOSE RANDOM mg/dL 99 123     Results from last 7 days   Lab Units 09/11/19  1012   INR  2 04*     Results from last 7 days   Lab Units 09/11/19  1109   POC GLUCOSE mg/dl 126         Results from last 7 days   Lab Units 09/12/19  0735 09/08/19  0558 09/06/19  1909   LACTIC ACID mmol/L  --   --  1 3   PROCALCITONIN ng/ml 0 42* 0 33*  --            * I Have Reviewed All Lab Data Listed Above  * Additional Pertinent Lab Tests Reviewed: All Labs Within Last 24 Hours Reviewed    Imaging:    Imaging Reports Reviewed Today Include:   Imaging Personally Reviewed by Myself Includes:      Recent Cultures (last 7 days):     Results from last 7 days   Lab Units 09/08/19  1121 09/07/19  1944 09/06/19  1905 09/06/19  1904   BLOOD CULTURE   --   --  No Growth After 5 Days  No Growth After 5 Days     SPUTUM CULTURE  2+ Growth of Candida albicans*  --   --   --    Alma Delia Sanchez STAIN RESULT  2+ Polys*  2+ Gram negative rods*  --   --   --    LEGIONELLA URINARY ANTIGEN   --  Negative  --   --        Last 24 Hours Medication List:     Current Facility-Administered Medications:  acetaminophen 650 mg Oral Q6H PRN Yenni Nieto MD    cefepime 2,000 mg Intravenous Q12H Yenni Nieto MD Last Rate: 2,000 mg (09/12/19 0322)   furosemide 80 mg Intravenous TID (diuretic) Shannan Vargas MD    guaiFENesin 600 mg Oral Q12H [address] Yenni Nieto MD    lactulose 20 g Oral BID Hannah Beyer PA-C    midodrine 10 mg Oral TID AC Shannan Vargas MD    octreotide 100 mcg Subcutaneous Q8H Rosa Mcmillan MD    pantoprazole 40 mg Oral Early Morning Yenni Nieto MD    rifaximin 550 mg Oral Q12H [address] Ria Sánchez PA-C         Today, Patient Was Seen By: Kirk Reynolds PA-C    ** Please Note: Dictation voice to text software may have been used in the creation of this document   **

## 2019-09-12 NOTE — ASSESSMENT & PLAN NOTE
· Patient was admitted with BOBBY on top of CKD stage 2    · Continue Albumin IV and Octreotide added this admission  Midodrine initiated  · Bumex discontinued  Lasix started  · Nephrology input appreciated, started on HD 9/10

## 2019-09-12 NOTE — PROGRESS NOTES
Transfer/Accept - Critical Care   Anish Ring 63 y o  male MRN: [id number]  Unit/Bed#: -01 Encounter: [id number]    Reason for Admission / Chief Complaint: Anasarca    History of Present Illness:  Anish Ring is a 63 y o  male who presented initially to [address] [place] [address] on 9/6 with anasarca  He has past medical history of 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 diagnosed in June 2019, CROW on sleep apnea, morbid obesity, CKD stage 2, and hypertension  Prior to admission he noticed increased edema for which his home Bumex had stopped working  On admission to the ED he was found to have BOBBY likely secondary to hepatorenal syndrome with Cr 2 14,  RLE cellulitis, and likely pneumonia  The patient was started on broad spectrum antibiotics at that time, and he has now been de-escalated to cefepime only  He was initially treated with albumin for BOBBY, however, renal function progressively worsened and he required hemodialysis on 9/10, and has been receiving HD daily  Unfortunately, his prognosis is poor as he would require liver transplant for hepatorenal syndrome, and GI does not feel that the patient would be a transplant candidate  Throughout the past 24 hours the patient has been having episodes of acute encephalopathy, periods of shortness of breath, and hypotension  He received 1 U PRBC yesterday for Hgb 6 9 and has been started on midodrine  Palliative care has been consulted for goals of care discussion  The patient has been transferred to step down for bipap and closer monitoring      History obtained from chart review and the patient   ______________________________________________________________________    Assessment:   Principal Problem:    Anasarca  Active Problems:    Acute Encephalopathy     CROW on CPAP    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)    Morbid obesity (HCC)    Anemia    Hyponatremia    Acute kidney injury ([facility] [address] )    Cellulitis of right lower extremity    HCAP (healthcare-associated pneumonia)  Resolved Problems:    * No resolved hospital problems  *        Plan:    Neuro:   Acute Encephalopathy   -Likely secondary to Hepatic Encephalopathy  -Ammonia 47 today  -Increase lactulose to TID    Delirium Precautions  Regulate sleep/wake cycle    CV:   Continue Midodrine for hypotension  Continuous Cardiac Monitoring  Goal MAP>65    Pulm:   HCAP  -Scattered mild patchy infiltrates noted bilaterally on admission CT scan concerning for PNA  -Urine Antigens negative  -Continue Cefepime  -Continue mucinex    CROW  -Bipap PRN and HS    Continue PRN bipap for increased work of breathing or hypoxia  Encourage cough/deep breathing/pulmonary toilet    GI:   Liver Cirrhosis Secondary to ROSALES  -MELD score 37 yesterday  -Continue TID lactulose, rifaximin  -Palliative Consult to discuss goals of care  -GI following     Continue daily protonix for GI ppx    :   Hepatorenal Syndrome  -Continue IHD per nephrology  -Continue scheduled lasix   -Continue Octreotide  -Nephrology following    F/E/N:   Fluids: IHD daily per nephrology  Electrolytes: Hyponatremia likely secondary to liver cirrhosis - continue FR 1500  Trend and replete K/Mag as needed  Nutrition: Continue Cardiac Diet    ID:   Continue IV cefepime for RLE cellulitis and HCAP  Blood cultures NGTD  Continue to trend procalcitonin, WBC, fever curve    Heme:   Anemia   -Likely multifactorial  -Required 1 U PRBC yesterday for Hgb 6 9  -Continue to trend and transfuse for Hgb<7    Endo:   Monitor glucose on BMP    Msk/Skin:   Continue to turn and reposition Q2H to prevent skin breakdown   Monitor RLE cellulitis     Disposition: Transfer to step down       Counseling / Coordination of Care  Total time spent today 32 minutes  Greater than 50% of total time was spent with the patient and / or family counseling and / or coordination of care   A description of the counseling / coordination of care: Plan of care discussed with primary team, nephrology, and palliative care    ______________________________________________________________________  Past Medical History:  Past Medical History:   Diagnosis Date    Anasarca     Chronic pain disorder     lower back    CPAP (continuous positive airway pressure) dependence     Heart murmur     Hypertension     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)     Myocardial infarction ([facility] [address] )     Renal insufficiency 2019    Sleep apnea     Vitamin D deficiency        Past Surgical History:  Past Surgical History:   Procedure Laterality Date    IR TEMP HD CATH  9/10/2019    KNEE ARTHROSCOPY Bilateral     KNEE SURGERY      [place] COLONOSCOPY FLX DX W/COLLJ SPEC WHEN PFRMD N/A 11/15/2017    Procedure: COLONOSCOPY;  Surgeon: Jeffrey Givens MD;  Location: [facility]; Service: Gastroenterology       Past Family History:  History reviewed  No pertinent family history      Social History:  Social History     Tobacco Use   Smoking Status Former Smoker    Last attempt to quit: 11/15/1990    Years since quittin 8   Smokeless Tobacco Never Used     Social History     Substance and Sexual Activity   Alcohol Use Yes    Comment: RARE     Social History     Substance and Sexual Activity   Drug Use No     Marital Status: /Civil Union    Medications:  Current Facility-Administered Medications   Medication Dose Route Frequency    acetaminophen (TYLENOL) tablet 650 mg  650 mg Oral Q6H PRN    cefepime (MAXIPIME) 2,000 mg in dextrose 5 % 50 mL IVPB  2,000 mg Intravenous Q12H    furosemide (LASIX) injection 80 mg  80 mg Intravenous TID (diuretic)    guaiFENesin (MUCINEX) 12 hr tablet 600 mg  600 mg Oral Q12H [address]    lactulose 20 g/30 mL oral solution 20 g  20 g Oral BID    midodrine (PROAMATINE) tablet 10 mg  10 mg Oral TID AC    octreotide (SandoSTATIN) injection 100 mcg  100 mcg Subcutaneous Q8H [address]    pantoprazole (PROTONIX) EC tablet 40 mg  40 mg Oral Early Morning    rifaximin (XIFAXAN) tablet 550 mg  550 mg Oral Q12H [address] Home medications:  Prior to Admission medications    Medication Sig Start Date End Date Taking? Authorizing Provider   bumetanide (BUMEX) 2 mg tablet Take 1 tablet (2 mg total) by mouth 2 (two) times a day 19  Yes MARKOS Ramos   lactulose (CONSTULOSE) 10 g/15 mL solution Take 20 g by mouth 2 (two) times a day    Historical Provider, MD   pantoprazole (PROTONIX) 40 mg tablet Take 1 tablet (40 mg total) by mouth daily in the early morning 19   MARKOS Ramos   potassium chloride (K-DUR,KLOR-CON) 20 mEq tablet Take 20 mEq by mouth 3 (three) times a day    Historical Provider, MD     Allergies: Allergies   Allergen Reactions    Lactose     Propranolol Eye Swelling    Torsemide Eye Swelling       ROS:   Review of Systems   Constitutional: Positive for appetite change and fatigue  Negative for chills and fever  HENT: Negative  Eyes: Negative  Respiratory: Positive for shortness of breath  Negative for chest tightness  Cardiovascular: Positive for leg swelling  Negative for chest pain and palpitations  Gastrointestinal: Positive for diarrhea  Negative for abdominal pain, nausea and vomiting  Endocrine: Negative  Genitourinary: Positive for scrotal swelling  Musculoskeletal: Positive for back pain  Negative for myalgias  Skin: Negative  Allergic/Immunologic: Negative  Neurological: Positive for weakness  Negative for dizziness, syncope and light-headedness  Hematological: Negative  Psychiatric/Behavioral: Negative          Vitals:  Vitals:    19 0900 19 0930 19 0956 19 1030   BP: (!) 106/45 (!) 116/47 (!) 108/48 (!) 89/41   BP Location:       Pulse: 70 70 69 72   Resp: 22  21 20   Temp:    97 6 °F (36 4 °C)   TempSrc:    Axillary   SpO2:       Weight:       Height:         Temperature:   Temp (24hrs), Av 5 °F (36 4 °C), Min:97 3 °F (36 3 °C), Max:98 °F (36 7 °C)    Current Temperature: 97 6 °F (36 4 °C)    Weights:   IBW: 75 3 kg Body mass index is 56 76 kg/m²  Hemodynamic Monitoring:  N/A     Non-Invasive/Invasive Ventilation Settings:  Respiratory    Lab Data (Last 4 hours)    None         O2/Vent Data (Last 4 hours)    None              No results found for: PHART, WTZ9LDQ, PO2ART, SBE2PVS, J5XRQRAW, BEART, SOURCE  SpO2: SpO2: 91 %, SpO2 Device: O2 Device: None (Room air)     Physical Exam:  Physical Exam   Constitutional: He appears lethargic  He is cooperative  He is easily aroused  He appears ill  No distress  HENT:   Head: Normocephalic and atraumatic  Eyes: Pupils are equal, round, and reactive to light  Scleral icterus is present  Neck: Normal range of motion  Cardiovascular: Normal rate, regular rhythm and intact distal pulses  Murmur heard  Pulmonary/Chest: Effort normal  He has decreased breath sounds in the right lower field and the left lower field  Abdominal: He exhibits distension  Bowel sounds are decreased  There is generalized tenderness  There is no guarding  Genitourinary: Right testis shows swelling  Musculoskeletal: He exhibits edema (+4 edema in b/l UE and LE)  Neurological: He is easily aroused  He appears lethargic  GCS eye subscore is 3  GCS verbal subscore is 5  GCS motor subscore is 6  Alert to voice, follows commands appropriaately  Oriented to person and place, disoriented to time  Generalized weakness noted in all extremities  Skin: Skin is warm and dry     jaundiced       Labs:  Results from last 7 days   Lab Units 09/12/19  0735 09/11/19  1012 09/10/19  0540 09/09/19  0524 09/08/19  0558 09/07/19  0526 09/06/19  1906   WBC Thousand/uL 10 29* 10 28* 9 24 8 33 8 97 9 27 10 02   HEMOGLOBIN g/dL 7 6* 6 9* 7 2* 7 5* 7 7* 8 4* 8 6*   HEMATOCRIT % 23 9* 22 3* 22 3* 23 7* 24 0* 26 1* 26 4*   PLATELETS Thousands/uL 28* 36* 42* 51* 51* 68* 64*   NEUTROS PCT %  --   --   --  68  --  64  --    BANDS PCT % 2 1 2  --  3  --  2   MONOS PCT %  --   --   --  12  --  16*  --    MONO PCT % 4 8 10  -- 7  --  13*     Results from last 7 days   Lab Units 09/12/19  0735 09/11/19  1012 09/10/19  0541 09/09/19  0524 09/08/19  0558 09/07/19  0526 09/06/19  1905   SODIUM mmol/L 130* 127* 125* 127* 128* 129* 127*   POTASSIUM mmol/L 4 8 5 1 5 1 4 7 4 8 5 2 4 6   CHLORIDE mmol/L 96* 94* 92* 92* 94* 96* 94*   CO2 mmol/L 26 25 24 25 23 26 24   ANION GAP mmol/L 8 8 9 10 11 7 9   BUN mg/dL 52* 54* 54* 53* 51* 48* 47*   CREATININE mg/dL 3 07* 2 93* 2 66* 2 44* 2 41* 2 45* 2 41*   CALCIUM mg/dL 8 7 8 7 8 3 8 8 8 5 8 7 8 4   GLUCOSE RANDOM mg/dL 99 123 111 116 100 107 126   ALT U/L  --  30 31 37  --  41 37   AST U/L  --  39 44 51*  --  62* 63*   ALK PHOS U/L  --  64 68 77  --  107 112   ALBUMIN g/dL  --  3 4* 3 8 3 6  --  2 9* 3 0*   TOTAL BILIRUBIN mg/dL  --  7 10* 7 00* 7 40*  --  7 10* 6 90*     Results from last 7 days   Lab Units 09/07/19  0526 09/06/19  1905   MAGNESIUM mg/dL 2 1 1 9      Results from last 7 days   Lab Units 09/11/19  1012 09/10/19  0541 09/09/19  0524 09/06/19  1905   INR  2 04* 1 88* 1 79* 1 77*   PTT seconds  --   --   --  40*      Results from last 7 days   Lab Units 09/06/19  1906   TROPONIN I ng/mL 0 02     Results from last 7 days   Lab Units 09/06/19  1909   LACTIC ACID mmol/L 1 3     ABG:  Results from last 7 days   Lab Units 09/11/19  1328   PH ART  7 327*   PCO2 ART mm Hg 42 7   PO2 ART mm Hg 44 2*   HCO3 ART mmol/L 21 9*   BASE EXC ART mmol/L -3 9   ABG SOURCE  Radial, Right     VBG:  Results from last 7 days   Lab Units 09/11/19  1328   ABG SOURCE  Radial, Right     Results from last 7 days   Lab Units 09/12/19  0735 09/08/19  0558   PROCALCITONIN ng/ml 0 42* 0 33*         Imaging: CT chest, abdomen, pelvis from admission and CXR from 9/10 reviewed I have personally reviewed pertinent reports  and I have personally reviewed pertinent films in PACS  EKG: NSR rate 60 This was personally reviewed by myself     Micro:  Results from last 7 days   Lab Units 09/08/19  1121 09/07/19  1944 09/06/19 1905 09/06/19 1904   BLOOD CULTURE   --   --  No Growth After 5 Days  No Growth After 5 Days  SPUTUM CULTURE  2+ Growth of Candida albicans*  --   --   --    GRAM STAIN RESULT  2+ Polys*  2+ Gram negative rods*  --   --   --    LEGIONELLA URINARY ANTIGEN   --  Negative  --   --    STREP PNEUMONIAE ANTIGEN, URINE   --  Negative  --   --          VTE Pharmacologic Prophylaxis: none 2/2 liver failure with elevated INR and anemia  VTE Mechanical Prophylaxis: sequential compression device    Invasive lines and devices: Invasive Devices     Peripheral Intravenous Line            Peripheral IV 09/06/19 Right Antecubital 5 days    Peripheral IV 09/10/19 Right Arm 1 day          Line            Temporary HD Catheter 1 day                Code Status: Level 1 - Full Code  POA:    POLST:      Given critical illness, patient length of stay will require greater than two midnights  Portions of the record may have been created with voice recognition software  Occasional wrong word or "sound a like" substitutions may have occurred due to the inherent limitations of voice recognition software  Read the chart carefully and recognize, using context, where substitutions have occurred        [address] [place] [address] Bill Aguilar

## 2019-09-12 NOTE — ASSESSMENT & PLAN NOTE
· Patient has been struggling with anasarca in the setting of decompensated cirrhosis secondary to ROSALES  · Bumex switched to lasix, started on HD, still doing quite poorly  Prognosis is grim  Family meeting tomorrow at 10am to discuss goals of care and consideration for hospice    · Nephrology managing fluid status, BPs are not tolerating significant diuresis  · GI following, he is NOT a candidate for transplant  · Continue daily weight, I/Os  · Clinically patient appears very ill, ICU to re-evaluate today for StepDown2 level of care

## 2019-09-12 NOTE — ASSESSMENT & PLAN NOTE
· Patient with decompensated ROSALES cirrhosis  MELD calculated to be up to 32 this admission (calculated at 25 last admission and 20 back in June)  · Ascites: CT shows mild ascites and diffuse anasarca  Patient was on diuresis as outpatient however, patient developed BOBBY and diuretics have been stopped at present  · Continue Lactulose for goal of 3 soft BMs a day  Was decreased due to frequent BMs however today is noted up to 74, frequency increased again and ammonia down to 47  · Varices: Will need EGD in future for variceal screening  · [facility] [address]  Screen: AFP in June normal  CT A/P showed no obvious focal mass on unenhanced CT  MRI in June did not show any suspicious lesion in the liver but was limited by motion artifact    · Transplant: No evaluation at transplant center has been performed to date - GI to reach out to [address], but we do feel his a poor candidate  · Patient has a poor prognosis overall

## 2019-09-12 NOTE — CONSULTS
Consultation - Palliative and Supportive Care   Pamela Milian 63 y o  male [id number]    Assessment:     Patient Active Problem List   Diagnosis    Anasarca    CROW on CPAP    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 ([address] )    Essential hypertension    Hyperbilirubinemia    Shortness of breath    Calculus of gallbladder without cholecystitis without obstruction    Liver lesion    Primary osteoarthritis involving multiple joints    Bilateral leg pain    Tick bite    Morbid obesity ([address] )    Anemia    Renal insufficiency    Bacteremia    Hyponatremia    Community acquired pneumonia    Acute kidney injury ([address] )    Cellulitis of right lower extremity    HCAP (healthcare-associated pneumonia)         Plan:  1  Symptom management -     Recommend global delirium precautions including:      - Establishment of day/night cycle via lights during the day and blinds open  Please limit interruptions at night as medically appropriate  - Provide glasses/hearing aids as apprioriate  - Minimize deliriogenic meds as able  - Provide reorientation including date on board and visible clock  - Avoid restraints as able, frequent verbal reorientations or patient care sitter as appropriate  2  Goals - Working to arrange goals of care conversation for tomorrow AM   I appreciate the input of JUVENTINO PEDRO and MADDY  Spouse is going to find out when daughter can be available  Patient's prognosis is very poor  Code Status: level 1           We appreciate the invitation to be involved in this patient's care  We will continue to follow  Please do not hesitate to reach our on call provider through our clinic answering service at  should you have acute symptom control concerns  IDENTIFICATION:  Consults  Physician Requesting Consult: Veronica Raines MD  Reason for Consult / Principal Problem: goals of care  Hx and PE limited by: patient encephalopathy, fatigue, and on CPAP      HISTORY OF PRESENT ILLNESS: Eleanor Vuong is a 63 y o  male with a history of ROSALES cirrhosis who has had multiple admissions related to anasarca and respiratory insufficiency  He presented with LE erythema (had recent hospital stay for cellulitis) as well as dyspnea  Found to have progressive renal failure on labs  Unfortunately patient's condition has continued to decline over the past week and he is now undergoing dialysis for progressive renal failure and fluid overload  He is now undergoing his third dialysis session  Unfortunately given his multiple medical comorbidities, poor insight, and challenging social situation, patient is not a good candidate for transplant  [facility] consulted to assist in coordinating a goals of care conversation  The patient himself is seen actively undergoing HD  He is wearing a CPAP machine  He does awaken and answers simple y/n questions but quickly returns to sleep  He denies pain  Feel SOB, but improved  He is agreeable to me speaking to his wife in his stead  I then spent time speaking with his spouse  She details some of their complicated social situation including a son who was killed several years ago  She makes statements such as "I'm going to have to speak to the  home" and "he might not make it out of here "  She also endorses a strong philosophy in being positive in front of her   She is agreeable to goals of care conversation and would like her daughter to be present    She describes patient's other two living children as "useless "        Review of Systems   Unable to perform ROS: mental status change       Past Medical History:   Diagnosis Date    Anasarca     Chronic pain disorder     lower back    CPAP (continuous positive airway pressure) dependence     Heart murmur     Hypertension     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 [facility] [address]     Myocardial infarction ([facility] [address]     Renal insufficiency 2019    Sleep apnea     Vitamin D deficiency      Past Surgical History:   Procedure Laterality Date    IR TEMP HD CATH  9/10/2019    KNEE ARTHROSCOPY Bilateral     KNEE SURGERY      [place] COLONOSCOPY FLX DX W/COLLJ SPEC WHEN PFRMD N/A 11/15/2017    Procedure: COLONOSCOPY;  Surgeon: Dutch Edwards MD;  Location: [facility]; Service: Gastroenterology     Social History     Socioeconomic History    Marital status: /Civil Union     Spouse name: Not on file    Number of children: Not on file    Years of education: Not on file    Highest education level: Not on file   Occupational History    Not on file   Social Needs    Financial resource strain: Not on file    Food insecurity:     Worry: Not on file     Inability: Not on file    Transportation needs:     Medical: Not on file     Non-medical: Not on file   Tobacco Use    Smoking status: Former Smoker     Last attempt to quit: 11/15/1990     Years since quittin 8    Smokeless tobacco: Never Used   Substance and Sexual Activity    Alcohol use: Yes     Comment: RARE    Drug use: No    Sexual activity: Not on file   Lifestyle    Physical activity:     Days per week: Not on file     Minutes per session: Not on file    Stress: Not on file   Relationships    Social connections:     Talks on phone: Not on file     Gets together: Not on file     Attends Advent service: Not on file     Active member of club or organization: Not on file     Attends meetings of clubs or organizations: Not on file     Relationship status: Not on file    Intimate partner violence:     Fear of current or ex partner: Not on file     Emotionally abused: Not on file     Physically abused: Not on file     Forced sexual activity: Not on file   Other Topics Concern    Not on file   Social History Narrative    Not on file     History reviewed  No pertinent family history      MEDICATIONS / ALLERGIES:    all current active meds have been reviewed and current meds:   Current Facility-Administered Medications   Medication Dose Route Frequency    acetaminophen (TYLENOL) tablet 650 mg  650 mg Oral Q6H PRN    cefepime (MAXIPIME) 2,000 mg in dextrose 5 % 50 mL IVPB  2,000 mg Intravenous Q12H    furosemide (LASIX) injection 80 mg  80 mg Intravenous TID (diuretic)    guaiFENesin (MUCINEX) 12 hr tablet 600 mg  600 mg Oral Q12H [address]    lactulose 20 g/30 mL oral solution 20 g  20 g Oral Daily    midodrine (PROAMATINE) tablet 10 mg  10 mg Oral TID AC    octreotide (SandoSTATIN) injection 100 mcg  100 mcg Subcutaneous Q8H [address]    pantoprazole (PROTONIX) EC tablet 40 mg  40 mg Oral Early Morning       Allergies   Allergen Reactions    Lactose     Propranolol Eye Swelling    Torsemide Eye Swelling       OBJECTIVE:    Physical Exam  Physical Exam   Constitutional: He appears ill  No distress  HENT:   Head: Atraumatic  CPAP in place  Eyes: Right eye exhibits no discharge  Left eye exhibits no discharge  Cardiovascular: Normal rate  Pulmonary/Chest: No respiratory distress  CPAP   Abdominal: Soft  He exhibits no distension  Musculoskeletal: He exhibits edema (diffuse anasarca)  Neurological:   Able to be awakened   Skin: Skin is warm and dry  There is erythema  Psychiatric:   Pleasant, though fatigued   Nursing note and vitals reviewed  Lab Results:   I have personally reviewed pertinent labs  , CBC:   Lab Results   Component Value Date    WBC 10 29 (H) 09/12/2019    HGB 7 6 (L) 09/12/2019    HCT 23 9 (L) 09/12/2019     (H) 09/12/2019    PLT 28 (LL) 09/12/2019    MCH 36 2 (H) 09/12/2019    MCHC 31 8 09/12/2019    RDW 17 1 (H) 09/12/2019    MPV 11 2 09/12/2019    NRBC 0 09/12/2019   , CMP:   Lab Results   Component Value Date    SODIUM 130 (L) 09/12/2019    K 4 8 09/12/2019    CL 96 (L) 09/12/2019    CO2 26 09/12/2019    BUN 52 (H) 09/12/2019    CREATININE 3 07 (H) 09/12/2019    CALCIUM 8 7 09/12/2019    EGFR 21 09/12/2019     Imaging Studies: I personally reviewed relevant reports    EKG, Pathology, and Other Studies: I personally reviewed relevant reports  Counseling / Coordination of Care  Total floor / unit time spent today 65+ minutes  Greater than 50% of total time was spent with the patient and / or family counseling and / or coordination of care  A description of the counseling / coordination of care: extensive discussion with patietn's spouse, as well as discussion with IM, CM, nephrology

## 2019-09-12 NOTE — SOCIAL WORK
CM spoke with Dr Himanshu Morales and was informed that pt has a temp cath and has been receiving dialysis  Due to pt's current condition, long term dialysis has not yet been determined  Palliative Care following and will discuss goals of care with pt and his wife per Dr Funk Resides  CM will look into establishing an upcoming care team meeting

## 2019-09-12 NOTE — HEMODIALYSIS
HD completed as ordered, Pt fitted with BiPap during treatment aiding oxygen saturation  No distress occurred  Good fistula performance for ordered BFR  Pre weight     199 Kg    Post weight  197 5 kg    UF 1 5 Kg

## 2019-09-12 NOTE — RESPIRATORY THERAPY NOTE
RT Ventilator Management Note  Miguel Brunner 63 y o  male MRN: [id number]  Unit/Bed#: -01 Encounter: [id number]      Daily Screen     No data found in the last 10 encounters  Physical Exam:   Assessment Type: Assess only  General Appearance: Drowsy, Eyes open/responds to voice  Respiratory Pattern: Irregular, Spontaneous, Assisted  Chest Assessment: Chest expansion symmetrical  Bilateral Breath Sounds: Clear, Diminished  Cough: None  O2 Device: home cpap with oxygen  Subjective Data: resting wihtout great apparent respiratory distress       Resp Comments: [facility] slightly lethargic only answering 1 world commands with noted desaturation on 3 lpm nasal cannual   pateint placed on home cpap at 10 cm h20 spo2 slowly imporved pateitn resting without apparent respiratory distress dialysis nurse made aware will chall when treatment finished

## 2019-09-12 NOTE — PROGRESS NOTES
HEMODIALYSIS ROUNDING NOTE    Patient: Ho Miles               Sex: male          DOA: 9/6/2019  5:35 PM   Date of Birth: @        Age: @        LOS:  LOS: 6 days   9/12/2019    REASON FOR THE CONSULTATION:  Further management of BOBBY    HPI     This is a 63 y o  male admitted for Anasarca     SUBJECTIVE     - Patient was seen during hemodialysis today  Patient denies nausea / vomiting / headache / dizziness / SOB / chest pain during hemodialysis  - Reviewed last 24 hrs events     CURRENT MEDICATIONS       Current Facility-Administered Medications:     acetaminophen (TYLENOL) tablet 650 mg, 650 mg, Oral, Q6H PRN, Dax Yao MD, 650 mg at 09/10/19 0117    cefepime (MAXIPIME) 2,000 mg in dextrose 5 % 50 mL IVPB, 2,000 mg, Intravenous, Q12H, Dax Yao MD, Last Rate: 100 mL/hr at 09/12/19 0322, 2,000 mg at 09/12/19 0322    furosemide (LASIX) injection 80 mg, 80 mg, Intravenous, TID (diuretic), Don De Leon MD, 80 mg at 09/11/19 1516    guaiFENesin (MUCINEX) 12 hr tablet 600 mg, 600 mg, Oral, Q12H [address], Dax Yao MD, 600 mg at 09/11/19 2157    lactulose 20 g/30 mL oral solution 20 g, 20 g, Oral, Daily, Kasey Saldivar PA-C, 20 g at 09/11/19 1509    midodrine (PROAMATINE) tablet 10 mg, 10 mg, Oral, TID AC, Don De Leon MD, 10 mg at 09/12/19 0613    octreotide (SandoSTATIN) injection 100 mcg, 100 mcg, Subcutaneous, Q8H [address], Don De Leon MD, 100 mcg at 09/12/19 0616    pantoprazole (PROTONIX) EC tablet 40 mg, 40 mg, Oral, Early Morning, Dax Yao MD, 40 mg at 09/12/19 3940    OBJECTIVE     Current Weight: Weight - Scale: (!) 185 kg (407 lb)  Vitals:    09/12/19 0830   BP: (!) 102/46   Pulse: 72   Resp: 20   Temp:    SpO2: 91%     Body mass index is 56 76 kg/m²      Intake/Output Summary (Last 24 hours) at 9/12/2019 0900  Last data filed at 9/12/2019 0725  Gross per 24 hour   Intake 1370 ml   Output 1500 ml   Net -130 ml       PHYSICAL EXAMINATION     Physical Exam Constitutional: No distress  Obese   HENT:   Head: Normocephalic and atraumatic  Neck: Neck supple  No JVD present  Cardiovascular: Normal rate  Pulmonary/Chest: No accessory muscle usage  No respiratory distress  He has no wheezes  Abdominal: Soft  He exhibits no distension  Musculoskeletal: He exhibits edema  Right hand: He exhibits no laceration  Left hand: He exhibits no laceration  Neurological: He is alert  Skin: Skin is warm  No cyanosis  Psychiatric: He is not combative  He expresses no suicidal ideation  LAB RESULTS     Results from last 7 days   Lab Units 09/12/19  0735 09/11/19  1012 09/10/19  0541 09/10/19  0540 09/09/19  0524 09/08/19  0558 09/07/19  0526 09/06/19  1906 09/06/19  1905   WBC Thousand/uL 10 29* 10 28*  --  9 24 8 33 8 97 9 27 10 02  --    HEMOGLOBIN g/dL 7 6* 6 9*  --  7 2* 7 5* 7 7* 8 4* 8 6*  --    HEMATOCRIT % 23 9* 22 3*  --  22 3* 23 7* 24 0* 26 1* 26 4*  --    PLATELETS Thousands/uL 28* 36*  --  42* 51* 51* 68* 64*  --    SODIUM mmol/L 130* 127* 125*  --  127* 128* 129*  --  127*   POTASSIUM mmol/L 4 8 5 1 5 1  --  4 7 4 8 5 2  --  4 6   CHLORIDE mmol/L 96* 94* 92*  --  92* 94* 96*  --  94*   CO2 mmol/L 26 25 24  --  25 23 26  --  24   BUN mg/dL 52* 54* 54*  --  53* 51* 48*  --  47*   CREATININE mg/dL 3 07* 2 93* 2 66*  --  2 44* 2 41* 2 45*  --  2 41*   EGFR ml/min/1 73sq m 21 23 25  --  28 29 28  --  29   CALCIUM mg/dL 8 7 8 7 8 3  --  8 8 8 5 8 7  --  8 4   MAGNESIUM mg/dL  --   --   --   --   --   --  2 1  --  1 9       RADIOLOGY RESULTS     CXR 1 view PA portable stat   Final Result by Lemuel Blanc DO (09/10 4866)   Persistent cardiomegaly  Improving pulmonary edema  Satisfactory right jugular central line placement  No pneumothorax  Right rib fractures  Workstation performed: [id number]         IR temp HD cath   Final Result by Nahomi Medel MD (09/10 1541)   Impression:   1   Successful ultrasound and fluoroscopically guided placement of a double-lumen temporary dialysis central venous catheter via the right internal jugular vein  The tip of the catheter is at the cavoatrial junction and may be used immediately  Workstation performed: [id number]         CT tibia fibula right wo contrast   Final Result by Dustin Pardo MD (09/06 2128)   Soft tissue evaluation is limited without IV contrast    1    No evidence of soft tissue gas  2   Generalized subcutaneous edema with associated skin thickening  Edema noted adjacent to the calf musculature  Findings are nonspecific and may be related to generalized anasarca  3   The absence of soft tissue gas does not exclude necrotizing fasciitis particularly at the early stages  If there is persistent concern for necrotizing fasciitis, consider follow-up with MRI with and without contrast          Workstation performed: [id number]         CT femur right wo contrast   Final Result by Dustin Pardo MD (09/06 2128)   Soft tissue evaluation is limited without IV contrast    1    No evidence of soft tissue gas  2   Generalized subcutaneous edema with associated skin thickening  Edema noted adjacent to the calf musculature  Findings are nonspecific and may be related to generalized anasarca  3   The absence of soft tissue gas does not exclude necrotizing fasciitis particularly at the early stages  If there is persistent concern for necrotizing fasciitis, consider follow-up with MRI with and without contrast          Workstation performed: [id number]         CT chest abdomen pelvis wo contrast   Final Result by Dustin Pardo MD (09/06 2116)   Exam is limited without IV and oral contrast particularly for soft tissue and bowel evaluation  1   Scattered mild patchy opacities bilaterally suspicious for pneumonia  2  Cirrhotic appearance to the liver  Splenomegaly  3   Mild ascites  4   Diffuse subcutaneous edema compatible with anasarca  Workstation performed: [id number]         XR chest 2 views   ED Interpretation by Les Navarrete DO (09/06 1949)   Limited film due to body habitus and poor inspiratory effort  Cardiomegaly present  Increased vascular congestion bilaterally  Final Result by Jae Field MD (66/13 2701)   Persistent cardiomegaly      Increased diffuse vascular congestion            Workstation performed: [id number]             PLAN / RECOMMENDATIONS     1  BOBBY  Multifactorial, secondary to hepatorenal syndrome  Patient was started on hemodialysis earlier  Plan 3rdhemodialysis today volume management  At 8:51 AM on 9/12/2019, I saw and examined patient during hemodialysis treatment  The patient was receiving hemodialysis for treatment of acute kidney injury  I have also reviewed vital signs, intake and output, lab results and recent events, and agreed with today's dialysis orders  Access:  Temporary hemodialysis catheter-No issue      Maggy Espinoza MD  Nephrology  9/12/2019        Portions of the record may have been created with voice recognition software  Occasional wrong word or "sound a like" substitutions may have occurred due to the inherent limitations of voice recognition software  Read the chart carefully and recognize, using context, where substitutions have occurred

## 2019-09-12 NOTE — SOCIAL WORK
Per Dr Garcia Members with Palliative Care, care team meeting established for 10am on 9/13/19  All aware

## 2019-09-13 LAB
ANION GAP SERPL CALCULATED.3IONS-SCNC: 10 MMOL/L (ref 4–13)
ANION GAP SERPL CALCULATED.3IONS-SCNC: 7 MMOL/L (ref 4–13)
APTT PPP: 45 SECONDS (ref 23–37)
BASOPHILS # BLD AUTO: 0.04 THOUSANDS/ΜL (ref 0–0.1)
BASOPHILS NFR BLD AUTO: 0 % (ref 0–1)
BUN SERPL-MCNC: 45 MG/DL (ref 5–25)
BUN SERPL-MCNC: 50 MG/DL (ref 5–25)
CA-I BLD-SCNC: 1.11 MMOL/L (ref 1.12–1.32)
CALCIUM SERPL-MCNC: 8.7 MG/DL (ref 8.3–10.1)
CALCIUM SERPL-MCNC: 8.8 MG/DL (ref 8.3–10.1)
CHLORIDE SERPL-SCNC: 97 MMOL/L (ref 100–108)
CHLORIDE SERPL-SCNC: 98 MMOL/L (ref 100–108)
CO2 SERPL-SCNC: 22 MMOL/L (ref 21–32)
CO2 SERPL-SCNC: 27 MMOL/L (ref 21–32)
CREAT SERPL-MCNC: 3.09 MG/DL (ref 0.6–1.3)
CREAT SERPL-MCNC: 3.12 MG/DL (ref 0.6–1.3)
EOSINOPHIL # BLD AUTO: 0.77 THOUSAND/ΜL (ref 0–0.61)
EOSINOPHIL NFR BLD AUTO: 7 % (ref 0–6)
ERYTHROCYTE [DISTWIDTH] IN BLOOD BY AUTOMATED COUNT: 16.9 % (ref 11.6–15.1)
GFR SERPL CREATININE-BSD FRML MDRD: 21 ML/MIN/1.73SQ M
GFR SERPL CREATININE-BSD FRML MDRD: 21 ML/MIN/1.73SQ M
GLUCOSE SERPL-MCNC: 111 MG/DL (ref 65–140)
GLUCOSE SERPL-MCNC: 131 MG/DL (ref 65–140)
HCT VFR BLD AUTO: 25 % (ref 36.5–49.3)
HGB BLD-MCNC: 7.7 G/DL (ref 12–17)
IMM GRANULOCYTES # BLD AUTO: 0.48 THOUSAND/UL (ref 0–0.2)
IMM GRANULOCYTES NFR BLD AUTO: 4 % (ref 0–2)
INR PPP: 2.06 (ref 0.84–1.19)
LYMPHOCYTES # BLD AUTO: 0.68 THOUSANDS/ΜL (ref 0.6–4.47)
LYMPHOCYTES NFR BLD AUTO: 6 % (ref 14–44)
MAGNESIUM SERPL-MCNC: 2.2 MG/DL (ref 1.6–2.6)
MCH RBC QN AUTO: 35.8 PG (ref 26.8–34.3)
MCHC RBC AUTO-ENTMCNC: 30.8 G/DL (ref 31.4–37.4)
MCV RBC AUTO: 116 FL (ref 82–98)
MONOCYTES # BLD AUTO: 1.12 THOUSAND/ΜL (ref 0.17–1.22)
MONOCYTES NFR BLD AUTO: 10 % (ref 4–12)
NEUTROPHILS # BLD AUTO: 8.01 THOUSANDS/ΜL (ref 1.85–7.62)
NEUTS SEG NFR BLD AUTO: 73 % (ref 43–75)
NRBC BLD AUTO-RTO: 0 /100 WBCS
PHOSPHATE SERPL-MCNC: 4.6 MG/DL (ref 2.7–4.5)
PLATELET # BLD AUTO: 26 THOUSANDS/UL (ref 149–390)
PMV BLD AUTO: 11.2 FL (ref 8.9–12.7)
POTASSIUM SERPL-SCNC: 4.7 MMOL/L (ref 3.5–5.3)
POTASSIUM SERPL-SCNC: 4.8 MMOL/L (ref 3.5–5.3)
PROTHROMBIN TIME: 23.4 SECONDS (ref 11.6–14.5)
RBC # BLD AUTO: 2.15 MILLION/UL (ref 3.88–5.62)
SODIUM SERPL-SCNC: 129 MMOL/L (ref 136–145)
SODIUM SERPL-SCNC: 132 MMOL/L (ref 136–145)
WBC # BLD AUTO: 11.1 THOUSAND/UL (ref 4.31–10.16)

## 2019-09-13 PROCEDURE — C1751 CATH, INF, PER/CENT/MIDLINE: HCPCS

## 2019-09-13 PROCEDURE — 82330 ASSAY OF CALCIUM: CPT | Performed by: INTERNAL MEDICINE

## 2019-09-13 PROCEDURE — 99233 SBSQ HOSP IP/OBS HIGH 50: CPT | Performed by: INTERNAL MEDICINE

## 2019-09-13 PROCEDURE — 83735 ASSAY OF MAGNESIUM: CPT | Performed by: INTERNAL MEDICINE

## 2019-09-13 PROCEDURE — 94760 N-INVAS EAR/PLS OXIMETRY 1: CPT

## 2019-09-13 PROCEDURE — 36620 INSERTION CATHETER ARTERY: CPT | Performed by: NURSE PRACTITIONER

## 2019-09-13 PROCEDURE — 85730 THROMBOPLASTIN TIME PARTIAL: CPT | Performed by: NURSE PRACTITIONER

## 2019-09-13 PROCEDURE — 99291 CRITICAL CARE FIRST HOUR: CPT | Performed by: STUDENT IN AN ORGANIZED HEALTH CARE EDUCATION/TRAINING PROGRAM

## 2019-09-13 PROCEDURE — 90945 DIALYSIS ONE EVALUATION: CPT | Performed by: NURSE PRACTITIONER

## 2019-09-13 PROCEDURE — 80048 BASIC METABOLIC PNL TOTAL CA: CPT | Performed by: PHYSICIAN ASSISTANT

## 2019-09-13 PROCEDURE — 85610 PROTHROMBIN TIME: CPT | Performed by: NURSE PRACTITIONER

## 2019-09-13 PROCEDURE — 94660 CPAP INITIATION&MGMT: CPT

## 2019-09-13 PROCEDURE — 4A133B1 MONITORING OF ARTERIAL PRESSURE, PERIPHERAL, PERCUTANEOUS APPROACH: ICD-10-PCS | Performed by: STUDENT IN AN ORGANIZED HEALTH CARE EDUCATION/TRAINING PROGRAM

## 2019-09-13 PROCEDURE — 84100 ASSAY OF PHOSPHORUS: CPT | Performed by: INTERNAL MEDICINE

## 2019-09-13 PROCEDURE — 4A133J1 MONITORING OF ARTERIAL PULSE, PERIPHERAL, PERCUTANEOUS APPROACH: ICD-10-PCS | Performed by: STUDENT IN AN ORGANIZED HEALTH CARE EDUCATION/TRAINING PROGRAM

## 2019-09-13 PROCEDURE — 93005 ELECTROCARDIOGRAM TRACING: CPT

## 2019-09-13 PROCEDURE — 90945 DIALYSIS ONE EVALUATION: CPT

## 2019-09-13 PROCEDURE — 80048 BASIC METABOLIC PNL TOTAL CA: CPT | Performed by: INTERNAL MEDICINE

## 2019-09-13 PROCEDURE — NC001 PR NO CHARGE: Performed by: NURSE PRACTITIONER

## 2019-09-13 PROCEDURE — 85025 COMPLETE CBC W/AUTO DIFF WBC: CPT | Performed by: PHYSICIAN ASSISTANT

## 2019-09-13 PROCEDURE — 03HY32Z INSERTION OF MONITORING DEVICE INTO UPPER ARTERY, PERCUTANEOUS APPROACH: ICD-10-PCS | Performed by: STUDENT IN AN ORGANIZED HEALTH CARE EDUCATION/TRAINING PROGRAM

## 2019-09-13 RX ORDER — LACTULOSE 20 G/30ML
30 SOLUTION ORAL 3 TIMES DAILY
Status: DISCONTINUED | OUTPATIENT
Start: 2019-09-13 | End: 2019-09-15

## 2019-09-13 RX ORDER — LIDOCAINE HYDROCHLORIDE 10 MG/ML
INJECTION, SOLUTION EPIDURAL; INFILTRATION; INTRACAUDAL; PERINEURAL
Status: COMPLETED
Start: 2019-09-13 | End: 2019-09-13

## 2019-09-13 RX ORDER — ONDANSETRON 2 MG/ML
4 INJECTION INTRAMUSCULAR; INTRAVENOUS EVERY 6 HOURS PRN
Status: DISCONTINUED | OUTPATIENT
Start: 2019-09-13 | End: 2019-09-25 | Stop reason: HOSPADM

## 2019-09-13 RX ADMIN — Medication 20000 ML: at 15:01

## 2019-09-13 RX ADMIN — OCTREOTIDE ACETATE 100 MCG: 100 INJECTION, SOLUTION INTRAVENOUS; SUBCUTANEOUS at 16:24

## 2019-09-13 RX ADMIN — CEFEPIME 2000 MG: 2 INJECTION, POWDER, FOR SOLUTION INTRAVENOUS at 16:02

## 2019-09-13 RX ADMIN — RIFAXIMIN 550 MG: 550 TABLET ORAL at 05:15

## 2019-09-13 RX ADMIN — MIDODRINE HYDROCHLORIDE 10 MG: 5 TABLET ORAL at 16:03

## 2019-09-13 RX ADMIN — FUROSEMIDE 80 MG: 10 INJECTION, SOLUTION INTRAMUSCULAR; INTRAVENOUS at 11:28

## 2019-09-13 RX ADMIN — MIDODRINE HYDROCHLORIDE 10 MG: 5 TABLET ORAL at 08:13

## 2019-09-13 RX ADMIN — MIDODRINE HYDROCHLORIDE 10 MG: 5 TABLET ORAL at 11:28

## 2019-09-13 RX ADMIN — GUAIFENESIN 600 MG: 600 TABLET, EXTENDED RELEASE ORAL at 08:13

## 2019-09-13 RX ADMIN — ONDANSETRON 4 MG: 2 INJECTION INTRAMUSCULAR; INTRAVENOUS at 21:52

## 2019-09-13 RX ADMIN — LACTULOSE 20 G: 10 SOLUTION ORAL at 16:03

## 2019-09-13 RX ADMIN — LIDOCAINE HYDROCHLORIDE: 10 INJECTION, SOLUTION EPIDURAL; INFILTRATION; INTRACAUDAL; PERINEURAL at 17:29

## 2019-09-13 RX ADMIN — CALCIUM GLUCONATE 1 G: 98 INJECTION, SOLUTION INTRAVENOUS at 18:42

## 2019-09-13 RX ADMIN — CEFEPIME 2000 MG: 2 INJECTION, POWDER, FOR SOLUTION INTRAVENOUS at 02:56

## 2019-09-13 RX ADMIN — FUROSEMIDE 80 MG: 10 INJECTION, SOLUTION INTRAMUSCULAR; INTRAVENOUS at 05:02

## 2019-09-13 RX ADMIN — LACTULOSE 20 G: 10 SOLUTION ORAL at 08:13

## 2019-09-13 RX ADMIN — Medication 20000 ML: at 22:15

## 2019-09-13 RX ADMIN — OCTREOTIDE ACETATE 100 MCG: 100 INJECTION, SOLUTION INTRAVENOUS; SUBCUTANEOUS at 21:37

## 2019-09-13 RX ADMIN — LACTULOSE 30 G: 10 SOLUTION ORAL at 21:37

## 2019-09-13 RX ADMIN — PANTOPRAZOLE SODIUM 40 MG: 40 TABLET, DELAYED RELEASE ORAL at 05:02

## 2019-09-13 RX ADMIN — OCTREOTIDE ACETATE 100 MCG: 100 INJECTION, SOLUTION INTRAVENOUS; SUBCUTANEOUS at 05:03

## 2019-09-13 RX ADMIN — RIFAXIMIN 550 MG: 550 TABLET ORAL at 16:03

## 2019-09-13 NOTE — PROGRESS NOTES
Progress note - Palliative and Supportive Care   Lola Mack 63 y o  male [id number]    Assessment:     Patient Active Problem List   Diagnosis    CROW on CPAP    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 [facility])    Essential hypertension    Hyperbilirubinemia    Liver lesion    Primary osteoarthritis involving multiple joints    Morbid obesity (HCC)    Anemia    Hyponatremia    HCAP (healthcare-associated pneumonia)    Hepatorenal syndrome (HCC)    Encephalopathy acute    Hypotension       Plan:  1  Symptom management -      Recommend global delirium precautions including:      - Establishment of day/night cycle via lights during the day and blinds open  Please limit interruptions at night as medically appropriate  - Provide glasses/hearing aids as apprioriate  - Minimize deliriogenic meds as able  - Provide reorientation including date on board and visible clock  - Avoid restraints as able, frequent verbal reorientations or patient care sitter as appropriate  2  Goals - Extensive goals discussion with patient, spouse and daughter  Patient's primary stated goal is to get to through this hospitalization so he can focus on losing weight so that he can be evaluated for transplant  In the immediate term he would like to be full code  However he and his family are open to discussing advanced care planning and he would like to review a 5 Wishes document with his spouse, daughter, mother, and younger sister  I have provided this document today  All medical questions answered during goals discussion  Patient's prognosis remains poor  Code Status: level 1   Decisional apparatus:  Patient is competent on my exam today  If competence is lost, patient's substitute decision maker would default to spouse by [place]  Advance Directive / Living Will / POLST:  none    Interval history:       Patient upgraded to ICU level of care today as his care needs increase and he is possibly to begin CVVH  Extensive goals of care discussion as above with patient and family  MEDICATIONS / ALLERGIES:     all current active meds have been reviewed and current meds:   Current Facility-Administered Medications   Medication Dose Route Frequency    acetaminophen (TYLENOL) tablet 650 mg  650 mg Oral Q6H PRN    cefepime (MAXIPIME) 2,000 mg in dextrose 5 % 50 mL IVPB  2,000 mg Intravenous Q12H    guaiFENesin (MUCINEX) 12 hr tablet 600 mg  600 mg Oral Q12H DANA    lactulose 20 g/30 mL oral solution 20 g  20 g Oral TID    midodrine (PROAMATINE) tablet 10 mg  10 mg Oral TID AC    NxStage K 4/Ca 3 dialysis solution (RFP-401) 20,000 mL  20,000 mL Dialysis Continuous    octreotide (SandoSTATIN) injection 100 mcg  100 mcg Subcutaneous Q8H [address]    pantoprazole (PROTONIX) EC tablet 40 mg  40 mg Oral Early Morning    rifaximin (XIFAXAN) tablet 550 mg  550 mg Oral Q12H DANA       Allergies   Allergen Reactions    Lactose     Propranolol Eye Swelling    Torsemide Eye Swelling       OBJECTIVE:    Physical Exam  Physical Exam   Constitutional: He appears ill  No distress  HENT:   Head: Atraumatic  Eyes: Right eye exhibits no discharge  Left eye exhibits no discharge  Pulmonary/Chest: Effort normal    Abdominal:   obese   Musculoskeletal: He exhibits edema  Neurological: He is alert  Skin: Skin is warm and dry  There is pallor  Psychiatric: He has a normal mood and affect  His behavior is normal    Nursing note and vitals reviewed  Lab Results:   I have personally reviewed pertinent labs  , CBC:   Lab Results   Component Value Date    WBC 11 10 (H) 09/13/2019    HGB 7 7 (L) 09/13/2019    HCT 25 0 (L) 09/13/2019     (H) 09/13/2019    PLT 26 (LL) 09/13/2019    MCH 35 8 (H) 09/13/2019    MCHC 30 8 (L) 09/13/2019    RDW 16 9 (H) 09/13/2019    MPV 11 2 09/13/2019    NRBC 0 09/13/2019   , CMP:   Lab Results   Component Value Date    SODIUM 129 (L) 09/13/2019    K 4 8 09/13/2019    CL 97 (L) 09/13/2019    CO2 22 09/13/2019    BUN 50 (H) 09/13/2019    CREATININE 3 12 (H) 09/13/2019    CALCIUM 8 7 09/13/2019    EGFR 21 09/13/2019         Counseling / Coordination of Care  Total floor / unit time spent today 50+ minutes  Greater than 50% of total time was spent with the patient and / or family counseling and / or coordination of care (10:05-10:45)  A description of the counseling / coordination of care: goals of care discussion as above

## 2019-09-13 NOTE — PROGRESS NOTES
NEPHROLOGY PROGRESS NOTE    Patient: Niecy Waller               Sex: male          DOA: 9/6/2019  5:35 PM   YOB: 1960        Age:  63 y o         LOS:  LOS: 7 days   9/13/2019    REASON FOR THE CONSULTATION:      Acute kidney injury on chronic kidney disease stage 3    SUBJECTIVE     Patient is seen and examined in the intensive care unit with family around  Awake alert and in no distress  Patient had undergone hemodialysis over the past 3 days  Continues to be short of breath and fluid overload state  Family meeting to be held today to clarify goals of care  CURRENT MEDICATIONS       Current Facility-Administered Medications:     acetaminophen (TYLENOL) tablet 650 mg, 650 mg, Oral, Q6H PRN, Sukhdev Bell PA-C, 650 mg at 09/10/19 0117    cefepime (MAXIPIME) 2,000 mg in dextrose 5 % 50 mL IVPB, 2,000 mg, Intravenous, Q12H, Sukhdev Bell PA-C, Stopped at 09/13/19 0326    furosemide (LASIX) injection 80 mg, 80 mg, Intravenous, TID (diuretic), Sukhdev Bell PA-C, 80 mg at 09/13/19 0502    guaiFENesin (MUCINEX) 12 hr tablet 600 mg, 600 mg, Oral, Q12H [address], Sukhdev Bell PA-C, 600 mg at 09/13/19 0813    lactulose 20 g/30 mL oral solution 20 g, 20 g, Oral, TID, MARKOS Harding, 20 g at 09/13/19 0813    midodrine (PROAMATINE) tablet 10 mg, 10 mg, Oral, TID Jamie Herrmann PA-C, 10 mg at 09/13/19 0813    octreotide (SandoSTATIN) injection 100 mcg, 100 mcg, Subcutaneous, Q8H [address], Sukhdev Bell PA-C, 100 mcg at 09/13/19 0503    pantoprazole (PROTONIX) EC tablet 40 mg, 40 mg, Oral, Early Morning, Sukhdev Bell PA-C, 40 mg at 09/13/19 0502    rifaximin (XIFAXAN) tablet 550 mg, 550 mg, Oral, Q12H [address], Sukhdev Bell PA-C, 550 mg at 09/13/19 0515    REVIEW OF SYSTEMS     Review of Systems   Constitutional: Negative  HENT: Negative  Eyes: Negative  Respiratory: Positive for shortness of breath  Cardiovascular: Positive for leg swelling  Gastrointestinal: Negative  Endocrine: Negative  Genitourinary: Negative  Musculoskeletal: Negative  Skin: Negative  Allergic/Immunologic: Negative  Neurological: Negative  Hematological: Negative  All other systems reviewed and are negative  OBJECTIVE     Current Weight: Weight - Scale: (!) 185 kg (407 lb)  Vitals:    09/13/19 0800   BP: 102/52   Pulse: 61   Resp: 20   Temp: 98 °F (36 7 °C)   SpO2: 96%     Body mass index is 56 76 kg/m²  Intake/Output Summary (Last 24 hours) at 9/13/2019 1022  Last data filed at 9/13/2019 0326  Gross per 24 hour   Intake 1470 ml   Output 2000 ml   Net -530 ml       PHYSICAL EXAMINATION     Physical Exam   Constitutional: He is oriented to person, place, and time  Morbidly obese patient who is lying in bed and in no distress  HENT:   Head: Normocephalic and atraumatic  Eyes: Pupils are equal, round, and reactive to light  Neck: Neck supple  Cardiovascular: Normal rate, regular rhythm and normal heart sounds  Pulmonary/Chest: Effort normal    Decreased breath sounds at the bases   Abdominal: Soft  Bowel sounds are normal    Musculoskeletal: Normal range of motion  He exhibits edema  Neurological: He is alert and oriented to person, place, and time  Skin: Skin is warm  Psychiatric: He has a normal mood and affect           LAB RESULTS     Results from last 7 days   Lab Units 09/13/19  0528 09/12/19  0735 09/11/19  1012 09/10/19  0541 09/10/19  0540 09/09/19  0524 09/08/19  0558 09/07/19  0526  09/06/19  1905   WBC Thousand/uL 11 10* 10 29* 10 28*  --  9 24 8 33 8 97 9 27   < >  --    HEMOGLOBIN g/dL 7 7* 7 6* 6 9*  --  7 2* 7 5* 7 7* 8 4*   < >  --    HEMATOCRIT % 25 0* 23 9* 22 3*  --  22 3* 23 7* 24 0* 26 1*   < >  --    PLATELETS Thousands/uL 26* 28* 36*  --  42* 51* 51* 68*   < >  --    POTASSIUM mmol/L 4 8 4 8 5 1 5 1  --  4 7 4 8 5 2  --  4 6   CHLORIDE mmol/L 97* 96* 94* 92*  --  92* 94* 96*  --  94*   CO2 mmol/L 22 26 25 24  --  25 23 26  --  24   BUN mg/dL 50* 52* 54* 54*  -- 53* 51* 48*  --  47*   CREATININE mg/dL 3 12* 3 07* 2 93* 2 66*  --  2 44* 2 41* 2 45*  --  2 41*   EGFR ml/min/1 73sq m 21 21 23 25  --  28 29 28  --  29   CALCIUM mg/dL 8 7 8 7 8 7 8 3  --  8 8 8 5 8 7  --  8 4   MAGNESIUM mg/dL  --   --   --   --   --   --   --  2 1  --  1 9    < > = values in this interval not displayed  RADIOLOGY RESULTS      Results for orders placed during the hospital encounter of 09/06/19   CXR 1 view PA portable stat    Narrative CHEST     INDICATION:   line placement RIJ Hemodialysis catheter  COMPARISON:  September 6, 2019    EXAM PERFORMED/VIEWS:  XR CHEST PORTABLE  2 images    FINDINGS:  Lungs are suboptimally aerated  Elevation of right hemidiaphragm  Cardiomegaly with diminishing pulmonary edema  Persistent prominence of the pulmonary vasculature in the left superior hilum  Right jugular central line tip in the upper SVC  No pneumothorax  Osseous structures appear within normal limits for patient age  Old fracture of right 5th rib  Questionable fracture right 6th rib  Impression Persistent cardiomegaly  Improving pulmonary edema  Satisfactory right jugular central line placement  No pneumothorax  Right rib fractures  Workstation performed: [id number]       Results for orders placed during the hospital encounter of 09/06/19   XR chest 2 views    Narrative CHEST     INDICATION:   SOB     COMPARISON:  8/15/2019    EXAM PERFORMED/VIEWS:  XR CHEST PA & LATERAL  2 views    FINDINGS:  Persistent moderate cardiomegaly  Development of diffuse vascular congestion    The lungs are otherwise clear  No pneumothorax or pleural effusion  Osseous structures appear within normal limits for patient age        Impression Persistent cardiomegaly    Increased diffuse vascular congestion        Workstation performed: [id number]         ASSESSMENT/PLAN     63years old male with past medical history of ROSALES cirrhosis, morbid obesity, anasarca, chronic kidney disease stage 3 who presented to our facility on September 6th with complaints of lower extremity cellulitis and was found to have acute kidney injury, to required pneumonia and cellulitis  1  Oligoanuric acute kidney injury on chronic disease stage III present admission:  Patient with progressive renal dysfunction likely secondary to hepatorenal syndrome and is dialysis dependent   -patient had undergone dialysis over the past 3 days with ultrafiltration that ranges  between 1-2 L   -despite 3 consecutive dialysis, renal clearances remain poor with serum creatinine of 3 0   -patient was scheduled to undergo a 4th session of dialysis for volume removal however will await outcome of family meeting   -with continuous venovenous hemodialysis duration if aggressive measures are planned   -nevertheless patient will be a very poor candidate for outpatient dialysis with increased risk of complications including death  This was narrated to the patient and the family  Continue supportive measures including midodrine and octreotide    2  Volume overload:  Secondary to fluid retaining state in a morbidly obese patient with 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  Oligoanuric and HD dependent for fluid removal     3  Hyponatremia:  Continues to have low serum sodium up to 129 today  This is secondary to fluid overload state  4  Anemia:  Secondary to underlying liver cirrhosis  Current hemoglobin 7 7 and below target  Transfuse to keep hemoglobin above 8     5  Thrombocytopenia: This is likely due to liver cirrhosis and portal hypertension  Severely low platelets that essentially precludes patient from undergoing hemodialysis as an outpatient with increased risk of bleeding  6  Liver cirrhosis: This is likely secondary to ROSALES  As per GI, given patient with morbid obesity appears to be a poor candidate for liver transplantation    However, noted possible discussion with [place] liver transplant team as per Gastroenterology              Yo Lauren MD  Nephrology  9/13/2019

## 2019-09-13 NOTE — PROCEDURES
Arterial Line Insertion  Date/Time: 9/13/2019 5:41 PM  Performed by: MARKOS Melgoza  Authorized by: MARKOS Melgoza     Patient location:  Bedside  Consent:     Consent obtained:  Verbal    Consent given by:  Patient and spouse    Risks discussed:  Bleeding, ischemia, repeat procedure, infection and pain  Universal protocol:     Procedure explained and questions answered to patient or proxy's satisfaction: yes      Relevant documents present and verified: yes      Test results available and properly labeled: yes      Radiology Images displayed and confirmed  If images not available, report reviewed: yes      Required blood products, implants, devices, and special equipment available: yes      Site/side marked: yes      Immediately prior to procedure a time out was called: yes      Patient identity confirmed:  Hospital-assigned identification number and arm band  Indications:     Indications: hemodynamic monitoring, multiple ABGs, continuous blood pressure monitoring and frequent labs / infusion    Pre-procedure details:     Skin preparation:  Chlorhexidine  Procedure details:     Location / Tip of Catheter:  Radial    Laterality:  Left    Neymar's test performed: yes      Neymar's test abnormal: no      Needle gauge:  20 G    Placement technique:  Percutaneous    Number of attempts:  1    Successful placement: yes      Transducer: waveform confirmed    Post-procedure details:     Post-procedure:  Biopatch applied, sterile dressing applied and sutured    CMS:  Normal    Patient tolerance of procedure:   Tolerated well, no immediate complications

## 2019-09-13 NOTE — ESCALATED TEAM TX
CTM    Present:  Dr Gael Don, Dr Kinga Stevens [place] Tonio RN/CM, pt, wife Jean Herrera and daughter  Purpose: To discuss goals of care  Lindsay Michaud began with the explanation that pt's liver is failing and he is not a candidate for a liver transplant at this time due to his wt and because he is too ill  Pt's kidney are not working due to the problem with his liver  His liver issues are affecting the rest of his body  Prognosis is poor  Dr Gael Don wanted to know if there was a point that pt did not want to cross  Questioning if pt wants a breathing tube (vent) and continuous dialysis when we reach that point or a crisis occurs  Pt and family feel that at this point, they want everything done  Feel that pt is a fighter and they do not want any treatment stopped  Will discuss further when his sister and mother arrives later today  CM discussed LTAC choices  Pt and family are requesting referrals to [facility] and [facility]   This was done  CM will continue to follow and assess needs as hospitalization progresses

## 2019-09-13 NOTE — PROGRESS NOTES
Progress Note - Critical Care   Janene De La Cruz 63 y o  male MRN: [id number]  Unit/Bed#:  Encounter: [id number]    Attending Physician: Kenneth Lim MD      ______________________________________________________________________  Assessment and Plan:   Principal Problem:    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 [facility])  Active Problems:    CROW on CPAP    Essential hypertension    Morbid obesity (HCC)    Anemia    Hyponatremia    HCAP (healthcare-associated pneumonia)    Hepatorenal syndrome (HCC)    Encephalopathy acute    Hypotension  Resolved Problems:    Cellulitis of right lower extremity        Neuro:   Encephalopathy-positive asterixis consistent with hepatic encephalopathy  Continue scheduled lactulose and rifaximin  Would titrate lactulose dose to 3-4 BMs per day  Continue frequent neuro checks and close neurologic monitoring  [facility] ICU  Delirium precautions  Sleep hygiene    CV:   Hypotension-patient with previous history of hypertension-hypotension now most likely related to liver disease and low albumin  Continue Midodrine  Continue close hemodynamic monitoring  Pulm:   Obstructive sleep apnea-will transition to nocturnal BiPAP, was previously using his own CPAP from home  H cap-continue on cefepime  Encourage good pulmonary toileting  GI:   Decompensated ROSALES cirrhosis-patient's meld score is 37, child's class C  GI has been following and will be in touch with Vinod Crow to discuss possibility of liver transplant as patient and his wife are interested in pursuing all potential options  Hepatic renal syndrome-GI will present case to Vinod Crow for review  Continue with hemodialysis sessions per Nephrology  Continue octreotide  :   Hepatopetal syndrome-requiring hemodialysis-nephrology following  Appreciate their input  Continue with hemodialysis further recommendations  F/E/N:   Monitor lytes and replace as needed    ID:   H cap-continue antibiotics  Trend procalcitonin      Heme: Anemia-acute on chronic-likely multifactorial and secondary to chronic kidney disease, cirrhosis, and now renal failure  Last transfusion was 9/11 and hemoglobin did respond appropriately  Trend hemoglobin and transfuse to maintain hemoglobin greater than 7 5   GI is following and will need eventual EGD to evaluate for presence of varices  Endo:   Monitor glucose on BMP     Msk/Skin:   Frequent turning, repositioning, offloading  Right lower extremity cellulitis-local wound care    Disposition:   Continue step-down level of care  Continue ongoing goals of care discussions with palliative care  Code Status: Level 1 - Full Code    Counseling / Coordination of Care  Total Critical Care time spent 34 minutes excluding procedures, teaching and family updates  ______________________________________________________________________    Chief Complaint: anasarca    24 Hour Events:   Patient was noted yesterday to have low/borderline low blood pressure and intermittently requiring his home CPAP for respiratory support  This reason he was transferred step-down level of care for closer monitoring  Review of Systems   Constitutional: Negative for chills and fever  HENT: Negative  Eyes: Negative  Respiratory: Positive for shortness of breath  Negative for cough, wheezing and stridor  Cardiovascular: Negative for chest pain, palpitations and leg swelling  Gastrointestinal: Positive for abdominal distention  Negative for abdominal pain, blood in stool, diarrhea, nausea and vomiting  Endocrine: Negative  Genitourinary: Negative for dysuria, flank pain, frequency and urgency  Musculoskeletal: Negative  Skin: Negative for rash and wound  Allergic/Immunologic: Negative  Neurological: Positive for weakness  Negative for dizziness, syncope, facial asymmetry, light-headedness, numbness and headaches  Hematological: Negative for adenopathy  Does not bruise/bleed easily  Psychiatric/Behavioral: Negative       ______________________________________________________________________  Physical Exam   Constitutional: He appears lethargic  He has a sickly appearance  He appears ill  HENT:   Head: Normocephalic and atraumatic  Eyes: Pupils are equal, round, and reactive to light  EOM are normal  Scleral icterus is present  Neck: No JVD present  Cardiovascular: Regular rhythm  Murmur heard  Pulmonary/Chest: No respiratory distress  He has decreased breath sounds  Abdominal: He exhibits distension  Bowel sounds are decreased  Genitourinary: Right testis shows swelling  Left testis shows swelling  Musculoskeletal: He exhibits edema  Neurological: He appears lethargic  GCS eye subscore is 3  GCS verbal subscore is 5  GCS motor subscore is 6  Psychiatric: He exhibits a depressed mood  ______________________________________________________________________  Vitals:    19 1800 19 1900 19   BP: 103/51 124/54  (!) 90/42   BP Location:  Right arm  Right arm   Pulse: 63 63  59   Resp: 20 17  13   Temp:  97 6 °F (36 4 °C)  98 °F (36 7 °C)   TempSrc:  Oral  Oral   SpO2: 98% 96% 97% 100%   Weight:       Height:           Temperature:   Temp (24hrs), Av 6 °F (36 4 °C), Min:97 5 °F (36 4 °C), Max:98 °F (36 7 °C)    Current Temperature: 98 °F (36 7 °C)  Weights:   IBW: 75 3 kg    Body mass index is 56 76 kg/m²  Weight (last 2 days)     None        Hemodynamic Monitoring:  N/A     Non-Invasive/Invasive Ventilation Settings:  Respiratory    Lab Data (Last 4 hours)    None         O2/Vent Data (Last 4 hours)      2218          Non-Invasive Ventilation Mode BiPAP                 No results found for: PHART, CQF8CJK, PO2ART, TBD4DKH, U1IGFULT, BEART, SOURCE  SpO2: SpO2: 100 %  Intake and Outputs:  I/O       701 -  -  07    P  O  370 740    I V  (mL/kg) 200 (1 1) 500 (2 7)    Blood 350     Other 500 300 IV Piggyback  50    Total Intake(mL/kg) 1420 (7 7) 1590 (8 6)    Urine (mL/kg/hr)  0 (0)    Other 1500 2000    Stool  0    Total Output 1500 2000    Net -80 -410          Unmeasured Urine Occurrence  1 x    Unmeasured Stool Occurrence  2 x          Nutrition:        Diet Orders   (From admission, onward)             Start     Ordered    09/08/19 1206  Diet Cardiovascular; Cardiac; Fluid Restriction 1500 ML  Diet effective now     Question Answer Comment   Diet Type Cardiovascular    Cardiac Cardiac    Other Restriction(s): Fluid Restriction 1500 ML    RD to adjust diet per protocol?  Yes        09/08/19 1205                Labs:   Results from last 7 days   Lab Units 09/12/19  0735 09/11/19  1012 09/10/19  0540 09/09/19  0524 09/08/19  0558 09/07/19  0526 09/06/19  1906   WBC Thousand/uL 10 29* 10 28* 9 24 8 33 8 97 9 27 10 02   HEMOGLOBIN g/dL 7 6* 6 9* 7 2* 7 5* 7 7* 8 4* 8 6*   HEMATOCRIT % 23 9* 22 3* 22 3* 23 7* 24 0* 26 1* 26 4*   PLATELETS Thousands/uL 28* 36* 42* 51* 51* 68* 64*   NEUTROS PCT %  --   --   --  68  --  64  --    BANDS PCT % 2 1 2  --  3  --  2   MONOS PCT %  --   --   --  12  --  16*  --    MONO PCT % 4 8 10  --  7  --  13*     Results from last 7 days   Lab Units 09/12/19  0735 09/11/19  1012 09/10/19  0541 09/09/19  0524 09/08/19  0558 09/07/19  0526 09/06/19  1905   SODIUM mmol/L 130* 127* 125* 127* 128* 129* 127*   POTASSIUM mmol/L 4 8 5 1 5 1 4 7 4 8 5 2 4 6   CHLORIDE mmol/L 96* 94* 92* 92* 94* 96* 94*   CO2 mmol/L 26 25 24 25 23 26 24   ANION GAP mmol/L 8 8 9 10 11 7 9   BUN mg/dL 52* 54* 54* 53* 51* 48* 47*   CREATININE mg/dL 3 07* 2 93* 2 66* 2 44* 2 41* 2 45* 2 41*   CALCIUM mg/dL 8 7 8 7 8 3 8 8 8 5 8 7 8 4   GLUCOSE RANDOM mg/dL 99 123 111 116 100 107 126   ALT U/L  --  30 31 37  --  41 37   AST U/L  --  39 44 51*  --  62* 63*   ALK PHOS U/L  --  64 68 77  --  107 112   ALBUMIN g/dL  --  3 4* 3 8 3 6  --  2 9* 3 0*   TOTAL BILIRUBIN mg/dL  --  7 10* 7 00* 7 40*  --  7 10* 6 90* Results from last 7 days   Lab Units 09/07/19  0526 09/06/19  1905   MAGNESIUM mg/dL 2 1 1 9      Results from last 7 days   Lab Units 09/11/19  1012 09/10/19  0541 09/09/19  0524 09/06/19  1905   INR  2 04* 1 88* 1 79* 1 77*   PTT seconds  --   --   --  40*      Results from last 7 days   Lab Units 09/06/19  1906   TROPONIN I ng/mL 0 02     Results from last 7 days   Lab Units 09/06/19  1909   LACTIC ACID mmol/L 1 3     ABG:  Results from last 7 days   Lab Units 09/11/19  1328   PH ART  7 327*   PCO2 ART mm Hg 42 7   PO2 ART mm Hg 44 2*   HCO3 ART mmol/L 21 9*   BASE EXC ART mmol/L -3 9   ABG SOURCE  Radial, Right     VBG:  Results from last 7 days   Lab Units 09/11/19  1328   ABG SOURCE  Radial, Right     Results from last 7 days   Lab Units 09/12/19  0735 09/08/19  0558   PROCALCITONIN ng/ml 0 42* 0 33*       EKG: NSR  Micro:  Results from last 7 days   Lab Units 09/08/19  1121 09/07/19  1944 09/06/19  1905 09/06/19  1904   BLOOD CULTURE   --   --  No Growth After 5 Days  No Growth After 5 Days  SPUTUM CULTURE  2+ Growth of Candida albicans*  --   --   --    GRAM STAIN RESULT  2+ Polys*  2+ Gram negative rods*  --   --   --    LEGIONELLA URINARY ANTIGEN   --  Negative  --   --    STREP PNEUMONIAE ANTIGEN, URINE   --  Negative  --   --      Allergies:    Allergies   Allergen Reactions    Lactose     Propranolol Eye Swelling    Torsemide Eye Swelling     Medications:   Scheduled Meds:  Current Facility-Administered Medications:  acetaminophen 650 mg Oral Q6H PRN Fish Ermias, PA-C    cefepime 2,000 mg Intravenous Q12H Fish Ermias, PA-C Last Rate: 2,000 mg (09/12/19 1604)   furosemide 80 mg Intravenous TID (diuretic) Fish Ermias, PA-C    guaiFENesin 600 mg Oral Q12H [address] Fish Ermias, PA-C    lactulose 20 g Oral TID MARKOS Lee    midodrine 10 mg Oral TID Asmita Blanchard PA-C    octreotide 100 mcg Subcutaneous [facility] Fish Monson PA-C    pantoprazole 40 mg Oral Early Morning Fish Monson PA-C rifaximin 550 mg Oral Q12H [address] Trixie Fermin PA-C      Continuous Infusions:   PRN Meds:    acetaminophen 650 mg Q6H PRN     VTE Pharmacologic Prophylaxis: Pharmacologic VTE Prophylaxis contraindicated due to Thrombocytopenia  VTE Mechanical Prophylaxis: sequential compression device  Invasive lines and devices: Invasive Devices     Peripheral Intravenous Line            Peripheral IV 09/06/19 Right Antecubital 6 days    Peripheral IV 09/10/19 Right Arm 2 days          Line            Temporary HD Catheter 2 days                     Portions of the record may have been created with voice recognition software  Occasional wrong word or "sound a like" substitutions may have occurred due to the inherent limitations of voice recognition software  Read the chart carefully and recognize, using context, where substitutions have occurred      Loida Noel

## 2019-09-13 NOTE — PROGRESS NOTES
GI Progress Note - Ermias Nelson 63 y o  male MRN: [id number]    Unit/Bed#:  Encounter: [id number]    Subjective: Extensive family meeting today with patient, wife, and daughter along with palliative care and ICU team      Objective:     Vitals: Blood pressure 102/52, pulse 61, temperature 98 °F (36 7 °C), temperature source Oral, resp  rate 20, height 5' 11" (1 803 m), weight (!) 185 kg (407 lb), SpO2 96 %  ,Body mass index is 56 76 kg/m²  Intake/Output Summary (Last 24 hours) at 9/13/2019 1316  Last data filed at 9/13/2019 0326  Gross per 24 hour   Intake 870 ml   Output    Net 870 ml       Physical Exam:     General Appearance: Alert, oriented x3, appears jaundiced and chronically ill, morbidly obese  Lungs: Clear to auscultation bilaterally, no rales or rhonchi, no labored breathing/accessory muscle use  Heart: RRR, no murmur  Abdomen: Soft, non-tender, non-distended; bowel sounds normal; no masses or no organomegaly  Extremities: No cyanosis, (+) LE edema    Invasive Devices     Peripheral Intravenous Line            Peripheral IV 09/06/19 Right Antecubital 6 days    Peripheral IV 09/10/19 Right Arm 2 days          Line            Temporary HD Catheter 2 days                Lab Results:  Results from last 7 days   Lab Units 09/13/19  0528   WBC Thousand/uL 11 10*   HEMOGLOBIN g/dL 7 7*   HEMATOCRIT % 25 0*   PLATELETS Thousands/uL 26*   NEUTROS PCT % 73   LYMPHS PCT % 6*   MONOS PCT % 10   EOS PCT % 7*     Results from last 7 days   Lab Units 09/13/19  0528  09/11/19  1012   POTASSIUM mmol/L 4 8   < > 5 1   CHLORIDE mmol/L 97*   < > 94*   CO2 mmol/L 22   < > 25   BUN mg/dL 50*   < > 54*   CREATININE mg/dL 3 12*   < > 2 93*   CALCIUM mg/dL 8 7   < > 8 7   ALK PHOS U/L  --   --  64   ALT U/L  --   --  30   AST U/L  --   --  39    < > = values in this interval not displayed       Results from last 7 days   Lab Units 09/13/19  1116   INR  2 06*           Imaging Studies: I have personally reviewed pertinent imaging studies  Ct Chest Abdomen Pelvis Wo Contrast    Result Date: 9/6/2019  Impression: Exam is limited without IV and oral contrast particularly for soft tissue and bowel evaluation  1   Scattered mild patchy opacities bilaterally suspicious for pneumonia  2  Cirrhotic appearance to the liver  Splenomegaly  3   Mild ascites  4   Diffuse subcutaneous edema compatible with anasarca  Workstation performed: [id number]     Cxr 1 View Pa Portable Stat    Result Date: 9/10/2019  Impression: Persistent cardiomegaly  Improving pulmonary edema  Satisfactory right jugular central line placement  No pneumothorax  Right rib fractures  Workstation performed: [id number]     Xr Chest 2 Views    Result Date: 9/7/2019  Impression: Persistent cardiomegaly Increased diffuse vascular congestion Workstation performed: [id number]     Ct Femur Right Wo Contrast    Result Date: 9/6/2019  Impression: Soft tissue evaluation is limited without IV contrast  1    No evidence of soft tissue gas  2   Generalized subcutaneous edema with associated skin thickening  Edema noted adjacent to the calf musculature  Findings are nonspecific and may be related to generalized anasarca  3   The absence of soft tissue gas does not exclude necrotizing fasciitis particularly at the early stages  If there is persistent concern for necrotizing fasciitis, consider follow-up with MRI with and without contrast  Workstation performed: [id number]     Ct Tibia Fibula Right Wo Contrast    Result Date: 9/6/2019  Impression: Soft tissue evaluation is limited without IV contrast  1    No evidence of soft tissue gas  2   Generalized subcutaneous edema with associated skin thickening  Edema noted adjacent to the calf musculature  Findings are nonspecific and may be related to generalized anasarca  3   The absence of soft tissue gas does not exclude necrotizing fasciitis particularly at the early stages    If there is persistent concern for necrotizing fasciitis, consider follow-up with MRI with and without contrast  Workstation performed: [id number]     Ir Temp Hd Cath    Result Date: 9/10/2019  Impression: Impression: 1  Successful ultrasound and fluoroscopically guided placement of a double-lumen temporary dialysis central venous catheter via the right internal jugular vein  The tip of the catheter is at the cavoatrial junction and may be used immediately   Workstation performed: [id number]       Assessment and Plan:     Decompensated ROSALES Cirrhosis  Hepatorenal Syndrome  - MELD 37 yesterday, Child Class C  - Full liver workup negative, etiology is cryptogenic/ROSALES cirrhosis  - Family meeting held due to his decompensated state, HRS, intermittent encephalopathy which is from multiorgan failure and his CPAP   - Discussed with [facility] transplant this morning to see if the patient is eligible for transplant and based on his BMI alone, he cannot undergo a transplant and they would provide the same care that we are providing here  - Discussed high risk of continued decompensation and likely death in the next several weeks-months due to cirrhosis with HRS and the family is going to discuss this throughout the day to determine next course of action but would like to continue with full care/treatment at this point  [facility] with CT A/P and MRI/MRCP in June 2019 showed 2 small indeterminate low density lesions in the R lobe of the liver but given his body habitus, these were unable to be differentiated, AFP was normal  - Intermittently encephalopathic, continue lactulose and rifaximin  - He ideally needs an EGD in the future to screen for esophageal varices but this is deferred at this point due to his acutely ill state      The patient will be seen by Dr Stephan Marks

## 2019-09-14 PROBLEM — E66.2 OBESITY WITH ALVEOLAR HYPOVENTILATION (HCC): Status: ACTIVE | Noted: 2019-09-14

## 2019-09-14 PROBLEM — I95.9 HYPOTENSION: Status: RESOLVED | Noted: 2019-09-12 | Resolved: 2019-09-14

## 2019-09-14 PROBLEM — D68.9 COAGULOPATHY (HCC): Status: ACTIVE | Noted: 2019-09-14

## 2019-09-14 PROBLEM — D69.6 THROMBOCYTOPENIA (HCC): Status: ACTIVE | Noted: 2019-09-14

## 2019-09-14 LAB
ALBUMIN SERPL BCP-MCNC: 3.3 G/DL (ref 3.5–5)
ALP SERPL-CCNC: 64 U/L (ref 46–116)
ALT SERPL W P-5'-P-CCNC: 27 U/L (ref 12–78)
AMMONIA PLAS-SCNC: 65 UMOL/L (ref 11–35)
ANION GAP SERPL CALCULATED.3IONS-SCNC: 5 MMOL/L (ref 4–13)
ANION GAP SERPL CALCULATED.3IONS-SCNC: 6 MMOL/L (ref 4–13)
ANION GAP SERPL CALCULATED.3IONS-SCNC: 7 MMOL/L (ref 4–13)
ANION GAP SERPL CALCULATED.3IONS-SCNC: 7 MMOL/L (ref 4–13)
AST SERPL W P-5'-P-CCNC: 42 U/L (ref 5–45)
BACTERIA SPT RESP CULT: ABNORMAL
BASOPHILS # BLD AUTO: 0.04 THOUSANDS/ΜL (ref 0–0.1)
BASOPHILS NFR BLD AUTO: 0 % (ref 0–1)
BILIRUB DIRECT SERPL-MCNC: 2.37 MG/DL (ref 0–0.2)
BILIRUB SERPL-MCNC: 7.6 MG/DL (ref 0.2–1)
BUN SERPL-MCNC: 19 MG/DL (ref 5–25)
BUN SERPL-MCNC: 22 MG/DL (ref 5–25)
BUN SERPL-MCNC: 28 MG/DL (ref 5–25)
BUN SERPL-MCNC: 32 MG/DL (ref 5–25)
CA-I BLD-SCNC: 1.15 MMOL/L (ref 1.12–1.32)
CA-I BLD-SCNC: 1.15 MMOL/L (ref 1.12–1.32)
CA-I BLD-SCNC: 1.17 MMOL/L (ref 1.12–1.32)
CALCIUM SERPL-MCNC: 8.5 MG/DL (ref 8.3–10.1)
CALCIUM SERPL-MCNC: 8.8 MG/DL (ref 8.3–10.1)
CALCIUM SERPL-MCNC: 9 MG/DL (ref 8.3–10.1)
CALCIUM SERPL-MCNC: 9.1 MG/DL (ref 8.3–10.1)
CHLORIDE SERPL-SCNC: 101 MMOL/L (ref 100–108)
CHLORIDE SERPL-SCNC: 99 MMOL/L (ref 100–108)
CO2 SERPL-SCNC: 26 MMOL/L (ref 21–32)
CO2 SERPL-SCNC: 27 MMOL/L (ref 21–32)
CO2 SERPL-SCNC: 28 MMOL/L (ref 21–32)
CO2 SERPL-SCNC: 28 MMOL/L (ref 21–32)
CREAT SERPL-MCNC: 1.82 MG/DL (ref 0.6–1.3)
CREAT SERPL-MCNC: 1.91 MG/DL (ref 0.6–1.3)
CREAT SERPL-MCNC: 2.3 MG/DL (ref 0.6–1.3)
CREAT SERPL-MCNC: 2.46 MG/DL (ref 0.6–1.3)
EOSINOPHIL # BLD AUTO: 0.83 THOUSAND/ΜL (ref 0–0.61)
EOSINOPHIL NFR BLD AUTO: 5 % (ref 0–6)
ERYTHROCYTE [DISTWIDTH] IN BLOOD BY AUTOMATED COUNT: 16.6 % (ref 11.6–15.1)
ERYTHROCYTE [DISTWIDTH] IN BLOOD BY AUTOMATED COUNT: 16.8 % (ref 11.6–15.1)
GFR SERPL CREATININE-BSD FRML MDRD: 28 ML/MIN/1.73SQ M
GFR SERPL CREATININE-BSD FRML MDRD: 30 ML/MIN/1.73SQ M
GFR SERPL CREATININE-BSD FRML MDRD: 38 ML/MIN/1.73SQ M
GFR SERPL CREATININE-BSD FRML MDRD: 40 ML/MIN/1.73SQ M
GLUCOSE SERPL-MCNC: 110 MG/DL (ref 65–140)
GLUCOSE SERPL-MCNC: 123 MG/DL (ref 65–140)
GLUCOSE SERPL-MCNC: 128 MG/DL (ref 65–140)
GLUCOSE SERPL-MCNC: 145 MG/DL (ref 65–140)
GRAM STN SPEC: ABNORMAL
GRAM STN SPEC: ABNORMAL
HCT VFR BLD AUTO: 24.1 % (ref 36.5–49.3)
HCT VFR BLD AUTO: 24.5 % (ref 36.5–49.3)
HGB BLD-MCNC: 7.5 G/DL (ref 12–17)
HGB BLD-MCNC: 7.6 G/DL (ref 12–17)
IMM GRANULOCYTES # BLD AUTO: 0.49 THOUSAND/UL (ref 0–0.2)
IMM GRANULOCYTES NFR BLD AUTO: 3 % (ref 0–2)
INR PPP: 2.36 (ref 0.84–1.19)
LYMPHOCYTES # BLD AUTO: 0.5 THOUSANDS/ΜL (ref 0.6–4.47)
LYMPHOCYTES NFR BLD AUTO: 3 % (ref 14–44)
MAGNESIUM SERPL-MCNC: 1.9 MG/DL (ref 1.6–2.6)
MAGNESIUM SERPL-MCNC: 2.1 MG/DL (ref 1.6–2.6)
MCH RBC QN AUTO: 35.3 PG (ref 26.8–34.3)
MCH RBC QN AUTO: 35.5 PG (ref 26.8–34.3)
MCHC RBC AUTO-ENTMCNC: 31 G/DL (ref 31.4–37.4)
MCHC RBC AUTO-ENTMCNC: 31.1 G/DL (ref 31.4–37.4)
MCV RBC AUTO: 114 FL (ref 82–98)
MCV RBC AUTO: 114 FL (ref 82–98)
MONOCYTES # BLD AUTO: 1.4 THOUSAND/ΜL (ref 0.17–1.22)
MONOCYTES NFR BLD AUTO: 9 % (ref 4–12)
NEUTROPHILS # BLD AUTO: 12.9 THOUSANDS/ΜL (ref 1.85–7.62)
NEUTS SEG NFR BLD AUTO: 80 % (ref 43–75)
NRBC BLD AUTO-RTO: 0 /100 WBCS
PHOSPHATE SERPL-MCNC: 2.7 MG/DL (ref 2.7–4.5)
PHOSPHATE SERPL-MCNC: 2.7 MG/DL (ref 2.7–4.5)
PHOSPHATE SERPL-MCNC: 3.4 MG/DL (ref 2.7–4.5)
PHOSPHATE SERPL-MCNC: 4 MG/DL (ref 2.7–4.5)
PLATELET # BLD AUTO: 20 THOUSANDS/UL (ref 149–390)
PLATELET # BLD AUTO: 21 THOUSANDS/UL (ref 149–390)
PMV BLD AUTO: 11.3 FL (ref 8.9–12.7)
PMV BLD AUTO: 11.4 FL (ref 8.9–12.7)
POTASSIUM SERPL-SCNC: 4.4 MMOL/L (ref 3.5–5.3)
POTASSIUM SERPL-SCNC: 4.5 MMOL/L (ref 3.5–5.3)
POTASSIUM SERPL-SCNC: 4.6 MMOL/L (ref 3.5–5.3)
POTASSIUM SERPL-SCNC: 4.7 MMOL/L (ref 3.5–5.3)
PROT SERPL-MCNC: 6.5 G/DL (ref 6.4–8.2)
PROTHROMBIN TIME: 26.1 SECONDS (ref 11.6–14.5)
RBC # BLD AUTO: 2.11 MILLION/UL (ref 3.88–5.62)
RBC # BLD AUTO: 2.15 MILLION/UL (ref 3.88–5.62)
SODIUM SERPL-SCNC: 132 MMOL/L (ref 136–145)
SODIUM SERPL-SCNC: 132 MMOL/L (ref 136–145)
SODIUM SERPL-SCNC: 134 MMOL/L (ref 136–145)
SODIUM SERPL-SCNC: 134 MMOL/L (ref 136–145)
WBC # BLD AUTO: 16.16 THOUSAND/UL (ref 4.31–10.16)
WBC # BLD AUTO: 16.99 THOUSAND/UL (ref 4.31–10.16)

## 2019-09-14 PROCEDURE — 85610 PROTHROMBIN TIME: CPT | Performed by: PHYSICIAN ASSISTANT

## 2019-09-14 PROCEDURE — 80076 HEPATIC FUNCTION PANEL: CPT | Performed by: NURSE PRACTITIONER

## 2019-09-14 PROCEDURE — 80048 BASIC METABOLIC PNL TOTAL CA: CPT | Performed by: INTERNAL MEDICINE

## 2019-09-14 PROCEDURE — 90945 DIALYSIS ONE EVALUATION: CPT | Performed by: INTERNAL MEDICINE

## 2019-09-14 PROCEDURE — 94660 CPAP INITIATION&MGMT: CPT

## 2019-09-14 PROCEDURE — 82330 ASSAY OF CALCIUM: CPT | Performed by: INTERNAL MEDICINE

## 2019-09-14 PROCEDURE — 85027 COMPLETE CBC AUTOMATED: CPT | Performed by: NURSE PRACTITIONER

## 2019-09-14 PROCEDURE — 84100 ASSAY OF PHOSPHORUS: CPT | Performed by: INTERNAL MEDICINE

## 2019-09-14 PROCEDURE — 85025 COMPLETE CBC W/AUTO DIFF WBC: CPT | Performed by: PHYSICIAN ASSISTANT

## 2019-09-14 PROCEDURE — 90945 DIALYSIS ONE EVALUATION: CPT | Performed by: NURSE PRACTITIONER

## 2019-09-14 PROCEDURE — 99291 CRITICAL CARE FIRST HOUR: CPT | Performed by: STUDENT IN AN ORGANIZED HEALTH CARE EDUCATION/TRAINING PROGRAM

## 2019-09-14 PROCEDURE — 99232 SBSQ HOSP IP/OBS MODERATE 35: CPT | Performed by: INTERNAL MEDICINE

## 2019-09-14 PROCEDURE — 83735 ASSAY OF MAGNESIUM: CPT | Performed by: INTERNAL MEDICINE

## 2019-09-14 PROCEDURE — 90945 DIALYSIS ONE EVALUATION: CPT

## 2019-09-14 PROCEDURE — 94760 N-INVAS EAR/PLS OXIMETRY 1: CPT

## 2019-09-14 PROCEDURE — 82140 ASSAY OF AMMONIA: CPT | Performed by: STUDENT IN AN ORGANIZED HEALTH CARE EDUCATION/TRAINING PROGRAM

## 2019-09-14 PROCEDURE — P9035 PLATELET PHERES LEUKOREDUCED: HCPCS

## 2019-09-14 RX ORDER — MAGNESIUM SULFATE 1 G/100ML
1 INJECTION INTRAVENOUS ONCE
Status: COMPLETED | OUTPATIENT
Start: 2019-09-14 | End: 2019-09-14

## 2019-09-14 RX ADMIN — OCTREOTIDE ACETATE 100 MCG: 100 INJECTION, SOLUTION INTRAVENOUS; SUBCUTANEOUS at 14:10

## 2019-09-14 RX ADMIN — CALCIUM GLUCONATE 1 G: 98 INJECTION, SOLUTION INTRAVENOUS at 05:33

## 2019-09-14 RX ADMIN — PANTOPRAZOLE SODIUM 40 MG: 40 TABLET, DELAYED RELEASE ORAL at 05:08

## 2019-09-14 RX ADMIN — CEFEPIME 2000 MG: 2 INJECTION, POWDER, FOR SOLUTION INTRAVENOUS at 14:35

## 2019-09-14 RX ADMIN — CALCIUM GLUCONATE 1 G: 98 INJECTION, SOLUTION INTRAVENOUS at 00:46

## 2019-09-14 RX ADMIN — CEFEPIME 2000 MG: 2 INJECTION, POWDER, FOR SOLUTION INTRAVENOUS at 02:37

## 2019-09-14 RX ADMIN — CALCIUM GLUCONATE 1 G: 98 INJECTION, SOLUTION INTRAVENOUS at 19:16

## 2019-09-14 RX ADMIN — MAGNESIUM SULFATE HEPTAHYDRATE 1 G: 1 INJECTION, SOLUTION INTRAVENOUS at 13:25

## 2019-09-14 RX ADMIN — MAGNESIUM SULFATE HEPTAHYDRATE 1 G: 1 INJECTION, SOLUTION INTRAVENOUS at 01:15

## 2019-09-14 RX ADMIN — RIFAXIMIN 550 MG: 550 TABLET ORAL at 17:10

## 2019-09-14 RX ADMIN — CALCIUM GLUCONATE 1 G: 98 INJECTION, SOLUTION INTRAVENOUS at 13:32

## 2019-09-14 RX ADMIN — Medication 20000 ML: at 12:10

## 2019-09-14 RX ADMIN — LACTULOSE 30 G: 10 SOLUTION ORAL at 17:04

## 2019-09-14 RX ADMIN — MIDODRINE HYDROCHLORIDE 10 MG: 5 TABLET ORAL at 17:04

## 2019-09-14 RX ADMIN — MIDODRINE HYDROCHLORIDE 10 MG: 5 TABLET ORAL at 08:09

## 2019-09-14 RX ADMIN — RIFAXIMIN 550 MG: 550 TABLET ORAL at 04:39

## 2019-09-14 RX ADMIN — Medication 20000 ML: at 10:37

## 2019-09-14 RX ADMIN — LACTULOSE 30 G: 10 SOLUTION ORAL at 08:09

## 2019-09-14 RX ADMIN — Medication 20000 ML: at 20:55

## 2019-09-14 RX ADMIN — GUAIFENESIN 600 MG: 600 TABLET, EXTENDED RELEASE ORAL at 08:09

## 2019-09-14 RX ADMIN — Medication 20000 ML: at 04:27

## 2019-09-14 RX ADMIN — OCTREOTIDE ACETATE 100 MCG: 100 INJECTION, SOLUTION INTRAVENOUS; SUBCUTANEOUS at 05:08

## 2019-09-14 RX ADMIN — MIDODRINE HYDROCHLORIDE 10 MG: 5 TABLET ORAL at 12:10

## 2019-09-14 RX ADMIN — MAGNESIUM SULFATE HEPTAHYDRATE 1 G: 1 INJECTION, SOLUTION INTRAVENOUS at 05:32

## 2019-09-14 RX ADMIN — OCTREOTIDE ACETATE 100 MCG: 100 INJECTION, SOLUTION INTRAVENOUS; SUBCUTANEOUS at 22:04

## 2019-09-14 RX ADMIN — LACTULOSE 30 G: 10 SOLUTION ORAL at 22:04

## 2019-09-14 NOTE — PROGRESS NOTES
Progress Note - Valerie Forbes 63 y o  male MRN: [id number]    Unit/Bed#:  Encounter: [id number]    Subjective:     Patient offers no new complaints  He is resting comfortably  No acute events over night  Objective:     Vitals: Blood pressure 130/60, pulse 66, temperature 97 6 °F (36 4 °C), temperature source Oral, resp  rate 19, height 5' 11" (1 803 m), weight (!) 190 kg (418 lb 14 oz), SpO2 97 %  ,Body mass index is 58 42 kg/m²        Intake/Output Summary (Last 24 hours) at 9/14/2019 1206  Last data filed at 9/14/2019 1100  Gross per 24 hour   Intake 720 ml   Output 3741 ml   Net -3021 ml       Physical Exam:     General Appearance: Alert, appears stated age and cooperative, jaundiced  Lungs: Clear to auscultation bilaterally, no rales or rhonchi, no labored breathing/accessory muscle use  Heart: Regular rate and rhythm, S1, S2 normal, no murmur, click, rub or gallop  Abdomen: Soft, non-tender, slightly distended; bowel sounds normal; no masses or no organomegaly  Extremities: No cyanosis, generalized edema    Invasive Devices     Peripheral Intravenous Line            Peripheral IV 09/10/19 Right Arm 3 days    Peripheral IV 09/13/19 Distal;Right;Upper;Ventral (anterior) Arm less than 1 day    Peripheral IV 09/13/19 Right;Ventral (anterior) Forearm less than 1 day          Arterial Line            Arterial Line 09/13/19 Radial less than 1 day          Line            Temporary HD Catheter 3 days                Lab Results:  Results from last 7 days   Lab Units 09/14/19  0435   WBC Thousand/uL 16 16*   HEMOGLOBIN g/dL 7 5*   HEMATOCRIT % 24 1*   PLATELETS Thousands/uL 20*   NEUTROS PCT % 80*   LYMPHS PCT % 3*   MONOS PCT % 9   EOS PCT % 5     Results from last 7 days   Lab Units 09/14/19  0435   POTASSIUM mmol/L 4 6   CHLORIDE mmol/L 99*   CO2 mmol/L 28   BUN mg/dL 28*   CREATININE mg/dL 2 30*   CALCIUM mg/dL 8 8   ALK PHOS U/L 64   ALT U/L 27   AST U/L 42     Results from last 7 days   Lab Units 09/14/19  0612   INR  2 36*           Imaging Studies: I have personally reviewed pertinent imaging studies  Ct Chest Abdomen Pelvis Wo Contrast    Result Date: 9/6/2019  Impression: Exam is limited without IV and oral contrast particularly for soft tissue and bowel evaluation  1   Scattered mild patchy opacities bilaterally suspicious for pneumonia  2  Cirrhotic appearance to the liver  Splenomegaly  3   Mild ascites  4   Diffuse subcutaneous edema compatible with anasarca  Workstation performed: [id number]     Cxr 1 View Pa Portable Stat    Result Date: 9/10/2019  Impression: Persistent cardiomegaly  Improving pulmonary edema  Satisfactory right jugular central line placement  No pneumothorax  Right rib fractures  Workstation performed: [id number]     Xr Chest 2 Views    Result Date: 9/7/2019  Impression: Persistent cardiomegaly Increased diffuse vascular congestion Workstation performed: [id number]     Ct Femur Right Wo Contrast    Result Date: 9/6/2019  Impression: Soft tissue evaluation is limited without IV contrast  1    No evidence of soft tissue gas  2   Generalized subcutaneous edema with associated skin thickening  Edema noted adjacent to the calf musculature  Findings are nonspecific and may be related to generalized anasarca  3   The absence of soft tissue gas does not exclude necrotizing fasciitis particularly at the early stages  If there is persistent concern for necrotizing fasciitis, consider follow-up with MRI with and without contrast  Workstation performed: [id number]     Ct Tibia Fibula Right Wo Contrast    Result Date: 9/6/2019  Impression: Soft tissue evaluation is limited without IV contrast  1    No evidence of soft tissue gas  2   Generalized subcutaneous edema with associated skin thickening  Edema noted adjacent to the calf musculature  Findings are nonspecific and may be related to generalized anasarca   3   The absence of soft tissue gas does not exclude necrotizing fasciitis particularly at the early stages  If there is persistent concern for necrotizing fasciitis, consider follow-up with MRI with and without contrast  Workstation performed: [id number]     Ir Temp Hd Cath    Result Date: 9/10/2019  Impression: Impression: 1  Successful ultrasound and fluoroscopically guided placement of a double-lumen temporary dialysis central venous catheter via the right internal jugular vein  The tip of the catheter is at the cavoatrial junction and may be used immediately  Workstation performed: [id number]       Assessment and Plan:   ROSALES Cirrhosis complicated by HRS on CVVHD  MELD 37  He continues with anasarca but has minimal ascites on imaging  He is AAOx3 - no evidence of HE at present - continue lactulose and Xifaxan as ordered  He will need an EGD for varices screening  2 small liver lesions noted on MRI in June with normal AFP - will monitor at 6 month intervals  - I spoke to Dr Venkata Clark at [facility]  Given very high BMI in setting of critical condition he does not believe patient in is a candidate for transplant at this time  He recommended continued supportive care as is being offered at the local level  I discussed this in great detail with the patient and his family at the bedside     They understand the patients renal function is unlikely to improve  They understand his poor prognosis

## 2019-09-14 NOTE — ASSESSMENT & PLAN NOTE
Attributed to his ROSALES related cirrhosis  Continue to monitor INR  Poor p o  Intake  Vitamin K as needed  Continue pantoprazole for GI protection

## 2019-09-14 NOTE — ASSESSMENT & PLAN NOTE
Hepatic metabolic in nature  Continue right fax min and lactulose  Monitor ammonia levels  Serial neuro exams  Monitor for delirium  Encourage good sleep hygiene  Avoid sedating medications

## 2019-09-14 NOTE — RESPIRATORY THERAPY NOTE
RT Ventilator Management Note  Kinga Gonzales 63 y o  male MRN: [id number]  Unit/Bed#:  Encounter: [id number]      Daily Screen     No data found in the last 10 encounters              Physical Exam:   Assessment Type: Assess only  General Appearance: Alert, Awake  Respiratory Pattern: Irregular, Spontaneous, Assisted  Chest Assessment: Chest expansion symmetrical  Bilateral Breath Sounds: Diminished, Coarse, Scattered, Crackles  Cough: None  O2 Device: HFNC  Subjective Data: awake and alert with mild nausea      Resp Comments: pateint remaisn on HFNC due to acute nausea and vomiting overnight unable to be placed on Bipap  pateint more awake and alert at this time will wean to nasal cannual as tolerated

## 2019-09-14 NOTE — ASSESSMENT & PLAN NOTE
Gastroenterology and nephrology following  Meld score 37 today  Coagulopathic  Continue CRRT for fluid removal   Continue octreotide and midodrine

## 2019-09-14 NOTE — PROGRESS NOTES
NEPHROLOGY PROGRESS NOTE    Patient: Rm Galdamez               Sex: male          DOA: 9/6/2019  5:35 PM   YOB: 1960        Age:  63 y o         LOS:  LOS: 8 days   9/14/2019    REASON FOR THE CONSULTATION:      Acute kidney injury on chronic kidney disease stage 3    SUBJECTIVE     Patient is seen and examined in the intensive care unit  Currently undergoing CVVHD  Adequate ultrafiltration obtained with improvement in blood pressure noted  CURRENT MEDICATIONS       Current Facility-Administered Medications:     acetaminophen (TYLENOL) tablet 650 mg, 650 mg, Oral, Q6H PRN, BIENVENIDO Berger-C, 650 mg at 09/10/19 0117    cefepime (MAXIPIME) 2,000 mg in dextrose 5 % 50 mL IVPB, 2,000 mg, Intravenous, Q12H, BIENVENIDO Berger-MIRTA, Stopped at 09/14/19 0401    guaiFENesin (MUCINEX) 12 hr tablet 600 mg, 600 mg, Oral, Q12H [address], David Audrey PA-C, 600 mg at 09/14/19 0809    lactulose 20 g/30 mL oral solution 30 g, 30 g, Oral, TID, MARKOS Laureano, 30 g at 09/14/19 0809    midodrine (PROAMATINE) tablet 10 mg, 10 mg, Oral, TID Fozia Ramon PA-C, 10 mg at 09/14/19 0809    NxStage K 4/Ca 3 dialysis solution (RFP-401) 20,000 mL, 20,000 mL, Dialysis, Continuous, Venu Lopez MD, 20,000 mL at 09/14/19 0427    octreotide (SandoSTATIN) injection 100 mcg, 100 mcg, Subcutaneous, Q8H [address], David Audrey PA-C, 100 mcg at 09/14/19 0508    ondansetron (ZOFRAN) injection 4 mg, 4 mg, Intravenous, Q6H PRN, MARKOS Aly, 4 mg at 09/13/19 2152    pantoprazole (PROTONIX) EC tablet 40 mg, 40 mg, Oral, Early Morning, David Audrey PA-C, 40 mg at 09/14/19 0508    rifaximin (XIFAXAN) tablet 550 mg, 550 mg, Oral, Q12H [address], David MARILYN Ventura, 550 mg at 09/14/19 1609    REVIEW OF SYSTEMS     Review of Systems   Constitutional: Positive for fatigue  HENT: Negative  Eyes: Negative  Respiratory: Negative  Cardiovascular: Positive for leg swelling  Gastrointestinal: Negative      Endocrine: Negative  Genitourinary: Negative  Musculoskeletal: Negative  Skin: Negative  Allergic/Immunologic: Negative  Neurological: Negative  Hematological: Negative  All other systems reviewed and are negative  OBJECTIVE     Current Weight: Weight - Scale: (!) 190 kg (418 lb 14 oz)  Vitals:    09/14/19 0900   BP:    Pulse: 67   Resp: 17   Temp:    SpO2: 98%     Body mass index is 58 42 kg/m²  Intake/Output Summary (Last 24 hours) at 9/14/2019 0954  Last data filed at 9/14/2019 0900  Gross per 24 hour   Intake 720 ml   Output 3265 ml   Net -2545 ml       PHYSICAL EXAMINATION     Physical Exam   Constitutional: He is oriented to person, place, and time  Morbidly obese male who is in no distress   HENT:   Head: Normocephalic and atraumatic  Eyes: Pupils are equal, round, and reactive to light  Neck: Neck supple  Cardiovascular: Normal rate, regular rhythm and normal heart sounds  Pulmonary/Chest: Effort normal    Poor inspiratory effort   Abdominal: Soft  Bowel sounds are normal    Musculoskeletal: Normal range of motion  He exhibits edema  Neurological: He is alert and oriented to person, place, and time  Skin: Skin is warm  Psychiatric: He has a normal mood and affect           LAB RESULTS     Results from last 7 days   Lab Units 09/14/19  0435 09/14/19  0019 09/13/19  1732 09/13/19  0528 09/12/19  0735 09/11/19  1012 09/10/19  0541 09/10/19  0540 09/09/19  0524   WBC Thousand/uL 16 16* 16 99*  --  11 10* 10 29* 10 28*  --  9 24 8 33   HEMOGLOBIN g/dL 7 5* 7 6*  --  7 7* 7 6* 6 9*  --  7 2* 7 5*   HEMATOCRIT % 24 1* 24 5*  --  25 0* 23 9* 22 3*  --  22 3* 23 7*   PLATELETS Thousands/uL 20* 21*  --  26* 28* 36*  --  42* 51*   POTASSIUM mmol/L 4 6 4 7 4 7 4 8 4 8 5 1 5 1  --  4 7   CHLORIDE mmol/L 99* 99* 98* 97* 96* 94* 92*  --  92*   CO2 mmol/L 28 26 27 22 26 25 24  --  25   BUN mg/dL 28* 32* 45* 50* 52* 54* 54*  --  53*   CREATININE mg/dL 2 30* 2 46* 3 09* 3 12* 3 07* 2 93* 2 66* --  2 44*   EGFR ml/min/1 73sq m 30 28 21 21 21 23 25  --  28   CALCIUM mg/dL 8 8 8 5 8 8 8 7 8 7 8 7 8 3  --  8 8   MAGNESIUM mg/dL 1 9 1 9 2 2  --   --   --   --   --   --    PHOSPHORUS mg/dL 3 4 4 0 4 6*  --   --   --   --   --   --            RADIOLOGY RESULTS      Results for orders placed during the hospital encounter of 09/06/19   CXR 1 view PA portable stat    Narrative CHEST     INDICATION:   line placement RIJ Hemodialysis catheter  COMPARISON:  September 6, 2019    EXAM PERFORMED/VIEWS:  XR CHEST PORTABLE  2 images    FINDINGS:  Lungs are suboptimally aerated  Elevation of right hemidiaphragm  Cardiomegaly with diminishing pulmonary edema  Persistent prominence of the pulmonary vasculature in the left superior hilum  Right jugular central line tip in the upper SVC  No pneumothorax  Osseous structures appear within normal limits for patient age  Old fracture of right 5th rib  Questionable fracture right 6th rib  Impression Persistent cardiomegaly  Improving pulmonary edema  Satisfactory right jugular central line placement  No pneumothorax  Right rib fractures  Workstation performed: [id number]       Results for orders placed during the hospital encounter of 09/06/19   XR chest 2 views    Narrative CHEST     INDICATION:   SOB     COMPARISON:  8/15/2019    EXAM PERFORMED/VIEWS:  XR CHEST PA & LATERAL  2 views    FINDINGS:  Persistent moderate cardiomegaly  Development of diffuse vascular congestion    The lungs are otherwise clear  No pneumothorax or pleural effusion  Osseous structures appear within normal limits for patient age        Impression Persistent cardiomegaly    Increased diffuse vascular congestion        Workstation performed: [id number]         ASSESSMENT/PLAN     63years old male with past medical history of ROSALES cirrhosis, morbid obesity, anasarca, chronic kidney disease stage 3 who presented to our facility on September 6th with complaints of lower extremity cellulitis and was found to have acute kidney injury, to required pneumonia and cellulitis  1  Oligoanuric acute kidney injury on chronic kidney disease stage III present on admission:  Patient with progressive renal dysfunction likely secondary to hepatorenal syndrome and is dialysis dependent   -patient had undergone intermittent dialysis without much improvement in volume status  -patient was therefore started on CVVHD for better fluid removal   -currently with ultrafiltration target of 250 cc per hour with a dialysis flow rate of 5000 cc per hour and a blood flow rate of 250 cc/minute   -continue to obtain BMP Q 8 along with ionized calcium, magnesium and phosphorus replaced appropriately  -plan to continue CVVHD for another 48 hours   -as previously discussed with the family,  HD in the current condition with decompensated liver cirrhosis, hepatic renal syndrome and marked thrombocytopenia poses risk for complications including hypotension, bleeding and death  2  Volume overload:  Secondary to fluid retaining state in a morbidly obese patient with 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  Oligoanuric and HD dependent for fluid removal     3  Hyponatremia:  Some improvement in serum sodium noted and up to 132 today as a result of fluid removal     4  Anemia:  Secondary to underlying liver cirrhosis  Current hemoglobin 7 5 and below target  Transfuse to keep hemoglobin above 8     5  Thrombocytopenia: This is likely due to liver cirrhosis  Remains at risk of spontaneous bleeding  Some oozing noted around the catheter site which appears to have been controlled  6  Liver cirrhosis: This is likely secondary to ROSALES  As per GI, given patient with morbid obesity appears to be a poor candidate for liver transplantation  7  Access:  Right IJ temp dialysis catheter          Andreina Qiu MD  Nephrology  9/14/2019

## 2019-09-14 NOTE — PROGRESS NOTES
Progress Note - Critical Care   Sahra Dow 63 y o  male MRN: [id number]  Unit/Bed#:  Encounter: [id number]    Attending Physician: Patricia Arriaga MD      ______________________________________________________________________  Assessment and Plan:   Principal Problem:    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 [facility])  Active Problems:    CROW on CPAP    Essential hypertension    Morbid obesity (HCC)    Anemia    Hyponatremia    HCAP (healthcare-associated pneumonia)    Hepatorenal syndrome (HCC)    Encephalopathy acute    Hypotension  Resolved Problems:    Cellulitis of right lower extremity        Neuro:  Encourage good sleep hygiene  Monitor for delirium  Serial neuro exams to monitor encephalopathy  Ammonia level this morning  CV:  Hemodynamically stable overnight  Continue to monitor  Pulm:  Hypoxemic at times, particularly while lying supine  High-flow nasal cannula instituted overnight due to vomiting episode and inability to utilize BiPAP  Continue to encourage incentive spirometry and pulmonary toilet  GI:  GI following  Unlikely transplant candidate  Monitor hepatic functions  Continue lactulose and rifaximin  Zofran for nausea and vomiting  NPO for bowel rest   Continue octreotide and midodrine for hepatorenal syndrome  Continue PPI  :  Continue to monitor BUN, creatinine, urinary output  Continue CRRT  Nephrology following  F/E/N:  Monitor and replete electrolytes per CRRT protocol  NPO at present  ID:  No current indication for antimicrobial therapy at this time  Continue to monitor fever and leukocyte curves  Heme:  Anemia likely multifactorial   Continue to monitor hemoglobin  Thrombocytopenia also multifactorial, stable  Continue to monitor  Endo:  Monitor glucoses and utilize insulin per protocol as needed  Msk/Skin:  PT/OT, mobilization out of bed as tolerated  Turn and reposition q 2 hours with frequent off loading      Disposition:  Continue ICU level of care due to multi-system organ failure  Code Status: Level 1 - Full Code    Counseling / Coordination of Care  Total Critical Care time spent 43 minutes excluding procedures, teaching and family updates  ______________________________________________________________________    24 Hour Events:   Started CRRT yesterday  Vomiting overnight, unable to use his BiPAP for his CROW  Zofran given for nausea  NPO at present       ______________________________________________________________________    Physical Exam:   GEN:  Morbidly obese, appears stated age, no acute distress  HEENT:  Sclera anicteric, mucous membranes pink and moist, conjunctiva pink, no marisol/rhinorrhea  Neck:  No lymphadenopathy, normal ROM, supple, no bruits  CV :  S1S2, regular, no murmurs, rubs or gallops  Intact distal pulses  No JVD  Resp:  Lungs diminished throughout  No subq air or crepitus  Symmetrical expansion  No cough noted  GI :  Abd soft, nontender, no guarding/rebound, nondistended, normoactive bowel sounds X4 quads, no organomegaly appreciated  Neuro:  CN II-XII grossly intact, nonfocal exam, speech clear, GCS 15  Psych:  Appropriate affect    ______________________________________________________________________  Blood pressure 145/60, pulse 65, temperature 97 6 °F (36 4 °C), resp  rate 13, height 5' 11" (1 803 m), weight (!) 185 kg (407 lb), SpO2 100 %  Temperature:   Temp (24hrs), Av 1 °F (36 7 °C), Min:97 6 °F (36 4 °C), Max:98 7 °F (37 1 °C)    Current Temperature: 97 6 °F (36 4 °C)  Weights:   IBW: 75 3 kg    Body mass index is 56 76 kg/m²    Weight (last 2 days)     None          Non-Invasive/Invasive Ventilation Settings:  Respiratory    Lab Data (Last 4 hours)    None         O2/Vent Data (Last 4 hours)       0044          Non-Invasive Ventilation Mode HFNC                 No results found for: PHART, RMS9PIU, PO2ART, SHD6EOJ, T3JCBNEJ, BEART, SOURCE  SpO2: SpO2: 100 %  Intake and Outputs:  I/O 09/12 0701 - 09/13 0700 09/13 0701 - 09/14 0700    P  O  740 120    I V  (mL/kg) 500 (2 7)     Other 300     IV Piggyback 130 150    Total Intake(mL/kg) 1670 (9) 270 (1 5)    Urine (mL/kg/hr) 0 (0) 200 (0)    Other 2000 1418    Stool 0 0    Total Output 2000 1618    Net -330 -1348          Unmeasured Urine Occurrence 1 x     Unmeasured Stool Occurrence 2 x 2 x          Nutrition:        Diet Orders   (From admission, onward)             Start     Ordered    09/13/19 2030  Diet NPO  Diet effective now     Question Answer Comment   Diet Type NPO    RD to adjust diet per protocol? Yes        09/13/19 2029                Labs:   Pending      Imaging:  None today  EKG:  Sinus rhythm on cardiac monitor  Micro:  Lab Results   Component Value Date    BLOODCX No Growth After 5 Days  09/06/2019    BLOODCX No Growth After 5 Days  09/06/2019    BLOODCX No Growth After 5 Days  08/15/2019    BLOODCX No Growth After 5 Days  08/15/2019    SPUTUMCULTUR 2+ Growth of Candida albicans (A) 09/08/2019     Allergies:    Allergies   Allergen Reactions    Lactose     Propranolol Eye Swelling    Torsemide Eye Swelling     Medications:   Scheduled Meds:  Current Facility-Administered Medications:  acetaminophen 650 mg Oral Q6H PRN Ankit Good PA-C    calcium gluconate 1 G  100 mL IVPB 1 g Intravenous Once Lynnette Andrade MD Last Rate: 1 g (09/14/19 0046)   cefepime 2,000 mg Intravenous Q12H Ankit Good PA-C Last Rate: Stopped (09/13/19 1646)   guaiFENesin 600 mg Oral Q12H [address] Ankit Good PA-C    lactulose 30 g Oral TID MARKOS Ramirez    magnesium sulfate 1 g Intravenous Once Lynnette Andrade MD    midodrine 10 mg Oral TID [facility] Ankit Good PA-C    NxStage K 4/Ca 3 20,000 mL Dialysis Continuous Eze Estraad MD    octreotide 100 mcg Subcutaneous [facility] Ankit Good PA-C    ondansetron 4 mg Intravenous Q6H PRN MARKOS Klein    pantoprazole 40 mg Oral Early Morning Ankit Good PA-C    rifaximin 550 mg Oral Q12H [address] Brayden Nguyen PA-C      Continuous Infusions:  NxStage K 4/Ca 3 20,000 mL     PRN Meds:    acetaminophen 650 mg Q6H PRN   ondansetron 4 mg Q6H PRN     VTE Pharmacologic Prophylaxis: Deferred secondary to thrombocytopenia  VTE Mechanical Prophylaxis: sequential compression device  Invasive lines and devices: Invasive Devices     Peripheral Intravenous Line            Peripheral IV 09/10/19 Right Arm 3 days    Peripheral IV 09/13/19 Distal;Right;Upper;Ventral (anterior) Arm less than 1 day    Peripheral IV 09/13/19 Right;Ventral (anterior) Forearm less than 1 day          Arterial Line            Arterial Line 09/13/19 Radial less than 1 day          Line            Temporary HD Catheter 3 days                     Portions of the record may have been created with voice recognition software  Occasional wrong word or "sound a like" substitutions may have occurred due to the inherent limitations of voice recognition software  Read the chart carefully and recognize, using context, where substitutions have occurred      MARKOS Landin

## 2019-09-14 NOTE — ASSESSMENT & PLAN NOTE
Related to liver disease  Transfuse for platelet count less than 20,000 due to high risk for spontaneous bleeding  Continue to monitor closely  Holding anticoagulation at present

## 2019-09-14 NOTE — ASSESSMENT & PLAN NOTE
[address] [facility] liver Transplant programs have both declined this patient  Multiple conversations have been held with the patient and his family regarding his prognosis  Meld score 37 with a mortality rate of 53% as of 9/14  Supportive care at present  Continue CRRT, octreotide, and midodrine for his hepatorenal syndrome

## 2019-09-15 LAB
ABO GROUP BLD: NORMAL
ALBUMIN SERPL BCP-MCNC: 3.2 G/DL (ref 3.5–5)
ALP SERPL-CCNC: 71 U/L (ref 46–116)
ALT SERPL W P-5'-P-CCNC: 29 U/L (ref 12–78)
AMMONIA PLAS-SCNC: 50 UMOL/L (ref 11–35)
ANION GAP SERPL CALCULATED.3IONS-SCNC: 5 MMOL/L (ref 4–13)
ANION GAP SERPL CALCULATED.3IONS-SCNC: 5 MMOL/L (ref 4–13)
ANION GAP SERPL CALCULATED.3IONS-SCNC: 6 MMOL/L (ref 4–13)
ANION GAP SERPL CALCULATED.3IONS-SCNC: 7 MMOL/L (ref 4–13)
ANION GAP SERPL CALCULATED.3IONS-SCNC: 8 MMOL/L (ref 4–13)
AST SERPL W P-5'-P-CCNC: 46 U/L (ref 5–45)
BASOPHILS # BLD AUTO: 0.03 THOUSANDS/ΜL (ref 0–0.1)
BASOPHILS NFR BLD AUTO: 0 % (ref 0–1)
BILIRUB DIRECT SERPL-MCNC: 2.47 MG/DL (ref 0–0.2)
BILIRUB SERPL-MCNC: 7.6 MG/DL (ref 0.2–1)
BLD GP AB SCN SERPL QL: NEGATIVE
BUN SERPL-MCNC: 12 MG/DL (ref 5–25)
BUN SERPL-MCNC: 12 MG/DL (ref 5–25)
BUN SERPL-MCNC: 13 MG/DL (ref 5–25)
BUN SERPL-MCNC: 14 MG/DL (ref 5–25)
BUN SERPL-MCNC: 17 MG/DL (ref 5–25)
CA-I BLD-SCNC: 1.14 MMOL/L (ref 1.12–1.32)
CA-I BLD-SCNC: 1.18 MMOL/L (ref 1.12–1.32)
CA-I BLD-SCNC: 1.18 MMOL/L (ref 1.12–1.32)
CA-I BLD-SCNC: 1.2 MMOL/L (ref 1.12–1.32)
CALCIUM SERPL-MCNC: 8.9 MG/DL (ref 8.3–10.1)
CALCIUM SERPL-MCNC: 9.1 MG/DL (ref 8.3–10.1)
CALCIUM SERPL-MCNC: 9.1 MG/DL (ref 8.3–10.1)
CALCIUM SERPL-MCNC: 9.2 MG/DL (ref 8.3–10.1)
CALCIUM SERPL-MCNC: 9.3 MG/DL (ref 8.3–10.1)
CHLORIDE SERPL-SCNC: 100 MMOL/L (ref 100–108)
CHLORIDE SERPL-SCNC: 102 MMOL/L (ref 100–108)
CHLORIDE SERPL-SCNC: 103 MMOL/L (ref 100–108)
CO2 SERPL-SCNC: 26 MMOL/L (ref 21–32)
CO2 SERPL-SCNC: 27 MMOL/L (ref 21–32)
CO2 SERPL-SCNC: 28 MMOL/L (ref 21–32)
CREAT SERPL-MCNC: 1.54 MG/DL (ref 0.6–1.3)
CREAT SERPL-MCNC: 1.6 MG/DL (ref 0.6–1.3)
CREAT SERPL-MCNC: 1.66 MG/DL (ref 0.6–1.3)
CREAT SERPL-MCNC: 1.76 MG/DL (ref 0.6–1.3)
CREAT SERPL-MCNC: 1.82 MG/DL (ref 0.6–1.3)
EOSINOPHIL # BLD AUTO: 0.81 THOUSAND/ΜL (ref 0–0.61)
EOSINOPHIL NFR BLD AUTO: 7 % (ref 0–6)
ERYTHROCYTE [DISTWIDTH] IN BLOOD BY AUTOMATED COUNT: 16.9 % (ref 11.6–15.1)
GFR SERPL CREATININE-BSD FRML MDRD: 40 ML/MIN/1.73SQ M
GFR SERPL CREATININE-BSD FRML MDRD: 42 ML/MIN/1.73SQ M
GFR SERPL CREATININE-BSD FRML MDRD: 45 ML/MIN/1.73SQ M
GFR SERPL CREATININE-BSD FRML MDRD: 47 ML/MIN/1.73SQ M
GFR SERPL CREATININE-BSD FRML MDRD: 49 ML/MIN/1.73SQ M
GLUCOSE SERPL-MCNC: 103 MG/DL (ref 65–140)
GLUCOSE SERPL-MCNC: 116 MG/DL (ref 65–140)
GLUCOSE SERPL-MCNC: 129 MG/DL (ref 65–140)
GLUCOSE SERPL-MCNC: 137 MG/DL (ref 65–140)
GLUCOSE SERPL-MCNC: 138 MG/DL (ref 65–140)
HCT VFR BLD AUTO: 23.2 % (ref 36.5–49.3)
HGB BLD-MCNC: 7.2 G/DL (ref 12–17)
IMM GRANULOCYTES # BLD AUTO: 0.4 THOUSAND/UL (ref 0–0.2)
IMM GRANULOCYTES NFR BLD AUTO: 4 % (ref 0–2)
INR PPP: 2.34 (ref 0.84–1.19)
LYMPHOCYTES # BLD AUTO: 0.66 THOUSANDS/ΜL (ref 0.6–4.47)
LYMPHOCYTES NFR BLD AUTO: 6 % (ref 14–44)
MAGNESIUM SERPL-MCNC: 1.8 MG/DL (ref 1.6–2.6)
MAGNESIUM SERPL-MCNC: 1.9 MG/DL (ref 1.6–2.6)
MAGNESIUM SERPL-MCNC: 1.9 MG/DL (ref 1.6–2.6)
MAGNESIUM SERPL-MCNC: 2 MG/DL (ref 1.6–2.6)
MAGNESIUM SERPL-MCNC: 2 MG/DL (ref 1.6–2.6)
MCH RBC QN AUTO: 36.2 PG (ref 26.8–34.3)
MCHC RBC AUTO-ENTMCNC: 31 G/DL (ref 31.4–37.4)
MCV RBC AUTO: 117 FL (ref 82–98)
MONOCYTES # BLD AUTO: 1.17 THOUSAND/ΜL (ref 0.17–1.22)
MONOCYTES NFR BLD AUTO: 10 % (ref 4–12)
NEUTROPHILS # BLD AUTO: 8.32 THOUSANDS/ΜL (ref 1.85–7.62)
NEUTS SEG NFR BLD AUTO: 73 % (ref 43–75)
NRBC BLD AUTO-RTO: 0 /100 WBCS
PHOSPHATE SERPL-MCNC: 2.3 MG/DL (ref 2.7–4.5)
PHOSPHATE SERPL-MCNC: 2.3 MG/DL (ref 2.7–4.5)
PHOSPHATE SERPL-MCNC: 2.4 MG/DL (ref 2.7–4.5)
PHOSPHATE SERPL-MCNC: 2.6 MG/DL (ref 2.7–4.5)
PHOSPHATE SERPL-MCNC: 2.6 MG/DL (ref 2.7–4.5)
PLATELET # BLD AUTO: 26 THOUSANDS/UL (ref 149–390)
PMV BLD AUTO: 11.8 FL (ref 8.9–12.7)
POTASSIUM SERPL-SCNC: 4.1 MMOL/L (ref 3.5–5.3)
POTASSIUM SERPL-SCNC: 4.1 MMOL/L (ref 3.5–5.3)
POTASSIUM SERPL-SCNC: 4.2 MMOL/L (ref 3.5–5.3)
PROCALCITONIN SERPL-MCNC: 0.37 NG/ML
PROT SERPL-MCNC: 6.6 G/DL (ref 6.4–8.2)
PROTHROMBIN TIME: 25.9 SECONDS (ref 11.6–14.5)
RBC # BLD AUTO: 1.99 MILLION/UL (ref 3.88–5.62)
RH BLD: NEGATIVE
SODIUM SERPL-SCNC: 135 MMOL/L (ref 136–145)
SODIUM SERPL-SCNC: 136 MMOL/L (ref 136–145)
SODIUM SERPL-SCNC: 136 MMOL/L (ref 136–145)
SPECIMEN EXPIRATION DATE: NORMAL
WBC # BLD AUTO: 11.39 THOUSAND/UL (ref 4.31–10.16)

## 2019-09-15 PROCEDURE — 90945 DIALYSIS ONE EVALUATION: CPT | Performed by: NURSE PRACTITIONER

## 2019-09-15 PROCEDURE — 82140 ASSAY OF AMMONIA: CPT | Performed by: NURSE PRACTITIONER

## 2019-09-15 PROCEDURE — 86900 BLOOD TYPING SEROLOGIC ABO: CPT | Performed by: PHYSICIAN ASSISTANT

## 2019-09-15 PROCEDURE — 83735 ASSAY OF MAGNESIUM: CPT | Performed by: INTERNAL MEDICINE

## 2019-09-15 PROCEDURE — 99233 SBSQ HOSP IP/OBS HIGH 50: CPT | Performed by: STUDENT IN AN ORGANIZED HEALTH CARE EDUCATION/TRAINING PROGRAM

## 2019-09-15 PROCEDURE — 82330 ASSAY OF CALCIUM: CPT | Performed by: INTERNAL MEDICINE

## 2019-09-15 PROCEDURE — 80076 HEPATIC FUNCTION PANEL: CPT | Performed by: NURSE PRACTITIONER

## 2019-09-15 PROCEDURE — 84145 PROCALCITONIN (PCT): CPT | Performed by: STUDENT IN AN ORGANIZED HEALTH CARE EDUCATION/TRAINING PROGRAM

## 2019-09-15 PROCEDURE — 86923 COMPATIBILITY TEST ELECTRIC: CPT

## 2019-09-15 PROCEDURE — 86901 BLOOD TYPING SEROLOGIC RH(D): CPT | Performed by: PHYSICIAN ASSISTANT

## 2019-09-15 PROCEDURE — 80048 BASIC METABOLIC PNL TOTAL CA: CPT | Performed by: INTERNAL MEDICINE

## 2019-09-15 PROCEDURE — 90945 DIALYSIS ONE EVALUATION: CPT | Performed by: INTERNAL MEDICINE

## 2019-09-15 PROCEDURE — 94760 N-INVAS EAR/PLS OXIMETRY 1: CPT

## 2019-09-15 PROCEDURE — 86850 RBC ANTIBODY SCREEN: CPT | Performed by: PHYSICIAN ASSISTANT

## 2019-09-15 PROCEDURE — 84100 ASSAY OF PHOSPHORUS: CPT | Performed by: INTERNAL MEDICINE

## 2019-09-15 PROCEDURE — 85025 COMPLETE CBC W/AUTO DIFF WBC: CPT | Performed by: NURSE PRACTITIONER

## 2019-09-15 PROCEDURE — 85610 PROTHROMBIN TIME: CPT | Performed by: PHYSICIAN ASSISTANT

## 2019-09-15 PROCEDURE — P9035 PLATELET PHERES LEUKOREDUCED: HCPCS

## 2019-09-15 PROCEDURE — 90945 DIALYSIS ONE EVALUATION: CPT

## 2019-09-15 RX ORDER — TRANEXAMIC ACID 100 MG/ML
500 INJECTION, SOLUTION INTRAVENOUS ONCE
Status: COMPLETED | OUTPATIENT
Start: 2019-09-15 | End: 2019-09-15

## 2019-09-15 RX ORDER — MAGNESIUM SULFATE 1 G/100ML
1 INJECTION INTRAVENOUS ONCE
Status: COMPLETED | OUTPATIENT
Start: 2019-09-15 | End: 2019-09-15

## 2019-09-15 RX ORDER — MAGNESIUM SULFATE 1 G/100ML
1 INJECTION INTRAVENOUS ONCE
Status: COMPLETED | OUTPATIENT
Start: 2019-09-15 | End: 2019-09-16

## 2019-09-15 RX ORDER — NYSTATIN 100000 U/G
CREAM TOPICAL 2 TIMES DAILY
Status: DISCONTINUED | OUTPATIENT
Start: 2019-09-15 | End: 2019-09-25 | Stop reason: HOSPADM

## 2019-09-15 RX ORDER — LACTULOSE 20 G/30ML
20 SOLUTION ORAL 2 TIMES DAILY
Status: DISCONTINUED | OUTPATIENT
Start: 2019-09-15 | End: 2019-09-16

## 2019-09-15 RX ADMIN — CALCIUM GLUCONATE 1 G: 98 INJECTION, SOLUTION INTRAVENOUS at 23:54

## 2019-09-15 RX ADMIN — ACETAMINOPHEN 650 MG: 325 TABLET, FILM COATED ORAL at 15:46

## 2019-09-15 RX ADMIN — RIFAXIMIN 550 MG: 550 TABLET ORAL at 04:36

## 2019-09-15 RX ADMIN — MAGNESIUM SULFATE HEPTAHYDRATE 1 G: 1 INJECTION, SOLUTION INTRAVENOUS at 05:31

## 2019-09-15 RX ADMIN — MIDODRINE HYDROCHLORIDE 10 MG: 5 TABLET ORAL at 08:13

## 2019-09-15 RX ADMIN — MIDODRINE HYDROCHLORIDE 10 MG: 5 TABLET ORAL at 11:15

## 2019-09-15 RX ADMIN — Medication 20000 ML: at 00:53

## 2019-09-15 RX ADMIN — PANTOPRAZOLE SODIUM 40 MG: 40 TABLET, DELAYED RELEASE ORAL at 05:17

## 2019-09-15 RX ADMIN — LACTULOSE 20 G: 10 SOLUTION ORAL at 17:50

## 2019-09-15 RX ADMIN — SODIUM PHOSPHATE, MONOBASIC, MONOHYDRATE 6 MMOL: 276; 142 INJECTION, SOLUTION INTRAVENOUS at 12:55

## 2019-09-15 RX ADMIN — NYSTATIN: 100000 CREAM TOPICAL at 10:48

## 2019-09-15 RX ADMIN — LACTULOSE 30 G: 10 SOLUTION ORAL at 08:13

## 2019-09-15 RX ADMIN — SODIUM PHOSPHATE, MONOBASIC, MONOHYDRATE 6 MMOL: 276; 142 INJECTION, SOLUTION INTRAVENOUS at 05:31

## 2019-09-15 RX ADMIN — OCTREOTIDE ACETATE 100 MCG: 100 INJECTION, SOLUTION INTRAVENOUS; SUBCUTANEOUS at 23:11

## 2019-09-15 RX ADMIN — MAGNESIUM SULFATE HEPTAHYDRATE 1 G: 1 INJECTION, SOLUTION INTRAVENOUS at 18:49

## 2019-09-15 RX ADMIN — TRANEXAMIC ACID 500 MG: 1 INJECTION, SOLUTION INTRAVENOUS at 16:42

## 2019-09-15 RX ADMIN — MIDODRINE HYDROCHLORIDE 10 MG: 5 TABLET ORAL at 15:46

## 2019-09-15 RX ADMIN — CALCIUM GLUCONATE 1 G: 98 INJECTION, SOLUTION INTRAVENOUS at 18:52

## 2019-09-15 RX ADMIN — Medication 20000 ML: at 18:49

## 2019-09-15 RX ADMIN — ONDANSETRON 4 MG: 2 INJECTION INTRAMUSCULAR; INTRAVENOUS at 21:42

## 2019-09-15 RX ADMIN — OCTREOTIDE ACETATE 100 MCG: 100 INJECTION, SOLUTION INTRAVENOUS; SUBCUTANEOUS at 15:06

## 2019-09-15 RX ADMIN — TRANEXAMIC ACID 500 MG: 1 INJECTION, SOLUTION INTRAVENOUS at 10:56

## 2019-09-15 RX ADMIN — CEFEPIME 2000 MG: 2 INJECTION, POWDER, FOR SOLUTION INTRAVENOUS at 03:25

## 2019-09-15 RX ADMIN — NYSTATIN: 100000 CREAM TOPICAL at 17:50

## 2019-09-15 RX ADMIN — Medication 20000 ML: at 04:51

## 2019-09-15 RX ADMIN — RIFAXIMIN 550 MG: 550 TABLET ORAL at 15:46

## 2019-09-15 RX ADMIN — CALCIUM GLUCONATE 1 G: 98 INJECTION, SOLUTION INTRAVENOUS at 12:55

## 2019-09-15 RX ADMIN — MAGNESIUM SULFATE HEPTAHYDRATE 1 G: 1 INJECTION, SOLUTION INTRAVENOUS at 23:54

## 2019-09-15 RX ADMIN — CALCIUM GLUCONATE 1 G: 98 INJECTION, SOLUTION INTRAVENOUS at 01:13

## 2019-09-15 RX ADMIN — OCTREOTIDE ACETATE 100 MCG: 100 INJECTION, SOLUTION INTRAVENOUS; SUBCUTANEOUS at 05:31

## 2019-09-15 NOTE — RESPIRATORY THERAPY NOTE
HFNC remains on standby at this time, pt seems to be doing well on the nasal cannula at this time  We continue with nasal cannula as tolerated  Will switch to BiPAP for hours of sleep

## 2019-09-15 NOTE — PROGRESS NOTES
Progress Note Rafiq Gandhi 1960, 63 y o  male MRN: [id number]    Unit/Bed#:  Encounter: [id number]    Primary Care Provider: No primary care provider on file  Date and time admitted to hospital: 9/6/2019  5:35 PM        Coagulopathy [facility])  Assessment & Plan  Attributed to his ROSALES related cirrhosis  Continue to monitor INR  Poor p o  Intake  Vitamin K as needed  Continue pantoprazole for GI protection  Thrombocytopenia ([address] )  Assessment & Plan  Related to liver disease  Transfuse for platelet count less than 20,000 due to high risk for spontaneous bleeding  Continue to monitor closely  Holding anticoagulation at present  Encephalopathy acute  Assessment & Plan  Hepatic metabolic in nature  Continue right fax min and lactulose  Monitor ammonia levels  Serial neuro exams  Monitor for delirium  Encourage good sleep hygiene  Avoid sedating medications  Hepatorenal syndrome [facility])  Assessment & Plan  Gastroenterology and nephrology following  Meld score 37 today  Coagulopathic  Continue CRRT for fluid removal   Continue octreotide and midodrine  HCAP (healthcare-associated pneumonia)  Assessment & Plan  Completed antibiotic course  Encourage pulmonary toilet  Aspiration precautions  Hyponatremia  Assessment & Plan  Hypovolemic hyponatremia  Continue CRRT  Nephrology following  Anemia  Assessment & Plan  Multifactorial   Continue to monitor and transfuse if necessary for hemoglobin less than 7 0  Morbid obesity [facility])  Assessment & Plan  Nutrition consulted  Hyperbilirubinemia  Assessment & Plan  Continue monitor  Secondary to ROSALES related cirrhosis  Essential hypertension  Assessment & Plan  Stable off antihypertensives at present  Continue monitor closely  Hemodynamically stable  CROW on CPAP  Assessment & Plan  HS BiPAP as needed for CROW        * 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 [facility])  [address] [facility] liver Transplant programs have both declined this patient  Multiple conversations have been held with the patient and his family regarding his prognosis  Meld score 37 with a mortality rate of 53% as of 9/14  Supportive care at present  Continue CRRT, octreotide, and midodrine for his hepatorenal syndrome  HPI/24hr events:  No acute events reported overnight by nursing  Mental status improved somewhat overnight      Vitals:   Vitals:    09/15/19 0200 09/15/19 0300 09/15/19 0342 09/15/19 0400   BP:       BP Location:       Pulse: 62 61  65   Resp: 13 12  17   Temp:    97 5 °F (36 4 °C)   TempSrc:       SpO2: 97% 97% 94% 95%   Weight:       Height:         Temp  Min: 97 1 °F (36 2 °C)  Max: 98 7 °F (37 1 °C)  IBW: 75 3 kg    SpO2: SpO2: 95 %      Intake/Output Summary (Last 24 hours) at 9/15/2019 0448  Last data filed at 9/15/2019 0301  Gross per 24 hour   Intake 1555 ml   Output 5786 ml   Net -4231 ml       Invasive/non-invasive ventilation settings:   Respiratory    Lab Data (Last 4 hours)    None         O2/Vent Data (Last 4 hours)      09/15 0108 09/15 0342        Non-Invasive Ventilation Mode BiPAP BiPAP                  Invasive Devices     Peripheral Intravenous Line            Peripheral IV 09/10/19 Right Arm 4 days    Peripheral IV 09/13/19 Distal;Right;Upper;Ventral (anterior) Arm 1 day    Peripheral IV 09/13/19 Right;Ventral (anterior) Forearm 1 day          Arterial Line            Arterial Line 09/13/19 Radial 1 day          Line            Temporary HD Catheter 4 days                Active medications:    Current Facility-Administered Medications:     acetaminophen (TYLENOL) tablet 650 mg, 650 mg, Oral, Q6H PRN, 650 mg at 09/10/19 0117    cefepime (MAXIPIME) 2,000 mg in dextrose 5 % 50 mL IVPB, 2,000 mg, Intravenous, Q12H, 2,000 mg at 09/15/19 0325    lactulose 20 g/30 mL oral solution 30 g, 30 g, Oral, TID, 30 g at 09/14/19 2204    midodrine (PROAMATINE) tablet 10 mg, 10 mg, Oral, TID AC, 10 mg at 09/14/19 1704    NxStage K 4/Ca 3 dialysis solution (RFP-401) 20,000 mL, 20,000 mL, Dialysis, Continuous, 20,000 mL at 09/15/19 0053    octreotide (SandoSTATIN) injection 100 mcg, 100 mcg, Subcutaneous, Q8H DANA, 100 mcg at 09/14/19 2204    ondansetron (ZOFRAN) injection 4 mg, 4 mg, Intravenous, Q6H PRN, 4 mg at 09/13/19 2152    pantoprazole (PROTONIX) EC tablet 40 mg, 40 mg, Oral, Early Morning, 40 mg at 09/14/19 0508    rifaximin (XIFAXAN) tablet 550 mg, 550 mg, Oral, Q12H DANA, 550 mg at 09/15/19 0436    Hemodynamic Monitoring  N/A    Physical Exam:  GEN:  Morbidly obese, appears stated age, no acute distress  HEENT:  Sclera anicteric, mucous membranes pink and moist, conjunctiva pink, no marisol/rhinorrhea  Neck:  No lymphadenopathy, normal ROM, supple, no bruits  CV :  S1S2, regular, no murmurs, rubs or gallops  Intact distal pulses  No JVD  Resp:  Lungs diminished throughout  No subq air or crepitus  Symmetrical expansion  No cough noted  GI :  Abd soft, nontender, no guarding/rebound, mildly distended, normoactive bowel sounds X4 quads, no organomegaly appreciated  Neuro:  CN II-XII grossly intact, nonfocal exam, speech clear, GCS 15    Lab: I have personally reviewed pertinent lab results    Imaging:  None today  EKG:  Sinus rhythm on the monitor  VTE Pharmacologic Prophylaxis: Reason for no pharmacologic prophylaxis Deferred secondary to thrombocytopenia  VTE Mechanical Prophylaxis: sequential compression device  Pain: No pain  Code Status: Level 1 - Full Code

## 2019-09-15 NOTE — PROGRESS NOTES
NEPHROLOGY PROGRESS NOTE    Patient: Miguel Brunner               Sex: male          DOA: 9/6/2019  5:35 PM   YOB: 1960        Age:  63 y o         LOS:  LOS: 9 days   9/15/2019    REASON FOR THE CONSULTATION:      Acute kidney injury on chronic kidney disease stage 3    SUBJECTIVE     Patient is seen and examined in the intensive care unit while undergoing continuous renal replacement therapy  Awake and in no distress  Noted bleeding from his mouth and attributes this to loosened tooth  Tolerated CVVHD well without any episodes of hypotension  CURRENT MEDICATIONS       Current Facility-Administered Medications:     acetaminophen (TYLENOL) tablet 650 mg, 650 mg, Oral, Q6H PRN, Cheo Rodriguez PA-C, 650 mg at 09/10/19 0117    cefepime (MAXIPIME) 2,000 mg in dextrose 5 % 50 mL IVPB, 2,000 mg, Intravenous, Q12H, Cheo Rodriguez PA-C, Stopped at 09/15/19 0700    lactulose 20 g/30 mL oral solution 20 g, 20 g, Oral, BID, Latrice Watkins PA-C    midodrine (PROAMATINE) tablet 10 mg, 10 mg, Oral, TID Yosvanytanya Barragan PA-C, 10 mg at 09/15/19 8212    NxStage K 4/Ca 3 dialysis solution (RFP-401) 20,000 mL, 20,000 mL, Dialysis, Continuous, Wendy Beltrán MD, 20,000 mL at 09/15/19 0451    nystatin (MYCOSTATIN) cream, , Topical, BID, Latrice Watkins PA-C    octreotide (SandoSTATIN) injection 100 mcg, 100 mcg, Subcutaneous, Q8H [address], Cheo Rodriguez PA-C, 100 mcg at 09/15/19 0531    ondansetron (ZOFRAN) injection 4 mg, 4 mg, Intravenous, Q6H PRN, [facility], [facility], 4 mg at 09/13/19 2152    pantoprazole (PROTONIX) EC tablet 40 mg, 40 mg, Oral, Early Morning, Cheo Rodriguez PA-C, 40 mg at 09/15/19 0517    rifaximin (XIFAXAN) tablet 550 mg, 550 mg, Oral, Q12H [address], Cheo Rodriguez PA-C, 550 mg at 09/15/19 0436    REVIEW OF SYSTEMS     Review of Systems   Constitutional: Positive for fatigue  HENT: Positive for dental problem  Eyes: Negative  Respiratory: Negative      Cardiovascular: Positive for leg swelling  Gastrointestinal: Negative  Endocrine: Negative  Genitourinary: Negative  Musculoskeletal: Negative  Skin: Negative  Allergic/Immunologic: Negative  Neurological: Negative  Hematological: Negative  All other systems reviewed and are negative  OBJECTIVE     Current Weight: Weight - Scale: (!) 184 kg (405 lb 13 9 oz)  Vitals:    09/15/19 0900   BP:    Pulse: 65   Resp: 19   Temp:    SpO2: 99%     Body mass index is 56 61 kg/m²  Intake/Output Summary (Last 24 hours) at 9/15/2019 0937  Last data filed at 9/15/2019 0800  Gross per 24 hour   Intake 1546 ml   Output 6301 ml   Net -4755 ml       PHYSICAL EXAMINATION     Physical Exam   Constitutional: He is oriented to person, place, and time  Obese male who is lying in bed and in no acute distress  HENT:   Head: Normocephalic and atraumatic  Eyes: Pupils are equal, round, and reactive to light  Neck: Neck supple  Cardiovascular: Normal rate and regular rhythm  Murmur heard  Pulmonary/Chest:   Poor inspiratory effort   Abdominal: Soft  Bowel sounds are normal    Musculoskeletal: Normal range of motion  He exhibits edema  Neurological: He is alert and oriented to person, place, and time  Skin: Skin is warm  Psychiatric: He has a normal mood and affect           LAB RESULTS     Results from last 7 days   Lab Units 09/15/19  0445 09/14/19  2353 09/14/19  1800 09/14/19  1211 09/14/19  0435 09/14/19  0019 09/13/19  1732 09/13/19  0528 09/12/19  0735 09/11/19  1012  09/10/19  0540   WBC Thousand/uL 11 39*  --   --   --  16 16* 16 99*  --  11 10* 10 29* 10 28*  --  9 24   HEMOGLOBIN g/dL 7 2*  --   --   --  7 5* 7 6*  --  7 7* 7 6* 6 9*  --  7 2*   HEMATOCRIT % 23 2*  --   --   --  24 1* 24 5*  --  25 0* 23 9* 22 3*  --  22 3*   PLATELETS Thousands/uL 26*  --   --   --  20* 21*  --  26* 28* 36*  --  42*   POTASSIUM mmol/L 4 1 4 2 4 4 4 5 4 6 4 7 4 7 4 8 4 8 5 1   < >  --    CHLORIDE mmol/L 102 102 101 99* 99* 99* 98* 97* 96* 94*   < >  --    CO2 mmol/L 26 28 27 28 28 26 27 22 26 25   < >  --    BUN mg/dL 14 17 19 22 28* 32* 45* 50* 52* 54*   < >  --    CREATININE mg/dL 1 54* 1 82* 1 82* 1 91* 2 30* 2 46* 3 09* 3 12* 3 07* 2 93*   < >  --    EGFR ml/min/1 73sq m 49 40 40 38 30 28 21 21 21 23   < >  --    CALCIUM mg/dL 9 1 8 9 9 0 9 1 8 8 8 5 8 8 8 7 8 7 8 7   < >  --    MAGNESIUM mg/dL 1 9 2 0 2 1 1 9 1 9 1 9 2 2  --   --   --   --   --    PHOSPHORUS mg/dL 2 3* 2 6* 2 7 2 7 3 4 4 0 4 6*  --   --   --   --   --     < > = values in this interval not displayed  RADIOLOGY RESULTS      Results for orders placed during the hospital encounter of 09/06/19   CXR 1 view PA portable stat    Narrative CHEST     INDICATION:   line placement RIJ Hemodialysis catheter  COMPARISON:  September 6, 2019    EXAM PERFORMED/VIEWS:  XR CHEST PORTABLE  2 images    FINDINGS:  Lungs are suboptimally aerated  Elevation of right hemidiaphragm  Cardiomegaly with diminishing pulmonary edema  Persistent prominence of the pulmonary vasculature in the left superior hilum  Right jugular central line tip in the upper SVC  No pneumothorax  Osseous structures appear within normal limits for patient age  Old fracture of right 5th rib  Questionable fracture right 6th rib  Impression Persistent cardiomegaly  Improving pulmonary edema  Satisfactory right jugular central line placement  No pneumothorax  Right rib fractures  Workstation performed: [id number]       Results for orders placed during the hospital encounter of 09/06/19   XR chest 2 views    Narrative CHEST     INDICATION:   SOB     COMPARISON:  8/15/2019    EXAM PERFORMED/VIEWS:  XR CHEST PA & LATERAL  2 views    FINDINGS:  Persistent moderate cardiomegaly  Development of diffuse vascular congestion    The lungs are otherwise clear  No pneumothorax or pleural effusion  Osseous structures appear within normal limits for patient age        Impression Persistent cardiomegaly    Increased diffuse vascular congestion        Workstation performed: [id number]         ASSESSMENT/PLAN     63years old male with past medical history of ROSALES cirrhosis, morbid obesity, anasarca, chronic kidney disease stage 3 who presented to our facility on September 6th with complaints of lower extremity cellulitis and was found to have acute kidney injury, to required pneumonia and cellulitis  1  Oligoanuric acute kidney injury on chronic kidney disease stage III present on admission:  Patient with progressive renal dysfunction likely secondary to hepatorenal syndrome and is dialysis dependent   -patient had undergone intermittent dialysis since admission without much improvement in volume status  -patient was therefore started on CVVHD for better fluid removal   -currently with ultrafiltration target of 300 cc per hour with a dialysis flow rate of 5000 cc per hour and a blood flow rate of 250 cc/minute   -continue to obtain BMP Q 8 along with ionized calcium, magnesium and phosphorus replaced appropriately  -plan to continue CVVHD still patient's volume status improves further   -still remains at risk for complications including spontaneous bleeding, hypotension during dialysis  2  Volume overload:  Secondary to fluid retaining state in a morbidly obese patient with 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  Oligoanuric and HD dependent for fluid removal   Will continue to will fluid aggressive fashion and may increase rate of UF to 350 cc/hour by this afternoon  3  Hyponatremia:  Some improvement in serum sodium noted and up to 135 today as a result of fluid removal     4  Anemia:  Secondary to underlying liver cirrhosis  Current hemoglobin 7 2 and below target  Transfuse to keep hemoglobin above 8     5  Thrombocytopenia: This is likely due to liver cirrhosis  Remains at risk of spontaneous bleeding    Some oozing noted around the catheter site which appears to have been controlled  6  Liver cirrhosis: This is likely secondary to ROSALES  As per GI, given patient with morbid obesity appears to be a poor candidate for liver transplantation  7  Access:  Right IJ temp dialysis catheter          Shelly Sandifer, MD  Nephrology  9/15/2019

## 2019-09-16 ENCOUNTER — APPOINTMENT (INPATIENT)
Dept: ULTRASOUND IMAGING | Facility: HOSPITAL | Age: 59
DRG: 177 | End: 2019-09-16
Payer: COMMERCIAL

## 2019-09-16 ENCOUNTER — APPOINTMENT (INPATIENT)
Dept: CT IMAGING | Facility: HOSPITAL | Age: 59
DRG: 177 | End: 2019-09-16
Payer: COMMERCIAL

## 2019-09-16 ENCOUNTER — APPOINTMENT (INPATIENT)
Dept: RADIOLOGY | Facility: HOSPITAL | Age: 59
DRG: 177 | End: 2019-09-16
Payer: COMMERCIAL

## 2019-09-16 ENCOUNTER — APPOINTMENT (INPATIENT)
Dept: INTERVENTIONAL RADIOLOGY/VASCULAR | Facility: HOSPITAL | Age: 59
DRG: 177 | End: 2019-09-16
Payer: COMMERCIAL

## 2019-09-16 PROBLEM — K80.20 CHOLELITHIASIS: Status: ACTIVE | Noted: 2019-09-16

## 2019-09-16 PROBLEM — J18.9 HCAP (HEALTHCARE-ASSOCIATED PNEUMONIA): Status: RESOLVED | Noted: 2019-09-06 | Resolved: 2019-09-16

## 2019-09-16 PROBLEM — R11.2 NAUSEA & VOMITING: Status: ACTIVE | Noted: 2019-09-16

## 2019-09-16 LAB
ABO GROUP BLD BPU: NORMAL
ALBUMIN SERPL BCP-MCNC: 3.1 G/DL (ref 3.5–5)
ALP SERPL-CCNC: 70 U/L (ref 46–116)
ALT SERPL W P-5'-P-CCNC: 31 U/L (ref 12–78)
AMMONIA PLAS-SCNC: 58 UMOL/L (ref 11–35)
ANION GAP SERPL CALCULATED.3IONS-SCNC: 5 MMOL/L (ref 4–13)
ANION GAP SERPL CALCULATED.3IONS-SCNC: 6 MMOL/L (ref 4–13)
ANION GAP SERPL CALCULATED.3IONS-SCNC: 7 MMOL/L (ref 4–13)
AST SERPL W P-5'-P-CCNC: 52 U/L (ref 5–45)
ATRIAL RATE: 67 BPM
ATRIAL RATE: 68 BPM
BASOPHILS # BLD AUTO: 0.01 THOUSANDS/ΜL (ref 0–0.1)
BASOPHILS # BLD AUTO: 0.02 THOUSANDS/ΜL (ref 0–0.1)
BASOPHILS NFR BLD AUTO: 0 % (ref 0–1)
BASOPHILS NFR BLD AUTO: 0 % (ref 0–1)
BILIRUB DIRECT SERPL-MCNC: 2.85 MG/DL (ref 0–0.2)
BILIRUB SERPL-MCNC: 8.3 MG/DL (ref 0.2–1)
BPU ID: NORMAL
BUN SERPL-MCNC: 10 MG/DL (ref 5–25)
BUN SERPL-MCNC: 11 MG/DL (ref 5–25)
BUN SERPL-MCNC: 9 MG/DL (ref 5–25)
CA-I BLD-SCNC: 1.18 MMOL/L (ref 1.12–1.32)
CA-I BLD-SCNC: 1.18 MMOL/L (ref 1.12–1.32)
CA-I BLD-SCNC: 1.19 MMOL/L (ref 1.12–1.32)
CALCIUM SERPL-MCNC: 9 MG/DL (ref 8.3–10.1)
CALCIUM SERPL-MCNC: 9.1 MG/DL (ref 8.3–10.1)
CALCIUM SERPL-MCNC: 9.3 MG/DL (ref 8.3–10.1)
CHLORIDE SERPL-SCNC: 102 MMOL/L (ref 100–108)
CHLORIDE SERPL-SCNC: 103 MMOL/L (ref 100–108)
CHLORIDE SERPL-SCNC: 104 MMOL/L (ref 100–108)
CO2 SERPL-SCNC: 26 MMOL/L (ref 21–32)
CO2 SERPL-SCNC: 26 MMOL/L (ref 21–32)
CO2 SERPL-SCNC: 27 MMOL/L (ref 21–32)
CREAT SERPL-MCNC: 1.54 MG/DL (ref 0.6–1.3)
CREAT SERPL-MCNC: 1.57 MG/DL (ref 0.6–1.3)
CREAT SERPL-MCNC: 1.58 MG/DL (ref 0.6–1.3)
EOSINOPHIL # BLD AUTO: 0.05 THOUSAND/ΜL (ref 0–0.61)
EOSINOPHIL # BLD AUTO: 0.05 THOUSAND/ΜL (ref 0–0.61)
EOSINOPHIL NFR BLD AUTO: 0 % (ref 0–6)
EOSINOPHIL NFR BLD AUTO: 0 % (ref 0–6)
ERYTHROCYTE [DISTWIDTH] IN BLOOD BY AUTOMATED COUNT: 17.3 % (ref 11.6–15.1)
ERYTHROCYTE [DISTWIDTH] IN BLOOD BY AUTOMATED COUNT: 17.3 % (ref 11.6–15.1)
FIBRINOGEN PPP-MCNC: 72 MG/DL (ref 227–495)
FIBRINOGEN PPP-MCNC: <60 MG/DL (ref 227–495)
GFR SERPL CREATININE-BSD FRML MDRD: 48 ML/MIN/1.73SQ M
GFR SERPL CREATININE-BSD FRML MDRD: 48 ML/MIN/1.73SQ M
GFR SERPL CREATININE-BSD FRML MDRD: 49 ML/MIN/1.73SQ M
GLUCOSE SERPL-MCNC: 114 MG/DL (ref 65–140)
GLUCOSE SERPL-MCNC: 115 MG/DL (ref 65–140)
GLUCOSE SERPL-MCNC: 127 MG/DL (ref 65–140)
HCT VFR BLD AUTO: 22.2 % (ref 36.5–49.3)
HCT VFR BLD AUTO: 22.8 % (ref 36.5–49.3)
HCT VFR BLD AUTO: 23.6 % (ref 36.5–49.3)
HGB BLD-MCNC: 6.9 G/DL (ref 12–17)
HGB BLD-MCNC: 7.1 G/DL (ref 12–17)
HGB BLD-MCNC: 7.2 G/DL (ref 12–17)
IMM GRANULOCYTES # BLD AUTO: 0.46 THOUSAND/UL (ref 0–0.2)
IMM GRANULOCYTES # BLD AUTO: 0.48 THOUSAND/UL (ref 0–0.2)
IMM GRANULOCYTES NFR BLD AUTO: 2 % (ref 0–2)
IMM GRANULOCYTES NFR BLD AUTO: 2 % (ref 0–2)
INR PPP: 2.32 (ref 0.84–1.19)
INR PPP: 2.84 (ref 0.84–1.19)
LIPASE SERPL-CCNC: 217 U/L (ref 73–393)
LYMPHOCYTES # BLD AUTO: 0.39 THOUSANDS/ΜL (ref 0.6–4.47)
LYMPHOCYTES # BLD AUTO: 0.51 THOUSANDS/ΜL (ref 0.6–4.47)
LYMPHOCYTES NFR BLD AUTO: 2 % (ref 14–44)
LYMPHOCYTES NFR BLD AUTO: 2 % (ref 14–44)
MAGNESIUM SERPL-MCNC: 1.8 MG/DL (ref 1.6–2.6)
MAGNESIUM SERPL-MCNC: 1.9 MG/DL (ref 1.6–2.6)
MAGNESIUM SERPL-MCNC: 2 MG/DL (ref 1.6–2.6)
MCH RBC QN AUTO: 36.6 PG (ref 26.8–34.3)
MCH RBC QN AUTO: 36.7 PG (ref 26.8–34.3)
MCHC RBC AUTO-ENTMCNC: 31.1 G/DL (ref 31.4–37.4)
MCHC RBC AUTO-ENTMCNC: 31.1 G/DL (ref 31.4–37.4)
MCV RBC AUTO: 118 FL (ref 82–98)
MCV RBC AUTO: 118 FL (ref 82–98)
MONOCYTES # BLD AUTO: 2.38 THOUSAND/ΜL (ref 0.17–1.22)
MONOCYTES # BLD AUTO: 2.53 THOUSAND/ΜL (ref 0.17–1.22)
MONOCYTES NFR BLD AUTO: 11 % (ref 4–12)
MONOCYTES NFR BLD AUTO: 12 % (ref 4–12)
NEUTROPHILS # BLD AUTO: 17.33 THOUSANDS/ΜL (ref 1.85–7.62)
NEUTROPHILS # BLD AUTO: 18.67 THOUSANDS/ΜL (ref 1.85–7.62)
NEUTS SEG NFR BLD AUTO: 84 % (ref 43–75)
NEUTS SEG NFR BLD AUTO: 85 % (ref 43–75)
NRBC BLD AUTO-RTO: 0 /100 WBCS
NRBC BLD AUTO-RTO: 0 /100 WBCS
P AXIS: 14 DEGREES
P AXIS: 48 DEGREES
PHOSPHATE SERPL-MCNC: 2.2 MG/DL (ref 2.7–4.5)
PHOSPHATE SERPL-MCNC: 2.3 MG/DL (ref 2.7–4.5)
PHOSPHATE SERPL-MCNC: 2.4 MG/DL (ref 2.7–4.5)
PLATELET # BLD AUTO: 29 THOUSANDS/UL (ref 149–390)
PLATELET # BLD AUTO: 30 THOUSANDS/UL (ref 149–390)
PMV BLD AUTO: 11.5 FL (ref 8.9–12.7)
PMV BLD AUTO: 11.7 FL (ref 8.9–12.7)
POTASSIUM SERPL-SCNC: 4.1 MMOL/L (ref 3.5–5.3)
PR INTERVAL: 190 MS
PR INTERVAL: 200 MS
PROT SERPL-MCNC: 7.1 G/DL (ref 6.4–8.2)
PROTHROMBIN TIME: 25.8 SECONDS (ref 11.6–14.5)
PROTHROMBIN TIME: 30.3 SECONDS (ref 11.6–14.5)
QRS AXIS: -37 DEGREES
QRS AXIS: -50 DEGREES
QRSD INTERVAL: 108 MS
QRSD INTERVAL: 98 MS
QT INTERVAL: 420 MS
QT INTERVAL: 428 MS
QTC INTERVAL: 446 MS
QTC INTERVAL: 452 MS
RBC # BLD AUTO: 1.88 MILLION/UL (ref 3.88–5.62)
RBC # BLD AUTO: 1.94 MILLION/UL (ref 3.88–5.62)
SODIUM SERPL-SCNC: 135 MMOL/L (ref 136–145)
SODIUM SERPL-SCNC: 135 MMOL/L (ref 136–145)
SODIUM SERPL-SCNC: 136 MMOL/L (ref 136–145)
T WAVE AXIS: 68 DEGREES
T WAVE AXIS: 81 DEGREES
UNIT DISPENSE STATUS: NORMAL
UNIT PRODUCT CODE: NORMAL
UNIT RH: NORMAL
VENTRICULAR RATE: 67 BPM
VENTRICULAR RATE: 68 BPM
WBC # BLD AUTO: 20.89 THOUSAND/UL (ref 4.31–10.16)
WBC # BLD AUTO: 21.99 THOUSAND/UL (ref 4.31–10.16)

## 2019-09-16 PROCEDURE — 85610 PROTHROMBIN TIME: CPT | Performed by: NURSE PRACTITIONER

## 2019-09-16 PROCEDURE — NC001 PR NO CHARGE: Performed by: RADIOLOGY

## 2019-09-16 PROCEDURE — 74018 RADEX ABDOMEN 1 VIEW: CPT

## 2019-09-16 PROCEDURE — P9035 PLATELET PHERES LEUKOREDUCED: HCPCS

## 2019-09-16 PROCEDURE — 76705 ECHO EXAM OF ABDOMEN: CPT

## 2019-09-16 PROCEDURE — 84100 ASSAY OF PHOSPHORUS: CPT | Performed by: INTERNAL MEDICINE

## 2019-09-16 PROCEDURE — 71250 CT THORAX DX C-: CPT

## 2019-09-16 PROCEDURE — 85018 HEMOGLOBIN: CPT | Performed by: PHYSICIAN ASSISTANT

## 2019-09-16 PROCEDURE — C1769 GUIDE WIRE: HCPCS

## 2019-09-16 PROCEDURE — 99291 CRITICAL CARE FIRST HOUR: CPT | Performed by: ANESTHESIOLOGY

## 2019-09-16 PROCEDURE — 87081 CULTURE SCREEN ONLY: CPT | Performed by: NURSE PRACTITIONER

## 2019-09-16 PROCEDURE — 85025 COMPLETE CBC W/AUTO DIFF WBC: CPT | Performed by: NURSE PRACTITIONER

## 2019-09-16 PROCEDURE — 71045 X-RAY EXAM CHEST 1 VIEW: CPT

## 2019-09-16 PROCEDURE — C9113 INJ PANTOPRAZOLE SODIUM, VIA: HCPCS | Performed by: NURSE PRACTITIONER

## 2019-09-16 PROCEDURE — 83735 ASSAY OF MAGNESIUM: CPT | Performed by: INTERNAL MEDICINE

## 2019-09-16 PROCEDURE — P9017 PLASMA 1 DONOR FRZ W/IN 8 HR: HCPCS

## 2019-09-16 PROCEDURE — 80048 BASIC METABOLIC PNL TOTAL CA: CPT | Performed by: INTERNAL MEDICINE

## 2019-09-16 PROCEDURE — 93010 ELECTROCARDIOGRAM REPORT: CPT | Performed by: INTERNAL MEDICINE

## 2019-09-16 PROCEDURE — 74176 CT ABD & PELVIS W/O CONTRAST: CPT

## 2019-09-16 PROCEDURE — 83690 ASSAY OF LIPASE: CPT | Performed by: NURSE PRACTITIONER

## 2019-09-16 PROCEDURE — 82140 ASSAY OF AMMONIA: CPT | Performed by: STUDENT IN AN ORGANIZED HEALTH CARE EDUCATION/TRAINING PROGRAM

## 2019-09-16 PROCEDURE — 87070 CULTURE OTHR SPECIMN AEROBIC: CPT | Performed by: NURSE PRACTITIONER

## 2019-09-16 PROCEDURE — 70450 CT HEAD/BRAIN W/O DYE: CPT

## 2019-09-16 PROCEDURE — 80076 HEPATIC FUNCTION PANEL: CPT | Performed by: NURSE PRACTITIONER

## 2019-09-16 PROCEDURE — 85384 FIBRINOGEN ACTIVITY: CPT | Performed by: NURSE PRACTITIONER

## 2019-09-16 PROCEDURE — 0F9430Z DRAINAGE OF GALLBLADDER WITH DRAINAGE DEVICE, PERCUTANEOUS APPROACH: ICD-10-PCS | Performed by: RADIOLOGY

## 2019-09-16 PROCEDURE — 90945 DIALYSIS ONE EVALUATION: CPT | Performed by: INTERNAL MEDICINE

## 2019-09-16 PROCEDURE — 85610 PROTHROMBIN TIME: CPT | Performed by: PHYSICIAN ASSISTANT

## 2019-09-16 PROCEDURE — C1729 CATH, DRAINAGE: HCPCS

## 2019-09-16 PROCEDURE — 99232 SBSQ HOSP IP/OBS MODERATE 35: CPT | Performed by: INTERNAL MEDICINE

## 2019-09-16 PROCEDURE — 99292 CRITICAL CARE ADDL 30 MIN: CPT | Performed by: ANESTHESIOLOGY

## 2019-09-16 PROCEDURE — 87040 BLOOD CULTURE FOR BACTERIA: CPT | Performed by: NURSE PRACTITIONER

## 2019-09-16 PROCEDURE — 93005 ELECTROCARDIOGRAM TRACING: CPT

## 2019-09-16 PROCEDURE — 47490 INCISION OF GALLBLADDER: CPT | Performed by: RADIOLOGY

## 2019-09-16 PROCEDURE — 85014 HEMATOCRIT: CPT | Performed by: PHYSICIAN ASSISTANT

## 2019-09-16 PROCEDURE — 87205 SMEAR GRAM STAIN: CPT | Performed by: NURSE PRACTITIONER

## 2019-09-16 PROCEDURE — 47490 INCISION OF GALLBLADDER: CPT

## 2019-09-16 PROCEDURE — 90945 DIALYSIS ONE EVALUATION: CPT

## 2019-09-16 PROCEDURE — 82330 ASSAY OF CALCIUM: CPT | Performed by: INTERNAL MEDICINE

## 2019-09-16 RX ORDER — CEFEPIME HYDROCHLORIDE 2 G/1
1000 INJECTION, POWDER, FOR SOLUTION INTRAVENOUS EVERY 12 HOURS
Status: DISCONTINUED | OUTPATIENT
Start: 2019-09-16 | End: 2019-09-16

## 2019-09-16 RX ORDER — MAGNESIUM SULFATE 1 G/100ML
1 INJECTION INTRAVENOUS ONCE
Status: COMPLETED | OUTPATIENT
Start: 2019-09-16 | End: 2019-09-16

## 2019-09-16 RX ORDER — DROPERIDOL 2.5 MG/ML
1.25 INJECTION, SOLUTION INTRAMUSCULAR; INTRAVENOUS ONCE
Status: DISCONTINUED | OUTPATIENT
Start: 2019-09-16 | End: 2019-09-16

## 2019-09-16 RX ORDER — MIDODRINE HYDROCHLORIDE 5 MG/1
15 TABLET ORAL
Status: DISCONTINUED | OUTPATIENT
Start: 2019-09-16 | End: 2019-09-25 | Stop reason: HOSPADM

## 2019-09-16 RX ORDER — CEFTRIAXONE 2 G/50ML
2000 INJECTION, SOLUTION INTRAVENOUS EVERY 24 HOURS
Status: DISCONTINUED | OUTPATIENT
Start: 2019-09-16 | End: 2019-09-16

## 2019-09-16 RX ORDER — DIPHENHYDRAMINE HYDROCHLORIDE 50 MG/ML
25 INJECTION INTRAMUSCULAR; INTRAVENOUS ONCE
Status: COMPLETED | OUTPATIENT
Start: 2019-09-16 | End: 2019-09-16

## 2019-09-16 RX ORDER — LIDOCAINE HYDROCHLORIDE 10 MG/ML
INJECTION, SOLUTION INFILTRATION; PERINEURAL CODE/TRAUMA/SEDATION MEDICATION
Status: COMPLETED | OUTPATIENT
Start: 2019-09-16 | End: 2019-09-16

## 2019-09-16 RX ORDER — MIDODRINE HYDROCHLORIDE 5 MG/1
5 TABLET ORAL ONCE
Status: COMPLETED | OUTPATIENT
Start: 2019-09-16 | End: 2019-09-16

## 2019-09-16 RX ORDER — PANTOPRAZOLE SODIUM 40 MG/1
40 INJECTION, POWDER, FOR SOLUTION INTRAVENOUS 2 TIMES DAILY
Status: DISCONTINUED | OUTPATIENT
Start: 2019-09-16 | End: 2019-09-25 | Stop reason: HOSPADM

## 2019-09-16 RX ADMIN — Medication 20000 ML: at 20:27

## 2019-09-16 RX ADMIN — OCTREOTIDE ACETATE 100 MCG: 100 INJECTION, SOLUTION INTRAVENOUS; SUBCUTANEOUS at 05:02

## 2019-09-16 RX ADMIN — METRONIDAZOLE 500 MG: 500 INJECTION, SOLUTION INTRAVENOUS at 17:00

## 2019-09-16 RX ADMIN — MIDODRINE HYDROCHLORIDE 10 MG: 5 TABLET ORAL at 07:27

## 2019-09-16 RX ADMIN — CEFEPIME HYDROCHLORIDE 1000 MG: 1 INJECTION, POWDER, FOR SOLUTION INTRAMUSCULAR; INTRAVENOUS at 21:11

## 2019-09-16 RX ADMIN — ONDANSETRON 8 MG: 2 INJECTION INTRAMUSCULAR; INTRAVENOUS at 02:17

## 2019-09-16 RX ADMIN — NYSTATIN 1 APPLICATION: 100000 CREAM TOPICAL at 18:10

## 2019-09-16 RX ADMIN — MAGNESIUM SULFATE HEPTAHYDRATE 1 G: 1 INJECTION, SOLUTION INTRAVENOUS at 19:44

## 2019-09-16 RX ADMIN — SODIUM PHOSPHATE, MONOBASIC, MONOHYDRATE 6 MMOL: 276; 142 INJECTION, SOLUTION INTRAVENOUS at 19:50

## 2019-09-16 RX ADMIN — SODIUM PHOSPHATE, MONOBASIC, MONOHYDRATE 6 MMOL: 276; 142 INJECTION, SOLUTION INTRAVENOUS at 15:19

## 2019-09-16 RX ADMIN — CEFTRIAXONE 2000 MG: 2 INJECTION, SOLUTION INTRAVENOUS at 06:04

## 2019-09-16 RX ADMIN — PHENYLEPHRINE HYDROCHLORIDE 30 MCG/MIN: 10 INJECTION INTRAVENOUS at 16:42

## 2019-09-16 RX ADMIN — METRONIDAZOLE 500 MG: 500 INJECTION, SOLUTION INTRAVENOUS at 05:02

## 2019-09-16 RX ADMIN — MAGNESIUM SULFATE HEPTAHYDRATE 1 G: 1 INJECTION, SOLUTION INTRAVENOUS at 12:45

## 2019-09-16 RX ADMIN — DIPHENHYDRAMINE HYDROCHLORIDE 25 MG: 50 INJECTION, SOLUTION INTRAMUSCULAR; INTRAVENOUS at 01:07

## 2019-09-16 RX ADMIN — Medication 20000 ML: at 05:20

## 2019-09-16 RX ADMIN — CEFEPIME HYDROCHLORIDE 1000 MG: 1 INJECTION, POWDER, FOR SOLUTION INTRAMUSCULAR; INTRAVENOUS at 10:36

## 2019-09-16 RX ADMIN — LACTULOSE 200 G: 10 SOLUTION ORAL at 11:20

## 2019-09-16 RX ADMIN — OCTREOTIDE ACETATE 100 MCG: 100 INJECTION, SOLUTION INTRAVENOUS; SUBCUTANEOUS at 16:45

## 2019-09-16 RX ADMIN — MIDODRINE HYDROCHLORIDE 10 MG: 5 TABLET ORAL at 11:30

## 2019-09-16 RX ADMIN — MIDODRINE HYDROCHLORIDE 15 MG: 5 TABLET ORAL at 16:51

## 2019-09-16 RX ADMIN — SODIUM PHOSPHATE, MONOBASIC, MONOHYDRATE 6 MMOL: 276; 142 INJECTION, SOLUTION INTRAVENOUS at 02:42

## 2019-09-16 RX ADMIN — OCTREOTIDE ACETATE 100 MCG: 100 INJECTION, SOLUTION INTRAVENOUS; SUBCUTANEOUS at 21:13

## 2019-09-16 RX ADMIN — MIDODRINE HYDROCHLORIDE 5 MG: 5 TABLET ORAL at 14:00

## 2019-09-16 RX ADMIN — LACTULOSE 200 G: 10 SOLUTION ORAL at 21:19

## 2019-09-16 RX ADMIN — RIFAXIMIN 550 MG: 550 TABLET ORAL at 16:47

## 2019-09-16 RX ADMIN — CALCIUM GLUCONATE 1 G: 98 INJECTION, SOLUTION INTRAVENOUS at 19:50

## 2019-09-16 RX ADMIN — LACTULOSE 200 G: 10 SOLUTION ORAL at 17:55

## 2019-09-16 RX ADMIN — PANTOPRAZOLE SODIUM 40 MG: 40 INJECTION, POWDER, LYOPHILIZED, FOR SOLUTION INTRAVENOUS at 17:55

## 2019-09-16 RX ADMIN — CALCIUM GLUCONATE 1 G: 98 INJECTION, SOLUTION INTRAVENOUS at 04:56

## 2019-09-16 RX ADMIN — METRONIDAZOLE 500 MG: 500 INJECTION, SOLUTION INTRAVENOUS at 21:51

## 2019-09-16 RX ADMIN — LIDOCAINE HYDROCHLORIDE 10 ML: 10 INJECTION, SOLUTION INFILTRATION; PERINEURAL at 14:38

## 2019-09-16 RX ADMIN — CALCIUM GLUCONATE 1 G: 98 INJECTION, SOLUTION INTRAVENOUS at 13:52

## 2019-09-16 RX ADMIN — NYSTATIN 1 APPLICATION: 100000 CREAM TOPICAL at 08:49

## 2019-09-16 RX ADMIN — PANTOPRAZOLE SODIUM 40 MG: 40 INJECTION, POWDER, LYOPHILIZED, FOR SOLUTION INTRAVENOUS at 05:02

## 2019-09-16 NOTE — CONSULTS
IR Consult Note    HPI:  63year old male with ROSALES cirrhosis was found to have findings concerning for acute cholecystitis and is referred for cholecystostomy tube placement  PMH:  HTN  ROSALES cirrhosis  MI  CKD    PSH:  Knee surgery    Physical exam:  BP (!) 112/42   Pulse 71   Temp 98 °F (36 7 °C)   Resp 18   Ht 5' 11" (1 803 m)   Wt (!) 176 kg (388 lb 0 2 oz)   SpO2 100%   BMI 54 12 kg/m²   Gen: NAD  Abd: Soft    A/P:  63year old male with ROSALES cirrhosis was found to have findings concerning for acute cholecystitis      - Cholecystostomy tube placement

## 2019-09-16 NOTE — ASSESSMENT & PLAN NOTE
Nutrition consulted for obesity  Monitor closely for worsening alveolar hypoventilation due to increasing ascites

## 2019-09-16 NOTE — ASSESSMENT & PLAN NOTE
Hepatic metabolic in nature  Continue rifaximin and lactulose  Monitor ammonia levels  Serial neuro exams  Monitor for delirium  Encourage good sleep hygiene  Avoid sedating medications  Mental status waxes and wanes

## 2019-09-16 NOTE — RESPIRATORY THERAPY NOTE
Pt remains on a nasal cannula at this time  HFNC will remain on standby at this time  If the patient starts to desat he will be placed back on the HFNC due to his inability to wear the BiPAP at this time

## 2019-09-16 NOTE — ASSESSMENT & PLAN NOTE
Gastroenterology has signed off  Meld score 37 with a mortality rate of 53% as of 9/15  Supportive care at present  Continue CRRT, octreotide, and midodrine for his hepatorenal syndrome

## 2019-09-16 NOTE — PROGRESS NOTES
Progress Note Joie Nolasco 1960, 63 y o  male MRN: [id number]    Unit/Bed#:  Encounter: [id number]    Primary Care Provider: No primary care provider on file  Date and time admitted to hospital: 9/6/2019  5:35 PM        Cholelithiasis  Assessment & Plan  Control vomiting with Zofran  Right upper quadrant ultrasound ordered  May need to re-consult Gastroenterology  Rocephin and Flagyl ordered due to doubled white count  Nausea & vomiting  Assessment & Plan  NPO status  Zofran as needed for nausea vomiting  Benadryl x1  Droperidol non formulary  Lipase within normal limits  Cannot exclude gastroparesis secondary to ROSALES  Patient with nontender, distended abdomen  Bowel sounds remain active in all 4 quadrants  Unsure if abdominal exam is valid given his encephalopathy  KUB severely limited by his habitus and poor penetration  Hesitant to place NG tube due to thrombocytopenia and potential for varices  CT abdomen pelvis reveals layering cholelithiasis with gastric distension and a dilated gallbladder  Right upper quadrant ultrasound ordered  Due to leukocytosis that has doubled in the last 24 hours, Rocephin and Flagyl started empirically  Patient remains afebrile  Coagulopathy ([facility] [address] )  Assessment & Plan  Attributed to his ROSALES related cirrhosis  INR remains elevated-continue to monitor  Poor p o  Intake  Vitamin K as needed  Continue pantoprazole for GI protection  Obesity with alveolar hypoventilation [facility])  Assessment & Plan  Nutrition consulted for obesity  Monitor closely for worsening alveolar hypoventilation due to increasing ascites  Thrombocytopenia ([facility] [address] )  Assessment & Plan  Related to liver disease  Transfuse for platelet count less than 20,000 due to high risk for spontaneous bleeding  Continue to monitor closely  Holding anticoagulation at present due to risk of bleeding  Encephalopathy acute  Assessment & Plan  Hepatic metabolic in nature    Continue rifaximin and lactulose  Monitor ammonia levels  Serial neuro exams  Monitor for delirium  Encourage good sleep hygiene  Avoid sedating medications  Mental status waxes and wanes  Hepatorenal syndrome [facility])  Assessment & Plan  Gastroenterology has signed off and nephrology continues to follow  Meld score 37 9/15  Coagulopathic  Continue CRRT for aggressive fluid removal   Continue octreotide and midodrine  Hyponatremia  Assessment & Plan  Hypovolemic hyponatremia  Continue CRRT  Nephrology following  Sodium stabilizing  Anemia  Assessment & Plan  Multifactorial   Continue to monitor and transfuse if necessary for hemoglobin less than 7 0  Oozing noted yesterday from hemodialysis catheter site and previous attempt PICC site  Controlled with pressure dressings and direct pressure  Morbid obesity [facility])  Assessment & Plan  Nutrition consulted  Poor p o  Intake at present  Essential hypertension  Assessment & Plan  Continue monitor closely  Hemodynamically stable off antihypertensives    CROW on CPAP  Assessment & Plan  HS BiPAP as needed for CROW  Unable to use this evening due to vomiting  * Liver cirrhosis secondary to [facility])  Assessment & Plan  Gastroenterology has signed off  Meld score 37 with a mortality rate of 53% as of 9/15  Supportive care at present  Continue CRRT, octreotide, and midodrine for his hepatorenal syndrome          Progress Note - Critical Care   Ermias Nelson 63 y o  male MRN: [id number]  Unit/Bed#:  Encounter: [id number]    Attending Physician: Melinda Peterson MD      ______________________________________________________________________  Assessment and Plan:   Principal Problem:    Liver cirrhosis secondary to [facility])  Active Problems:    CROW on CPAP    Essential hypertension    Hyperbilirubinemia    Morbid obesity ([facility] [address] )    Anemia    Hyponatremia    Hepatorenal syndrome ([facility] [address] )    Encephalopathy acute    Thrombocytopenia ([facility] [address] )    Obesity with alveolar hypoventilation (HCC)    Coagulopathy (HCC)    Nausea & vomiting    Cholelithiasis  Resolved Problems:    Cellulitis of right lower extremity    HCAP (healthcare-associated pneumonia)    Hypotension        Disposition:  Continue ICU level of care due to multi-system organ dysfunction    Code Status: Level 1 - Full Code    Counseling / Coordination of Care  Total Critical Care time spent 46 minutes excluding procedures, teaching and family updates  ______________________________________________________________________    24 Hour Events:   Patient with significant nausea and vomiting overnight  KUB limited due to poor penetration and body habitus  CT of the abdomen revealed layering cholelithiasis with the markedly distended gallbladder and gastric distension  Decision to defer NG tube secondary to thrombocytopenia and possibility of varices  Nausea controlled with Zofran  Will obtain right upper quadrant ultrasound to attempt better visualization of the gallbladder     ______________________________________________________________________    Physical Exam:   GEN:  Morbidly obese, appears stated age, no acute distress  HEENT:  Sclera icteric, mucous membranes pink and moist, conjunctiva pink, no marisol/rhinorrhea  Neck:  No lymphadenopathy, normal ROM, supple, no bruits  CV :  S1S2, distant, regular, no murmurs, rubs or gallops  Intact distal pulses  Mild JVD  Resp:  Lungs diminished throughout  No subq air or crepitus  Symmetrical expansion  No cough noted  GI :  Abd soft, nontender, no guarding/rebound, distended, hypoactive bowel sounds X4 quads, no organomegaly appreciated due to distention and ascites, negative Diamond sign  Neuro:  CN II-XII grossly intact, nonfocal exam, speech clear, GCS 15    ______________________________________________________________________  Blood pressure 111/55, pulse 74, temperature 98 2 °F (36 8 °C), resp   rate (!) 26, height 5' 11" (1 803 m), weight (!) 177 kg (389 lb 8 9 oz), SpO2 98 %  Temperature:   Temp (24hrs), Av 7 °F (36 5 °C), Min:97 5 °F (36 4 °C), Max:98 2 °F (36 8 °C)    Current Temperature: 98 2 °F (36 8 °C)  Weights:   IBW: 75 3 kg    Body mass index is 54 33 kg/m²  Weight (last 2 days)     Date/Time   Weight    19 0400   (!) 177 (389 55)    09/15/19 0600   (!) 184 (405 87)    19 0841   (!) 190 (418 87)              Non-Invasive/Invasive Ventilation Settings:  Respiratory    Lab Data (Last 4 hours)    None         O2/Vent Data (Last 4 hours)    None              No results found for: PHART, TFE2IOE, PO2ART, OMU2VYA, J6VFJDMT, BEART, SOURCE  SpO2: SpO2: 98 %  Intake and Outputs:  I/O        07 - 09/15 0700 09/15 07 -  0700    P  O  535 800    I V  (mL/kg) 264 (1 4)     Blood  250    IV Piggyback 847 557    Total Intake(mL/kg) 1646 (8 9) 1607 (9 1)    Other 6442 5739    Stool 0 0    Total Output 6442 5739    Net -3977 -1636          Unmeasured Stool Occurrence 5 x 4 x          Nutrition:        Diet Orders   (From admission, onward)             Start     Ordered    19 0324  Diet NPO  Diet effective now     Question Answer Comment   Diet Type NPO    RD to adjust diet per protocol? No        19 0323                Labs:   Pending      Imaging:  CT chest abdomen pelvis wo contrast   Final Result      Patchy nodular airspace disease throughout the left lung consistent with an acute infiltrate, likely of infectious or inflammatory etiology  Findings have progressed since CT examination of 2019      Distended gallbladder with evidence of layering cholelithiasis  Cholecystitis cannot be excluded, and gallbladder ultrasound may be considered  Partial collapse of the right lower lobe  Small right pleural effusion        Cirrhosis with evidence of portal hypertension               Workstation performed: [id number]         CT head wo contrast   Final Result      Extensive metallic artifact, likely secondary to an object in the patient's vicinity, limits evaluation  Within this limitation no gross evidence of intracranial abnormality  Workstation performed: [id number]         CXR 1 view PA portable stat   Final Result   Persistent cardiomegaly  Improving pulmonary edema  Satisfactory right jugular central line placement  No pneumothorax  Right rib fractures  Workstation performed: [id number]         IR temp HD cath   Final Result   Impression:   1  Successful ultrasound and fluoroscopically guided placement of a double-lumen temporary dialysis central venous catheter via the right internal jugular vein  The tip of the catheter is at the cavoatrial junction and may be used immediately  Workstation performed: [id number]         CT tibia fibula right wo contrast   Final Result   Soft tissue evaluation is limited without IV contrast    1    No evidence of soft tissue gas  2   Generalized subcutaneous edema with associated skin thickening  Edema noted adjacent to the calf musculature  Findings are nonspecific and may be related to generalized anasarca  3   The absence of soft tissue gas does not exclude necrotizing fasciitis particularly at the early stages  If there is persistent concern for necrotizing fasciitis, consider follow-up with MRI with and without contrast          Workstation performed: [id number]         CT femur right wo contrast   Final Result   Soft tissue evaluation is limited without IV contrast    1    No evidence of soft tissue gas  2   Generalized subcutaneous edema with associated skin thickening  Edema noted adjacent to the calf musculature  Findings are nonspecific and may be related to generalized anasarca  3   The absence of soft tissue gas does not exclude necrotizing fasciitis particularly at the early stages    If there is persistent concern for necrotizing fasciitis, consider follow-up with MRI with and without contrast          Workstation performed: [id number]         CT chest abdomen pelvis wo contrast   Final Result   Exam is limited without IV and oral contrast particularly for soft tissue and bowel evaluation  1   Scattered mild patchy opacities bilaterally suspicious for pneumonia  2  Cirrhotic appearance to the liver  Splenomegaly  3   Mild ascites  4   Diffuse subcutaneous edema compatible with anasarca  Workstation performed: [id number]         XR chest 2 views   ED Interpretation   Limited film due to body habitus and poor inspiratory effort  Cardiomegaly present  Increased vascular congestion bilaterally  Final Result   Persistent cardiomegaly      Increased diffuse vascular congestion            Workstation performed: [id number]         XR abdomen 1 view kub    (Results Pending)   XR chest portable ICU    (Results Pending)   US gallbladder    (Results Pending)       EKG:  Sinus rhythm on cardiac monitor  Micro:  Lab Results   Component Value Date    BLOODCX No Growth After 5 Days  09/06/2019    BLOODCX No Growth After 5 Days  09/06/2019    BLOODCX No Growth After 5 Days  08/15/2019    BLOODCX No Growth After 5 Days  08/15/2019    SPUTUMCULTUR 2+ Growth of Candida albicans (A) 09/08/2019    SPUTUMCULTUR 2+ Growth of Moraxella catarrhalis (A) 09/08/2019    SPUTUMCULTUR 2+ Growth of  09/08/2019     Allergies:    Allergies   Allergen Reactions    Lactose     Propranolol Eye Swelling    Torsemide Eye Swelling     Medications:   Scheduled Meds:  Current Facility-Administered Medications:  calcium gluconate 1 G  100 mL IVPB 1 g Intravenous Once Taylor Corbett MD Last Rate: 1 g (09/16/19 0456)   cefTRIAXone 2,000 mg Intravenous Q24H Time MARKOS Castellanos    lactulose 20 g Oral BID Neelima Reyes PA-C    metroNIDAZOLE 500 mg Intravenous [address], CRNP    midodrine 10 mg Oral TID [facility] Ricky Veliz PA-C    NxStage K 4/Ca 3 20,000 mL Dialysis Continuous Jose Story MD    nystatin Topical BID Christopher Crystal PA-C    octreotide 100 mcg Subcutaneous [facility] Sukhdev Bell PA-C    ondansetron 4 mg Intravenous Q6H PRN MARKOS Thomas    pantoprazole 40 mg Intravenous BID MARKOS Thomas    rifaximin 550 mg Oral Q12H [facility] Sukhdev Bell PA-C      Continuous Infusions:  NxStage K 4/Ca 3 20,000 mL     PRN Meds:    ondansetron 4 mg Q6H PRN     VTE Pharmacologic Prophylaxis: Deferred secondary to thrombocytopenia and anemia  VTE Mechanical Prophylaxis: sequential compression device  Invasive lines and devices: Invasive Devices     Peripheral Intravenous Line            Peripheral IV 09/13/19 Distal;Right;Upper;Ventral (anterior) Arm 2 days    Peripheral IV 09/13/19 Right;Ventral (anterior) Forearm 2 days          Arterial Line            Arterial Line 09/13/19 Radial 2 days          Line            Temporary HD Catheter 5 days                     Portions of the record may have been created with voice recognition software  Occasional wrong word or "sound a like" substitutions may have occurred due to the inherent limitations of voice recognition software  Read the chart carefully and recognize, using context, where substitutions have occurred      MARKOS Thomas

## 2019-09-16 NOTE — ASSESSMENT & PLAN NOTE
Related to liver disease  Transfuse for platelet count less than 20,000 due to high risk for spontaneous bleeding  Continue to monitor closely  Holding anticoagulation at present due to risk of bleeding

## 2019-09-16 NOTE — RESPIRATORY THERAPY NOTE
Pt not placed on BiPAP due to the fact that the patient continues to vomit  Will continue to monitor the patient if the SpO2 falls patient will be placed on HFNC

## 2019-09-16 NOTE — BRIEF OP NOTE (RAD/CATH)
Cholecystostomy tube placement  Procedure Note    PATIENT NAME: Melissa Patient  : 1960  MRN: [id number]     Pre-op Diagnosis:   1  Acute on chronic kidney failure ([facility] [facility] )    2  Anasarca    3  Bilateral pneumonia    4  Cellulitis of right lower extremity    5  Cellulitis of scrotum    6  Hyponatremia    7  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 ([facility] )    8  CROW on CPAP    9  Hepatorenal syndrome (HCC)      Post-op Diagnosis:   1  Acute on chronic kidney failure ([facility] [address] )    2  Anasarca    3  Bilateral pneumonia    4  Cellulitis of right lower extremity    5  Cellulitis of scrotum    6  Hyponatremia    7  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 ([facility] )    8  CROW on CPAP    9  Hepatorenal syndrome [facility])        Surgeon:   Claudean Alderman, MD    Estimated Blood Loss: 1 mL    Findings: Successful cholecystostomy tube placement using 8 5 Fr catheter  Specimens: 20 mL dark bile sent to lab      Complications:  None    Anesthesia: Local    Claudean Alderman, MD     Date: 2019  Time: 2:56 PM

## 2019-09-16 NOTE — ASSESSMENT & PLAN NOTE
NPO status  Zofran as needed for nausea vomiting  Benadryl x1  Droperidol non formulary  Lipase within normal limits  Cannot exclude gastroparesis secondary to ROSALES  Patient with nontender, distended abdomen  Bowel sounds remain active in all 4 quadrants  Unsure if abdominal exam is valid given his encephalopathy  KUB severely limited by his habitus and poor penetration  Hesitant to place NG tube due to thrombocytopenia and potential for varices  CT abdomen pelvis reveals layering cholelithiasis with gastric distension and a dilated gallbladder  Right upper quadrant ultrasound ordered  Due to leukocytosis that has doubled in the last 24 hours, Rocephin and Flagyl started empirically  Patient remains afebrile

## 2019-09-16 NOTE — DISCHARGE INSTRUCTIONS
TUBE CARE INSTRUCTIONS    Care after your procedure:    Resume your normal diet  Small sips of flat soda will help with nausea  1  The properly functioning catheter should be forward flushed once (1x) daily with 10ml of normal saline using clean technique  You will be given a prescription for flushes  To flush the tube, clean both connections with alcohol swab  Twist off the drainage bag/ bulb  tubing and twist the saline syringe into the drainage tube and flush  Remove the syringe and twist the drainage bag / bulb tubing tubing back on     2  The drainage bag/bulb may be emptied as necessary  Keep a record of the amount of fluid you drain from your tube  This should be done with clean technique as well  3  A fresh dressing should be applied daily over the tube insertion site  4  As the tube is secured to the skin with only a suture,try not to pull on your tube  Tub baths are not permitted  Showers are permitted if the patient's skin entry site is prevented from getting wet  Similarly, washcloth "baths" are acceptable  Contact Interventional Radiology at [phone] Lisa PATIENTS: Contact Interventional Radiology at [phone]) Todd Ba PATIENTS: Contact Interventional Radiology at [phone]) if:    1  Leakage or large amounts of liquid around the catheter  2  Fever of 101 degrees lasting several hours without other obvious cause (such as sore throat, flu, etc)  3  Persistent nausea or vomiting  4  Diminished drainage, which may be associated with pressure or pain  Or when the     drainage from your tube is less than 10mls for 48 hours  5  Catheter pulled back or falls out  The following pharmacies carry the flush syringes         [facility]                     [facility]  [address] [facility]                         [address] [facility]  Phone [phone]            Phone [phone]   [place] [address]                                [phone]  [address] [facility] Bhupendra FARIA                      [address] [facility]  Phone [phone]            Phone [phone]                      Rick Levine                                                                                                          [phone]  [facility]    [address]  Phone [phone]

## 2019-09-16 NOTE — ASSESSMENT & PLAN NOTE
Multifactorial   Continue to monitor and transfuse if necessary for hemoglobin less than 7 0  Oozing noted yesterday from hemodialysis catheter site and previous attempt PICC site  Controlled with pressure dressings and direct pressure

## 2019-09-16 NOTE — ASSESSMENT & PLAN NOTE
Called and left VM for patient to return call for results.   Continue monitor closely    Hemodynamically stable off antihypertensives

## 2019-09-16 NOTE — ASSESSMENT & PLAN NOTE
Attributed to his ROSALES related cirrhosis  INR remains elevated-continue to monitor  Poor p o  Intake  Vitamin K as needed  Continue pantoprazole for GI protection

## 2019-09-16 NOTE — PROGRESS NOTES
NEPHROLOGY PROGRESS NOTE    Patient: Jluis Burgess               Sex: male          DOA: 9/6/2019  5:35 PM   YOB: 1960        Age:  63 y o         LOS:  LOS: 10 days   9/16/2019    REASON FOR THE CONSULTATION:      Acute kidney injury on chronic kidney disease stage 3    SUBJECTIVE     Patient is seen and examined while undergoing continuous venovenous hemodialysis  Patient's son at the bedside  Patient appears to be sleepy  Events of the past 24 hours noted  He underwent CT scan of the chest abdomen pelvis revealed infiltrate in the lungs and a distended gallbladder with suspicious for cholecystitis  He was also noted to be vomiting overnight      CURRENT MEDICATIONS       Current Facility-Administered Medications:     cefepime (MAXIPIME) 1,000 mg in dextrose 5 % 50 mL IVPB, 1,000 mg, Intravenous, Q12H, MARKOS Du    lactulose retention enema 200 g, 200 g, Rectal, TID, MARKOS Du    metroNIDAZOLE (FLAGYL) IVPB (premix) 500 mg, 500 mg, Intravenous, Q8H, MARKOS Keith, Stopped at 09/16/19 0523    midodrine (PROAMATINE) tablet 10 mg, 10 mg, Oral, TID Donavondo Esther PA-C, 10 mg at 09/16/19 4081    NxStage K 4/Ca 3 dialysis solution (RFP-401) 20,000 mL, 20,000 mL, Dialysis, Continuous, Zenaida Stephens MD, 20,000 mL at 09/16/19 0520    nystatin (MYCOSTATIN) cream, , Topical, BID, Bandar Betancourt PA-C, 1 application at 61/26/68 0849    octreotide (SandoSTATIN) injection 100 mcg, 100 mcg, Subcutaneous, Q8H [address], Casimiro Marquez PA-C, 100 mcg at 09/16/19 0502    ondansetron (ZOFRAN) injection 4 mg, 4 mg, Intravenous, Q6H PRN, MARKOS Keith, 4 mg at 09/15/19 2142    pantoprazole (PROTONIX) injection 40 mg, 40 mg, Intravenous, BID, MARKOS Keith, 40 mg at 09/16/19 0502    rifaximin (XIFAXAN) tablet 550 mg, 550 mg, Oral, Q12H [address], Casimiro Marquez PA-C, 550 mg at 09/15/19 1546    REVIEW OF SYSTEMS     Review of Systems   Unable to perform ROS: Acuity of condition       OBJECTIVE     Current Weight: Weight - Scale: (!) 176 kg (388 lb 0 2 oz)  Vitals:    09/16/19 1015   BP:    Pulse: 72   Resp: 18   Temp: 98 3 °F (36 8 °C)   SpO2: 98%     Body mass index is 54 12 kg/m²  Intake/Output Summary (Last 24 hours) at 9/16/2019 1034  Last data filed at 9/16/2019 1000  Gross per 24 hour   Intake 1850 ml   Output 7244 ml   Net -5394 ml       PHYSICAL EXAMINATION     Physical Exam   Constitutional: He is oriented to person, place, and time  Obese male who is lying in bed   HENT:   Head: Normocephalic and atraumatic  Eyes: Pupils are equal, round, and reactive to light  Neck: Neck supple  Cardiovascular: Normal rate and regular rhythm  Murmur heard  Pulmonary/Chest:   Poor inspiratory effort   Abdominal: Soft  Bowel sounds are normal    Genitourinary:   Genitourinary Comments: Swollen testicles   Musculoskeletal: Normal range of motion  He exhibits edema  Neurological: He is alert and oriented to person, place, and time  Skin: Skin is warm  Psychiatric: He has a normal mood and affect           LAB RESULTS     Results from last 7 days   Lab Units 09/16/19  0430 09/15/19  2310 09/15/19  1750 09/15/19  1134 09/15/19  0445 09/14/19  2353 09/14/19  1800  09/14/19  0435 09/14/19  0019  09/13/19  0528 09/12/19  0735 09/11/19  1012   WBC Thousand/uL 21 99*  --   --   --  11 39*  --   --   --  16 16* 16 99*  --  11 10* 10 29* 10 28*   HEMOGLOBIN g/dL 7 1*  --   --   --  7 2*  --   --   --  7 5* 7 6*  --  7 7* 7 6* 6 9*   HEMATOCRIT % 22 8*  --   --   --  23 2*  --   --   --  24 1* 24 5*  --  25 0* 23 9* 22 3*   PLATELETS Thousands/uL 29*  --   --   --  26*  --   --   --  20* 21*  --  26* 28* 36*   POTASSIUM mmol/L 4 1 4 1 4 2 4 2 4 1 4 2 4 4   < > 4 6 4 7   < > 4 8 4 8 5 1   CHLORIDE mmol/L 103 103 100 102 102 102 101   < > 99* 99*   < > 97* 96* 94*   CO2 mmol/L 26 28 27 28 26 28 27   < > 28 26   < > 22 26 25   BUN mg/dL 11 12 12 13 14 17 19   < > 28* 32*   < > 50* 52* 54*   CREATININE mg/dL 1 54* 1 76* 1 66* 1 60* 1 54* 1 82* 1 82*   < > 2 30* 2 46*   < > 3 12* 3 07* 2 93*   EGFR ml/min/1 73sq m 49 42 45 47 49 40 40   < > 30 28   < > 21 21 23   CALCIUM mg/dL 9 0 9 1 9 3 9 2 9 1 8 9 9 0   < > 8 8 8 5   < > 8 7 8 7 8 7   MAGNESIUM mg/dL 2 0 1 9 1 8 2 0 1 9 2 0 2 1   < > 1 9 1 9   < >  --   --   --    PHOSPHORUS mg/dL 2 4* 2 4* 2 6* 2 3* 2 3* 2 6* 2 7   < > 3 4 4 0   < >  --   --   --     < > = values in this interval not displayed  RADIOLOGY RESULTS      Results for orders placed during the hospital encounter of 09/06/19   CXR 1 view PA portable stat    Narrative CHEST     INDICATION:   line placement RIJ Hemodialysis catheter  COMPARISON:  September 6, 2019    EXAM PERFORMED/VIEWS:  XR CHEST PORTABLE  2 images    FINDINGS:  Lungs are suboptimally aerated  Elevation of right hemidiaphragm  Cardiomegaly with diminishing pulmonary edema  Persistent prominence of the pulmonary vasculature in the left superior hilum  Right jugular central line tip in the upper SVC  No pneumothorax  Osseous structures appear within normal limits for patient age  Old fracture of right 5th rib  Questionable fracture right 6th rib  Impression Persistent cardiomegaly  Improving pulmonary edema  Satisfactory right jugular central line placement  No pneumothorax  Right rib fractures  Workstation performed: [id number]       Results for orders placed during the hospital encounter of 09/06/19   XR chest 2 views    Narrative CHEST     INDICATION:   SOB     COMPARISON:  8/15/2019    EXAM PERFORMED/VIEWS:  XR CHEST PA & LATERAL  2 views    FINDINGS:  Persistent moderate cardiomegaly  Development of diffuse vascular congestion    The lungs are otherwise clear  No pneumothorax or pleural effusion  Osseous structures appear within normal limits for patient age        Impression Persistent cardiomegaly    Increased diffuse vascular congestion        Workstation performed: [id number]         ASSESSMENT/PLAN     63years old male with past medical history of ROSALES cirrhosis, morbid obesity, anasarca, chronic kidney disease stage 3 who presented to our facility on September 6th with complaints of lower extremity cellulitis and was found to have acute kidney injury, to required pneumonia and cellulitis  1  anuric acute kidney injury on chronic kidney disease stage III present on admission:  Patient with progressive renal dysfunction likely secondary to hepatorenal syndrome and is dialysis dependent   -patient had undergone intermittent dialysis since admission without much improvement in volume status  -patient was therefore started on CVVHD for better fluid removal   -currently with ultrafiltration target of 300 cc per hour with a dialysis flow rate of 5000 cc per hour and a blood flow rate of 250 cc/minute   -continue to obtain BMP Q 8 along with ionized calcium, magnesium and phosphorus replaced appropriately  -plan to continue CVVHD still patient's volume status improves further   -still remains at risk for complications including spontaneous bleeding, hypotension during dialysis  -    2  Volume overload:  Secondary to fluid retaining state in a morbidly obese patient with 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  anuric and HD dependent for fluid removal   Will continue to will fluid aggressive fashion and may increase rate of UF to 350 cc/hour by this afternoon  3  Hyponatremia:  Some improvement in serum sodium noted and up to 135 today as a result of fluid removal     4  Anemia:  Secondary to underlying liver cirrhosis  Current hemoglobin 7 1 and below target  Transfuse to keep hemoglobin above 8     5  Thrombocytopenia: This is likely due to liver cirrhosis  Remains at risk of spontaneous bleeding  Some oozing noted around the catheter site which appears to have been controlled  Platelet count 88723       6  Liver cirrhosis: This is likely secondary to ROSALES  As per GI, given patient with morbid obesity appears to be a poor candidate for liver transplantation  7  Hyperbilirubinemia:  CT scan showed distended gallbladder  Ultrasound of the gallbladder just performed to evaluate for cholecystitis  8  Pneumonia:  Found to have developed infiltrate in left lower lobe of lung  Remains on cefepime and Flagyl  7  Access:  Right IJ temp dialysis catheter          Alee Christianson MD  Nephrology  9/16/2019

## 2019-09-16 NOTE — RESPIRATORY THERAPY NOTE
Pt remains on NC at this time, HFNC is on Standy by but may be used instead of BiPAP for hours of sleep due to patient's nausea

## 2019-09-16 NOTE — PROGRESS NOTES
Progress note - Palliative and Supportive Care   Jarad Zayas 63 y o  male [id number]    Assessment:     Patient Active Problem List   Diagnosis    CROW on CPAP    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 [facility])    Essential hypertension    Hyperbilirubinemia    Liver lesion    Primary osteoarthritis involving multiple joints    Morbid obesity (HCC)    Anemia    Hyponatremia    Hepatorenal syndrome (HCC)    Encephalopathy acute    Thrombocytopenia (HCC)    Obesity with alveolar hypoventilation (HCC)    Coagulopathy (HCC)    Nausea & vomiting    Cholelithiasis         Plan:  1  Symptom management -    - Zofran 4mg q6h PRN for nausea appears to be effective  Will continue for now  - continue lactulose per ICU team for hepatic encephalopathy  2  Goals - Extensive discussion on Friday  Re-affirmed today  Patient continues to request full cares  Reiterated poor prognosis but will continue to provide support  [facility]  to reach out to patient's wife for additional care needs  Code Status: level 1       Interval history:       Patient had an episode of nausea/vomiting overnight  Received one dose of benadryl and one dose of zofran  Nausea has improved  Leukocytosis developed as well and imaging suggest possible infection therefore antibiotic therapy started  This morning, his nausea has improved  His only complaint of pain is mild abdominal pain  BMs documented x4  Patient somewhat slow to respond today, but denies feeling fatigued       MEDICATIONS / ALLERGIES:     all current active meds have been reviewed and current meds:   Current Facility-Administered Medications   Medication Dose Route Frequency    cefepime (MAXIPIME) 1,000 mg in dextrose 5 % 50 mL IVPB  1,000 mg Intravenous Q12H    lactulose 20 g/30 mL oral solution 20 g  20 g Oral BID    metroNIDAZOLE (FLAGYL) IVPB (premix) 500 mg  500 mg Intravenous Q8H    midodrine (PROAMATINE) tablet 10 mg  10 mg Oral TID AC    NxStage K 4/Ca 3 dialysis solution (RFP-401) 20,000 mL  20,000 mL Dialysis Continuous    nystatin (MYCOSTATIN) cream   Topical BID    octreotide (SandoSTATIN) injection 100 mcg  100 mcg Subcutaneous Q8H [address]    ondansetron (ZOFRAN) injection 4 mg  4 mg Intravenous Q6H PRN    pantoprazole (PROTONIX) injection 40 mg  40 mg Intravenous BID    rifaximin (XIFAXAN) tablet 550 mg  550 mg Oral Q12H DANA       Allergies   Allergen Reactions    Lactose     Propranolol Eye Swelling    Torsemide Eye Swelling       OBJECTIVE:    Physical Exam  Physical Exam   Constitutional: He appears well-nourished  He does not appear ill  No distress  HENT:   Head: Atraumatic  Eyes: Right eye exhibits no discharge  Left eye exhibits no discharge  Scleral icterus is present  Pulmonary/Chest: No respiratory distress  Abdominal: Soft  obese   Musculoskeletal: He exhibits edema  Neurological: He is alert  Answers appropriately, but slow to respond   Skin: Skin is warm and dry  Psychiatric:   pleasant   Nursing note and vitals reviewed  Lab Results:   I have personally reviewed pertinent labs  , CBC:   Lab Results   Component Value Date    WBC 21 99 (H) 09/16/2019    HGB 7 1 (L) 09/16/2019    HCT 22 8 (L) 09/16/2019     (H) 09/16/2019    PLT 29 (LL) 09/16/2019    MCH 36 6 (H) 09/16/2019    MCHC 31 1 (L) 09/16/2019    RDW 17 3 (H) 09/16/2019    MPV 11 7 09/16/2019    NRBC 0 09/16/2019   , CMP:   Lab Results   Component Value Date    SODIUM 135 (L) 09/16/2019    K 4 1 09/16/2019     09/16/2019    CO2 26 09/16/2019    BUN 11 09/16/2019    CREATININE 1 54 (H) 09/16/2019    CALCIUM 9 0 09/16/2019    AST 52 (H) 09/16/2019    ALT 31 09/16/2019    ALKPHOS 70 09/16/2019    EGFR 49 09/16/2019         Counseling / Coordination of Care  Total floor / unit time spent today 20+ minutes  Greater than 50% of total time was spent with the patient and / or family counseling and / or coordination of care   A description of the counseling / coordination of care: symptom assessment, med review, psychosocial support

## 2019-09-16 NOTE — ASSESSMENT & PLAN NOTE
Control vomiting with Zofran  Right upper quadrant ultrasound ordered  May need to re-consult Gastroenterology  Rocephin and Flagyl ordered due to doubled white count

## 2019-09-16 NOTE — UTILIZATION REVIEW
Continued Stay Review    Date: 9/16/19                        Current Patient Class: Inpatient  Current Level of Care: ICU    HPI:59 y o  male initially admitted on 9/6  Assessment/Plan:  Progress 9/11: Patient has had a decline in blood pressure as well and was started on midodrine  Patient more somnolent  Ammonia  75  started on hemodialysis on 9/10 and  had lower blood pressure during dialysis today and was noted to have a hemoglobin of 6 9  VBG, showed  pH at 7 327  Patient was started on his home CPAP Critical care called to evaluate  Blood cultures neg after 4 days  Repeat CBC tomorrow  He has diffuse anasarca, bradypnea, and decreased breath sounds  Transfused PRBcs  Progress 9/12: Transfer to step down unit for continued decline in mental status  Answering 1-2 word responses  Hepatic encephalopathy  Increase lactulose  Frequent neuro checks  Possible he is having co2 retention contributing to mental status decline  Continue midodrine  Palliative care consult  Unfortunately, his prognosis is poor as he would require liver transplant for hepatorenal syndrome, and GI does not feel that the patient would be a transplant candidate  Progress 9/13:   Hypotension most likely related to liver disease and low albumin  Continue hemodialysis, hepatic renal syndrome, will present cast to [facility] for review  Extensive family meeting with patient, wife, daughter and palliative:   also set done with the patient and brought to there attention that his BMI, being greater than 40, renders him a non liver transplant candidate  alert and oriented x 3  Jaundiced  +Le edema  Arterial line placed  Started on CRRT  PROCEDURE NOTE :  9/13:  Arterial line insertion left radial   Progress 9/14:  Placed on high flow nasal cannula overnight due to vomiting and inability to use bipap  Q 8 hour BMP   hgb 7 5  Transfuse to keep hgb >8   He continues with anasarca but has minimal ascites on imaging  Last data filed at 9/14/2019 0900      Gross per 24 hour   Intake 720 ml   Output 3265 ml   Net -2545 ml   GI consult 9/14: There is no evidence of hepatic encephalopathy the present time  Patient will continue with both lactulose and Xifaxan as ordered  Progress 9/15:  Hold anticoagulation  Thrombocytopenic  Continue with CRRT, octreotide, midodrine  [facility] and [facility] programs have both declined this patient  Diminished lungs  Intake/Output Summary (Last 24 hours) at 9/15/2019 0448  Last data filed at 9/15/2019 0301      Gross per 24 hour   Intake 1555 ml   Output 5786 ml   Net -4231 ml      Progress 9/16:  Vomiting again  Zofran given IV with some improvement  Monitor Pulse ox  Right upper quad us pedning  NPO  Cannot exclude gastroparesis secondary to ROSALES  BS x4 quads  Hesitant to place NGT due to thrombocytopenia and potential for varices  Leukocytosis has doubled in past 24 hours  Afebrile  Rocephin and flagyl IV  KUB limited due to poor penetration and body habitus  Pertinent Labs/Diagnostic Results:   US gallbladder 9/16: Essentially nondiagnostic due to body habitus with mildly distended gallbladder demonstrating top normal wall thickness though no pericholecystic fluid or stones visualized   This finding can be seen with cirrhosis though if there is clinical concern for   acute cholecystitis, HIDA scan is recommended  CT C/A/P 9/16:  Patchy nodular airspace disease throughout the left lung consistent with an acute infiltrate, likely of infectious or inflammatory etiology   Findings have progressed since CT examination of 9/6/2019  Distended gallbladder with evidence of layering cholelithiasis   Cholecystitis cannot be excluded, and gallbladder ultrasound may be considered  Partial collapse of the right lower lobe   Small right pleural effusion    Cirrhosis with evidence of portal hypertension  CT head 9/16:  Extensive metallic artifact, likely secondary to an object in the patient's vicinity, limits evaluation   Within this limitation no gross evidence of intracranial abnormality  KUB 9/16:  There is a nonobstructive bowel gas pattern   Air distended stomach  CXR 9/16: Central vascular congestion  Probable right basilar volume loss  Results from last 7 days   Lab Units 09/16/19  0430 09/15/19  0445 09/14/19  0435 09/14/19  0019 09/13/19  0528  09/12/19  0735 09/11/19  1012 09/10/19  0540   WBC Thousand/uL 21 99* 11 39* 16 16* 16 99* 11 10*  --  10 29* 10 28* 9 24   HEMOGLOBIN g/dL 7 1* 7 2* 7 5* 7 6* 7 7*  --  7 6* 6 9* 7 2*   HEMATOCRIT % 22 8* 23 2* 24 1* 24 5* 25 0*  --  23 9* 22 3* 22 3*   PLATELETS Thousands/uL 29* 26* 20* 21* 26*  --  28* 36* 42*   NEUTROS ABS Thousands/µL 18 67* 8 32* 12 90*  --  8 01*   < >  --   --   --    BANDS PCT %  --   --   --   --   --   --  2 1 2    < > = values in this interval not displayed           Results from last 7 days   Lab Units 09/16/19  1050 09/16/19  0430 09/15/19  2310 09/15/19  1750 09/15/19  1134   SODIUM mmol/L 136 135* 136 135* 136   POTASSIUM mmol/L 4 1 4 1 4 1 4 2 4 2   CHLORIDE mmol/L 104 103 103 100 102   CO2 mmol/L 27 26 28 27 28   ANION GAP mmol/L 5 6 5 8 6   BUN mg/dL 10 11 12 12 13   CREATININE mg/dL 1 57* 1 54* 1 76* 1 66* 1 60*   EGFR ml/min/1 73sq m 48 49 42 45 47   CALCIUM mg/dL 9 1 9 0 9 1 9 3 9 2   CALCIUM, IONIZED mmol/L 1 19 1 18 1 18 1 18 1 14   MAGNESIUM mg/dL 1 9 2 0 1 9 1 8 2 0   PHOSPHORUS mg/dL 2 2* 2 4* 2 4* 2 6* 2 3*     Results from last 7 days   Lab Units 09/16/19  0430 09/15/19  0445 09/14/19  0518 09/14/19  0435 09/12/19  0851 09/11/19  1321 09/11/19  1012 09/10/19  0541   AST U/L 52* 46*  --  42  --   --  39 44   ALT U/L 31 29  --  27  --   --  30 31   ALK PHOS U/L 70 71  --  64  --   --  64 68   TOTAL PROTEIN g/dL 7 1 6 6  --  6 5  --   --  6 6 6 9   ALBUMIN g/dL 3 1* 3 2*  --  3 3*  --   --  3 4* 3 8   TOTAL BILIRUBIN mg/dL 8 30* 7 60*  --  7 60*  --   --  7 10* 7 00*   BILIRUBIN DIRECT mg/dL 2 85* 2 47*  --  2 37*  --   --  2 30* 2 25*   AMMONIA umol/L 58* 50* 65*  --  47* 74*  --   --      Results from last 7 days   Lab Units 09/11/19  1109   POC GLUCOSE mg/dl 126     Results from last 7 days   Lab Units 09/16/19  1050 09/16/19  0430 09/15/19  2310 09/15/19  1750 09/15/19  1134 09/15/19  0445 09/14/19  2353 09/14/19  1800 09/14/19  1211 09/14/19  0435   GLUCOSE RANDOM mg/dL 114 127 129 137 138 103 116 123 110 128     Results from last 7 days   Lab Units 09/11/19  1328   PH ART  7 327*   PCO2 ART mm Hg 42 7   PO2 ART mm Hg 44 2*   HCO3 ART mmol/L 21 9*   BASE EXC ART mmol/L -3 9   O2 CONTENT ART mL/dL 8 6*   O2 HGB, ARTERIAL % 76 1*   ABG SOURCE  Radial, Right       Results from last 7 days   Lab Units 09/16/19  0430 09/15/19  0445 09/14/19  0612 09/13/19  1116   PROTIME seconds 30 3* 25 9* 26 1* 23 4*   INR  2 84* 2 34* 2 36* 2 06*   PTT seconds  --   --   --  45*         Results from last 7 days   Lab Units 09/15/19  1134 09/12/19  0735   PROCALCITONIN ng/ml 0 37* 0 42*     Results from last 7 days   Lab Units 09/10/19  1552   HEP B S AG  Non-reactive   HEP C AB  Non-reactive   HEP B C IGM  Non-reactive   HEP B C TOTAL AB  Non-reactive     Results from last 7 days   Lab Units 09/16/19  0208   LIPASE u/L 217         Results from last 7 days   Lab Units 09/12/19  0735 09/11/19  1012 09/10/19  0540   TOTAL COUNTED  100 100 100     Vital Signs:  9/11:  9/12:     09/12/19 0830   BP: (!) 102/46   Pulse: 72   Resp: 20   Temp:     SpO2: 91     9/13:Blood pressure 102/52, pulse 61, temperature 98 °F (36 7 °C), temperature source Oral, resp  rate 20, height 5' 11" (1 803 m), weight (!) 185 kg (407 lb), SpO2 96 %  9/14:Blood pressure 145/60, pulse 65, temperature 97 6 °F (36 4 °C), resp   rate 13,   Time Temp Pulse Resp BP MAP (mmHg) Arterial Line BP MAP SpO2 O2 Device   09/16/19 1253 98 °F (36 7 °C) 68 18 112/42Abnormal         09/16/19 1200  73 14   116/41 61 mmHg 98 %  09/16/19 1145 98 °F (36 7 °C) 72 18 114/39Abnormal   129/50 71 mmHg 98 %    09/16/19 1030  71 18   111/42 61 mmHg 98 %    09/16/19 1015 98 3 °F (36 8 °C) 72 18   115/44 63 mmHg 98 %    09/16/19 1000  70 14   107/40 59 mmHg 98 %    09/16/19 0950 98 °F (36 7 °C) 71 18 108/40Abnormal   104/40 60 mmHg 98 %    09/16/19 0300  71 16   143/50 74 mmHg 98 %    09/16/19 0100  68 25Abnormal    143/50 75 mmHg 97 %    09/16/19 0000 98 2 °F (36 8 °C) 72 19   130/49 72 mmHg 96 %    09/15/19 2300  69 17   146/51 76 mmHg 98 %    09/15/19 1700  64 17   146/55 80 mmHg 100 %    09/15/19 1600 97 7 °F (36 5 °C) 71 21 117/55 79 95/52 67 mmHg 93 % Nasal cannula   09/15/19 1500  65 16   135/56 80 mmHg 100 %    09/15/19 0500  65 14   134/49 71 mmHg 88 %Abnormal     09/15/19 0400 97 5 °F (36 4 °C) 65 17   126/49 71 mmHg 95 %    09/15/19 0200  62 13   115/41 62 mmHg 97 %    09/14/19 2355  63 14   113/42 63 mmHg 95 %      Medications:   Scheduled Meds:   Current Facility-Administered Medications:  calcium gluconate 1 G  100 mL IVPB 1 g Intravenous Once   cefepime 1,000 mg Intravenous Q12H   lactulose 200 g Rectal TID   magnesium sulfate 1 g Intravenous Once   metroNIDAZOLE 500 mg Intravenous Q8H   midodrine 10 mg Oral TID AC   NxStage K 4/Ca 3 20,000 mL Dialysis Continuous   nystatin  Topical BID   octreotide 100 mcg Subcutaneous Q8H DANA   ondansetron 4 mg Intravenous Q6H PRN   pantoprazole 40 mg Intravenous BID   rifaximin 550 mg Oral Q12H [address]   sodium phosphate 6 mmol Intravenous Once       Discharge Plan: TBD  Network Utilization Review Department  Phone: [phone]; Fax [phone]  [email or web address]  ATTENTION: Please call with any questions or concerns to [phone]  and carefully listen to the prompts so that you are directed to the right person     Send all requests for admission clinical reviews, approved or denied determinations and any other requests to fax [phone]   All voicemails are confidential

## 2019-09-16 NOTE — ASSESSMENT & PLAN NOTE
Gastroenterology has signed off and nephrology continues to follow  Meld score 37 9/15  Coagulopathic  Continue CRRT for aggressive fluid removal   Continue octreotide and midodrine

## 2019-09-17 ENCOUNTER — APPOINTMENT (INPATIENT)
Dept: RADIOLOGY | Facility: HOSPITAL | Age: 59
DRG: 177 | End: 2019-09-17
Payer: COMMERCIAL

## 2019-09-17 LAB
ABO GROUP BLD BPU: NORMAL
ALBUMIN SERPL BCP-MCNC: 3.2 G/DL (ref 3.5–5)
ALP SERPL-CCNC: 77 U/L (ref 46–116)
ALT SERPL W P-5'-P-CCNC: 32 U/L (ref 12–78)
AMMONIA PLAS-SCNC: 39 UMOL/L (ref 11–35)
ANION GAP SERPL CALCULATED.3IONS-SCNC: 6 MMOL/L (ref 4–13)
ANION GAP SERPL CALCULATED.3IONS-SCNC: 7 MMOL/L (ref 4–13)
ANION GAP SERPL CALCULATED.3IONS-SCNC: 8 MMOL/L (ref 4–13)
APTT PPP: 74 SECONDS (ref 23–37)
AST SERPL W P-5'-P-CCNC: 57 U/L (ref 5–45)
BASOPHILS # BLD MANUAL: 0 THOUSAND/UL (ref 0–0.1)
BASOPHILS NFR MAR MANUAL: 0 % (ref 0–1)
BILIRUB DIRECT SERPL-MCNC: 3.54 MG/DL (ref 0–0.2)
BILIRUB SERPL-MCNC: 8.6 MG/DL (ref 0.2–1)
BPU ID: NORMAL
BUN SERPL-MCNC: 10 MG/DL (ref 5–25)
BUN SERPL-MCNC: 9 MG/DL (ref 5–25)
CA-I BLD-SCNC: 1.18 MMOL/L (ref 1.12–1.32)
CA-I BLD-SCNC: 1.19 MMOL/L (ref 1.12–1.32)
CA-I BLD-SCNC: 1.19 MMOL/L (ref 1.12–1.32)
CA-I BLD-SCNC: 1.2 MMOL/L (ref 1.12–1.32)
CA-I BLD-SCNC: 1.21 MMOL/L (ref 1.12–1.32)
CALCIUM SERPL-MCNC: 8.8 MG/DL (ref 8.3–10.1)
CALCIUM SERPL-MCNC: 9.1 MG/DL (ref 8.3–10.1)
CALCIUM SERPL-MCNC: 9.2 MG/DL (ref 8.3–10.1)
CALCIUM SERPL-MCNC: 9.3 MG/DL (ref 8.3–10.1)
CALCIUM SERPL-MCNC: 9.4 MG/DL (ref 8.3–10.1)
CHLORIDE SERPL-SCNC: 100 MMOL/L (ref 100–108)
CHLORIDE SERPL-SCNC: 102 MMOL/L (ref 100–108)
CHLORIDE SERPL-SCNC: 103 MMOL/L (ref 100–108)
CHLORIDE SERPL-SCNC: 104 MMOL/L (ref 100–108)
CHLORIDE SERPL-SCNC: 104 MMOL/L (ref 100–108)
CO2 SERPL-SCNC: 28 MMOL/L (ref 21–32)
CO2 SERPL-SCNC: 29 MMOL/L (ref 21–32)
CREAT SERPL-MCNC: 1.53 MG/DL (ref 0.6–1.3)
CREAT SERPL-MCNC: 1.59 MG/DL (ref 0.6–1.3)
CREAT SERPL-MCNC: 1.62 MG/DL (ref 0.6–1.3)
CREAT SERPL-MCNC: 1.67 MG/DL (ref 0.6–1.3)
CREAT SERPL-MCNC: 1.71 MG/DL (ref 0.6–1.3)
EOSINOPHIL # BLD MANUAL: 0.26 THOUSAND/UL (ref 0–0.4)
EOSINOPHIL NFR BLD MANUAL: 1 % (ref 0–6)
ERYTHROCYTE [DISTWIDTH] IN BLOOD BY AUTOMATED COUNT: 18 % (ref 11.6–15.1)
FIBRINOGEN PPP-MCNC: 60 MG/DL (ref 227–495)
FIBRINOGEN PPP-MCNC: <60 MG/DL (ref 227–495)
FIBRINOGEN PPP-MCNC: <60 MG/DL (ref 227–495)
GFR SERPL CREATININE-BSD FRML MDRD: 43 ML/MIN/1.73SQ M
GFR SERPL CREATININE-BSD FRML MDRD: 44 ML/MIN/1.73SQ M
GFR SERPL CREATININE-BSD FRML MDRD: 46 ML/MIN/1.73SQ M
GFR SERPL CREATININE-BSD FRML MDRD: 47 ML/MIN/1.73SQ M
GFR SERPL CREATININE-BSD FRML MDRD: 49 ML/MIN/1.73SQ M
GLUCOSE SERPL-MCNC: 108 MG/DL (ref 65–140)
GLUCOSE SERPL-MCNC: 112 MG/DL (ref 65–140)
GLUCOSE SERPL-MCNC: 114 MG/DL (ref 65–140)
GLUCOSE SERPL-MCNC: 136 MG/DL (ref 65–140)
GLUCOSE SERPL-MCNC: 139 MG/DL (ref 65–140)
HCT VFR BLD AUTO: 23 % (ref 36.5–49.3)
HGB BLD-MCNC: 7.1 G/DL (ref 12–17)
INR PPP: 3.15 (ref 0.84–1.19)
LYMPHOCYTES # BLD AUTO: 0.51 THOUSAND/UL (ref 0.6–4.47)
LYMPHOCYTES # BLD AUTO: 2 % (ref 14–44)
MACROCYTES BLD QL AUTO: PRESENT
MAGNESIUM SERPL-MCNC: 1.7 MG/DL (ref 1.6–2.6)
MAGNESIUM SERPL-MCNC: 1.8 MG/DL (ref 1.6–2.6)
MAGNESIUM SERPL-MCNC: 1.9 MG/DL (ref 1.6–2.6)
MAGNESIUM SERPL-MCNC: 1.9 MG/DL (ref 1.6–2.6)
MAGNESIUM SERPL-MCNC: 2 MG/DL (ref 1.6–2.6)
MCH RBC QN AUTO: 36.4 PG (ref 26.8–34.3)
MCHC RBC AUTO-ENTMCNC: 30.9 G/DL (ref 31.4–37.4)
MCV RBC AUTO: 118 FL (ref 82–98)
MONOCYTES # BLD AUTO: 1.54 THOUSAND/UL (ref 0–1.22)
MONOCYTES NFR BLD: 6 % (ref 4–12)
MRSA NOSE QL CULT: NORMAL
MYELOCYTES NFR BLD MANUAL: 1 % (ref 0–1)
NEUTROPHILS # BLD MANUAL: 23.14 THOUSAND/UL (ref 1.85–7.62)
NEUTS BAND NFR BLD MANUAL: 5 % (ref 0–8)
NEUTS SEG NFR BLD AUTO: 85 % (ref 43–75)
NRBC BLD AUTO-RTO: 0 /100 WBCS
PHOSPHATE SERPL-MCNC: 1.8 MG/DL (ref 2.7–4.5)
PHOSPHATE SERPL-MCNC: 2.2 MG/DL (ref 2.7–4.5)
PHOSPHATE SERPL-MCNC: 2.2 MG/DL (ref 2.7–4.5)
PHOSPHATE SERPL-MCNC: 2.3 MG/DL (ref 2.7–4.5)
PHOSPHATE SERPL-MCNC: 2.4 MG/DL (ref 2.7–4.5)
PLATELET # BLD AUTO: 37 THOUSANDS/UL (ref 149–390)
PLATELET BLD QL SMEAR: ABNORMAL
PMV BLD AUTO: 11.3 FL (ref 8.9–12.7)
POTASSIUM SERPL-SCNC: 3.9 MMOL/L (ref 3.5–5.3)
POTASSIUM SERPL-SCNC: 4 MMOL/L (ref 3.5–5.3)
POTASSIUM SERPL-SCNC: 4.1 MMOL/L (ref 3.5–5.3)
POTASSIUM SERPL-SCNC: 4.1 MMOL/L (ref 3.5–5.3)
POTASSIUM SERPL-SCNC: 4.2 MMOL/L (ref 3.5–5.3)
PROT SERPL-MCNC: 6.6 G/DL (ref 6.4–8.2)
PROTHROMBIN TIME: 32.8 SECONDS (ref 11.6–14.5)
RBC # BLD AUTO: 1.95 MILLION/UL (ref 3.88–5.62)
SODIUM SERPL-SCNC: 136 MMOL/L (ref 136–145)
SODIUM SERPL-SCNC: 136 MMOL/L (ref 136–145)
SODIUM SERPL-SCNC: 138 MMOL/L (ref 136–145)
SODIUM SERPL-SCNC: 138 MMOL/L (ref 136–145)
SODIUM SERPL-SCNC: 139 MMOL/L (ref 136–145)
TOTAL CELLS COUNTED SPEC: 100
UNIT DISPENSE STATUS: NORMAL
UNIT PRODUCT CODE: NORMAL
UNIT RH: NORMAL
WBC # BLD AUTO: 25.71 THOUSAND/UL (ref 4.31–10.16)

## 2019-09-17 PROCEDURE — 94760 N-INVAS EAR/PLS OXIMETRY 1: CPT

## 2019-09-17 PROCEDURE — 83735 ASSAY OF MAGNESIUM: CPT | Performed by: INTERNAL MEDICINE

## 2019-09-17 PROCEDURE — 85610 PROTHROMBIN TIME: CPT | Performed by: PHYSICIAN ASSISTANT

## 2019-09-17 PROCEDURE — 99291 CRITICAL CARE FIRST HOUR: CPT | Performed by: ANESTHESIOLOGY

## 2019-09-17 PROCEDURE — 80076 HEPATIC FUNCTION PANEL: CPT | Performed by: NURSE PRACTITIONER

## 2019-09-17 PROCEDURE — 90945 DIALYSIS ONE EVALUATION: CPT

## 2019-09-17 PROCEDURE — 85384 FIBRINOGEN ACTIVITY: CPT | Performed by: NURSE PRACTITIONER

## 2019-09-17 PROCEDURE — 82330 ASSAY OF CALCIUM: CPT | Performed by: INTERNAL MEDICINE

## 2019-09-17 PROCEDURE — C9113 INJ PANTOPRAZOLE SODIUM, VIA: HCPCS | Performed by: NURSE PRACTITIONER

## 2019-09-17 PROCEDURE — 99233 SBSQ HOSP IP/OBS HIGH 50: CPT | Performed by: INTERNAL MEDICINE

## 2019-09-17 PROCEDURE — 85730 THROMBOPLASTIN TIME PARTIAL: CPT | Performed by: PHYSICIAN ASSISTANT

## 2019-09-17 PROCEDURE — P9012 CRYOPRECIPITATE EACH UNIT: HCPCS

## 2019-09-17 PROCEDURE — 99292 CRITICAL CARE ADDL 30 MIN: CPT | Performed by: ANESTHESIOLOGY

## 2019-09-17 PROCEDURE — 74018 RADEX ABDOMEN 1 VIEW: CPT

## 2019-09-17 PROCEDURE — 71045 X-RAY EXAM CHEST 1 VIEW: CPT

## 2019-09-17 PROCEDURE — 85007 BL SMEAR W/DIFF WBC COUNT: CPT | Performed by: NURSE PRACTITIONER

## 2019-09-17 PROCEDURE — 85027 COMPLETE CBC AUTOMATED: CPT | Performed by: NURSE PRACTITIONER

## 2019-09-17 PROCEDURE — 80048 BASIC METABOLIC PNL TOTAL CA: CPT | Performed by: INTERNAL MEDICINE

## 2019-09-17 PROCEDURE — 94660 CPAP INITIATION&MGMT: CPT

## 2019-09-17 PROCEDURE — 84100 ASSAY OF PHOSPHORUS: CPT | Performed by: INTERNAL MEDICINE

## 2019-09-17 PROCEDURE — 82140 ASSAY OF AMMONIA: CPT | Performed by: PHYSICIAN ASSISTANT

## 2019-09-17 RX ORDER — ALBUMIN (HUMAN) 12.5 G/50ML
12.5 SOLUTION INTRAVENOUS ONCE
Status: COMPLETED | OUTPATIENT
Start: 2019-09-17 | End: 2019-09-17

## 2019-09-17 RX ORDER — MAGNESIUM SULFATE 1 G/100ML
1 INJECTION INTRAVENOUS ONCE
Status: COMPLETED | OUTPATIENT
Start: 2019-09-17 | End: 2019-09-18

## 2019-09-17 RX ORDER — MAGNESIUM SULFATE 1 G/100ML
1 INJECTION INTRAVENOUS ONCE
Status: COMPLETED | OUTPATIENT
Start: 2019-09-17 | End: 2019-09-17

## 2019-09-17 RX ORDER — POTASSIUM CHLORIDE 20MEQ/15ML
40 LIQUID (ML) ORAL ONCE
Status: COMPLETED | OUTPATIENT
Start: 2019-09-17 | End: 2019-09-17

## 2019-09-17 RX ADMIN — SODIUM PHOSPHATE, MONOBASIC, MONOHYDRATE 6 MMOL: 276; 142 INJECTION, SOLUTION INTRAVENOUS at 18:09

## 2019-09-17 RX ADMIN — LACTULOSE 200 G: 10 SOLUTION ORAL at 16:37

## 2019-09-17 RX ADMIN — MAGNESIUM SULFATE HEPTAHYDRATE 1 G: 1 INJECTION, SOLUTION INTRAVENOUS at 23:27

## 2019-09-17 RX ADMIN — OCTREOTIDE ACETATE 100 MCG: 100 INJECTION, SOLUTION INTRAVENOUS; SUBCUTANEOUS at 06:25

## 2019-09-17 RX ADMIN — CALCIUM GLUCONATE 1 G: 98 INJECTION, SOLUTION INTRAVENOUS at 23:39

## 2019-09-17 RX ADMIN — OCTREOTIDE ACETATE 100 MCG: 100 INJECTION, SOLUTION INTRAVENOUS; SUBCUTANEOUS at 21:38

## 2019-09-17 RX ADMIN — POTASSIUM CHLORIDE 40 MEQ: 20 SOLUTION ORAL at 23:47

## 2019-09-17 RX ADMIN — SODIUM PHOSPHATE, MONOBASIC, MONOHYDRATE 9 MMOL: 276; 142 INJECTION, SOLUTION INTRAVENOUS at 23:32

## 2019-09-17 RX ADMIN — Medication 20000 ML: at 20:25

## 2019-09-17 RX ADMIN — OCTREOTIDE ACETATE 100 MCG: 100 INJECTION, SOLUTION INTRAVENOUS; SUBCUTANEOUS at 13:11

## 2019-09-17 RX ADMIN — PHENYLEPHRINE HYDROCHLORIDE 90 MCG/MIN: 10 INJECTION INTRAVENOUS at 04:56

## 2019-09-17 RX ADMIN — LACTULOSE 200 G: 10 SOLUTION ORAL at 21:21

## 2019-09-17 RX ADMIN — RIFAXIMIN 550 MG: 550 TABLET ORAL at 03:16

## 2019-09-17 RX ADMIN — ALBUMIN (HUMAN) 12.5 G: 12.5 SOLUTION INTRAVENOUS at 19:24

## 2019-09-17 RX ADMIN — MIDODRINE HYDROCHLORIDE 15 MG: 5 TABLET ORAL at 16:16

## 2019-09-17 RX ADMIN — SODIUM PHOSPHATE, MONOBASIC, MONOHYDRATE 6 MMOL: 276; 142 INJECTION, SOLUTION INTRAVENOUS at 13:11

## 2019-09-17 RX ADMIN — CEFEPIME HYDROCHLORIDE 2000 MG: 2 INJECTION, POWDER, FOR SOLUTION INTRAVENOUS at 16:18

## 2019-09-17 RX ADMIN — CEFEPIME HYDROCHLORIDE 1000 MG: 1 INJECTION, POWDER, FOR SOLUTION INTRAMUSCULAR; INTRAVENOUS at 08:38

## 2019-09-17 RX ADMIN — SODIUM PHOSPHATE, MONOBASIC, MONOHYDRATE 6 MMOL: 276; 142 INJECTION, SOLUTION INTRAVENOUS at 07:30

## 2019-09-17 RX ADMIN — PHENYLEPHRINE HYDROCHLORIDE 80 MCG/MIN: 10 INJECTION INTRAVENOUS at 21:23

## 2019-09-17 RX ADMIN — NYSTATIN: 100000 CREAM TOPICAL at 18:35

## 2019-09-17 RX ADMIN — MIDODRINE HYDROCHLORIDE 15 MG: 5 TABLET ORAL at 11:51

## 2019-09-17 RX ADMIN — Medication 20000 ML: at 10:51

## 2019-09-17 RX ADMIN — SODIUM PHOSPHATE, MONOBASIC, MONOHYDRATE 6 MMOL: 276; 142 INJECTION, SOLUTION INTRAVENOUS at 00:58

## 2019-09-17 RX ADMIN — Medication 20000 ML: at 00:07

## 2019-09-17 RX ADMIN — Medication 20000 ML: at 04:00

## 2019-09-17 RX ADMIN — PHENYLEPHRINE HYDROCHLORIDE 120 MCG/MIN: 10 INJECTION INTRAVENOUS at 12:59

## 2019-09-17 RX ADMIN — METRONIDAZOLE 500 MG: 500 INJECTION, SOLUTION INTRAVENOUS at 05:01

## 2019-09-17 RX ADMIN — LACTULOSE 200 G: 10 SOLUTION ORAL at 09:29

## 2019-09-17 RX ADMIN — METRONIDAZOLE 500 MG: 500 INJECTION, SOLUTION INTRAVENOUS at 17:24

## 2019-09-17 RX ADMIN — MAGNESIUM SULFATE HEPTAHYDRATE 1 G: 1 INJECTION, SOLUTION INTRAVENOUS at 00:58

## 2019-09-17 RX ADMIN — PANTOPRAZOLE SODIUM 40 MG: 40 INJECTION, POWDER, LYOPHILIZED, FOR SOLUTION INTRAVENOUS at 08:26

## 2019-09-17 RX ADMIN — RIFAXIMIN 550 MG: 550 TABLET ORAL at 16:16

## 2019-09-17 RX ADMIN — MIDODRINE HYDROCHLORIDE 15 MG: 5 TABLET ORAL at 08:00

## 2019-09-17 RX ADMIN — PANTOPRAZOLE SODIUM 40 MG: 40 INJECTION, POWDER, LYOPHILIZED, FOR SOLUTION INTRAVENOUS at 18:06

## 2019-09-17 RX ADMIN — NYSTATIN: 100000 CREAM TOPICAL at 08:25

## 2019-09-17 NOTE — PLAN OF CARE
Problem: PAIN - ADULT  Goal: Verbalizes/displays adequate comfort level or baseline comfort level  Description  Interventions:  - Encourage patient to monitor pain and request assistance  - Assess pain using appropriate pain scale  - Administer analgesics based on type and severity of pain and evaluate response  - Implement non-pharmacological measures as appropriate and evaluate response  - Notify physician/advanced practitioner if interventions unsuccessful or patient reports new pain  Outcome: Progressing     Problem: INFECTION - ADULT  Goal: Absence or prevention of progression during hospitalization  Description  INTERVENTIONS:  - Assess and monitor for signs and symptoms of infection  - Monitor lab/diagnostic results  - Monitor all insertion sites, i e  indwelling lines, tubes, and drains  - [place] appropriate cooling/warming therapies per order  - Administer medications as ordered  - Instruct and encourage patient and family to use good hand hygiene technique  - Identify and instruct in appropriate isolation precautions for identified infection/condition  Outcome: Progressing  Goal: Absence of fever/infection during neutropenic period  Description  INTERVENTIONS:  - Monitor WBC    Outcome: Progressing     Problem: SAFETY ADULT  Goal: Patient will remain free of falls  Description  INTERVENTIONS:  - Assess patient frequently for physical needs  -  Identify cognitive and physical deficits and behaviors that affect risk of falls    -  [place] fall precautions as indicated by assessment   - Educate patient/family on patient safety including physical limitations  - Instruct patient to call for assistance with activity based on assessment  - Modify environment to reduce risk of injury  - Consider OT/PT consult to assist with strengthening/mobility  Outcome: Progressing  Goal: Maintain or return to baseline ADL function  Description  INTERVENTIONS:  -  Assess patient's ability to carry out ADLs; assess patient's baseline for ADL function and identify physical deficits which impact ability to perform ADLs (bathing, care of mouth/teeth, toileting, grooming, dressing, etc )  - Assess/evaluate cause of self-care deficits   - Assess range of motion  - Assess patient's mobility; develop plan if impaired  - Assess patient's need for assistive devices and provide as appropriate  - Encourage maximum independence but intervene and supervise when necessary  - Involve family in performance of ADLs  - Assess for home care needs following discharge   - Consider OT consult to assist with ADL evaluation and planning for discharge  - Provide patient education as appropriate  Outcome: Progressing  Goal: Maintain or return mobility status to optimal level  Description  INTERVENTIONS:  - Assess patient's baseline mobility status (ambulation, transfers, stairs, etc )    - Identify cognitive and physical deficits and behaviors that affect mobility  - Identify mobility aids required to assist with transfers and/or ambulation (gait belt, sit-to-stand, lift, walker, cane, etc )  - [place] fall precautions as indicated by assessment  - Record patient progress and toleration of activity level on Mobility SBAR; progress patient to next Phase/Stage  - Instruct patient to call for assistance with activity based on assessment  - Consider rehabilitation consult to assist with strengthening/weightbearing, etc   Outcome: Progressing     Problem: DISCHARGE PLANNING  Goal: Discharge to home or other facility with appropriate resources  Description  INTERVENTIONS:  - Identify barriers to discharge w/patient and caregiver  - Arrange for needed discharge resources and transportation as appropriate  - Identify discharge learning needs (meds, wound care, etc )  - Arrange for interpretive services to assist at discharge as needed  - Refer to Case Management Department for coordinating discharge planning if the patient needs post-hospital services based on physician/advanced practitioner order or complex needs related to functional status, cognitive ability, or social support system  Outcome: Progressing     Problem: Knowledge Deficit  Goal: Patient/family/caregiver demonstrates understanding of disease process, treatment plan, medications, and discharge instructions  Description  Complete learning assessment and assess knowledge base  Interventions:  - Provide teaching at level of understanding  - Provide teaching via preferred learning methods  Outcome: Progressing     Problem: Potential for Falls  Goal: Patient will remain free of falls  Description  INTERVENTIONS:  - Assess patient frequently for physical needs  -  Identify cognitive and physical deficits and behaviors that affect risk of falls    -  [place] fall precautions as indicated by assessment   - Educate patient/family on patient safety including physical limitations  - Instruct patient to call for assistance with activity based on assessment  - Modify environment to reduce risk of injury  - Consider OT/PT consult to assist with strengthening/mobility  Outcome: Progressing     Problem: Prexisting or High Potential for Compromised Skin Integrity  Goal: Skin integrity is maintained or improved  Description  INTERVENTIONS:  - Identify patients at risk for skin breakdown  - Assess and monitor skin integrity  - Assess and monitor nutrition and hydration status  - Monitor labs   - Assess for incontinence   - Turn and reposition patient  - Assist with mobility/ambulation  - Relieve pressure over bony prominences  - Avoid friction and shearing  - Provide appropriate hygiene as needed including keeping skin clean and dry  - Evaluate need for skin moisturizer/barrier cream  - Collaborate with interdisciplinary team   - Patient/family teaching  - Consider wound care consult   Outcome: Progressing     Problem: Nutrition/Hydration-ADULT  Goal: Nutrient/Hydration intake appropriate for improving, restoring or maintaining nutritional needs  Description  Monitor and assess patient's nutrition/hydration status for malnutrition  Collaborate with interdisciplinary team and initiate plan and interventions as ordered  Monitor patient's weight and dietary intake as ordered or per policy  Utilize nutrition screening tool and intervene as necessary  Determine patient's food preferences and provide high-protein, high-caloric foods as appropriate       INTERVENTIONS:  - Monitor oral intake, urinary output, labs, and treatment plans  - Assess nutrition and hydration status and recommend course of action  - Evaluate amount of meals eaten  - Assist patient with eating if necessary   - Allow adequate time for meals  - Recommend/ encourage appropriate diets, oral nutritional supplements, and vitamin/mineral supplements  - Order, calculate, and assess calorie counts as needed  - Recommend, monitor, and adjust tube feedings and TPN/PPN based on assessed needs  - Assess need for intravenous fluids  - Provide specific nutrition/hydration education as appropriate  - Include patient/family/caregiver in decisions related to nutrition  Outcome: Progressing     Problem: METABOLIC, FLUID AND ELECTROLYTES - ADULT  Goal: Electrolytes maintained within normal limits  Description  INTERVENTIONS:  - Monitor labs and assess patient for signs and symptoms of electrolyte imbalances  - Administer electrolyte replacement as ordered  - Monitor response to electrolyte replacements, including repeat lab results as appropriate  - Instruct patient on fluid and nutrition as appropriate  Outcome: Progressing  Goal: Fluid balance maintained  Description  INTERVENTIONS:  - Monitor labs   - Monitor I/O and WT  - Instruct patient on fluid and nutrition as appropriate  - Assess for signs & symptoms of volume excess or deficit  Outcome: Progressing

## 2019-09-17 NOTE — PLAN OF CARE
Recommend add ensure clear with meals  Will monitor/adjust TF prn  Problem: Nutrition/Hydration-ADULT  Goal: Nutrient/Hydration intake appropriate for improving, restoring or maintaining nutritional needs  Description  Monitor and assess patient's nutrition/hydration status for malnutrition  Collaborate with interdisciplinary team and initiate plan and interventions as ordered  Monitor patient's weight and dietary intake as ordered or per policy  Utilize nutrition screening tool and intervene as necessary  Determine patient's food preferences and provide high-protein, high-caloric foods as appropriate       INTERVENTIONS:  - Monitor oral intake, urinary output, labs, and treatment plans  - Assess nutrition and hydration status and recommend course of action  - Evaluate amount of meals eaten  - Assist patient with eating if necessary   - Allow adequate time for meals  - Recommend/ encourage appropriate diets, oral nutritional supplements, and vitamin/mineral supplements  - Order, calculate, and assess calorie counts as needed  - Recommend, monitor, and adjust tube feedings and TPN/PPN based on assessed needs  - Assess need for intravenous fluids  - Provide specific nutrition/hydration education as appropriate  - Include patient/family/caregiver in decisions related to nutrition  Outcome: Progressing

## 2019-09-17 NOTE — PROGRESS NOTES
NEPHROLOGY PROGRESS NOTE    Patient: Rm Galdamez               Sex: male          DOA: 9/6/2019  5:35 PM   YOB: 1960        Age:  63 y o         LOS:  LOS: 11 days   9/17/2019    REASON FOR THE CONSULTATION:      Acute kidney injury on chronic kidney disease stage 3    SUBJECTIVE     Patient is seen and examined while undergoing continuous venovenous hemodialysis  Family next to the bedside  Patient underwent a right cholecystostomy drain for possible acute cholecystitis yesterday  Noted to have ultrafiltration up to 8 9 L performed yesterday  Started on phenylephrine due to drop in blood pressure      CURRENT MEDICATIONS       Current Facility-Administered Medications:     cefepime (MAXIPIME) 1,000 mg in dextrose 5 % 50 mL IVPB, 1,000 mg, Intravenous, Q12H, MARKOS Laureano, Stopped at 09/17/19 0908    lactulose retention enema 200 g, 200 g, Rectal, TID, DON LaureanoNP, 200 g at 09/17/19 0929    metroNIDAZOLE (FLAGYL) IVPB (premix) 500 mg, 500 mg, Intravenous, Q8H, MARKOS Aly, Stopped at 09/17/19 7835    midodrine (PROAMATINE) tablet 15 mg, 15 mg, Oral, TID AC, MARKOS Laureano, 15 mg at 09/17/19 0800    nystatin (MYCOSTATIN) cream, , Topical, BID, Kendrick Lugo PA-C    octreotide (SandoSTATIN) injection 100 mcg, 100 mcg, Subcutaneous, Q8H [facility], David Ventura PA-C, 100 mcg at 09/17/19 4943    ondansetron (ZOFRAN) injection 4 mg, 4 mg, Intravenous, Q6H PRN, MARKOS Aly, 4 mg at 09/15/19 2142    pantoprazole (PROTONIX) injection 40 mg, 40 mg, Intravenous, BID, MARKOS Aly, 40 mg at 09/17/19 8129    phenylephrine (TEVIN-SYNEPHRINE) 50 mg (STANDARD CONCENTRATION) in sodium chloride 0 9% 250 mL,  mcg/min, Intravenous, Titrated, MARKOS Laureano, Last Rate: 36 mL/hr at 09/17/19 0917, 120 mcg/min at 09/17/19 0917    rifaximin (XIFAXAN) tablet 550 mg, 550 mg, Oral, Q12H [facility], David Ventura PA-C, 550 mg at 09/17/19 0316    REVIEW OF SYSTEMS     Review of Systems   Unable to perform ROS: Acuity of condition   Constitutional: Negative  HENT: Negative  Eyes: Negative  Respiratory: Negative  Cardiovascular: Negative  Gastrointestinal: Negative  Endocrine: Negative  Genitourinary: Negative  Musculoskeletal: Negative  Skin: Negative  Allergic/Immunologic: Negative  Neurological: Negative  Hematological: Negative  All other systems reviewed and are negative  OBJECTIVE     Current Weight: Weight - Scale: (!) 172 kg (378 lb 5 oz)  Vitals:    09/17/19 0800   BP:    Pulse: 74   Resp: 17   Temp:    SpO2: 96%     Body mass index is 52 76 kg/m²  Intake/Output Summary (Last 24 hours) at 9/17/2019 1020  Last data filed at 9/17/2019 1000  Gross per 24 hour   Intake 2887 73 ml   Output 9721 ml   Net -6833 27 ml       PHYSICAL EXAMINATION     Physical Exam   Constitutional: He is oriented to person, place, and time  Obese male who appears lethargic however awake  HENT:   Head: Normocephalic and atraumatic  Eyes: Pupils are equal, round, and reactive to light  Neck: Neck supple  Cardiovascular: Normal rate, regular rhythm and normal heart sounds  Pulmonary/Chest:   Decreased breath sounds anteriorly   Abdominal: Soft  Bowel sounds are normal    Genitourinary:   Genitourinary Comments: Swollen testicles   Musculoskeletal: Normal range of motion  He exhibits edema  Neurological: He is alert and oriented to person, place, and time  Skin: Skin is warm  Psychiatric: He has a normal mood and affect           LAB RESULTS     Results from last 7 days   Lab Units 09/17/19  0531 09/16/19  2354 09/16/19  2227 09/16/19  1804 09/16/19  1420 09/16/19  1050 09/16/19  0430 09/15/19  2310 09/15/19  1750  09/15/19  0445  09/14/19  0435 09/14/19  0019  09/13/19  0528   WBC Thousand/uL 25 71*  --   --   --  20 89*  --  21 99*  --   --   --  11 39*  --  16 16* 16 99*  --  11 10*   HEMOGLOBIN g/dL 7 1*  --  7 2*  --  6 9*  --  7 1*  --   --   --  7 2*  --  7 5* 7 6*  --  7 7*   HEMATOCRIT % 23 0*  --  23 6*  --  22 2*  --  22 8*  --   --   --  23 2*  --  24 1* 24 5*  --  25 0*   PLATELETS Thousands/uL 37*  --   --   --  30*  --  29*  --   --   --  26*  --  20* 21*  --  26*   POTASSIUM mmol/L 4 2 4 0  --  4 1  --  4 1 4 1 4 1 4 2   < > 4 1   < > 4 6 4 7   < > 4 8   CHLORIDE mmol/L 103 102  --  102  --  104 103 103 100   < > 102   < > 99* 99*   < > 97*   CO2 mmol/L 28 28  --  26  --  27 26 28 27   < > 26   < > 28 26   < > 22   BUN mg/dL 10 9  --  9  --  10 11 12 12   < > 14   < > 28* 32*   < > 50*   CREATININE mg/dL 1 62* 1 53*  --  1 58*  --  1 57* 1 54* 1 76* 1 66*   < > 1 54*   < > 2 30* 2 46*   < > 3 12*   EGFR ml/min/1 73sq m 46 49  --  48  --  48 49 42 45   < > 49   < > 30 28   < > 21   CALCIUM mg/dL 9 3 9 4  --  9 3  --  9 1 9 0 9 1 9 3   < > 9 1   < > 8 8 8 5   < > 8 7   MAGNESIUM mg/dL 2 0 1 8  --  1 8  --  1 9 2 0 1 9 1 8   < > 1 9   < > 1 9 1 9   < >  --    PHOSPHORUS mg/dL 2 4* 2 2*  --  2 3*  --  2 2* 2 4* 2 4* 2 6*   < > 2 3*   < > 3 4 4 0   < >  --     < > = values in this interval not displayed  RADIOLOGY RESULTS      Results for orders placed during the hospital encounter of 09/06/19   CXR 1 view PA portable stat    Narrative CHEST     INDICATION:   line placement RIJ Hemodialysis catheter  COMPARISON:  September 6, 2019    EXAM PERFORMED/VIEWS:  XR CHEST PORTABLE  2 images    FINDINGS:  Lungs are suboptimally aerated  Elevation of right hemidiaphragm  Cardiomegaly with diminishing pulmonary edema  Persistent prominence of the pulmonary vasculature in the left superior hilum  Right jugular central line tip in the upper SVC  No pneumothorax  Osseous structures appear within normal limits for patient age  Old fracture of right 5th rib  Questionable fracture right 6th rib  Impression Persistent cardiomegaly  Improving pulmonary edema    Satisfactory right jugular central line placement  No pneumothorax  Right rib fractures  Workstation performed: [id number]       Results for orders placed during the hospital encounter of 09/06/19   XR chest 2 views    Narrative CHEST     INDICATION:   SOB     COMPARISON:  8/15/2019    EXAM PERFORMED/VIEWS:  XR CHEST PA & LATERAL  2 views    FINDINGS:  Persistent moderate cardiomegaly  Development of diffuse vascular congestion    The lungs are otherwise clear  No pneumothorax or pleural effusion  Osseous structures appear within normal limits for patient age  Impression Persistent cardiomegaly    Increased diffuse vascular congestion        Workstation performed: [id number]         ASSESSMENT/PLAN     63years old male with past medical history of ROSALES cirrhosis, morbid obesity, anasarca, chronic kidney disease stage 3 who presented to our facility on September 6th with complaints of lower extremity cellulitis and was found to have acute kidney injury, to required pneumonia and cellulitis  1  anuric acute kidney injury on chronic kidney disease stage III present on admission:  Patient with progressive renal dysfunction likely secondary to hepatorenal syndrome and is dialysis dependent   -patient had undergone intermittent dialysis since admission without much improvement in volume status  -patient was therefore started on CVVHD on September 13th for better fluid removal and to increase clearance  -currently with ultrafiltration target of 340 cc per hour with a dialysis flow rate of 5000 cc per hour and a blood flow rate of 250 cc/minute   -patient had maintained his blood pressure despite excessive ultrafiltration however noted now to be on phenylephrine likely a result of either excessive ultrafiltration versus development of possible sepsis secondary to cholecystitis   -in any event recommend to decrease the ultrafiltration rate to a lower target so as not to compromise his hemodynamics    -continue to obtain BMP Q 8 along with ionized calcium, magnesium and phosphorus replaced appropriately  -plan to continue CVVHD still patient's volume status improves further   -still remains at risk for complications including spontaneous bleeding, hypotension during dialysis  -    2  Volume overload:  Secondary to fluid retaining state in a morbidly obese patient with 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  anuric and HD dependent for fluid removal   Patient had been in -19 L in fluid balance since admission  3  Hyponatremia:  Some improvement in serum sodium noted and up to 135 today as a result of fluid removal     4  Anemia:  Secondary to underlying liver cirrhosis  Current hemoglobin 7 1 and below target  Transfuse to keep hemoglobin above 8     5  Thrombocytopenia: This is likely due to liver cirrhosis  Remains at risk of spontaneous bleeding  Current platelet count is 48400  6  Liver cirrhosis: This is likely secondary to ROSALES  As per GI, given patient with morbid obesity appears to be a poor candidate for liver transplantation  7  Hyperbilirubinemia:  CT scan showed distended gallbladder  Status post cholecystostomy tube for possible acute cholecystitis  8  Pneumonia:  Found to have developed infiltrate in left lower lobe of lung  Remains on cefepime and Flagyl  7  Access:  Right IJ temp dialysis catheter  8  Hypophosphatemia:  Serum phosphorus 2 4  Continue with replacement as indicated          Eze Estrada MD  Nephrology  9/17/2019

## 2019-09-17 NOTE — PROGRESS NOTES
Progress Note - Critical Care   Eleanor Vuong 63 y o  male MRN: [id number]  Unit/Bed#:  Encounter: [id number]    Assessment:     Principal Problem:    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 ([address] )  Active Problems:    CROW on CPAP    Essential hypertension    Hyperbilirubinemia    Morbid obesity (HCC)    Anemia    Hyponatremia    Hepatorenal syndrome (HCC)    Encephalopathy acute    Thrombocytopenia (HCC)    Obesity with alveolar hypoventilation (HCC)    Coagulopathy (HCC)    Nausea & vomiting    Cholelithiasis      Plan:          Neuro:    Hepatic encephalopathy - rectal lactulose, rifaximin 500mg Q12,, q4 neurochecks, monitor ammonia      Delirium precautions, re-orient, sleep hygiene, daily CAM-ICU                 CV:    Hypotension - maintain MAPs > 60, SBP > 90, requiring increasing dose of phenylephrine to maintain goal BP overnight, continue hemodynamic monitoring, continue midodrine                  Lung:    Acute hypoxic respiratory insufficiency - wean NC, currently at 4L, pulmonary toilet                 GI:    ROSALES cirrhosis w hepatorenal syndrome - MELD 37, continue assessing transplant options, continue octreotide, midodrine, optimize BP, trend LFTs, bili, co-ags     Acute cholecystitis - s/p perc octavio, output initially bilious, now sanginous although minimal output overnight, maintain NGT, PRN zofran                 FEN:    Lytes - repletion per RN CVVH protocol   Nutrition - NPO, nutrition consulted given obesity, if BMI improves w volume removal can re-discuss transfer opportunities w [facility], clamp NG today    Fluid - no mIVF, 8L negative / 24h                 :    Hepatorenal syndrome - CVVH w /hr, may need to pause today if no improvement in BP, follow renal function, remains essentially anuric                  ID:    Acute cholecystitis - continue cefepime, flagyl, monitor leukocytosis, f/u culture growth                 Heme:    Hepatic coagulopathy - monitor / trend plts, H/H, fibrinogen, co-ags, transfuse as appropriate, hold chemical DVT ppx                  Endo:    Stable - no acute issues   Continue serial accuchecks   No insulin requirements                            Msk/Skin:    Repetitive repositioning and off-loading to prevent skin break down and ulcerative formation                 Disposition:    Continue ICU care    Guarded long-term prognosis, 3 month mortality 52% w life expectancy 1-3 years, 82% oer-op mortality      Chief Complaint: Unable to provide 2/2 acuity of condition     HPI/24hr events: patient had increased phenylephrine requirement, bilious / sanguinous output from per octavio drain w stable Hg    Physical Exam: Physical Exam   Constitutional: He appears well-developed and well-nourished  No distress  HENT:   Head: Normocephalic and atraumatic  Eyes: Pupils are equal, round, and reactive to light  Neck: Neck supple  No tracheal deviation present  Cardiovascular: Normal rate, regular rhythm and intact distal pulses  Exam reveals no gallop and no friction rub  No murmur heard  Pulmonary/Chest: Effort normal and breath sounds normal  No respiratory distress  He has no wheezes  He has no rales  Abdominal: Soft  Bowel sounds are normal  He exhibits no distension and no mass  There is no tenderness  There is no guarding  Musculoskeletal: He exhibits no edema or deformity  Neurological: He is alert  Asleep at time of my evaluation but does awaken, follow commands   Skin: Skin is warm and dry  He is not diaphoretic  Jaundice, anasarca, significant edema of extremities    Psychiatric: He has a normal mood and affect  His behavior is normal    Nursing note and vitals reviewed          Vitals:    09/17/19 0200 09/17/19 0300 09/17/19 0400 09/17/19 0500   BP:       Pulse: 72 73 72 71   Resp: 15 16 15 13   Temp:   98 1 °F (36 7 °C)    TempSrc:   Oral    SpO2: 98% 97% 97% 97%   Weight:       Height:         Arterial Line BP: 102/37  Arterial Line MAP (mmHg): 56 mmHg    Temperature:   Temp (24hrs), Av 9 °F (36 6 °C), Min:97 5 °F (36 4 °C), Max:98 3 °F (36 8 °C)    Current: Temperature: 98 1 °F (36 7 °C)    Weights:   IBW: 75 3 kg    Body mass index is 54 12 kg/m²  Weight (last 2 days)     Date/Time   Weight    19 0502   (!) 176 (388 01)    19 0400   (!) 177 (389 55)    09/15/19 0600   (!) 184 (405 87)              Hemodynamic Monitoring:  N/A     Non-Invasive/Invasive Ventilation Settings:  Respiratory    Lab Data (Last 4 hours)    None         O2/Vent Data (Last 4 hours)    None              No results found for: PHART, PQO3MOF, PO2ART, LAY0RUF, M9RKLNVE, BEART, SOURCE  SpO2: SpO2: 97 %    Intake and Outputs:  I/O       09/15 07 -  0700  07 -  0700    P  O  800 0    I V  (mL/kg) 141 (0 8) 1086 3 (6 2)    Blood 250 811 7    NG/GT  160    IV Piggyback 957 184 2    Total Intake(mL/kg) 2148 (12 2) 2242 1 (12 7)    Urine (mL/kg/hr)  236 (0 1)    Emesis/NG output  170    Drains  310    Other 6887 8331    Stool 0 590    Total Output 5687 3467    Net -8902 -4071 9          Unmeasured Urine Occurrence  1 x    Unmeasured Stool Occurrence 4 x 0 x        UOP: 0/hour   Nutrition:        Diet Orders   (From admission, onward)             Start     Ordered    19 032  Diet NPO  Diet effective now     Question Answer Comment   Diet Type NPO    RD to adjust diet per protocol?  No        19 0323              Labs:   Results from last 7 days   Lab Units 19  0531 19  2227 19  1420 19  0430 09/15/19  0445   WBC Thousand/uL 25 71*  --  20 89* 21 99* 11 39*   HEMOGLOBIN g/dL 7 1* 7 2* 6 9* 7 1* 7 2*   HEMATOCRIT % 23 0* 23 6* 22 2* 22 8* 23 2*   PLATELETS Thousands/uL 37*  --  30* 29* 26*   NEUTROS PCT %  --   --  84* 85* 73   MONOS PCT %  --   --  12 11 10   MONO PCT % 6  --   --   --   --       Results from last 7 days   Lab Units 19  0531 19  2354 19  1804  19  0430  09/15/19  0445   SODIUM mmol/L 138 136 135*   < > 135*   < > 135*   POTASSIUM mmol/L 4 2 4 0 4 1   < > 4 1   < > 4 1   CHLORIDE mmol/L 103 102 102   < > 103   < > 102   CO2 mmol/L 28 28 26   < > 26   < > 26   BUN mg/dL 10 9 9   < > 11   < > 14   CREATININE mg/dL 1 62* 1 53* 1 58*   < > 1 54*   < > 1 54*   CALCIUM mg/dL 9 3 9 4 9 3   < > 9 0   < > 9 1   ALK PHOS U/L 77  --   --   --  70  --  71   ALT U/L 32  --   --   --  31  --  29   AST U/L 57*  --   --   --  52*  --  46*    < > = values in this interval not displayed  Results from last 7 days   Lab Units 09/17/19  0531 09/16/19  2354 09/16/19  1804   MAGNESIUM mg/dL 2 0 1 8 1 8     Results from last 7 days   Lab Units 09/17/19  0531 09/16/19  2354 09/16/19  1804   PHOSPHORUS mg/dL 2 4* 2 2* 2 3*      Results from last 7 days   Lab Units 09/17/19  0531 09/16/19  1420 09/16/19  0430  09/13/19  1116   INR  3 15* 2 32* 2 84*   < > 2 06*   PTT seconds 74*  --   --   --  45*    < > = values in this interval not displayed  0   Lab Value Date/Time    TROPONINI 0 02 09/06/2019 1906    TROPONINI 0 03 08/15/2019 1225    TROPONINI 0 03 06/07/2019 1210    TROPONINI <0 03 11/14/2018 1624       Imaging:   IR tube placement octavio   Final Result   Impression:    Successful ultrasound-guided cholecystostomy tube placement  Workstation performed: [id number]         US gallbladder   Final Result      Essentially nondiagnostic due to body habitus with mildly distended gallbladder demonstrating top normal wall thickness though no pericholecystic fluid or stones visualized  This finding can be seen with cirrhosis though if there is clinical concern for    acute cholecystitis, HIDA scan is recommended  Workstation performed: [id number]         CT chest abdomen pelvis wo contrast   Final Result      Patchy nodular airspace disease throughout the left lung consistent with an acute infiltrate, likely of infectious or inflammatory etiology    Findings have progressed since CT examination of 9/6/2019      Distended gallbladder with evidence of layering cholelithiasis  Cholecystitis cannot be excluded, and gallbladder ultrasound may be considered  Partial collapse of the right lower lobe  Small right pleural effusion  Cirrhosis with evidence of portal hypertension               Workstation performed: [id number]         CT head wo contrast   Final Result      Extensive metallic artifact, likely secondary to an object in the patient's vicinity, limits evaluation  Within this limitation no gross evidence of intracranial abnormality  Workstation performed: [id number]         XR abdomen 1 view kub   Final Result   There is a nonobstructive bowel gas pattern  Air distended stomach  Workstation performed: [id number]         XR chest portable ICU   Final Result      Central vascular congestion  Probable right basilar volume loss  Workstation performed: [id number]         CXR 1 view PA portable stat   Final Result   Persistent cardiomegaly  Improving pulmonary edema  Satisfactory right jugular central line placement  No pneumothorax  Right rib fractures  Workstation performed: [id number]         IR temp HD cath   Final Result   Impression:   1  Successful ultrasound and fluoroscopically guided placement of a double-lumen temporary dialysis central venous catheter via the right internal jugular vein  The tip of the catheter is at the cavoatrial junction and may be used immediately  Workstation performed: [id number]         CT tibia fibula right wo contrast   Final Result   Soft tissue evaluation is limited without IV contrast    1    No evidence of soft tissue gas  2   Generalized subcutaneous edema with associated skin thickening  Edema noted adjacent to the calf musculature  Findings are nonspecific and may be related to generalized anasarca     3   The absence of soft tissue gas does not exclude necrotizing fasciitis particularly at the early stages  If there is persistent concern for necrotizing fasciitis, consider follow-up with MRI with and without contrast          Workstation performed: [id number]         CT femur right wo contrast   Final Result   Soft tissue evaluation is limited without IV contrast    1    No evidence of soft tissue gas  2   Generalized subcutaneous edema with associated skin thickening  Edema noted adjacent to the calf musculature  Findings are nonspecific and may be related to generalized anasarca  3   The absence of soft tissue gas does not exclude necrotizing fasciitis particularly at the early stages  If there is persistent concern for necrotizing fasciitis, consider follow-up with MRI with and without contrast          Workstation performed: [id number]         CT chest abdomen pelvis wo contrast   Final Result   Exam is limited without IV and oral contrast particularly for soft tissue and bowel evaluation  1   Scattered mild patchy opacities bilaterally suspicious for pneumonia  2  Cirrhotic appearance to the liver  Splenomegaly  3   Mild ascites  4   Diffuse subcutaneous edema compatible with anasarca  Workstation performed: [id number]         XR chest 2 views   ED Interpretation   Limited film due to body habitus and poor inspiratory effort  Cardiomegaly present  Increased vascular congestion bilaterally  Final Result   Persistent cardiomegaly      Increased diffuse vascular congestion            Workstation performed: [id number]            I have personally reviewed pertinent reports  and I have personally reviewed pertinent films in PACS    EKG: as per tele nsr no ectopy     Micro:  Lab Results   Component Value Date    BLOODCX No Growth After 5 Days  09/06/2019    BLOODCX No Growth After 5 Days  09/06/2019    BLOODCX No Growth After 5 Days  08/15/2019    BLOODCX No Growth After 5 Days   08/15/2019    SPUTUMCULTUR 2+ Growth of Candida albicans (A) 09/08/2019    SPUTUMCULTUR 2+ Growth of Moraxella catarrhalis (A) 09/08/2019    SPUTUMCULTUR 2+ Growth of  09/08/2019       Allergies: Allergies   Allergen Reactions    Lactose     Propranolol Eye Swelling    Torsemide Eye Swelling       Medications:   Scheduled Meds:    Current Facility-Administered Medications:  cefepime 1,000 mg Intravenous Q12H MARKOS Sr Last Rate: Stopped (09/16/19 2201)   lactulose 200 g Rectal TID MARKOS Sr    metroNIDAZOLE 500 mg Intravenous Q8H MARKOS Tejada Last Rate: Stopped (09/17/19 4166)   midodrine 15 mg Oral TID AC MARKOS Sr    NxStage K 4/Ca 3 20,000 mL Dialysis Continuous Joe Tyler MD    nystatin  Topical BID Serge Cuenca PA-C    octreotide 100 mcg Subcutaneous formerly [facility] Fish Monson PA-C    ondansetron 4 mg Intravenous Q6H PRN MARKOS Tejada    pantoprazole 40 mg Intravenous BID MARKOS Tejada    phenylephine  mcg/min Intravenous Titrated MARKOS Sr Last Rate: 90 mcg/min (09/17/19 0456)   rifaximin 550 mg Oral Q12H [address] Fish Monson PA-C    sodium phosphate 6 mmol Intravenous Once Zayda Chacko PA-C      Continuous Infusions:    NxStage K 4/Ca 3 20,000 mL    phenylephine  mcg/min Last Rate: 90 mcg/min (09/17/19 0456)     PRN Meds:    ondansetron 4 mg Q6H PRN       VTE Pharmacologic Prophylaxis: Sequential compression device (Venodyne)  and RX contraindicated due to: hepatic coagulpathy   VTE Mechanical Prophylaxis: sequential compression device    Invasive lines and devices:   Invasive Devices     Peripheral Intravenous Line            Peripheral IV 09/13/19 Right;Ventral (anterior) Forearm 3 days    Peripheral IV 09/16/19 Left;Upper Arm less than 1 day    Peripheral IV 09/16/19 Left;Upper;Medial Antecubital less than 1 day          Arterial Line            Arterial Line 09/13/19 Radial 3 days          Line            Temporary HD Catheter 6 days          Drain Closed/Suction Drain Right RUQ Other (Comment) 8 5 Fr  less than 1 day    NG/OG/Enteral Tube Nasogastric Right nares less than 1 day    Rectal Tube With balloon less than 1 day                   Counseling / Coordination of Care  Total Critical Care time spent 47 minutes excluding procedures, teaching and family updates  Code Status: Level 1 - Full Code     Portions of the record may have been created with voice recognition software  Occasional wrong word or "sound a like" substitutions may have occurred due to the inherent limitations of voice recognition software  Read the chart carefully and recognize, using context, where substitutions have occurred       Maame Rowe PA-C

## 2019-09-17 NOTE — PLAN OF CARE
Problem: PAIN - ADULT  Goal: Verbalizes/displays adequate comfort level or baseline comfort level  Description  Interventions:  - Encourage patient to monitor pain and request assistance  - Assess pain using appropriate pain scale  - Administer analgesics based on type and severity of pain and evaluate response  - Implement non-pharmacological measures as appropriate and evaluate response  - Consider cultural and social influences on pain and pain management  - Notify physician/advanced practitioner if interventions unsuccessful or patient reports new pain  Outcome: Progressing     Problem: INFECTION - ADULT  Goal: Absence or prevention of progression during hospitalization  Description  INTERVENTIONS:  - Assess and monitor for signs and symptoms of infection  - Monitor lab/diagnostic results  - Monitor all insertion sites, i e  indwelling lines, tubes, and drains  - Monitor endotracheal if appropriate and nasal secretions for changes in amount and color  - [place] appropriate cooling/warming therapies per order  - Administer medications as ordered  - Instruct and encourage patient and family to use good hand hygiene technique  - Identify and instruct in appropriate isolation precautions for identified infection/condition  Outcome: Progressing  Goal: Absence of fever/infection during neutropenic period  Description  INTERVENTIONS:  - Monitor WBC    Outcome: Progressing     Problem: SAFETY ADULT  Goal: Patient will remain free of falls  Description  INTERVENTIONS:  - Assess patient frequently for physical needs  -  Identify cognitive and physical deficits and behaviors that affect risk of falls    -  [place] fall precautions as indicated by assessment   - Educate patient/family on patient safety including physical limitations  - Instruct patient to call for assistance with activity based on assessment  - Modify environment to reduce risk of injury  - Consider OT/PT consult to assist with strengthening/mobility  Outcome: Progressing  Goal: Maintain or return to baseline ADL function  Description  INTERVENTIONS:  -  Assess patient's ability to carry out ADLs; assess patient's baseline for ADL function and identify physical deficits which impact ability to perform ADLs (bathing, care of mouth/teeth, toileting, grooming, dressing, etc )  - Assess/evaluate cause of self-care deficits   - Assess range of motion  - Assess patient's mobility; develop plan if impaired  - Assess patient's need for assistive devices and provide as appropriate  - Encourage maximum independence but intervene and supervise when necessary  - Involve family in performance of ADLs  - Assess for home care needs following discharge   - Consider OT consult to assist with ADL evaluation and planning for discharge  - Provide patient education as appropriate  Outcome: Progressing  Goal: Maintain or return mobility status to optimal level  Description  INTERVENTIONS:  - Assess patient's baseline mobility status (ambulation, transfers, stairs, etc )    - Identify cognitive and physical deficits and behaviors that affect mobility  - Identify mobility aids required to assist with transfers and/or ambulation (gait belt, sit-to-stand, lift, walker, cane, etc )  - [place] fall precautions as indicated by assessment  - Record patient progress and toleration of activity level on Mobility SBAR; progress patient to next Phase/Stage  - Instruct patient to call for assistance with activity based on assessment  - Consider rehabilitation consult to assist with strengthening/weightbearing, etc   Outcome: Progressing     Problem: DISCHARGE PLANNING  Goal: Discharge to home or other facility with appropriate resources  Description  INTERVENTIONS:  - Identify barriers to discharge w/patient and caregiver  - Arrange for needed discharge resources and transportation as appropriate  - Identify discharge learning needs (meds, wound care, etc )  - Arrange for interpretive services to assist at discharge as needed  - Refer to Case Management Department for coordinating discharge planning if the patient needs post-hospital services based on physician/advanced practitioner order or complex needs related to functional status, cognitive ability, or social support system  Outcome: Progressing     Problem: Knowledge Deficit  Goal: Patient/family/caregiver demonstrates understanding of disease process, treatment plan, medications, and discharge instructions  Description  Complete learning assessment and assess knowledge base  Interventions:  - Provide teaching at level of understanding  - Provide teaching via preferred learning methods  Outcome: Progressing     Problem: Potential for Falls  Goal: Patient will remain free of falls  Description  INTERVENTIONS:  - Assess patient frequently for physical needs  -  Identify cognitive and physical deficits and behaviors that affect risk of falls    -  [place] fall precautions as indicated by assessment   - Educate patient/family on patient safety including physical limitations  - Instruct patient to call for assistance with activity based on assessment  - Modify environment to reduce risk of injury  - Consider OT/PT consult to assist with strengthening/mobility  Outcome: Progressing     Problem: Prexisting or High Potential for Compromised Skin Integrity  Goal: Skin integrity is maintained or improved  Description  INTERVENTIONS:  - Identify patients at risk for skin breakdown  - Assess and monitor skin integrity  - Assess and monitor nutrition and hydration status  - Monitor labs   - Assess for incontinence   - Turn and reposition patient  - Assist with mobility/ambulation  - Relieve pressure over bony prominences  - Avoid friction and shearing  - Provide appropriate hygiene as needed including keeping skin clean and dry  - Evaluate need for skin moisturizer/barrier cream  - Collaborate with interdisciplinary team   - Patient/family teaching  - Consider wound care consult   Outcome: Progressing     Problem: Nutrition/Hydration-ADULT  Goal: Nutrient/Hydration intake appropriate for improving, restoring or maintaining nutritional needs  Description  Monitor and assess patient's nutrition/hydration status for malnutrition  Collaborate with interdisciplinary team and initiate plan and interventions as ordered  Monitor patient's weight and dietary intake as ordered or per policy  Utilize nutrition screening tool and intervene as necessary  Determine patient's food preferences and provide high-protein, high-caloric foods as appropriate       INTERVENTIONS:  - Monitor oral intake, urinary output, labs, and treatment plans  - Assess nutrition and hydration status and recommend course of action  - Evaluate amount of meals eaten  - Assist patient with eating if necessary   - Allow adequate time for meals  - Recommend/ encourage appropriate diets, oral nutritional supplements, and vitamin/mineral supplements  - Order, calculate, and assess calorie counts as needed  - Recommend, monitor, and adjust tube feedings and TPN/PPN based on assessed needs  - Assess need for intravenous fluids  - Provide specific nutrition/hydration education as appropriate  - Include patient/family/caregiver in decisions related to nutrition  Outcome: Progressing     Problem: METABOLIC, FLUID AND ELECTROLYTES - ADULT  Goal: Electrolytes maintained within normal limits  Description  INTERVENTIONS:  - Monitor labs and assess patient for signs and symptoms of electrolyte imbalances  - Administer electrolyte replacement as ordered  - Monitor response to electrolyte replacements, including repeat lab results as appropriate  - Instruct patient on fluid and nutrition as appropriate  Outcome: Progressing  Goal: Fluid balance maintained  Description  INTERVENTIONS:  - Monitor labs   - Monitor I/O and WT  - Instruct patient on fluid and nutrition as appropriate  - Assess for signs & symptoms of volume excess or deficit  Outcome: Progressing

## 2019-09-18 ENCOUNTER — APPOINTMENT (INPATIENT)
Dept: RADIOLOGY | Facility: HOSPITAL | Age: 59
DRG: 177 | End: 2019-09-18
Payer: COMMERCIAL

## 2019-09-18 ENCOUNTER — TELEPHONE (OUTPATIENT)
Dept: PALLIATIVE MEDICINE | Facility: CLINIC | Age: 59
End: 2019-09-18

## 2019-09-18 PROBLEM — E87.1 HYPONATREMIA: Status: RESOLVED | Noted: 2019-08-17 | Resolved: 2019-09-18

## 2019-09-18 LAB
ABO GROUP BLD BPU: NORMAL
ABO GROUP BLD: NORMAL
ALBUMIN SERPL BCP-MCNC: 3.2 G/DL (ref 3.5–5)
ALP SERPL-CCNC: 73 U/L (ref 46–116)
ALT SERPL W P-5'-P-CCNC: 36 U/L (ref 12–78)
ANION GAP SERPL CALCULATED.3IONS-SCNC: 10 MMOL/L (ref 4–13)
ANION GAP SERPL CALCULATED.3IONS-SCNC: 6 MMOL/L (ref 4–13)
ANION GAP SERPL CALCULATED.3IONS-SCNC: 7 MMOL/L (ref 4–13)
AST SERPL W P-5'-P-CCNC: 63 U/L (ref 5–45)
BASOPHILS # BLD AUTO: 0.02 THOUSANDS/ΜL (ref 0–0.1)
BASOPHILS NFR BLD AUTO: 0 % (ref 0–1)
BILIRUB DIRECT SERPL-MCNC: 3.41 MG/DL (ref 0–0.2)
BILIRUB SERPL-MCNC: 8.5 MG/DL (ref 0.2–1)
BLD GP AB SCN SERPL QL: NEGATIVE
BLD SMEAR INTERP: NORMAL
BPU ID: NORMAL
BUN SERPL-MCNC: 10 MG/DL (ref 5–25)
BUN SERPL-MCNC: 11 MG/DL (ref 5–25)
BUN SERPL-MCNC: 11 MG/DL (ref 5–25)
CA-I BLD-SCNC: 1.17 MMOL/L (ref 1.12–1.32)
CA-I BLD-SCNC: 1.19 MMOL/L (ref 1.12–1.32)
CA-I BLD-SCNC: 1.2 MMOL/L (ref 1.12–1.32)
CALCIUM SERPL-MCNC: 9 MG/DL (ref 8.3–10.1)
CALCIUM SERPL-MCNC: 9.1 MG/DL (ref 8.3–10.1)
CALCIUM SERPL-MCNC: 9.2 MG/DL (ref 8.3–10.1)
CHLORIDE SERPL-SCNC: 102 MMOL/L (ref 100–108)
CHLORIDE SERPL-SCNC: 102 MMOL/L (ref 100–108)
CHLORIDE SERPL-SCNC: 103 MMOL/L (ref 100–108)
CO2 SERPL-SCNC: 26 MMOL/L (ref 21–32)
CO2 SERPL-SCNC: 27 MMOL/L (ref 21–32)
CO2 SERPL-SCNC: 27 MMOL/L (ref 21–32)
CREAT SERPL-MCNC: 1.65 MG/DL (ref 0.6–1.3)
CREAT SERPL-MCNC: 1.88 MG/DL (ref 0.6–1.3)
CREAT SERPL-MCNC: 1.88 MG/DL (ref 0.6–1.3)
EOSINOPHIL # BLD AUTO: 0.45 THOUSAND/ΜL (ref 0–0.61)
EOSINOPHIL NFR BLD AUTO: 4 % (ref 0–6)
ERYTHROCYTE [DISTWIDTH] IN BLOOD BY AUTOMATED COUNT: 18.6 % (ref 11.6–15.1)
FIBRINOGEN PPP-MCNC: 81 MG/DL (ref 227–495)
FIBRINOGEN PPP-MCNC: <60 MG/DL (ref 227–495)
FIBRINOGEN PPP-MCNC: <60 MG/DL (ref 227–495)
GFR SERPL CREATININE-BSD FRML MDRD: 39 ML/MIN/1.73SQ M
GFR SERPL CREATININE-BSD FRML MDRD: 39 ML/MIN/1.73SQ M
GFR SERPL CREATININE-BSD FRML MDRD: 45 ML/MIN/1.73SQ M
GLUCOSE SERPL-MCNC: 119 MG/DL (ref 65–140)
GLUCOSE SERPL-MCNC: 120 MG/DL (ref 65–140)
GLUCOSE SERPL-MCNC: 131 MG/DL (ref 65–140)
HCT VFR BLD AUTO: 20.5 % (ref 36.5–49.3)
HCT VFR BLD AUTO: 21.6 % (ref 36.5–49.3)
HCT VFR BLD AUTO: 22.8 % (ref 36.5–49.3)
HGB BLD-MCNC: 6.4 G/DL (ref 12–17)
HGB BLD-MCNC: 6.8 G/DL (ref 12–17)
HGB BLD-MCNC: 7 G/DL (ref 12–17)
IMM GRANULOCYTES # BLD AUTO: >0.5 THOUSAND/UL (ref 0–0.2)
IMM GRANULOCYTES NFR BLD AUTO: 6 % (ref 0–2)
INR PPP: 2.99 (ref 0.84–1.19)
LYMPHOCYTES # BLD AUTO: 0.86 THOUSANDS/ΜL (ref 0.6–4.47)
LYMPHOCYTES NFR BLD AUTO: 7 % (ref 14–44)
MAGNESIUM SERPL-MCNC: 1.7 MG/DL (ref 1.6–2.6)
MAGNESIUM SERPL-MCNC: 1.9 MG/DL (ref 1.6–2.6)
MAGNESIUM SERPL-MCNC: 2 MG/DL (ref 1.6–2.6)
MCH RBC QN AUTO: 37 PG (ref 26.8–34.3)
MCHC RBC AUTO-ENTMCNC: 31.2 G/DL (ref 31.4–37.4)
MCV RBC AUTO: 119 FL (ref 82–98)
MONOCYTES # BLD AUTO: 1.89 THOUSAND/ΜL (ref 0.17–1.22)
MONOCYTES NFR BLD AUTO: 15 % (ref 4–12)
NEUTROPHILS # BLD AUTO: 8.59 THOUSANDS/ΜL (ref 1.85–7.62)
NEUTS SEG NFR BLD AUTO: 68 % (ref 43–75)
NRBC BLD AUTO-RTO: 0 /100 WBCS
PHOSPHATE SERPL-MCNC: 1.8 MG/DL (ref 2.7–4.5)
PHOSPHATE SERPL-MCNC: 1.9 MG/DL (ref 2.7–4.5)
PHOSPHATE SERPL-MCNC: 1.9 MG/DL (ref 2.7–4.5)
PLATELET # BLD AUTO: 27 THOUSANDS/UL (ref 149–390)
PMV BLD AUTO: 11 FL (ref 8.9–12.7)
POTASSIUM SERPL-SCNC: 4 MMOL/L (ref 3.5–5.3)
POTASSIUM SERPL-SCNC: 4.1 MMOL/L (ref 3.5–5.3)
POTASSIUM SERPL-SCNC: 4.1 MMOL/L (ref 3.5–5.3)
PROT SERPL-MCNC: 6.5 G/DL (ref 6.4–8.2)
PROTHROMBIN TIME: 31.5 SECONDS (ref 11.6–14.5)
RBC # BLD AUTO: 1.73 MILLION/UL (ref 3.88–5.62)
RH BLD: NEGATIVE
SODIUM SERPL-SCNC: 135 MMOL/L (ref 136–145)
SODIUM SERPL-SCNC: 136 MMOL/L (ref 136–145)
SODIUM SERPL-SCNC: 139 MMOL/L (ref 136–145)
SPECIMEN EXPIRATION DATE: NORMAL
UNIT DISPENSE STATUS: NORMAL
UNIT PRODUCT CODE: NORMAL
UNIT RH: NORMAL
WBC # BLD AUTO: 12.58 THOUSAND/UL (ref 4.31–10.16)

## 2019-09-18 PROCEDURE — 94660 CPAP INITIATION&MGMT: CPT

## 2019-09-18 PROCEDURE — 02HV33Z INSERTION OF INFUSION DEVICE INTO SUPERIOR VENA CAVA, PERCUTANEOUS APPROACH: ICD-10-PCS | Performed by: ANESTHESIOLOGY

## 2019-09-18 PROCEDURE — P9012 CRYOPRECIPITATE EACH UNIT: HCPCS

## 2019-09-18 PROCEDURE — 90945 DIALYSIS ONE EVALUATION: CPT

## 2019-09-18 PROCEDURE — 85018 HEMOGLOBIN: CPT | Performed by: PHYSICIAN ASSISTANT

## 2019-09-18 PROCEDURE — 85610 PROTHROMBIN TIME: CPT | Performed by: PHYSICIAN ASSISTANT

## 2019-09-18 PROCEDURE — 86850 RBC ANTIBODY SCREEN: CPT | Performed by: PHYSICIAN ASSISTANT

## 2019-09-18 PROCEDURE — 90935 HEMODIALYSIS ONE EVALUATION: CPT | Performed by: INTERNAL MEDICINE

## 2019-09-18 PROCEDURE — 82330 ASSAY OF CALCIUM: CPT | Performed by: INTERNAL MEDICINE

## 2019-09-18 PROCEDURE — 85384 FIBRINOGEN ACTIVITY: CPT | Performed by: NURSE PRACTITIONER

## 2019-09-18 PROCEDURE — P9016 RBC LEUKOCYTES REDUCED: HCPCS

## 2019-09-18 PROCEDURE — 86920 COMPATIBILITY TEST SPIN: CPT

## 2019-09-18 PROCEDURE — 94760 N-INVAS EAR/PLS OXIMETRY 1: CPT

## 2019-09-18 PROCEDURE — 85025 COMPLETE CBC W/AUTO DIFF WBC: CPT | Performed by: NURSE PRACTITIONER

## 2019-09-18 PROCEDURE — 86900 BLOOD TYPING SEROLOGIC ABO: CPT | Performed by: PHYSICIAN ASSISTANT

## 2019-09-18 PROCEDURE — 85014 HEMATOCRIT: CPT | Performed by: PHYSICIAN ASSISTANT

## 2019-09-18 PROCEDURE — 85014 HEMATOCRIT: CPT | Performed by: NURSE PRACTITIONER

## 2019-09-18 PROCEDURE — 99291 CRITICAL CARE FIRST HOUR: CPT | Performed by: ANESTHESIOLOGY

## 2019-09-18 PROCEDURE — 99232 SBSQ HOSP IP/OBS MODERATE 35: CPT | Performed by: INTERNAL MEDICINE

## 2019-09-18 PROCEDURE — NC001 PR NO CHARGE: Performed by: ANESTHESIOLOGY

## 2019-09-18 PROCEDURE — C9113 INJ PANTOPRAZOLE SODIUM, VIA: HCPCS | Performed by: NURSE PRACTITIONER

## 2019-09-18 PROCEDURE — 84100 ASSAY OF PHOSPHORUS: CPT | Performed by: INTERNAL MEDICINE

## 2019-09-18 PROCEDURE — 36556 INSERT NON-TUNNEL CV CATH: CPT | Performed by: ANESTHESIOLOGY

## 2019-09-18 PROCEDURE — 71045 X-RAY EXAM CHEST 1 VIEW: CPT

## 2019-09-18 PROCEDURE — 80048 BASIC METABOLIC PNL TOTAL CA: CPT | Performed by: INTERNAL MEDICINE

## 2019-09-18 PROCEDURE — 86901 BLOOD TYPING SEROLOGIC RH(D): CPT | Performed by: PHYSICIAN ASSISTANT

## 2019-09-18 PROCEDURE — 85018 HEMOGLOBIN: CPT | Performed by: NURSE PRACTITIONER

## 2019-09-18 PROCEDURE — 83735 ASSAY OF MAGNESIUM: CPT | Performed by: INTERNAL MEDICINE

## 2019-09-18 PROCEDURE — 80076 HEPATIC FUNCTION PANEL: CPT | Performed by: PHYSICIAN ASSISTANT

## 2019-09-18 RX ORDER — LIDOCAINE HYDROCHLORIDE 10 MG/ML
INJECTION, SOLUTION EPIDURAL; INFILTRATION; INTRACAUDAL; PERINEURAL
Status: DISPENSED
Start: 2019-09-18 | End: 2019-09-19

## 2019-09-18 RX ORDER — LACTULOSE 20 G/30ML
20 SOLUTION ORAL 2 TIMES DAILY
Status: DISCONTINUED | OUTPATIENT
Start: 2019-09-18 | End: 2019-09-21

## 2019-09-18 RX ORDER — MAGNESIUM SULFATE 1 G/100ML
1 INJECTION INTRAVENOUS ONCE
Status: COMPLETED | OUTPATIENT
Start: 2019-09-18 | End: 2019-09-18

## 2019-09-18 RX ORDER — ALBUMIN (HUMAN) 12.5 G/50ML
12.5 SOLUTION INTRAVENOUS EVERY 6 HOURS SCHEDULED
Status: DISCONTINUED | OUTPATIENT
Start: 2019-09-18 | End: 2019-09-22

## 2019-09-18 RX ORDER — ALBUMIN (HUMAN) 12.5 G/50ML
12.5 SOLUTION INTRAVENOUS ONCE
Status: COMPLETED | OUTPATIENT
Start: 2019-09-18 | End: 2019-09-18

## 2019-09-18 RX ORDER — TRANEXAMIC ACID 100 MG/ML
500 INJECTION, SOLUTION INTRAVENOUS ONCE
Status: DISCONTINUED | OUTPATIENT
Start: 2019-09-18 | End: 2019-09-19

## 2019-09-18 RX ADMIN — MIDODRINE HYDROCHLORIDE 15 MG: 5 TABLET ORAL at 17:32

## 2019-09-18 RX ADMIN — Medication 20000 ML: at 15:01

## 2019-09-18 RX ADMIN — PANTOPRAZOLE SODIUM 40 MG: 40 INJECTION, POWDER, LYOPHILIZED, FOR SOLUTION INTRAVENOUS at 17:32

## 2019-09-18 RX ADMIN — PHENYLEPHRINE HYDROCHLORIDE 70 MCG/MIN: 10 INJECTION INTRAVENOUS at 16:35

## 2019-09-18 RX ADMIN — OCTREOTIDE ACETATE 100 MCG: 100 INJECTION, SOLUTION INTRAVENOUS; SUBCUTANEOUS at 06:02

## 2019-09-18 RX ADMIN — Medication 20000 ML: at 08:59

## 2019-09-18 RX ADMIN — LACTULOSE 20 G: 10 SOLUTION ORAL at 17:33

## 2019-09-18 RX ADMIN — PANTOPRAZOLE SODIUM 40 MG: 40 INJECTION, POWDER, LYOPHILIZED, FOR SOLUTION INTRAVENOUS at 08:30

## 2019-09-18 RX ADMIN — CEFEPIME HYDROCHLORIDE 2000 MG: 2 INJECTION, POWDER, FOR SOLUTION INTRAVENOUS at 08:29

## 2019-09-18 RX ADMIN — NYSTATIN: 100000 CREAM TOPICAL at 17:31

## 2019-09-18 RX ADMIN — ALBUMIN (HUMAN) 12.5 G: 12.5 SOLUTION INTRAVENOUS at 02:29

## 2019-09-18 RX ADMIN — PHENYLEPHRINE HYDROCHLORIDE 80 MCG/MIN: 10 INJECTION INTRAVENOUS at 08:02

## 2019-09-18 RX ADMIN — CALCIUM GLUCONATE 1 G: 98 INJECTION, SOLUTION INTRAVENOUS at 13:35

## 2019-09-18 RX ADMIN — Medication 20000 ML: at 03:55

## 2019-09-18 RX ADMIN — Medication 20000 ML: at 11:17

## 2019-09-18 RX ADMIN — MAGNESIUM SULFATE HEPTAHYDRATE 1 G: 1 INJECTION, SOLUTION INTRAVENOUS at 06:02

## 2019-09-18 RX ADMIN — SODIUM PHOSPHATE, MONOBASIC, MONOHYDRATE 9 MMOL: 276; 142 INJECTION, SOLUTION INTRAVENOUS at 06:16

## 2019-09-18 RX ADMIN — MIDODRINE HYDROCHLORIDE 15 MG: 5 TABLET ORAL at 11:07

## 2019-09-18 RX ADMIN — METRONIDAZOLE 500 MG: 500 INJECTION, SOLUTION INTRAVENOUS at 04:15

## 2019-09-18 RX ADMIN — ALBUMIN (HUMAN) 12.5 G: 0.25 INJECTION, SOLUTION INTRAVENOUS at 23:06

## 2019-09-18 RX ADMIN — CALCIUM GLUCONATE 1 G: 98 INJECTION, SOLUTION INTRAVENOUS at 19:00

## 2019-09-18 RX ADMIN — Medication 20000 ML: at 00:18

## 2019-09-18 RX ADMIN — RIFAXIMIN 550 MG: 550 TABLET ORAL at 04:32

## 2019-09-18 RX ADMIN — LACTULOSE 20 G: 10 SOLUTION ORAL at 08:30

## 2019-09-18 RX ADMIN — ALBUMIN (HUMAN) 12.5 G: 0.25 INJECTION, SOLUTION INTRAVENOUS at 17:34

## 2019-09-18 RX ADMIN — OCTREOTIDE ACETATE 100 MCG: 100 INJECTION, SOLUTION INTRAVENOUS; SUBCUTANEOUS at 22:38

## 2019-09-18 RX ADMIN — SODIUM PHOSPHATE, MONOBASIC, MONOHYDRATE 9 MMOL: 276; 142 INJECTION, SOLUTION INTRAVENOUS at 19:00

## 2019-09-18 RX ADMIN — CEFEPIME HYDROCHLORIDE 2000 MG: 2 INJECTION, POWDER, FOR SOLUTION INTRAVENOUS at 01:25

## 2019-09-18 RX ADMIN — MIDODRINE HYDROCHLORIDE 15 MG: 5 TABLET ORAL at 07:40

## 2019-09-18 RX ADMIN — SODIUM PHOSPHATE, MONOBASIC, MONOHYDRATE 9 MMOL: 276; 142 INJECTION, SOLUTION INTRAVENOUS at 13:35

## 2019-09-18 RX ADMIN — OCTREOTIDE ACETATE 100 MCG: 100 INJECTION, SOLUTION INTRAVENOUS; SUBCUTANEOUS at 14:07

## 2019-09-18 RX ADMIN — ALBUMIN (HUMAN) 12.5 G: 0.25 INJECTION, SOLUTION INTRAVENOUS at 12:09

## 2019-09-18 RX ADMIN — NYSTATIN: 100000 CREAM TOPICAL at 08:50

## 2019-09-18 RX ADMIN — RIFAXIMIN 550 MG: 550 TABLET ORAL at 16:29

## 2019-09-18 NOTE — SOCIAL WORK
Conversation with nephrology  Conversations w [facility] and [facility] [place] regarding transplant  Continued conversation w [facility] for detemination   [facility] following

## 2019-09-18 NOTE — PLAN OF CARE
Problem: PAIN - ADULT  Goal: Verbalizes/displays adequate comfort level or baseline comfort level  Description  Interventions:  - Encourage patient to monitor pain and request assistance  - Assess pain using appropriate pain scale  - Administer analgesics based on type and severity of pain and evaluate response  - Implement non-pharmacological measures as appropriate and evaluate response  - Consider cultural and social influences on pain and pain management  - Notify physician/advanced practitioner if interventions unsuccessful or patient reports new pain  Outcome: Progressing     Problem: INFECTION - ADULT  Goal: Absence or prevention of progression during hospitalization  Description  INTERVENTIONS:  - Assess and monitor for signs and symptoms of infection  - Monitor lab/diagnostic results  - Monitor all insertion sites, i e  indwelling lines, tubes, and drains  - Monitor endotracheal if appropriate and nasal secretions for changes in amount and color  - [place] appropriate cooling/warming therapies per order  - Administer medications as ordered  - Instruct and encourage patient and family to use good hand hygiene technique  - Identify and instruct in appropriate isolation precautions for identified infection/condition  Outcome: Progressing  Goal: Absence of fever/infection during neutropenic period  Description  INTERVENTIONS:  - Monitor WBC    Outcome: Progressing     Problem: SAFETY ADULT  Goal: Patient will remain free of falls  Description  INTERVENTIONS:  - Assess patient frequently for physical needs  -  Identify cognitive and physical deficits and behaviors that affect risk of falls    -  [place] fall precautions as indicated by assessment   - Educate patient/family on patient safety including physical limitations  - Instruct patient to call for assistance with activity based on assessment  - Modify environment to reduce risk of injury  - Consider OT/PT consult to assist with strengthening/mobility  Outcome: Progressing  Goal: Maintain or return to baseline ADL function  Description  INTERVENTIONS:  -  Assess patient's ability to carry out ADLs; assess patient's baseline for ADL function and identify physical deficits which impact ability to perform ADLs (bathing, care of mouth/teeth, toileting, grooming, dressing, etc )  - Assess/evaluate cause of self-care deficits   - Assess range of motion  - Assess patient's mobility; develop plan if impaired  - Assess patient's need for assistive devices and provide as appropriate  - Encourage maximum independence but intervene and supervise when necessary  - Involve family in performance of ADLs  - Assess for home care needs following discharge   - Consider OT consult to assist with ADL evaluation and planning for discharge  - Provide patient education as appropriate  Outcome: Progressing  Goal: Maintain or return mobility status to optimal level  Description  INTERVENTIONS:  - Assess patient's baseline mobility status (ambulation, transfers, stairs, etc )    - Identify cognitive and physical deficits and behaviors that affect mobility  - Identify mobility aids required to assist with transfers and/or ambulation (gait belt, sit-to-stand, lift, walker, cane, etc )  - [place] fall precautions as indicated by assessment  - Record patient progress and toleration of activity level on Mobility SBAR; progress patient to next Phase/Stage  - Instruct patient to call for assistance with activity based on assessment  - Consider rehabilitation consult to assist with strengthening/weightbearing, etc   Outcome: Progressing     Problem: DISCHARGE PLANNING  Goal: Discharge to home or other facility with appropriate resources  Description  INTERVENTIONS:  - Identify barriers to discharge w/patient and caregiver  - Arrange for needed discharge resources and transportation as appropriate  - Identify discharge learning needs (meds, wound care, etc )  - Arrange for interpretive services to assist at discharge as needed  - Refer to Case Management Department for coordinating discharge planning if the patient needs post-hospital services based on physician/advanced practitioner order or complex needs related to functional status, cognitive ability, or social support system  Outcome: Progressing     Problem: Knowledge Deficit  Goal: Patient/family/caregiver demonstrates understanding of disease process, treatment plan, medications, and discharge instructions  Description  Complete learning assessment and assess knowledge base  Interventions:  - Provide teaching at level of understanding  - Provide teaching via preferred learning methods  Outcome: Progressing     Problem: Potential for Falls  Goal: Patient will remain free of falls  Description  INTERVENTIONS:  - Assess patient frequently for physical needs  -  Identify cognitive and physical deficits and behaviors that affect risk of falls    -  [place] fall precautions as indicated by assessment   - Educate patient/family on patient safety including physical limitations  - Instruct patient to call for assistance with activity based on assessment  - Modify environment to reduce risk of injury  - Consider OT/PT consult to assist with strengthening/mobility  Outcome: Progressing     Problem: Prexisting or High Potential for Compromised Skin Integrity  Goal: Skin integrity is maintained or improved  Description  INTERVENTIONS:  - Identify patients at risk for skin breakdown  - Assess and monitor skin integrity  - Assess and monitor nutrition and hydration status  - Monitor labs   - Assess for incontinence   - Turn and reposition patient  - Assist with mobility/ambulation  - Relieve pressure over bony prominences  - Avoid friction and shearing  - Provide appropriate hygiene as needed including keeping skin clean and dry  - Evaluate need for skin moisturizer/barrier cream  - Collaborate with interdisciplinary team   - Patient/family teaching  - Consider wound care consult   Outcome: Progressing     Problem: Nutrition/Hydration-ADULT  Goal: Nutrient/Hydration intake appropriate for improving, restoring or maintaining nutritional needs  Description  Monitor and assess patient's nutrition/hydration status for malnutrition  Collaborate with interdisciplinary team and initiate plan and interventions as ordered  Monitor patient's weight and dietary intake as ordered or per policy  Utilize nutrition screening tool and intervene as necessary  Determine patient's food preferences and provide high-protein, high-caloric foods as appropriate       INTERVENTIONS:  - Monitor oral intake, urinary output, labs, and treatment plans  - Assess nutrition and hydration status and recommend course of action  - Evaluate amount of meals eaten  - Assist patient with eating if necessary   - Allow adequate time for meals  - Recommend/ encourage appropriate diets, oral nutritional supplements, and vitamin/mineral supplements  - Order, calculate, and assess calorie counts as needed  - Recommend, monitor, and adjust tube feedings and TPN/PPN based on assessed needs  - Assess need for intravenous fluids  - Provide specific nutrition/hydration education as appropriate  - Include patient/family/caregiver in decisions related to nutrition  Outcome: Progressing     Problem: METABOLIC, FLUID AND ELECTROLYTES - ADULT  Goal: Electrolytes maintained within normal limits  Description  INTERVENTIONS:  - Monitor labs and assess patient for signs and symptoms of electrolyte imbalances  - Administer electrolyte replacement as ordered  - Monitor response to electrolyte replacements, including repeat lab results as appropriate  - Instruct patient on fluid and nutrition as appropriate  Outcome: Progressing  Goal: Fluid balance maintained  Description  INTERVENTIONS:  - Monitor labs   - Monitor I/O and WT  - Instruct patient on fluid and nutrition as appropriate  - Assess for signs & symptoms of volume excess or deficit  Outcome: Progressing

## 2019-09-18 NOTE — PROGRESS NOTES
Progress Note - Critical Care   Valerie Forbes 63 y o  male MRN: [id number]  Unit/Bed#:  Encounter: [id number]    Attending Physician: Pranav Harp MD      ______________________________________________________________________  Assessment and Plan:   Principal Problem:    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 [facility])  Active Problems:    CROW on CPAP    Essential hypertension    Hyperbilirubinemia    Morbid obesity ([address] )    Anemia    Hepatorenal syndrome (HCC)    Encephalopathy acute    Thrombocytopenia (HCC)    Obesity with alveolar hypoventilation (HCC)    Coagulopathy (HCC)    Nausea & vomiting    Cholelithiasis  Resolved Problems:    Cellulitis of right lower extremity    HCAP (healthcare-associated pneumonia)            Neuro:   Acute Encephalopathy   -Likely secondary to hepatic encephalopathy, now improving  -Decrease lactulose to BID and change to oral from enema as he is tolerating PO intake  -Trend ammonia and neuro exam    Continue delirium precautions  Regulate sleep/wake cycle     CV:   Hypotension   -Likely secondary to ROSALES liver cirrhosis and hypoalbuminemia  -Patient is also significantly negative on I/O balance - may consider decreasing UF rate on CVVH  -Continue Phenyephrine for goal SBP>100 and MAP>55 - may need to consider central IV access today PICC vs  CVC   Would further discuss risk/benefit of this with family given profound coagulopathy   -Continue TID Midodrine    Continuous Cardiac Monitoring    Pulm:   CROW on BIPAP  -Continue Bipap HS     Wean supplemental O2 for SpO2>90  Encourage cough/deep breathing/IS     GI:   ROSALES Liver Cirrhosis  -MELD 41  -Continue octreotide  -Continue lactulose and rifaximin   -Follow up with [place] on status of liver transplantation to discuss if patient could be considered as a candidate if his BMI is less than 50   -Continue to trend LFTs, bilirubin, ammonia, coags    Acute Cholecystitis  -s/p percutaneous octavio drain on 9/16 - 60ml bilious drainage/24 hours  -Sterile body fluid culture without bacteria/organisms seen   -Tolerated clear liquids last evening - consider advancing diet and removing NGT if tolerating  -Continue PRN zofran    :   Hepatorenal Syndrome  -Continue CVVH with goal -150ml/hr  -Currently 2 7L negative over 24 hours  -Consider decreasing hourly UF if increasing pressor requirements  -Nephrology following - appreciate recommendations    F/E/N:   Fluids: CVVH with goal -150ml/hr  Electrolytes: trend and replete per CVVH protocol  Nutrition: Advance diet today, and remove NGT if tolerating    ID:   Acute Cholecystitis  -Sterile body fluid culture with no bacteria/organisms seen  -BC show no growth x24 hours  -Continue Cefepime and Flagyl until BC negative [id number]  -Persistent leukocytosis - continue to trend WBC and fever curve    Heme:   Coagulopathy Secondary to ROSALES Liver Cirrhosis  - Continue to trend CBC and coags  -Transfuse for Hgb<6 5, INR>4, Fibrinogen <100, and Platelets<10  -Monitor closely for signs and symptoms of bleeding    Endo:   No acute issues  Continue to monitor glucose on BMP     Msk/Skin:   Continue frequent turning and repositioning to prevent skin breakdown    Disposition: Continue ICU level of Care    Code Status: Level 1 - Full Code    Counseling / Coordination of Care  Total Critical Care time spent 32 minutes excluding procedures, teaching and family updates  ______________________________________________________________________    Chief Complaint: "I'm Okay"    24 Hour Events: CVVH was run throughout the past 24 hours at -150ml/hr  The patient continued to be hypotensive and Bran as titrated to maintain SBP>100, MAP>55  He received 3 U Cryoprecipitate for Fibrinogen <60, and 2 doses 25 % albumin for hypotension       Review of Systems - Negative except nausea and diarrhea  ______________________________________________________________________    Physical Exam:   Physical Exam   Constitutional: He is oriented to person, place, and time  He appears well-developed  He appears lethargic  He appears ill  No distress  HENT:   Head: Normocephalic and atraumatic  Eyes: Scleral icterus is present  Cardiovascular: Normal rate, regular rhythm, normal heart sounds and intact distal pulses  Pulmonary/Chest: Effort normal  He has decreased breath sounds in the right lower field and the left lower field  Abdominal: Soft  He exhibits distension  Bowel sounds are decreased  There is generalized tenderness  Musculoskeletal: He exhibits edema (+4 generalized pitting edema)  Neurological: He is oriented to person, place, and time  He appears lethargic  GCS eye subscore is 3  GCS verbal subscore is 5  GCS motor subscore is 6  Exhibits facial symmetry  Diminished, but equal strength in all extremities  Skin: Skin is warm and dry  jaundiced   Psychiatric: He has a normal mood and affect        ______________________________________________________________________  Vitals:    19 0117 19 0144 19 0152 19 0200   BP: (!) 98/45 (!) 92/32 (!) 91/42 (!) 91/43   BP Location: Right arm Right arm Right arm Right arm   Pulse: 63 63 63 63   Resp:  14  14   Temp: 97 9 °F (36 6 °C) 97 8 °F (36 6 °C)     TempSrc: Axillary Axillary     SpO2: 99% 98% 99% 98%   Weight:       Height:           Temperature:   Temp (24hrs), Av 9 °F (36 6 °C), Min:96 8 °F (36 °C), Max:98 4 °F (36 9 °C)    Current Temperature: 97 8 °F (36 6 °C)  Weights:   IBW: 75 3 kg    Body mass index is 52 76 kg/m²    Weight (last 2 days)     Date/Time   Weight    19 0600   (!) 172 (378 31)    19 0502   (!) 176 (388 01)    19 0400   (!) 177 (389 55)            Hemodynamic Monitoring:  N/A     Non-Invasive/Invasive Ventilation Settings:  Respiratory    Lab Data (Last 4 hours)    None         O2/Vent Data (Last 4 hours)       2322          Non-Invasive Ventilation Mode BiPAP                 No results found for: PHART, EWH2HUC, PO2ART, PGG6EYK, Z2YAKIFI, BEART, SOURCE  SpO2: SpO2: 98 %, SpO2 Device: O2 Device: Other (comment)(cpap)  Intake and Outputs:  I/O       09/16 0701 - 09/17 0700 09/17 0701 - 09/18 0700    P  O  0 1220    I V  (mL/kg) 1392 2 (8 1) 1075 6 (6 3)    Blood 811 7 319    NG/ 226    IV Piggyback 184 2 600    Total Intake(mL/kg) 2648 (15 4) 3440 6 (20)    Urine (mL/kg/hr) 236 (0 1) 0 (0)    Emesis/NG output 170 0    Drains 310 60    Other 8910 5181    Stool 600 900    Blood 171     Total Output 88812 6141    Net -8609 -5055 4          Unmeasured Urine Occurrence 1 x     Unmeasured Stool Occurrence 0 x 1 x        UOP: 0 ml/hr   Nutrition:        Diet Orders   (From admission, onward)             Start     Ordered    09/17/19 1142  Diet Enteral/Parenteral; Tube Feeding with Oral Diet; Jevity 1 2 Jaleel; Cyclic; 10; 10 hours; Clear Liquid  Diet effective now     Question Answer Comment   Diet Type Enteral/Parenteral    Enteral/Parenteral Tube Feeding with Oral Diet    Tube Feeding Formula: Jevity 1 2 Jaleel    Bolus/Cyclic/Continuous Cyclic    Tube Feeding Cyclic Rate (mL/hr): 10    Tube Feeding Cyclic: Administer over: 10 hours    Diet Type Clear Liquid    RD to adjust diet per protocol? No        09/17/19 1143              TF currently not running, Cyclic Feeds: Jevilty 1 2 at 10 ml/hr with a goal of 10     Labs:   Results from last 7 days   Lab Units 09/17/19  0531 09/16/19  2227 09/16/19  1420 09/16/19  0430 09/15/19  0445 09/14/19  0435 09/14/19  0019 09/13/19  0528 09/12/19  0735 09/11/19  1012   WBC Thousand/uL 25 71*  --  20 89* 21 99* 11 39* 16 16* 16 99* 11 10* 10 29* 10 28*   HEMOGLOBIN g/dL 7 1* 7 2* 6 9* 7 1* 7 2* 7 5* 7 6* 7 7* 7 6* 6 9*   HEMATOCRIT % 23 0* 23 6* 22 2* 22 8* 23 2* 24 1* 24 5* 25 0* 23 9* 22 3*   PLATELETS Thousands/uL 37*  --  30* 29* 26* 20* 21* 26* 28* 36*   NEUTROS PCT %  --   --  84* 85* 73 80*  --  73  --   --    BANDS PCT % 5  --   --   --   --   --   --   --  2 1   MONOS PCT %  --   --  12 11 10 9  --  10  --   --    MONO PCT % 6  --   --   --   --   --   --   --  4 8     Results from last 7 days   Lab Units 09/17/19  2245 09/17/19  1549 09/17/19  1144 09/17/19  0531 09/16/19  2354 09/16/19  1804 09/16/19  1050 09/16/19  0430  09/15/19  0445  09/14/19  0435  09/11/19  1012   SODIUM mmol/L 136 138 139 138 136 135* 136 135*   < > 135*   < > 132*   < > 127*   POTASSIUM mmol/L 3 9 4 1 4 1 4 2 4 0 4 1 4 1 4 1   < > 4 1   < > 4 6   < > 5 1   CHLORIDE mmol/L 100 104 104 103 102 102 104 103   < > 102   < > 99*   < > 94*   CO2 mmol/L 28 28 29 28 28 26 27 26   < > 26   < > 28   < > 25   ANION GAP mmol/L 8 6 6 7 6 7 5 6   < > 7   < > 5   < > 8   BUN mg/dL 10 10 10 10 9 9 10 11   < > 14   < > 28*   < > 54*   CREATININE mg/dL 1 59* 1 67* 1 71* 1 62* 1 53* 1 58* 1 57* 1 54*   < > 1 54*   < > 2 30*   < > 2 93*   CALCIUM mg/dL 9 2 8 8 9 1 9 3 9 4 9 3 9 1 9 0   < > 9 1   < > 8 8   < > 8 7   GLUCOSE RANDOM mg/dL 136 139 108 112 114 115 114 127   < > 103   < > 128   < > 123   ALT U/L  --   --   --  32  --   --   --  31  --  29  --  27  --  30   AST U/L  --   --   --  57*  --   --   --  52*  --  46*  --  42  --  39   ALK PHOS U/L  --   --   --  77  --   --   --  70  --  71  --  64  --  64   ALBUMIN g/dL  --   --   --  3 2*  --   --   --  3 1*  --  3 2*  --  3 3*  --  3 4*   TOTAL BILIRUBIN mg/dL  --   --   --  8 60*  --   --   --  8 30*  --  7 60*  --  7 60*  --  7 10*    < > = values in this interval not displayed       Results from last 7 days   Lab Units 09/17/19  2245 09/17/19  1549 09/17/19  1144 09/17/19  0531 09/16/19  2354 09/16/19  1804 09/16/19  1050   MAGNESIUM mg/dL 1 7 1 9 1 9 2 0 1 8 1 8 1 9   PHOSPHORUS mg/dL 1 8* 2 2* 2 3* 2 4* 2 2* 2 3* 2 2*      Results from last 7 days   Lab Units 09/17/19  0531 09/16/19  1420 09/16/19  0430 09/15/19  0445 09/14/19  0612 09/13/19  1116 09/11/19  1012   INR  3 15* 2 32* 2 84* 2 34* 2 36* 2 06* 2 04*   PTT seconds 74*  --   --   --   --  45*  -- ABG:  Results from last 7 days   Lab Units 19  1328   PH ART  7 327*   PCO2 ART mm Hg 42 7   PO2 ART mm Hg 44 2*   HCO3 ART mmol/L 21 9*   BASE EXC ART mmol/L -3 9   ABG SOURCE  Radial, Right     VBG:  Results from last 7 days   Lab Units 19  1328   ABG SOURCE  Radial, Right     Results from last 7 days   Lab Units 09/15/19  1134 19  0735   PROCALCITONIN ng/ml 0 37* 0 42*       Imagin/17 CXR,  RUQ US,  CT chest abdomen and pelvis  CT head reviewed I have personally reviewed pertinent reports  and I have personally reviewed pertinent films in PACS  EKG: NSR Rate 64  Micro:  Results from last 7 days   Lab Units 19  1443 19  1031 19  1028 19  0945   BLOOD CULTURE   --  No Growth at 24 hrs  No Growth at 24 hrs   --    GRAM STAIN RESULT  No Polys  No organisms seen  --   --   --    BODY FLUID CULTURE, STERILE  No growth  --   --   --    MRSA CULTURE ONLY   --   --   --  No Methicillin Resistant Staphlyococcus aureus (MRSA) isolated     Allergies:    Allergies   Allergen Reactions    Lactose     Propranolol Eye Swelling    Torsemide Eye Swelling     Medications:   Scheduled Meds:  Current Facility-Administered Medications:  cefepime 2,000 mg Intravenous Q8H MARKOS Linder Last Rate: 2,000 mg (19 0125)   lactulose 200 g Rectal TID MARKOS Linder    metroNIDAZOLE 500 mg Intravenous Q12H MARKOS Linder Last Rate: Stopped (19 1800)   midodrine 15 mg Oral TID AC MARKOS Linder    NxStage K 4/Ca 3 20,000 mL Dialysis Continuous Andreina Qiu MD    nystatin  Topical BID Ha Toure PA-C    octreotide 100 mcg Subcutaneous [facility] Adam Hobson PA-C    ondansetron 4 mg Intravenous Q6H PRN Nicola Betts CRALINA    pantoprazole 40 mg Intravenous BID Nicola PatMARKOS kingsley    phenylephine  mcg/min Intravenous Titrated MARKOS Linder Last Rate: 80 mcg/min (19 0152)   rifaximin 550 mg Oral Q12H [address] Felicia Kulkarni PA-C      Continuous Infusions:  NxStage K 4/Ca 3 20,000 mL    phenylephine  mcg/min Last Rate: 80 mcg/min (09/18/19 0152)     PRN Meds:    ondansetron 4 mg Q6H PRN     VTE Pharmacologic Prophylaxis: Profound Coagulopathy 2/2 ROSALES liver cirrhosis  VTE Mechanical Prophylaxis: sequential compression device  Invasive lines and devices: Invasive Devices     Peripheral Intravenous Line            Peripheral IV 09/16/19 Left;Upper Arm 1 day    Peripheral IV 09/17/19 Right Antecubital less than 1 day    Peripheral IV 09/18/19 Dorsal (posterior); Right Forearm less than 1 day          Arterial Line            Arterial Line 09/13/19 Radial 4 days          Line            Temporary HD Catheter 7 days          Drain            Closed/Suction Drain Right RUQ Other (Comment) 8 5 Fr  1 day    NG/OG/Enteral Tube Nasogastric Right nares 1 day    Rectal Tube With balloon 1 day                     Portions of the record may have been created with voice recognition software  Occasional wrong word or "sound a like" substitutions may have occurred due to the inherent limitations of voice recognition software  Read the chart carefully and recognize, using context, where substitutions have occurred      [address] [place] [address]

## 2019-09-18 NOTE — PROGRESS NOTES
NEPHROLOGY PROGRESS NOTE    Patient: Jarad Zayas               Sex: male          DOA: 9/6/2019  5:35 PM   YOB: 1960        Age:  63 y o         LOS:  LOS: 12 days   9/18/2019    REASON FOR THE CONSULTATION:      Acute kidney injury on chronic kidney disease stage 3    SUBJECTIVE     Patient is seen and examined while undergoing continuous venovenous hemodialysis  Patient is awake, alert and feels well  Still requiring small dose pressor  Ultrafiltration rate decreased to 150 cc an hour  Still noted to be in negative fluid balance over the past 24 hours  His BMI now stands below 50       CURRENT MEDICATIONS       Current Facility-Administered Medications:     albumin human (FLEXBUMIN) 25 % injection 12 5 g, 12 5 g, Intravenous, Q6H [address], Katty Goldstein PA-C    lactulose 20 g/30 mL oral solution 20 g, 20 g, Oral, BID, Shailesh Villafuerte PA-C, 20 g at 09/18/19 0830    midodrine (PROAMATINE) tablet 15 mg, 15 mg, Oral, TID AC, MARKOS Aldridge, 15 mg at 09/18/19 0740    NxStage K 4/Ca 3 dialysis solution (RFP-401) 20,000 mL, 20,000 mL, Dialysis, Continuous, Vishal Kamara MD, 20,000 mL at 09/18/19 0859    nystatin (MYCOSTATIN) cream, , Topical, BID, Katty Goldstein PA-C    octreotide (SandoSTATIN) injection 100 mcg, 100 mcg, Subcutaneous, Q8H [address], BIENVENIDO Davis-C, 100 mcg at 09/18/19 0602    ondansetron (ZOFRAN) injection 4 mg, 4 mg, Intravenous, Q6H PRN, MARKOS Nath, 4 mg at 09/15/19 2142    pantoprazole (PROTONIX) injection 40 mg, 40 mg, Intravenous, BID, MARKOS Nath, 40 mg at 09/18/19 0830    phenylephrine (TEVIN-SYNEPHRINE) 50 mg (STANDARD CONCENTRATION) in sodium chloride 0 9% 250 mL,  mcg/min, Intravenous, Titrated, MARKOS Aldridge, Last Rate: 21 mL/hr at 09/18/19 0948, 70 mcg/min at 09/18/19 0948    rifaximin (XIFAXAN) tablet 550 mg, 550 mg, Oral, Q12H [address], Shailesh Villafuerte PA-C, 550 mg at 09/18/19 0432    tranexamic acid 100mg/mL (for epistaxis) 500 mg, 500 mg, Nasal, Once, Akin Islas PA-C    REVIEW OF SYSTEMS     Review of Systems   Constitutional: Positive for fatigue  HENT: Negative  Eyes: Negative  Respiratory: Negative  Cardiovascular: Positive for leg swelling  Gastrointestinal: Negative  Endocrine: Negative  Genitourinary: Negative  Musculoskeletal: Negative  Skin: Negative  Allergic/Immunologic: Negative  Neurological: Negative  Hematological: Negative  All other systems reviewed and are negative  OBJECTIVE     Current Weight: Weight - Scale: (!) 168 kg (370 lb 6 oz)  Vitals:    09/18/19 0900   BP:    Pulse: 65   Resp: 20   Temp:    SpO2: 99%     Body mass index is 51 66 kg/m²  Intake/Output Summary (Last 24 hours) at 9/18/2019 1008  Last data filed at 9/18/2019 0900  Gross per 24 hour   Intake 3727 7 ml   Output 6978 ml   Net -3250 3 ml       PHYSICAL EXAMINATION     Physical Exam   Constitutional: He is oriented to person, place, and time  Male who is lying down and offers no complaints  HENT:   Head: Normocephalic and atraumatic  Eyes: Pupils are equal, round, and reactive to light  Neck: Neck supple  Cardiovascular: Normal rate, regular rhythm and normal heart sounds  Pulmonary/Chest:   Poor inspiratory effort   Abdominal: Soft  Bowel sounds are normal    Musculoskeletal: Normal range of motion  He exhibits edema  Neurological: He is alert and oriented to person, place, and time  Skin: Skin is warm  Psychiatric: He has a normal mood and affect           LAB RESULTS     Results from last 7 days   Lab Units 09/18/19  0925 09/18/19  0428 09/17/19  2245 09/17/19  1549 09/17/19  1144 09/17/19  0531 09/16/19  2354 09/16/19  2227 09/16/19  1804 09/16/19  1420  09/16/19  0430  09/15/19  0445  09/14/19  0435 09/14/19  0019   WBC Thousand/uL  --  12 58*  --   --   --  25 71*  --   --   --  20 89*  --  21 99*  --  11 39*  --  16 16* 16 99*   HEMOGLOBIN g/dL 7 0* 6 4*  --   --   -- 7 1*  --  7 2*  --  6 9*  --  7 1*  --  7 2*  --  7 5* 7 6*   HEMATOCRIT % 22 8* 20 5*  --   --   --  23 0*  --  23 6*  --  22 2*  --  22 8*  --  23 2*  --  24 1* 24 5*   PLATELETS Thousands/uL  --  27*  --   --   --  37*  --   --   --  30*  --  29*  --  26*  --  20* 21*   POTASSIUM mmol/L  --  4 1 3 9 4 1 4 1 4 2 4 0  --  4 1  --    < > 4 1   < > 4 1   < > 4 6 4 7   CHLORIDE mmol/L  --  102 100 104 104 103 102  --  102  --    < > 103   < > 102   < > 99* 99*   CO2 mmol/L  --  27 28 28 29 28 28  --  26  --    < > 26   < > 26   < > 28 26   BUN mg/dL  --  10 10 10 10 10 9  --  9  --    < > 11   < > 14   < > 28* 32*   CREATININE mg/dL  --  1 65* 1 59* 1 67* 1 71* 1 62* 1 53*  --  1 58*  --    < > 1 54*   < > 1 54*   < > 2 30* 2 46*   EGFR ml/min/1 73sq m  --  45 47 44 43 46 49  --  48  --    < > 49   < > 49   < > 30 28   CALCIUM mg/dL  --  9 0 9 2 8 8 9 1 9 3 9 4  --  9 3  --    < > 9 0   < > 9 1   < > 8 8 8 5   MAGNESIUM mg/dL  --  1 7 1 7 1 9 1 9 2 0 1 8  --  1 8  --    < > 2 0   < > 1 9   < > 1 9 1 9   PHOSPHORUS mg/dL  --  1 8* 1 8* 2 2* 2 3* 2 4* 2 2*  --  2 3*  --    < > 2 4*   < > 2 3*   < > 3 4 4 0    < > = values in this interval not displayed  RADIOLOGY RESULTS      Results for orders placed during the hospital encounter of 09/06/19   CXR 1 view PA portable stat    Narrative CHEST     INDICATION:   line placement RIJ Hemodialysis catheter  COMPARISON:  September 6, 2019    EXAM PERFORMED/VIEWS:  XR CHEST PORTABLE  2 images    FINDINGS:  Lungs are suboptimally aerated  Elevation of right hemidiaphragm  Cardiomegaly with diminishing pulmonary edema  Persistent prominence of the pulmonary vasculature in the left superior hilum  Right jugular central line tip in the upper SVC  No pneumothorax  Osseous structures appear within normal limits for patient age  Old fracture of right 5th rib  Questionable fracture right 6th rib  Impression Persistent cardiomegaly    Improving pulmonary edema  Satisfactory right jugular central line placement  No pneumothorax  Right rib fractures  Workstation performed: [id number]       Results for orders placed during the hospital encounter of 09/06/19   XR chest 2 views    Narrative CHEST     INDICATION:   SOB     COMPARISON:  8/15/2019    EXAM PERFORMED/VIEWS:  XR CHEST PA & LATERAL  2 views    FINDINGS:  Persistent moderate cardiomegaly  Development of diffuse vascular congestion    The lungs are otherwise clear  No pneumothorax or pleural effusion  Osseous structures appear within normal limits for patient age  Impression Persistent cardiomegaly    Increased diffuse vascular congestion        Workstation performed: [id number]         ASSESSMENT/PLAN     63years old male with past medical history of ROSALES cirrhosis, morbid obesity, anasarca, chronic kidney disease stage 3 who presented to our facility on September 6th with complaints of lower extremity cellulitis and was found to have acute kidney injury, to required pneumonia and cellulitis  1  Anuric acute kidney injury on chronic kidney disease stage III present on admission:  Patient with progressive renal dysfunction likely secondary to hepatorenal syndrome and is dialysis dependent   -patient had undergone intermittent dialysis since admission without much improvement in volume status  -patient was therefore started on CVVHD on September 13th for better fluid removal and to increase clearance  -currently with ultrafiltration target of 150 cc per hour with a dialysis flow rate of 5000 cc per hour and a blood flow rate of 250 cc/minute   -ultrafiltration weight was lowered due to patient developed hypotension necessitating initiation of a pressor    -plan to lower pressor rate if possible so that the ultrafiltration rate can be increased up to 200 cc an hour this afternoon with a target of 250 cc    -continue to obtain BMP Q 8 along with ionized calcium, magnesium and phosphorus replaced appropriately  -plan to continue CVVHD still patient's volume status improves further   -still remains at risk for complications including spontaneous bleeding, hypotension during dialysis  -    2  Volume overload:  Secondary to fluid retaining state in a morbidly obese patient with 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  anuric and HD dependent for fluid removal   Patient had been in -23 L in fluid balance since admission  3  Hyponatremia:  Sodium improved to 136 mEq per L      4  Anemia:  Secondary to underlying liver cirrhosis  Current hemoglobin 7 1 and below target  Transfuse to keep hemoglobin above 8     5  Thrombocytopenia: This is likely due to liver cirrhosis  Remains at risk of spontaneous bleeding  Current platelet count is 99711  6  Liver cirrhosis: This is likely secondary to ROSALES  Transplant programs had refused potential transfer initially due to BMI being greater than 50  However with significant weight loss through fluid removal via CRRT, his new BMI stands below 50  ICU staff to reconsider and revisit ID of transferring to [facility] for possible liver [facility]  7  Hyperbilirubinemia:  CT scan showed distended gallbladder  Status post cholecystostomy tube for possible acute cholecystitis  T bilirubin   Stable at 8 3     8  Pneumonia:  Found to have developed infiltrate in left lower lobe of lung  Remains on cefepime and Flagyl  7  Access:  Right IJ temp dialysis catheter  8  Hypophosphatemia:  Serum phosphorus 2 4  Continue with replacement as indicated          Gregory Jones MD  Nephrology  9/18/2019

## 2019-09-18 NOTE — PROGRESS NOTES
Progress Note - Lo Leblanc 63 y o  male MRN: [id number]    Unit/Bed#:  Encounter: [id number]        Subjective:     Patient was seen and examined in the ICU  He is alert and oriented x3  He has no complaints currently  He is still undergoing continuous venovenous hemodialysis  ROS: As noted in the HPI, otherwise all others negative  Objective:     Vitals: Blood pressure (!) 97/47, pulse 64, temperature 97 8 °F (36 6 °C), temperature source Oral, resp  rate 18, height 5' 11" (1 803 m), weight (!) 168 kg (370 lb 6 oz), SpO2 97 %  ,Body mass index is 51 66 kg/m²  Intake/Output Summary (Last 24 hours) at 9/18/2019 1144  Last data filed at 9/18/2019 1100  Gross per 24 hour   Intake 4160 1 ml   Output 7408 ml   Net -3247 9 ml       Physical Exam:     General Appearance: Alert and oriented x 3  In no respiratory distress  Lungs: Clear to auscultation bilaterally, no rales or rhonchi  Heart: Regular rate and rhythm, S1, S2 normal, no murmur, click, rub or gallop  Abdomen: Soft, non-tender, non-distended; bowel sounds normal; no masses or no organomegaly  Extremities: No cyanosis, edema    Invasive Devices     Peripheral Intravenous Line            Peripheral IV 09/16/19 Left;Upper Arm 1 day    Peripheral IV 09/17/19 Right Antecubital less than 1 day    Peripheral IV 09/18/19 Dorsal (posterior); Right Forearm less than 1 day          Arterial Line            Arterial Line 09/13/19 Radial 4 days          Line            Temporary HD Catheter 7 days          Drain            Rectal Tube With balloon 2 days    Closed/Suction Drain Right RUQ Other (Comment) 8 5 Fr  1 day                Lab Results:  Results from last 7 days   Lab Units 09/18/19  0925 09/18/19  0428   WBC Thousand/uL  --  12 58*   HEMOGLOBIN g/dL 7 0* 6 4*   HEMATOCRIT % 22 8* 20 5*   PLATELETS Thousands/uL  --  27*   NEUTROS PCT %  --  68   LYMPHS PCT %  --  7*   MONOS PCT %  --  15*   EOS PCT %  --  4     Results from last 7 days   Lab Units 09/18/19  0732 09/18/19  0428   POTASSIUM mmol/L  --  4 1   CHLORIDE mmol/L  --  102   CO2 mmol/L  --  27   BUN mg/dL  --  10   CREATININE mg/dL  --  1 65*   CALCIUM mg/dL  --  9 0   ALK PHOS U/L 73  --    ALT U/L 36  --    AST U/L 63*  --      Results from last 7 days   Lab Units 09/18/19  0637   INR  2 99*     Results from last 7 days   Lab Units 09/16/19  0208   LIPASE u/L 217       Imaging Studies: I have personally reviewed pertinent imaging studies  Ct Chest Abdomen Pelvis Wo Contrast    Result Date: 9/6/2019  Impression: Exam is limited without IV and oral contrast particularly for soft tissue and bowel evaluation  1   Scattered mild patchy opacities bilaterally suspicious for pneumonia  2  Cirrhotic appearance to the liver  Splenomegaly  3   Mild ascites  4   Diffuse subcutaneous edema compatible with anasarca  Workstation performed: [id number]     Cxr 1 View Pa Portable Stat    Result Date: 9/10/2019  Impression: Persistent cardiomegaly  Improving pulmonary edema  Satisfactory right jugular central line placement  No pneumothorax  Right rib fractures  Workstation performed: [id number]     Xr Chest 2 Views    Result Date: 9/7/2019  Impression: Persistent cardiomegaly Increased diffuse vascular congestion Workstation performed: [id number]     Ct Femur Right Wo Contrast    Result Date: 9/6/2019  Impression: Soft tissue evaluation is limited without IV contrast  1    No evidence of soft tissue gas  2   Generalized subcutaneous edema with associated skin thickening  Edema noted adjacent to the calf musculature  Findings are nonspecific and may be related to generalized anasarca  3   The absence of soft tissue gas does not exclude necrotizing fasciitis particularly at the early stages    If there is persistent concern for necrotizing fasciitis, consider follow-up with MRI with and without contrast  Workstation performed: [id number]     Ct Tibia Fibula Right Wo Contrast    Result Date: 9/6/2019  Impression: Soft tissue evaluation is limited without IV contrast  1    No evidence of soft tissue gas  2   Generalized subcutaneous edema with associated skin thickening  Edema noted adjacent to the calf musculature  Findings are nonspecific and may be related to generalized anasarca  3   The absence of soft tissue gas does not exclude necrotizing fasciitis particularly at the early stages  If there is persistent concern for necrotizing fasciitis, consider follow-up with MRI with and without contrast  Workstation performed: [id number]     Ir Temp Hd Cath    Result Date: 9/10/2019  Impression: Impression: 1  Successful ultrasound and fluoroscopically guided placement of a double-lumen temporary dialysis central venous catheter via the right internal jugular vein  The tip of the catheter is at the cavoatrial junction and may be used immediately  Workstation performed: [id number]         Assessment and Plan:     1) Decompensated [place] cirrhosis complicated by hepatorenal syndrome and generalized anasarca - MELD 40  Patient's MELD score continues to rise as his INR is reaching close to 3 at this point  Fortunately, his renal function has improved with CVVHD  His creatinine peaked at 3 at one point, now at 1 65  He continues to have significant amount of anasarca  Otherwise, he is alert and oriented x3  Patient's BMI is currently 51 6  [facility] was contacted regarding eligibility for transplant, however was denied secondary to BMI  Over the weekend, the [facility] was contacted for possible transfer to the transplant service  At that time, Dr Jonatan Peralta had discussed considering transfer if his BMI  would be in the high 40s  However, he is critically ill  He is still on pressors  He did have an elevated white count yesterday 25,000, which has continue to trend downward to 12,000 after he had percutaneous cholecystostomy tube placed    He had right upper quadrant ultrasound showing possibility of gallbladder distention  He has had negative infectious workup otherwise  His liver workup has been negative  [facility] [address]  screen with CT A/P and MRI/MRCP in June 2019 showed 2 small indeterminate low density lesions in the R lobe of the liver but given his body habitus, these were unable to be differentiated, AFP was normal   - Continue to monitor CMP and PT/INR closely   - Discussed with ICU team  -   Liana Baker of [facility] transfer center  I also spoke to the hepatology nurse who works with Dr Ewa Evans  They will get back to me regarding whether or not he can be transferred for transplant evaluation  However, we have been told that he is likely not a good candidate as he is still on pressors and is on CVVHD  - Appreciate nephrology recommendations      ADDENDUM @ 1300: Spoke to Sunil Mccormack again from Hepatology at [facility]  She is an RN who works with Dr Ewa Evans who is aware of the patient's case  Patient will need to be off of CVVHD and pressors in order to be transferred to the [facility]  I have her direct cell phone number, and will contact her again tomorrow if we are able to accomplish this

## 2019-09-18 NOTE — TELEPHONE ENCOUNTER
Bedside visit made this to patient who was alert and oriented  Requesting to have NG tube removed  Two nurses performing bedsisde care  No family at bedside  Md rounds not yet done No complaints of pain nausea anxiety

## 2019-09-18 NOTE — PLAN OF CARE
Problem: PAIN - ADULT  Goal: Verbalizes/displays adequate comfort level or baseline comfort level  Description  Interventions:  - Encourage patient to monitor pain and request assistance  - Assess pain using appropriate pain scale  - Administer analgesics based on type and severity of pain and evaluate response  - Implement non-pharmacological measures as appropriate and evaluate response  - Consider cultural and social influences on pain and pain management  - Notify physician/advanced practitioner if interventions unsuccessful or patient reports new pain  Outcome: Progressing     Problem: INFECTION - ADULT  Goal: Absence or prevention of progression during hospitalization  Description  INTERVENTIONS:  - Assess and monitor for signs and symptoms of infection  - Monitor lab/diagnostic results  - Monitor all insertion sites, i e  indwelling lines, tubes, and drains  - Monitor endotracheal if appropriate and nasal secretions for changes in amount and color  - [place] appropriate cooling/warming therapies per order  - Administer medications as ordered  - Instruct and encourage patient and family to use good hand hygiene technique  - Identify and instruct in appropriate isolation precautions for identified infection/condition  Outcome: Progressing  Goal: Absence of fever/infection during neutropenic period  Description  INTERVENTIONS:  - Monitor WBC    Outcome: Progressing     Problem: SAFETY ADULT  Goal: Patient will remain free of falls  Description  INTERVENTIONS:  - Assess patient frequently for physical needs  -  Identify cognitive and physical deficits and behaviors that affect risk of falls    -  [place] fall precautions as indicated by assessment   - Educate patient/family on patient safety including physical limitations  - Instruct patient to call for assistance with activity based on assessment  - Modify environment to reduce risk of injury  - Consider OT/PT consult to assist with strengthening/mobility  Outcome: Progressing  Goal: Maintain or return to baseline ADL function  Description  INTERVENTIONS:  -  Assess patient's ability to carry out ADLs; assess patient's baseline for ADL function and identify physical deficits which impact ability to perform ADLs (bathing, care of mouth/teeth, toileting, grooming, dressing, etc )  - Assess/evaluate cause of self-care deficits   - Assess range of motion  - Assess patient's mobility; develop plan if impaired  - Assess patient's need for assistive devices and provide as appropriate  - Encourage maximum independence but intervene and supervise when necessary  - Involve family in performance of ADLs  - Assess for home care needs following discharge   - Consider OT consult to assist with ADL evaluation and planning for discharge  - Provide patient education as appropriate  Outcome: Progressing  Goal: Maintain or return mobility status to optimal level  Description  INTERVENTIONS:  - Assess patient's baseline mobility status (ambulation, transfers, stairs, etc )    - Identify cognitive and physical deficits and behaviors that affect mobility  - Identify mobility aids required to assist with transfers and/or ambulation (gait belt, sit-to-stand, lift, walker, cane, etc )  - [place] fall precautions as indicated by assessment  - Record patient progress and toleration of activity level on Mobility SBAR; progress patient to next Phase/Stage  - Instruct patient to call for assistance with activity based on assessment  - Consider rehabilitation consult to assist with strengthening/weightbearing, etc   Outcome: Progressing     Problem: DISCHARGE PLANNING  Goal: Discharge to home or other facility with appropriate resources  Description  INTERVENTIONS:  - Identify barriers to discharge w/patient and caregiver  - Arrange for needed discharge resources and transportation as appropriate  - Identify discharge learning needs (meds, wound care, etc )  - Arrange for interpretive services to assist at discharge as needed  - Refer to Case Management Department for coordinating discharge planning if the patient needs post-hospital services based on physician/advanced practitioner order or complex needs related to functional status, cognitive ability, or social support system  Outcome: Progressing     Problem: Knowledge Deficit  Goal: Patient/family/caregiver demonstrates understanding of disease process, treatment plan, medications, and discharge instructions  Description  Complete learning assessment and assess knowledge base  Interventions:  - Provide teaching at level of understanding  - Provide teaching via preferred learning methods  Outcome: Progressing     Problem: Potential for Falls  Goal: Patient will remain free of falls  Description  INTERVENTIONS:  - Assess patient frequently for physical needs  -  Identify cognitive and physical deficits and behaviors that affect risk of falls    -  [place] fall precautions as indicated by assessment   - Educate patient/family on patient safety including physical limitations  - Instruct patient to call for assistance with activity based on assessment  - Modify environment to reduce risk of injury  - Consider OT/PT consult to assist with strengthening/mobility  Outcome: Progressing     Problem: Prexisting or High Potential for Compromised Skin Integrity  Goal: Skin integrity is maintained or improved  Description  INTERVENTIONS:  - Identify patients at risk for skin breakdown  - Assess and monitor skin integrity  - Assess and monitor nutrition and hydration status  - Monitor labs   - Assess for incontinence   - Turn and reposition patient  - Assist with mobility/ambulation  - Relieve pressure over bony prominences  - Avoid friction and shearing  - Provide appropriate hygiene as needed including keeping skin clean and dry  - Evaluate need for skin moisturizer/barrier cream  - Collaborate with interdisciplinary team   - Patient/family teaching  - Consider wound care consult   Outcome: Progressing     Problem: Nutrition/Hydration-ADULT  Goal: Nutrient/Hydration intake appropriate for improving, restoring or maintaining nutritional needs  Description  Monitor and assess patient's nutrition/hydration status for malnutrition  Collaborate with interdisciplinary team and initiate plan and interventions as ordered  Monitor patient's weight and dietary intake as ordered or per policy  Utilize nutrition screening tool and intervene as necessary  Determine patient's food preferences and provide high-protein, high-caloric foods as appropriate       INTERVENTIONS:  - Monitor oral intake, urinary output, labs, and treatment plans  - Assess nutrition and hydration status and recommend course of action  - Evaluate amount of meals eaten  - Assist patient with eating if necessary   - Allow adequate time for meals  - Recommend/ encourage appropriate diets, oral nutritional supplements, and vitamin/mineral supplements  - Order, calculate, and assess calorie counts as needed  - Recommend, monitor, and adjust tube feedings  based on assessed needs  - Assess need for intravenous fluids  - Provide nutrition/hydration education as appropriate  - Include patient/family/caregiver in decisions related to nutrition   Outcome: Progressing     Problem: METABOLIC, FLUID AND ELECTROLYTES - ADULT  Goal: Electrolytes maintained within normal limits  Description  INTERVENTIONS:  - Monitor labs and assess patient for signs and symptoms of electrolyte imbalances  - Administer electrolyte replacement as ordered  - Monitor response to electrolyte replacements, including repeat lab results as appropriate  - Instruct patient on fluid and nutrition as appropriate  Outcome: Progressing  Goal: Fluid balance maintained  Description  INTERVENTIONS:  - Monitor labs   - Monitor I/O and WT  - Instruct patient on fluid and nutrition as appropriate  - Assess for signs & symptoms of volume excess or deficit  Outcome: Progressing     Problem: GENITOURINARY - ADULT  Goal: Maintains or returns to baseline urinary function  Description  INTERVENTIONS:  - Assess urinary function  - Encourage oral fluids to ensure adequate hydration if ordered  - Administer IV fluids as ordered to ensure adequate hydration  - Administer ordered medications as needed  - Offer frequent toileting  - Follow urinary retention protocol if ordered  Outcome: Progressing     Problem: SKIN/TISSUE INTEGRITY - ADULT  Goal: Skin integrity remains intact  Description  INTERVENTIONS  - Identify patients at risk for skin breakdown  - Assess and monitor skin integrity  - Assess and monitor nutrition and hydration status  - Monitor labs (i e  albumin)  - Assess for incontinence   - Turn and reposition patient  - Assist with mobility/ambulation  - Relieve pressure over bony prominences  - Avoid friction and shearing  - Provide appropriate hygiene as needed including keeping skin clean and dry  - Evaluate need for skin moisturizer/barrier cream  - Collaborate with interdisciplinary team (i e  Nutrition, Rehabilitation, etc )   - Patient/family teaching  Outcome: Progressing  Goal: Incision(s), wounds(s) or drain site(s) healing without S/S of infection  Description  INTERVENTIONS  - Assess and document risk factors for skin impairment   - Assess and document dressing, incision, wound bed, drain sites and surrounding tissue  - Consider nutrition services referral as needed  - Oral mucous membranes remain intact  - Provide patient/ family education  Outcome: Progressing

## 2019-09-18 NOTE — PROCEDURES
Central Line Insertion  Date/Time: 9/18/2019 12:05 PM  Performed by: Amanda Oseguera MD  Authorized by: Amanda Oseguera MD     Patient location:  Bedside  Other Assisting Provider: No    Consent:     Consent obtained:  Written    Consent given by:  Patient and spouse    Risks discussed:  Arterial puncture, bleeding, infection and pneumothorax    Alternatives discussed:  No treatment  Universal protocol:     Procedure explained and questions answered to patient or proxy's satisfaction: yes      Relevant documents present and verified: yes      Test results available and properly labeled: yes      Radiology Images displayed and confirmed  If images not available, report reviewed: yes      Required blood products, implants, devices, and special equipment available: yes      Site/side marked: yes      Immediately prior to procedure, a time out was called: yes      Patient identity confirmed:  Verbally with patient and hospital-assigned identification number  Pre-procedure details:     Hand hygiene: Hand hygiene performed prior to insertion      Sterile barrier technique: All elements of maximal sterile technique followed      Skin preparation:  ChloraPrep    Skin preparation agent: Skin preparation agent completely dried prior to procedure    Indications:     Central line indications: medications requiring central line and no peripheral vascular access      Site selection rationale:  Patient high risk for bleeding, not safe for subclavian access  Anesthesia (see MAR for exact dosages):      Anesthesia method:  Local infiltration    Local anesthetic:  Lidocaine 1% w/o epi  Procedure details:     Location:  Left internal jugular    Vessel type: vein      Laterality:  Left    Catheter type:  Triple lumen 20cm    Catheter size:  7 Fr    Landmarks identified: yes      Ultrasound guidance: yes      Sterile ultrasound techniques: Sterile gel and sterile probe covers were used      Number of attempts:  1    Successful placement: yes    Post-procedure details:     Post-procedure:  Dressing applied and line sutured    Assessment:  Blood return through all ports and free fluid flow    Post-procedure complications: local hematoma      Patient tolerance of procedure: Tolerated well, no immediate complications  Comments:      Discussed high risks for bleeding with patient, spouse and daughter  Discussed need for reliable central access for continued management, patient and family agreeable to proceed  Vitals monitored throughtout, easy placement in left IJ on first attempt, local hematoma noted prior to dressing placement, pressure held but still with continued oozing  Topical TXA, surgicell applied  Pressure dressing placed on top of sterile cover  Will continued to monitor

## 2019-09-19 LAB
ABO GROUP BLD BPU: NORMAL
ALBUMIN SERPL BCP-MCNC: 3.3 G/DL (ref 3.5–5)
ALP SERPL-CCNC: 73 U/L (ref 46–116)
ALT SERPL W P-5'-P-CCNC: 37 U/L (ref 12–78)
ANION GAP SERPL CALCULATED.3IONS-SCNC: 6 MMOL/L (ref 4–13)
ANION GAP SERPL CALCULATED.3IONS-SCNC: 8 MMOL/L (ref 4–13)
AST SERPL W P-5'-P-CCNC: 67 U/L (ref 5–45)
BACTERIA SPEC BFLD CULT: NO GROWTH
BASOPHILS # BLD AUTO: 0.01 THOUSANDS/ΜL (ref 0–0.1)
BASOPHILS NFR BLD AUTO: 0 % (ref 0–1)
BILIRUB DIRECT SERPL-MCNC: 3.39 MG/DL (ref 0–0.2)
BILIRUB SERPL-MCNC: 9.2 MG/DL (ref 0.2–1)
BPU ID: NORMAL
BUN SERPL-MCNC: 11 MG/DL (ref 5–25)
BUN SERPL-MCNC: 9 MG/DL (ref 5–25)
CA-I BLD-SCNC: 1.17 MMOL/L (ref 1.12–1.32)
CA-I BLD-SCNC: 1.17 MMOL/L (ref 1.12–1.32)
CALCIUM SERPL-MCNC: 9.2 MG/DL (ref 8.3–10.1)
CALCIUM SERPL-MCNC: 9.2 MG/DL (ref 8.3–10.1)
CHLORIDE SERPL-SCNC: 103 MMOL/L (ref 100–108)
CHLORIDE SERPL-SCNC: 104 MMOL/L (ref 100–108)
CO2 SERPL-SCNC: 27 MMOL/L (ref 21–32)
CO2 SERPL-SCNC: 27 MMOL/L (ref 21–32)
CREAT SERPL-MCNC: 1.78 MG/DL (ref 0.6–1.3)
CREAT SERPL-MCNC: 1.82 MG/DL (ref 0.6–1.3)
CROSSMATCH: NORMAL
CROSSMATCH: NORMAL
EOSINOPHIL # BLD AUTO: 0.54 THOUSAND/ΜL (ref 0–0.61)
EOSINOPHIL NFR BLD AUTO: 6 % (ref 0–6)
ERYTHROCYTE [DISTWIDTH] IN BLOOD BY AUTOMATED COUNT: 21.7 % (ref 11.6–15.1)
GFR SERPL CREATININE-BSD FRML MDRD: 40 ML/MIN/1.73SQ M
GFR SERPL CREATININE-BSD FRML MDRD: 41 ML/MIN/1.73SQ M
GLUCOSE SERPL-MCNC: 101 MG/DL (ref 65–140)
GLUCOSE SERPL-MCNC: 115 MG/DL (ref 65–140)
GRAM STN SPEC: NORMAL
GRAM STN SPEC: NORMAL
HCT VFR BLD AUTO: 22.7 % (ref 36.5–49.3)
HCT VFR BLD AUTO: 23 % (ref 36.5–49.3)
HGB BLD-MCNC: 7 G/DL (ref 12–17)
HGB BLD-MCNC: 7.4 G/DL (ref 12–17)
IMM GRANULOCYTES # BLD AUTO: >0.5 THOUSAND/UL (ref 0–0.2)
IMM GRANULOCYTES NFR BLD AUTO: 10 % (ref 0–2)
INR PPP: 2.91 (ref 0.84–1.19)
LYMPHOCYTES # BLD AUTO: 0.63 THOUSANDS/ΜL (ref 0.6–4.47)
LYMPHOCYTES NFR BLD AUTO: 7 % (ref 14–44)
MAGNESIUM SERPL-MCNC: 1.8 MG/DL (ref 1.6–2.6)
MAGNESIUM SERPL-MCNC: 1.9 MG/DL (ref 1.6–2.6)
MCH RBC QN AUTO: 34.8 PG (ref 26.8–34.3)
MCHC RBC AUTO-ENTMCNC: 30.8 G/DL (ref 31.4–37.4)
MCV RBC AUTO: 113 FL (ref 82–98)
MONOCYTES # BLD AUTO: 1.17 THOUSAND/ΜL (ref 0.17–1.22)
MONOCYTES NFR BLD AUTO: 14 % (ref 4–12)
NEUTROPHILS # BLD AUTO: 5.33 THOUSANDS/ΜL (ref 1.85–7.62)
NEUTS SEG NFR BLD AUTO: 63 % (ref 43–75)
NRBC BLD AUTO-RTO: 0 /100 WBCS
PHOSPHATE SERPL-MCNC: 1.9 MG/DL (ref 2.7–4.5)
PHOSPHATE SERPL-MCNC: 1.9 MG/DL (ref 2.7–4.5)
PLATELET # BLD AUTO: 22 THOUSANDS/UL (ref 149–390)
PMV BLD AUTO: 11.9 FL (ref 8.9–12.7)
POTASSIUM SERPL-SCNC: 3.9 MMOL/L (ref 3.5–5.3)
POTASSIUM SERPL-SCNC: 4.1 MMOL/L (ref 3.5–5.3)
PROT SERPL-MCNC: 6.4 G/DL (ref 6.4–8.2)
PROTHROMBIN TIME: 30.8 SECONDS (ref 11.6–14.5)
RBC # BLD AUTO: 2.01 MILLION/UL (ref 3.88–5.62)
SODIUM SERPL-SCNC: 136 MMOL/L (ref 136–145)
SODIUM SERPL-SCNC: 139 MMOL/L (ref 136–145)
UNIT DISPENSE STATUS: NORMAL
UNIT PRODUCT CODE: NORMAL
UNIT RH: NORMAL
WBC # BLD AUTO: 8.55 THOUSAND/UL (ref 4.31–10.16)

## 2019-09-19 PROCEDURE — 83735 ASSAY OF MAGNESIUM: CPT | Performed by: INTERNAL MEDICINE

## 2019-09-19 PROCEDURE — 84100 ASSAY OF PHOSPHORUS: CPT | Performed by: INTERNAL MEDICINE

## 2019-09-19 PROCEDURE — 85384 FIBRINOGEN ACTIVITY: CPT | Performed by: PHYSICIAN ASSISTANT

## 2019-09-19 PROCEDURE — 94660 CPAP INITIATION&MGMT: CPT

## 2019-09-19 PROCEDURE — 80076 HEPATIC FUNCTION PANEL: CPT | Performed by: PHYSICIAN ASSISTANT

## 2019-09-19 PROCEDURE — 82330 ASSAY OF CALCIUM: CPT | Performed by: INTERNAL MEDICINE

## 2019-09-19 PROCEDURE — G8979 MOBILITY GOAL STATUS: HCPCS

## 2019-09-19 PROCEDURE — G8978 MOBILITY CURRENT STATUS: HCPCS

## 2019-09-19 PROCEDURE — 99291 CRITICAL CARE FIRST HOUR: CPT | Performed by: ANESTHESIOLOGY

## 2019-09-19 PROCEDURE — C9113 INJ PANTOPRAZOLE SODIUM, VIA: HCPCS | Performed by: NURSE PRACTITIONER

## 2019-09-19 PROCEDURE — 85610 PROTHROMBIN TIME: CPT | Performed by: PHYSICIAN ASSISTANT

## 2019-09-19 PROCEDURE — 97163 PT EVAL HIGH COMPLEX 45 MIN: CPT

## 2019-09-19 PROCEDURE — 85018 HEMOGLOBIN: CPT | Performed by: PHYSICIAN ASSISTANT

## 2019-09-19 PROCEDURE — 85025 COMPLETE CBC W/AUTO DIFF WBC: CPT | Performed by: NURSE PRACTITIONER

## 2019-09-19 PROCEDURE — 99232 SBSQ HOSP IP/OBS MODERATE 35: CPT | Performed by: INTERNAL MEDICINE

## 2019-09-19 PROCEDURE — 90935 HEMODIALYSIS ONE EVALUATION: CPT | Performed by: INTERNAL MEDICINE

## 2019-09-19 PROCEDURE — 85014 HEMATOCRIT: CPT | Performed by: PHYSICIAN ASSISTANT

## 2019-09-19 PROCEDURE — 80048 BASIC METABOLIC PNL TOTAL CA: CPT | Performed by: INTERNAL MEDICINE

## 2019-09-19 PROCEDURE — 90945 DIALYSIS ONE EVALUATION: CPT

## 2019-09-19 PROCEDURE — 94760 N-INVAS EAR/PLS OXIMETRY 1: CPT

## 2019-09-19 RX ORDER — MAGNESIUM SULFATE 1 G/100ML
INJECTION INTRAVENOUS
Status: DISPENSED
Start: 2019-09-19 | End: 2019-09-19

## 2019-09-19 RX ORDER — MAGNESIUM SULFATE 1 G/100ML
1 INJECTION INTRAVENOUS ONCE
Status: COMPLETED | OUTPATIENT
Start: 2019-09-19 | End: 2019-09-19

## 2019-09-19 RX ORDER — POTASSIUM CHLORIDE 29.8 MG/ML
40 INJECTION INTRAVENOUS ONCE
Status: COMPLETED | OUTPATIENT
Start: 2019-09-19 | End: 2019-09-19

## 2019-09-19 RX ORDER — POTASSIUM CHLORIDE 29.8 MG/ML
INJECTION INTRAVENOUS
Status: DISPENSED
Start: 2019-09-19 | End: 2019-09-19

## 2019-09-19 RX ADMIN — NYSTATIN: 100000 CREAM TOPICAL at 09:07

## 2019-09-19 RX ADMIN — Medication 20000 ML: at 03:41

## 2019-09-19 RX ADMIN — ALBUMIN (HUMAN) 12.5 G: 0.25 INJECTION, SOLUTION INTRAVENOUS at 12:19

## 2019-09-19 RX ADMIN — PANTOPRAZOLE SODIUM 40 MG: 40 INJECTION, POWDER, LYOPHILIZED, FOR SOLUTION INTRAVENOUS at 09:07

## 2019-09-19 RX ADMIN — FUROSEMIDE 120 MG: 10 INJECTION, SOLUTION INTRAVENOUS at 15:42

## 2019-09-19 RX ADMIN — PHENYLEPHRINE HYDROCHLORIDE 130 MCG/MIN: 10 INJECTION INTRAVENOUS at 00:34

## 2019-09-19 RX ADMIN — CALCIUM GLUCONATE 1 G: 98 INJECTION, SOLUTION INTRAVENOUS at 06:21

## 2019-09-19 RX ADMIN — ALBUMIN (HUMAN) 12.5 G: 0.25 INJECTION, SOLUTION INTRAVENOUS at 05:48

## 2019-09-19 RX ADMIN — MIDODRINE HYDROCHLORIDE 15 MG: 5 TABLET ORAL at 06:21

## 2019-09-19 RX ADMIN — PANTOPRAZOLE SODIUM 40 MG: 40 INJECTION, POWDER, LYOPHILIZED, FOR SOLUTION INTRAVENOUS at 18:54

## 2019-09-19 RX ADMIN — RIFAXIMIN 550 MG: 550 TABLET ORAL at 16:25

## 2019-09-19 RX ADMIN — LACTULOSE 20 G: 10 SOLUTION ORAL at 09:07

## 2019-09-19 RX ADMIN — SODIUM PHOSPHATE, MONOBASIC, MONOHYDRATE 9 MMOL: 276; 142 INJECTION, SOLUTION INTRAVENOUS at 06:21

## 2019-09-19 RX ADMIN — MAGNESIUM SULFATE HEPTAHYDRATE 1 G: 1 INJECTION, SOLUTION INTRAVENOUS at 01:37

## 2019-09-19 RX ADMIN — OCTREOTIDE ACETATE 100 MCG: 100 INJECTION, SOLUTION INTRAVENOUS; SUBCUTANEOUS at 15:41

## 2019-09-19 RX ADMIN — RIFAXIMIN 550 MG: 550 TABLET ORAL at 04:33

## 2019-09-19 RX ADMIN — ALBUMIN (HUMAN) 12.5 G: 0.25 INJECTION, SOLUTION INTRAVENOUS at 18:54

## 2019-09-19 RX ADMIN — MIDODRINE HYDROCHLORIDE 15 MG: 5 TABLET ORAL at 16:25

## 2019-09-19 RX ADMIN — OCTREOTIDE ACETATE 100 MCG: 100 INJECTION, SOLUTION INTRAVENOUS; SUBCUTANEOUS at 05:48

## 2019-09-19 RX ADMIN — MIDODRINE HYDROCHLORIDE 15 MG: 5 TABLET ORAL at 12:19

## 2019-09-19 RX ADMIN — Medication 20000 ML: at 07:29

## 2019-09-19 RX ADMIN — NYSTATIN: 100000 CREAM TOPICAL at 18:54

## 2019-09-19 RX ADMIN — OCTREOTIDE ACETATE 100 MCG: 100 INJECTION, SOLUTION INTRAVENOUS; SUBCUTANEOUS at 21:28

## 2019-09-19 RX ADMIN — ALBUMIN (HUMAN) 12.5 G: 0.25 INJECTION, SOLUTION INTRAVENOUS at 23:31

## 2019-09-19 RX ADMIN — CALCIUM GLUCONATE 1 G: 98 INJECTION, SOLUTION INTRAVENOUS at 01:46

## 2019-09-19 RX ADMIN — LACTULOSE 20 G: 10 SOLUTION ORAL at 18:54

## 2019-09-19 RX ADMIN — MAGNESIUM SULFATE HEPTAHYDRATE 1 G: 1 INJECTION, SOLUTION INTRAVENOUS at 06:21

## 2019-09-19 RX ADMIN — SODIUM PHOSPHATE, MONOBASIC, MONOHYDRATE 9 MMOL: 276; 142 INJECTION, SOLUTION INTRAVENOUS at 01:40

## 2019-09-19 RX ADMIN — POTASSIUM CHLORIDE 40 MEQ: 29.8 INJECTION, SOLUTION INTRAVENOUS at 01:36

## 2019-09-19 NOTE — SOCIAL WORK
Continued conversation w Hoang today as pt CVVH off now  Cm met w pt wife and daughter to introduce self  Discussed plan and ongoing conversations w Hoang   Cm to follow

## 2019-09-19 NOTE — PHYSICAL THERAPY NOTE
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)     Myocardial infarction ([facility] [address] )     Renal insufficiency 6/7/2019    Sleep apnea     Vitamin D deficiency         09/19/19 1403   Note Type   Note type Eval only   Pain Assessment   Pain Assessment 0-10   Pain Score 4   Pain Type Chronic pain   Pain Location Leg   Pain Orientation Right   Hospital Pain Intervention(s) Repositioned   Home Living   Type of Home Mobile home  (RV)   Home Layout One level;Stairs to enter without rails  (3 MIKA)   Bathroom Shower/Tub Tub only   Bathroom Toilet Standard   Bathroom Accessibility Not accessible   Home Equipment Walker   Prior Function   Level of [place] Independent with ADLs and functional mobility  (ambulates c RW prn)   Lives With Spouse   Receives Help From Family   ADL Assistance Independent   IADLs Independent   Falls in the last 6 months 0   Vocational On disability   Comments (+) drives   Restrictions/Precautions   Weight Bearing Precautions Per Order No   Other Precautions Chair Alarm; Bed Alarm;Multiple lines;Telemetry;O2;Fall Risk;Pain  (jorge)   General   Additional Pertinent History pt presents to [facility] liver cirrhosis 2* ROSALES  s/p cholecystostomy tube placement 9/16/19  also new start HD/CVVH  PT consulted for mobility + d/c planning  up c (A)  Family/Caregiver Present No   Cognition   Orientation Level Oriented X4   RUE Assessment   RUE Assessment WFL  (4/5)   LUE Assessment   LUE Assessment WFL  (4/5)   RLE Assessment   RLE Assessment WFL  (4/5)   LLE Assessment   LLE Assessment WFL  (4/5)   Coordination   Sensation WFL   Bed Mobility   Rolling R 3  Moderate assistance   Additional items Assist x 2;Bedrails;Verbal cues;LE management; Increased time required   Rolling L 3  Moderate assistance   Additional items Assist x 2;Bedrails; Increased time required;Verbal cues;LE management   Supine to Sit 2  Maximal assistance   Additional items Assist x 2;HOB elevated; Increased time required;Verbal cues;LE management   Sit to Supine 2  Maximal assistance   Additional items Assist x 2;Verbal cues;LE management; Increased time required   Transfers   Sit to Stand 3  Moderate assistance   Additional items Assist x 2;Verbal cues; Increased time required   Stand to Sit 3  Moderate assistance   Additional items Assist x 2;Verbal cues; Increased time required   Ambulation/Elevation   Gait pattern Not appropriate  (pt unable to initiate egrees test)   Balance   Static Sitting Fair +   Dynamic Sitting Fair +   Static Standing Poor  (standing tolerance 2 min at EOB)   Dynamic Standing Poor   Endurance Deficit   Endurance Deficit Yes   Activity Tolerance   Activity Tolerance Patient limited by fatigue;Patient limited by pain   Nurse Made Aware MADIE Del Rosario present during PT session + aware of pt's currently mobility status requiring (A)x2  Assessment   Prognosis Good   Problem List Decreased strength;Decreased endurance; Impaired balance;Decreased mobility; Decreased skin integrity;Obesity;Pain   Assessment pt is a 58y/o m who presents to [facility] c liver cirrhosis 2* ROSALES  s/p cholecystostomy tube placement 9/16/19; also new start CVVH/HD  PMH significant for CROW, morbid obesity, OA, MI, + chronic pain  at baseline, pt (I) c functional mobility tasks s AD  resides c spouse in RV c 3 MIKA  currently requires mod-max (A)x2 to complete mobility tasks 2* deficits in strength, balance, pain, skin integrity, + activity tolerance noted in PT exam above  Barthel Index 40/100  initiated standing at bedside mod (A)x2 x2min  ambulation deferred at this time 2* pt's inability to initiate egress test  returned to supine at end of session c all needs in reach  will further assess ambulation>chair next session as appropriate   would benefit from skilled PT to maximize functional mobility + improve quality of life  upon d/c, recommend STR  PT eval of high complexity 2* unstable med status c pt requiring ongoing medical management 2* liver cirrhosis 2* ROSALES  pt c multiple co-morbidities + mobility deficits above requiring (A)x2 to complete mobility tasks  unable to initiate ambulation 2* failure to pass egress test  pt remains in ICU c multiple lines  Barriers to Discharge Inaccessible home environment   Goals   Patient Goals "to walk around so I can drive"  STG Expiration Date 09/29/19   Short Term Goal #1 1  increase strength 1/2 grade to improve overall functional mobility, 2  perform bed mobility min (A)x1 to decrease caregiver burden, 3  perform transfers min (A)x1 to safely perform ADLs, 4  tolerate standing x5min c RW to progress OOB mobility, 5  improve Barthel Index 10pts to improve quality of life   Plan   Treatment/Interventions Functional transfer training;LE strengthening/ROM; Therapeutic exercise; Endurance training;Patient/family training;Equipment eval/education; Bed mobility;Continued evaluation;Spoke to nursing;Spoke to advanced practitioner;Spoke to MD   PT Frequency Other (Comment)  (3-5x/wk)   Recommendation   Recommendation Short-term skilled PT   PT - OK to Discharge No   Modified [place] Scale   Modified [place] Scale 4   Barthel Index   Feeding 5   Bathing 0   Grooming Score 0   Dressing Score 5   Bladder Score 10   Bowels Score 10   Toilet Use Score 5   Transfers (Bed/Chair) Score 5   Mobility (Level Surface) Score 0   Stairs Score 0   Barthel Index Score 40     Carina Dhillon, PT, DPT

## 2019-09-19 NOTE — PLAN OF CARE
Problem: PHYSICAL THERAPY ADULT  Goal: Performs mobility at highest level of function for planned discharge setting  See evaluation for individualized goals  Description  Treatment/Interventions: Functional transfer training, LE strengthening/ROM, Therapeutic exercise, Endurance training, Patient/family training, Equipment eval/education, Bed mobility, Continued evaluation, Spoke to nursing, Spoke to advanced practitioner, Spoke to MD          See flowsheet documentation for full assessment, interventions and recommendations  Note:   Prognosis: Good  Problem List: Decreased strength, Decreased endurance, Impaired balance, Decreased mobility, Decreased skin integrity, Obesity, Pain  Assessment: pt is a 58y/o m who presents to [facility] c liver cirrhosis 2* ROSALES  s/p cholecystostomy tube placement 9/16/19; also new start CVVH/HD  PMH significant for CROW, morbid obesity, OA, MI, + chronic pain  at baseline, pt (I) c functional mobility tasks s AD  resides c spouse in RV c 3 MIKA  currently requires mod-max (A)x2 to complete mobility tasks 2* deficits in strength, balance, pain, skin integrity, + activity tolerance noted in PT exam above  Barthel Index 40/100  initiated standing at bedside mod (A)x2 x2min  ambulation deferred at this time 2* pt's inability to initiate egress test  returned to supine at end of session c all needs in reach  will further assess ambulation>chair next session as appropriate  would benefit from skilled PT to maximize functional mobility + improve quality of life  upon d/c, recommend STR  PT eval of high complexity 2* unstable med status c pt requiring ongoing medical management 2* liver cirrhosis 2* ROSALES  pt c multiple co-morbidities + mobility deficits above requiring (A)x2 to complete mobility tasks  unable to initiate ambulation 2* failure to pass egress test  pt remains in ICU c multiple lines    Barriers to Discharge: Inaccessible home environment     Recommendation: Short-term skilled PT     PT - OK to Discharge: No    See flowsheet documentation for full assessment

## 2019-09-19 NOTE — PLAN OF CARE
Problem: PAIN - ADULT  Goal: Verbalizes/displays adequate comfort level or baseline comfort level  Description  Interventions:  - Encourage patient to monitor pain and request assistance  - Assess pain using appropriate pain scale  - Administer analgesics based on type and severity of pain and evaluate response  - Implement non-pharmacological measures as appropriate and evaluate response  - Consider cultural and social influences on pain and pain management  - Notify physician/advanced practitioner if interventions unsuccessful or patient reports new pain  Outcome: Progressing     Problem: INFECTION - ADULT  Goal: Absence or prevention of progression during hospitalization  Description  INTERVENTIONS:  - Assess and monitor for signs and symptoms of infection  - Monitor lab/diagnostic results  - Monitor all insertion sites, i e  indwelling lines, tubes, and drains  - Monitor endotracheal if appropriate and nasal secretions for changes in amount and color  - [place] appropriate cooling/warming therapies per order  - Administer medications as ordered  - Instruct and encourage patient and family to use good hand hygiene technique  - Identify and instruct in appropriate isolation precautions for identified infection/condition  Outcome: Progressing  Goal: Absence of fever/infection during neutropenic period  Description  INTERVENTIONS:  - Monitor WBC    Outcome: Progressing     Problem: SAFETY ADULT  Goal: Patient will remain free of falls  Description  INTERVENTIONS:  - Assess patient frequently for physical needs  -  Identify cognitive and physical deficits and behaviors that affect risk of falls    -  [place] fall precautions as indicated by assessment   - Educate patient/family on patient safety including physical limitations  - Instruct patient to call for assistance with activity based on assessment  - Modify environment to reduce risk of injury  - Consider OT/PT consult to assist with strengthening/mobility  Outcome: Progressing  Goal: Maintain or return to baseline ADL function  Description  INTERVENTIONS:  -  Assess patient's ability to carry out ADLs; assess patient's baseline for ADL function and identify physical deficits which impact ability to perform ADLs (bathing, care of mouth/teeth, toileting, grooming, dressing, etc )  - Assess/evaluate cause of self-care deficits   - Assess range of motion  - Assess patient's mobility; develop plan if impaired  - Assess patient's need for assistive devices and provide as appropriate  - Encourage maximum independence but intervene and supervise when necessary  - Involve family in performance of ADLs  - Assess for home care needs following discharge   - Consider OT consult to assist with ADL evaluation and planning for discharge  - Provide patient education as appropriate  Outcome: Progressing  Goal: Maintain or return mobility status to optimal level  Description  INTERVENTIONS:  - Assess patient's baseline mobility status (ambulation, transfers, stairs, etc )    - Identify cognitive and physical deficits and behaviors that affect mobility  - Identify mobility aids required to assist with transfers and/or ambulation (gait belt, sit-to-stand, lift, walker, cane, etc )  - [place] fall precautions as indicated by assessment  - Record patient progress and toleration of activity level on Mobility SBAR; progress patient to next Phase/Stage  - Instruct patient to call for assistance with activity based on assessment  - Consider rehabilitation consult to assist with strengthening/weightbearing, etc   Outcome: Progressing     Problem: DISCHARGE PLANNING  Goal: Discharge to home or other facility with appropriate resources  Description  INTERVENTIONS:  - Identify barriers to discharge w/patient and caregiver  - Arrange for needed discharge resources and transportation as appropriate  - Identify discharge learning needs (meds, wound care, etc )  - Arrange for interpretive services to assist at discharge as needed  - Refer to Case Management Department for coordinating discharge planning if the patient needs post-hospital services based on physician/advanced practitioner order or complex needs related to functional status, cognitive ability, or social support system  Outcome: Progressing     Problem: Knowledge Deficit  Goal: Patient/family/caregiver demonstrates understanding of disease process, treatment plan, medications, and discharge instructions  Description  Complete learning assessment and assess knowledge base  Interventions:  - Provide teaching at level of understanding  - Provide teaching via preferred learning methods  Outcome: Progressing     Problem: Potential for Falls  Goal: Patient will remain free of falls  Description  INTERVENTIONS:  - Assess patient frequently for physical needs  -  Identify cognitive and physical deficits and behaviors that affect risk of falls    -  [place] fall precautions as indicated by assessment   - Educate patient/family on patient safety including physical limitations  - Instruct patient to call for assistance with activity based on assessment  - Modify environment to reduce risk of injury  - Consider OT/PT consult to assist with strengthening/mobility  Outcome: Progressing     Problem: Prexisting or High Potential for Compromised Skin Integrity  Goal: Skin integrity is maintained or improved  Description  INTERVENTIONS:  - Identify patients at risk for skin breakdown  - Assess and monitor skin integrity  - Assess and monitor nutrition and hydration status  - Monitor labs   - Assess for incontinence   - Turn and reposition patient  - Assist with mobility/ambulation  - Relieve pressure over bony prominences  - Avoid friction and shearing  - Provide appropriate hygiene as needed including keeping skin clean and dry  - Evaluate need for skin moisturizer/barrier cream  - Collaborate with interdisciplinary team   - Patient/family teaching  - Consider wound care consult   Outcome: Progressing     Problem: Nutrition/Hydration-ADULT  Goal: Nutrient/Hydration intake appropriate for improving, restoring or maintaining nutritional needs  Description  Monitor and assess patient's nutrition/hydration status for malnutrition  Collaborate with interdisciplinary team and initiate plan and interventions as ordered  Monitor patient's weight and dietary intake as ordered or per policy  Utilize nutrition screening tool and intervene as necessary  Determine patient's food preferences and provide high-protein, high-caloric foods as appropriate       INTERVENTIONS:  - Monitor oral intake, urinary output, labs, and treatment plans  - Assess nutrition and hydration status and recommend course of action  - Evaluate amount of meals eaten  - Assist patient with eating if necessary   - Allow adequate time for meals  - Recommend/ encourage appropriate diets, oral nutritional supplements, and vitamin/mineral supplements  - Order, calculate, and assess calorie counts as needed  - Recommend, monitor, and adjust tube feedings  based on assessed needs  - Assess need for intravenous fluids  - Provide nutrition/hydration education as appropriate  - Include patient/family/caregiver in decisions related to nutrition   Outcome: Progressing     Problem: METABOLIC, FLUID AND ELECTROLYTES - ADULT  Goal: Electrolytes maintained within normal limits  Description  INTERVENTIONS:  - Monitor labs and assess patient for signs and symptoms of electrolyte imbalances  - Administer electrolyte replacement as ordered  - Monitor response to electrolyte replacements, including repeat lab results as appropriate  - Instruct patient on fluid and nutrition as appropriate  Outcome: Progressing  Goal: Fluid balance maintained  Description  INTERVENTIONS:  - Monitor labs   - Monitor I/O and WT  - Instruct patient on fluid and nutrition as appropriate  - Assess for signs & symptoms of volume excess or deficit  Outcome: Progressing     Problem: GENITOURINARY - ADULT  Goal: Maintains or returns to baseline urinary function  Description  INTERVENTIONS:  - Assess urinary function  - Encourage oral fluids to ensure adequate hydration if ordered  - Administer IV fluids as ordered to ensure adequate hydration  - Administer ordered medications as needed  - Offer frequent toileting  - Follow urinary retention protocol if ordered  Outcome: Progressing     Problem: SKIN/TISSUE INTEGRITY - ADULT  Goal: Skin integrity remains intact  Description  INTERVENTIONS  - Identify patients at risk for skin breakdown  - Assess and monitor skin integrity  - Assess and monitor nutrition and hydration status  - Monitor labs (i e  albumin)  - Assess for incontinence   - Turn and reposition patient  - Assist with mobility/ambulation  - Relieve pressure over bony prominences  - Avoid friction and shearing  - Provide appropriate hygiene as needed including keeping skin clean and dry  - Evaluate need for skin moisturizer/barrier cream  - Collaborate with interdisciplinary team (i e  Nutrition, Rehabilitation, etc )   - Patient/family teaching  Outcome: Progressing  Goal: Incision(s), wounds(s) or drain site(s) healing without S/S of infection  Description  INTERVENTIONS  - Assess and document risk factors for skin impairment   - Assess and document dressing, incision, wound bed, drain sites and surrounding tissue  - Consider nutrition services referral as needed  - Oral mucous membranes remain intact  - Provide patient/ family education  Outcome: Progressing

## 2019-09-19 NOTE — PROGRESS NOTES
Progress Note - Elin Blackman 63 y o  male MRN: [id number]    Unit/Bed#:  Encounter: [id number]        Subjective:     Patient has been weaned off of pressors  He is also transitioning from CVVHD to HD  He continues to be alert and oriented x3  ROS: As noted in the HPI, otherwise all others negative  Objective:     Vitals: Blood pressure (!) 85/42, pulse 60, temperature 98 3 °F (36 8 °C), temperature source Oral, resp  rate 14, height 5' 11" (1 803 m), weight (!) 165 kg (362 lb 14 oz), SpO2 98 %  ,Body mass index is 50 61 kg/m²  Intake/Output Summary (Last 24 hours) at 9/19/2019 1159  Last data filed at 9/19/2019 0700  Gross per 24 hour   Intake 2559 6 ml   Output 4581 ml   Net -2021 4 ml       Physical Exam:     General Appearance: Alert and oriented x 3  In no respiratory distress  Lungs: Clear to auscultation bilaterally, no rales or rhonchi  Heart: Regular rate and rhythm, S1, S2 normal, no murmur, click, rub or gallop  Abdomen: Soft, obese, nontender, non-distended; bowel sounds normal; no masses or no organomegaly  Extremities:  Positive anasarca    Invasive Devices     Central Venous Catheter Line            CVC Central Lines 09/18/19 Triple 20cm less than 1 day          Peripheral Intravenous Line            Peripheral IV 09/17/19 Right Antecubital 1 day    Peripheral IV 09/18/19 Dorsal (posterior); Right Forearm 1 day          Arterial Line            Arterial Line 09/13/19 Radial 5 days          Line            Temporary HD Catheter 8 days          Drain            Closed/Suction Drain Right RUQ Other (Comment) 8 5 Fr  2 days                Lab Results:  Results from last 7 days   Lab Units 09/19/19  0524   WBC Thousand/uL 8 55   HEMOGLOBIN g/dL 7 0*   HEMATOCRIT % 22 7*   PLATELETS Thousands/uL 22*   NEUTROS PCT % 63   LYMPHS PCT % 7*   MONOS PCT % 14*   EOS PCT % 6     Results from last 7 days   Lab Units 09/19/19  0524   POTASSIUM mmol/L 4 1   CHLORIDE mmol/L 103   CO2 mmol/L 27   BUN mg/dL 9   CREATININE mg/dL 1 78*   CALCIUM mg/dL 9 2   ALK PHOS U/L 73   ALT U/L 37   AST U/L 67*     Results from last 7 days   Lab Units 09/19/19  0524   INR  2 91*     Results from last 7 days   Lab Units 09/16/19  0208   LIPASE u/L 217       Imaging Studies: I have personally reviewed pertinent imaging studies  Ct Chest Abdomen Pelvis Wo Contrast    Result Date: 9/6/2019  Impression: Exam is limited without IV and oral contrast particularly for soft tissue and bowel evaluation  1   Scattered mild patchy opacities bilaterally suspicious for pneumonia  2  Cirrhotic appearance to the liver  Splenomegaly  3   Mild ascites  4   Diffuse subcutaneous edema compatible with anasarca  Workstation performed: [id number]     Cxr 1 View Pa Portable Stat    Result Date: 9/10/2019  Impression: Persistent cardiomegaly  Improving pulmonary edema  Satisfactory right jugular central line placement  No pneumothorax  Right rib fractures  Workstation performed: [id number]     Xr Chest 2 Views    Result Date: 9/7/2019  Impression: Persistent cardiomegaly Increased diffuse vascular congestion Workstation performed: [id number]     Ct Femur Right Wo Contrast    Result Date: 9/6/2019  Impression: Soft tissue evaluation is limited without IV contrast  1    No evidence of soft tissue gas  2   Generalized subcutaneous edema with associated skin thickening  Edema noted adjacent to the calf musculature  Findings are nonspecific and may be related to generalized anasarca  3   The absence of soft tissue gas does not exclude necrotizing fasciitis particularly at the early stages  If there is persistent concern for necrotizing fasciitis, consider follow-up with MRI with and without contrast  Workstation performed: [id number]     Ct Tibia Fibula Right Wo Contrast    Result Date: 9/6/2019  Impression: Soft tissue evaluation is limited without IV contrast  1    No evidence of soft tissue gas   2   Generalized subcutaneous edema with associated skin thickening  Edema noted adjacent to the calf musculature  Findings are nonspecific and may be related to generalized anasarca  3   The absence of soft tissue gas does not exclude necrotizing fasciitis particularly at the early stages  If there is persistent concern for necrotizing fasciitis, consider follow-up with MRI with and without contrast  Workstation performed: [id number]     Ir Temp Hd Cath    Result Date: 9/10/2019  Impression: Impression: 1  Successful ultrasound and fluoroscopically guided placement of a double-lumen temporary dialysis central venous catheter via the right internal jugular vein  The tip of the catheter is at the cavoatrial junction and may be used immediately  Workstation performed: [id number]         Assessment and Plan:     1) Decompensated ROSALES cirrhosis complicated by hepatorenal syndrome and generalized anasarca - MELD 40 today and yesterday  Patient's INR seems to be stabilizing in the high 2s, with rising total bilirubin  Patient's creatinine peaked at 3 during admission, currently he is being transitioned off of CVVHD  He continues to be alert and oriented x3  He does have significant amount of anasarca  His BMI has dropped significantly during admission  It is currently 50 6  Over the weekend, the [address] was contacted for possible transfer to the transplant service  At that time, Dr Boris Parker discussed considering transfer if his BMI would be in the high 40s with other contributing factors  Fortunately, he has been weaned off of pressors  During admission, he did have a significant white count [place] 143[address], which improved after he had percutaneous cholecystostomy placed  He has had negative infectious workup otherwise  His liver workup has been negative   [facility] [address]  screen with CT A/P and MRI/MRCP in June 2019 showed 2 small indeterminate low density lesions in the R lobe of the liver but given his body habitus, these were unable to be differentiated, AFP was normal   - Myself, Dr Giacomo Barrientos and Susana Sandoval called Lelo Coronado who is a hepatology nurse at [facility]  She has been in direct contact with Dr Jett Solo  We are waiting to hear back from Dr Jett Solo to see whether or not it is possible to transfer to the patient given he is off of pressors and CVVHD     - Otherwise, we will continue current management   - Nephrology recommendations appreciated   - Continue to monitor mental status closely   - CMP and INR daily

## 2019-09-19 NOTE — PROGRESS NOTES
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 [facility])    Essential hypertension    Hyperbilirubinemia    Liver lesion    Primary osteoarthritis involving multiple joints    Morbid obesity (HCC)    Anemia    Hepatorenal syndrome (HCC)    Encephalopathy acute    Thrombocytopenia (HCC)    Obesity with alveolar hypoventilation (HCC)    Coagulopathy (HCC)    Nausea & vomiting    Cholelithiasis         Plan:  1  Symptom management -     Recommend global delirium precautions including:      - Establishment of day/night cycle via lights during the day and blinds open  Please limit interruptions at night as medically appropriate  - Provide glasses/hearing aids as apprioriate  - Minimize deliriogenic meds as able  - Provide reorientation including date on board and visible clock  - Avoid restraints as able, frequent verbal reorientations or patient care sitter as appropriate  2  Goals - Treatment focused  Endorses a desire to have a positive attitude  Although I freely reiterated my concerns about his overall prognosis, I will honor he and his family's request to provide positive support  Will continue to build rapport and provide support      Code Status: level 1   Decisional apparatus:  Patient is competent on my exam today  If competence is lost, patient's substitute decision maker would default to spouse [place]  Interval history:       Patient has made small strides towards wellness in the past several days with substantial fluid loss through CVVH  Today reports feeling positive and 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  He greatly appreciates the work done by the medical team here and all the support he has received  He is not having pain or dyspnea        MEDICATIONS / ALLERGIES:     all current active meds have been reviewed and current meds:   Current Facility-Administered Medications   Medication Dose Route Frequency    albumin human (FLEXBUMIN) 25 % injection 12 5 g  12 5 g Intravenous Q6H [address]    lactulose 20 g/30 mL oral solution 20 g  20 g Oral BID    magnesium sulfate 1-5 GM/100ML-% IVPB (premix) SOLN **ADS Override Pull**        midodrine (PROAMATINE) tablet 15 mg  15 mg Oral TID AC    NxStage K 4/Ca 3 dialysis solution (RFP-401) 20,000 mL  20,000 mL Dialysis Continuous    nystatin (MYCOSTATIN) cream   Topical BID    octreotide (SandoSTATIN) injection 100 mcg  100 mcg Subcutaneous Q8H [address]    ondansetron (ZOFRAN) injection 4 mg  4 mg Intravenous Q6H PRN    pantoprazole (PROTONIX) injection 40 mg  40 mg Intravenous BID    phenylephrine (TEVIN-SYNEPHRINE) 50 mg (STANDARD CONCENTRATION) in sodium chloride 0 9% 250 mL   mcg/min Intravenous Titrated    potassium chloride 40 mEq/100 mL IVPB (premix) **ADS Override Pull**        rifaximin (XIFAXAN) tablet 550 mg  550 mg Oral Q12H [address]    tranexamic acid 100mg/mL (for epistaxis) 500 mg  500 mg Nasal Once       Allergies   Allergen Reactions    Lactose     Propranolol Eye Swelling    Torsemide Eye Swelling       OBJECTIVE:    Physical Exam  Physical Exam   Constitutional: He appears ill  No distress  HENT:   Head: Atraumatic  Eyes: Right eye exhibits no discharge  Left eye exhibits no discharge  Pulmonary/Chest: Effort normal  No respiratory distress  Abdominal: Soft  He exhibits distension  Musculoskeletal: He exhibits edema  Neurological: He is alert  Skin: Skin is warm and dry  Psychiatric: He has a normal mood and affect  Nursing note and vitals reviewed  Lab Results:   I have personally reviewed pertinent labs  , CBC:   Lab Results   Component Value Date    WBC 8 55 09/19/2019    HGB 7 0 (L) 09/19/2019    HCT 22 7 (L) 09/19/2019     (H) 09/19/2019    PLT 22 (LL) 09/19/2019    MCH 34 8 (H) 09/19/2019 MCHC 30 8 (L) 09/19/2019    RDW 21 7 (H) 09/19/2019    MPV 11 9 09/19/2019    NRBC 0 09/19/2019   , CMP:   Lab Results   Component Value Date    SODIUM 136 09/19/2019    K 4 1 09/19/2019     09/19/2019    CO2 27 09/19/2019    BUN 9 09/19/2019    CREATININE 1 78 (H) 09/19/2019    CALCIUM 9 2 09/19/2019    AST 67 (H) 09/19/2019    ALT 37 09/19/2019    ALKPHOS 73 09/19/2019    EGFR 41 09/19/2019         Counseling / Coordination of Care  Total floor / unit time spent today 15+ minutes  Greater than 50% of total time was spent with the patient and / or family counseling and / or coordination of care  A description of the counseling / coordination of care: psychosocial support, brief symptom assessment

## 2019-09-19 NOTE — PROGRESS NOTES
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 [facility])  Active Problems:    CROW on CPAP    Essential hypertension    Hyperbilirubinemia    Morbid obesity ([address] )    Anemia    Hepatorenal syndrome (HCC)    Encephalopathy acute    Thrombocytopenia (HCC)    Obesity with alveolar hypoventilation (HCC)    Coagulopathy (HCC)    Nausea & vomiting    Cholelithiasis  Resolved Problems:    Hyponatremia    Cellulitis of right lower extremity    HCAP (healthcare-associated pneumonia)    Hypotension        Neuro:   Acute Encephalopathy  -Likely secondary to hepatic encephalopathy, now significantly improved  -Continue lactulose BID and rifaximin  -Trend neuro exam    Continue delirium precautions  Regulate sleep/wake cycle    CV:   Hypotension  -Likely secondary to ROSALES liver cirrhosis and hypoalbuminemia  -Consider decreasing UF rate on CVVH as patient is significantly negative on I/O balance, currently running at -150  -Continue Phenylephrine for goal MAP>60  -Continue TID Midodrine  -Continue scheduled 25% albumin    Pulm:   CROW on CPAP at home  -Continue bipap HS    Wean supplemental O2 for SpO2>90  Encourage cough, deep breathing, IS    GI:   ROSALES Liver Cirrhosis   -MELD 40  -Continue Octreotide  -Continue lactulose and Rifaximin  -Team has been in contact with [place] to discuss options for liver transplant as patients BMI is approaching their acceptable range    Acute Cholecystitis  -s/p percutaneous octavio drain on 9/16 with 70ml bilious drainage/24 hours  -Blood and Fluid Cultures show no growth  -Currently tolerating renal diet  -Continue PRN zofran    :  Hepatorenal Syndrome  -Continue CVVH with goal -150ml/hour, consider decreasing UF rate with increasing pressor requirements  -Nephrology following - appreciate recommendations    F/E/N:   Fluids: Continue CVVH for goal negative fluid balance  Electrolytes: Replete per CVVH nursing protocol  Nutrition: Continue Renal Diet    ID:   Leukocytosis has improved, and he has remained afebrile  Blood and fluid cultures negative to date  Monitor off antibiotics  Trend WBC and fever curve    Heme:   Hepatic Coagulopathy  -Continue to trend CBC and Coags  -Transfuse for Hgb<6 5, INR>4, Fibrinogen<100, and PLT<10  -Monitor closely for signs of bleeding    Endo:   No acute issues, monitor glucose on BMP     Msk/Skin:   Continue frequent turning and repositioning to prevent skin breakdown    Disposition: continue ICU level of care    Code Status: Level 1 - Full Code    Counseling / Coordination of Care  Total Critical Care time spent 32 minutes excluding procedures, teaching and family updates  ______________________________________________________________________    Chief Complaint: "I feel great"    24 Hour Events: CVVH was continued at -150ml/hr  Throughout last evening the patient was having increased pressor requirements with Phenylephrine up to 130  He received 1 U PRBC for Hgb 6 8 and 1 U Cryo for fibrinogen <60  Review of Systems  Review of Systems - General ROS: negative   Respiratory ROS: negative  Cardiovascular ROS: negative  Gastrointestinal ROS: no abdominal pain, change in bowel habits, or black or bloody stools  Genito-Urinary ROS: negative  Musculoskeletal ROS: negative  ______________________________________________________________________    Physical Exam:   Physical Exam   Constitutional: He is oriented to person, place, and time  He appears well-developed  He appears ill  No distress  HENT:   Head: Normocephalic and atraumatic  Eyes: Pupils are equal, round, and reactive to light  Scleral icterus is present  Cardiovascular: Normal rate, regular rhythm, normal heart sounds and intact distal pulses  Pulmonary/Chest: Effort normal  No respiratory distress  He has decreased breath sounds  Abdominal: Soft  He exhibits distension  There is generalized tenderness  Musculoskeletal: He exhibits edema  Tenderness: +3 generalized pitting edema  Neurological: He is alert and oriented to person, place, and time  GCS eye subscore is 4  GCS verbal subscore is 5  GCS motor subscore is 6  Generalized weakness in all extremities  Skin: Skin is warm and dry  jaundiced   Psychiatric: He has a normal mood and affect        ______________________________________________________________________  Vitals:    19 2331 19 2340 19 0000 19 0100   BP: (!) 93/47      BP Location:       Pulse: 66  59 (!) 54   Resp: (!) 29  12 14   Temp: 97 7 °F (36 5 °C)      TempSrc: Oral      SpO2: 92% 93% 95% 95%   Weight:       Height:           Temperature:   Temp (24hrs), Av 9 °F (36 6 °C), Min:97 7 °F (36 5 °C), Max:98 °F (36 7 °C)    Current Temperature: 97 7 °F (36 5 °C)  Weights:   IBW: 75 3 kg    Body mass index is 51 66 kg/m²  Weight (last 2 days)     Date/Time   Weight    19 0535   (!) 168 (370 37)    19 0600   (!) 172 (378 31)            Hemodynamic Monitoring:  N/A     Non-Invasive/Invasive Ventilation Settings:  Respiratory    Lab Data (Last 4 hours)    None         O2/Vent Data (Last 4 hours)       2340          Non-Invasive Ventilation Mode BiPAP  Comment: v30                 No results found for: PHART, MRD4STO, PO2ART, LGY8WHF, W1MUKYUQ, BEART, SOURCE  SpO2: SpO2: 95 %, SpO2 Device: O2 Device: Other (comment)(bipap o2 titrated to mask )  Intake and Outputs:  I/O        07 -  0700  07 -  0700    P  O  1240 790    I V  (mL/kg) 1398 6 (8 3) 1229 2 (7 3)    Blood 319 472    NG/ 90    IV Piggyback 650 100    Total Intake(mL/kg) 3833 6 (22 8) 2681 2 (16)    Urine (mL/kg/hr) 0 (0) 0 (0)    Emesis/NG output 0 0    Drains 60 70    Other 7788 4415    Stool 980 50    Total Output 6765 3202    Net -0242 6 -5197 8          Unmeasured Stool Occurrence 1 x 0 x        UOP: 0 ml/hr   Nutrition:        Diet Orders   (From admission, onward)             Start     Ordered    09/18/19 1208  Diet Renal; Renal Restrictive; Yes; Fluid Restriction 1000 ML; No  Diet effective now     Question Answer Comment   Diet Type Renal    Renal Renal Restrictive    Should patient have a fluid restriction? Yes    Fluid Restriction Fluid Restriction 1000 ML    Should patient have a protein modifier? No    RD to adjust diet per protocol?  No        09/18/19 1207                Labs:   Results from last 7 days   Lab Units 09/19/19  0031 09/18/19  1817 09/18/19  0925 09/18/19  0428 09/17/19  0531 09/16/19  2227 09/16/19  1420 09/16/19  0430 09/15/19  0445 09/14/19  0435 09/14/19  0019 09/13/19  0528 09/12/19  0735   WBC Thousand/uL  --   --   --  12 58* 25 71*  --  20 89* 21 99* 11 39* 16 16* 16 99* 11 10* 10 29*   HEMOGLOBIN g/dL 7 4* 6 8* 7 0* 6 4* 7 1* 7 2* 6 9* 7 1* 7 2* 7 5* 7 6* 7 7* 7 6*   HEMATOCRIT % 23 0* 21 6* 22 8* 20 5* 23 0* 23 6* 22 2* 22 8* 23 2* 24 1* 24 5* 25 0* 23 9*   PLATELETS Thousands/uL  --   --   --  27* 37*  --  30* 29* 26* 20* 21* 26* 28*   NEUTROS PCT %  --   --   --  68  --   --  84* 85* 73 80*  --  73  --    BANDS PCT %  --   --   --   --  5  --   --   --   --   --   --   --  2   MONOS PCT %  --   --   --  15*  --   --  12 11 10 9  --  10  --    MONO PCT %  --   --   --   --  6  --   --   --   --   --   --   --  4     Results from last 7 days   Lab Units 09/19/19  0031 09/18/19  1817 09/18/19  1213 09/18/19  0732 09/18/19  0428 09/17/19  2245 09/17/19  1549 09/17/19  1144 09/17/19  0531  09/16/19  0430  09/15/19  0445  09/14/19  0435   SODIUM mmol/L 139 139 135*  --  136 136 138 139 138   < > 135*   < > 135*   < > 132*   POTASSIUM mmol/L 3 9 4 0 4 1  --  4 1 3 9 4 1 4 1 4 2   < > 4 1   < > 4 1   < > 4 6   CHLORIDE mmol/L 104 103 102  --  102 100 104 104 103   < > 103   < > 102   < > 99*   CO2 mmol/L 27 26 27  --  27 28 28 29 28   < > 26   < > 26   < > 28   ANION GAP mmol/L 8 10 6  --  7 8 6 6 7   < > 6   < > 7   < > 5   BUN mg/dL 11 11 11  --  10 10 10 10 10   < > 11   < > 14   < > 28*   CREATININE mg/dL 1 82* 1 88* 1 88*  --  1 65* 1 59* 1 67* 1 71* 1 62*   < > 1 54*   < > 1 54*   < > 2 30*   CALCIUM mg/dL 9 2 9 1 9 2  --  9 0 9 2 8 8 9 1 9 3   < > 9 0   < > 9 1   < > 8 8   GLUCOSE RANDOM mg/dL 115 120 131  --  119 136 139 108 112   < > 127   < > 103   < > 128   ALT U/L  --   --   --  36  --   --   --   --  32  --  31  --  29  --  27   AST U/L  --   --   --  63*  --   --   --   --  57*  --  52*  --  46*  --  42   ALK PHOS U/L  --   --   --  73  --   --   --   --  77  --  70  --  71  --  64   ALBUMIN g/dL  --   --   --  3 2*  --   --   --   --  3 2*  --  3 1*  --  3 2*  --  3 3*   TOTAL BILIRUBIN mg/dL  --   --   --  8 50*  --   --   --   --  8 60*  --  8 30*  --  7 60*  --  7 60*    < > = values in this interval not displayed  Results from last 7 days   Lab Units 09/19/19  0031 09/18/19  1817 09/18/19  1213 09/18/19  0428 09/17/19  2245 09/17/19  1549 09/17/19  1144   MAGNESIUM mg/dL 1 9 2 0 1 9 1 7 1 7 1 9 1 9   PHOSPHORUS mg/dL 1 9* 1 9* 1 9* 1 8* 1 8* 2 2* 2 3*      Results from last 7 days   Lab Units 09/18/19  0637 09/17/19  0531 09/16/19  1420 09/16/19  0430 09/15/19  0445 09/14/19  0612 09/13/19  1116   INR  2 99* 3 15* 2 32* 2 84* 2 34* 2 36* 2 06*   PTT seconds  --  74*  --   --   --   --  45*              ABG:    VBG:    Results from last 7 days   Lab Units 09/15/19  1134 09/12/19  0735   PROCALCITONIN ng/ml 0 37* 0 42*       Imaging: CXR reviewed from 9/18 showing [facility] TLC terminating in the SVC and stable vascular congestion I have personally reviewed pertinent reports     and I have personally reviewed pertinent films in PACS  EKG: NSR rate 62  Micro:  Results from last 7 days   Lab Units 09/16/19  1443 09/16/19  1031 09/16/19  1028 09/16/19  0945   BLOOD CULTURE   -- No Growth at 48 hrs  No Growth at 48 hrs   --    GRAM STAIN RESULT  No Polys  No organisms seen  --   --   --    BODY FLUID CULTURE, STERILE  No growth  --   --   --    MRSA CULTURE ONLY   --   --   --  No Methicillin Resistant Staphlyococcus aureus (MRSA) isolated     Allergies:    Allergies   Allergen Reactions    Lactose     Propranolol Eye Swelling    Torsemide Eye Swelling     Medications:   Scheduled Meds:    Current Facility-Administered Medications:  magnesium sulfate        potassium chloride        albumin human 12 5 g Intravenous Q6H [facility] Antonia Rodney PA-C Last Rate: 0 g (09/18/19 1754)   calcium gluconate 1 G  100 mL IVPB 1 g Intravenous Once Anali Steinberg PA-C Last Rate: 1 g (09/19/19 0146)   lactulose 20 g Oral BID Anali Steinberg PA-C    lidocaine (PF)        magnesium sulfate 1 g Intravenous Once Anali Steinberg PA-C    midodrine 15 mg Oral TID AC MARKOS Melgoza    NxStage K 4/Ca 3 20,000 mL Dialysis Continuous Nanette Fox MD Last Rate: 0 mL (09/18/19 1500)   nystatin  Topical BID Antonia Rodney PA-C    octreotide 100 mcg Subcutaneous [facility] Anali Steinberg PA-C    ondansetron 4 mg Intravenous Q6H PRN MARKOS Samuels    pantoprazole 40 mg Intravenous BID MARKOS Samuels    phenylephine  mcg/min Intravenous Titrated Antonia Rodney PA-C Last Rate: 130 mcg/min (09/19/19 0034)   potassium chloride 40 mEq Intravenous Once Anali Steinberg PA-C    rifaximin 550 mg Oral Q12H [facility] Anali Steinberg PA-C    sodium phosphate 9 mmol Intravenous Once Anali Steinberg PA-C Last Rate: 9 mmol (09/19/19 0140)   tranexamic acid 500 mg Nasal Once Antonia Rodney PA-C      Continuous Infusions:    NxStage K 4/Ca 3 20,000 mL Last Rate: 0 mL (09/18/19 1500)   phenylephine  mcg/min Last Rate: 130 mcg/min (09/19/19 0034)     PRN Meds:    ondansetron 4 mg Q6H PRN     VTE Pharmacologic Prophylaxis: Hepatic Coagulopathy, not appropriate for pharm DVT ppx  VTE Mechanical Prophylaxis: sequential compression device  Invasive lines and devices: Invasive Devices     Central Venous Catheter Line            CVC Central Lines 09/18/19 Triple 20cm less than 1 day          Peripheral Intravenous Line            Peripheral IV 09/17/19 Right Antecubital 1 day    Peripheral IV 09/18/19 Dorsal (posterior); Right Forearm 1 day          Arterial Line            Arterial Line 09/13/19 Radial 5 days          Line            Temporary HD Catheter 8 days          Drain            Closed/Suction Drain Right RUQ Other (Comment) 8 5 Fr  2 days                     Portions of the record may have been created with voice recognition software  Occasional wrong word or "sound a like" substitutions may have occurred due to the inherent limitations of voice recognition software  Read the chart carefully and recognize, using context, where substitutions have occurred      [address] [place] [address]

## 2019-09-19 NOTE — PROGRESS NOTES
NEPHROLOGY PROGRESS NOTE    Patient: Ana Mayen               Sex: male          DOA: 9/6/2019  5:35 PM   YOB: 1960        Age:  63 y o         LOS:  LOS: 13 days   9/19/2019    REASON FOR THE CONSULTATION:      Acute kidney injury on chronic kidney disease stage 3    SUBJECTIVE     Patient is seen and examined while undergoing continuous venovenous hemodialysis  Patient is awake, alert and in no distress  Noted to be in -2 5 L fluid balance overnight  Patient noted to have urinated 160 cc this morning      CURRENT MEDICATIONS       Current Facility-Administered Medications:     albumin human (FLEXBUMIN) 25 % injection 12 5 g, 12 5 g, Intravenous, Q6H [address], Hilary Davis PA-C, Last Rate: 0 mL/hr at 09/18/19 1754, 12 5 g at 09/19/19 0548    lactulose 20 g/30 mL oral solution 20 g, 20 g, Oral, BID, Perry Alfaro PA-C, 20 g at 09/19/19 0907    magnesium sulfate 1-5 GM/100ML-% IVPB (premix) SOLN **ADS Override Pull**, , , ,     midodrine (PROAMATINE) tablet 15 mg, 15 mg, Oral, TID AC, Henretta Dingess, CRNP, 15 mg at 09/19/19 9061    NxStage K 4/Ca 3 dialysis solution (RFP-401) 20,000 mL, 20,000 mL, Dialysis, Continuous, Lashon Petit MD, Last Rate: 0 mL/hr at 09/18/19 1500, 20,000 mL at 09/19/19 0729    nystatin (MYCOSTATIN) cream, , Topical, BID, Hilary Davis PA-C    octreotide (SandoSTATIN) injection 100 mcg, 100 mcg, Subcutaneous, Q8H [address], Perry Alfaro PA-C, 100 mcg at 09/19/19 0548    ondansetron (ZOFRAN) injection 4 mg, 4 mg, Intravenous, Q6H PRN, MARKOS Ramirez, 4 mg at 09/15/19 2142    pantoprazole (PROTONIX) injection 40 mg, 40 mg, Intravenous, BID, MARKOS Ramirez, 40 mg at 09/19/19 0907    phenylephrine (TEVIN-SYNEPHRINE) 50 mg (STANDARD CONCENTRATION) in sodium chloride 0 9% 250 mL,  mcg/min, Intravenous, Titrated, Hilary Davis PA-C, Stopped at 09/19/19 0800    potassium chloride 40 mEq/100 mL IVPB (premix) **ADS Override Pull**, , , ,    rifaximin (XIFAXAN) tablet 550 mg, 550 mg, Oral, Q12H [facility], Karsonberta Ware PA-C, 550 mg at 09/19/19 0433    tranexamic acid 100mg/mL (for epistaxis) 500 mg, 500 mg, Nasal, Once, Colette Jara PA-C    REVIEW OF SYSTEMS     Review of Systems   Constitutional: Positive for fatigue  HENT: Negative  Eyes: Negative  Respiratory: Negative  Cardiovascular: Positive for leg swelling  Gastrointestinal: Negative  Endocrine: Negative  Genitourinary: Positive for scrotal swelling  Musculoskeletal: Negative  Skin: Negative  Allergic/Immunologic: Negative  Neurological: Negative  Hematological: Negative  All other systems reviewed and are negative  OBJECTIVE     Current Weight: Weight - Scale: (!) 165 kg (362 lb 14 oz)  Vitals:    09/19/19 0700   BP:    Pulse: 60   Resp: 14   Temp: 98 3 °F (36 8 °C)   SpO2: 98%     Body mass index is 50 61 kg/m²  Intake/Output Summary (Last 24 hours) at 9/19/2019 0959  Last data filed at 9/19/2019 0700  Gross per 24 hour   Intake 3027 6 ml   Output 5272 ml   Net -2244 4 ml       PHYSICAL EXAMINATION     Physical Exam   Constitutional: He is oriented to person, place, and time  Male who is lying in bed and appears to be in good spirits   HENT:   Head: Normocephalic and atraumatic  Eyes: Pupils are equal, round, and reactive to light  Neck: Neck supple  Cardiovascular: Normal rate, regular rhythm and normal heart sounds  Pulmonary/Chest: Effort normal    Decreased breath sounds anteriorly   Abdominal: Soft  Bowel sounds are normal    Musculoskeletal: Normal range of motion  Neurological: He is alert and oriented to person, place, and time  Skin: Skin is warm  Psychiatric: He has a normal mood and affect           LAB RESULTS     Results from last 7 days   Lab Units 09/19/19  0524 09/19/19  0031 09/18/19  1817 09/18/19  1213 09/18/19  5020 09/18/19  0428 09/17/19  2245 09/17/19  1549  09/17/19  0531  09/16/19  2227  09/16/19  1420 09/16/19  0430  09/15/19  0445  09/14/19  0435   WBC Thousand/uL 8 55  --   --   --   --  12 58*  --   --   --  25 71*  --   --   --  20 89*  --  21 99*  --  11 39*  --  16 16*   HEMOGLOBIN g/dL 7 0* 7 4* 6 8*  --  7 0* 6 4*  --   --   --  7 1*  --  7 2*  --  6 9*  --  7 1*  --  7 2*  --  7 5*   HEMATOCRIT % 22 7* 23 0* 21 6*  --  22 8* 20 5*  --   --   --  23 0*  --  23 6*  --  22 2*  --  22 8*  --  23 2*  --  24 1*   PLATELETS Thousands/uL 22*  --   --   --   --  27*  --   --   --  37*  --   --   --  30*  --  29*  --  26*  --  20*   POTASSIUM mmol/L 4 1 3 9 4 0 4 1  --  4 1 3 9 4 1   < > 4 2   < >  --    < >  --    < > 4 1   < > 4 1   < > 4 6   CHLORIDE mmol/L 103 104 103 102  --  102 100 104   < > 103   < >  --    < >  --    < > 103   < > 102   < > 99*   CO2 mmol/L 27 27 26 27  --  27 28 28   < > 28   < >  --    < >  --    < > 26   < > 26   < > 28   BUN mg/dL 9 11 11 11  --  10 10 10   < > 10   < >  --    < >  --    < > 11   < > 14   < > 28*   CREATININE mg/dL 1 78* 1 82* 1 88* 1 88*  --  1 65* 1 59* 1 67*   < > 1 62*   < >  --    < >  --    < > 1 54*   < > 1 54*   < > 2 30*   EGFR ml/min/1 73sq m 41 40 39 39  --  45 47 44   < > 46   < >  --    < >  --    < > 49   < > 49   < > 30   CALCIUM mg/dL 9 2 9 2 9 1 9 2  --  9 0 9 2 8 8   < > 9 3   < >  --    < >  --    < > 9 0   < > 9 1   < > 8 8   MAGNESIUM mg/dL 1 8 1 9 2 0 1 9  --  1 7 1 7 1 9   < > 2 0   < >  --    < >  --    < > 2 0   < > 1 9   < > 1 9   PHOSPHORUS mg/dL 1 9* 1 9* 1 9* 1 9*  --  1 8* 1 8* 2 2*   < > 2 4*   < >  --    < >  --    < > 2 4*   < > 2 3*   < > 3 4    < > = values in this interval not displayed  RADIOLOGY RESULTS      Results for orders placed during the hospital encounter of 09/06/19   CXR 1 view PA portable stat    Narrative CHEST     INDICATION:   line placement RIJ Hemodialysis catheter  COMPARISON:  September 6, 2019    EXAM PERFORMED/VIEWS:  XR CHEST PORTABLE  2 images    FINDINGS:  Lungs are suboptimally aerated  Elevation of right hemidiaphragm  Cardiomegaly with diminishing pulmonary edema  Persistent prominence of the pulmonary vasculature in the left superior hilum  Right jugular central line tip in the upper SVC  No pneumothorax  Osseous structures appear within normal limits for patient age  Old fracture of right 5th rib  Questionable fracture right 6th rib  Impression Persistent cardiomegaly  Improving pulmonary edema  Satisfactory right jugular central line placement  No pneumothorax  Right rib fractures  Workstation performed: [id number]       Results for orders placed during the hospital encounter of 09/06/19   XR chest 2 views    Narrative CHEST     INDICATION:   SOB     COMPARISON:  8/15/2019    EXAM PERFORMED/VIEWS:  XR CHEST PA & LATERAL  2 views    FINDINGS:  Persistent moderate cardiomegaly  Development of diffuse vascular congestion    The lungs are otherwise clear  No pneumothorax or pleural effusion  Osseous structures appear within normal limits for patient age  Impression Persistent cardiomegaly    Increased diffuse vascular congestion        Workstation performed: [id number]         ASSESSMENT/PLAN     63years old male with past medical history of ROSALES cirrhosis, morbid obesity, anasarca, chronic kidney disease stage 3 who presented to our facility on September 6th with complaints of lower extremity cellulitis and was found to have acute kidney injury, to required pneumonia and cellulitis  1  Oliguric acute kidney injury on chronic kidney disease stage III present on admission:  Patient with progressive renal dysfunction likely secondary to hepatorenal syndrome and is dialysis dependent   -patient had undergone intermittent dialysis since admission without much improvement in volume status  -patient was therefore started on CVVHD on September 13th for better fluid removal and to increase clearance    -currently with ultrafiltration target of 250 cc per hour with a dialysis flow rate of 5000 cc per hour and a blood flow rate of 250 cc/minute   -noted to have urinated 160 cc this morning   -now off pressors with excellent blood pressure noted this morning   -due to improvement in volume status and renal clearances plan to discontinue CVVHD today and switch to intermittent HD starting tomorrow pending urine output and lab results  2  Volume overload:  Secondary to fluid retaining state in a morbidly obese patient with 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  anuric and HD dependent for fluid removal   Patient had been in -25 L in fluid balance since admission  3  Hyponatremia:  Sodium improved to 136 mEq per L      4  Anemia:  Secondary to underlying liver cirrhosis  Current hemoglobin 7 0 and below target  Recommend blood transfusion to keep hemoglobin close to 10 to prevent renal hypoxia  5  Thrombocytopenia: This is likely due to liver cirrhosis  Remains at risk of spontaneous bleeding  Current platelet count is 92528  6  Liver cirrhosis: This is likely secondary to ROSALES  Transplant programs had refused potential transfer initially due to BMI being greater than 50  However with significant weight loss through fluid removal via CRRT, his new BMI stands at 50    -remains on octreotide, rifaximin and midodrine  7  Hyperbilirubinemia:  CT scan showed distended gallbladder  Status post cholecystostomy tube for possible acute cholecystitis  T bilirubin   Stable at 8 3     8  Pneumonia:  Found to have developed infiltrate in left lower lobe of lung  Remains on cefepime and Flagyl  7  Access:  Right IJ temp dialysis catheter  8  Hypophosphatemia:  Serum phosphorus 1 9 and low  Continue with replacement as indicated          Barbie Schreiber MD  Nephrology  9/19/2019

## 2019-09-19 NOTE — RESPIRATORY THERAPY NOTE
09/18/19 3760   Non-Invasive Information   Interface Full face mask   Non-Invasive Ventilation Mode BiPAP  (v30)   $ CPAP/BiPAP Charge - Initial & Daily Mgmt Yes   SpO2 93 %   $ Pulse Oximetry Spot Check Charge Completed   Resp Comments   (pt placed on our bipap 10/5 f/f mask )   Non-Invasive Settings   Rise Time 2   IPAP (cm) 10 cm   EPAP (cm) 5 cm   Non-Invasive Readings   Total Rate 20   Spontaneous Vt (mL) 534   Spontaneous MV (mL) 12 7   I/E Ratio (Obs) 1:1 9   Leak (lpm) 34 4   Skin Intervention Skin intact   Non-Invasive Alarms   Low Insp Pressure Time (sec) 60 sec   Vt Low (mL) 2   High Resp Rate (BPM) 40 BPM   Apnea Interval (sec) 30

## 2019-09-19 NOTE — PLAN OF CARE
Problem: PAIN - ADULT  Goal: Verbalizes/displays adequate comfort level or baseline comfort level  Description  Interventions:  - Encourage patient to monitor pain and request assistance  - Assess pain using appropriate pain scale  - Administer analgesics based on type and severity of pain and evaluate response  - Implement non-pharmacological measures as appropriate and evaluate response  - Consider cultural and social influences on pain and pain management  - Notify physician/advanced practitioner if interventions unsuccessful or patient reports new pain  Outcome: Progressing     Problem: INFECTION - ADULT  Goal: Absence or prevention of progression during hospitalization  Description  INTERVENTIONS:  - Assess and monitor for signs and symptoms of infection  - Monitor lab/diagnostic results  - Monitor all insertion sites, i e  indwelling lines, tubes, and drains  - Monitor endotracheal if appropriate and nasal secretions for changes in amount and color  - [place] appropriate cooling/warming therapies per order  - Administer medications as ordered  - Instruct and encourage patient and family to use good hand hygiene technique  - Identify and instruct in appropriate isolation precautions for identified infection/condition  Outcome: Progressing  Goal: Absence of fever/infection during neutropenic period  Description  INTERVENTIONS:  - Monitor WBC    Outcome: Progressing     Problem: SAFETY ADULT  Goal: Patient will remain free of falls  Description  INTERVENTIONS:  - Assess patient frequently for physical needs  -  Identify cognitive and physical deficits and behaviors that affect risk of falls    -  [place] fall precautions as indicated by assessment   - Educate patient/family on patient safety including physical limitations  - Instruct patient to call for assistance with activity based on assessment  - Modify environment to reduce risk of injury  - Consider OT/PT consult to assist with strengthening/mobility  Outcome: Progressing  Goal: Maintain or return to baseline ADL function  Description  INTERVENTIONS:  -  Assess patient's ability to carry out ADLs; assess patient's baseline for ADL function and identify physical deficits which impact ability to perform ADLs (bathing, care of mouth/teeth, toileting, grooming, dressing, etc )  - Assess/evaluate cause of self-care deficits   - Assess range of motion  - Assess patient's mobility; develop plan if impaired  - Assess patient's need for assistive devices and provide as appropriate  - Encourage maximum independence but intervene and supervise when necessary  - Involve family in performance of ADLs  - Assess for home care needs following discharge   - Consider OT consult to assist with ADL evaluation and planning for discharge  - Provide patient education as appropriate  Outcome: Progressing  Goal: Maintain or return mobility status to optimal level  Description  INTERVENTIONS:  - Assess patient's baseline mobility status (ambulation, transfers, stairs, etc )    - Identify cognitive and physical deficits and behaviors that affect mobility  - Identify mobility aids required to assist with transfers and/or ambulation (gait belt, sit-to-stand, lift, walker, cane, etc )  - [place] fall precautions as indicated by assessment  - Record patient progress and toleration of activity level on Mobility SBAR; progress patient to next Phase/Stage  - Instruct patient to call for assistance with activity based on assessment  - Consider rehabilitation consult to assist with strengthening/weightbearing, etc   Outcome: Progressing     Problem: DISCHARGE PLANNING  Goal: Discharge to home or other facility with appropriate resources  Description  INTERVENTIONS:  - Identify barriers to discharge w/patient and caregiver  - Arrange for needed discharge resources and transportation as appropriate  - Identify discharge learning needs (meds, wound care, etc )  - Arrange for interpretive services to assist at discharge as needed  - Refer to Case Management Department for coordinating discharge planning if the patient needs post-hospital services based on physician/advanced practitioner order or complex needs related to functional status, cognitive ability, or social support system  Outcome: Progressing     Problem: Knowledge Deficit  Goal: Patient/family/caregiver demonstrates understanding of disease process, treatment plan, medications, and discharge instructions  Description  Complete learning assessment and assess knowledge base  Interventions:  - Provide teaching at level of understanding  - Provide teaching via preferred learning methods  Outcome: Progressing     Problem: Potential for Falls  Goal: Patient will remain free of falls  Description  INTERVENTIONS:  - Assess patient frequently for physical needs  -  Identify cognitive and physical deficits and behaviors that affect risk of falls    -  [place] fall precautions as indicated by assessment   - Educate patient/family on patient safety including physical limitations  - Instruct patient to call for assistance with activity based on assessment  - Modify environment to reduce risk of injury  - Consider OT/PT consult to assist with strengthening/mobility  Outcome: Progressing     Problem: Prexisting or High Potential for Compromised Skin Integrity  Goal: Skin integrity is maintained or improved  Description  INTERVENTIONS:  - Identify patients at risk for skin breakdown  - Assess and monitor skin integrity  - Assess and monitor nutrition and hydration status  - Monitor labs   - Assess for incontinence   - Turn and reposition patient  - Assist with mobility/ambulation  - Relieve pressure over bony prominences  - Avoid friction and shearing  - Provide appropriate hygiene as needed including keeping skin clean and dry  - Evaluate need for skin moisturizer/barrier cream  - Collaborate with interdisciplinary team   - Patient/family teaching  - Consider wound care consult   Outcome: Progressing     Problem: Nutrition/Hydration-ADULT  Goal: Nutrient/Hydration intake appropriate for improving, restoring or maintaining nutritional needs  Description  Monitor and assess patient's nutrition/hydration status for malnutrition  Collaborate with interdisciplinary team and initiate plan and interventions as ordered  Monitor patient's weight and dietary intake as ordered or per policy  Utilize nutrition screening tool and intervene as necessary  Determine patient's food preferences and provide high-protein, high-caloric foods as appropriate       INTERVENTIONS:  - Monitor oral intake, urinary output, labs, and treatment plans  - Assess nutrition and hydration status and recommend course of action  - Evaluate amount of meals eaten  - Assist patient with eating if necessary   - Allow adequate time for meals  - Recommend/ encourage appropriate diets, oral nutritional supplements, and vitamin/mineral supplements  - Order, calculate, and assess calorie counts as needed  - Recommend, monitor, and adjust tube feedings  based on assessed needs  - Assess need for intravenous fluids  - Provide nutrition/hydration education as appropriate  - Include patient/family/caregiver in decisions related to nutrition   Outcome: Progressing     Problem: METABOLIC, FLUID AND ELECTROLYTES - ADULT  Goal: Electrolytes maintained within normal limits  Description  INTERVENTIONS:  - Monitor labs and assess patient for signs and symptoms of electrolyte imbalances  - Administer electrolyte replacement as ordered  - Monitor response to electrolyte replacements, including repeat lab results as appropriate  - Instruct patient on fluid and nutrition as appropriate  Outcome: Progressing  Goal: Fluid balance maintained  Description  INTERVENTIONS:  - Monitor labs   - Monitor I/O and WT  - Instruct patient on fluid and nutrition as appropriate  - Assess for signs & symptoms of volume excess or deficit  Outcome: Progressing     Problem: GENITOURINARY - ADULT  Goal: Maintains or returns to baseline urinary function  Description  INTERVENTIONS:  - Assess urinary function  - Encourage oral fluids to ensure adequate hydration if ordered  - Administer IV fluids as ordered to ensure adequate hydration  - Administer ordered medications as needed  - Offer frequent toileting  - Follow urinary retention protocol if ordered  Outcome: Progressing     Problem: SKIN/TISSUE INTEGRITY - ADULT  Goal: Skin integrity remains intact  Description  INTERVENTIONS  - Identify patients at risk for skin breakdown  - Assess and monitor skin integrity  - Assess and monitor nutrition and hydration status  - Monitor labs (i e  albumin)  - Assess for incontinence   - Turn and reposition patient  - Assist with mobility/ambulation  - Relieve pressure over bony prominences  - Avoid friction and shearing  - Provide appropriate hygiene as needed including keeping skin clean and dry  - Evaluate need for skin moisturizer/barrier cream  - Collaborate with interdisciplinary team (i e  Nutrition, Rehabilitation, etc )   - Patient/family teaching  Outcome: Progressing  Goal: Incision(s), wounds(s) or drain site(s) healing without S/S of infection  Description  INTERVENTIONS  - Assess and document risk factors for skin impairment   - Assess and document dressing, incision, wound bed, drain sites and surrounding tissue  - Consider nutrition services referral as needed  - Oral mucous membranes remain intact  - Provide patient/ family education  Outcome: Progressing

## 2019-09-20 LAB
ABO GROUP BLD BPU: NORMAL
ALBUMIN SERPL BCP-MCNC: 3.5 G/DL (ref 3.5–5)
ALP SERPL-CCNC: 74 U/L (ref 46–116)
ALT SERPL W P-5'-P-CCNC: 38 U/L (ref 12–78)
ANION GAP SERPL CALCULATED.3IONS-SCNC: 8 MMOL/L (ref 4–13)
AST SERPL W P-5'-P-CCNC: 69 U/L (ref 5–45)
BASE EXCESS BLDA CALC-SCNC: -2.4 MMOL/L
BASOPHILS # BLD AUTO: 0.02 THOUSANDS/ΜL (ref 0–0.1)
BASOPHILS NFR BLD AUTO: 0 % (ref 0–1)
BILIRUB DIRECT SERPL-MCNC: 3.11 MG/DL (ref 0–0.2)
BILIRUB SERPL-MCNC: 7.8 MG/DL (ref 0.2–1)
BPU ID: NORMAL
BUN SERPL-MCNC: 16 MG/DL (ref 5–25)
CA-I BLD-SCNC: 1.16 MMOL/L (ref 1.12–1.32)
CALCIUM SERPL-MCNC: 9 MG/DL (ref 8.3–10.1)
CHLORIDE SERPL-SCNC: 101 MMOL/L (ref 100–108)
CO2 SERPL-SCNC: 26 MMOL/L (ref 21–32)
CREAT SERPL-MCNC: 2.92 MG/DL (ref 0.6–1.3)
EOSINOPHIL # BLD AUTO: 0.49 THOUSAND/ΜL (ref 0–0.61)
EOSINOPHIL NFR BLD AUTO: 6 % (ref 0–6)
ERYTHROCYTE [DISTWIDTH] IN BLOOD BY AUTOMATED COUNT: 21.9 % (ref 11.6–15.1)
FIBRINOGEN PPP-MCNC: 67 MG/DL (ref 227–495)
FIBRINOGEN PPP-MCNC: 72 MG/DL (ref 227–495)
FIBRINOGEN PPP-MCNC: <60 MG/DL (ref 227–495)
GFR SERPL CREATININE-BSD FRML MDRD: 23 ML/MIN/1.73SQ M
GLUCOSE SERPL-MCNC: 103 MG/DL (ref 65–140)
HCO3 BLDA-SCNC: 23.7 MMOL/L (ref 22–28)
HCT VFR BLD AUTO: 21.6 % (ref 36.5–49.3)
HGB BLD-MCNC: 6.7 G/DL (ref 12–17)
IMM GRANULOCYTES # BLD AUTO: >0.5 THOUSAND/UL (ref 0–0.2)
IMM GRANULOCYTES NFR BLD AUTO: 8 % (ref 0–2)
INR PPP: 2.79 (ref 0.84–1.19)
LYMPHOCYTES # BLD AUTO: 0.61 THOUSANDS/ΜL (ref 0.6–4.47)
LYMPHOCYTES NFR BLD AUTO: 8 % (ref 14–44)
MAGNESIUM SERPL-MCNC: 2 MG/DL (ref 1.6–2.6)
MCH RBC QN AUTO: 35.8 PG (ref 26.8–34.3)
MCHC RBC AUTO-ENTMCNC: 31 G/DL (ref 31.4–37.4)
MCV RBC AUTO: 116 FL (ref 82–98)
MONOCYTES # BLD AUTO: 1.08 THOUSAND/ΜL (ref 0.17–1.22)
MONOCYTES NFR BLD AUTO: 14 % (ref 4–12)
NEUTROPHILS # BLD AUTO: 5.17 THOUSANDS/ΜL (ref 1.85–7.62)
NEUTS SEG NFR BLD AUTO: 64 % (ref 43–75)
NRBC BLD AUTO-RTO: 0 /100 WBCS
O2 CT BLDA-SCNC: 10.1 ML/DL (ref 16–23)
OXYHGB MFR BLDA: 92.1 % (ref 94–97)
PCO2 BLDA: 47.1 MM HG (ref 36–44)
PH BLDA: 7.32 [PH] (ref 7.35–7.45)
PHOSPHATE SERPL-MCNC: 1.7 MG/DL (ref 2.7–4.5)
PLATELET # BLD AUTO: 18 THOUSANDS/UL (ref 149–390)
PMV BLD AUTO: 11.5 FL (ref 8.9–12.7)
PO2 BLDA: 71.2 MM HG (ref 75–129)
POTASSIUM SERPL-SCNC: 4.1 MMOL/L (ref 3.5–5.3)
PROT SERPL-MCNC: 6.4 G/DL (ref 6.4–8.2)
PROTHROMBIN TIME: 29.8 SECONDS (ref 11.6–14.5)
RBC # BLD AUTO: 1.87 MILLION/UL (ref 3.88–5.62)
SODIUM SERPL-SCNC: 135 MMOL/L (ref 136–145)
SPECIMEN SOURCE: ABNORMAL
UNIT DISPENSE STATUS: NORMAL
UNIT PRODUCT CODE: NORMAL
UNIT RH: NORMAL
WBC # BLD AUTO: 7.98 THOUSAND/UL (ref 4.31–10.16)

## 2019-09-20 PROCEDURE — 83735 ASSAY OF MAGNESIUM: CPT | Performed by: PHYSICIAN ASSISTANT

## 2019-09-20 PROCEDURE — 85610 PROTHROMBIN TIME: CPT | Performed by: PHYSICIAN ASSISTANT

## 2019-09-20 PROCEDURE — 82330 ASSAY OF CALCIUM: CPT | Performed by: PHYSICIAN ASSISTANT

## 2019-09-20 PROCEDURE — C9113 INJ PANTOPRAZOLE SODIUM, VIA: HCPCS | Performed by: NURSE PRACTITIONER

## 2019-09-20 PROCEDURE — 84100 ASSAY OF PHOSPHORUS: CPT | Performed by: PHYSICIAN ASSISTANT

## 2019-09-20 PROCEDURE — 94660 CPAP INITIATION&MGMT: CPT

## 2019-09-20 PROCEDURE — 94760 N-INVAS EAR/PLS OXIMETRY 1: CPT

## 2019-09-20 PROCEDURE — 82805 BLOOD GASES W/O2 SATURATION: CPT | Performed by: PHYSICIAN ASSISTANT

## 2019-09-20 PROCEDURE — 99233 SBSQ HOSP IP/OBS HIGH 50: CPT | Performed by: INTERNAL MEDICINE

## 2019-09-20 PROCEDURE — 97110 THERAPEUTIC EXERCISES: CPT

## 2019-09-20 PROCEDURE — 85384 FIBRINOGEN ACTIVITY: CPT | Performed by: PHYSICIAN ASSISTANT

## 2019-09-20 PROCEDURE — 80076 HEPATIC FUNCTION PANEL: CPT | Performed by: PHYSICIAN ASSISTANT

## 2019-09-20 PROCEDURE — 97116 GAIT TRAINING THERAPY: CPT

## 2019-09-20 PROCEDURE — 85025 COMPLETE CBC W/AUTO DIFF WBC: CPT | Performed by: PHYSICIAN ASSISTANT

## 2019-09-20 PROCEDURE — P9012 CRYOPRECIPITATE EACH UNIT: HCPCS

## 2019-09-20 PROCEDURE — 80048 BASIC METABOLIC PNL TOTAL CA: CPT | Performed by: PHYSICIAN ASSISTANT

## 2019-09-20 PROCEDURE — 99232 SBSQ HOSP IP/OBS MODERATE 35: CPT | Performed by: INTERNAL MEDICINE

## 2019-09-20 RX ORDER — NOREPINEPHRINE BITARTRATE 1 MG/ML
INJECTION, SOLUTION INTRAVENOUS
Status: DISPENSED
Start: 2019-09-20 | End: 2019-09-20

## 2019-09-20 RX ORDER — FUROSEMIDE 10 MG/ML
80 INJECTION INTRAMUSCULAR; INTRAVENOUS
Status: DISCONTINUED | OUTPATIENT
Start: 2019-09-20 | End: 2019-09-21

## 2019-09-20 RX ORDER — ALBUMIN (HUMAN) 12.5 G/50ML
12.5 SOLUTION INTRAVENOUS ONCE
Status: COMPLETED | OUTPATIENT
Start: 2019-09-20 | End: 2019-09-20

## 2019-09-20 RX ORDER — LANOLIN ALCOHOL/MO/W.PET/CERES
400 CREAM (GRAM) TOPICAL DAILY
Status: DISCONTINUED | OUTPATIENT
Start: 2019-09-20 | End: 2019-09-25 | Stop reason: HOSPADM

## 2019-09-20 RX ADMIN — ALBUMIN (HUMAN) 12.5 G: 0.25 INJECTION, SOLUTION INTRAVENOUS at 17:50

## 2019-09-20 RX ADMIN — OCTREOTIDE ACETATE 100 MCG: 100 INJECTION, SOLUTION INTRAVENOUS; SUBCUTANEOUS at 23:00

## 2019-09-20 RX ADMIN — PANTOPRAZOLE SODIUM 40 MG: 40 INJECTION, POWDER, LYOPHILIZED, FOR SOLUTION INTRAVENOUS at 17:49

## 2019-09-20 RX ADMIN — MIDODRINE HYDROCHLORIDE 15 MG: 5 TABLET ORAL at 16:42

## 2019-09-20 RX ADMIN — Medication 400 MCG: at 08:04

## 2019-09-20 RX ADMIN — LACTULOSE 20 G: 10 SOLUTION ORAL at 17:49

## 2019-09-20 RX ADMIN — OCTREOTIDE ACETATE 100 MCG: 100 INJECTION, SOLUTION INTRAVENOUS; SUBCUTANEOUS at 05:28

## 2019-09-20 RX ADMIN — SODIUM PHOSPHATE, MONOBASIC, MONOHYDRATE 21 MMOL: 276; 142 INJECTION, SOLUTION INTRAVENOUS at 05:50

## 2019-09-20 RX ADMIN — FUROSEMIDE 80 MG: 10 INJECTION, SOLUTION INTRAMUSCULAR; INTRAVENOUS at 17:49

## 2019-09-20 RX ADMIN — MIDODRINE HYDROCHLORIDE 15 MG: 5 TABLET ORAL at 12:32

## 2019-09-20 RX ADMIN — RIFAXIMIN 550 MG: 550 TABLET ORAL at 16:42

## 2019-09-20 RX ADMIN — RIFAXIMIN 550 MG: 550 TABLET ORAL at 03:48

## 2019-09-20 RX ADMIN — LACTULOSE 20 G: 10 SOLUTION ORAL at 08:04

## 2019-09-20 RX ADMIN — OCTREOTIDE ACETATE 100 MCG: 100 INJECTION, SOLUTION INTRAVENOUS; SUBCUTANEOUS at 16:42

## 2019-09-20 RX ADMIN — ALBUMIN (HUMAN) 12.5 G: 0.25 INJECTION, SOLUTION INTRAVENOUS at 05:25

## 2019-09-20 RX ADMIN — NYSTATIN: 100000 CREAM TOPICAL at 08:04

## 2019-09-20 RX ADMIN — PANTOPRAZOLE SODIUM 40 MG: 40 INJECTION, POWDER, LYOPHILIZED, FOR SOLUTION INTRAVENOUS at 08:04

## 2019-09-20 RX ADMIN — NYSTATIN: 100000 CREAM TOPICAL at 17:49

## 2019-09-20 RX ADMIN — ALBUMIN (HUMAN) 12.5 G: 0.25 INJECTION, SOLUTION INTRAVENOUS at 21:29

## 2019-09-20 RX ADMIN — MIDODRINE HYDROCHLORIDE 15 MG: 5 TABLET ORAL at 06:12

## 2019-09-20 RX ADMIN — FUROSEMIDE 80 MG: 10 INJECTION, SOLUTION INTRAMUSCULAR; INTRAVENOUS at 08:49

## 2019-09-20 RX ADMIN — VITAM B12 50 MCG: 100 TAB at 08:04

## 2019-09-20 RX ADMIN — ALBUMIN (HUMAN) 12.5 G: 0.25 INJECTION, SOLUTION INTRAVENOUS at 12:32

## 2019-09-20 RX ADMIN — ALBUMIN (HUMAN) 12.5 G: 0.25 INJECTION, SOLUTION INTRAVENOUS at 23:18

## 2019-09-20 NOTE — PROGRESS NOTES
NEPHROLOGY PROGRESS NOTE    Patient: Elenaor Vuong               Sex: male          DOA: 9/6/2019  5:35 PM   YOB: 1960        Age:  63 y o         LOS:  LOS: 14 days       HPI     Patient admitted with acute kidney injury  He does have history of cirrhosis of liver  Had a massive anasarca  He was given dialysis  Initially I HD and then letter CRRT because of hemodynamic instability    SUBJECTIVE     Patient is off dialysis  Awake and alert  Lost quite a bit of weight since being dialyzed  Denies any complaint  Still feeling weak  Cannot get out of the bed  No chest pain no palpitation    Remain oliguric    No nausea no vomiting no shortness of breath    CURRENT MEDICATIONS       Current Facility-Administered Medications:     albumin human (FLEXBUMIN) 25 % injection 12 5 g, 12 5 g, Intravenous, Q6H [facility], Jas Grimaldo PA-C, Last Rate: 0 mL/hr at 09/18/19 1754, 12 5 g at 09/20/19 0525    cyanocobalamin (VITAMIN B-12) tablet 50 mcg, 50 mcg, Oral, Daily, MARKOS Harding, 50 mcg at 24/73/53 4663    folic acid (FOLVITE) tablet 400 mcg, 400 mcg, Oral, Daily, MARKOS Harding, 400 mcg at 09/20/19 0804    furosemide (LASIX) injection 80 mg, 80 mg, Intravenous, TID (diuretic), Lyn Carrera MD, 80 mg at 09/20/19 0849    lactulose 20 g/30 mL oral solution 20 g, 20 g, Oral, BID, Reina Brito PA-C, 20 g at 09/20/19 0804    midodrine (PROAMATINE) tablet 15 mg, 15 mg, Oral, TID ACPerla CRNP, 15 mg at 09/20/19 0612    norepinephrine (LEVOPHED) 1 mg/mL injection **ADS Override Pull**, , , ,     nystatin (MYCOSTATIN) cream, , Topical, BID, Jas Grimaldo PA-C    octreotide (SandoSTATIN) injection 100 mcg, 100 mcg, Subcutaneous, Q8H [facility], Reina Brito PA-C, 100 mcg at 09/20/19 0528    ondansetron (ZOFRAN) injection 4 mg, 4 mg, Intravenous, Q6H PRN, MARKOS Garrett, 4 mg at 09/15/19 2142    pantoprazole (PROTONIX) injection 40 mg, 40 mg, Intravenous, BID, [facility], CRNP, 40 mg at 09/20/19 0804    rifaximin [facility]) tablet 550 mg, 550 mg, Oral, Q12H [facility], Marvel Painter PA-C, 550 mg at 09/20/19 0348    OBJECTIVE     Current Weight: Weight - Scale: (!) 171 kg (376 lb 15 8 oz)  Vitals:    09/20/19 0800   BP: (!) 95/46   Pulse: 58   Resp: 14   Temp: 98 6 °F (37 °C)   SpO2: 92%       Intake/Output Summary (Last 24 hours) at 9/20/2019 1022  Last data filed at 9/20/2019 0400  Gross per 24 hour   Intake 260 ml   Output 30 ml   Net 230 ml       PHYSICAL EXAMINATION     Physical Exam   Constitutional: He is oriented to person, place, and time  He appears well-developed  No distress  Morbidly obese   HENT:   Head: Normocephalic and atraumatic  Mouth/Throat: Oropharynx is clear and moist  No oropharyngeal exudate  Eyes: Pupils are equal, round, and reactive to light  Conjunctivae are normal  Scleral icterus is present  Neck: Normal range of motion  Neck supple  No JVD present  Cardiovascular: Normal rate and regular rhythm  Murmur heard  Pulmonary/Chest: Effort normal and breath sounds normal  No respiratory distress  He has no wheezes  Abdominal: Soft  Bowel sounds are normal  There is no tenderness  Musculoskeletal: Normal range of motion  He exhibits edema  Neurological: He is alert and oriented to person, place, and time  Skin: Skin is warm  No rash noted  Psychiatric: He has a normal mood and affect   His behavior is normal         LAB RESULTS     Results from last 7 days   Lab Units 09/20/19  0437 09/19/19  0524 09/19/19  0031 09/18/19  1817 09/18/19  1213 09/18/19  0925 09/18/19  0428 09/17/19  2245  09/17/19  0531  09/16/19  1420  09/16/19  0430  09/15/19  0445   WBC Thousand/uL 7 98 8 55  --   --   --   --  12 58*  --   --  25 71*  --  20 89*  --  21 99*  --  11 39*   HEMOGLOBIN g/dL 6 7* 7 0* 7 4* 6 8*  --  7 0* 6 4*  --   --  7 1*   < > 6 9*  --  7 1*  --  7 2*   HEMATOCRIT % 21 6* 22 7* 23 0* 21 6*  --  22 8* 20 5*  --   -- 23 0*   < > 22 2*  --  22 8*  --  23 2*   PLATELETS Thousands/uL 18* 22*  --   --   --   --  27*  --   --  37*  --  30*  --  29*  --  26*   POTASSIUM mmol/L 4 1 4 1 3 9 4 0 4 1  --  4 1 3 9   < > 4 2   < >  --    < > 4 1   < > 4 1   CHLORIDE mmol/L 101 103 104 103 102  --  102 100   < > 103   < >  --    < > 103   < > 102   CO2 mmol/L 26 27 27 26 27  --  27 28   < > 28   < >  --    < > 26   < > 26   BUN mg/dL 16 9 11 11 11  --  10 10   < > 10   < >  --    < > 11   < > 14   CREATININE mg/dL 2 92* 1 78* 1 82* 1 88* 1 88*  --  1 65* 1 59*   < > 1 62*   < >  --    < > 1 54*   < > 1 54*   EGFR ml/min/1 73sq m 23 41 40 39 39  --  45 47   < > 46   < >  --    < > 49   < > 49   CALCIUM mg/dL 9 0 9 2 9 2 9 1 9 2  --  9 0 9 2   < > 9 3   < >  --    < > 9 0   < > 9 1   MAGNESIUM mg/dL 2 0 1 8 1 9 2 0 1 9  --  1 7 1 7   < > 2 0   < >  --    < > 2 0   < > 1 9   PHOSPHORUS mg/dL 1 7* 1 9* 1 9* 1 9* 1 9*  --  1 8* 1 8*   < > 2 4*   < >  --    < > 2 4*   < > 2 3*    < > = values in this interval not displayed  RADIOLOGY RESULTS      Results for orders placed during the hospital encounter of 09/06/19   CXR 1 view PA portable stat    Narrative CHEST     INDICATION:   line placement RIJ Hemodialysis catheter  COMPARISON:  September 6, 2019    EXAM PERFORMED/VIEWS:  XR CHEST PORTABLE  2 images    FINDINGS:  Lungs are suboptimally aerated  Elevation of right hemidiaphragm  Cardiomegaly with diminishing pulmonary edema  Persistent prominence of the pulmonary vasculature in the left superior hilum  Right jugular central line tip in the upper SVC  No pneumothorax  Osseous structures appear within normal limits for patient age  Old fracture of right 5th rib  Questionable fracture right 6th rib  Impression Persistent cardiomegaly  Improving pulmonary edema  Satisfactory right jugular central line placement  No pneumothorax  Right rib fractures        Workstation performed: [id number]       Results for orders placed during the hospital encounter of 09/06/19   XR chest 2 views    Narrative CHEST     INDICATION:   SOB     COMPARISON:  8/15/2019    EXAM PERFORMED/VIEWS:  XR CHEST PA & LATERAL  2 views    FINDINGS:  Persistent moderate cardiomegaly  Development of diffuse vascular congestion    The lungs are otherwise clear  No pneumothorax or pleural effusion  Osseous structures appear within normal limits for patient age  Impression Persistent cardiomegaly    Increased diffuse vascular congestion        Workstation performed: [id number]       No results found for this or any previous visit  No results found for this or any previous visit  Results for orders placed during the hospital encounter of 08/15/19   CT abdomen pelvis wo contrast    Narrative CT ABDOMEN AND PELVIS WITHOUT IV CONTRAST    INDICATION:   Abdominal distension  History of cirrhosis  Testicular swelling  COMPARISON:  6/7/2019    TECHNIQUE:  CT examination of the abdomen and pelvis was performed without intravenous contrast   Axial, sagittal, and coronal 2D reformatted images were created from the source data and submitted for interpretation  Radiation dose length product (DLP) for this visit:  6752 mGy-cm   This examination, like all CT scans performed in the [facility], was performed utilizing techniques to minimize radiation dose exposure, including the use of iterative   reconstruction and automated exposure control  No oral contrast     FINDINGS:    ABDOMEN    LOWER CHEST:  Small right-sided pleural effusion noted    LIVER/BILIARY TREE:  Changes of severe hepatic cirrhosis identified  There is no obvious focal mass appreciated on this unenhanced examination  GALLBLADDER:  There are gallstone(s) within the gallbladder, without pericholecystic inflammatory changes      SPLEEN:  The spleen is enlarged, measuring almost 20 cm in cephalocaudad length    PANCREAS:  Limited assessment without IV contrast  Although there is peripancreatic edema, this also involves the root of the mesentery, and was also present on the 6/7/2019 study  It is not felt to be related to pancreatitis  ADRENAL GLANDS:  Unremarkable  KIDNEYS/URETERS:  Bilateral nonobstructing nephrolithiasis  The largest individual calculus is seen in the right interpolar aspect measuring approximately 7 mm  STOMACH AND BOWEL:  Unremarkable  APPENDIX:  No findings to suggest appendicitis  ABDOMINOPELVIC CAVITY:  Perihepatic ascites  Retroperitoneal and mesenteric root edema  VESSELS:  No AAA    PELVIS    REPRODUCTIVE ORGANS:  Unremarkable for patient's age  URINARY BLADDER:  Unremarkable  ABDOMINAL WALL/INGUINAL REGIONS:  There is diffuse body wall edema  The scrotum is diffusely enlarged and of water density, which may represent a combination of both hydroceles and scrotal wall edema  Dilated anterior abdominal wall collateral vessels  OSSEOUS STRUCTURES:  Advanced multilevel degenerative changes throughout the lumbar spine  Grade 2 anterolisthesis L4-L5      Impression 1  Appearance of the liver consistent with severe cirrhotic change  Splenomegaly an additional findings suggestive of portal hypertension  2  Small amount of ascites  Diffuse body wall edema  Markedly enlargement of the scrotum likely related to hydroceles and scrotal wall edema and small right-sided pleural effusion  Retroperitoneal and mesenteric root edema  Findings most consistent   with anasarca  3  No evidence of bowel obstruction  4  Bilateral nephrolithiasis but no evidence of hydronephrosis  5  Cholelithiasis          Workstation performed: [id number]       No results found for this or any previous visit  PLAN / RECOMMENDATIONS      Acute kidney injury:  Likely is due to hepatorenal syndrome  Was on dialysis  Still remained oliguric with worsening renal function  I will hold dialysis today    Will give diuresis at this point to change oliguric renal failure to non oliguric  Will monitor him closely and if renal function does not get better and remain oliguric will try to put him back on regular hemodialysis    Cirrhosis of liver:  Patient will need transplant  Being evaluated    Hypertension:  Seems to chronic in nature will continue to monitor    Anemia:  Hemoglobin is 6 7  It is decreasing  May benefit from blood transfusion as it can improve kidney function also    Thrombocytopenia:  Related to cirrhosis of liver stable without any apparent bleed    Volume overload status: Will try to give diuretic for improvement    Overall prognosis guarded  Will continue treatment for hepatorenal with hope for liver transplant    Le Mcmullen MD  Nephrology  9/20/2019        Portions of the record may have been created with voice recognition software  Occasional wrong word or "sound a like" substitutions may have occurred due to the inherent limitations of voice recognition software  Read the chart carefully and recognize, using context, where substitutions have occurred

## 2019-09-20 NOTE — PROGRESS NOTES
Progress Note - Celestina Stearns 63 y o  male MRN: [id number]    Unit/Bed#:  Encounter: [id number]        Subjective:     Patient was seen and examined at the bedside in ICU  He is now on intermittent hemodialysis  He is still hopeful that he will have the opportunity to be evaluated by the transplant service  He is now having small urine output  ROS: As noted in the HPI, otherwise all others negative  Objective:     Vitals: Blood pressure (!) 95/46, pulse 58, temperature 98 6 °F (37 °C), temperature source Oral, resp  rate 14, height 5' 11" (1 803 m), weight (!) 171 kg (376 lb 15 8 oz), SpO2 92 %  ,Body mass index is 52 58 kg/m²  Intake/Output Summary (Last 24 hours) at 9/20/2019 1054  Last data filed at 9/20/2019 0400  Gross per 24 hour   Intake 260 ml   Output 30 ml   Net 230 ml       Physical Exam:     General Appearance: Alert and oriented x 3  In no respiratory distress  Lungs: Clear to auscultation bilaterally, no rales or rhonchi  Heart: Regular rate and rhythm, S1, S2 normal, no murmur, click, rub or gallop  Abdomen: Soft, obese, non-tender, non-distended; bowel sounds normal; no masses or no organomegaly  Extremities: + Anasarca     Invasive Devices     Central Venous Catheter Line            CVC Central Lines 09/18/19 Triple 20cm 1 day          Peripheral Intravenous Line            Peripheral IV 09/17/19 Right Antecubital 2 days    Peripheral IV 09/18/19 Dorsal (posterior); Right Forearm 2 days          Arterial Line            Arterial Line 09/13/19 Radial 6 days          Line            Temporary HD Catheter 9 days          Drain            Closed/Suction Drain Right RUQ Other (Comment) 8 5 Fr  3 days                Lab Results:  Results from last 7 days   Lab Units 09/20/19  0437   WBC Thousand/uL 7 98   HEMOGLOBIN g/dL 6 7*   HEMATOCRIT % 21 6*   PLATELETS Thousands/uL 18*   NEUTROS PCT % 64   LYMPHS PCT % 8*   MONOS PCT % 14*   EOS PCT % 6     Results from last 7 days   Lab Units 09/20/19  0437   POTASSIUM mmol/L 4 1   CHLORIDE mmol/L 101   CO2 mmol/L 26   BUN mg/dL 16   CREATININE mg/dL 2 92*   CALCIUM mg/dL 9 0   ALK PHOS U/L 74   ALT U/L 38   AST U/L 69*     Results from last 7 days   Lab Units 09/20/19  0437   INR  2 79*     Results from last 7 days   Lab Units 09/16/19  0208   LIPASE u/L 217       Imaging Studies: I have personally reviewed pertinent imaging studies  Ct Chest Abdomen Pelvis Wo Contrast    Result Date: 9/6/2019  Impression: Exam is limited without IV and oral contrast particularly for soft tissue and bowel evaluation  1   Scattered mild patchy opacities bilaterally suspicious for pneumonia  2  Cirrhotic appearance to the liver  Splenomegaly  3   Mild ascites  4   Diffuse subcutaneous edema compatible with anasarca  Workstation performed: [id number]     Cxr 1 View Pa Portable Stat    Result Date: 9/10/2019  Impression: Persistent cardiomegaly  Improving pulmonary edema  Satisfactory right jugular central line placement  No pneumothorax  Right rib fractures  Workstation performed: [id number]     Xr Chest 2 Views    Result Date: 9/7/2019  Impression: Persistent cardiomegaly Increased diffuse vascular congestion Workstation performed: [id number]     Ct Femur Right Wo Contrast    Result Date: 9/6/2019  Impression: Soft tissue evaluation is limited without IV contrast  1    No evidence of soft tissue gas  2   Generalized subcutaneous edema with associated skin thickening  Edema noted adjacent to the calf musculature  Findings are nonspecific and may be related to generalized anasarca  3   The absence of soft tissue gas does not exclude necrotizing fasciitis particularly at the early stages    If there is persistent concern for necrotizing fasciitis, consider follow-up with MRI with and without contrast  Workstation performed: [id number]     Ct Tibia Fibula Right Wo Contrast    Result Date: 9/6/2019  Impression: Soft tissue evaluation is limited without IV contrast  1  No evidence of soft tissue gas  2   Generalized subcutaneous edema with associated skin thickening  Edema noted adjacent to the calf musculature  Findings are nonspecific and may be related to generalized anasarca  3   The absence of soft tissue gas does not exclude necrotizing fasciitis particularly at the early stages  If there is persistent concern for necrotizing fasciitis, consider follow-up with MRI with and without contrast  Workstation performed: [id number]     Ir Temp Hd Cath    Result Date: 9/10/2019  Impression: Impression: 1  Successful ultrasound and fluoroscopically guided placement of a double-lumen temporary dialysis central venous catheter via the right internal jugular vein  The tip of the catheter is at the cavoatrial junction and may be used immediately  Workstation performed: [id number]         Assessment and Plan:     1) Decompensated ROSALES cirrhosis complicated by hepatorenal syndrome and generalized anasarca - MELD 39 today  From an HRS standpoint, he is off of CVVHD  He will now be getting IHD  He continues to be alert and oriented x3  He does have significant amount of anasarca  His BMI has dropped significantly during admission  Over the weekend, the [address] was contacted for possible transfer to the transplant service  At that time, Dr Higinio Reese discussed considering transfer if his BMI would be in the high 40s with other contributing factors  Fortunately, he has been weaned off of pressors  During admission, he did have a significant [place] count [place] 28465, which improved after he had percutaneous cholecystostomy placed  He has had negative infectious workup otherwise  His liver workup has been negative   [facility] [address]  screen with CT A/P and MRI/MRCP in June 2019 showed 2 small indeterminate low density lesions in the R lobe of the liver but given his body habitus, these were unable to be differentiated, AFP was normal   - Dr Salma Butts spoke to Dr Higinio Reese yesterday who will be discussing with his colleagues on whether or not they believe he could be a candidate for transplant  - Otherwise, we will continue current management   - Nephrology recommendations appreciated   - Continue to monitor mental status closely   - CMP and INR daily   - Lactulose and Xifaxan 550 mg BID

## 2019-09-20 NOTE — PLAN OF CARE
Problem: PHYSICAL THERAPY ADULT  Goal: Performs mobility at highest level of function for planned discharge setting  See evaluation for individualized goals  Description  Treatment/Interventions: Functional transfer training, LE strengthening/ROM, Therapeutic exercise, Endurance training, Patient/family training, Equipment eval/education, Bed mobility, Continued evaluation, Spoke to nursing, Spoke to advanced practitioner, Spoke to MD          See flowsheet documentation for full assessment, interventions and recommendations  Outcome: Progressing  Note:   Prognosis: Good  Problem List: Decreased strength, Decreased endurance, Impaired balance, Decreased mobility, Obesity, Decreased skin integrity  Assessment: pt motivated for PT session  able to complete OOB mobility assessment mod (A)x2  ambulated 3' bed>chair c jorge RW c min verbal cues for sequencing + RW management  pt able to tolerate sitting OOB in recliner c BP 94/46  pt resting comfortably  completed seated AROM LAQ + ankle pumps x10 reps c min verbal cues for technique  will cont skilled PT to further maximize functional mobility + improve quality of life  upon d/c, recommend STR  Barriers to Discharge: Inaccessible home environment     Recommendation: Short-term skilled PT     PT - OK to Discharge: Yes(to STR when stable )    See flowsheet documentation for full assessment

## 2019-09-20 NOTE — SOCIAL WORK
Cm contacted insurance agency to inquire about prior authorization for EMS transport  Cm informed no prior authorization needed for EMS transport  Reference number for call: [id number]  CM team to contact [phone] opt 1, opt 3 for prior authorization for hospital to hospital transfer when applicable [phone]  Pending reference for potential transfer [id number]  Cm team will continue to follow

## 2019-09-20 NOTE — PHYSICAL THERAPY NOTE
PT Progress Note (31min)  (14:20-14:51)       09/20/19 1451   Pain Assessment   Pain Assessment No/denies pain   Restrictions/Precautions   Weight Bearing Precautions Per Order No   Other Precautions Multiple lines;Telemetry;O2;Fall Risk  Shelvia Nearing)   General   Chart Reviewed Yes   Response to Previous Treatment Patient with no complaints from previous session  Family/Caregiver Present No   Cognition   Orientation Level Oriented X4   Subjective   Subjective pt motivated for PT session  "I didn't get much sleep last night, but I'll do anything"  Bed Mobility   Rolling R 4  Minimal assistance   Additional items Assist x 2;Bedrails; Increased time required;Verbal cues;LE management   Rolling L 4  Minimal assistance   Additional items Assist x 2;Bedrails; Increased time required;Verbal cues;LE management   Supine to Sit 3  Moderate assistance   Additional items Assist x 2; Increased time required;Verbal cues;LE management   Transfers   Sit to Stand 3  Moderate assistance   Additional items Assist x 2;Verbal cues; Increased time required   Stand to Sit 3  Moderate assistance   Additional items Assist x 2;Armrests; Verbal cues; Increased time required   Ambulation/Elevation   Gait pattern Wide YUMIKO; Decreased foot clearance; Short stride; Excessively slow   Gait Assistance 3  Moderate assist   Additional items Assist x 2;Verbal cues  (3rd (A) on standby for safety)   Assistive Device Bariatric Rolling walker   Distance 3' bed>chair   Stair Management Assistance Not tested   Balance   Static Sitting Fair +   Dynamic Sitting Fair +   Static Standing Poor +   Dynamic Standing Poor +   Ambulatory Poor   Endurance Deficit   Endurance Deficit Yes   Endurance Deficit Description BP 94/46 sitting in chair at end of session  Activity Tolerance   Activity Tolerance Patient limited by fatigue   Nurse Made Aware RN Link Cutting aware pt ambulated bed>chair Ax2-3 for safety + jorge RW     Exercises   Knee AROM Long Arc Quad Sitting;10 reps;AROM; Bilateral   Ankle Pumps Sitting;10 reps;AROM; Bilateral   Assessment   Prognosis Good   Problem List Decreased strength;Decreased endurance; Impaired balance;Decreased mobility;Obesity; Decreased skin integrity   Assessment pt motivated for PT session  able to complete OOB mobility assessment mod (A)x2  ambulated 3' bed>chair c jorge RW c min verbal cues for sequencing + RW management  pt able to tolerate sitting OOB in recliner c BP 94/46  pt resting comfortably  completed seated AROM LAQ + ankle pumps x10 reps c min verbal cues for technique  will cont skilled PT to further maximize functional mobility + improve quality of life  upon d/c, recommend STR  Barriers to Discharge Inaccessible home environment   Goals   Patient Goals "to walk around so I can drive"  STG Expiration Date 09/29/19   Short Term Goal #1 in addition to PT goals: 1  ambulate 100' min (A)x1 c RW to improve quality of life   Treatment Day 1   Plan   Treatment/Interventions Functional transfer training;LE strengthening/ROM; Therapeutic exercise; Endurance training;Patient/family training;Equipment eval/education; Bed mobility;Gait training;Spoke to nursing;Spoke to MD;Spoke to advanced practitioner   Progress Progressing toward goals   PT Frequency Other (Comment)  (3-5x/wk)   Recommendation   Recommendation Short-term skilled PT   PT - OK to Discharge Yes  (to STR when stable )     Sina Velazquez, PT, DPT

## 2019-09-20 NOTE — PROGRESS NOTES
Progress Note - Critical Care   Katya Tayolr 63 y o  male MRN: [id number]  Unit/Bed#:  Encounter: [id number]    Attending Physician: Kelsie Pelletier MD      ______________________________________________________________________  Assessment and Plan:   Principal Problem:    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 [facility])  Active Problems:    CROW on CPAP    Essential hypertension    Hyperbilirubinemia    Morbid obesity ([address] )    Anemia    Hepatorenal syndrome (HCC)    Encephalopathy acute    Thrombocytopenia (HCC)    Obesity with alveolar hypoventilation (HCC)    Coagulopathy (HCC)    Nausea & vomiting    Cholelithiasis  Resolved Problems:    Hyponatremia    Cellulitis of right lower extremity    HCAP (healthcare-associated pneumonia)    Hypotension        Neuro:   Acute Encephalopathy  -Likely secondary to hepatic encephalopathy, now significantly improved  -Continue Lactulose BID and Rifaximin    Continue Delirium Precautions  Regulate sleep/wake cycle    CV:   Continue Midodrine  Continuous Cardiac Monitoring  Goal MAP>60    Pulm:   CROW on CPAP at home  -Continue HS bipap    Wean supplemental O2 for SpO2>90  Encourage cough, deep breathing, IS    GI:   ROSALES Liver Cirrhosis  -MELD 40  -continue Octreotide  -Continue Lactulose and Rifaximin  -Team in contact with [facility] daily to discuss evaluation for liver transplant/possible transfer    Cholecystitis  -s/p percutaneous octavio drain on 9/16 with 30ml bilious drainage/24 hours  -Blood and Fluid Cultures show no growth - currently monitoring off abx  -Tolerating Renal diet   -Continue PRN zofran    :   Hepatorenal Syndrome  -CVVH stopped yesterday morning - overall, the patient is - 25L  -IV lasix challenge last evening, however patient produced no urine - may consider restarting IHD when appropriate  -Nephrology following - appreciate recommendations    F/E/N:   Fluids: Overall volume goal even to negative   Continue with PO fluid restriction  Electrolytes: Replete for K>4, Mag>2  Nutrition: Continue Renal diet with FR    ID:   Leukocytosis resolved  Blood and Fluid Cultures negative to date  Trend WBC and fever curve  Monitor off abx    Heme:   Hepatic Coagulopathy  -Transfuse for Hgb<6 5, INR>4, Fibrinogen<60, PLT<10  -Continue to trend CBC/Coags  -Monitor closely for signs of bleeding    Macrocytic Anemia  -Start I15 and Folic Acid supplementation    Endo:   No acute issues, monitor glucose on BMP     Msk/Skin:   Continue frequent turning and repositioning to prevent skin breakdown    Disposition: Continue ICU level of Care    Code Status: Level 1 - Full Code    Counseling / Coordination of Care  Total Critical Care time spent 32 minutes excluding procedures, teaching and family updates  ______________________________________________________________________    Chief Complaint: "I am feeling better"    24 Hour Events: The patient was successful weaned of of phenylephrine yesterday morning  CVVH was stopped, and he received 120mg IV lasix challenge  Despite there lasix, he did not produce any urine  Review of Systems   Constitutional: Negative  HENT: Negative  Eyes: Negative  Respiratory: Negative  Cardiovascular: Negative  Gastrointestinal: Negative  Endocrine: Negative  Genitourinary: Negative  Musculoskeletal: Negative  Skin: Negative  Allergic/Immunologic: Negative  Neurological: Negative  Hematological: Negative  Psychiatric/Behavioral: Negative       ______________________________________________________________________    Physical Exam:   Physical Exam   Constitutional: He is oriented to person, place, and time  He appears well-developed  He appears ill  No distress  HENT:   Head: Normocephalic and atraumatic  Eyes: Pupils are equal, round, and reactive to light  Scleral icterus is present     Neck:   Soft hematoma noted on L neck from LIJ TLC insertion   Cardiovascular: Normal rate, regular rhythm and intact distal pulses  Pulmonary/Chest: Effort normal  No respiratory distress  He has decreased breath sounds  Abdominal: Soft  He exhibits distension  Bowel sounds are decreased  There is no tenderness  Musculoskeletal: He exhibits edema (+3 pitting edema in all extremities)  Neurological: He is alert and oriented to person, place, and time  GCS eye subscore is 4  GCS verbal subscore is 5  GCS motor subscore is 6  Face symmetrical  Generalized weakness in all 4 extremities   Skin: Skin is warm and dry  jaundiced   Psychiatric: He has a normal mood and affect        ______________________________________________________________________  Vitals:    19 2255 19 2300 19 0000 19 0032   BP:  (!) 101/48     BP Location:       Pulse:  63 62    Resp:  21 20    Temp:       TempSrc:       SpO2: 90% 90% 97% 93%   Weight:       Height:           Temperature:   Temp (24hrs), Av 1 °F (36 7 °C), Min:97 9 °F (36 6 °C), Max:98 3 °F (36 8 °C)    Current Temperature: 98 °F (36 7 °C)  Weights:   IBW: 75 3 kg    Body mass index is 50 61 kg/m²  Weight (last 2 days)     Date/Time   Weight    19 0500   (!) 165 (362 88)    19 0535   (!) 168 (370 37)            Hemodynamic Monitoring:  N/A     Non-Invasive/Invasive Ventilation Settings:  Respiratory    Lab Data (Last 4 hours)    None         O2/Vent Data (Last 4 hours)       0032        Non-Invasive Ventilation Mode BiPAP BiPAP                No results found for: PHART, DQL7UZF, PO2ART, XNY9VZA, R7UMONSA, BEART, SOURCE  SpO2: SpO2: 94 %, SpO2 Device: O2 Device: Other (comment)(BiPap)  Intake and Outputs:  I/O        07 -  0700  07 -  0700    P  O  880     I V  (mL/kg) 1789 6 (10 8) 9 (0 1)    Blood 472     NG/GT 90 0    IV Piggyback 100 200    Total Intake(mL/kg) 3331 6 (20 2) 209 (1 3)    Urine (mL/kg/hr) 162 (0)     Emesis/NG output 0     Drains 70 30    Other 5646 237    Stool 50     Total Output 5994 267    Net -6346 4 -58          Unmeasured Urine Occurrence 0 x     Unmeasured Stool Occurrence 0 x         UOP: 0 ml/hr   Nutrition:        Diet Orders   (From admission, onward)             Start     Ordered    09/18/19 1208  Diet Renal; Renal Restrictive; Yes; Fluid Restriction 1000 ML; No  Diet effective now     Question Answer Comment   Diet Type Renal    Renal Renal Restrictive    Should patient have a fluid restriction? Yes    Fluid Restriction Fluid Restriction 1000 ML    Should patient have a protein modifier? No    RD to adjust diet per protocol? No        09/18/19 1207                Labs:   Results from last 7 days   Lab Units 09/19/19  0524 09/19/19  0031 09/18/19  1817 09/18/19  0925 09/18/19  0428 09/17/19  0531 09/16/19  2227 09/16/19  1420 09/16/19  0430 09/15/19  0445 09/14/19  0435  09/13/19  0528   WBC Thousand/uL 8 55  --   --   --  12 58* 25 71*  --  20 89* 21 99* 11 39* 16 16*   < > 11 10*   HEMOGLOBIN g/dL 7 0* 7 4* 6 8* 7 0* 6 4* 7 1* 7 2* 6 9* 7 1* 7 2* 7 5*   < > 7 7*   HEMATOCRIT % 22 7* 23 0* 21 6* 22 8* 20 5* 23 0* 23 6* 22 2* 22 8* 23 2* 24 1*   < > 25 0*   PLATELETS Thousands/uL 22*  --   --   --  27* 37*  --  30* 29* 26* 20*   < > 26*   NEUTROS PCT % 63  --   --   --  68  --   --  84* 85* 73 80*  --  73   BANDS PCT %  --   --   --   --   --  5  --   --   --   --   --   --   --    MONOS PCT % 14*  --   --   --  15*  --   --  12 11 10 9  --  10   MONO PCT %  --   --   --   --   --  6  --   --   --   --   --   --   --     < > = values in this interval not displayed       Results from last 7 days   Lab Units 09/19/19  0524 09/19/19  0031 09/18/19  1817 09/18/19  1213 09/18/19  0732 09/18/19  0428 09/17/19  2245 09/17/19  1549  09/17/19  0531  09/16/19  0430  09/15/19  0445  09/14/19  0435   SODIUM mmol/L 136 139 139 135*  --  136 136 138   < > 138   < > 135*   < > 135*   < > 132*   POTASSIUM mmol/L 4 1 3 9 4 0 4 1  --  4 1 3 9 4 1   < > 4 2   < > 4 1   < > 4 1   < > 4 6   CHLORIDE mmol/L 103 104 103 102  --  102 100 104   < > 103   < > 103   < > 102   < > 99*   CO2 mmol/L 27 27 26 27  --  27 28 28   < > 28   < > 26   < > 26   < > 28   ANION GAP mmol/L 6 8 10 6  --  7 8 6   < > 7   < > 6   < > 7   < > 5   BUN mg/dL 9 11 11 11  --  10 10 10   < > 10   < > 11   < > 14   < > 28*   CREATININE mg/dL 1 78* 1 82* 1 88* 1 88*  --  1 65* 1 59* 1 67*   < > 1 62*   < > 1 54*   < > 1 54*   < > 2 30*   CALCIUM mg/dL 9 2 9 2 9 1 9 2  --  9 0 9 2 8 8   < > 9 3   < > 9 0   < > 9 1   < > 8 8   GLUCOSE RANDOM mg/dL 101 115 120 131  --  119 136 139   < > 112   < > 127   < > 103   < > 128   ALT U/L 37  --   --   --  36  --   --   --   --  32  --  31  --  29  --  27   AST U/L 67*  --   --   --  63*  --   --   --   --  57*  --  52*  --  46*  --  42   ALK PHOS U/L 73  --   --   --  73  --   --   --   --  77  --  70  --  71  --  64   ALBUMIN g/dL 3 3*  --   --   --  3 2*  --   --   --   --  3 2*  --  3 1*  --  3 2*  --  3 3*   TOTAL BILIRUBIN mg/dL 9 20*  --   --   --  8 50*  --   --   --   --  8 60*  --  8 30*  --  7 60*  --  7 60*    < > = values in this interval not displayed  Results from last 7 days   Lab Units 09/19/19  0524 09/19/19  0031 09/18/19  1817 09/18/19  1213 09/18/19  0428 09/17/19  2245 09/17/19  1549   MAGNESIUM mg/dL 1 8 1 9 2 0 1 9 1 7 1 7 1 9   PHOSPHORUS mg/dL 1 9* 1 9* 1 9* 1 9* 1 8* 1 8* 2 2*      Results from last 7 days   Lab Units 09/19/19  0524 09/18/19  0637 09/17/19  0531 09/16/19  1420 09/16/19  0430 09/15/19  0445 09/14/19  0612 09/13/19  1116   INR  2 91* 2 99* 3 15* 2 32* 2 84* 2 34* 2 36* 2 06*   PTT seconds  --   --  74*  --   --   --   --  45*              ABG:    VBG:    Results from last 7 days   Lab Units 09/15/19  1134   PROCALCITONIN ng/ml 0 37*       Imaging:  No new imaging   EKG: NSR rate 65  Micro:  Results from last 7 days   Lab Units 09/16/19  1443 09/16/19  1031 09/16/19  1028 09/16/19  0945   BLOOD CULTURE   --  No Growth at 72 hrs   No Growth at 72 hrs   --    GRAM STAIN RESULT  No Polys  No organisms seen  --   --   --    BODY FLUID CULTURE, STERILE  No growth  --   --   --    MRSA CULTURE ONLY   --   --   --  No Methicillin Resistant Staphlyococcus aureus (MRSA) isolated     Allergies: Allergies   Allergen Reactions    Lactose     Propranolol Eye Swelling    Torsemide Eye Swelling     Medications:   Scheduled Meds:  Current Facility-Administered Medications:  albumin human 12 5 g Intravenous Q6H [facility] Marge Law PA-C Last Rate: 0 g (09/18/19 1754)   lactulose 20 g Oral BID Ron Starks PA-C    midodrine 15 mg Oral TID AC MARKOS Nelson    nystatin  Topical BID Marge Law PA-C    octreotide 100 mcg Subcutaneous formerly [facility] Ron Starks PA-C    ondansetron 4 mg Intravenous Q6H PRN MARKOS Skaggs    pantoprazole 40 mg Intravenous BID MARKOS Skaggs    rifaximin 550 mg Oral Q12H [facility] Ron Starks PA-C      Continuous Infusions:   PRN Meds:    ondansetron 4 mg Q6H PRN     VTE Pharmacologic Prophylaxis: none secondary to significant hepatic coagualopathy  VTE Mechanical Prophylaxis: sequential compression device  Invasive lines and devices: Invasive Devices     Central Venous Catheter Line            CVC Central Lines 09/18/19 Triple 20cm 1 day          Peripheral Intravenous Line            Peripheral IV 09/17/19 Right Antecubital 2 days    Peripheral IV 09/18/19 Dorsal (posterior); Right Forearm 1 day          Arterial Line            Arterial Line 09/13/19 Radial 6 days          Line            Temporary HD Catheter 9 days          Drain            Closed/Suction Drain Right RUQ Other (Comment) 8 5 Fr  3 days                     Portions of the record may have been created with voice recognition software  Occasional wrong word or "sound a like" substitutions may have occurred due to the inherent limitations of voice recognition software  Read the chart carefully and recognize, using context, where substitutions have occurred      Sherly KING Hima Quiñones

## 2019-09-20 NOTE — PLAN OF CARE
Problem: PAIN - ADULT  Goal: Verbalizes/displays adequate comfort level or baseline comfort level  Description  Interventions:  - Encourage patient to monitor pain and request assistance  - Assess pain using appropriate pain scale  - Administer analgesics based on type and severity of pain and evaluate response  - Implement non-pharmacological measures as appropriate and evaluate response  - Consider cultural and social influences on pain and pain management  - Notify physician/advanced practitioner if interventions unsuccessful or patient reports new pain  Outcome: Progressing     Problem: INFECTION - ADULT  Goal: Absence or prevention of progression during hospitalization  Description  INTERVENTIONS:  - Assess and monitor for signs and symptoms of infection  - Monitor lab/diagnostic results  - Monitor all insertion sites, i e  indwelling lines, tubes, and drains  - Monitor endotracheal if appropriate and nasal secretions for changes in amount and color  - [place] appropriate cooling/warming therapies per order  - Administer medications as ordered  - Instruct and encourage patient and family to use good hand hygiene technique  - Identify and instruct in appropriate isolation precautions for identified infection/condition  Outcome: Progressing  Goal: Absence of fever/infection during neutropenic period  Description  INTERVENTIONS:  - Monitor WBC    Outcome: Progressing     Problem: SAFETY ADULT  Goal: Patient will remain free of falls  Description  INTERVENTIONS:  - Assess patient frequently for physical needs  -  Identify cognitive and physical deficits and behaviors that affect risk of falls    -  [place] fall precautions as indicated by assessment   - Educate patient/family on patient safety including physical limitations  - Instruct patient to call for assistance with activity based on assessment  - Modify environment to reduce risk of injury  - Consider OT/PT consult to assist with strengthening/mobility  Outcome: Progressing  Goal: Maintain or return to baseline ADL function  Description  INTERVENTIONS:  -  Assess patient's ability to carry out ADLs; assess patient's baseline for ADL function and identify physical deficits which impact ability to perform ADLs (bathing, care of mouth/teeth, toileting, grooming, dressing, etc )  - Assess/evaluate cause of self-care deficits   - Assess range of motion  - Assess patient's mobility; develop plan if impaired  - Assess patient's need for assistive devices and provide as appropriate  - Encourage maximum independence but intervene and supervise when necessary  - Involve family in performance of ADLs  - Assess for home care needs following discharge   - Consider OT consult to assist with ADL evaluation and planning for discharge  - Provide patient education as appropriate  Outcome: Progressing  Goal: Maintain or return mobility status to optimal level  Description  INTERVENTIONS:  - Assess patient's baseline mobility status (ambulation, transfers, stairs, etc )    - Identify cognitive and physical deficits and behaviors that affect mobility  - Identify mobility aids required to assist with transfers and/or ambulation (gait belt, sit-to-stand, lift, walker, cane, etc )  - [place] fall precautions as indicated by assessment  - Record patient progress and toleration of activity level on Mobility SBAR; progress patient to next Phase/Stage  - Instruct patient to call for assistance with activity based on assessment  - Consider rehabilitation consult to assist with strengthening/weightbearing, etc   Outcome: Progressing     Problem: DISCHARGE PLANNING  Goal: Discharge to home or other facility with appropriate resources  Description  INTERVENTIONS:  - Identify barriers to discharge w/patient and caregiver  - Arrange for needed discharge resources and transportation as appropriate  - Identify discharge learning needs (meds, wound care, etc )  - Arrange for interpretive services to assist at discharge as needed  - Refer to Case Management Department for coordinating discharge planning if the patient needs post-hospital services based on physician/advanced practitioner order or complex needs related to functional status, cognitive ability, or social support system  Outcome: Progressing     Problem: Knowledge Deficit  Goal: Patient/family/caregiver demonstrates understanding of disease process, treatment plan, medications, and discharge instructions  Description  Complete learning assessment and assess knowledge base  Interventions:  - Provide teaching at level of understanding  - Provide teaching via preferred learning methods  Outcome: Progressing     Problem: Potential for Falls  Goal: Patient will remain free of falls  Description  INTERVENTIONS:  - Assess patient frequently for physical needs  -  Identify cognitive and physical deficits and behaviors that affect risk of falls    -  [place] fall precautions as indicated by assessment   - Educate patient/family on patient safety including physical limitations  - Instruct patient to call for assistance with activity based on assessment  - Modify environment to reduce risk of injury  - Consider OT/PT consult to assist with strengthening/mobility  Outcome: Progressing     Problem: Prexisting or High Potential for Compromised Skin Integrity  Goal: Skin integrity is maintained or improved  Description  INTERVENTIONS:  - Identify patients at risk for skin breakdown  - Assess and monitor skin integrity  - Assess and monitor nutrition and hydration status  - Monitor labs   - Assess for incontinence   - Turn and reposition patient  - Assist with mobility/ambulation  - Relieve pressure over bony prominences  - Avoid friction and shearing  - Provide appropriate hygiene as needed including keeping skin clean and dry  - Evaluate need for skin moisturizer/barrier cream  - Collaborate with interdisciplinary team   - Patient/family teaching  - Consider wound care consult   Outcome: Progressing     Problem: Nutrition/Hydration-ADULT  Goal: Nutrient/Hydration intake appropriate for improving, restoring or maintaining nutritional needs  Description  Monitor and assess patient's nutrition/hydration status for malnutrition  Collaborate with interdisciplinary team and initiate plan and interventions as ordered  Monitor patient's weight and dietary intake as ordered or per policy  Utilize nutrition screening tool and intervene as necessary  Determine patient's food preferences and provide high-protein, high-caloric foods as appropriate       INTERVENTIONS:  - Monitor oral intake, urinary output, labs, and treatment plans  - Assess nutrition and hydration status and recommend course of action  - Evaluate amount of meals eaten  - Assist patient with eating if necessary   - Allow adequate time for meals  - Recommend/ encourage appropriate diets, oral nutritional supplements, and vitamin/mineral supplements  - Order, calculate, and assess calorie counts as needed  - Recommend, monitor, and adjust tube feedings  based on assessed needs  - Assess need for intravenous fluids  - Provide nutrition/hydration education as appropriate  - Include patient/family/caregiver in decisions related to nutrition   Outcome: Progressing     Problem: METABOLIC, FLUID AND ELECTROLYTES - ADULT  Goal: Electrolytes maintained within normal limits  Description  INTERVENTIONS:  - Monitor labs and assess patient for signs and symptoms of electrolyte imbalances  - Administer electrolyte replacement as ordered  - Monitor response to electrolyte replacements, including repeat lab results as appropriate  - Instruct patient on fluid and nutrition as appropriate  Outcome: Progressing  Goal: Fluid balance maintained  Description  INTERVENTIONS:  - Monitor labs   - Monitor I/O and WT  - Instruct patient on fluid and nutrition as appropriate  - Assess for signs & symptoms of volume excess or deficit  Outcome: Progressing     Problem: GENITOURINARY - ADULT  Goal: Maintains or returns to baseline urinary function  Description  INTERVENTIONS:  - Assess urinary function  - Encourage oral fluids to ensure adequate hydration if ordered  - Administer IV fluids as ordered to ensure adequate hydration  - Administer ordered medications as needed  - Offer frequent toileting  - Follow urinary retention protocol if ordered  Outcome: Progressing     Problem: SKIN/TISSUE INTEGRITY - ADULT  Goal: Skin integrity remains intact  Description  INTERVENTIONS  - Identify patients at risk for skin breakdown  - Assess and monitor skin integrity  - Assess and monitor nutrition and hydration status  - Monitor labs (i e  albumin)  - Assess for incontinence   - Turn and reposition patient  - Assist with mobility/ambulation  - Relieve pressure over bony prominences  - Avoid friction and shearing  - Provide appropriate hygiene as needed including keeping skin clean and dry  - Evaluate need for skin moisturizer/barrier cream  - Collaborate with interdisciplinary team (i e  Nutrition, Rehabilitation, etc )   - Patient/family teaching  Outcome: Progressing  Goal: Incision(s), wounds(s) or drain site(s) healing without S/S of infection  Description  INTERVENTIONS  - Assess and document risk factors for skin impairment   - Assess and document dressing, incision, wound bed, drain sites and surrounding tissue  - Consider nutrition services referral as needed  - Oral mucous membranes remain intact  - Provide patient/ family education  Outcome: Progressing

## 2019-09-21 ENCOUNTER — APPOINTMENT (INPATIENT)
Dept: DIALYSIS | Facility: HOSPITAL | Age: 59
DRG: 177 | End: 2019-09-21
Payer: COMMERCIAL

## 2019-09-21 PROBLEM — K81.9 CHOLECYSTITIS: Status: ACTIVE | Noted: 2019-09-16

## 2019-09-21 PROBLEM — R11.2 NAUSEA & VOMITING: Status: RESOLVED | Noted: 2019-09-16 | Resolved: 2019-09-21

## 2019-09-21 LAB
ABO GROUP BLD BPU: NORMAL
ALBUMIN SERPL BCP-MCNC: 3.6 G/DL (ref 3.5–5)
ALBUMIN SERPL BCP-MCNC: 3.7 G/DL (ref 3.5–5)
ALP SERPL-CCNC: 73 U/L (ref 46–116)
ALP SERPL-CCNC: 76 U/L (ref 46–116)
ALT SERPL W P-5'-P-CCNC: 39 U/L (ref 12–78)
ALT SERPL W P-5'-P-CCNC: 39 U/L (ref 12–78)
AMMONIA PLAS-SCNC: 48 UMOL/L (ref 11–35)
AMMONIA PLAS-SCNC: 73 UMOL/L (ref 11–35)
ANION GAP SERPL CALCULATED.3IONS-SCNC: 7 MMOL/L (ref 4–13)
ANION GAP SERPL CALCULATED.3IONS-SCNC: 9 MMOL/L (ref 4–13)
AST SERPL W P-5'-P-CCNC: 61 U/L (ref 5–45)
AST SERPL W P-5'-P-CCNC: 63 U/L (ref 5–45)
BACTERIA BLD CULT: NORMAL
BACTERIA BLD CULT: NORMAL
BASE EXCESS BLDA CALC-SCNC: -2.7 MMOL/L
BASE EXCESS BLDA CALC-SCNC: -3.7 MMOL/L
BASOPHILS # BLD AUTO: 0.03 THOUSANDS/ΜL (ref 0–0.1)
BASOPHILS NFR BLD AUTO: 0 % (ref 0–1)
BILIRUB SERPL-MCNC: 7 MG/DL (ref 0.2–1)
BILIRUB SERPL-MCNC: 7.3 MG/DL (ref 0.2–1)
BPU ID: NORMAL
BUN SERPL-MCNC: 23 MG/DL (ref 5–25)
BUN SERPL-MCNC: 24 MG/DL (ref 5–25)
CA-I BLD-SCNC: 1.16 MMOL/L (ref 1.12–1.32)
CALCIUM SERPL-MCNC: 8.9 MG/DL (ref 8.3–10.1)
CALCIUM SERPL-MCNC: 8.9 MG/DL (ref 8.3–10.1)
CHLORIDE SERPL-SCNC: 102 MMOL/L (ref 100–108)
CHLORIDE SERPL-SCNC: 102 MMOL/L (ref 100–108)
CO2 SERPL-SCNC: 26 MMOL/L (ref 21–32)
CO2 SERPL-SCNC: 26 MMOL/L (ref 21–32)
CREAT SERPL-MCNC: 4 MG/DL (ref 0.6–1.3)
CREAT SERPL-MCNC: 4.21 MG/DL (ref 0.6–1.3)
EOSINOPHIL # BLD AUTO: 0.51 THOUSAND/ΜL (ref 0–0.61)
EOSINOPHIL NFR BLD AUTO: 7 % (ref 0–6)
ERYTHROCYTE [DISTWIDTH] IN BLOOD BY AUTOMATED COUNT: 21.9 % (ref 11.6–15.1)
ERYTHROCYTE [DISTWIDTH] IN BLOOD BY AUTOMATED COUNT: 21.9 % (ref 11.6–15.1)
FIBRINOGEN PPP-MCNC: 96 MG/DL (ref 227–495)
GFR SERPL CREATININE-BSD FRML MDRD: 15 ML/MIN/1.73SQ M
GFR SERPL CREATININE-BSD FRML MDRD: 15 ML/MIN/1.73SQ M
GLUCOSE SERPL-MCNC: 113 MG/DL (ref 65–140)
GLUCOSE SERPL-MCNC: 122 MG/DL (ref 65–140)
HCO3 BLDA-SCNC: 22.3 MMOL/L (ref 22–28)
HCO3 BLDA-SCNC: 23.7 MMOL/L (ref 22–28)
HCT VFR BLD AUTO: 22.1 % (ref 36.5–49.3)
HCT VFR BLD AUTO: 22.3 % (ref 36.5–49.3)
HGB BLD-MCNC: 6.8 G/DL (ref 12–17)
HGB BLD-MCNC: 6.8 G/DL (ref 12–17)
IMM GRANULOCYTES # BLD AUTO: >0.5 THOUSAND/UL (ref 0–0.2)
IMM GRANULOCYTES NFR BLD AUTO: 9 % (ref 0–2)
INR PPP: 2.59 (ref 0.84–1.19)
LYMPHOCYTES # BLD AUTO: 0.52 THOUSANDS/ΜL (ref 0.6–4.47)
LYMPHOCYTES NFR BLD AUTO: 7 % (ref 14–44)
MAGNESIUM SERPL-MCNC: 2 MG/DL (ref 1.6–2.6)
MAGNESIUM SERPL-MCNC: 2.1 MG/DL (ref 1.6–2.6)
MCH RBC QN AUTO: 36 PG (ref 26.8–34.3)
MCH RBC QN AUTO: 36 PG (ref 26.8–34.3)
MCHC RBC AUTO-ENTMCNC: 30.5 G/DL (ref 31.4–37.4)
MCHC RBC AUTO-ENTMCNC: 30.8 G/DL (ref 31.4–37.4)
MCV RBC AUTO: 117 FL (ref 82–98)
MCV RBC AUTO: 118 FL (ref 82–98)
MONOCYTES # BLD AUTO: 0.99 THOUSAND/ΜL (ref 0.17–1.22)
MONOCYTES NFR BLD AUTO: 14 % (ref 4–12)
NEUTROPHILS # BLD AUTO: 4.31 THOUSANDS/ΜL (ref 1.85–7.62)
NEUTS SEG NFR BLD AUTO: 63 % (ref 43–75)
NRBC BLD AUTO-RTO: 0 /100 WBCS
O2 CT BLDA-SCNC: 10.9 ML/DL (ref 16–23)
O2 CT BLDA-SCNC: 11.3 ML/DL (ref 16–23)
OXYHGB MFR BLDA: 97.5 % (ref 94–97)
OXYHGB MFR BLDA: 97.6 % (ref 94–97)
PCO2 BLDA: 45 MM HG (ref 36–44)
PCO2 BLDA: 49.6 MM HG (ref 36–44)
PH BLDA: 7.3 [PH] (ref 7.35–7.45)
PH BLDA: 7.31 [PH] (ref 7.35–7.45)
PHOSPHATE SERPL-MCNC: 2.6 MG/DL (ref 2.7–4.5)
PHOSPHATE SERPL-MCNC: 2.8 MG/DL (ref 2.7–4.5)
PLATELET # BLD AUTO: 18 THOUSANDS/UL (ref 149–390)
PLATELET # BLD AUTO: 19 THOUSANDS/UL (ref 149–390)
PMV BLD AUTO: 11.7 FL (ref 8.9–12.7)
PMV BLD AUTO: 12.6 FL (ref 8.9–12.7)
PO2 BLDA: 185.3 MM HG (ref 75–129)
PO2 BLDA: 194 MM HG (ref 75–129)
POTASSIUM SERPL-SCNC: 3.8 MMOL/L (ref 3.5–5.3)
POTASSIUM SERPL-SCNC: 3.9 MMOL/L (ref 3.5–5.3)
PROT SERPL-MCNC: 6.5 G/DL (ref 6.4–8.2)
PROT SERPL-MCNC: 6.6 G/DL (ref 6.4–8.2)
PROTHROMBIN TIME: 28.1 SECONDS (ref 11.6–14.5)
RBC # BLD AUTO: 1.89 MILLION/UL (ref 3.88–5.62)
RBC # BLD AUTO: 1.89 MILLION/UL (ref 3.88–5.62)
SODIUM SERPL-SCNC: 135 MMOL/L (ref 136–145)
SODIUM SERPL-SCNC: 137 MMOL/L (ref 136–145)
SPECIMEN SOURCE: ABNORMAL
SPECIMEN SOURCE: ABNORMAL
UNIT DISPENSE STATUS: NORMAL
UNIT PRODUCT CODE: NORMAL
UNIT RH: NORMAL
WBC # BLD AUTO: 6.83 THOUSAND/UL (ref 4.31–10.16)
WBC # BLD AUTO: 6.99 THOUSAND/UL (ref 4.31–10.16)

## 2019-09-21 PROCEDURE — 80053 COMPREHEN METABOLIC PANEL: CPT | Performed by: PHYSICIAN ASSISTANT

## 2019-09-21 PROCEDURE — 84100 ASSAY OF PHOSPHORUS: CPT | Performed by: PHYSICIAN ASSISTANT

## 2019-09-21 PROCEDURE — 85027 COMPLETE CBC AUTOMATED: CPT | Performed by: NURSE PRACTITIONER

## 2019-09-21 PROCEDURE — 99233 SBSQ HOSP IP/OBS HIGH 50: CPT | Performed by: INTERNAL MEDICINE

## 2019-09-21 PROCEDURE — 82805 BLOOD GASES W/O2 SATURATION: CPT | Performed by: PHYSICIAN ASSISTANT

## 2019-09-21 PROCEDURE — 84100 ASSAY OF PHOSPHORUS: CPT | Performed by: NURSE PRACTITIONER

## 2019-09-21 PROCEDURE — 83735 ASSAY OF MAGNESIUM: CPT | Performed by: PHYSICIAN ASSISTANT

## 2019-09-21 PROCEDURE — 82330 ASSAY OF CALCIUM: CPT | Performed by: NURSE PRACTITIONER

## 2019-09-21 PROCEDURE — 80053 COMPREHEN METABOLIC PANEL: CPT | Performed by: NURSE PRACTITIONER

## 2019-09-21 PROCEDURE — 93005 ELECTROCARDIOGRAM TRACING: CPT

## 2019-09-21 PROCEDURE — 83735 ASSAY OF MAGNESIUM: CPT | Performed by: NURSE PRACTITIONER

## 2019-09-21 PROCEDURE — 82140 ASSAY OF AMMONIA: CPT | Performed by: PHYSICIAN ASSISTANT

## 2019-09-21 PROCEDURE — 85025 COMPLETE CBC W/AUTO DIFF WBC: CPT | Performed by: PHYSICIAN ASSISTANT

## 2019-09-21 PROCEDURE — 99291 CRITICAL CARE FIRST HOUR: CPT | Performed by: INTERNAL MEDICINE

## 2019-09-21 PROCEDURE — 85384 FIBRINOGEN ACTIVITY: CPT | Performed by: NURSE PRACTITIONER

## 2019-09-21 PROCEDURE — 85610 PROTHROMBIN TIME: CPT | Performed by: NURSE PRACTITIONER

## 2019-09-21 PROCEDURE — 94760 N-INVAS EAR/PLS OXIMETRY 1: CPT

## 2019-09-21 PROCEDURE — 94660 CPAP INITIATION&MGMT: CPT

## 2019-09-21 PROCEDURE — C9113 INJ PANTOPRAZOLE SODIUM, VIA: HCPCS | Performed by: NURSE PRACTITIONER

## 2019-09-21 RX ORDER — LACTULOSE 20 G/30ML
20 SOLUTION ORAL 3 TIMES DAILY
Status: DISCONTINUED | OUTPATIENT
Start: 2019-09-21 | End: 2019-09-25 | Stop reason: HOSPADM

## 2019-09-21 RX ORDER — ALBUMIN (HUMAN) 12.5 G/50ML
25 SOLUTION INTRAVENOUS
Status: DISCONTINUED | OUTPATIENT
Start: 2019-09-21 | End: 2019-09-25 | Stop reason: HOSPADM

## 2019-09-21 RX ADMIN — PANTOPRAZOLE SODIUM 40 MG: 40 INJECTION, POWDER, LYOPHILIZED, FOR SOLUTION INTRAVENOUS at 17:16

## 2019-09-21 RX ADMIN — ALBUMIN (HUMAN) 12.5 G: 0.25 INJECTION, SOLUTION INTRAVENOUS at 11:57

## 2019-09-21 RX ADMIN — LACTULOSE 20 G: 10 SOLUTION ORAL at 21:12

## 2019-09-21 RX ADMIN — OCTREOTIDE ACETATE 100 MCG: 100 INJECTION, SOLUTION INTRAVENOUS; SUBCUTANEOUS at 21:12

## 2019-09-21 RX ADMIN — ALBUMIN (HUMAN) 12.5 G: 0.25 INJECTION, SOLUTION INTRAVENOUS at 17:16

## 2019-09-21 RX ADMIN — ALBUMIN (HUMAN) 12.5 G: 0.25 INJECTION, SOLUTION INTRAVENOUS at 05:00

## 2019-09-21 RX ADMIN — ALBUMIN (HUMAN) 25 G: 0.25 INJECTION, SOLUTION INTRAVENOUS at 15:43

## 2019-09-21 RX ADMIN — OCTREOTIDE ACETATE 100 MCG: 100 INJECTION, SOLUTION INTRAVENOUS; SUBCUTANEOUS at 05:00

## 2019-09-21 RX ADMIN — PANTOPRAZOLE SODIUM 40 MG: 40 INJECTION, POWDER, LYOPHILIZED, FOR SOLUTION INTRAVENOUS at 08:27

## 2019-09-21 RX ADMIN — NYSTATIN: 100000 CREAM TOPICAL at 17:16

## 2019-09-21 RX ADMIN — LACTULOSE 20 G: 10 SOLUTION ORAL at 08:27

## 2019-09-21 RX ADMIN — OCTREOTIDE ACETATE 100 MCG: 100 INJECTION, SOLUTION INTRAVENOUS; SUBCUTANEOUS at 16:52

## 2019-09-21 RX ADMIN — MIDODRINE HYDROCHLORIDE 15 MG: 5 TABLET ORAL at 16:53

## 2019-09-21 RX ADMIN — MIDODRINE HYDROCHLORIDE 15 MG: 5 TABLET ORAL at 10:58

## 2019-09-21 RX ADMIN — ALBUMIN (HUMAN) 25 G: 0.25 INJECTION, SOLUTION INTRAVENOUS at 13:13

## 2019-09-21 RX ADMIN — MIDODRINE HYDROCHLORIDE 15 MG: 5 TABLET ORAL at 06:56

## 2019-09-21 RX ADMIN — NYSTATIN: 100000 CREAM TOPICAL at 08:27

## 2019-09-21 RX ADMIN — RIFAXIMIN 550 MG: 550 TABLET ORAL at 04:47

## 2019-09-21 RX ADMIN — LACTULOSE 20 G: 10 SOLUTION ORAL at 16:55

## 2019-09-21 RX ADMIN — RIFAXIMIN 550 MG: 550 TABLET ORAL at 16:53

## 2019-09-21 NOTE — ASSESSMENT & PLAN NOTE
Attributed to his ROSALES related cirrhosis  - Transfuse platelets <79; Cryo for Fibrinogen < 100  - F/u AM INR

## 2019-09-21 NOTE — RESPIRATORY THERAPY NOTE
09/21/19 0900   Non-Invasive Information   Interface Face mask   Non-Invasive Ventilation Mode BiPAP   SpO2 99 %   $ Pulse Oximetry Spot Check Charge Completed   Resp Comments Pt placed on bipap by AP      Non-Invasive Settings   FiO2 (%) 40  (weaned from 50%)   Rise Time 3   Humidification   (heater)   Temperature (Set) 31   IPAP (cm) 16 cm   EPAP (cm) 5 cm   Rate (Set) 8   Inspiratory Time (Set) 0 9   Non-Invasive Readings   Total Rate 17   Spontaneous Vt (mL) 575   Spontaneous MV (mL) 8 9   Heater Temperature (Obs) 28 1   Leak (lpm) 14   Skin Intervention Skin intact   Peak Pressure (Obs) 17   Non-Invasive Alarms   Insp Pressure High (cm H20) 20   Insp Pressure Low (cm H20) 5   Low Insp Pressure Time (sec) 20 sec   MV Low (L/min) 5   Vt High (mL) 1200   Vt Low (mL) 200   High Resp Rate (BPM) 40 BPM   Low Resp Rate (BPM) 8 BPM   Apnea Interval (sec) 20

## 2019-09-21 NOTE — ASSESSMENT & PLAN NOTE
- Related to liver disease     - Transfuse for platelet count less than 10,000 due to high risk for spontaneous bleeding     - Continue to monitor closely  - Holding anticoagulation at present due to risk of bleeding

## 2019-09-21 NOTE — SOCIAL WORK
LOS 15 GMLOS none  CM received a fax from patient's insurance Clinical review requesting clinicals  CM faxed clinicals to [phone]

## 2019-09-21 NOTE — RESPIRATORY THERAPY NOTE
Patient taken off bipap and placed on 2LNC  Tolerating well  No apparent respiratory distress   spo2 post wean is 95%

## 2019-09-21 NOTE — ASSESSMENT & PLAN NOTE
-Hepatic metabolic in nature  - Increased lactulose dose for increasing lethargy and ammonia levels  - Serial neuro exams     - Encourage good sleep hygiene  Avoid sedating medications

## 2019-09-21 NOTE — ASSESSMENT & PLAN NOTE
- Meld score 39 as of 9/20     - Continue HD, octreotide, and midodrine for his hepatorenal syndrome  - [facility] following, has been accepted for transfer  Will transfer when bed available

## 2019-09-21 NOTE — ASSESSMENT & PLAN NOTE
- Gastroenterology has signed off and nephrology continues to follow  Meld score 39 based on yesterday's labs     - Continue octreotide, midodrine, albumin  - Had HD yesterday, for HD again tomorrow

## 2019-09-21 NOTE — PLAN OF CARE
Problem: PAIN - ADULT  Goal: Verbalizes/displays adequate comfort level or baseline comfort level  Description  Interventions:  - Encourage patient to monitor pain and request assistance  - Assess pain using appropriate pain scale  - Administer analgesics based on type and severity of pain and evaluate response  - Implement non-pharmacological measures as appropriate and evaluate response  - Consider cultural and social influences on pain and pain management  - Notify physician/advanced practitioner if interventions unsuccessful or patient reports new pain  Outcome: Progressing     Problem: INFECTION - ADULT  Goal: Absence or prevention of progression during hospitalization  Description  INTERVENTIONS:  - Assess and monitor for signs and symptoms of infection  - Monitor lab/diagnostic results  - Monitor all insertion sites, i e  indwelling lines, tubes, and drains  - Monitor endotracheal if appropriate and nasal secretions for changes in amount and color  - [place] appropriate cooling/warming therapies per order  - Administer medications as ordered  - Instruct and encourage patient and family to use good hand hygiene technique  - Identify and instruct in appropriate isolation precautions for identified infection/condition  Outcome: Progressing     Problem: SAFETY ADULT  Goal: Patient will remain free of falls  Description  INTERVENTIONS:  - Assess patient frequently for physical needs  -  Identify cognitive and physical deficits and behaviors that affect risk of falls    -  [place] fall precautions as indicated by assessment   - Educate patient/family on patient safety including physical limitations  - Instruct patient to call for assistance with activity based on assessment  - Modify environment to reduce risk of injury  - Consider OT/PT consult to assist with strengthening/mobility  Outcome: Progressing  Goal: Maintain or return to baseline ADL function  Description  INTERVENTIONS:  -  Assess patient's ability to carry out ADLs; assess patient's baseline for ADL function and identify physical deficits which impact ability to perform ADLs (bathing, care of mouth/teeth, toileting, grooming, dressing, etc )  - Assess/evaluate cause of self-care deficits   - Assess range of motion  - Assess patient's mobility; develop plan if impaired  - Assess patient's need for assistive devices and provide as appropriate  - Encourage maximum independence but intervene and supervise when necessary  - Involve family in performance of ADLs  - Assess for home care needs following discharge   - Consider OT consult to assist with ADL evaluation and planning for discharge  - Provide patient education as appropriate  Outcome: Progressing  Goal: Maintain or return mobility status to optimal level  Description  INTERVENTIONS:  - Assess patient's baseline mobility status (ambulation, transfers, stairs, etc )    - Identify cognitive and physical deficits and behaviors that affect mobility  - Identify mobility aids required to assist with transfers and/or ambulation (gait belt, sit-to-stand, lift, walker, cane, etc )  - [place] fall precautions as indicated by assessment  - Record patient progress and toleration of activity level on Mobility SBAR; progress patient to next Phase/Stage  - Instruct patient to call for assistance with activity based on assessment  - Consider rehabilitation consult to assist with strengthening/weightbearing, etc   Outcome: Progressing     Problem: DISCHARGE PLANNING  Goal: Discharge to home or other facility with appropriate resources  Description  INTERVENTIONS:  - Identify barriers to discharge w/patient and caregiver  - Arrange for needed discharge resources and transportation as appropriate  - Identify discharge learning needs (meds, wound care, etc )  - Arrange for interpretive services to assist at discharge as needed  - Refer to Case Management Department for coordinating discharge planning if the patient needs post-hospital services based on physician/advanced practitioner order or complex needs related to functional status, cognitive ability, or social support system  Outcome: Progressing     Problem: Knowledge Deficit  Goal: Patient/family/caregiver demonstrates understanding of disease process, treatment plan, medications, and discharge instructions  Description  Complete learning assessment and assess knowledge base  Interventions:  - Provide teaching at level of understanding  - Provide teaching via preferred learning methods  Outcome: Progressing     Problem: Potential for Falls  Goal: Patient will remain free of falls  Description  INTERVENTIONS:  - Assess patient frequently for physical needs  -  Identify cognitive and physical deficits and behaviors that affect risk of falls    -  [place] fall precautions as indicated by assessment   - Educate patient/family on patient safety including physical limitations  - Instruct patient to call for assistance with activity based on assessment  - Modify environment to reduce risk of injury  - Consider OT/PT consult to assist with strengthening/mobility  Outcome: Progressing     Problem: Prexisting or High Potential for Compromised Skin Integrity  Goal: Skin integrity is maintained or improved  Description  INTERVENTIONS:  - Identify patients at risk for skin breakdown  - Assess and monitor skin integrity  - Assess and monitor nutrition and hydration status  - Monitor labs   - Assess for incontinence   - Turn and reposition patient  - Assist with mobility/ambulation  - Relieve pressure over bony prominences  - Avoid friction and shearing  - Provide appropriate hygiene as needed including keeping skin clean and dry  - Evaluate need for skin moisturizer/barrier cream  - Collaborate with interdisciplinary team   - Patient/family teaching  - Consider wound care consult   Outcome: Progressing     Problem: Nutrition/Hydration-ADULT  Goal: Nutrient/Hydration intake appropriate for improving, restoring or maintaining nutritional needs  Description  Monitor and assess patient's nutrition/hydration status for malnutrition  Collaborate with interdisciplinary team and initiate plan and interventions as ordered  Monitor patient's weight and dietary intake as ordered or per policy  Determine patient's food preferences and provide high-protein, high-caloric foods as appropriate       INTERVENTIONS:  - Monitor oral intake, urinary output, labs, and treatment plans  - Assess nutrition and hydration status and recommend course of action  - Evaluate amount of meals eaten  - Assist patient with eating if necessary   - Allow adequate time for meals  - Recommend/ encourage appropriate diets, oral nutritional supplements, and vitamin/mineral supplements  - Order, calculate, and assess calorie counts as needed  - Recommend, monitor, and adjust tube feedings  based on assessed needs  - Assess need for intravenous fluids  - Provide nutrition/hydration education as appropriate  - Include patient/family/caregiver in decisions related to nutrition    Outcome: Progressing     Problem: METABOLIC, FLUID AND ELECTROLYTES - ADULT  Goal: Electrolytes maintained within normal limits  Description  INTERVENTIONS:  - Monitor labs and assess patient for signs and symptoms of electrolyte imbalances  - Administer electrolyte replacement as ordered  - Monitor response to electrolyte replacements, including repeat lab results as appropriate  - Instruct patient on fluid and nutrition as appropriate  Outcome: Progressing  Goal: Fluid balance maintained  Description  INTERVENTIONS:  - Monitor labs   - Monitor I/O and WT  - Instruct patient on fluid and nutrition as appropriate  - Assess for signs & symptoms of volume excess or deficit  Outcome: Progressing     Problem: GENITOURINARY - ADULT  Goal: Maintains or returns to baseline urinary function  Description  INTERVENTIONS:  - Assess urinary function  - Encourage oral fluids to ensure adequate hydration if ordered  - Administer IV fluids as ordered to ensure adequate hydration  - Administer ordered medications as needed  - Offer frequent toileting  - Follow urinary retention protocol if ordered  Outcome: Progressing     Problem: SKIN/TISSUE INTEGRITY - ADULT  Goal: Skin integrity remains intact  Description  INTERVENTIONS  - Identify patients at risk for skin breakdown  - Assess and monitor skin integrity  - Assess and monitor nutrition and hydration status  - Monitor labs (i e  albumin)  - Assess for incontinence   - Turn and reposition patient  - Assist with mobility/ambulation  - Relieve pressure over bony prominences  - Avoid friction and shearing  - Provide appropriate hygiene as needed including keeping skin clean and dry  - Evaluate need for skin moisturizer/barrier cream  - Collaborate with interdisciplinary team (i e  Nutrition, Rehabilitation, etc )   - Patient/family teaching  Outcome: Progressing  Goal: Incision(s), wounds(s) or drain site(s) healing without S/S of infection  Description  INTERVENTIONS  - Assess and document risk factors for skin impairment   - Assess and document dressing, incision, wound bed, drain sites and surrounding tissue  - Consider nutrition services referral as needed  - Oral mucous membranes remain intact  - Provide patient/ family education  Outcome: Progressing

## 2019-09-21 NOTE — PROGRESS NOTES
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 [facility])  Assessment & Plan  - Meld score 39 as of 9/20     - Continue HD, octreotide, and midodrine for his hepatorenal syndrome  - [facility] following, hopeful transfer if able to stay off of pressors Sunday    Cholecystitis  Assessment & Plan  - Patient s/p Cholecytostomy tube 9/16  - Continue for now    Coagulopathy [facility])  Assessment & Plan  Attributed to his ROSALES related cirrhosis  - Transfuse platelets <56; Cryo for Fibrinogen < 100  - F/u AM INR    Thrombocytopenia (HCC)  Assessment & Plan  - Related to liver disease     - Transfuse for platelet count less than 10,000 due to high risk for spontaneous bleeding     - Continue to monitor closely  - Holding anticoagulation at present due to risk of bleeding  Encephalopathy acute  Assessment & Plan  -Hepatic metabolic in nature  - Slightly worsened overnight; ABG showing respiratory acidosis so placed on BiPap overnight  - Increased lactulose dose for increasing lethargy and ammonia levels  - Serial neuro exams     - Encourage good sleep hygiene  Avoid sedating medications  Anemia  Assessment & Plan  - Secondary to oozing as well as CKD and liver failure  - Macrocytic and elevated RDW  - Unclear if 2/2 reticulocytosis or true macrocytopenia  Could check O50 and Folic acid; in mean time continue folic acid and P39 supplementation    Hyperbilirubinemia  Assessment & Plan  Continue monitor  Secondary to ROSALES related cirrhosis      CROW on CPAP  Assessment & Plan  - HS BiPAP    Obesity with alveolar hypoventilation (HCC)  Assessment & Plan  - Continue BiPap  - ABG this AM    Morbid obesity (HCC)  Assessment & Plan  Nutrition consulted  Poor p o  Intake at present  Essential hypertension  Assessment & Plan  Remains normotensive    Nausea & vomitingresolved as of 9/21/2019  Assessment & Plan  -     Plan:    Neuro:   Continues to wax and wean  Placed on bipap overnight for hypercarbic respiratory failure    Regulate sleep/wake cycle, delirium precautions    CV:   BP remains soft but remains off of sweetie  Goal MAP > 60 mmHg  Cardiac infusions: None  Rhythm: NSR  Follow rhythm on telemetry    Pulm:   Currently on BiPAP  Mental status improved  F/U AM ABG    Encourage deep breathing/coughing/IS/PulmToileting   Wean O2 for sats > 92%  GI:   Nutrition/diet plan: Regular diet  Stress ulcer prophylaxis: No prophylaxis needed  Bowel regimen: None currently    :   HD today  Continue lasix, minimal response  Indwelling Garcia present: no     FEN:   Pending  Fluid/Diuretic plan: HD today and lasix 80 TID  Goal 24 hour fluid balance: Per HD  Electrolytes repleted: Pending  Goal: K >4 0, Mag >2 0, and Phos >3 0    ID:   Continue cholecystomy tube, remains off Abx  Trend temps and WBC count    Heme:   Transfuse for H/H < 6 5; Platelets < 10; Fibrinogen <100;  AM labs pending    Endo:   No issues    Msk/Skin:  Mobility goal: OOB today  PT consult: Following  OT consult: Following  Frequent turning and off-loading    Family:  Patient updated at bedside  Day team to update family today    Lines:  Central venous access: RIJ HD cath; LIJ TLC  Keep central line today for meds requiring central line, hemodynamic monitoring, dialysis  Arterial line: Assessed  Continued for the following reasons Continuous BP monitoring       VTE Prophylaxis:  Pharmacologic Prophylaxis: Reason for no pharmacologic prophylaxis Coagulopathy  Mechanical Prophylaxis: sequential compression device    Disposition: Continue ICU care    Code Status: Level 1 - Full Code    Counseling / Coordination of Care  Total Critical Care time spent 55 minutes excluding procedures, teaching and family updates  ______________________________________________________________________    HPI/24hr events:   Patient with lethargy early in evening  ABG showing respiratory acidosis  Placed on BiPAP  Repeat slightly worse but with improved mental status  AM pending  Patient given 12 5 G 25% Albumin for some hypotension with great response  Minimal response from IV lasix  ______________________________________________________________________    Physical Exam:   Physical Exam   Constitutional: He is oriented to person, place, and time  Elderly male, appears older than stated age, chronically ill appearing, anasarca   HENT:   Head: Normocephalic and atraumatic  Mouth/Throat: Mucous membranes are dry  Eyes: Conjunctivae and EOM are normal    Neck: No JVD present  Right IJ temporary dialysis catheter bruising, but site soft  Left IJ triple-lumen catheter with bruising and hematoma, however soft  Cardiovascular: Normal rate, regular rhythm, S1 normal and S2 normal    Murmur heard  Systolic murmur is present with a grade of 3/6  Pulmonary/Chest: He has decreased breath sounds in the right lower field and the left lower field  Abdominal: Soft  Bowel sounds are normal  He exhibits distension  There is no tenderness  There is no guarding  Cholecystostomy tube in place   Genitourinary:   Genitourinary Comments: Deferred   Musculoskeletal: He exhibits edema (4+ pitting edema with weeping)  Neurological: He is alert and oriented to person, place, and time  Skin: Skin is warm and dry  Capillary refill takes less than 2 seconds  Nursing note and vitals reviewed        ______________________________________________________________________  Vitals:    09/20/19 2300 09/21/19 0000 09/21/19 0022 09/21/19 0300   BP: (!) 88/44 92/52     BP Location: Pulse: 59 60     Resp: 18 18     Temp: 97 5 °F (36 4 °C)   97 7 °F (36 5 °C)   TempSrc: Oral   Axillary   SpO2: 99% 99% 99% 98%   Weight:       Height:         Arterial Line BP: 106/43  Arterial Line MAP (mmHg): 62 mmHg     Temperature:   Temp (24hrs), Av 8 °F (36 6 °C), Min:97 3 °F (36 3 °C), Max:98 6 °F (37 °C)    Current Temperature: 97 7 °F (36 5 °C)    Weights:   IBW: 75 3 kg    Body mass index is 50 83 kg/m²  Weight (last 2 days)     Date/Time   Weight    19 1100   (!) 165 (364 42)    19 0553   (!) 171 (376 99)    19 0500   (!) 165 (362 88)              Hemodynamic Monitoring:  N/A       Non-Invasive/Invasive Ventilation Settings:  Respiratory    Lab Data (Last 4 hours)       0430            pH, Arterial       7 313           pCO2, Arterial       45 0           pO2, Arterial       185 3           HCO3, Arterial       22 3           Base Excess, Arterial       -3 7                O2/Vent Data     None              Lab Results   Component Value Date    PHART 7 313 (L) 2019    KKI6EOG 45 0 (H) 2019    PO2ART 185 3 (H) 2019    UZP6MVG 22 3 2019    BEART -3 7 2019    SOURCE Line, Arterial 2019     SpO2: SpO2: 98 %, SpO2 Activity: SpO2 Activity: At Rest, SpO2 Device: O2 Device: Other (comment)    Intake and Outputs:  I/O        0701 -  0700  07 -  0700    P  O   480    I V  (mL/kg) 9 (0 1) 100 (0 6)    Blood 110 122    NG/GT 0 0    IV Piggyback 200 300    Total Intake(mL/kg) 319 (1 9) 1002 (6 1)    Urine (mL/kg/hr)  20 (0)    Drains 30 45    Other 237     Total Output 267 65    Net +52 +937                Nutrition:        Diet Orders   (From admission, onward)             Start     Ordered    19 1208  Diet Renal; Renal Restrictive; Yes; Fluid Restriction 1000 ML; No  Diet effective now     Question Answer Comment   Diet Type Renal    Renal Renal Restrictive    Should patient have a fluid restriction?  Yes    Fluid Restriction Fluid Restriction 1000 ML    Should patient have a protein modifier? No    RD to adjust diet per protocol? No        09/18/19 1207                Labs:   Results from last 7 days   Lab Units 09/21/19  0015 09/20/19  0437 09/19/19  0524   WBC Thousand/uL 6 99 7 98 8 55   HEMOGLOBIN g/dL 6 8* 6 7* 7 0*   HEMATOCRIT % 22 3* 21 6* 22 7*   PLATELETS Thousands/uL 19* 18* 22*   NEUTROS PCT % 63 64 63   MONOS PCT % 14* 14* 14*     Results from last 7 days   Lab Units 09/21/19  0015 09/20/19  0437 09/19/19  0524   SODIUM mmol/L 137 135* 136   POTASSIUM mmol/L 3 8 4 1 4 1   CHLORIDE mmol/L 102 101 103   CO2 mmol/L 26 26 27   BUN mg/dL 23 16 9   CREATININE mg/dL 4 00* 2 92* 1 78*   CALCIUM mg/dL 8 9 9 0 9 2   ALK PHOS U/L 73 74 73   ALT U/L 39 38 37   AST U/L 63* 69* 67*     Results from last 7 days   Lab Units 09/21/19  0015 09/20/19  0437 09/19/19  0524   MAGNESIUM mg/dL 2 1 2 0 1 8     Lab Results   Component Value Date    PHOS 2 6 (L) 09/21/2019    PHOS 1 7 (L) 09/20/2019    PHOS 1 9 (L) 09/19/2019      Results from last 7 days   Lab Units 09/20/19  0437 09/19/19  0524 09/18/19  0637 09/17/19  0531   INR  2 79* 2 91* 2 99* 3 15*   PTT seconds  --   --   --  74*     0   Lab Value Date/Time    TROPONINI 0 02 09/06/2019 1906    TROPONINI 0 03 08/15/2019 1225    TROPONINI 0 03 06/07/2019 1210    TROPONINI <0 03 11/14/2018 1624         ABG:  Lab Results   Component Value Date    PHART 7 313 (L) 09/21/2019    [id number] 45 0 (H) 09/21/2019    PO2ART 185 3 (H) 09/21/2019    [id number] 22 3 09/21/2019    BEART -3 7 09/21/2019    SOURCE Line, Arterial 09/21/2019       Imaging:  I have personally reviewed pertinent films in PACS   EKG: Review    Micro:  Lab Results   Component Value Date    BLOODCX No Growth After 4 Days  09/16/2019    BLOODCX No Growth After 4 Days  09/16/2019    BLOODCX No Growth After 5 Days   09/06/2019    SPUTUMCULTUR 2+ Growth of Candida albicans (A) 09/08/2019    SPUTUMCULTUR 2+ Growth of Moraxella catarrhalis (A) 09/08/2019    SPUTUMCULTUR 2+ Growth of  09/08/2019       Allergies: Allergies   Allergen Reactions    Lactose     Propranolol Eye Swelling    Torsemide Eye Swelling     Medications:   Scheduled Meds:    Current Facility-Administered Medications:  albumin human 12 5 g Intravenous Q6H [address] Aristeo Diop PA-C Last Rate: Stopped (09/20/19 4776)   cyanocobalamin 50 mcg Oral Daily MARKOS Harding    folic acid 801 mcg Oral Daily MARKOS Harding    furosemide 80 mg Intravenous TID (diuretic) Tara Sarmiento MD    lactulose 20 g Oral TID Joann Winston PA-C    midodrine 15 mg Oral TID AC MARKOS Rowland    nystatin  Topical BID Aristeo Diop PA-C    octreotide 100 mcg Subcutaneous [facility] Joann Winston PA-C    ondansetron 4 mg Intravenous Q6H PRN MARKOS Muñoz    pantoprazole 40 mg Intravenous BID MARKOS Muñoz    rifaximin 550 mg Oral Q12H [address] Joann Winston PA-C      Continuous Infusions:   PRN Meds:    ondansetron 4 mg Q6H PRN       Invasive lines and devices: Invasive Devices     Central Venous Catheter Line            CVC Central Lines 09/18/19 Triple 20cm 2 days          Peripheral Intravenous Line            Peripheral IV 09/17/19 Right Antecubital 3 days    Peripheral IV 09/18/19 Dorsal (posterior); Right Forearm 3 days          Arterial Line            Arterial Line 09/13/19 Radial 7 days          Line            Temporary HD Catheter 10 days          Drain            Closed/Suction Drain Right RUQ Other (Comment) 8 5 Fr  4 days                   SIGNATURE: Joann Winston PA-C  DATE: September 21, 2019

## 2019-09-21 NOTE — PROGRESS NOTES
NEPHROLOGY PROGRESS NOTE    Patient: Christie Colon               Sex: male          DOA: 9/6/2019  5:35 PM   YOB: 1960        Age:  63 y o         LOS:  LOS: 15 days       HPI     Patient with cirrhosis of liver and hepatorenal syndrome on dialysis    SUBJECTIVE     Since yesterday patient condition deteriorated with shortness of breath requiring BiPAP    He is awake and alert  Not very comfortable with BiPAP  He is not making any urine    Diuretic did not help much    Does have abdominal discomfort no chest pain no palpitation    CURRENT MEDICATIONS       Current Facility-Administered Medications:     albumin human (FLEXBUMIN) 25 % injection 12 5 g, 12 5 g, Intravenous, Q6H [address], Ha Toure PA-C, Last Rate: 0 mL/hr at 09/20/19 2326, 12 5 g at 09/21/19 0500    albumin human (FLEXBUMIN) 25 % injection 25 g, 25 g, Intravenous, Q1H PRN, Serena Ariza MD    cyanocobalamin (VITAMIN B-12) tablet 50 mcg, 50 mcg, Oral, Daily, MARKOS Harding, 50 mcg at 64/72/27 5550    folic acid (FOLVITE) tablet 400 mcg, 400 mcg, Oral, Daily, MARKOS Harding, 400 mcg at 09/20/19 0804    lactulose 20 g/30 mL oral solution 20 g, 20 g, Oral, TID, Adam Hobson PA-C    midodrine (PROAMATINE) tablet 15 mg, 15 mg, Oral, TID AC, MARKOS Linder, 15 mg at 09/21/19 3744    nystatin (MYCOSTATIN) cream, , Topical, BID, Ha Toure PA-C    octreotide (SandoSTATIN) injection 100 mcg, 100 mcg, Subcutaneous, Q8H [address], Adam Hobson PA-C, 100 mcg at 09/21/19 0500    ondansetron (ZOFRAN) injection 4 mg, 4 mg, Intravenous, Q6H PRN, MARKOS Rivera, 4 mg at 09/15/19 2142    pantoprazole (PROTONIX) injection 40 mg, 40 mg, Intravenous, BID, MARKOS Rivera, 40 mg at 09/21/19 0827    rifaximin (XIFAXAN) tablet 550 mg, 550 mg, Oral, Q12H [address], Adam Hobson PA-C, 550 mg at 09/21/19 0447    OBJECTIVE     Current Weight: Weight - Scale: (!) 169 kg (373 lb 7 4 oz)  Vitals:    09/21/19 2211 BP: 103/52   Pulse: 61   Resp: 15   Temp:    SpO2: 98%       Intake/Output Summary (Last 24 hours) at 9/21/2019 0957  Last data filed at 9/21/2019 0600  Gross per 24 hour   Intake 632 ml   Output 75 ml   Net 557 ml       PHYSICAL EXAMINATION     Physical Exam   Constitutional: He is oriented to person, place, and time  He appears well-developed  No distress  HENT:   Head: Normocephalic  Mouth/Throat: Oropharynx is clear and moist    Eyes: Conjunctivae are normal  Scleral icterus is present  Neck: Neck supple  No JVD present  Cardiovascular: Normal rate and normal heart sounds  Pulmonary/Chest: He is in respiratory distress  He has wheezes  Abdominal: Soft  Bowel sounds are normal  He exhibits distension  There is no tenderness  Musculoskeletal: Normal range of motion  He exhibits edema  Neurological: He is alert and oriented to person, place, and time  Skin: Skin is warm  No rash noted  Psychiatric: He has a normal mood and affect   His behavior is normal         LAB RESULTS     Results from last 7 days   Lab Units 09/21/19  0436 09/21/19  0015 09/20/19  0437 09/19/19  0524 09/19/19  0031 09/18/19  1817 09/18/19  1213 09/18/19  0925 09/18/19  0428  09/17/19  0531  09/16/19  1420   WBC Thousand/uL 6 83 6 99 7 98 8 55  --   --   --   --  12 58*  --  25 71*  --  20 89*   HEMOGLOBIN g/dL 6 8* 6 8* 6 7* 7 0* 7 4* 6 8*  --  7 0* 6 4*  --  7 1*   < > 6 9*   HEMATOCRIT % 22 1* 22 3* 21 6* 22 7* 23 0* 21 6*  --  22 8* 20 5*  --  23 0*   < > 22 2*   PLATELETS Thousands/uL 18* 19* 18* 22*  --   --   --   --  27*  --  37*  --  30*   POTASSIUM mmol/L 3 9 3 8 4 1 4 1 3 9 4 0 4 1  --  4 1   < > 4 2   < >  --    CHLORIDE mmol/L 102 102 101 103 104 103 102  --  102   < > 103   < >  --    CO2 mmol/L 26 26 26 27 27 26 27  --  27   < > 28   < >  --    BUN mg/dL 24 23 16 9 11 11 11  --  10   < > 10   < >  --    CREATININE mg/dL 4 21* 4 00* 2 92* 1 78* 1 82* 1 88* 1 88*  --  1 65*   < > 1 62*   < >  --    EGFR ml/min/1 73sq m 15 15 23 41 40 39 39  --  45   < > 46   < >  --    CALCIUM mg/dL 8 9 8 9 9 0 9 2 9 2 9 1 9 2  --  9 0   < > 9 3   < >  --    MAGNESIUM mg/dL 2 0 2 1 2 0 1 8 1 9 2 0 1 9  --  1 7   < > 2 0   < >  --    PHOSPHORUS mg/dL 2 8 2 6* 1 7* 1 9* 1 9* 1 9* 1 9*  --  1 8*   < > 2 4*   < >  --     < > = values in this interval not displayed  RADIOLOGY RESULTS      Results for orders placed during the hospital encounter of 09/06/19   CXR 1 view PA portable stat    Narrative CHEST     INDICATION:   line placement RIJ Hemodialysis catheter  COMPARISON:  September 6, 2019    EXAM PERFORMED/VIEWS:  XR CHEST PORTABLE  2 images    FINDINGS:  Lungs are suboptimally aerated  Elevation of right hemidiaphragm  Cardiomegaly with diminishing pulmonary edema  Persistent prominence of the pulmonary vasculature in the left superior hilum  Right jugular central line tip in the upper SVC  No pneumothorax  Osseous structures appear within normal limits for patient age  Old fracture of right 5th rib  Questionable fracture right 6th rib  Impression Persistent cardiomegaly  Improving pulmonary edema  Satisfactory right jugular central line placement  No pneumothorax  Right rib fractures  Workstation performed: [id number]       Results for orders placed during the hospital encounter of 09/06/19   XR chest 2 views    Narrative CHEST     INDICATION:   SOB     COMPARISON:  8/15/2019    EXAM PERFORMED/VIEWS:  XR CHEST PA & LATERAL  2 views    FINDINGS:  Persistent moderate cardiomegaly  Development of diffuse vascular congestion    The lungs are otherwise clear  No pneumothorax or pleural effusion  Osseous structures appear within normal limits for patient age  Impression Persistent cardiomegaly    Increased diffuse vascular congestion        Workstation performed: [id number]       No results found for this or any previous visit  No results found for this or any previous visit    Results for orders placed during the hospital encounter of 08/15/19   CT abdomen pelvis wo contrast    Narrative CT ABDOMEN AND PELVIS WITHOUT IV CONTRAST    INDICATION:   Abdominal distension  History of cirrhosis  Testicular swelling  COMPARISON:  6/7/2019    TECHNIQUE:  CT examination of the abdomen and pelvis was performed without intravenous contrast   Axial, sagittal, and coronal 2D reformatted images were created from the source data and submitted for interpretation  Radiation dose length product (DLP) for this visit:  4710 mGy-cm   This examination, like all CT scans performed in the [facility], was performed utilizing techniques to minimize radiation dose exposure, including the use of iterative   reconstruction and automated exposure control  No oral contrast     FINDINGS:    ABDOMEN    LOWER CHEST:  Small right-sided pleural effusion noted    LIVER/BILIARY TREE:  Changes of severe hepatic cirrhosis identified  There is no obvious focal mass appreciated on this unenhanced examination  GALLBLADDER:  There are gallstone(s) within the gallbladder, without pericholecystic inflammatory changes  SPLEEN:  The spleen is enlarged, measuring almost 20 cm in cephalocaudad length    PANCREAS:  Limited assessment without IV contrast   Although there is peripancreatic edema, this also involves the root of the mesentery, and was also present on the 6/7/2019 study  It is not felt to be related to pancreatitis  ADRENAL GLANDS:  Unremarkable  KIDNEYS/URETERS:  Bilateral nonobstructing nephrolithiasis  The largest individual calculus is seen in the right interpolar aspect measuring approximately 7 mm  STOMACH AND BOWEL:  Unremarkable  APPENDIX:  No findings to suggest appendicitis  ABDOMINOPELVIC CAVITY:  Perihepatic ascites  Retroperitoneal and mesenteric root edema  VESSELS:  No AAA    PELVIS    REPRODUCTIVE ORGANS:  Unremarkable for patient's age      URINARY BLADDER: Unremarkable  ABDOMINAL WALL/INGUINAL REGIONS:  There is diffuse body wall edema  The scrotum is diffusely enlarged and of water density, which may represent a combination of both hydroceles and scrotal wall edema  Dilated anterior abdominal wall collateral vessels  OSSEOUS STRUCTURES:  Advanced multilevel degenerative changes throughout the lumbar spine  Grade 2 anterolisthesis L4-L5      Impression 1  Appearance of the liver consistent with severe cirrhotic change  Splenomegaly an additional findings suggestive of portal hypertension  2  Small amount of ascites  Diffuse body wall edema  Markedly enlargement of the scrotum likely related to hydroceles and scrotal wall edema and small right-sided pleural effusion  Retroperitoneal and mesenteric root edema  Findings most consistent   with anasarca  3  No evidence of bowel obstruction  4  Bilateral nephrolithiasis but no evidence of hydronephrosis  5  Cholelithiasis          Workstation performed: [id number]       No results found for this or any previous visit  PLAN / RECOMMENDATIONS      Acute kidney injury  Due to hepatorenal syndrome not making much urine with worsening kidney function  Seems to be dialysis dependent at this point  Will try to regular dialysis  Will monitor blood pressure and also on dialysis  Will give IV albumin as part of the pressure support for hypotension on dialysis  Discussed with the patient about that  If blood pressure drops or he goes into atrial fibrillation with rapid response I may have to stop dialysis  Will discussed with the patient at that time again about long-term prognosis    Hepatorenal syndrome:  Patient need liver transplant  It is being consider [facility]    Hypoxia:  Possibly due to volume overload  On BiPAP hopefully with dialysis and removal of fluid will help    Morbid obesity:  Improved with dialysis  Will continue to provide that    Anemia:  May need blood transfusion    Possibly on dialysis to support blood pressure    Overall prognosis guarded  Discussed with ICU team   Hospice may need to be consider if dialysis is not very successful and patient get a hemodynamically unstable    Trent Nicole MD  Nephrology  9/21/2019        Portions of the record may have been created with voice recognition software  Occasional wrong word or "sound a like" substitutions may have occurred due to the inherent limitations of voice recognition software  Read the chart carefully and recognize, using context, where substitutions have occurred

## 2019-09-21 NOTE — HEMODIALYSIS
HD completed as ordered  No distress to Pt,   Access performance good, ordered BFR with no issues  Albumin infusion for BP support      Pre weight 169 0 Kg    Post weight 165 7 Kg     UF 3 3 Kg

## 2019-09-21 NOTE — ASSESSMENT & PLAN NOTE
- Secondary to oozing as well as CKD and liver failure  - Macrocytic and elevated RDW  - Unclear if 2/2 reticulocytosis or true macrocytopenia   Could check U46 and Folic acid; in mean time continue folic acid and J41 supplementation  -Received one unit PRBC today

## 2019-09-21 NOTE — PLAN OF CARE
Problem: METABOLIC, FLUID AND ELECTROLYTES - ADULT  Goal: Electrolytes maintained within normal limits  Description  INTERVENTIONS:  - Monitor labs and assess patient for signs and symptoms of electrolyte imbalances  - Administer electrolyte replacement as ordered  - Monitor response to electrolyte replacements, including repeat lab results as appropriate  - Instruct patient on fluid and nutrition as appropriate  Outcome: Progressing  Goal: Fluid balance maintained  Description  INTERVENTIONS:  - Monitor labs   - Monitor I/O and WT  - Instruct patient on fluid and nutrition as appropriate  - Assess for signs & symptoms of volume excess or deficit  Outcome: Progressing     Problem: SKIN/TISSUE INTEGRITY - ADULT  Goal: Skin integrity remains intact  Description  INTERVENTIONS  - Identify patients at risk for skin breakdown  - Assess and monitor skin integrity  - Assess and monitor nutrition and hydration status  - Monitor labs (i e  albumin)  - Assess for incontinence   - Turn and reposition patient  - Assist with mobility/ambulation  - Relieve pressure over bony prominences  - Avoid friction and shearing  - Provide appropriate hygiene as needed including keeping skin clean and dry  - Evaluate need for skin moisturizer/barrier cream  - Collaborate with interdisciplinary team (i e  Nutrition, Rehabilitation, etc )   - Patient/family teaching  Outcome: Progressing

## 2019-09-22 LAB
ABO GROUP BLD: NORMAL
ALBUMIN SERPL BCP-MCNC: 3.8 G/DL (ref 3.5–5)
ALP SERPL-CCNC: 68 U/L (ref 46–116)
ALT SERPL W P-5'-P-CCNC: 35 U/L (ref 12–78)
AMMONIA PLAS-SCNC: 93 UMOL/L (ref 11–35)
ANION GAP SERPL CALCULATED.3IONS-SCNC: 7 MMOL/L (ref 4–13)
AST SERPL W P-5'-P-CCNC: 51 U/L (ref 5–45)
BASOPHILS # BLD AUTO: 0.01 THOUSANDS/ΜL (ref 0–0.1)
BASOPHILS NFR BLD AUTO: 0 % (ref 0–1)
BILIRUB SERPL-MCNC: 7.3 MG/DL (ref 0.2–1)
BLD GP AB SCN SERPL QL: NEGATIVE
BUN SERPL-MCNC: 21 MG/DL (ref 5–25)
CALCIUM SERPL-MCNC: 8.8 MG/DL (ref 8.3–10.1)
CHLORIDE SERPL-SCNC: 100 MMOL/L (ref 100–108)
CO2 SERPL-SCNC: 31 MMOL/L (ref 21–32)
CREAT SERPL-MCNC: 4.21 MG/DL (ref 0.6–1.3)
EOSINOPHIL # BLD AUTO: 0.33 THOUSAND/ΜL (ref 0–0.61)
EOSINOPHIL NFR BLD AUTO: 5 % (ref 0–6)
ERYTHROCYTE [DISTWIDTH] IN BLOOD BY AUTOMATED COUNT: 22.5 % (ref 11.6–15.1)
GFR SERPL CREATININE-BSD FRML MDRD: 15 ML/MIN/1.73SQ M
GLUCOSE SERPL-MCNC: 110 MG/DL (ref 65–140)
HCT VFR BLD AUTO: 21.2 % (ref 36.5–49.3)
HGB BLD-MCNC: 6.7 G/DL (ref 12–17)
IMM GRANULOCYTES # BLD AUTO: 0.27 THOUSAND/UL (ref 0–0.2)
IMM GRANULOCYTES NFR BLD AUTO: 4 % (ref 0–2)
INR PPP: 2.96 (ref 0.84–1.19)
LYMPHOCYTES # BLD AUTO: 0.46 THOUSANDS/ΜL (ref 0.6–4.47)
LYMPHOCYTES NFR BLD AUTO: 7 % (ref 14–44)
MAGNESIUM SERPL-MCNC: 1.9 MG/DL (ref 1.6–2.6)
MCH RBC QN AUTO: 37 PG (ref 26.8–34.3)
MCHC RBC AUTO-ENTMCNC: 31.6 G/DL (ref 31.4–37.4)
MCV RBC AUTO: 117 FL (ref 82–98)
MONOCYTES # BLD AUTO: 0.85 THOUSAND/ΜL (ref 0.17–1.22)
MONOCYTES NFR BLD AUTO: 13 % (ref 4–12)
NEUTROPHILS # BLD AUTO: 4.55 THOUSANDS/ΜL (ref 1.85–7.62)
NEUTS SEG NFR BLD AUTO: 71 % (ref 43–75)
NRBC BLD AUTO-RTO: 0 /100 WBCS
PHOSPHATE SERPL-MCNC: 3 MG/DL (ref 2.7–4.5)
PLATELET # BLD AUTO: 16 THOUSANDS/UL (ref 149–390)
PMV BLD AUTO: 11.1 FL (ref 8.9–12.7)
POTASSIUM SERPL-SCNC: 3.3 MMOL/L (ref 3.5–5.3)
PREALB SERPL-MCNC: 5.5 MG/DL (ref 18–40)
PROT SERPL-MCNC: 6.5 G/DL (ref 6.4–8.2)
PROTHROMBIN TIME: 31.3 SECONDS (ref 11.6–14.5)
RBC # BLD AUTO: 1.81 MILLION/UL (ref 3.88–5.62)
RH BLD: NEGATIVE
SODIUM SERPL-SCNC: 138 MMOL/L (ref 136–145)
SPECIMEN EXPIRATION DATE: NORMAL
WBC # BLD AUTO: 6.47 THOUSAND/UL (ref 4.31–10.16)

## 2019-09-22 PROCEDURE — 86901 BLOOD TYPING SEROLOGIC RH(D): CPT | Performed by: NURSE PRACTITIONER

## 2019-09-22 PROCEDURE — 85610 PROTHROMBIN TIME: CPT | Performed by: STUDENT IN AN ORGANIZED HEALTH CARE EDUCATION/TRAINING PROGRAM

## 2019-09-22 PROCEDURE — 30233R1 TRANSFUSION OF NONAUTOLOGOUS PLATELETS INTO PERIPHERAL VEIN, PERCUTANEOUS APPROACH: ICD-10-PCS | Performed by: STUDENT IN AN ORGANIZED HEALTH CARE EDUCATION/TRAINING PROGRAM

## 2019-09-22 PROCEDURE — 30233M1 TRANSFUSION OF NONAUTOLOGOUS PLASMA CRYOPRECIPITATE INTO PERIPHERAL VEIN, PERCUTANEOUS APPROACH: ICD-10-PCS | Performed by: STUDENT IN AN ORGANIZED HEALTH CARE EDUCATION/TRAINING PROGRAM

## 2019-09-22 PROCEDURE — 84134 ASSAY OF PREALBUMIN: CPT | Performed by: NURSE PRACTITIONER

## 2019-09-22 PROCEDURE — 84100 ASSAY OF PHOSPHORUS: CPT | Performed by: NURSE PRACTITIONER

## 2019-09-22 PROCEDURE — 85025 COMPLETE CBC W/AUTO DIFF WBC: CPT | Performed by: NURSE PRACTITIONER

## 2019-09-22 PROCEDURE — 93005 ELECTROCARDIOGRAM TRACING: CPT

## 2019-09-22 PROCEDURE — 94660 CPAP INITIATION&MGMT: CPT

## 2019-09-22 PROCEDURE — 99291 CRITICAL CARE FIRST HOUR: CPT | Performed by: INTERNAL MEDICINE

## 2019-09-22 PROCEDURE — 30233N1 TRANSFUSION OF NONAUTOLOGOUS RED BLOOD CELLS INTO PERIPHERAL VEIN, PERCUTANEOUS APPROACH: ICD-10-PCS | Performed by: STUDENT IN AN ORGANIZED HEALTH CARE EDUCATION/TRAINING PROGRAM

## 2019-09-22 PROCEDURE — 83735 ASSAY OF MAGNESIUM: CPT | Performed by: NURSE PRACTITIONER

## 2019-09-22 PROCEDURE — 80053 COMPREHEN METABOLIC PANEL: CPT | Performed by: NURSE PRACTITIONER

## 2019-09-22 PROCEDURE — 94760 N-INVAS EAR/PLS OXIMETRY 1: CPT

## 2019-09-22 PROCEDURE — 86850 RBC ANTIBODY SCREEN: CPT | Performed by: NURSE PRACTITIONER

## 2019-09-22 PROCEDURE — P9016 RBC LEUKOCYTES REDUCED: HCPCS

## 2019-09-22 PROCEDURE — 86920 COMPATIBILITY TEST SPIN: CPT

## 2019-09-22 PROCEDURE — C9113 INJ PANTOPRAZOLE SODIUM, VIA: HCPCS | Performed by: NURSE PRACTITIONER

## 2019-09-22 PROCEDURE — 86900 BLOOD TYPING SEROLOGIC ABO: CPT | Performed by: NURSE PRACTITIONER

## 2019-09-22 PROCEDURE — 99233 SBSQ HOSP IP/OBS HIGH 50: CPT | Performed by: INTERNAL MEDICINE

## 2019-09-22 PROCEDURE — 82140 ASSAY OF AMMONIA: CPT | Performed by: NURSE PRACTITIONER

## 2019-09-22 RX ORDER — TRANEXAMIC ACID 100 MG/ML
500 INJECTION, SOLUTION INTRAVENOUS ONCE
Status: COMPLETED | OUTPATIENT
Start: 2019-09-22 | End: 2019-09-22

## 2019-09-22 RX ADMIN — TRANEXAMIC ACID 500 MG: 1 INJECTION, SOLUTION INTRAVENOUS at 09:04

## 2019-09-22 RX ADMIN — OCTREOTIDE ACETATE 100 MCG: 100 INJECTION, SOLUTION INTRAVENOUS; SUBCUTANEOUS at 05:59

## 2019-09-22 RX ADMIN — LACTULOSE 20 G: 10 SOLUTION ORAL at 08:15

## 2019-09-22 RX ADMIN — NYSTATIN: 100000 CREAM TOPICAL at 17:05

## 2019-09-22 RX ADMIN — ALBUMIN (HUMAN) 12.5 G: 0.25 INJECTION, SOLUTION INTRAVENOUS at 00:54

## 2019-09-22 RX ADMIN — MIDODRINE HYDROCHLORIDE 15 MG: 5 TABLET ORAL at 17:04

## 2019-09-22 RX ADMIN — OCTREOTIDE ACETATE 100 MCG: 100 INJECTION, SOLUTION INTRAVENOUS; SUBCUTANEOUS at 14:30

## 2019-09-22 RX ADMIN — ALBUMIN (HUMAN) 12.5 G: 0.25 INJECTION, SOLUTION INTRAVENOUS at 05:59

## 2019-09-22 RX ADMIN — LACTULOSE 20 G: 10 SOLUTION ORAL at 17:04

## 2019-09-22 RX ADMIN — NYSTATIN: 100000 CREAM TOPICAL at 08:15

## 2019-09-22 RX ADMIN — Medication 400 MCG: at 08:15

## 2019-09-22 RX ADMIN — LACTULOSE 20 G: 10 SOLUTION ORAL at 21:00

## 2019-09-22 RX ADMIN — RIFAXIMIN 550 MG: 550 TABLET ORAL at 17:04

## 2019-09-22 RX ADMIN — MIDODRINE HYDROCHLORIDE 15 MG: 5 TABLET ORAL at 12:49

## 2019-09-22 RX ADMIN — OCTREOTIDE ACETATE 100 MCG: 100 INJECTION, SOLUTION INTRAVENOUS; SUBCUTANEOUS at 21:00

## 2019-09-22 RX ADMIN — PANTOPRAZOLE SODIUM 40 MG: 40 INJECTION, POWDER, LYOPHILIZED, FOR SOLUTION INTRAVENOUS at 17:05

## 2019-09-22 RX ADMIN — MIDODRINE HYDROCHLORIDE 15 MG: 5 TABLET ORAL at 06:00

## 2019-09-22 RX ADMIN — RIFAXIMIN 550 MG: 550 TABLET ORAL at 05:59

## 2019-09-22 RX ADMIN — PANTOPRAZOLE SODIUM 40 MG: 40 INJECTION, POWDER, LYOPHILIZED, FOR SOLUTION INTRAVENOUS at 08:15

## 2019-09-22 RX ADMIN — VITAM B12 50 MCG: 100 TAB at 08:15

## 2019-09-22 NOTE — SOCIAL WORK
Domingo spoke w RN at [facility] to discuss pending transfer authorization as cm had received a call from Isabelle Lundberg in [facility] at [place], but she had not left a return phone number  Per yehuda RN it appears that [employer] has received the clinical that was sent by [facility] Machado yesterday afternoon, but their faxes are "backed up" and they are waiting for clinical to upload  She did not advise for cm to send clinical again as it would further delay the process  At this time case is still pending  RN to send message to Isabelle Lundberg to contact cm if any additional is needed   Domingo to follow

## 2019-09-22 NOTE — SOCIAL WORK
Accepting physician at [place] is Dr Abbe Jackson Cm spoke w PAC  Cm provided them with the authorization case # that is good for hospital transfer as well as ground transport  [facility]AC will be contacting [facility] shortly   PAC will set up transport when bed is ready

## 2019-09-22 NOTE — PROGRESS NOTES
NEPHROLOGY PROGRESS NOTE    Patient: Devora Otto               Sex: male          DOA: 9/6/2019  5:35 PM   YOB: 1960        Age:  63 y o         LOS:  LOS: 16 days       HPI     Patient with acute kidney injury and cirrhosis of liver     SUBJECTIVE     Patient had dialysis yesterday and tolerated quite well  Off BiPAP    Awake and alert    Denies any complaint    No chest pain no palpitation or shortness of breath    CURRENT MEDICATIONS       Current Facility-Administered Medications:     albumin human (FLEXBUMIN) 25 % injection 25 g, 25 g, Intravenous, Q1H PRN, Meliton Hopson MD, Stopped at 09/22/19 0301    cyanocobalamin (VITAMIN B-12) tablet 50 mcg, 50 mcg, Oral, Daily, MARKOS Harding, 50 mcg at 96/28/71 8626    folic acid (FOLVITE) tablet 400 mcg, 400 mcg, Oral, Daily, MARKOS Harding, 400 mcg at 09/22/19 0815    lactulose 20 g/30 mL oral solution 20 g, 20 g, Oral, TID, Shilpa Sweeney PA-C, 20 g at 09/22/19 0815    midodrine (PROAMATINE) tablet 15 mg, 15 mg, Oral, TID AC, DON MotaNP, 15 mg at 09/22/19 0600    nystatin (MYCOSTATIN) cream, , Topical, BID, Ginger Ortiz PA-C    octreotide (SandoSTATIN) injection 100 mcg, 100 mcg, Subcutaneous, Q8H [address], Shilpa Sweeney PA-C, 100 mcg at 09/22/19 0559    ondansetron (ZOFRAN) injection 4 mg, 4 mg, Intravenous, Q6H PRN, MARKOS Santizo, 4 mg at 09/15/19 2142    pantoprazole (PROTONIX) injection 40 mg, 40 mg, Intravenous, BID, DON SantizoNP, 40 mg at 09/22/19 0815    rifaximin (XIFAXAN) tablet 550 mg, 550 mg, Oral, Q12H [address], Shilpa Sweeney PA-C, 550 mg at 09/22/19 0559    OBJECTIVE     Current Weight: Weight - Scale: (!) 165 kg (364 lb 10 3 oz)  Vitals:    09/22/19 0922   BP:    Pulse: 66   Resp: (!) 24   Temp: 97 8 °F (36 6 °C)   SpO2: 95%       Intake/Output Summary (Last 24 hours) at 9/22/2019 1100  Last data filed at 9/22/2019 1600  Gross per 24 hour   Intake 1940 ml   Output 4000 ml   Net -2060 ml       PHYSICAL EXAMINATION     Physical Exam   Constitutional: He is oriented to person, place, and time  He appears well-developed  No distress  HENT:   Head: Normocephalic  Mouth/Throat: Oropharynx is clear and moist    Eyes: Conjunctivae are normal  No scleral icterus  Neck: Neck supple  No JVD present  Cardiovascular: Normal rate and normal heart sounds  Pulmonary/Chest: Effort normal  He has no wheezes  Abdominal: Soft  There is no tenderness  Musculoskeletal: Normal range of motion  He exhibits edema  Neurological: He is alert and oriented to person, place, and time  Skin: Skin is warm  No rash noted  Psychiatric: He has a normal mood and affect  His behavior is normal         LAB RESULTS     Results from last 7 days   Lab Units 09/22/19  0437 09/21/19  0436 09/21/19  0015 09/20/19  0437 09/19/19  0524 09/19/19  0031 09/18/19  1817  09/18/19  0428  09/17/19  0531   WBC Thousand/uL 6 47 6 83 6 99 7 98 8 55  --   --   --  12 58*  --  25 71*   HEMOGLOBIN g/dL 6 7* 6 8* 6 8* 6 7* 7 0* 7 4* 6 8*   < > 6 4*  --  7 1*   HEMATOCRIT % 21 2* 22 1* 22 3* 21 6* 22 7* 23 0* 21 6*   < > 20 5*  --  23 0*   PLATELETS Thousands/uL 16* 18* 19* 18* 22*  --   --   --  27*  --  37*   POTASSIUM mmol/L 3 3* 3 9 3 8 4 1 4 1 3 9 4 0   < > 4 1   < > 4 2   CHLORIDE mmol/L 100 102 102 101 103 104 103   < > 102   < > 103   CO2 mmol/L 31 26 26 26 27 27 26   < > 27   < > 28   BUN mg/dL 21 24 23 16 9 11 11   < > 10   < > 10   CREATININE mg/dL 4 21* 4 21* 4 00* 2 92* 1 78* 1 82* 1 88*   < > 1 65*   < > 1 62*   EGFR ml/min/1 73sq m 15 15 15 23 41 40 39   < > 45   < > 46   CALCIUM mg/dL 8 8 8 9 8 9 9 0 9 2 9 2 9 1   < > 9 0   < > 9 3   MAGNESIUM mg/dL 1 9 2 0 2 1 2 0 1 8 1 9 2 0   < > 1 7   < > 2 0   PHOSPHORUS mg/dL 3 0 2 8 2 6* 1 7* 1 9* 1 9* 1 9*   < > 1 8*   < > 2 4*    < > = values in this interval not displayed         RADIOLOGY RESULTS      Results for orders placed during the hospital encounter of 09/06/19 CXR 1 view PA portable stat    Narrative CHEST     INDICATION:   line placement RIJ Hemodialysis catheter  COMPARISON:  September 6, 2019    EXAM PERFORMED/VIEWS:  XR CHEST PORTABLE  2 images    FINDINGS:  Lungs are suboptimally aerated  Elevation of right hemidiaphragm  Cardiomegaly with diminishing pulmonary edema  Persistent prominence of the pulmonary vasculature in the left superior hilum  Right jugular central line tip in the upper SVC  No pneumothorax  Osseous structures appear within normal limits for patient age  Old fracture of right 5th rib  Questionable fracture right 6th rib  Impression Persistent cardiomegaly  Improving pulmonary edema  Satisfactory right jugular central line placement  No pneumothorax  Right rib fractures  Workstation performed: [id number]       Results for orders placed during the hospital encounter of 09/06/19   XR chest 2 views    Narrative CHEST     INDICATION:   SOB     COMPARISON:  8/15/2019    EXAM PERFORMED/VIEWS:  XR CHEST PA & LATERAL  2 views    FINDINGS:  Persistent moderate cardiomegaly  Development of diffuse vascular congestion    The lungs are otherwise clear  No pneumothorax or pleural effusion  Osseous structures appear within normal limits for patient age  Impression Persistent cardiomegaly    Increased diffuse vascular congestion        Workstation performed: [id number]       No results found for this or any previous visit  No results found for this or any previous visit  Results for orders placed during the hospital encounter of 08/15/19   CT abdomen pelvis wo contrast    Narrative CT ABDOMEN AND PELVIS WITHOUT IV CONTRAST    INDICATION:   Abdominal distension  History of cirrhosis  Testicular swelling      COMPARISON:  6/7/2019    TECHNIQUE:  CT examination of the abdomen and pelvis was performed without intravenous contrast   Axial, sagittal, and coronal 2D reformatted images were created from the source data and submitted for interpretation  Radiation dose length product (DLP) for this visit:  6567 mGy-cm   This examination, like all CT scans performed in the [facility], was performed utilizing techniques to minimize radiation dose exposure, including the use of iterative   reconstruction and automated exposure control  No oral contrast     FINDINGS:    ABDOMEN    LOWER CHEST:  Small right-sided pleural effusion noted    LIVER/BILIARY TREE:  Changes of severe hepatic cirrhosis identified  There is no obvious focal mass appreciated on this unenhanced examination  GALLBLADDER:  There are gallstone(s) within the gallbladder, without pericholecystic inflammatory changes  SPLEEN:  The spleen is enlarged, measuring almost 20 cm in cephalocaudad length    PANCREAS:  Limited assessment without IV contrast   Although there is peripancreatic edema, this also involves the root of the mesentery, and was also present on the 6/7/2019 study  It is not felt to be related to pancreatitis  ADRENAL GLANDS:  Unremarkable  KIDNEYS/URETERS:  Bilateral nonobstructing nephrolithiasis  The largest individual calculus is seen in the right interpolar aspect measuring approximately 7 mm  STOMACH AND BOWEL:  Unremarkable  APPENDIX:  No findings to suggest appendicitis  ABDOMINOPELVIC CAVITY:  Perihepatic ascites  Retroperitoneal and mesenteric root edema  VESSELS:  No AAA    PELVIS    REPRODUCTIVE ORGANS:  Unremarkable for patient's age  URINARY BLADDER:  Unremarkable  ABDOMINAL WALL/INGUINAL REGIONS:  There is diffuse body wall edema  The scrotum is diffusely enlarged and of water density, which may represent a combination of both hydroceles and scrotal wall edema  Dilated anterior abdominal wall collateral vessels  OSSEOUS STRUCTURES:  Advanced multilevel degenerative changes throughout the lumbar spine  Grade 2 anterolisthesis L4-L5      Impression 1   Appearance of the liver consistent with severe cirrhotic change  Splenomegaly an additional findings suggestive of portal hypertension  2  Small amount of ascites  Diffuse body wall edema  Markedly enlargement of the scrotum likely related to hydroceles and scrotal wall edema and small right-sided pleural effusion  Retroperitoneal and mesenteric root edema  Findings most consistent   with anasarca  3  No evidence of bowel obstruction  4  Bilateral nephrolithiasis but no evidence of hydronephrosis  5  Cholelithiasis          Workstation performed: [id number]       No results found for this or any previous visit  PLAN / RECOMMENDATIONS      Acute kidney injury:  Likely secondary to hepatorenal syndrome seems to be dialysis dependent at this point  Had dialysis yesterday  Will provide dialysis tomorrow again  Cirrhosis of liver:  Need liver transplant as patient does have hepatorenal syndrome    Anemia:  Will treat with Procrit and may need blood transfusion    Obesity:  Patient BMI is reducing and at present 50 after fluid removal with dialysis    Obstructive sleep apnea: On BiPAP at night    Will continue to monitor and dialyze tomorrow    Dede Negrete MD  Nephrology  9/22/2019        Portions of the record may have been created with voice recognition software  Occasional wrong word or "sound a like" substitutions may have occurred due to the inherent limitations of voice recognition software  Read the chart carefully and recognize, using context, where substitutions have occurred

## 2019-09-22 NOTE — SOCIAL WORK
Updated clinical provided to Jessica Bal at [facility], [phone]  Awaiting authorization for transfer  Will be ground transfer  [facility]  Cm to follow   Still waiting for accepting physician at [facility]

## 2019-09-22 NOTE — SOCIAL WORK
Per Jessica Kaur at [facility] pt is approved for [employer] from 9/22-9/26  [facility] #[id number]  Approval letter fax attached into [employer] Use same auth for ground transport  Should pt not transfer today, cm needs to call into [facility] at [phone] to provide additional clinical and have dates updated   Awaiting accepting physician at [place]

## 2019-09-23 ENCOUNTER — APPOINTMENT (INPATIENT)
Dept: DIALYSIS | Facility: HOSPITAL | Age: 59
DRG: 177 | End: 2019-09-23
Payer: COMMERCIAL

## 2019-09-23 LAB
ABO GROUP BLD BPU: NORMAL
ALBUMIN SERPL BCP-MCNC: 3.7 G/DL (ref 3.5–5)
ALP SERPL-CCNC: 71 U/L (ref 46–116)
ALT SERPL W P-5'-P-CCNC: 34 U/L (ref 12–78)
ANION GAP SERPL CALCULATED.3IONS-SCNC: 10 MMOL/L (ref 4–13)
ANISOCYTOSIS BLD QL SMEAR: PRESENT
AST SERPL W P-5'-P-CCNC: 50 U/L (ref 5–45)
BASOPHILS # BLD MANUAL: 0 THOUSAND/UL (ref 0–0.1)
BASOPHILS NFR MAR MANUAL: 0 % (ref 0–1)
BILIRUB SERPL-MCNC: 7.9 MG/DL (ref 0.2–1)
BPU ID: NORMAL
BUN SERPL-MCNC: 28 MG/DL (ref 5–25)
CALCIUM SERPL-MCNC: 8.9 MG/DL (ref 8.3–10.1)
CHLORIDE SERPL-SCNC: 98 MMOL/L (ref 100–108)
CO2 SERPL-SCNC: 28 MMOL/L (ref 21–32)
CREAT SERPL-MCNC: 5.54 MG/DL (ref 0.6–1.3)
CROSSMATCH: NORMAL
EOSINOPHIL # BLD MANUAL: 0.45 THOUSAND/UL (ref 0–0.4)
EOSINOPHIL NFR BLD MANUAL: 5 % (ref 0–6)
ERYTHROCYTE [DISTWIDTH] IN BLOOD BY AUTOMATED COUNT: 24.2 % (ref 11.6–15.1)
GFR SERPL CREATININE-BSD FRML MDRD: 10 ML/MIN/1.73SQ M
GLUCOSE SERPL-MCNC: 125 MG/DL (ref 65–140)
HCT VFR BLD AUTO: 24.7 % (ref 36.5–49.3)
HGB BLD-MCNC: 7.7 G/DL (ref 12–17)
HGB RETIC QN AUTO: 42.9 PG (ref 30–38.3)
IMM RETICS NFR: 26.1 % (ref 0–14)
INR PPP: 2.58 (ref 0.84–1.19)
LYMPHOCYTES # BLD AUTO: 0.18 THOUSAND/UL (ref 0.6–4.47)
LYMPHOCYTES # BLD AUTO: 2 % (ref 14–44)
MACROCYTES BLD QL AUTO: PRESENT
MAGNESIUM SERPL-MCNC: 2.1 MG/DL (ref 1.6–2.6)
MCH RBC QN AUTO: 35.6 PG (ref 26.8–34.3)
MCHC RBC AUTO-ENTMCNC: 31.2 G/DL (ref 31.4–37.4)
MCV RBC AUTO: 114 FL (ref 82–98)
METAMYELOCYTES NFR BLD MANUAL: 7 % (ref 0–1)
MONOCYTES # BLD AUTO: 0.09 THOUSAND/UL (ref 0–1.22)
MONOCYTES NFR BLD: 1 % (ref 4–12)
MYELOCYTES NFR BLD MANUAL: 2 % (ref 0–1)
NEUTROPHILS # BLD MANUAL: 7.44 THOUSAND/UL (ref 1.85–7.62)
NEUTS BAND NFR BLD MANUAL: 5 % (ref 0–8)
NEUTS SEG NFR BLD AUTO: 77 % (ref 43–75)
NRBC BLD AUTO-RTO: 0 /100 WBCS
PHOSPHATE SERPL-MCNC: 3.3 MG/DL (ref 2.7–4.5)
PLATELET # BLD AUTO: 24 THOUSANDS/UL (ref 149–390)
PLATELET BLD QL SMEAR: ABNORMAL
PMV BLD AUTO: 12.2 FL (ref 8.9–12.7)
POTASSIUM SERPL-SCNC: 3.3 MMOL/L (ref 3.5–5.3)
PROMYELOCYTES NFR BLD MANUAL: 1 % (ref 0–0)
PROT SERPL-MCNC: 6.5 G/DL (ref 6.4–8.2)
PROTHROMBIN TIME: 28 SECONDS (ref 11.6–14.5)
RBC # BLD AUTO: 2.16 MILLION/UL (ref 3.88–5.62)
RETICS # AUTO: ABNORMAL 10*3/UL (ref 14356–105094)
RETICS # CALC: 5.58 % (ref 0.37–1.87)
SODIUM SERPL-SCNC: 136 MMOL/L (ref 136–145)
TOTAL CELLS COUNTED SPEC: 100
UNIT DISPENSE STATUS: NORMAL
UNIT PRODUCT CODE: NORMAL
UNIT RH: NORMAL
WBC # BLD AUTO: 9.07 THOUSAND/UL (ref 4.31–10.16)

## 2019-09-23 PROCEDURE — C9113 INJ PANTOPRAZOLE SODIUM, VIA: HCPCS | Performed by: NURSE PRACTITIONER

## 2019-09-23 PROCEDURE — 84100 ASSAY OF PHOSPHORUS: CPT | Performed by: NURSE PRACTITIONER

## 2019-09-23 PROCEDURE — 99232 SBSQ HOSP IP/OBS MODERATE 35: CPT | Performed by: INTERNAL MEDICINE

## 2019-09-23 PROCEDURE — 85027 COMPLETE CBC AUTOMATED: CPT | Performed by: NURSE PRACTITIONER

## 2019-09-23 PROCEDURE — 94660 CPAP INITIATION&MGMT: CPT

## 2019-09-23 PROCEDURE — 85007 BL SMEAR W/DIFF WBC COUNT: CPT | Performed by: NURSE PRACTITIONER

## 2019-09-23 PROCEDURE — 80053 COMPREHEN METABOLIC PANEL: CPT | Performed by: NURSE PRACTITIONER

## 2019-09-23 PROCEDURE — 85610 PROTHROMBIN TIME: CPT | Performed by: PHYSICIAN ASSISTANT

## 2019-09-23 PROCEDURE — 83735 ASSAY OF MAGNESIUM: CPT | Performed by: NURSE PRACTITIONER

## 2019-09-23 PROCEDURE — 99233 SBSQ HOSP IP/OBS HIGH 50: CPT | Performed by: INTERNAL MEDICINE

## 2019-09-23 PROCEDURE — 97530 THERAPEUTIC ACTIVITIES: CPT

## 2019-09-23 PROCEDURE — 94760 N-INVAS EAR/PLS OXIMETRY 1: CPT

## 2019-09-23 PROCEDURE — 85046 RETICYTE/HGB CONCENTRATE: CPT | Performed by: ANESTHESIOLOGY

## 2019-09-23 PROCEDURE — 99233 SBSQ HOSP IP/OBS HIGH 50: CPT | Performed by: PHYSICIAN ASSISTANT

## 2019-09-23 RX ORDER — POTASSIUM CHLORIDE 14.9 MG/ML
20 INJECTION INTRAVENOUS ONCE
Status: COMPLETED | OUTPATIENT
Start: 2019-09-23 | End: 2019-09-23

## 2019-09-23 RX ADMIN — Medication 400 MCG: at 08:11

## 2019-09-23 RX ADMIN — MIDODRINE HYDROCHLORIDE 15 MG: 5 TABLET ORAL at 06:33

## 2019-09-23 RX ADMIN — MIDODRINE HYDROCHLORIDE 15 MG: 5 TABLET ORAL at 15:19

## 2019-09-23 RX ADMIN — PANTOPRAZOLE SODIUM 40 MG: 40 INJECTION, POWDER, LYOPHILIZED, FOR SOLUTION INTRAVENOUS at 18:38

## 2019-09-23 RX ADMIN — POTASSIUM CHLORIDE 20 MEQ: 14.9 INJECTION, SOLUTION INTRAVENOUS at 07:52

## 2019-09-23 RX ADMIN — OCTREOTIDE ACETATE 100 MCG: 100 INJECTION, SOLUTION INTRAVENOUS; SUBCUTANEOUS at 15:19

## 2019-09-23 RX ADMIN — MIDODRINE HYDROCHLORIDE 15 MG: 5 TABLET ORAL at 11:46

## 2019-09-23 RX ADMIN — NYSTATIN: 100000 CREAM TOPICAL at 08:11

## 2019-09-23 RX ADMIN — RIFAXIMIN 550 MG: 550 TABLET ORAL at 06:33

## 2019-09-23 RX ADMIN — LACTULOSE 20 G: 10 SOLUTION ORAL at 08:10

## 2019-09-23 RX ADMIN — OCTREOTIDE ACETATE 100 MCG: 100 INJECTION, SOLUTION INTRAVENOUS; SUBCUTANEOUS at 06:33

## 2019-09-23 RX ADMIN — NYSTATIN: 100000 CREAM TOPICAL at 18:37

## 2019-09-23 RX ADMIN — OCTREOTIDE ACETATE 100 MCG: 100 INJECTION, SOLUTION INTRAVENOUS; SUBCUTANEOUS at 22:43

## 2019-09-23 RX ADMIN — RIFAXIMIN 550 MG: 550 TABLET ORAL at 18:36

## 2019-09-23 RX ADMIN — VITAM B12 50 MCG: 100 TAB at 08:10

## 2019-09-23 RX ADMIN — PANTOPRAZOLE SODIUM 40 MG: 40 INJECTION, POWDER, LYOPHILIZED, FOR SOLUTION INTRAVENOUS at 08:10

## 2019-09-23 RX ADMIN — LACTULOSE 20 G: 10 SOLUTION ORAL at 15:18

## 2019-09-23 RX ADMIN — LACTULOSE 20 G: 10 SOLUTION ORAL at 22:45

## 2019-09-23 NOTE — PROGRESS NOTES
Progress Note Humberto Escobar 1960, 63 y o  male MRN: [id number]    Unit/Bed#:  Encounter: [id number]    Primary Care Provider: No primary care provider on file  Date and time admitted to hospital: 9/6/2019  5:35 PM        * 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 [facility])  Assessment & Plan  - Meld score 39 as of 9/20     - Continue HD, octreotide, and midodrine for his hepatorenal syndrome  - [facility] following, has been accepted for transfer  Will transfer when bed available  Hepatorenal syndrome [facility])  Assessment & Plan  - Gastroenterology has signed off and nephrology continues to follow  Meld score 38 based on yesterday's labs  - Continue octreotide, midodrine, albumin  - Plan for HD today  Encephalopathy acute  Assessment & Plan  -Hepatic metabolic in nature  - Increased lactulose dose for increasing lethargy and ammonia levels  - Serial neuro exams     - Encourage good sleep hygiene  Avoid sedating medications  Thrombocytopenia ([facility] [address] )  Assessment & Plan  - Related to liver disease     - Transfuse for platelet count less than 10,000 due to high risk for spontaneous bleeding     - Continue to monitor closely  - Holding anticoagulation at present due to risk of bleeding  Coagulopathy ([address] )  Assessment & Plan  Attributed to his ROSALES related cirrhosis  - Transfuse platelets <84; Cryo for Fibrinogen < 100  - F/u AM INR    Obesity with alveolar hypoventilation (HCC)  Assessment & Plan  - Continue BiPap q HS    Morbid obesity ([address] )  Assessment & Plan  Nutrition consulted  Poor p o  Intake at present  CROW on CPAP  Assessment & Plan  - HS BiPAP    Cholecystitis  Assessment & Plan  - Patient s/p Cholecytostomy tube 9/16  - Continue for now    Essential hypertension  Assessment & Plan  Remains normotensive          Subjective/Objective     Subjective:   No acute issues over night  No blood product transfusion x 24hrs       Objective:  Vitals: Blood pressure 107/53, pulse 63, temperature 97 7 °F (36 5 °C), temperature source Oral, resp  rate 18, height 5' 11" (1 803 m), weight (!) 167 kg (368 lb 6 2 oz), SpO2 96 %  ,Body mass index is 51 38 kg/m²  Intake/Output Summary (Last 24 hours) at 9/23/2019 1052  Last data filed at 9/23/2019 0800  Gross per 24 hour   Intake 510 ml   Output 30 ml   Net 480 ml       Invasive Devices     Central Venous Catheter Line            CVC Central Lines 09/18/19 Triple 20cm 4 days          Line            Temporary HD Catheter 12 days          Drain            Closed/Suction Drain Right RUQ Other (Comment) 8 5 Fr  6 days              Physical Exam   Constitutional: He is oriented to person, place, and time  Chronically ill appearing  Ashen colored  HENT:   Head: Normocephalic and atraumatic  Eyes: Pupils are equal, round, and reactive to light  EOM are normal    Neck: Normal range of motion  Neck supple  Cardiovascular: Normal rate and regular rhythm  Pulmonary/Chest: Effort normal and breath sounds normal  No respiratory distress  Abdominal: Soft  Bowel sounds are normal  He exhibits no distension  There is no tenderness  There is no guarding  Perc cholecystostomy tube  Musculoskeletal: He exhibits edema  Gross full body anasarca  Neurological: He is alert and oriented to person, place, and time  Lethargic    Skin: Skin is warm  Psychiatric: He has a normal mood and affect  Vitals reviewed  Lab, Imaging and other studies: I have personally reviewed pertinent reports     and I have personally reviewed pertinent films in PACS  VTE Pharmacologic Prophylaxis: Sequential compression device (Venodyne)   VTE Mechanical Prophylaxis: sequential compression device

## 2019-09-23 NOTE — DISCHARGE SUMMARY
Discharge Summary - Mathew Nguyen 63 y o  male MRN: [id number]    Unit/Bed#:  Encounter: [id number]    Admission Date:   Admission Orders (From admission, onward)     Ordered        09/06/19 2143  Inpatient Admission  Once                     Admitting Diagnosis: Cellulitis of scrotum [N49 2]  Anasarca [R60 1]  Hyponatremia [E87 1]  SOB (shortness of breath) [R06 02]  Leg swelling [M79 89]  Acute on chronic kidney failure (HCC) [N17 9, N18 9]  Bilateral pneumonia [J18 9]  Cellulitis of right lower extremity [U53 626]    HPI:   Mathew Nguyen is a 63year old male who [facility] to [facility] 9/24/19 for evaluation progressive shortness of breath and bilateral lower extremity swelling  Patient has no past medical history of ROSALES cirrhosis, CAD status post stenting, CKD, hypertension, sleep apnea with CPAP dependency, and super morbid obesity  Patient was diagnosed with ROSALES cirrhosis in June 2019  He since then and has had multiple hospitalizations since for fluid retention and associated symptoms since that time despite appropriate diuretic therapy and reported compliance with low-sodium diet  Despite recent hospitalization earlier in the month of September patient presented a progressive shortness of breath bilateral lower extremity swelling  He noted 20+ lb weight gain  He was seen by outpatient PCP prior laboratory and studies reveal worsening of renal function  He was therefore referred for ED evaluation  Upon arrival to emergency department patient was found to have full body anasarca with extensive swelling to mid abdomen  Laboratory data revealed profound coagulopathy as well as elevated serum creatinine  Admission creatinine 2 41 from baseline 1 1  There was also concern for interval development of right lower extremity cellulitis with that interval development of swelling and erythema  He was subsequently admitted to hospitalist service for IV antibiotics and diuresis      Procedures Performed: Orders Placed This Encounter   Procedures   [address] [facility]    ED ECG Documentation Only       Summary of Hospital Course:   During hospitalization patient was seen evaluated by Nephrology team for acute kidney injury, as well as GI services for concern for progressive hepatic renal syndrome  At time of admission patient's MELD 31  He was therefore started on scheduled albumin, octreotide and IV diuretics  Despite high doses of IV diuretic therapy patient's volume status remained unchanged  The decision was made to initiate HD on 9/10/19  Temporary right HD catheter was placed under IR guidance  He underwent 3 consecutive treatment sessions, with high volume fluid removal once again without interval improvement  He then decompensated from hemodynamic standpoint Midodrine was initiated for blood pressure support however his clinical status deteriorated  He became more encephalopathic and had periods of shortness of breath  For this reason he was transferred to the intensive care unit and placed on BiPAP  Given his hypotension he required vasopressor therapy and was transitioned from traditional dialysis to CVVHD  After several days of CVVHD the patient was approximately 25 L negative  Was able to be weaned off vasopressor therapy  He was transitioned back to traditional dialysis  The Critical Care and Gastroenterology team were in contact with the [facility] who was willing to consider accepting the patient in transfer for evaluation for a liver transplant  Fortunately, the patient was formally accepted and was able to get of bed on 9/25/19  He will be transferred to the [address] [place] [address] under the care of Dr Emma Cunningham  Consultants:   Gastroenterology  Nephrology  Interventional Radiology  Palliative care  Wound care  Pharmacy  Dietary   PT/OT    Significant Findings, Care, Treatment and Services Provided:   9/10: IR guided Right IJ HD catheter    9/10: Initiation of HD  9/11: Transfuse 1 U of PRBCs  9/13: L Radial Leisa  9/13:  Initiate CRRT  9/14: Transfuse 1 U platelets  1/54: Transfuse 1 U platelets  0/06: Transfuse 1 U platelets  9/34: Transfuse 2 U of FFP  9/16: Percutaneous Cholecystostomy   9/17: Transfuse 2 U cryoprecipitate  9/18: Transfuse 2 U cryoprecipitate  9/18: Transfuse 2 U PRBCs  9/18: L IJ triple lumen CVC  9/18: Antibioticsstopped (14 days total HCAP/biliary source)    9/20: Transfuse 2 U cryoprecipitate   9/21: Transition to iHD  9/22: Transfuse PRBC        Complications: Aspiration PNA    Discharge Diagnosis:  Principal Problem:    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 ([facility] [address] )  Active Problems:    Liver failure    CROW on CPAP    Essential hypertension    Hyperbilirubinemia    Morbid obesity ([facility] [address] )    Anemia    Hepatorenal syndrome ([facility] [address] )    Encephalopathy acute    Thrombocytopenia ([facility] [address] )    Obesity with alveolar hypoventilation ([facility] [address] )    Coagulopathy ([facility] [address] )    Cholecystitis      Resolved Problems  Date Reviewed: 9/25/2019          Resolved    Hyponatremia 9/18/2019     Resolved by  MARKOS Orozco    Cellulitis of right lower extremity 9/12/2019     Resolved by  MARKOS Izquierdo    HCAP (healthcare-associated pneumonia) 9/16/2019     Resolved by  MARKOS Magaña    Hypotension 9/14/2019     Resolved by  MARKOS Magaña    Nausea & vomiting 9/21/2019     Resolved by  Sebastian Bateman PA-C          Condition at Discharge: stable         Discharge instructions/Information to patient and family:   See after visit summary for information provided to patient and family  Provisions for Follow-Up Care:  See after visit summary for information related to follow-up care and any pertinent home health orders  PCP: No primary care provider on file  Disposition: [address]      Discharge Statement   I spent 29 minutes discharging the patient  This time was spent on the day of discharge   I had direct contact with the patient on the day of discharge  Additional documentation is required if more than 30 minutes were spent on discharge  Discharge Medications:  See after visit summary for reconciled discharge medications provided to patient and family

## 2019-09-23 NOTE — PROGRESS NOTES
Progress Note Alex Portillo 1960, 63 y o  male MRN: [id number]    Unit/Bed#:  Encounter: [id number]    Primary Care Provider: No primary care provider on file  Date and time admitted to hospital: 9/6/2019  5:35 PM        Cholecystitis  Assessment & Plan  - Patient s/p Cholecytostomy tube 9/16  - Continue for now    Coagulopathy [facility])  Assessment & Plan  Attributed to his ROSALES related cirrhosis  - Transfuse platelets <02; Cryo for Fibrinogen < 100  - F/u AM INR    Obesity with alveolar hypoventilation (HCC)  Assessment & Plan  - Continue BiPap q HS    Thrombocytopenia (HCC)  Assessment & Plan  - Related to liver disease     - Transfuse for platelet count less than 10,000 due to high risk for spontaneous bleeding     - Continue to monitor closely  - Holding anticoagulation at present due to risk of bleeding  Encephalopathy acute  Assessment & Plan  -Hepatic metabolic in nature  - Increased lactulose dose for increasing lethargy and ammonia levels  - Serial neuro exams     - Encourage good sleep hygiene  Avoid sedating medications  Hepatorenal syndrome [facility])  Assessment & Plan  - Gastroenterology has signed off and nephrology continues to follow  Meld score 39 based on yesterday's labs  - Continue octreotide, midodrine, albumin  - Had HD yesterday, for HD again tomorrow    Anemia  Assessment & Plan  - Secondary to oozing as well as CKD and liver failure  - Macrocytic and elevated RDW  - Unclear if 2/2 reticulocytosis or true macrocytopenia  Could check P53 and Folic acid; in mean time continue folic acid and Q77 supplementation  -Received one unit PRBC today    Morbid obesity ([address] )  Assessment & Plan  Nutrition consulted  Poor p o  Intake at present  Hyperbilirubinemia  Assessment & Plan  Continue monitor  Secondary to ROSALES related cirrhosis      Essential hypertension  Assessment & Plan  Remains normotensive    CROW on CPAP  Assessment & Plan  - HS BiPAP    * 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 [facility])  Assessment & Plan  - Meld score 39 as of 9/20     - Continue HD, octreotide, and midodrine for his hepatorenal syndrome  - [facility] following, has been accepted for transfer  Will transfer when bed available  Nausea & vomitingresolved as of 9/21/2019  Assessment & Plan  -               ----------------------------------------------------------------------------------------  HPI/24hr events: Tolerated HD yesterday  Disposition: Continue ICU level of care  Code Status: Level 1 - Full Code  ---------------------------------------------------------------------------------------  SUBJECTIVE  No complaints today  Review of Systems   Constitutional: Negative for chills and fever  HENT: Negative  Eyes: Negative  Respiratory: Negative for cough, shortness of breath, wheezing and stridor  Cardiovascular: Negative for chest pain, palpitations and leg swelling  Gastrointestinal: Negative for abdominal distention, abdominal pain, blood in stool, diarrhea, nausea and vomiting  Endocrine: Negative  Genitourinary: Negative for dysuria, flank pain, frequency and urgency  Musculoskeletal: Negative  Skin: Negative for rash and wound  Allergic/Immunologic: Negative  Neurological: Negative for dizziness, syncope, facial asymmetry, weakness, light-headedness, numbness and headaches  Hematological: Negative for adenopathy  Does not bruise/bleed easily  Psychiatric/Behavioral: Negative       ---------------------------------------------------------------------------------------  OBJECTIVE    Physical Exam   Constitutional: He is oriented to person, place, and time  He has a sickly appearance  He appears ill  No distress  HENT:   Head: Normocephalic and atraumatic  Eyes: Pupils are equal, round, and reactive to light  EOM are normal  Scleral icterus is present  Cardiovascular: Regular rhythm and normal heart sounds     Pulmonary/Chest: Effort normal  No respiratory distress  He has decreased breath sounds in the right lower field and the left lower field  Abdominal: He exhibits distension  Musculoskeletal: He exhibits edema  Neurological: He is alert and oriented to person, place, and time  GCS eye subscore is 4  GCS verbal subscore is 5  GCS motor subscore is 6  Skin: Skin is warm and dry  jaundiced   Psychiatric: He has a normal mood and affect  Vitals   Vitals:    19 1200 19 1300 19 1400 19 1539   BP:    92/50   BP Location:    Right arm   Pulse: 69 65 71 71   Resp: (!) 25 14 20 20   Temp:    98 1 °F (36 7 °C)   TempSrc:    Oral   SpO2: 97% 97% 97% 96%   Weight:       Height:         Temp (24hrs), Av °F (36 7 °C), Min:97 8 °F (36 6 °C), Max:98 4 °F (36 9 °C)  Current: Temperature: 98 1 °F (36 7 °C)      Invasive/non-invasive ventilation settings   Respiratory    Lab Data (Last 4 hours)    None         O2/Vent Data (Last 4 hours)    None                Height and Weights   Height: 5' 11" (180 3 cm)  IBW: 75 3 kg  Body mass index is 50 86 kg/m²  Weight (last 2 days)     Date/Time   Weight    19 0838   (!) 165 (364 64)    19 0600   (!) 169 (373 46)    19 1100   (!) 165 (364 42)    19 0553   (!) 171 (376 99)                Intake and Output  I/O        0701 -  0700  07 -  0700    P  O  340 480    I V  (mL/kg) 500 (3)     Blood  350    Other 700     NG/GT  10    IV Piggyback 50     Total Intake(mL/kg) 1590 (9 4) 840 (5 1)    Urine (mL/kg/hr) 0 (0)     Drains      Other 4000     Stool 0     Total Output 4000     Net -2410 +840          Unmeasured Stool Occurrence 13 x 1 x          Nutrition       Diet Orders   (From admission, onward)             Start     Ordered    19 1208  Diet Renal; Renal Restrictive; Yes; Fluid Restriction 1000 ML; No  Diet effective now     Question Answer Comment   Diet Type Renal    Renal Renal Restrictive    Should patient have a fluid restriction?  Yes Fluid Restriction Fluid Restriction 1000 ML    Should patient have a protein modifier? No    RD to adjust diet per protocol? No        09/18/19 1207                Laboratory and Diagnostics:  Results from last 7 days   Lab Units 09/22/19 0437 09/21/19  0436 09/21/19  0015 09/20/19 0437 09/19/19  0524 09/19/19  0031 09/18/19 1817 09/18/19  0428 09/17/19  0531  09/16/19  1420 09/16/19  0430   WBC Thousand/uL 6 47 6 83 6 99 7 98 8 55  --   --   --  12 58* 25 71*  --  20 89* 21 99*   HEMOGLOBIN g/dL 6 7* 6 8* 6 8* 6 7* 7 0* 7 4* 6 8*   < > 6 4* 7 1*   < > 6 9* 7 1*   HEMATOCRIT % 21 2* 22 1* 22 3* 21 6* 22 7* 23 0* 21 6*   < > 20 5* 23 0*   < > 22 2* 22 8*   PLATELETS Thousands/uL 16* 18* 19* 18* 22*  --   --   --  27* 37*  --  30* 29*   NEUTROS PCT % 71  --  63 64 63  --   --   --  68  --   --  84* 85*   BANDS PCT %  --   --   --   --   --   --   --   --   --  5  --   --   --    MONOS PCT % 13*  --  14* 14* 14*  --   --   --  15*  --   --  12 11   MONO PCT %  --   --   --   --   --   --   --   --   --  6  --   --   --     < > = values in this interval not displayed       Results from last 7 days   Lab Units 09/22/19 0437 09/21/19 0436 09/21/19  0015 09/20/19  0437 09/19/19  0524 09/19/19  0031 09/18/19 1817 09/18/19  0732  09/17/19  0531   SODIUM mmol/L 138 135* 137 135* 136 139 139   < >  --    < > 138   POTASSIUM mmol/L 3 3* 3 9 3 8 4 1 4 1 3 9 4 0   < >  --    < > 4 2   CHLORIDE mmol/L 100 102 102 101 103 104 103   < >  --    < > 103   CO2 mmol/L 31 26 26 26 27 27 26   < >  --    < > 28   ANION GAP mmol/L 7 7 9 8 6 8 10   < >  --    < > 7   BUN mg/dL 21 24 23 16 9 11 11   < >  --    < > 10   CREATININE mg/dL 4 21* 4 21* 4 00* 2 92* 1 78* 1 82* 1 88*   < >  --    < > 1 62*   CALCIUM mg/dL 8 8 8 9 8 9 9 0 9 2 9 2 9 1   < >  --    < > 9 3   GLUCOSE RANDOM mg/dL 110 113 122 103 101 115 120   < >  --    < > 112   ALT U/L 35 39 39 38 37  --   --   --  36  --  32   AST U/L 51* 61* 63* 69* 67*  --   --   --  63* --  57*   ALK PHOS U/L 68 76 73 74 73  --   --   --  73  --  77   ALBUMIN g/dL 3 8 3 6 3 7 3 5 3 3*  --   --   --  3 2*  --  3 2*   TOTAL BILIRUBIN mg/dL 7 30* 7 00* 7 30* 7 80* 9 20*  --   --   --  8 50*  --  8 60*    < > = values in this interval not displayed  Results from last 7 days   Lab Units 09/22/19  0437 09/21/19  0436 09/21/19  0015 09/20/19  0437 09/19/19  0524 09/19/19  0031 09/18/19  1817   MAGNESIUM mg/dL 1 9 2 0 2 1 2 0 1 8 1 9 2 0   PHOSPHORUS mg/dL 3 0 2 8 2 6* 1 7* 1 9* 1 9* 1 9*      Results from last 7 days   Lab Units 09/22/19  0437 09/21/19  0436 09/20/19  0437 09/19/19  0524 09/18/19  0637 09/17/19  0531 09/16/19  1420   INR  2 96* 2 59* 2 79* 2 91* 2 99* 3 15* 2 32*   PTT seconds  --   --   --   --   --  74*  --               ABG:  Results from last 7 days   Lab Units 09/21/19  0430   PH ART  7 313*   PCO2 ART mm Hg 45 0*   PO2 ART mm Hg 185 3*   HCO3 ART mmol/L 22 3   BASE EXC ART mmol/L -3 7   ABG SOURCE  Line, Arterial     VBG:  Results from last 7 days   Lab Units 09/21/19  0430   ABG SOURCE  Line, Arterial           Micro  Results from last 7 days   Lab Units 09/16/19  1443 09/16/19  1031 09/16/19  1028 09/16/19  0945   BLOOD CULTURE   --  No Growth After 5 Days  No Growth After 5 Days    --    GRAM STAIN RESULT  No Polys  No organisms seen  --   --   --    BODY FLUID CULTURE, STERILE  No growth  --   --   --    MRSA CULTURE ONLY   --   --   --  No Methicillin Resistant Staphlyococcus aureus (MRSA) isolated       EKG: SR with PACs      Active Medications  Scheduled Meds:  Current Facility-Administered Medications:  albumin human 25 g Intravenous Q1H PRN Augustus Mac MD Last Rate: Stopped (09/22/19 0301)   cyanocobalamin 50 mcg Oral Daily MARKOS Harding    [START ON 9/23/2019] epoetin carla 10,000 Units Intravenous Once Augustus Mac MD    folic acid 274 mcg Oral Daily MARKOS Harding    lactulose 20 g Oral TID Kevin Sanon PA-C    midodrine 15 mg Oral TID AC Jaylene Sparks, MARKOS    nystatin  Topical BID Moises Norris PA-C    octreotide 100 mcg Subcutaneous [facility] Aníbal Telles PA-C    ondansetron 4 mg Intravenous Q6H PRN MARKOS Arzate    pantoprazole 40 mg Intravenous BID Isidro BurtonineMARKOS    rifaximin 550 mg Oral Q12H [facility] Aníbal Telles PA-C      Continuous Infusions:     PRN Meds:     albumin human 25 g Q1H PRN   ondansetron 4 mg Q6H PRN       ---------------------------------------------------------------------------------------  Advance Directive and Living Will:      Power of :    POLST:    ---------------------------------------------------------------------------------------  Counseling / Coordination of Care  Total Critical Care time spent 32 minutes excluding procedures, teaching and family updates  MARKOS Valenzuela        Portions of the record may have been created with voice recognition software  Occasional wrong word or "sound a like" substitutions may have occurred due to the inherent limitations of voice recognition software    Read the chart carefully and recognize, using context, where substitutions have occurred

## 2019-09-23 NOTE — PROGRESS NOTES
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 [facility])    Essential hypertension    Hyperbilirubinemia    Liver lesion    Primary osteoarthritis involving multiple joints    Morbid obesity (HCC)    Anemia    Hepatorenal syndrome (HCC)    Encephalopathy acute    Thrombocytopenia (HCC)    Obesity with alveolar hypoventilation (HCC)    Coagulopathy (HCC)    Cholecystitis         Plan:  1  Symptom management -    Recommend global delirium precautions including:      - Establishment of day/night cycle via lights during the day and blinds open  Please limit interruptions at night as medically appropriate  - Provide glasses/hearing aids as apprioriate  - Minimize deliriogenic meds as able  - Provide reorientation including date on board and visible clock  - Avoid restraints as able, frequent verbal reorientations or patient care sitter as appropriate  Recommend continued input from PT team   OOB as able  2  Goals - Full cares  Hopefully pending transfer to [facility] for transplant eval      Interval history:        Patient seen and examined this AM  Feels fatigued but well  Denies pain  Denies SOB  Over the weekend has continued to make strides getting OOB to chair  Has had worse appetite but blames this on the hospital food       MEDICATIONS / ALLERGIES:     current meds:   Current Facility-Administered Medications   Medication Dose Route Frequency    albumin human (FLEXBUMIN) 25 % injection 25 g  25 g Intravenous Q1H PRN    cyanocobalamin (VITAMIN B-12) tablet 50 mcg  50 mcg Oral Daily    epoetin carla (EPOGEN,PROCRIT) injection 10,000 Units  10,000 Units Intravenous Once    folic acid (FOLVITE) tablet 400 mcg  400 mcg Oral Daily    lactulose 20 g/30 mL oral solution 20 g  20 g Oral TID    midodrine (PROAMATINE) tablet 15 mg  15 mg Oral TID AC    nystatin (MYCOSTATIN) cream   Topical BID    octreotide (SandoSTATIN) injection 100 mcg  100 mcg Subcutaneous Q8H [address]    ondansetron (ZOFRAN) injection 4 mg  4 mg Intravenous Q6H PRN    pantoprazole (PROTONIX) injection 40 mg  40 mg Intravenous BID    potassium chloride 20 mEq IVPB (premix)  20 mEq Intravenous Once    rifaximin (XIFAXAN) tablet 550 mg  550 mg Oral Q12H DANA       Allergies   Allergen Reactions    Lactose     Propranolol Eye Swelling    Torsemide Eye Swelling       OBJECTIVE:    Physical Exam  Physical Exam   Constitutional: No distress  HENT:   Head: Atraumatic  Eyes: Right eye exhibits no discharge  Left eye exhibits no discharge  Pulmonary/Chest: Effort normal  No respiratory distress  Abdominal:   obese   Musculoskeletal: He exhibits edema  Neurological: He is alert  Skin: Skin is warm and dry  Psychiatric: He has a normal mood and affect  Nursing note and vitals reviewed  Lab Results:   I have personally reviewed pertinent labs  , CBC:   Lab Results   Component Value Date    WBC 9 07 09/23/2019    HGB 7 7 (L) 09/23/2019    HCT 24 7 (L) 09/23/2019     (H) 09/23/2019    PLT 24 (LL) 09/23/2019    MCH 35 6 (H) 09/23/2019    MCHC 31 2 (L) 09/23/2019    RDW 24 2 (H) 09/23/2019    MPV 12 2 09/23/2019    NRBC 0 09/23/2019   , CMP:   Lab Results   Component Value Date    SODIUM 136 09/23/2019    K 3 3 (L) 09/23/2019    CL 98 (L) 09/23/2019    CO2 28 09/23/2019    BUN 28 (H) 09/23/2019    CREATININE 5 54 (H) 09/23/2019    CALCIUM 8 9 09/23/2019    AST 50 (H) 09/23/2019    ALT 34 09/23/2019    ALKPHOS 71 09/23/2019    EGFR 10 09/23/2019         Counseling / Coordination of Care  Total floor / unit time spent today 15+ minutes  Greater than 50% of total time was spent with the patient and / or family counseling and / or coordination of care   A description of the counseling / coordination of care: psychosocial support, symptom assessment

## 2019-09-23 NOTE — ASSESSMENT & PLAN NOTE
- Gastroenterology has signed off and nephrology continues to follow  Meld score 38 based on yesterday's labs  - Continue octreotide, midodrine, albumin  - Plan for HD today

## 2019-09-23 NOTE — PROGRESS NOTES
NEPHROLOGY PROGRESS NOTE    Patient: Jarad Zayas               Sex: male          DOA: 9/6/2019  5:35 PM   YOB: 1960        Age:  63 y o         LOS:  LOS: 17 days       HPI     Patient with cirrhosis of liver and acute kidney injury due to hepatorenal syndrome    SUBJECTIVE     Patient is awake  Denies any complaint    No chest pain no palpitation or shortness of Breath    CURRENT MEDICATIONS       Current Facility-Administered Medications:     albumin human (FLEXBUMIN) 25 % injection 25 g, 25 g, Intravenous, Q1H PRN, Brandon Concepcion MD, Stopped at 09/22/19 0301    cyanocobalamin (VITAMIN B-12) tablet 50 mcg, 50 mcg, Oral, Daily, MARKOS Harding, 50 mcg at 09/23/19 0810    epoetin carla (EPOGEN,PROCRIT) injection 10,000 Units, 10,000 Units, Intravenous, Once, Brandon Concepcion MD    folic acid (FOLVITE) tablet 400 mcg, 400 mcg, Oral, Daily, MARKOS Harding, 400 mcg at 09/23/19 0811    lactulose 20 g/30 mL oral solution 20 g, 20 g, Oral, TID, Shailesh Villafuerte PA-C, 20 g at 09/23/19 0810    midodrine (PROAMATINE) tablet 15 mg, 15 mg, Oral, TID AC, MARKOS Aldridge, 15 mg at 09/23/19 4251    nystatin (MYCOSTATIN) cream, , Topical, BID, Katty Goldstein PA-C    octreotide (SandoSTATIN) injection 100 mcg, 100 mcg, Subcutaneous, Q8H [facility], Shailesh Villafuerte PA-C, 100 mcg at 09/23/19 7891    ondansetron (ZOFRAN) injection 4 mg, 4 mg, Intravenous, Q6H PRN, MARKOS Nath, 4 mg at 09/15/19 2142    pantoprazole (PROTONIX) injection 40 mg, 40 mg, Intravenous, BID, MARKOS Nath, 40 mg at 09/23/19 0810    potassium chloride 20 mEq IVPB (premix), 20 mEq, Intravenous, Once, Katty Goldstein PA-C, Last Rate: 50 mL/hr at 09/23/19 0752, 20 mEq at 09/23/19 0752    rifaximin (XIFAXAN) tablet 550 mg, 550 mg, Oral, Q12H [facility], Shailesh Villafuerte PA-C, 550 mg at 09/23/19 6754    OBJECTIVE     Current Weight: Weight - Scale: (!) 167 kg (368 lb 6 2 oz)  Vitals:    09/23/19 0800   BP: 102/52 Pulse: 67   Resp: 13   Temp:    SpO2:        Intake/Output Summary (Last 24 hours) at 9/23/2019 3379  Last data filed at 9/23/2019 4343  Gross per 24 hour   Intake 460 ml   Output 30 ml   Net 430 ml       PHYSICAL EXAMINATION     Physical Exam   Constitutional: He is oriented to person, place, and time  He appears well-developed  No distress  HENT:   Head: Normocephalic  Mouth/Throat: Oropharynx is clear and moist    Eyes: Conjunctivae are normal  No scleral icterus  Neck: Neck supple  No JVD present  Cardiovascular: Normal rate and normal heart sounds  Pulmonary/Chest: Effort normal  He has no wheezes  Abdominal: Soft  There is no tenderness  Musculoskeletal: Normal range of motion  He exhibits no edema  Neurological: He is alert and oriented to person, place, and time  Skin: Skin is warm  No rash noted  Psychiatric: He has a normal mood and affect  His behavior is normal         LAB RESULTS     Results from last 7 days   Lab Units 09/23/19  0437 09/22/19  0437 09/21/19  0436 09/21/19  0015 09/20/19  0437 09/19/19  0524 09/19/19  0031  09/18/19  0428   WBC Thousand/uL 9 07 6 47 6 83 6 99 7 98 8 55  --   --  12 58*   HEMOGLOBIN g/dL 7 7* 6 7* 6 8* 6 8* 6 7* 7 0* 7 4*   < > 6 4*   HEMATOCRIT % 24 7* 21 2* 22 1* 22 3* 21 6* 22 7* 23 0*   < > 20 5*   PLATELETS Thousands/uL 24* 16* 18* 19* 18* 22*  --   --  27*   POTASSIUM mmol/L 3 3* 3 3* 3 9 3 8 4 1 4 1 3 9   < > 4 1   CHLORIDE mmol/L 98* 100 102 102 101 103 104   < > 102   CO2 mmol/L 28 31 26 26 26 27 27   < > 27   BUN mg/dL 28* 21 24 23 16 9 11   < > 10   CREATININE mg/dL 5 54* 4 21* 4 21* 4 00* 2 92* 1 78* 1 82*   < > 1 65*   EGFR ml/min/1 73sq m 10 15 15 15 23 41 40   < > 45   CALCIUM mg/dL 8 9 8 8 8 9 8 9 9 0 9 2 9 2   < > 9 0   MAGNESIUM mg/dL 2 1 1 9 2 0 2 1 2 0 1 8 1 9   < > 1 7   PHOSPHORUS mg/dL 3 3 3 0 2 8 2 6* 1 7* 1 9* 1 9*   < > 1 8*    < > = values in this interval not displayed         RADIOLOGY RESULTS      Results for orders placed during the hospital encounter of 09/06/19   CXR 1 view PA portable stat    Narrative CHEST     INDICATION:   line placement RIJ Hemodialysis catheter  COMPARISON:  September 6, 2019    EXAM PERFORMED/VIEWS:  XR CHEST PORTABLE  2 images    FINDINGS:  Lungs are suboptimally aerated  Elevation of right hemidiaphragm  Cardiomegaly with diminishing pulmonary edema  Persistent prominence of the pulmonary vasculature in the left superior hilum  Right jugular central line tip in the upper SVC  No pneumothorax  Osseous structures appear within normal limits for patient age  Old fracture of right 5th rib  Questionable fracture right 6th rib  Impression Persistent cardiomegaly  Improving pulmonary edema  Satisfactory right jugular central line placement  No pneumothorax  Right rib fractures  Workstation performed: [id number]       Results for orders placed during the hospital encounter of 09/06/19   XR chest 2 views    Narrative CHEST     INDICATION:   SOB     COMPARISON:  8/15/2019    EXAM PERFORMED/VIEWS:  XR CHEST PA & LATERAL  2 views    FINDINGS:  Persistent moderate cardiomegaly  Development of diffuse vascular congestion    The lungs are otherwise clear  No pneumothorax or pleural effusion  Osseous structures appear within normal limits for patient age  Impression Persistent cardiomegaly    Increased diffuse vascular congestion        Workstation performed: [id number]       No results found for this or any previous visit  No results found for this or any previous visit  Results for orders placed during the hospital encounter of 08/15/19   CT abdomen pelvis wo contrast    Narrative CT ABDOMEN AND PELVIS WITHOUT IV CONTRAST    INDICATION:   Abdominal distension  History of cirrhosis  Testicular swelling      COMPARISON:  6/7/2019    TECHNIQUE:  CT examination of the abdomen and pelvis was performed without intravenous contrast   Axial, sagittal, and coronal 2D reformatted images were created from the source data and submitted for interpretation  Radiation dose length product (DLP) for this visit:  2836 mGy-cm   This examination, like all CT scans performed in the [facility], was performed utilizing techniques to minimize radiation dose exposure, including the use of iterative   reconstruction and automated exposure control  No oral contrast     FINDINGS:    ABDOMEN    LOWER CHEST:  Small right-sided pleural effusion noted    LIVER/BILIARY TREE:  Changes of severe hepatic cirrhosis identified  There is no obvious focal mass appreciated on this unenhanced examination  GALLBLADDER:  There are gallstone(s) within the gallbladder, without pericholecystic inflammatory changes  SPLEEN:  The spleen is enlarged, measuring almost 20 cm in cephalocaudad length    PANCREAS:  Limited assessment without IV contrast   Although there is peripancreatic edema, this also involves the root of the mesentery, and was also present on the 6/7/2019 study  It is not felt to be related to pancreatitis  ADRENAL GLANDS:  Unremarkable  KIDNEYS/URETERS:  Bilateral nonobstructing nephrolithiasis  The largest individual calculus is seen in the right interpolar aspect measuring approximately 7 mm  STOMACH AND BOWEL:  Unremarkable  APPENDIX:  No findings to suggest appendicitis  ABDOMINOPELVIC CAVITY:  Perihepatic ascites  Retroperitoneal and mesenteric root edema  VESSELS:  No AAA    PELVIS    REPRODUCTIVE ORGANS:  Unremarkable for patient's age  URINARY BLADDER:  Unremarkable  ABDOMINAL WALL/INGUINAL REGIONS:  There is diffuse body wall edema  The scrotum is diffusely enlarged and of water density, which may represent a combination of both hydroceles and scrotal wall edema  Dilated anterior abdominal wall collateral vessels  OSSEOUS STRUCTURES:  Advanced multilevel degenerative changes throughout the lumbar spine    Grade 2 anterolisthesis L4-L5      Impression 1  Appearance of the liver consistent with severe cirrhotic change  Splenomegaly an additional findings suggestive of portal hypertension  2  Small amount of ascites  Diffuse body wall edema  Markedly enlargement of the scrotum likely related to hydroceles and scrotal wall edema and small right-sided pleural effusion  Retroperitoneal and mesenteric root edema  Findings most consistent   with anasarca  3  No evidence of bowel obstruction  4  Bilateral nephrolithiasis but no evidence of hydronephrosis  5  Cholelithiasis          Workstation performed: [id number]       No results found for this or any previous visit  PLAN / RECOMMENDATIONS      Acute kidney injury:  Dialysis dependent at this point  Will dialyze him today with albumin support for low blood pressure  Cirrhosis of liver: For a liver transplant he  He has been accepted waiting for the bed availability    Anemia:  Will continue treat with Procrit     Discussed with ICU team and will continue to monitor    Reagan Nair MD  Nephrology  9/23/2019        Portions of the record may have been created with voice recognition software  Occasional wrong word or "sound a like" substitutions may have occurred due to the inherent limitations of voice recognition software  Read the chart carefully and recognize, using context, where substitutions have occurred

## 2019-09-23 NOTE — ASSESSMENT & PLAN NOTE
Attributed to his ROSALES related cirrhosis  - Transfuse platelets <40; Cryo for Fibrinogen < 100  - F/u AM INR

## 2019-09-23 NOTE — PLAN OF CARE
Problem: PAIN - ADULT  Goal: Verbalizes/displays adequate comfort level or baseline comfort level  Description  Interventions:  - Encourage patient to monitor pain and request assistance  - Assess pain using appropriate pain scale  - Administer analgesics based on type and severity of pain and evaluate response  - Implement non-pharmacological measures as appropriate and evaluate response  - Consider cultural and social influences on pain and pain management  - Notify physician/advanced practitioner if interventions unsuccessful or patient reports new pain  Outcome: Progressing     Problem: INFECTION - ADULT  Goal: Absence or prevention of progression during hospitalization  Description  INTERVENTIONS:  - Assess and monitor for signs and symptoms of infection  - Monitor lab/diagnostic results  - Monitor all insertion sites, i e  indwelling lines, tubes, and drains  - Monitor endotracheal if appropriate and nasal secretions for changes in amount and color  - [place] appropriate cooling/warming therapies per order  - Administer medications as ordered  - Instruct and encourage patient and family to use good hand hygiene technique  - Identify and instruct in appropriate isolation precautions for identified infection/condition  Outcome: Progressing  Goal: Absence of fever/infection during neutropenic period  Description  INTERVENTIONS:  - Monitor WBC    Outcome: Progressing     Problem: SAFETY ADULT  Goal: Patient will remain free of falls  Description  INTERVENTIONS:  - Assess patient frequently for physical needs  -  Identify cognitive and physical deficits and behaviors that affect risk of falls    -  [place] fall precautions as indicated by assessment   - Educate patient/family on patient safety including physical limitations  - Instruct patient to call for assistance with activity based on assessment  - Modify environment to reduce risk of injury  - Consider OT/PT consult to assist with strengthening/mobility  Outcome: Progressing  Goal: Maintain or return to baseline ADL function  Description  INTERVENTIONS:  -  Assess patient's ability to carry out ADLs; assess patient's baseline for ADL function and identify physical deficits which impact ability to perform ADLs (bathing, care of mouth/teeth, toileting, grooming, dressing, etc )  - Assess/evaluate cause of self-care deficits   - Assess range of motion  - Assess patient's mobility; develop plan if impaired  - Assess patient's need for assistive devices and provide as appropriate  - Encourage maximum independence but intervene and supervise when necessary  - Involve family in performance of ADLs  - Assess for home care needs following discharge   - Consider OT consult to assist with ADL evaluation and planning for discharge  - Provide patient education as appropriate  Outcome: Progressing  Goal: Maintain or return mobility status to optimal level  Description  INTERVENTIONS:  - Assess patient's baseline mobility status (ambulation, transfers, stairs, etc )    - Identify cognitive and physical deficits and behaviors that affect mobility  - Identify mobility aids required to assist with transfers and/or ambulation (gait belt, sit-to-stand, lift, walker, cane, etc )  - [place] fall precautions as indicated by assessment  - Record patient progress and toleration of activity level on Mobility SBAR; progress patient to next Phase/Stage  - Instruct patient to call for assistance with activity based on assessment  - Consider rehabilitation consult to assist with strengthening/weightbearing, etc   Outcome: Progressing     Problem: DISCHARGE PLANNING  Goal: Discharge to home or other facility with appropriate resources  Description  INTERVENTIONS:  - Identify barriers to discharge w/patient and caregiver  - Arrange for needed discharge resources and transportation as appropriate  - Identify discharge learning needs (meds, wound care, etc )  - Arrange for interpretive services to assist at discharge as needed  - Refer to Case Management Department for coordinating discharge planning if the patient needs post-hospital services based on physician/advanced practitioner order or complex needs related to functional status, cognitive ability, or social support system  Outcome: Progressing     Problem: Knowledge Deficit  Goal: Patient/family/caregiver demonstrates understanding of disease process, treatment plan, medications, and discharge instructions  Description  Complete learning assessment and assess knowledge base  Interventions:  - Provide teaching at level of understanding  - Provide teaching via preferred learning methods  Outcome: Progressing     Problem: Potential for Falls  Goal: Patient will remain free of falls  Description  INTERVENTIONS:  - Assess patient frequently for physical needs  -  Identify cognitive and physical deficits and behaviors that affect risk of falls    -  [place] fall precautions as indicated by assessment   - Educate patient/family on patient safety including physical limitations  - Instruct patient to call for assistance with activity based on assessment  - Modify environment to reduce risk of injury  - Consider OT/PT consult to assist with strengthening/mobility  Outcome: Progressing     Problem: Prexisting or High Potential for Compromised Skin Integrity  Goal: Skin integrity is maintained or improved  Description  INTERVENTIONS:  - Identify patients at risk for skin breakdown  - Assess and monitor skin integrity  - Assess and monitor nutrition and hydration status  - Monitor labs   - Assess for incontinence   - Turn and reposition patient  - Assist with mobility/ambulation  - Relieve pressure over bony prominences  - Avoid friction and shearing  - Provide appropriate hygiene as needed including keeping skin clean and dry  - Evaluate need for skin moisturizer/barrier cream  - Collaborate with interdisciplinary team   - Patient/family teaching  - Consider wound care consult   Outcome: Progressing     Problem: Nutrition/Hydration-ADULT  Goal: Nutrient/Hydration intake appropriate for improving, restoring or maintaining nutritional needs  Description  Monitor and assess patient's nutrition/hydration status for malnutrition  Collaborate with interdisciplinary team and initiate plan and interventions as ordered  Monitor patient's weight and dietary intake as ordered or per policy  Determine patient's food preferences and provide high-protein, high-caloric foods as appropriate       INTERVENTIONS:  - Monitor oral intake, urinary output, labs, and treatment plans  - Assess nutrition and hydration status and recommend course of action  - Evaluate amount of meals eaten  - Assist patient with eating if necessary   - Allow adequate time for meals  - Recommend/ encourage appropriate diets, oral nutritional supplements, and vitamin/mineral supplements  - Order, calculate, and assess calorie counts as needed  - Recommend, monitor, and adjust tube feedings  based on assessed needs  - Assess need for intravenous fluids  - Provide nutrition/hydration education as appropriate  - Include patient/family/caregiver in decisions related to nutrition    Outcome: Progressing     Problem: METABOLIC, FLUID AND ELECTROLYTES - ADULT  Goal: Electrolytes maintained within normal limits  Description  INTERVENTIONS:  - Monitor labs and assess patient for signs and symptoms of electrolyte imbalances  - Administer electrolyte replacement as ordered  - Monitor response to electrolyte replacements, including repeat lab results as appropriate  - Instruct patient on fluid and nutrition as appropriate  Outcome: Progressing  Goal: Fluid balance maintained  Description  INTERVENTIONS:  - Monitor labs   - Monitor I/O and WT  - Instruct patient on fluid and nutrition as appropriate  - Assess for signs & symptoms of volume excess or deficit  Outcome: Progressing     Problem: GENITOURINARY - ADULT  Goal: Maintains or returns to baseline urinary function  Description  INTERVENTIONS:  - Assess urinary function  - Encourage oral fluids to ensure adequate hydration if ordered  - Administer IV fluids as ordered to ensure adequate hydration  - Administer ordered medications as needed  - Offer frequent toileting  - Follow urinary retention protocol if ordered  Outcome: Progressing     Problem: SKIN/TISSUE INTEGRITY - ADULT  Goal: Skin integrity remains intact  Description  INTERVENTIONS  - Identify patients at risk for skin breakdown  - Assess and monitor skin integrity  - Assess and monitor nutrition and hydration status  - Monitor labs (i e  albumin)  - Assess for incontinence   - Turn and reposition patient  - Assist with mobility/ambulation  - Relieve pressure over bony prominences  - Avoid friction and shearing  - Provide appropriate hygiene as needed including keeping skin clean and dry  - Evaluate need for skin moisturizer/barrier cream  - Collaborate with interdisciplinary team (i e  Nutrition, Rehabilitation, etc )   - Patient/family teaching  Outcome: Progressing  Goal: Incision(s), wounds(s) or drain site(s) healing without S/S of infection  Description  INTERVENTIONS  - Assess and document risk factors for skin impairment   - Assess and document dressing, incision, wound bed, drain sites and surrounding tissue  - Consider nutrition services referral as needed  - Oral mucous membranes remain intact  - Provide patient/ family education  Outcome: Progressing

## 2019-09-23 NOTE — PHYSICAL THERAPY NOTE
PT Progress Note (10min)  (11:20-11:30)       09/23/19 1130   Pain Assessment   Pain Assessment No/denies pain   Restrictions/Precautions   Weight Bearing Precautions Per Order No   Other Precautions Multiple lines;Telemetry;O2;Fall Risk  (jorge)   General   Chart Reviewed Yes   Response to Previous Treatment Patient with no complaints from previous session  Family/Caregiver Present No   Cognition   Orientation Level Oriented X4   Subjective   Subjective pt resting comfortably upon arriva  reports increased fatigue  Bed Mobility   Additional Comments performed long sitting max (A)x2 x20sec; pt very limited by fatigue  Transfers   Sit to Stand Unable to assess  (2* lethargy)   Ambulation/Elevation   Gait pattern Not tested   Endurance Deficit   Endurance Deficit Yes   Activity Tolerance   Activity Tolerance Patient limited by fatigue   Nurse Made Aware RN Dalila Bass present during PT session + aware pt c increased lethargy this session  Exercises   Knee AROM Long Arc Quad Supine;10 reps;AROM; Bilateral  (SLR)   Ankle Pumps Supine;10 reps;AROM; Bilateral   Assessment   Prognosis Fair   Problem List Decreased strength;Decreased endurance; Impaired balance;Decreased mobility;Obesity; Decreased skin integrity   Assessment pt c increased lethargy this session  unable to perform OOB mobility 2* above  performed long sitting max (A)x2 x20 sec for repositioning of bed linens  completed supine AROM ankle pumps + SLR x10 reps  tolerated bed in chair position at end of session  will cont skilled PT to further maximize functional mobility + improve quality of life  upon d/c, recommend STR  Barriers to Discharge Inaccessible home environment   Goals   Patient Goals "to walk around so I can drive"  STG Expiration Date 09/29/19   Treatment Day 2   Plan   Treatment/Interventions Functional transfer training;LE strengthening/ROM; Therapeutic exercise; Endurance training;Patient/family training;Equipment eval/education; Bed mobility;Gait training;Spoke to nursing;Spoke to advanced practitioner   Progress No functional improvements   PT Frequency Other (Comment)  (3-5x/wk)   Recommendation   Recommendation Short-term skilled PT   PT - OK to Discharge Yes  (to STR when stable )     Joleen Dancer, PT, DPT

## 2019-09-23 NOTE — PLAN OF CARE
Problem: PHYSICAL THERAPY ADULT  Goal: Performs mobility at highest level of function for planned discharge setting  See evaluation for individualized goals  Description  Treatment/Interventions: Functional transfer training, LE strengthening/ROM, Therapeutic exercise, Endurance training, Patient/family training, Equipment eval/education, Bed mobility, Continued evaluation, Spoke to nursing, Spoke to advanced practitioner, Spoke to MD          See flowsheet documentation for full assessment, interventions and recommendations  Outcome: Not Progressing  Note:   Prognosis: Fair  Problem List: Decreased strength, Decreased endurance, Impaired balance, Decreased mobility, Obesity, Decreased skin integrity  Assessment: pt c increased lethargy this session  unable to perform OOB mobility 2* above  performed long sitting max (A)x2 x20 sec for repositioning of bed linens  completed supine AROM ankle pumps + SLR x10 reps  tolerated bed in chair position at end of session  will cont skilled PT to further maximize functional mobility + improve quality of life  upon d/c, recommend STR  Barriers to Discharge: Inaccessible home environment     Recommendation: Short-term skilled PT     PT - OK to Discharge: Yes(to STR when stable )    See flowsheet documentation for full assessment

## 2019-09-23 NOTE — UTILIZATION REVIEW
Continued Stay Review    Date: 9/21/19                   Current Patient Class: Inpatient  Current Level of Care: critical care    HPI:59 y o  male initially admitted on 9/6    Assessment/Plan: Slightly worsened overnight; ABG showing respiratory acidosis so placed on BiPap overnight  Lactulose increased  Ammonia elevated  Anemia could be due to reticulocytosis or true macrocytopenia  Check M56, folic acide  GI has signed off, nephrology continues to follow  Continue octreodtide, midodrine, albumin  Due to HD today  PRogress note 9/17: Continues on CVVHD with negative fluids at 7 7L  GCS=15  More alert but still with lethargy  Last data filed at 9/17/2019 1000      Gross per 24 hour   Intake 2887 73 ml   Output 9721 ml   Net -6833 27 ml   Progress 9/18:  Decrease lactuolose to BID and change to oral from enema form  Tolerating diet and po  Consider central IV access PICC vs CVC  /p percutaneous octavio drain on 9/16 - 60ml bilious drainage/24 hours  Continues with sweetie due to hypotenstion  Received 3 units cryopre for fibrinogen <60 and 2 doses 25% ablumin  Lungs diminished  GCS=15, lethargic   +4 generalized pitting edema  Progress 9/19: CVVH was continued at -150ml/hr  Throughout last evening the patient was having increased pressor requirements with Phenylephrine up to 130  He received 1 U PRBC for Hgb 6 8 and 1 U Cryo for fibrinogen <60  + 3 pitting edema  Seen by palliative  Making small strides with substantial fluid loss via CVVH  No pain  Progress 9/20:  Stood 2 minutes with PT on first attempt out of bed in 1 5 weeks  Continue po lactulose  Daily PT  CVV h was stopped and he received 120mg IV lasix challenge with no urine output  Weaned off phyenylephrine  He has a soft hematoma on hi neck from TLC insertion  Abdominal distention with decreased bs  GCS=15  MELD score is 39  Due for HD today  Thaddeus Osler following with hopeful transfer if able to stay off pressors 9/22    Placed on bipap for shortness of breath  No making any urine  Awake and alert, not comfortable on bipap  Transitioned to [facility] later in the day  Tolerating with pulse ox 95%  Progress 9/21: Approved for transfer to [facility] by insurance 9/22=9/26  PROceduRe note 9/16:  Findings: Successful cholecystostomy tube placement using 8 5 Fr catheter  Anesthesia: Local  Pertinent Labs/Diagnostic Results:   CXR 9/18:  Stable vascular congestion  No pneumothorax after left IJ catheter placement terminating in the SVC  CXR 9/17: Nasogastric tube is in the stomach  kUB 9/17: Limited evaluation with nasogastric tube better visualized on subsequent chest x-rays  Ref Range & Units 9/21/19 0436 9/21/19 0015   Ammonia 79 48     Results from last 7 days   Lab Units 09/21/19  0436 09/21/19  0015 09/20/19  0437 09/17/19  0531   WBC Thousand/uL 6 83 6 99 7 98 25 71*   HEMOGLOBIN g/dL 6 8* 6 8* 6 7* 7 1*   HEMATOCRIT % 22 1* 22 3* 21 6* 23 0*   PLATELETS Thousands/uL 18* 19* 18* 37*   NEUTROS ABS Thousands/µL  --  4 31 5 17  --    BANDS PCT %  --   --   --  5    < > = values in this interval not displayed         Results from last 7 days   Lab Units 09/21/19  0436 09/21/19  0015 09/20/19  0437 09/19/19  0524 09/19/19  0031 09/18/19  1817   SODIUM mmol/L 135* 137 135* 136 139 139   POTASSIUM mmol/L 3 9 3 8 4 1 4 1 3 9 4 0   CHLORIDE mmol/L 102 102 101 103 104 103   CO2 mmol/L 26 26 26 27 27 26   ANION GAP mmol/L 7 9 8 6 8 10   BUN mg/dL 24 23 16 9 11 11   CREATININE mg/dL 4 21* 4 00* 2 92* 1 78* 1 82* 1 88*   EGFR ml/min/1 73sq m 15 15 23 41 40 39   CALCIUM mg/dL 8 9 8 9 9 0 9 2 9 2 9 1   CALCIUM, IONIZED mmol/L 1 16  --  1 16 1 17 1 17 1 19   MAGNESIUM mg/dL 2 0 2 1 2 0 1 8 1 9 2 0   PHOSPHORUS mg/dL 2 8 2 6* 1 7* 1 9* 1 9* 1 9*     Results from last 7 days   Lab Units 09/21/19  0436 09/21/19  0015 09/20/19  0437 09/19/19  0524 09/18/19  0732 09/17/19  0531   AST U/L 61* 63* 69* 67* 63* 57*   ALT U/L 39 39 38 37 36 32   ALK PHOS U/L 76 73 74 73 73 77   TOTAL PROTEIN g/dL 6 5 6 6 6 4 6 4 6 5 6 6   ALBUMIN g/dL 3 6 3 7 3 5 3 3* 3 2* 3 2*   TOTAL BILIRUBIN mg/dL 7 00* 7 30* 7 80* 9 20* 8 50* 8 60*   BILIRUBIN DIRECT mg/dL  --   --  3 11* 3 39* 3 41* 3 54*   AMMONIA umol/L 73* 48*  --   --   --  39*         Results from last 7 days   Lab Units 09/21/19  0436 09/21/19  0015 09/20/19  0437 09/19/19  0524 09/19/19  0031 09/18/19  1817 09/18/19  1213 09/18/19  0428   GLUCOSE RANDOM mg/dL 113 122 103 101 115 120 131 119     Results from last 7 days   Lab Units 09/21/19  0430 09/21/19  0019 09/20/19  2134   PH ART  7 313* 7 298* 7 319*   PCO2 ART mm Hg 45 0* 49 6* 47 1*   PO2 ART mm Hg 185 3* 194 0* 71 2*   HCO3 ART mmol/L 22 3 23 7 23 7   BASE EXC ART mmol/L -3 7 -2 7 -2 4   O2 CONTENT ART mL/dL 10 9* 11 3* 10 1*   O2 HGB, ARTERIAL % 97 5* 97 6* 92 1*   ABG SOURCE  Line, Arterial Line, Arterial Line, Arterial     Results from last 7 days   Lab Units 09/21/19  0436 09/17/19  0531   PROTIME seconds 28 1* 32 8*   INR  2 59* 3 15*   PTT seconds  --  74*    < > = values in this interval not displayed  Results from last 7 days   Lab Units 09/16/19  1443 09/16/19  1031 09/16/19  1028   BLOOD CULTURE   --  No Growth After 5 Days  No Growth After 5 Days     GRAM STAIN RESULT  No Polys  No organisms seen  --   --    BODY FLUID CULTURE, STERILE  No growth  --   --      Results from last 7 days   Lab Units 09/17/19  0531   TOTAL COUNTED  100     Vital Signs:      Date/Time Temp Pulse Resp BP MAP (mmHg) Arterial Line BP MAP SpO2 O2 Device   09/23/19 1000  63 18 107/53 74       09/23/19 0200  61 12 98/52 72   98 %    09/23/19 0035        97 %    09/23/19 0000  73 13 95/54 71   98 %    09/22/19 1400  71 20   117/46 64 mmHg 97 %    09/22/19 1300  65 14   117/43 62 mmHg 97 %    09/22/19 1200  69 25Abnormal    115/35 57 mmHg 97 %    09/22/19 1100 98 °F (36 7 °C) 70 22   122/45 63 mmHg 97 %    09/22/19 1000  72 24Abnormal    109/43 61 mmHg 96 %    09/22/19 0800  62 15 96/45Abnormal  65 108/39 56 mmHg 96 %    09/22/19 0700  63 19   111/40 58 mmHg 96 %    09/22/19 0642 97 8 °F (36 6 °C) 63 17   111/40 57 mmHg 96 %    09/22/19 0330  75 16 95/49Abnormal  68 107/43 58 mmHg 98 %    09/22/19 0200  69 17 90/52 67 101/36 55 mmHg 98 %    09/21/19 2353 98 1 °F (36 7 °C) 68 23Abnormal  106/41Abnormal     98 % Other (comment)    O2 Device: bi pap at 09/21/19 2353   09/21/19 1900 97 7 °F (36 5 °C) 64 21 99/49Abnormal  70 104/45 61 mmHg 97 %    09/21/19 1800  64 28Abnormal  97/50 71 132/41 61 mmHg 99 %    09/21/19 1700  68 29Abnormal  94/45Abnormal  65 115/45 67 mmHg 100 %    09/21/19 1500  69 14   116/48      09/21/19 1430  70 17   110/46      09/21/19 1400  72 27Abnormal    98/42      09/21/19 1100  60 19   107/45 64 mmHg 94 %    09/21/19 1055        96 % Nasal cannula   09/21/19 0900  62 30Abnormal    122/50 73 mmHg 99 %    09/21/19 0800 98 3 °F (36 8 °C) 55 15   109/46 64 mmHg 99 %    09/21/19 0700  52Abnormal  8Abnormal    112/48 65 mmHg 98 %    09/21/19 0600  59 15 87/49Abnormal  65 129/56 75 mmHg 98 %    09/21/19 0500  63 22 76/41Abnormal  53 104/45 65 mmHg 99 %    O2 Device: BiPap at 09/21/19 0400   09/21/19 0022        99 % Other (comment)   09/21/19 0000  60 18 92/52 69 106/43 62 mmHg 99       Medications:   Scheduled Meds:   Current Facility-Administered Medications:  albumin human 25 g Intravenous Q1H PRN   cyanocobalamin 50 mcg Oral Daily   epoetin carla 10,000 Units Intravenous Once   folic acid 065 mcg Oral Daily   lactulose 20 g Oral TID   midodrine 15 mg Oral TID AC   nystatin  Topical BID   octreotide 100 mcg Subcutaneous Q8H [address]   ondansetron 4 mg Intravenous Q6H PRN   pantoprazole 40 mg Intravenous BID   rifaximin 550 mg Oral Q12H [address]       Discharge Plan: TBD pending available bed at [facility] Review Department  Phone: [phone];  Fax [phone]  [email or web address]  ATTENTION: Please call with any questions or concerns to [phone]  and carefully listen to the prompts so that you are directed to the right person  Send all requests for admission clinical reviews, approved or denied determinations and any other requests to fax [phone]   All voicemails are confidential

## 2019-09-24 LAB
ALBUMIN SERPL BCP-MCNC: 3.6 G/DL (ref 3.5–5)
ALP SERPL-CCNC: 73 U/L (ref 46–116)
ALT SERPL W P-5'-P-CCNC: 34 U/L (ref 12–78)
AMMONIA PLAS-SCNC: 73 UMOL/L (ref 11–35)
ANION GAP SERPL CALCULATED.3IONS-SCNC: 10 MMOL/L (ref 4–13)
AST SERPL W P-5'-P-CCNC: 51 U/L (ref 5–45)
BASOPHILS # BLD AUTO: 0.02 THOUSANDS/ΜL (ref 0–0.1)
BASOPHILS NFR BLD AUTO: 0 % (ref 0–1)
BILIRUB SERPL-MCNC: 8 MG/DL (ref 0.2–1)
BUN SERPL-MCNC: 25 MG/DL (ref 5–25)
CALCIUM SERPL-MCNC: 8.9 MG/DL (ref 8.3–10.1)
CHLORIDE SERPL-SCNC: 99 MMOL/L (ref 100–108)
CO2 SERPL-SCNC: 28 MMOL/L (ref 21–32)
CREAT SERPL-MCNC: 5.16 MG/DL (ref 0.6–1.3)
EOSINOPHIL # BLD AUTO: 0.28 THOUSAND/ΜL (ref 0–0.61)
EOSINOPHIL NFR BLD AUTO: 4 % (ref 0–6)
ERYTHROCYTE [DISTWIDTH] IN BLOOD BY AUTOMATED COUNT: 23.9 % (ref 11.6–15.1)
GFR SERPL CREATININE-BSD FRML MDRD: 11 ML/MIN/1.73SQ M
GLUCOSE SERPL-MCNC: 114 MG/DL (ref 65–140)
HCT VFR BLD AUTO: 24.4 % (ref 36.5–49.3)
HGB BLD-MCNC: 7.7 G/DL (ref 12–17)
IMM GRANULOCYTES # BLD AUTO: 0.18 THOUSAND/UL (ref 0–0.2)
IMM GRANULOCYTES NFR BLD AUTO: 3 % (ref 0–2)
INR PPP: 2.82 (ref 0.84–1.19)
LYMPHOCYTES # BLD AUTO: 0.41 THOUSANDS/ΜL (ref 0.6–4.47)
LYMPHOCYTES NFR BLD AUTO: 6 % (ref 14–44)
MAGNESIUM SERPL-MCNC: 2 MG/DL (ref 1.6–2.6)
MCH RBC QN AUTO: 35.5 PG (ref 26.8–34.3)
MCHC RBC AUTO-ENTMCNC: 31.6 G/DL (ref 31.4–37.4)
MCV RBC AUTO: 112 FL (ref 82–98)
MONOCYTES # BLD AUTO: 0.99 THOUSAND/ΜL (ref 0.17–1.22)
MONOCYTES NFR BLD AUTO: 15 % (ref 4–12)
NEUTROPHILS # BLD AUTO: 4.88 THOUSANDS/ΜL (ref 1.85–7.62)
NEUTS SEG NFR BLD AUTO: 72 % (ref 43–75)
NRBC BLD AUTO-RTO: 0 /100 WBCS
PHOSPHATE SERPL-MCNC: 3.2 MG/DL (ref 2.7–4.5)
PLATELET # BLD AUTO: 19 THOUSANDS/UL (ref 149–390)
PMV BLD AUTO: 11.2 FL (ref 8.9–12.7)
POTASSIUM SERPL-SCNC: 3.1 MMOL/L (ref 3.5–5.3)
PROT SERPL-MCNC: 6.5 G/DL (ref 6.4–8.2)
PROTHROMBIN TIME: 30 SECONDS (ref 11.6–14.5)
RBC # BLD AUTO: 2.17 MILLION/UL (ref 3.88–5.62)
SODIUM SERPL-SCNC: 137 MMOL/L (ref 136–145)
WBC # BLD AUTO: 6.76 THOUSAND/UL (ref 4.31–10.16)

## 2019-09-24 PROCEDURE — 94760 N-INVAS EAR/PLS OXIMETRY 1: CPT

## 2019-09-24 PROCEDURE — 80053 COMPREHEN METABOLIC PANEL: CPT | Performed by: NURSE PRACTITIONER

## 2019-09-24 PROCEDURE — 97116 GAIT TRAINING THERAPY: CPT

## 2019-09-24 PROCEDURE — 85025 COMPLETE CBC W/AUTO DIFF WBC: CPT | Performed by: NURSE PRACTITIONER

## 2019-09-24 PROCEDURE — 84100 ASSAY OF PHOSPHORUS: CPT | Performed by: NURSE PRACTITIONER

## 2019-09-24 PROCEDURE — 99233 SBSQ HOSP IP/OBS HIGH 50: CPT | Performed by: INTERNAL MEDICINE

## 2019-09-24 PROCEDURE — 85610 PROTHROMBIN TIME: CPT | Performed by: PHYSICIAN ASSISTANT

## 2019-09-24 PROCEDURE — C9113 INJ PANTOPRAZOLE SODIUM, VIA: HCPCS | Performed by: NURSE PRACTITIONER

## 2019-09-24 PROCEDURE — 82140 ASSAY OF AMMONIA: CPT | Performed by: PHYSICIAN ASSISTANT

## 2019-09-24 PROCEDURE — 83735 ASSAY OF MAGNESIUM: CPT | Performed by: NURSE PRACTITIONER

## 2019-09-24 PROCEDURE — 99233 SBSQ HOSP IP/OBS HIGH 50: CPT | Performed by: ANESTHESIOLOGY

## 2019-09-24 RX ORDER — POTASSIUM CHLORIDE 20MEQ/15ML
40 LIQUID (ML) ORAL ONCE
Status: COMPLETED | OUTPATIENT
Start: 2019-09-24 | End: 2019-09-24

## 2019-09-24 RX ADMIN — MIDODRINE HYDROCHLORIDE 15 MG: 5 TABLET ORAL at 18:03

## 2019-09-24 RX ADMIN — OCTREOTIDE ACETATE 100 MCG: 100 INJECTION, SOLUTION INTRAVENOUS; SUBCUTANEOUS at 05:28

## 2019-09-24 RX ADMIN — RIFAXIMIN 550 MG: 550 TABLET ORAL at 05:00

## 2019-09-24 RX ADMIN — MIDODRINE HYDROCHLORIDE 15 MG: 5 TABLET ORAL at 08:39

## 2019-09-24 RX ADMIN — OCTREOTIDE ACETATE 100 MCG: 100 INJECTION, SOLUTION INTRAVENOUS; SUBCUTANEOUS at 22:00

## 2019-09-24 RX ADMIN — NYSTATIN: 100000 CREAM TOPICAL at 18:04

## 2019-09-24 RX ADMIN — POTASSIUM CHLORIDE 40 MEQ: 20 SOLUTION ORAL at 08:39

## 2019-09-24 RX ADMIN — MIDODRINE HYDROCHLORIDE 15 MG: 5 TABLET ORAL at 12:23

## 2019-09-24 RX ADMIN — LACTULOSE 20 G: 10 SOLUTION ORAL at 22:00

## 2019-09-24 RX ADMIN — VITAM B12 50 MCG: 100 TAB at 08:39

## 2019-09-24 RX ADMIN — LACTULOSE 20 G: 10 SOLUTION ORAL at 18:03

## 2019-09-24 RX ADMIN — PANTOPRAZOLE SODIUM 40 MG: 40 INJECTION, POWDER, LYOPHILIZED, FOR SOLUTION INTRAVENOUS at 18:03

## 2019-09-24 RX ADMIN — OCTREOTIDE ACETATE 100 MCG: 100 INJECTION, SOLUTION INTRAVENOUS; SUBCUTANEOUS at 18:06

## 2019-09-24 RX ADMIN — NYSTATIN: 100000 CREAM TOPICAL at 08:38

## 2019-09-24 RX ADMIN — LACTULOSE 20 G: 10 SOLUTION ORAL at 08:39

## 2019-09-24 RX ADMIN — RIFAXIMIN 550 MG: 550 TABLET ORAL at 18:04

## 2019-09-24 RX ADMIN — Medication 400 MCG: at 08:39

## 2019-09-24 RX ADMIN — PANTOPRAZOLE SODIUM 40 MG: 40 INJECTION, POWDER, LYOPHILIZED, FOR SOLUTION INTRAVENOUS at 08:39

## 2019-09-24 NOTE — PLAN OF CARE
Problem: Nutrition/Hydration-ADULT  Goal: Nutrient/Hydration intake appropriate for improving, restoring or maintaining nutritional needs  Description  Monitor and assess patient's nutrition/hydration status for malnutrition  Collaborate with interdisciplinary team and initiate plan and interventions as ordered  Monitor patient's weight and dietary intake as ordered or per policy  Determine patient's food preferences and provide high-protein, high-caloric foods as appropriate       INTERVENTIONS:  - Monitor oral intake, urinary output, labs, and treatment plans  - Assess nutrition and hydration status and recommend course of action  - Evaluate amount of meals eaten  - Assist patient with eating if necessary   - Allow adequate time for meals  - Recommend/ encourage appropriate diets, oral nutritional supplements, and vitamin/mineral supplements  - Order, calculate, and assess calorie counts as needed  - Recommend, monitor, and adjust tube feedings  based on assessed needs  - Assess need for intravenous fluids  - Provide nutrition/hydration education as appropriate  - Include patient/family/caregiver in decisions related to nutrition    Outcome: Progressing  Slow progressing due to decreased appetite/intake

## 2019-09-24 NOTE — PROGRESS NOTES
RD recommends liberalize diet to RALPH 1000 mL fluid restriction due to poor/inadequate meal intake

## 2019-09-24 NOTE — SOCIAL WORK
CM left message with [id number] to update authorization for [facility]  We are still waiting for a bed so pt can be transferred

## 2019-09-24 NOTE — HEMODIALYSIS
HD completed as ordered, good BFR from catheter  Pt tolerated treatment well    Pre weight 167 0kg  Post weight 163 7Kg     U F 3 3Kg

## 2019-09-24 NOTE — ASSESSMENT & PLAN NOTE
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

## 2019-09-24 NOTE — PLAN OF CARE
Problem: PHYSICAL THERAPY ADULT  Goal: Performs mobility at highest level of function for planned discharge setting  See evaluation for individualized goals  Description  Treatment/Interventions: Functional transfer training, LE strengthening/ROM, Therapeutic exercise, Endurance training, Patient/family training, Equipment eval/education, Bed mobility, Continued evaluation, Spoke to nursing, Spoke to advanced practitioner, Spoke to MD          See flowsheet documentation for full assessment, interventions and recommendations  Outcome: Progressing  Note:   Prognosis: Fair  Problem List: Decreased strength, Decreased endurance, Impaired balance, Decreased mobility, Obesity, Decreased skin integrity  Assessment: pt demonstrating progress c PT  OOB in chair upon arrival  completed mobility tasks min-mod (A)x2  progressed ambulation distance to 5' c jorge RW min (A)x2 limited by fatigue  able to tolerate standing x2min c support of RW for back pericare  returned to chair at end of session c all needs in reach  will cont skilled PT to maximize functional mobility + improve quality of life  upon d/c, recommend STR  Barriers to Discharge: Inaccessible home environment     Recommendation: Short-term skilled PT     PT - OK to Discharge: Yes    See flowsheet documentation for full assessment

## 2019-09-24 NOTE — PROGRESS NOTES
NEPHROLOGY PROGRESS NOTE    Patient: Sahra Dow               Sex: male          DOA: 9/6/2019  5:35 PM   YOB: 1960        Age:  63 y o         LOS:  LOS: 18 days       HPI     Patient with acute kidney injury and cirrhosis of liver    SUBJECTIVE     Awake and alert  Denies any complaint  Out of bed to the chair    No shortness of breath no chest pain no palpitation    CURRENT MEDICATIONS       Current Facility-Administered Medications:     albumin human (FLEXBUMIN) 25 % injection 25 g, 25 g, Intravenous, Q1H PRN, Aida Sosa MD, Stopped at 09/22/19 0301    cyanocobalamin (VITAMIN B-12) tablet 50 mcg, 50 mcg, Oral, Daily, MARKOS Harding, 50 mcg at 09/24/19 0839    epoetin carla (EPOGEN,PROCRIT) injection 10,000 Units, 10,000 Units, Intravenous, Once, MD Terrell Yusuf  [START ON 9/25/2019] epoetin carla (EPOGEN,PROCRIT) injection 10,000 Units, 10,000 Units, Intravenous, Once, Aida Sosa MD    folic acid (FOLVITE) tablet 400 mcg, 400 mcg, Oral, Daily, MARKOS Harding, 400 mcg at 09/24/19 0839    lactulose 20 g/30 mL oral solution 20 g, 20 g, Oral, TID, Violette Benjamin PA-C, 20 g at 09/24/19 0839    midodrine (PROAMATINE) tablet 15 mg, 15 mg, Oral, TID AC, MARKOS Alba, 15 mg at 09/24/19 3354    nystatin (MYCOSTATIN) cream, , Topical, BID, Isabel Lewis PA-C    octreotide (SandoSTATIN) injection 100 mcg, 100 mcg, Subcutaneous, Q8H [address], Violette Benjamin PA-C, 100 mcg at 09/24/19 0528    ondansetron (ZOFRAN) injection 4 mg, 4 mg, Intravenous, Q6H PRN, Rosamaria Libman, CRNP, 4 mg at 09/15/19 2142    pantoprazole (PROTONIX) injection 40 mg, 40 mg, Intravenous, BID, Rosamaria Libman, CRNP, 40 mg at 09/24/19 7011    rifaximin (XIFAXAN) tablet 550 mg, 550 mg, Oral, Q12H [address], Violette Benjamin PA-C, 550 mg at 09/24/19 0500    OBJECTIVE     Current Weight: Weight - Scale: (!) 167 kg (368 lb 6 2 oz)  Vitals:    09/24/19 0700   BP: 107/52   Pulse: 61   Resp: 20 Temp: 97 9 °F (36 6 °C)   SpO2: 100%       Intake/Output Summary (Last 24 hours) at 9/24/2019 1056  Last data filed at 9/24/2019 0600  Gross per 24 hour   Intake 1010 ml   Output 3830 ml   Net -2820 ml       PHYSICAL EXAMINATION     Physical Exam   Constitutional: He is oriented to person, place, and time  He appears well-developed  No distress  HENT:   Head: Normocephalic  Mouth/Throat: Oropharynx is clear and moist    Eyes: Conjunctivae are normal  No scleral icterus  Neck: Neck supple  No JVD present  Cardiovascular: Normal rate and normal heart sounds  Pulmonary/Chest: Effort normal  He has no wheezes  Abdominal: Soft  There is no tenderness  Musculoskeletal: Normal range of motion  He exhibits no edema  Neurological: He is alert and oriented to person, place, and time  Skin: Skin is warm  No rash noted  Psychiatric: He has a normal mood and affect   His behavior is normal         LAB RESULTS     Results from last 7 days   Lab Units 09/24/19  0534 09/24/19  0506 09/23/19  0437 09/22/19  0437 09/21/19  0436 09/21/19  0015 09/20/19  0437 09/19/19  0524   WBC Thousand/uL  --  6 76 9 07 6 47 6 83 6 99 7 98 8 55   HEMOGLOBIN g/dL  --  7 7* 7 7* 6 7* 6 8* 6 8* 6 7* 7 0*   HEMATOCRIT %  --  24 4* 24 7* 21 2* 22 1* 22 3* 21 6* 22 7*   PLATELETS Thousands/uL  --  19* 24* 16* 18* 19* 18* 22*   POTASSIUM mmol/L 3 1*  --  3 3* 3 3* 3 9 3 8 4 1 4 1   CHLORIDE mmol/L 99*  --  98* 100 102 102 101 103   CO2 mmol/L 28  --  28 31 26 26 26 27   BUN mg/dL 25  --  28* 21 24 23 16 9   CREATININE mg/dL 5 16*  --  5 54* 4 21* 4 21* 4 00* 2 92* 1 78*   EGFR ml/min/1 73sq m 11  --  10 15 15 15 23 41   CALCIUM mg/dL 8 9  --  8 9 8 8 8 9 8 9 9 0 9 2   MAGNESIUM mg/dL 2 0  --  2 1 1 9 2 0 2 1 2 0 1 8   PHOSPHORUS mg/dL 3 2  --  3 3 3 0 2 8 2 6* 1 7* 1 9*       RADIOLOGY RESULTS      Results for orders placed during the hospital encounter of 09/06/19   CXR 1 view PA portable stat    Narrative CHEST     INDICATION:   line placement RIJ Hemodialysis catheter  COMPARISON:  September 6, 2019    EXAM PERFORMED/VIEWS:  XR CHEST PORTABLE  2 images    FINDINGS:  Lungs are suboptimally aerated  Elevation of right hemidiaphragm  Cardiomegaly with diminishing pulmonary edema  Persistent prominence of the pulmonary vasculature in the left superior hilum  Right jugular central line tip in the upper SVC  No pneumothorax  Osseous structures appear within normal limits for patient age  Old fracture of right 5th rib  Questionable fracture right 6th rib  Impression Persistent cardiomegaly  Improving pulmonary edema  Satisfactory right jugular central line placement  No pneumothorax  Right rib fractures  Workstation performed: [id number]       Results for orders placed during the hospital encounter of 09/06/19   XR chest 2 views    Narrative CHEST     INDICATION:   SOB     COMPARISON:  8/15/2019    EXAM PERFORMED/VIEWS:  XR CHEST PA & LATERAL  2 views    FINDINGS:  Persistent moderate cardiomegaly  Development of diffuse vascular congestion    The lungs are otherwise clear  No pneumothorax or pleural effusion  Osseous structures appear within normal limits for patient age  Impression Persistent cardiomegaly    Increased diffuse vascular congestion        Workstation performed: [id number]       No results found for this or any previous visit  No results found for this or any previous visit  Results for orders placed during the hospital encounter of 08/15/19   CT abdomen pelvis wo contrast    Narrative CT ABDOMEN AND PELVIS WITHOUT IV CONTRAST    INDICATION:   Abdominal distension  History of cirrhosis  Testicular swelling  COMPARISON:  6/7/2019    TECHNIQUE:  CT examination of the abdomen and pelvis was performed without intravenous contrast   Axial, sagittal, and coronal 2D reformatted images were created from the source data and submitted for interpretation       Radiation dose length product (DLP) for this visit:  3175 mGy-cm   This examination, like all CT scans performed in the [facility], was performed utilizing techniques to minimize radiation dose exposure, including the use of iterative   reconstruction and automated exposure control  No oral contrast     FINDINGS:    ABDOMEN    LOWER CHEST:  Small right-sided pleural effusion noted    LIVER/BILIARY TREE:  Changes of severe hepatic cirrhosis identified  There is no obvious focal mass appreciated on this unenhanced examination  GALLBLADDER:  There are gallstone(s) within the gallbladder, without pericholecystic inflammatory changes  SPLEEN:  The spleen is enlarged, measuring almost 20 cm in cephalocaudad length    PANCREAS:  Limited assessment without IV contrast   Although there is peripancreatic edema, this also involves the root of the mesentery, and was also present on the 6/7/2019 study  It is not felt to be related to pancreatitis  ADRENAL GLANDS:  Unremarkable  KIDNEYS/URETERS:  Bilateral nonobstructing nephrolithiasis  The largest individual calculus is seen in the right interpolar aspect measuring approximately 7 mm  STOMACH AND BOWEL:  Unremarkable  APPENDIX:  No findings to suggest appendicitis  ABDOMINOPELVIC CAVITY:  Perihepatic ascites  Retroperitoneal and mesenteric root edema  VESSELS:  No AAA    PELVIS    REPRODUCTIVE ORGANS:  Unremarkable for patient's age  URINARY BLADDER:  Unremarkable  ABDOMINAL WALL/INGUINAL REGIONS:  There is diffuse body wall edema  The scrotum is diffusely enlarged and of water density, which may represent a combination of both hydroceles and scrotal wall edema  Dilated anterior abdominal wall collateral vessels  OSSEOUS STRUCTURES:  Advanced multilevel degenerative changes throughout the lumbar spine  Grade 2 anterolisthesis L4-L5      Impression 1  Appearance of the liver consistent with severe cirrhotic change    Splenomegaly an additional findings suggestive of portal hypertension  2  Small amount of ascites  Diffuse body wall edema  Markedly enlargement of the scrotum likely related to hydroceles and scrotal wall edema and small right-sided pleural effusion  Retroperitoneal and mesenteric root edema  Findings most consistent   with anasarca  3  No evidence of bowel obstruction  4  Bilateral nephrolithiasis but no evidence of hydronephrosis  5  Cholelithiasis          Workstation performed: [id number]       No results found for this or any previous visit  PLAN / RECOMMENDATIONS      Acute kidney injury:  Seems to be dialysis dependent  Had dialysis yesterday and tolerated very well  Will continue to provide dialysis support on intermittent basis    Cirrhosis of liver:  Does have end-stage and will require liver transplant  Has been accepted at [facility] waiting for a bed    Sleep apnea:  Require CPAP at night    Overall prognosis guarded    Sanford Baumgarten, MD  Nephrology  9/24/2019        Portions of the record may have been created with voice recognition software  Occasional wrong word or "sound a like" substitutions may have occurred due to the inherent limitations of voice recognition software  Read the chart carefully and recognize, using context, where substitutions have occurred

## 2019-09-24 NOTE — ASSESSMENT & PLAN NOTE
- Gastroenterology has signed off and nephrology continues to follow  - Continue octreotide, midodrine, albumin  - HD M/W/F, dialyzed yesterday

## 2019-09-24 NOTE — ASSESSMENT & PLAN NOTE
- Hepatic encephalopathy  - Lactulose TID  - Serial neuro exams     - Encourage good sleep hygiene  Avoid sedating medications

## 2019-09-24 NOTE — PHYSICAL THERAPY NOTE
PT Progress Note (13min)  (10:33-10:46)       09/24/19 1046   Pain Assessment   Pain Assessment No/denies pain   Restrictions/Precautions   Weight Bearing Precautions Per Order No   Other Precautions Chair Alarm; Bed Alarm;Multiple lines;Telemetry;O2;Fall Risk  (jorge)   General   Chart Reviewed Yes   Response to Previous Treatment Patient with no complaints from previous session  Family/Caregiver Present No   Cognition   Orientation Level Oriented X4   Subjective   Subjective pt OOB in chair upon arrival  agreeable to PT session  "I'm much better than yesterday"  Bed Mobility   Supine to Sit Unable to assess  (pt OOB in chair upon arrival)   Transfers   Sit to Stand 3  Moderate assistance   Additional items Assist x 2;Armrests; Verbal cues; Increased time required   Stand to Sit 3  Moderate assistance   Additional items Assist x 2;Armrests; Verbal cues; Increased time required   Ambulation/Elevation   Gait pattern Wide YUMIKO; Decreased foot clearance; Short stride; Excessively slow   Gait Assistance 4  Minimal assist   Additional items Assist x 2;Verbal cues   Assistive Device Bariatric Rolling walker   Distance 5'   Stair Management Assistance Not tested   Balance   Static Sitting Fair +   Dynamic Sitting Fair +   Static Standing Poor +  (tolerated standing x2min c RW for pericare)   Dynamic Standing Poor +   Ambulatory Poor +   Endurance Deficit   Endurance Deficit Yes   Activity Tolerance   Activity Tolerance Patient limited by fatigue   Nurse Made Aware MADIE Solorio present + aware pt requires (A)x2 for mobility tasks  Assessment   Prognosis Fair   Problem List Decreased strength;Decreased endurance; Impaired balance;Decreased mobility;Obesity; Decreased skin integrity   Assessment pt demonstrating progress c PT  OOB in chair upon arrival  completed mobility tasks min-mod (A)x2  progressed ambulation distance to 5' c jorge RW min (A)x2 limited by fatigue  able to tolerate standing x2min c support of RW for back pericare  returned to chair at end of session c all needs in reach  will cont skilled PT to maximize functional mobility + improve quality of life  upon d/c, recommend STR  Barriers to Discharge Inaccessible home environment   Goals   Patient Goals "to walk around the room"  STG Expiration Date 09/29/19   PT Treatment Day 3   Plan   Treatment/Interventions Functional transfer training;LE strengthening/ROM; Therapeutic exercise; Endurance training;Patient/family training;Equipment eval/education; Bed mobility;Gait training;Spoke to nursing   Progress Slow progress, decreased activity tolerance   PT Frequency Other (Comment)  (3-5x/wk)   Recommendation   Recommendation Short-term skilled PT   PT - OK to Discharge Yes  (to STR when stable )     Char Alexander, PT, DPT

## 2019-09-24 NOTE — PROGRESS NOTES
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 [facility])  Assessment & Plan  - Meld score 39 as of 9/20     - Continue HD, octreotide, and midodrine for his hepatorenal syndrome  - [facility] following, has been accepted for transfer  Will transfer when bed available  Hepatorenal syndrome [facility])  Assessment & Plan  - Gastroenterology has signed off and nephrology continues to follow  - Continue octreotide, midodrine, albumin  - HD M/W/F, dialyzed yesterday    Encephalopathy acute  Assessment & Plan  - Hepatic encephalopathy  - Lactulose TID  - Serial neuro exams     - Encourage good sleep hygiene  Avoid sedating medications  Anemia  Assessment & Plan  - Secondary to oozing as well as CKD and liver failure  - Macrocytic and elevated RDW      Thrombocytopenia (HCC)  Assessment & Plan  - Related to liver disease     - Transfuse for platelet count less than 10,000 due to high risk for spontaneous bleeding     - Continue to monitor closely  - Holding anticoagulation at present due to risk of bleeding  Hyperbilirubinemia  Assessment & Plan  Continue to monitor  Secondary to ROSALES-related cirrhosis  Coagulopathy ([address] )  Assessment & Plan  - Attributed to his ROSALES related cirrhosis  - Transfuse platelets <66; Cryo for Fibrinogen < 100  - F/u AM INR    Obesity with alveolar hypoventilation (HCC)  Assessment & Plan  - Continue BiPap qHS    Morbid obesity ([address] )  Assessment & Plan  Nutrition consulted    Poor p o  intake    CROW on CPAP  Assessment & Plan  - HS BiPAP    Cholecystitis  Assessment & Plan  - Patient s/p Cholecytostomy tube 9/16  - Continue for now    Essential hypertension  Assessment & Plan  Remains normotensive    ----------------------------------------------------------------------------------------  HPI/24hr events: HD yesterday  No acute overnight events  Slept well overnight, more alert and oriented this morning  Disposition: Transfer to [facility] when bed available  Code Status: Level 1 - Full Code  ---------------------------------------------------------------------------------------  SUBJECTIVE  "I feel okay"    Review of Systems  Review of systems was reviewed and negative unless stated above in HPI/24-hour events   ---------------------------------------------------------------------------------------  OBJECTIVE    Physical Exam   Constitutional: He is oriented to person, place, and time  Vital signs are normal  He appears ill  No distress  Middle aged, obese male  Bipap in place   HENT:   Head: Normocephalic and atraumatic  Eyes: Pupils are equal, round, and reactive to light  Neck: Neck supple  Cardiovascular: Normal rate and regular rhythm  2+ pitting edema of bilateral lower extremities  Diffuse anasarca  Pulmonary/Chest: Effort normal and breath sounds normal    Abdominal: He exhibits no distension  There is no tenderness  Soft, obese  +perc octavio tube   Genitourinary:   Genitourinary Comments: +scrotal edema   Musculoskeletal:   Moving all extremities to command   Neurological: He is alert and oriented to person, place, and time  Skin: He is not diaphoretic  Oozing from CVC and IV sites  Skin warm        Vitals   Vitals:    19 0000 19 0020 19 0400 19 0454   BP: 96/52  98/51    BP Location: Right arm      Pulse: 65  62    Resp: (!) 26  22    Temp: 97 7 °F (36 5 °C)  97 7 °F (36 5 °C)    TempSrc: Axillary  Axillary    SpO2: 99% 99% 98% 97%   Weight:       Height:         Temp (24hrs), Av 6 °F (36 4 °C), Min:97 4 °F (36 3 °C), Max:97 7 °F (36 5 °C)  Current: Temperature: 97 7 °F (36 5 °C)      Invasive/non-invasive ventilation settings   Respiratory Lab Data (Last 4 hours)    None         O2/Vent Data (Last 4 hours)      09/24 0454          Non-Invasive Ventilation Mode BiPAP                   Height and Weights   Height: 5' 11" (180 3 cm)  IBW: 75 3 kg  Body mass index is 51 38 kg/m²  Weight (last 2 days)     Date/Time   Weight    09/23/19 0600   (!) 167 (368 39)    09/22/19 0838   (!) 165 (364 64)                Intake and Output  I/O       09/22 0701 - 09/23 0700 09/23 0701 - 09/24 0700    P  O  580 50    I V  (mL/kg)  500 (3)    Blood 350     Other  500    NG/GT 10     IV Piggyback  100    Total Intake(mL/kg) 940 (5 6) 1150 (6 9)    Urine (mL/kg/hr)  0 (0)    Drains 30 0    Other  3800    Total Output 30 3800    Net +910 -2650          Unmeasured Stool Occurrence 1 x           Nutrition       Diet Orders   (From admission, onward)             Start     Ordered    09/23/19 1012  Dietary nutrition supplements  Once     Question Answer Comment   Select Supplement: Ensure Enlive-Strawberry    Frequency Breakfast, Dinner        09/23/19 1011    09/18/19 1208  Diet Renal; Renal Restrictive; Yes; Fluid Restriction 1000 ML; No  Diet effective now     Question Answer Comment   Diet Type Renal    Renal Renal Restrictive    Should patient have a fluid restriction? Yes    Fluid Restriction Fluid Restriction 1000 ML    Should patient have a protein modifier? No    RD to adjust diet per protocol?  No        09/18/19 1207                Laboratory and Diagnostics:  Results from last 7 days   Lab Units 09/23/19  0437 09/22/19  0437 09/21/19  0436 09/21/19  0015 09/20/19  0437 09/19/19  0524 09/19/19  0031  09/18/19  0428 09/17/19  0531   WBC Thousand/uL 9 07 6 47 6 83 6 99 7 98 8 55  --   --  12 58* 25 71*   HEMOGLOBIN g/dL 7 7* 6 7* 6 8* 6 8* 6 7* 7 0* 7 4*   < > 6 4* 7 1*   HEMATOCRIT % 24 7* 21 2* 22 1* 22 3* 21 6* 22 7* 23 0*   < > 20 5* 23 0*   PLATELETS Thousands/uL 24* 16* 18* 19* 18* 22*  --   --  27* 37*   NEUTROS PCT %  --  71  --  63 64 63  --   --  68  -- BANDS PCT % 5  --   --   --   --   --   --   --   --  5   MONOS PCT %  --  13*  --  14* 14* 14*  --   --  15*  --    MONO PCT % 1*  --   --   --   --   --   --   --   --  6    < > = values in this interval not displayed  Results from last 7 days   Lab Units 09/23/19 0437 09/22/19 0437 09/21/19 0436 09/21/19 0015 09/20/19 0437 09/19/19  0524 09/19/19  0031  09/18/19  0732   SODIUM mmol/L 136 138 135* 137 135* 136 139   < >  --    POTASSIUM mmol/L 3 3* 3 3* 3 9 3 8 4 1 4 1 3 9   < >  --    CHLORIDE mmol/L 98* 100 102 102 101 103 104   < >  --    CO2 mmol/L 28 31 26 26 26 27 27   < >  --    ANION GAP mmol/L 10 7 7 9 8 6 8   < >  --    BUN mg/dL 28* 21 24 23 16 9 11   < >  --    CREATININE mg/dL 5 54* 4 21* 4 21* 4 00* 2 92* 1 78* 1 82*   < >  --    CALCIUM mg/dL 8 9 8 8 8 9 8 9 9 0 9 2 9 2   < >  --    GLUCOSE RANDOM mg/dL 125 110 113 122 103 101 115   < >  --    ALT U/L 34 35 39 39 38 37  --   --  36   AST U/L 50* 51* 61* 63* 69* 67*  --   --  63*   ALK PHOS U/L 71 68 76 73 74 73  --   --  73   ALBUMIN g/dL 3 7 3 8 3 6 3 7 3 5 3 3*  --   --  3 2*   TOTAL BILIRUBIN mg/dL 7 90* 7 30* 7 00* 7 30* 7 80* 9 20*  --   --  8 50*    < > = values in this interval not displayed       Results from last 7 days   Lab Units 09/23/19 0437 09/22/19 0437 09/21/19 0436 09/21/19 0015 09/20/19 0437 09/19/19  0524 09/19/19  0031   MAGNESIUM mg/dL 2 1 1 9 2 0 2 1 2 0 1 8 1 9   PHOSPHORUS mg/dL 3 3 3 0 2 8 2 6* 1 7* 1 9* 1 9*      Results from last 7 days   Lab Units 09/23/19  0746 09/22/19  0437 09/21/19  0436 09/20/19  0437 09/19/19  0524 09/18/19  0637 09/17/19  0531   INR  2 58* 2 96* 2 59* 2 79* 2 91* 2 99* 3 15*   PTT seconds  --   --   --   --   --   --  74*              ABG:  Results from last 7 days   Lab Units 09/21/19  0430   PH ART  7 313*   PCO2 ART mm Hg 45 0*   PO2 ART mm Hg 185 3*   HCO3 ART mmol/L 22 3   BASE EXC ART mmol/L -3 7   ABG SOURCE  Line, Arterial     VBG:  Results from last 7 days   Lab Units 09/21/19  0430   ABG SOURCE  Line, Arterial     Imaging: No new imaging  I have personally reviewed pertinent reports  Active Medications  Scheduled Meds:    Current Facility-Administered Medications:  albumin human 25 g Intravenous Q1H PRN Sabino Vee MD Last Rate: Stopped (09/22/19 0301)   cyanocobalamin 50 mcg Oral Daily MARKOS Harding    epoetin carla 10,000 Units Intravenous Once Sabino Vee MD    folic acid 488 mcg Oral Daily MARKOS Harding    lactulose 20 g Oral TID Casimiro Marquez PA-C    midodrine 15 mg Oral TID AC MARKOS Du    nystatin  Topical BID Bandar Betancourt PA-C    octreotide 100 mcg Subcutaneous [facility] Casimiro Marquez PA-C    ondansetron 4 mg Intravenous Q6H PRN MARKOS Keith    pantoprazole 40 mg Intravenous BID Saima MARKOS Blackwell    rifaximin 550 mg Oral Q12H [address] Casimiro Marquez PA-C      Continuous Infusions:     PRN Meds:     albumin human 25 g Q1H PRN   ondansetron 4 mg Q6H PRN       ---------------------------------------------------------------------------------------  Advance Directive and Living Will:      Power of :    POLST:    ---------------------------------------------------------------------------------------  Counseling / Coordination of Care  Total Critical Care time spent 0 minutes excluding procedures, teaching and family updates          Elis Park PA-C

## 2019-09-25 ENCOUNTER — APPOINTMENT (INPATIENT)
Dept: DIALYSIS | Facility: HOSPITAL | Age: 59
DRG: 177 | End: 2019-09-25
Payer: COMMERCIAL

## 2019-09-25 VITALS
BODY MASS INDEX: 44.1 KG/M2 | HEIGHT: 71 IN | DIASTOLIC BLOOD PRESSURE: 51 MMHG | RESPIRATION RATE: 16 BRPM | SYSTOLIC BLOOD PRESSURE: 110 MMHG | TEMPERATURE: 97.7 F | HEART RATE: 63 BPM | WEIGHT: 315 LBS | OXYGEN SATURATION: 100 %

## 2019-09-25 LAB
ATRIAL RATE: 267 BPM
ATRIAL RATE: 64 BPM
ERYTHROCYTE [DISTWIDTH] IN BLOOD BY AUTOMATED COUNT: 23.7 % (ref 11.6–15.1)
FIBRINOGEN PPP-MCNC: 77 MG/DL (ref 227–495)
HCT VFR BLD AUTO: 26.6 % (ref 36.5–49.3)
HGB BLD-MCNC: 8.3 G/DL (ref 12–17)
INR PPP: 2.67 (ref 0.84–1.19)
MCH RBC QN AUTO: 34.9 PG (ref 26.8–34.3)
MCHC RBC AUTO-ENTMCNC: 31.2 G/DL (ref 31.4–37.4)
MCV RBC AUTO: 112 FL (ref 82–98)
P AXIS: 63 DEGREES
PLATELET # BLD AUTO: 22 THOUSANDS/UL (ref 149–390)
PMV BLD AUTO: 11.2 FL (ref 8.9–12.7)
PR INTERVAL: 194 MS
PROTHROMBIN TIME: 28.7 SECONDS (ref 11.6–14.5)
QRS AXIS: -68 DEGREES
QRS AXIS: -70 DEGREES
QRSD INTERVAL: 124 MS
QRSD INTERVAL: 126 MS
QT INTERVAL: 424 MS
QT INTERVAL: 434 MS
QTC INTERVAL: 447 MS
QTC INTERVAL: 470 MS
RBC # BLD AUTO: 2.38 MILLION/UL (ref 3.88–5.62)
T WAVE AXIS: 93 DEGREES
T WAVE AXIS: 95 DEGREES
VENTRICULAR RATE: 64 BPM
VENTRICULAR RATE: 74 BPM
WBC # BLD AUTO: 9.41 THOUSAND/UL (ref 4.31–10.16)

## 2019-09-25 PROCEDURE — 99233 SBSQ HOSP IP/OBS HIGH 50: CPT | Performed by: INTERNAL MEDICINE

## 2019-09-25 PROCEDURE — C9113 INJ PANTOPRAZOLE SODIUM, VIA: HCPCS | Performed by: NURSE PRACTITIONER

## 2019-09-25 PROCEDURE — 99232 SBSQ HOSP IP/OBS MODERATE 35: CPT | Performed by: INTERNAL MEDICINE

## 2019-09-25 PROCEDURE — 93010 ELECTROCARDIOGRAM REPORT: CPT | Performed by: INTERNAL MEDICINE

## 2019-09-25 PROCEDURE — NC001 PR NO CHARGE: Performed by: PHYSICIAN ASSISTANT

## 2019-09-25 PROCEDURE — 94660 CPAP INITIATION&MGMT: CPT

## 2019-09-25 PROCEDURE — 85610 PROTHROMBIN TIME: CPT | Performed by: ANESTHESIOLOGY

## 2019-09-25 PROCEDURE — 99238 HOSP IP/OBS DSCHRG MGMT 30/<: CPT | Performed by: NURSE PRACTITIONER

## 2019-09-25 PROCEDURE — 85384 FIBRINOGEN ACTIVITY: CPT | Performed by: ANESTHESIOLOGY

## 2019-09-25 PROCEDURE — 85027 COMPLETE CBC AUTOMATED: CPT | Performed by: ANESTHESIOLOGY

## 2019-09-25 PROCEDURE — 94760 N-INVAS EAR/PLS OXIMETRY 1: CPT

## 2019-09-25 RX ORDER — NYSTATIN 100000 U/G
CREAM TOPICAL 2 TIMES DAILY
Qty: 30 G | Refills: 0
Start: 2019-09-25 | End: 2020-07-02 | Stop reason: HOSPADM

## 2019-09-25 RX ORDER — PANTOPRAZOLE SODIUM 40 MG/1
40 INJECTION, POWDER, FOR SOLUTION INTRAVENOUS 2 TIMES DAILY
Qty: 1 EACH | Refills: 0
Start: 2019-09-25 | End: 2020-07-02 | Stop reason: HOSPADM

## 2019-09-25 RX ORDER — LANOLIN ALCOHOL/MO/W.PET/CERES
400 CREAM (GRAM) TOPICAL DAILY
Qty: 30 TABLET | Refills: 0
Start: 2019-09-26

## 2019-09-25 RX ORDER — MIDODRINE HYDROCHLORIDE 5 MG/1
15 TABLET ORAL
Qty: 90 TABLET | Refills: 0 | Status: ON HOLD
Start: 2019-09-25 | End: 2020-07-02 | Stop reason: ALTCHOICE

## 2019-09-25 RX ORDER — ALBUMIN (HUMAN) 12.5 G/50ML
25 SOLUTION INTRAVENOUS
Qty: 50 ML | Refills: 0 | Status: ON HOLD
Start: 2019-09-25 | End: 2020-07-02 | Stop reason: ALTCHOICE

## 2019-09-25 RX ORDER — ONDANSETRON 2 MG/ML
4 INJECTION INTRAMUSCULAR; INTRAVENOUS EVERY 6 HOURS PRN
Qty: 2 ML | Refills: 0 | Status: ON HOLD
Start: 2019-09-25 | End: 2020-07-02 | Stop reason: ALTCHOICE

## 2019-09-25 RX ORDER — OCTREOTIDE ACETATE 100 UG/ML
100 INJECTION, SOLUTION INTRAVENOUS; SUBCUTANEOUS EVERY 8 HOURS SCHEDULED
Qty: 1 ML | Refills: 0 | Status: ON HOLD
Start: 2019-09-25 | End: 2020-07-02 | Stop reason: ALTCHOICE

## 2019-09-25 RX ORDER — LACTULOSE 20 G/30ML
20 SOLUTION ORAL 3 TIMES DAILY
Qty: 236 ML | Refills: 0 | Status: ON HOLD
Start: 2019-09-25 | End: 2020-07-02 | Stop reason: ALTCHOICE

## 2019-09-25 RX ADMIN — RIFAXIMIN 550 MG: 550 TABLET ORAL at 15:19

## 2019-09-25 RX ADMIN — VITAM B12 50 MCG: 100 TAB at 10:00

## 2019-09-25 RX ADMIN — NYSTATIN: 100000 CREAM TOPICAL at 10:00

## 2019-09-25 RX ADMIN — OCTREOTIDE ACETATE 100 MCG: 100 INJECTION, SOLUTION INTRAVENOUS; SUBCUTANEOUS at 14:30

## 2019-09-25 RX ADMIN — Medication 400 MCG: at 10:00

## 2019-09-25 RX ADMIN — PANTOPRAZOLE SODIUM 40 MG: 40 INJECTION, POWDER, LYOPHILIZED, FOR SOLUTION INTRAVENOUS at 10:00

## 2019-09-25 RX ADMIN — MIDODRINE HYDROCHLORIDE 15 MG: 5 TABLET ORAL at 11:08

## 2019-09-25 RX ADMIN — LACTULOSE 20 G: 10 SOLUTION ORAL at 10:00

## 2019-09-25 RX ADMIN — RIFAXIMIN 550 MG: 550 TABLET ORAL at 03:00

## 2019-09-25 RX ADMIN — LACTULOSE 20 G: 10 SOLUTION ORAL at 15:19

## 2019-09-25 RX ADMIN — EPOETIN ALFA 10000 UNITS: 10000 SOLUTION INTRAVENOUS; SUBCUTANEOUS at 01:00

## 2019-09-25 RX ADMIN — EPOETIN ALFA 10000 UNITS: 10000 SOLUTION INTRAVENOUS; SUBCUTANEOUS at 11:52

## 2019-09-25 RX ADMIN — MIDODRINE HYDROCHLORIDE 15 MG: 5 TABLET ORAL at 06:33

## 2019-09-25 RX ADMIN — OCTREOTIDE ACETATE 100 MCG: 100 INJECTION, SOLUTION INTRAVENOUS; SUBCUTANEOUS at 05:56

## 2019-09-25 RX ADMIN — MIDODRINE HYDROCHLORIDE 15 MG: 5 TABLET ORAL at 15:19

## 2019-09-25 NOTE — ASSESSMENT & PLAN NOTE
- Hepatic encephalopathy  - Lactulose TID  - Serial neuro exams     - Encourage good sleep hygiene    Avoid sedating medications  - Regulate sleep/wake cycle

## 2019-09-25 NOTE — ASSESSMENT & PLAN NOTE
- Attributed to his ROSALES related cirrhosis  - Transfuse platelets <87; Cryo for Fibrinogen < 100  - F/u AM INR

## 2019-09-25 NOTE — PROGRESS NOTES
Pastoral Care Progress Note    2019  Patient: Ana Mayen : 1960  Admission Date & Time: 2019 1735  MRN: [id number] [facility]: [id number]                     Chaplaincy Interventions Utilized:   Empowerment: Encouraged focus on present    Exploration: Explored alternatives    Collaboration: Consulted with interdisciplinary team      Chaplaincy Outcomes Achieved:  Expressed peace

## 2019-09-25 NOTE — PROGRESS NOTES
Patient to be transferred to [facility] at 16:00  Progress note - Palliative and Supportive Care   Bharathi Graff 63 y o  male [id number]    Assessment:     Patient Active Problem List   Diagnosis    CROW on CPAP    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)    Essential hypertension    Hyperbilirubinemia    Liver lesion    Primary osteoarthritis involving multiple joints    Morbid obesity (HCC)    Anemia    Hepatorenal syndrome (HCC)    Encephalopathy acute    Thrombocytopenia (HCC)    Obesity with alveolar hypoventilation (HCC)    Coagulopathy (HCC)    Cholecystitis         Plan:  1  Symptom management -     Recommend global delirium precautions including:      - Establishment of day/night cycle via lights during the day and blinds open  Please limit interruptions at night as medically appropriate  - Provide glasses/hearing aids as apprioriate  - Minimize deliriogenic meds as able  - Provide reorientation including date on board and visible clock  - Avoid restraints as able, frequent verbal reorientations or patient care sitter as appropriate  2  Goals - Family continues to be hopeful for transfer to [facility]  Awaiting bed availability  I will continue to be available for psychosocial support or goals of care as needed  Interval history:       *Patient seen undergoing dialysis  Currently feels very fatigued  No pain  Appetite is ok, but waiting to eat until after HD  Denies SOB  Family at bedside       MEDICATIONS / ALLERGIES:     all current active meds have been reviewed and current meds:   Current Facility-Administered Medications   Medication Dose Route Frequency    albumin human (FLEXBUMIN) 25 % injection 25 g  25 g Intravenous Q1H PRN    cyanocobalamin (VITAMIN B-12) tablet 50 mcg  50 mcg Oral Daily    epoetin carla (EPOGEN,PROCRIT) injection 10,000 Units  10,000 Units Intravenous Once    folic acid (FOLVITE) tablet 400 mcg  400 mcg Oral Daily    lactulose 20 g/30 mL oral solution 20 g  20 g Oral TID    midodrine (PROAMATINE) tablet 15 mg  15 mg Oral TID AC    nystatin (MYCOSTATIN) cream   Topical BID    octreotide (SandoSTATIN) injection 100 mcg  100 mcg Subcutaneous Q8H [address]    ondansetron (ZOFRAN) injection 4 mg  4 mg Intravenous Q6H PRN    pantoprazole (PROTONIX) injection 40 mg  40 mg Intravenous BID    rifaximin (XIFAXAN) tablet 550 mg  550 mg Oral Q12H DANA       Allergies   Allergen Reactions    Lactose     Propranolol Eye Swelling    Torsemide Eye Swelling       OBJECTIVE:    Physical Exam  Physical Exam   Constitutional: No distress  HENT:   Head: Atraumatic  Eyes: Right eye exhibits no discharge  Left eye exhibits no discharge  Pulmonary/Chest: Effort normal  No respiratory distress  Abdominal: He exhibits no distension  Musculoskeletal: He exhibits edema  Neurological: He is alert  Skin: Skin is warm  He is not diaphoretic  Psychiatric:   Affect flat today   Nursing note and vitals reviewed  Lab Results: I have personally reviewed pertinent labs  , CBC: No results found for: WBC, HGB, HCT, MCV, PLT, ADJUSTEDWBC, MCH, MCHC, RDW, MPV, NRBC, CMP: No results found for: SODIUM, K, CL, CO2, ANIONGAP, BUN, CREATININE, GLUCOSE, CALCIUM, AST, ALT, ALKPHOS, PROT, BILITOT, EGFR      Counseling / Coordination of Care  Total floor / unit time spent today 15+ minutes  Greater than 50% of total time was spent with the patient and / or family counseling and / or coordination of care  A description of the counseling / coordination of care: psychosocial support, brief symptom assessment

## 2019-09-25 NOTE — NURSING NOTE
Pt left floor with transport team, no belongings left behind  Report called to receiving facility  Pt's own cpap machine and cell phone sent with patient

## 2019-09-25 NOTE — PROGRESS NOTES
NEPHROLOGY PROGRESS NOTE    Patient: Anish Ring               Sex: male          DOA: 9/6/2019  5:35 PM   YOB: 1960        Age:  63 y o         LOS:  LOS: 19 days       HPI     Patient with acute kidney injury and cirrhosis of liver    SUBJECTIVE     On BiPAP    Does have obstructive sleep apnea requiring BiPAP overnight    Not much new other development    CURRENT MEDICATIONS       Current Facility-Administered Medications:     albumin human (FLEXBUMIN) 25 % injection 25 g, 25 g, Intravenous, Q1H PRN, Miri Marcelino MD, Stopped at 09/22/19 0301    cyanocobalamin (VITAMIN B-12) tablet 50 mcg, 50 mcg, Oral, Daily, MARKOS Harding, 50 mcg at 09/25/19 1000    epoetin carla (EPOGEN,PROCRIT) injection 10,000 Units, 10,000 Units, Intravenous, Once, Miri Marcelino MD    epoetin carla (EPOGEN,PROCRIT) injection 10,000 Units, 10,000 Units, Intravenous, Once, Miri Marcelino MD    folic acid (FOLVITE) tablet 400 mcg, 400 mcg, Oral, Daily, MARKOS Harding, 400 mcg at 09/25/19 1000    lactulose 20 g/30 mL oral solution 20 g, 20 g, Oral, TID, Suzie Nick PA-C, 20 g at 09/25/19 1000    midodrine (PROAMATINE) tablet 15 mg, 15 mg, Oral, TID AC, MARKOS Kaplan, 15 mg at 09/25/19 2175    nystatin (MYCOSTATIN) cream, , Topical, BID, Mp Carpenter PA-C    octreotide (SandoSTATIN) injection 100 mcg, 100 mcg, Subcutaneous, Q8H [address], Suzie Nick PA-C, 100 mcg at 09/25/19 0556    ondansetron (ZOFRAN) injection 4 mg, 4 mg, Intravenous, Q6H PRN, Time MARKOS Castellanos, 4 mg at 09/15/19 2142    pantoprazole (PROTONIX) injection 40 mg, 40 mg, Intravenous, BID, Time MARKOS Castellanos, 40 mg at 09/25/19 1000    rifaximin (XIFAXAN) tablet 550 mg, 550 mg, Oral, Q12H [address], Suzie Nick PA-C, 550 mg at 09/25/19 0300    OBJECTIVE     Current Weight: Weight - Scale: (!) 167 kg (367 lb 11 6 oz)  Vitals:    09/25/19 1030   BP: 101/54   Pulse: 55   Resp: 15   Temp:    SpO2: 98%       Intake/Output Summary (Last 24 hours) at 9/25/2019 1037  Last data filed at 9/25/2019 1025  Gross per 24 hour   Intake 200 ml   Output 25 ml   Net 175 ml       PHYSICAL EXAMINATION     Physical Exam   Constitutional: He appears well-developed  No distress  HENT:   Head: Normocephalic  Mouth/Throat: Oropharynx is clear and moist    Eyes: Conjunctivae are normal  No scleral icterus  Neck: Neck supple  No JVD present  Cardiovascular: Normal rate and normal heart sounds  Pulmonary/Chest: Effort normal  He has no wheezes  Abdominal: Soft  There is no tenderness  Musculoskeletal: Normal range of motion  He exhibits no edema  Skin: Skin is warm  No rash noted  LAB RESULTS     Results from last 7 days   Lab Units 09/24/19  0534 09/24/19  0506 09/23/19  0437 09/22/19  0437 09/21/19  0436 09/21/19  0015 09/20/19  0437 09/19/19  0524   WBC Thousand/uL  --  6 76 9 07 6 47 6 83 6 99 7 98 8 55   HEMOGLOBIN g/dL  --  7 7* 7 7* 6 7* 6 8* 6 8* 6 7* 7 0*   HEMATOCRIT %  --  24 4* 24 7* 21 2* 22 1* 22 3* 21 6* 22 7*   PLATELETS Thousands/uL  --  19* 24* 16* 18* 19* 18* 22*   POTASSIUM mmol/L 3 1*  --  3 3* 3 3* 3 9 3 8 4 1 4 1   CHLORIDE mmol/L 99*  --  98* 100 102 102 101 103   CO2 mmol/L 28  --  28 31 26 26 26 27   BUN mg/dL 25  --  28* 21 24 23 16 9   CREATININE mg/dL 5 16*  --  5 54* 4 21* 4 21* 4 00* 2 92* 1 78*   EGFR ml/min/1 73sq m 11  --  10 15 15 15 23 41   CALCIUM mg/dL 8 9  --  8 9 8 8 8 9 8 9 9 0 9 2   MAGNESIUM mg/dL 2 0  --  2 1 1 9 2 0 2 1 2 0 1 8   PHOSPHORUS mg/dL 3 2  --  3 3 3 0 2 8 2 6* 1 7* 1 9*       RADIOLOGY RESULTS      Results for orders placed during the hospital encounter of 09/06/19   CXR 1 view PA portable stat    Narrative CHEST     INDICATION:   line placement RIJ Hemodialysis catheter  COMPARISON:  September 6, 2019    EXAM PERFORMED/VIEWS:  XR CHEST PORTABLE  2 images    FINDINGS:  Lungs are suboptimally aerated  Elevation of right hemidiaphragm      Cardiomegaly with diminishing pulmonary edema  Persistent prominence of the pulmonary vasculature in the left superior hilum  Right jugular central line tip in the upper SVC  No pneumothorax  Osseous structures appear within normal limits for patient age  Old fracture of right 5th rib  Questionable fracture right 6th rib  Impression Persistent cardiomegaly  Improving pulmonary edema  Satisfactory right jugular central line placement  No pneumothorax  Right rib fractures  Workstation performed: [id number]       Results for orders placed during the hospital encounter of 09/06/19   XR chest 2 views    Narrative CHEST     INDICATION:   SOB     COMPARISON:  8/15/2019    EXAM PERFORMED/VIEWS:  XR CHEST PA & LATERAL  2 views    FINDINGS:  Persistent moderate cardiomegaly  Development of diffuse vascular congestion    The lungs are otherwise clear  No pneumothorax or pleural effusion  Osseous structures appear within normal limits for patient age  Impression Persistent cardiomegaly    Increased diffuse vascular congestion        Workstation performed: [id number]       No results found for this or any previous visit  No results found for this or any previous visit  Results for orders placed during the hospital encounter of 08/15/19   CT abdomen pelvis wo contrast    Narrative CT ABDOMEN AND PELVIS WITHOUT IV CONTRAST    INDICATION:   Abdominal distension  History of cirrhosis  Testicular swelling  COMPARISON:  6/7/2019    TECHNIQUE:  CT examination of the abdomen and pelvis was performed without intravenous contrast   Axial, sagittal, and coronal 2D reformatted images were created from the source data and submitted for interpretation  Radiation dose length product (DLP) for this visit:  4349 mGy-cm     This examination, like all CT scans performed in the [facility], was performed utilizing techniques to minimize radiation dose exposure, including the use of iterative   reconstruction and automated exposure control  No oral contrast     FINDINGS:    ABDOMEN    LOWER CHEST:  Small right-sided pleural effusion noted    LIVER/BILIARY TREE:  Changes of severe hepatic cirrhosis identified  There is no obvious focal mass appreciated on this unenhanced examination  GALLBLADDER:  There are gallstone(s) within the gallbladder, without pericholecystic inflammatory changes  SPLEEN:  The spleen is enlarged, measuring almost 20 cm in cephalocaudad length    PANCREAS:  Limited assessment without IV contrast   Although there is peripancreatic edema, this also involves the root of the mesentery, and was also present on the 6/7/2019 study  It is not felt to be related to pancreatitis  ADRENAL GLANDS:  Unremarkable  KIDNEYS/URETERS:  Bilateral nonobstructing nephrolithiasis  The largest individual calculus is seen in the right interpolar aspect measuring approximately 7 mm  STOMACH AND BOWEL:  Unremarkable  APPENDIX:  No findings to suggest appendicitis  ABDOMINOPELVIC CAVITY:  Perihepatic ascites  Retroperitoneal and mesenteric root edema  VESSELS:  No AAA    PELVIS    REPRODUCTIVE ORGANS:  Unremarkable for patient's age  URINARY BLADDER:  Unremarkable  ABDOMINAL WALL/INGUINAL REGIONS:  There is diffuse body wall edema  The scrotum is diffusely enlarged and of water density, which may represent a combination of both hydroceles and scrotal wall edema  Dilated anterior abdominal wall collateral vessels  OSSEOUS STRUCTURES:  Advanced multilevel degenerative changes throughout the lumbar spine  Grade 2 anterolisthesis L4-L5      Impression 1  Appearance of the liver consistent with severe cirrhotic change  Splenomegaly an additional findings suggestive of portal hypertension  2  Small amount of ascites  Diffuse body wall edema  Markedly enlargement of the scrotum likely related to hydroceles and scrotal wall edema and small right-sided pleural effusion    Retroperitoneal and mesenteric root edema  Findings most consistent   with anasarca  3  No evidence of bowel obstruction  4  Bilateral nephrolithiasis but no evidence of hydronephrosis  5  Cholelithiasis          Workstation performed: [id number]       No results found for this or any previous visit  PLAN / RECOMMENDATIONS      Acute kidney injury:  Due to hepatorenal syndrome  Still remain oliguric  Will continue provide dialytic support    Cirrhosis of liver: With hepatorenal syndrome  Need liver transplant  According to ICU team has been accepted but waiting for [facility]    Anemia:  Will continue treat with Procrit  related to BOBBY    Obstructive sleep apnea:  Need BiPAP at night    Overall prognosis guarded  Unless he gets liver transplant it is likely that he will be dialysis dependent  Discussed with the family though I am not sure they understand    Will continue to monitor    Miguel Ángel Schaefer MD  Nephrology  9/25/2019        Portions of the record may have been created with voice recognition software  Occasional wrong word or "sound a like" substitutions may have occurred due to the inherent limitations of voice recognition software  Read the chart carefully and recognize, using context, where substitutions have occurred

## 2019-09-25 NOTE — PHYSICAL THERAPY NOTE
Physical Therapy Cancellation Note    PT undergoing HD at time of attempted PT session  Will cont to follow + provide PT interventions as appropriate       David Bellamy, PT, DPT

## 2019-09-25 NOTE — ASSESSMENT & PLAN NOTE
- Gastroenterology has signed off and nephrology continues to follow  - Continue octreotide, midodrine, albumin  - HD M/W/F

## 2019-09-25 NOTE — OCCUPATIONAL THERAPY NOTE
Occupational Therapy Cancellation Note        Patient Name: Jossy Melton  [id number] Date: 9/25/2019    Chart review completed  Attempted to see pt for OT re-evaluation  Pt currently receiving dialysis and is not available to engage in OT  Will continue to follow and re-evaluate when appropriate      [employer], OTR/L

## 2019-09-25 NOTE — HEMODIALYSIS
Treatment completed, stable treatment  Tolerated uf of 4000ml  Pretreatment weight 166 8kg post treatment weight 160 3kg

## 2019-09-25 NOTE — TRANSPORTATION MEDICAL NECESSITY
Section I - General Information    Name of Patient: Valerie Forbes                 : 1960    Medicare #: [id number]  Transport Date: 19 (PCS is valid for round trips on this date and for all repetitive trips in the 60-day range as noted below )  Origin: [facility]  Is the pt's stay covered under Medicare Part A (PPS/DRG)   [] [id number]  Closest appropriate facility? If no, why is transport to more distant facility required? Yes  If hospice pt, is this transport related to pt's terminal illness? No       Section II - Medical Necessity Questionnaire  Ambulance transportation is medically necessary only if other means of transport are contraindicated or would be potentially harmful to the patient  To meet this requirement, the patient must either be "bed confined" or suffer from a condition such that transport by means other than ambulance is contraindicated by the patient's condition  The following questions must be answered by the medical professional signing below for this form to be valid:    1)  Describe the MEDICAL CONDITION (physical and/or mental) of this patient AT [address] [place] [address] that requires the patient to be transported in an ambulance and why transport by other means is contraindicated by the patient's condition: liver cirrhosis-needs to be eval for a liver transplant    2) Is the patient "bed confined" as defined below? Yes  To be "be confined" the patient must satisfy all three of the following conditions: (1) unable to get up from bed without Assistance; AND (2) unable to ambulate; AND (3) unable to sit in a chair or wheelchair  3) Can this patient safely be transported by car or wheelchair van (i e , seated during transport without a medical attendant or monitoring)?    No    4) In addition to completing questions 1-3 above, please check any of the following conditions that apply*:   *Note: supporting documentation for any boxes checked must be maintained in the patient's medical records  If hosp-hosp transfer, describe services needed at 2nd facility not available at 1st facility? Medical attendant required   Unable to tolerate seated position for time needed to transport   Morbid obesity requires additional personnel/equipment to safely handle patient       Section III - Signature of Physician or Healthcare Professional  I certify that the above information is true and correct based on my evaluation of this patient, and represent that the patient requires transport by ambulance and that other forms of transport are contraindicated  I understand that this information will be used by the Centers for Medicare and Medicaid Services (CMS) to support the determination of medical necessity for ambulance services, and I represent that I have personal knowledge of the patient's condition at time of transport  []  If this box is checked, I also certify that the patient is physically or mentally incapable of signing the ambulance service's claim and that the institution with which I am affiliated has furnished care, services, or assistance to the patient  My signature below is made on behalf of the patient pursuant to 42 CFR §424 36(b)(4)   In accordance with 42 CFR §424 37, the specific reason(s) that the patient is physically or mentally incapable of signing the claim form is as follows: n/a    Signature of Physician* or Healthcare Professional______________________________________________________________  Signature Date 09/25/19 (For scheduled repetitive transports, this form is not valid for transports performed more than 60 days after this date)    Printed Name & Credentials of Physician or Healthcare Professional (MD, DO, RN, etc )_______Teresa Billye Schirmer RN_________________________  *Form must be signed by patient's attending physician for scheduled, repetitive transports   For non-repetitive, unscheduled ambulance transports, if unable to obtain the signature of the attending physician, any of the following may sign (choose appropriate option below)  [] Physician Assistant []  Clinical Nurse Specialist [x]  Registered Nurse  []  Nurse Practitioner  [x] Discharge Planner

## 2019-09-25 NOTE — SOCIAL WORK
CM received notice that a bed is now available at [facility]  CM contacted Jessica Rankin from [id number] to update authorization  Authorization has now been updated and is good from 9/25-9/29/19  Same Ernestina Burks is to be used for ground transport  Medical Nec done and placed in pt's chart

## 2019-09-25 NOTE — PROGRESS NOTES
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 [facility])  Assessment & Plan  - Meld score 39 as of 9/20     - Continue HD, octreotide, and midodrine for his hepatorenal syndrome  - [facility] following, has been accepted for transfer  Will transfer when bed available  Hepatorenal syndrome [facility])  Assessment & Plan  - Gastroenterology has signed off and nephrology continues to follow  - Continue octreotide, midodrine, albumin  - HD M/W/F    Encephalopathy acute  Assessment & Plan  - Hepatic encephalopathy  - Lactulose TID  - Serial neuro exams     - Encourage good sleep hygiene  Avoid sedating medications  - Regulate sleep/wake cycle      Anemia  Assessment & Plan  - Secondary to oozing as well as CKD and liver failure    Thrombocytopenia (HCC)  Assessment & Plan  - Related to liver disease     - Transfuse for platelet count less than 10,000 due to high risk for spontaneous bleeding     - Continue to monitor closely  - Holding anticoagulation at present due to risk of bleeding  Hyperbilirubinemia  Assessment & Plan  Continue to monitor  Secondary to ROSALES-related cirrhosis  Coagulopathy ([facility] [address] )  Assessment & Plan  - Attributed to his ROSALES related cirrhosis  - Transfuse platelets <40; Cryo for Fibrinogen < 100  - F/u AM INR    Obesity with alveolar hypoventilation (HCC)  Assessment & Plan  - Continue BiPap qHS    Morbid obesity ([address] )  Assessment & Plan  Nutrition consulted    Poor p o  intake    CROW on CPAP  Assessment & Plan  - HS BiPAP    Cholecystitis  Assessment & Plan  - Patient s/p Cholecytostomy tube 9/16  - Continue for now    Essential hypertension  Assessment & Plan  Remains normotensive  ----------------------------------------------------------------------------------------  HPI/24hr events: No acute overnight events    Disposition: Continue Stepdown Level 1 level of care , awaiting bed at [facility]  Code Status: Level 1 - Full Code  ---------------------------------------------------------------------------------------  SUBJECTIVE  No acute events overnight    Review of Systems  Review of systems was reviewed and negative unless stated above in HPI/24-hour events   ---------------------------------------------------------------------------------------  OBJECTIVE    Physical Exam   Constitutional: He is oriented to person, place, and time  Vital signs are normal  He appears ill  No distress  Middle aged, obese male  NAD  Bipap in place   HENT:   Head: Normocephalic and atraumatic  Neck: Neck supple  Cardiovascular: Normal rate and regular rhythm  Pulmonary/Chest: Effort normal and breath sounds normal    Abdominal: Soft  He exhibits no distension  There is no tenderness  Musculoskeletal:   2+ pitting edema of b/l lower extremities  Diffuse anasarca   Neurological: He is alert and oriented to person, place, and time  Skin: Skin is warm and dry  He is not diaphoretic  Oozing from IV sites, mild oozing from CVC site       Vitals   Vitals:    19 1600 19 2000 19 2300 19 0020   BP: 107/55 104/59 103/52    BP Location: Left arm Left arm Left arm    Pulse: 68 65 62    Resp:  18    Temp: 97 6 °F (36 4 °C) 97 8 °F (36 6 °C) 97 7 °F (36 5 °C)    TempSrc: Oral Oral Oral    SpO2: 96% 98% 99% 100%   Weight:       Height:         Temp (24hrs), Av 7 °F (36 5 °C), Min:97 5 °F (36 4 °C), Max:97 9 °F (36 6 °C)  Current: Temperature: 97 7 °F (36 5 °C)      Invasive/non-invasive ventilation settings   Respiratory    Lab Data (Last 4 hours)    None         O2/Vent Data (Last 4 hours)       0020          Non-Invasive Ventilation Mode BiPAP                   Height and Weights   Height: 5' 11" (180 3 cm)  IBW: 75 3 kg  Body mass index is 51 38 kg/m²    Weight (last 2 days) Date/Time   Weight    09/23/19 0600   (!) 167 (368 39)                Intake and Output  I/O       09/23 0701 - 09/24 0700 09/24 0701 - 09/25 0700    P  O  50 120    I V  (mL/kg) 500 (3)     Other 500     NG/GT 10     IV Piggyback 100     Total Intake(mL/kg) 1160 (6 9) 120 (0 7)    Urine (mL/kg/hr) 0 (0)     Drains 30     Other 3800     Stool 0     Total Output 3830     Net -2670 +120          Unmeasured Stool Occurrence 2 x 2 x          Nutrition       Diet Orders   (From admission, onward)             Start     Ordered    09/23/19 1012  Dietary nutrition supplements  Once     Question Answer Comment   Select Supplement: Ensure Enlive-Strawberry    Frequency Breakfast, Dinner        09/23/19 1011    09/18/19 1208  Diet Renal; Renal Restrictive; Yes; Fluid Restriction 1000 ML; No  Diet effective now     Question Answer Comment   Diet Type Renal    Renal Renal Restrictive    Should patient have a fluid restriction? Yes    Fluid Restriction Fluid Restriction 1000 ML    Should patient have a protein modifier? No    RD to adjust diet per protocol? No        09/18/19 1207                Laboratory and Diagnostics:  Results from last 7 days   Lab Units 09/24/19  0506 09/23/19  0437 09/22/19  0437 09/21/19  0436 09/21/19  0015 09/20/19  0437 09/19/19  0524  09/18/19  0428   WBC Thousand/uL 6 76 9 07 6 47 6 83 6 99 7 98 8 55  --  12 58*   HEMOGLOBIN g/dL 7 7* 7 7* 6 7* 6 8* 6 8* 6 7* 7 0*   < > 6 4*   HEMATOCRIT % 24 4* 24 7* 21 2* 22 1* 22 3* 21 6* 22 7*   < > 20 5*   PLATELETS Thousands/uL 19* 24* 16* 18* 19* 18* 22*  --  27*   NEUTROS PCT % 72  --  71  --  63 64 63  --  68   BANDS PCT %  --  5  --   --   --   --   --   --   --    MONOS PCT % 15*  --  13*  --  14* 14* 14*  --  15*   MONO PCT %  --  1*  --   --   --   --   --   --   --     < > = values in this interval not displayed       Results from last 7 days   Lab Units 09/24/19  0534 09/23/19  7025 09/22/19  1571 09/21/19  0436 09/21/19  0015 09/20/19  1737 09/19/19  0524   SODIUM mmol/L 137 136 138 135* 137 135* 136   POTASSIUM mmol/L 3 1* 3 3* 3 3* 3 9 3 8 4 1 4 1   CHLORIDE mmol/L 99* 98* 100 102 102 101 103   CO2 mmol/L 28 28 31 26 26 26 27   ANION GAP mmol/L 10 10 7 7 9 8 6   BUN mg/dL 25 28* 21 24 23 16 9   CREATININE mg/dL 5 16* 5 54* 4 21* 4 21* 4 00* 2 92* 1 78*   CALCIUM mg/dL 8 9 8 9 8 8 8 9 8 9 9 0 9 2   GLUCOSE RANDOM mg/dL 114 125 110 113 122 103 101   ALT U/L 34 34 35 39 39 38 37   AST U/L 51* 50* 51* 61* 63* 69* 67*   ALK PHOS U/L 73 71 68 76 73 74 73   ALBUMIN g/dL 3 6 3 7 3 8 3 6 3 7 3 5 3 3*   TOTAL BILIRUBIN mg/dL 8 00* 7 90* 7 30* 7 00* 7 30* 7 80* 9 20*     Results from last 7 days   Lab Units 09/24/19  0534 09/23/19  0437 09/22/19  0437 09/21/19  0436 09/21/19  0015 09/20/19  0437 09/19/19  0524   MAGNESIUM mg/dL 2 0 2 1 1 9 2 0 2 1 2 0 1 8   PHOSPHORUS mg/dL 3 2 3 3 3 0 2 8 2 6* 1 7* 1 9*      Results from last 7 days   Lab Units 09/24/19  0737 09/23/19  0746 09/22/19  0437 09/21/19  0436 09/20/19  0437 09/19/19  0524 09/18/19  0637   INR  2 82* 2 58* 2 96* 2 59* 2 79* 2 91* 2 99*              ABG:  Results from last 7 days   Lab Units 09/21/19  0430   PH ART  7 313*   PCO2 ART mm Hg 45 0*   PO2 ART mm Hg 185 3*   HCO3 ART mmol/L 22 3   BASE EXC ART mmol/L -3 7   ABG SOURCE  Line, Arterial     VBG:  Results from last 7 days   Lab Units 09/21/19  0430   ABG SOURCE  Line, Arterial       Imaging: No new imaging I have personally reviewed pertinent reports        Active Medications  Scheduled Meds:    Current Facility-Administered Medications:  albumin human 25 g Intravenous Q1H PRN Monica Moore MD Last Rate: Stopped (09/22/19 0301)   cyanocobalamin 50 mcg Oral Daily MARKOS Harding    epoetin carla 10,000 Units Intravenous Once Monica Moore MD    folic acid 576 mcg Oral Daily MARKOS Harding    lactulose 20 g Oral TID Aníbal Telles PA-C    midodrine 15 mg Oral TID AC ThomasMARKOS Urrutia    nystatin  Topical BID Moises Norris PA-C octreotide 100 mcg Subcutaneous [facility] David Ventura PA-C    ondansetron 4 mg Intravenous Q6H PRN MARKOS Aly    pantoprazole 40 mg Intravenous BID MARKOS Aly    rifaximin 550 mg Oral Q12H [address] David Ventura PA-C      Continuous Infusions:     PRN Meds:     albumin human 25 g Q1H PRN   ondansetron 4 mg Q6H PRN       ---------------------------------------------------------------------------------------  Advance Directive and Living Will:      Power of :    POLST:    ---------------------------------------------------------------------------------------  Counseling / Coordination of Care  Total Critical Care time spent 0 minutes excluding procedures, teaching and family updates          Rob Chiu PA-C

## 2019-09-25 NOTE — PLAN OF CARE
Problem: Knowledge Deficit  Goal: Patient/family/caregiver demonstrates understanding of disease process, treatment plan, medications, and discharge instructions  Description  Complete learning assessment and assess knowledge base  Interventions:  - Provide teaching at level of understanding  - Provide teaching via preferred learning methods  Outcome: Progressing     Problem: Nutrition/Hydration-ADULT  Goal: Nutrient/Hydration intake appropriate for improving, restoring or maintaining nutritional needs  Description  Monitor and assess patient's nutrition/hydration status for malnutrition  Collaborate with interdisciplinary team and initiate plan and interventions as ordered  Monitor patient's weight and dietary intake as ordered or per policy  Determine patient's food preferences and provide high-protein, high-caloric foods as appropriate       INTERVENTIONS:  - Monitor oral intake, urinary output, labs, and treatment plans  - Assess nutrition and hydration status and recommend course of action  - Evaluate amount of meals eaten  - Assist patient with eating if necessary   - Allow adequate time for meals  - Recommend/ encourage appropriate diets, oral nutritional supplements, and vitamin/mineral supplements  - Order, calculate, and assess calorie counts as needed  - Recommend, monitor, and adjust tube feedings  based on assessed needs  - Assess need for intravenous fluids  - Provide nutrition/hydration education as appropriate  - Include patient/family/caregiver in decisions related to nutrition    Outcome: Progressing  Able to maintain blood flow,  Uf discussed with md

## 2019-09-26 NOTE — UTILIZATION REVIEW
Notification of Discharge  This is a Notification of Discharge from our facility [address]  Please be advised that this patient has been discharge from our facility  Below you will find the admission and discharge date and time including the patients disposition  PRESENTATION DATE: 9/6/2019  5:35 PM  OBS ADMISSION DATE:   IP ADMISSION DATE: 9/6/19 2143   DISCHARGE DATE: 9/25/2019  5:00 PM  DISPOSITION: Non [facility] Non [facility]   Admission Orders listed below:  Admission Orders (From admission, onward)     Ordered        09/06/19 2143  Inpatient Admission  Once                   Please contact the UR Department if additional information is required to close this patient's authorization/case  [address]  Utilization Review Department  Phone: [phone]; Fax [phone]  [email or web address]  ATTENTION: Please call with any questions or concerns to [phone]  and carefully listen to the prompts so that you are directed to the right person  Send all requests for admission clinical reviews, approved or denied determinations and any other requests to fax [phone]   All voicemails are confidential

## 2019-11-29 ENCOUNTER — APPOINTMENT (EMERGENCY)
Dept: RADIOLOGY | Facility: HOSPITAL | Age: 59
End: 2019-11-29
Payer: COMMERCIAL

## 2019-11-29 ENCOUNTER — HOSPITAL ENCOUNTER (EMERGENCY)
Facility: HOSPITAL | Age: 59
Discharge: OTHER INSTITUTION | End: 2019-11-30
Attending: EMERGENCY MEDICINE | Admitting: EMERGENCY MEDICINE
Payer: COMMERCIAL

## 2019-11-29 VITALS
RESPIRATION RATE: 24 BRPM | BODY MASS INDEX: 36.57 KG/M2 | HEIGHT: 72 IN | TEMPERATURE: 98.5 F | DIASTOLIC BLOOD PRESSURE: 63 MMHG | SYSTOLIC BLOOD PRESSURE: 123 MMHG | WEIGHT: 270 LBS | OXYGEN SATURATION: 98 % | HEART RATE: 86 BPM

## 2019-11-29 DIAGNOSIS — R53.1 GENERALIZED WEAKNESS: Primary | ICD-10-CM

## 2019-11-29 DIAGNOSIS — R68.83 CHILLS: ICD-10-CM

## 2019-11-29 DIAGNOSIS — R06.02 SHORTNESS OF BREATH: ICD-10-CM

## 2019-11-29 LAB
ALBUMIN SERPL BCP-MCNC: 2.9 G/DL (ref 3.5–5)
ALP SERPL-CCNC: 72 U/L (ref 46–116)
ALT SERPL W P-5'-P-CCNC: 12 U/L (ref 12–78)
AMMONIA PLAS-SCNC: <10 UMOL/L (ref 11–35)
ANION GAP SERPL CALCULATED.3IONS-SCNC: 5 MMOL/L (ref 4–13)
APTT PPP: 35 SECONDS (ref 23–37)
AST SERPL W P-5'-P-CCNC: 21 U/L (ref 5–45)
BACTERIA UR QL AUTO: ABNORMAL /HPF
BASOPHILS # BLD AUTO: 0.06 THOUSANDS/ΜL (ref 0–0.1)
BASOPHILS NFR BLD AUTO: 1 % (ref 0–1)
BILIRUB SERPL-MCNC: 0.7 MG/DL (ref 0.2–1)
BILIRUB UR QL STRIP: ABNORMAL
BUN SERPL-MCNC: 19 MG/DL (ref 5–25)
CALCIUM SERPL-MCNC: 8.7 MG/DL (ref 8.3–10.1)
CHLORIDE SERPL-SCNC: 102 MMOL/L (ref 100–108)
CLARITY UR: ABNORMAL
CO2 SERPL-SCNC: 33 MMOL/L (ref 21–32)
COLOR UR: ABNORMAL
CREAT SERPL-MCNC: 4.75 MG/DL (ref 0.6–1.3)
D DIMER PPP FEU-MCNC: 5.1 UG/ML FEU
EOSINOPHIL # BLD AUTO: 0.36 THOUSAND/ΜL (ref 0–0.61)
EOSINOPHIL NFR BLD AUTO: 3 % (ref 0–6)
ERYTHROCYTE [DISTWIDTH] IN BLOOD BY AUTOMATED COUNT: 23.1 % (ref 11.6–15.1)
FINE GRAN CASTS URNS QL MICRO: ABNORMAL /LPF
FLUAV RNA NPH QL NAA+PROBE: NORMAL
FLUBV RNA NPH QL NAA+PROBE: NORMAL
GFR SERPL CREATININE-BSD FRML MDRD: 13 ML/MIN/1.73SQ M
GLUCOSE SERPL-MCNC: 109 MG/DL (ref 65–140)
GLUCOSE UR STRIP-MCNC: NEGATIVE MG/DL
HCT VFR BLD AUTO: 33.2 % (ref 36.5–49.3)
HGB BLD-MCNC: 11 G/DL (ref 12–17)
HGB UR QL STRIP.AUTO: ABNORMAL
HYALINE CASTS #/AREA URNS LPF: ABNORMAL /LPF
IMM GRANULOCYTES # BLD AUTO: 0.19 THOUSAND/UL (ref 0–0.2)
IMM GRANULOCYTES NFR BLD AUTO: 2 % (ref 0–2)
INR PPP: 1.23 (ref 0.84–1.19)
KETONES UR STRIP-MCNC: ABNORMAL MG/DL
LACTATE SERPL-SCNC: 0.8 MMOL/L (ref 0.5–2)
LEUKOCYTE ESTERASE UR QL STRIP: ABNORMAL
LYMPHOCYTES # BLD AUTO: 0.27 THOUSANDS/ΜL (ref 0.6–4.47)
LYMPHOCYTES NFR BLD AUTO: 2 % (ref 14–44)
MCH RBC QN AUTO: 34.5 PG (ref 26.8–34.3)
MCHC RBC AUTO-ENTMCNC: 33.1 G/DL (ref 31.4–37.4)
MCV RBC AUTO: 104 FL (ref 82–98)
MONOCYTES # BLD AUTO: 1.54 THOUSAND/ΜL (ref 0.17–1.22)
MONOCYTES NFR BLD AUTO: 13 % (ref 4–12)
MUCOUS THREADS UR QL AUTO: ABNORMAL
NEUTROPHILS # BLD AUTO: 9.83 THOUSANDS/ΜL (ref 1.85–7.62)
NEUTS SEG NFR BLD AUTO: 79 % (ref 43–75)
NITRITE UR QL STRIP: NEGATIVE
NON-SQ EPI CELLS URNS QL MICRO: ABNORMAL /HPF
NRBC BLD AUTO-RTO: 0 /100 WBCS
NT-PROBNP SERPL-MCNC: ABNORMAL PG/ML
OTHER STN SPEC: ABNORMAL
PH UR STRIP.AUTO: 5 [PH]
PLATELET # BLD AUTO: 129 THOUSANDS/UL (ref 149–390)
PMV BLD AUTO: 9.8 FL (ref 8.9–12.7)
POTASSIUM SERPL-SCNC: 4.2 MMOL/L (ref 3.5–5.3)
PROT SERPL-MCNC: 6.1 G/DL (ref 6.4–8.2)
PROT UR STRIP-MCNC: ABNORMAL MG/DL
PROTHROMBIN TIME: 15.5 SECONDS (ref 11.6–14.5)
RBC # BLD AUTO: 3.19 MILLION/UL (ref 3.88–5.62)
RBC #/AREA URNS AUTO: ABNORMAL /HPF
RSV RNA NPH QL NAA+PROBE: NORMAL
SODIUM SERPL-SCNC: 140 MMOL/L (ref 136–145)
SP GR UR STRIP.AUTO: >=1.03 (ref 1–1.03)
TROPONIN I SERPL-MCNC: 0.05 NG/ML
UROBILINOGEN UR QL STRIP.AUTO: 0.2 E.U./DL
WBC # BLD AUTO: 12.25 THOUSAND/UL (ref 4.31–10.16)
WBC #/AREA URNS AUTO: ABNORMAL /HPF

## 2019-11-29 PROCEDURE — 36415 COLL VENOUS BLD VENIPUNCTURE: CPT | Performed by: EMERGENCY MEDICINE

## 2019-11-29 PROCEDURE — 99284 EMERGENCY DEPT VISIT MOD MDM: CPT | Performed by: PHYSICIAN ASSISTANT

## 2019-11-29 PROCEDURE — 81001 URINALYSIS AUTO W/SCOPE: CPT | Performed by: PHYSICIAN ASSISTANT

## 2019-11-29 PROCEDURE — 85379 FIBRIN DEGRADATION QUANT: CPT | Performed by: PHYSICIAN ASSISTANT

## 2019-11-29 PROCEDURE — 84484 ASSAY OF TROPONIN QUANT: CPT | Performed by: PHYSICIAN ASSISTANT

## 2019-11-29 PROCEDURE — 83880 ASSAY OF NATRIURETIC PEPTIDE: CPT | Performed by: PHYSICIAN ASSISTANT

## 2019-11-29 PROCEDURE — 36415 COLL VENOUS BLD VENIPUNCTURE: CPT | Performed by: PHYSICIAN ASSISTANT

## 2019-11-29 PROCEDURE — 99285 EMERGENCY DEPT VISIT HI MDM: CPT

## 2019-11-29 PROCEDURE — 85730 THROMBOPLASTIN TIME PARTIAL: CPT | Performed by: PHYSICIAN ASSISTANT

## 2019-11-29 PROCEDURE — 82140 ASSAY OF AMMONIA: CPT | Performed by: PHYSICIAN ASSISTANT

## 2019-11-29 PROCEDURE — 85610 PROTHROMBIN TIME: CPT | Performed by: PHYSICIAN ASSISTANT

## 2019-11-29 PROCEDURE — 87086 URINE CULTURE/COLONY COUNT: CPT | Performed by: PHYSICIAN ASSISTANT

## 2019-11-29 PROCEDURE — 87631 RESP VIRUS 3-5 TARGETS: CPT | Performed by: PHYSICIAN ASSISTANT

## 2019-11-29 PROCEDURE — 93005 ELECTROCARDIOGRAM TRACING: CPT

## 2019-11-29 PROCEDURE — 96367 TX/PROPH/DG ADDL SEQ IV INF: CPT

## 2019-11-29 PROCEDURE — 87040 BLOOD CULTURE FOR BACTERIA: CPT | Performed by: PHYSICIAN ASSISTANT

## 2019-11-29 PROCEDURE — 71046 X-RAY EXAM CHEST 2 VIEWS: CPT

## 2019-11-29 PROCEDURE — 85025 COMPLETE CBC W/AUTO DIFF WBC: CPT | Performed by: EMERGENCY MEDICINE

## 2019-11-29 PROCEDURE — 83605 ASSAY OF LACTIC ACID: CPT | Performed by: PHYSICIAN ASSISTANT

## 2019-11-29 PROCEDURE — 96365 THER/PROPH/DIAG IV INF INIT: CPT

## 2019-11-29 PROCEDURE — 84484 ASSAY OF TROPONIN QUANT: CPT | Performed by: EMERGENCY MEDICINE

## 2019-11-29 PROCEDURE — 80053 COMPREHEN METABOLIC PANEL: CPT | Performed by: EMERGENCY MEDICINE

## 2019-11-29 RX ADMIN — CEFEPIME HYDROCHLORIDE 2000 MG: 2 INJECTION, POWDER, FOR SOLUTION INTRAVENOUS at 22:39

## 2019-11-29 RX ADMIN — VANCOMYCIN HYDROCHLORIDE 2000 MG: 1 INJECTION, POWDER, LYOPHILIZED, FOR SOLUTION INTRAVENOUS at 23:50

## 2019-11-30 LAB
ATRIAL RATE: 91 BPM
P AXIS: 41 DEGREES
PR INTERVAL: 170 MS
QRS AXIS: -65 DEGREES
QRSD INTERVAL: 110 MS
QT INTERVAL: 372 MS
QTC INTERVAL: 457 MS
T WAVE AXIS: 55 DEGREES
TROPONIN I SERPL-MCNC: 0.08 NG/ML
VENTRICULAR RATE: 91 BPM

## 2019-11-30 PROCEDURE — 93010 ELECTROCARDIOGRAM REPORT: CPT | Performed by: INTERNAL MEDICINE

## 2019-11-30 NOTE — ED NOTES
Received call from Ruy Bartholomew at the call center  Patient pick-up time is 01:30AM  Tranfers crew from [facility] CCT unit  Magi Sanchez Receiving physician is Dr Jena Feliz  Phone number for report is  [phone]  Patient going [facility]  Room 4009       [facility]  11/30/19 0004       [facility]  11/30/19 0004

## 2019-11-30 NOTE — ED PROVIDER NOTES
History  Chief Complaint   Patient presents with    Weakness - Generalized     per pt's daughter - c/o ongoing chills and generalized weakness  pt reports liver transplant oct 12th    Chills     R KERON  is a 64 y/o male with PMH significant for ROSALES cirrhosis and liver transplant, CKD, HTN, a fib, and sleep apnea who presents to the emergency department with complaint of shortness of breath, chills, and bilateral leg weakness beginning today while receiving dialysis  Patient had a liver transplant for a blood infection approximately 2 weeks ago, currently undergoing outpatient home rehab and dialysis 3 times a week, last dialyzed this afternoon, has not missed any treatments  Patient was instructed to go to the ER while receiving dialysis, as he was so short of breath that he could not complete sentences  Per records, patient went to [facility] for liver transplant on 10/12/19, with post operative course complicated by hemorrhagic shock s/p ex-lap with blood clot removal/hematoma drainage on 10/14/19, continued BOBBY, and a fib  He has not been hospitalized since transplant  He is currently compliant with a large post-transplant medication regimen  Patient states that he last ate this morning, has an appetite currently, denies fever, lightheadedness/dizziness, headache, fatigue, cough, hemoptysis, abdominal pain, N/V, diarrhea, constipation  Prior to Admission Medications   Prescriptions Last Dose Informant Patient Reported? Taking?    albumin human (FLEXBUMIN) 25 %   No No   Sig: Infuse 100 mL (25 g total) into a venous catheter every hour as needed (For hypotension on dialysis)   cyanocobalamin (VITAMIN B-12) 50 MCG tablet   No No   Sig: Take 1 tablet (50 mcg total) by mouth daily   folic acid (FOLVITE) 956 mcg tablet   No No   Sig: Take 1 tablet (400 mcg total) by mouth daily   lactulose 20 g/30 mL   No No   Sig: Take 30 mL (20 g total) by mouth 3 (three) times a day   midodrine (PROAMATINE) 5 mg tablet No No   Sig: Take 3 tablets (15 mg total) by mouth 3 (three) times a day before meals   nystatin (MYCOSTATIN) cream   No No   Sig: Apply topically 2 (two) times a day   octreotide (SandoSTATIN) 100 mcg/mL   No No   Sig: Inject 1 mL (100 mcg total) under the skin every 8 (eight) hours   ondansetron (ZOFRAN) 4 mg/2 mL injection   No No   Sig: Infuse 2 mL (4 mg total) into a venous catheter every 6 (six) hours as needed for nausea or vomiting   pantoprazole (PROTONIX) 40 mg injection   No No   Sig: Infuse 40 mg into a venous catheter 2 (two) times a day      Facility-Administered Medications: None       Past Medical History:   Diagnosis Date    Anasarca     Chronic pain disorder     lower back    CPAP (continuous positive airway pressure) dependence     Heart murmur     Hypertension     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 [facility] [address] [facility]     Myocardial infarction ([facility] [address] [facility]     Renal insufficiency 2019    Sleep apnea     Vitamin D deficiency        Past Surgical History:   Procedure Laterality Date    IR TEMP HD CATH  9/10/2019    IR TUBE PLACEMENT NEGRA  2019    KNEE ARTHROSCOPY Bilateral     KNEE SURGERY      MT COLONOSCOPY FLX DX W/COLLJ SPEC WHEN PFRMD N/A 11/15/2017    Procedure: COLONOSCOPY;  Surgeon: Charles Flores MD;  Location: [facility]; Service: Gastroenterology       History reviewed  No pertinent family history  I have reviewed and agree with the history as documented  Social History     Tobacco Use    Smoking status: Former Smoker     Last attempt to quit: 11/15/1990     Years since quittin 0    Smokeless tobacco: Never Used   Substance Use Topics    Alcohol use: Yes     Comment: RARE    Drug use: No        Review of Systems   Constitutional: Positive for chills  Negative for activity change, appetite change, fatigue, fever and unexpected weight change     HENT: Negative for congestion, ear discharge, ear pain, facial swelling, postnasal drip, rhinorrhea, sinus pressure, sinus pain, sore throat and trouble swallowing  Eyes: Negative for photophobia, pain, discharge, redness, itching and visual disturbance  Respiratory: Positive for shortness of breath  Negative for cough, choking, chest tightness, wheezing and stridor  Cardiovascular: Negative for chest pain, palpitations and leg swelling  Gastrointestinal: Negative for abdominal distention, abdominal pain, blood in stool, constipation, diarrhea, nausea and vomiting  Genitourinary: Negative for decreased urine volume, difficulty urinating, dysuria, flank pain, frequency, hematuria and urgency  Musculoskeletal: Negative for arthralgias, back pain, gait problem, joint swelling, myalgias, neck pain and neck stiffness  Skin: Negative for color change, pallor, rash and wound  Neurological: Positive for weakness  Negative for dizziness, tremors, seizures, syncope, facial asymmetry, speech difficulty, light-headedness, numbness and headaches  Hematological: Negative for adenopathy  All other systems reviewed and are negative  Physical Exam  Physical Exam   Constitutional: He is oriented to person, place, and time  He appears well-developed and well-nourished  He is cooperative  Non-toxic appearance  No distress  Pauses to take breaks after a certain amount of words, visibly short of breath, difficulty speaking in full sentences but lying on bed comfortably   HENT:   Head: Normocephalic and atraumatic  Eyes: Pupils are equal, round, and reactive to light  Conjunctivae and EOM are normal    Neck: Normal range of motion  Neck supple  Cardiovascular: Normal rate, regular rhythm, S1 normal, S2 normal, normal heart sounds and intact distal pulses  Exam reveals no decreased pulses  No murmur heard  Pulmonary/Chest: Effort normal  No accessory muscle usage or stridor  Tachypnea noted  No respiratory distress  He has decreased breath sounds in the right upper field, the right middle field and the right lower field   He has no wheezes  He has no rhonchi  He has no rales  He exhibits no tenderness, no edema and no swelling  Tunneled catheter in place on right chest wall without erythema    Abdominal: Soft  Normal appearance, normal aorta and bowel sounds are normal  He exhibits no distension, no ascites, no pulsatile midline mass and no mass  There is no hepatosplenomegaly  There is no tenderness  There is no rigidity, no rebound and no guarding  No hernia  Musculoskeletal: Normal range of motion  He exhibits no edema, tenderness or deformity  Lymphadenopathy:     He has no cervical adenopathy  Neurological: He is alert and oriented to person, place, and time  He has normal strength  No cranial nerve deficit or sensory deficit  Coordination normal  GCS eye subscore is 4  GCS verbal subscore is 5  GCS motor subscore is 6  Skin: Skin is warm and intact  Capillary refill takes less than 2 seconds  No abrasion, no bruising, no lesion, no petechiae and no rash noted  No erythema  No pallor  Vitals reviewed        Vital Signs  ED Triage Vitals [11/29/19 1815]   Temperature Pulse Respirations Blood Pressure SpO2   98 5 °F (36 9 °C) 86 16 147/73 98 %      Temp Source Heart Rate Source Patient Position - Orthostatic VS BP Location FiO2 (%)   Oral Monitor Sitting Left arm --      Pain Score       No Pain           Vitals:    11/29/19 1815 11/29/19 2027 11/29/19 2230   BP: 147/73 142/76 123/63   Pulse: 86 89 86   Patient Position - Orthostatic VS: Sitting           Visual Acuity      ED Medications  Medications   cefepime (MAXIPIME) 2,000 mg in dextrose 5 % 50 mL IVPB (0 mg Intravenous Stopped 11/29/19 2337)   vancomycin (VANCOCIN) 2,000 mg in sodium chloride 0 9 % 500 mL IVPB (2,000 mg Intravenous New Bag 11/29/19 2350)       Diagnostic Studies  Results Reviewed     Procedure Component Value Units Date/Time    Blood culture #1 [[id number]] Collected:  11/29/19 2230    Lab Status:  Preliminary result Specimen:  Blood from Arm, Right Updated:  12/01/19 1201     Blood Culture No Growth at 24 hrs  Blood culture #2 [[id number]] Collected:  11/29/19 2231    Lab Status:  Preliminary result Specimen:  Blood from Arm, Left Updated:  12/01/19 1201     Blood Culture No Growth at 24 hrs  Urine culture [[id number]] Collected:  11/29/19 2133    Lab Status:  Preliminary result Specimen:  Urine, Other Updated:  12/01/19 0755     Urine Culture Culture too young- will reincubate    Troponin I [[id number]]  (Abnormal) Collected:  11/29/19 2350    Lab Status:  Final result Specimen:  Blood from Arm, Right Updated:  11/30/19 0018     Troponin I 0 08 ng/mL     Influenza A/B and RSV PCR [[id number]]  (Normal) Collected:  11/29/19 2106    Lab Status:  Final result Specimen:  Nose Updated:  11/29/19 2201     INFLUENZA A PCR None Detected     INFLUENZA B PCR None Detected     RSV PCR None Detected    Urine Microscopic [[id number]]  (Abnormal) Collected:  11/29/19 2133    Lab Status:  Final result Specimen:  Urine, Other Updated:  11/29/19 2151     RBC, UA 1-2 /hpf      WBC, UA 20-30 /hpf      Epithelial Cells Occasional /hpf      Bacteria, UA Occasional /hpf      Hyaline Casts, UA 0-1 /lpf      Fine granular casts 0-1 /lpf      OTHER OBSERVATIONS Yeast Cells Present     MUCUS THREADS Occasional    Narrative:       RECEIVED 1 5 ML URINE SAMPLE  URINE MICROSCOPIC ANALYSIS WAS PERFORMED ON UNSPUN URINE SAMPLE  AGD    UA w Reflex to Microscopic w Reflex to Culture [[id number]]  (Abnormal) Collected:  11/29/19 2133    Lab Status:  Final result Specimen:  Urine, Other Updated:  11/29/19 2146     Color, UA Roberta     Clarity, UA Slightly Cloudy     Specific Gravity, UA >=1 030     pH, UA 5 0     Leukocytes, UA Moderate     Nitrite, UA Negative     Protein,  (2+) mg/dl      Glucose, UA Negative mg/dl      Ketones, UA Trace mg/dl      Urobilinogen, UA 0 2 E U /dl      Bilirubin, UA Moderate     Blood, UA Moderate    Lactic acid, plasma x2 [[id number]]  (Normal) Collected: 11/29/19 2105    Lab Status:  Final result Specimen:  Blood from Arm, Right Updated:  11/29/19 2141     LACTIC ACID 0 8 mmol/L     Narrative:       Result may be elevated if tourniquet was used during collection      Ammonia [[id number]]  (Abnormal) Collected:  11/29/19 2105    Lab Status:  Final result Specimen:  Blood from Arm, Right Updated:  11/29/19 2137     Ammonia <10 umol/L     D-Dimer [[id number]]  (Abnormal) Collected:  11/29/19 2003    Lab Status:  Final result Specimen:  Blood from Arm, Right Updated:  11/29/19 2132     D-Dimer, Quant 5 10 ug/ml FEU     NT-BNP PRO [[id number]]  (Abnormal) Collected:  11/29/19 2003    Lab Status:  Final result Specimen:  Blood from Arm, Right Updated:  11/29/19 2131     NT-proBNP 16,417 pg/mL     Protime-INR [[id number]]  (Abnormal) Collected:  11/29/19 2003    Lab Status:  Final result Specimen:  Blood from Arm, Right Updated:  11/29/19 2122     Protime 15 5 seconds      INR 1 23    APTT [[id number]]  (Normal) Collected:  11/29/19 2003    Lab Status:  Final result Specimen:  Blood from Arm, Right Updated:  11/29/19 2122     PTT 35 seconds     Troponin I [[id number]]  (Abnormal) Collected:  11/29/19 2003    Lab Status:  Final result Specimen:  Blood from Arm, Right Updated:  11/29/19 2030     Troponin I 0 05 ng/mL     Comprehensive metabolic panel [[id number]]  (Abnormal) Collected:  11/29/19 2003    Lab Status:  Final result Specimen:  Blood from Arm, Right Updated:  11/29/19 2027     Sodium 140 mmol/L      Potassium 4 2 mmol/L      Chloride 102 mmol/L      CO2 33 mmol/L      ANION GAP 5 mmol/L      BUN 19 mg/dL      Creatinine 4 75 mg/dL      Glucose 109 mg/dL      Calcium 8 7 mg/dL      AST 21 U/L      ALT 12 U/L      Alkaline Phosphatase 72 U/L      Total Protein 6 1 g/dL      Albumin 2 9 g/dL      Total Bilirubin 0 70 mg/dL      eGFR 13 ml/min/1 73sq m     Narrative:       [place] guidelines for Chronic Kidney Disease (CKD):     Stage 1 with normal or high GFR (GFR > 90 mL/min/1 73 square meters)    Stage 2 Mild CKD (GFR = 60-89 mL/min/1 73 square meters)    Stage 3A Moderate CKD (GFR = 45-59 mL/min/1 73 square meters)    Stage 3B Moderate CKD (GFR = 30-44 mL/min/1 73 square meters)    Stage 4 Severe CKD (GFR = 15-29 mL/min/1 73 square meters)    Stage 5 End Stage CKD (GFR <15 mL/min/1 73 square meters)  Note: GFR calculation is accurate only with a steady state creatinine    CBC and differential [[id number]]  (Abnormal) Collected:  11/29/19 2003    Lab Status:  Final result Specimen:  Blood from Arm, Right Updated:  11/29/19 2010     WBC 12 25 Thousand/uL      RBC 3 19 Million/uL      Hemoglobin 11 0 g/dL      Hematocrit 33 2 %       fL      MCH 34 5 pg      MCHC 33 1 g/dL      RDW 23 1 %      MPV 9 8 fL      Platelets 320 Thousands/uL      nRBC 0 /100 WBCs      Neutrophils Relative 79 %      Immat GRANS % 2 %      Lymphocytes Relative 2 %      Monocytes Relative 13 %      Eosinophils Relative 3 %      Basophils Relative 1 %      Neutrophils Absolute 9 83 Thousands/µL      Immature Grans Absolute 0 19 Thousand/uL      Lymphocytes Absolute 0 27 Thousands/µL      Monocytes Absolute 1 54 Thousand/µL      Eosinophils Absolute 0 36 Thousand/µL      Basophils Absolute 0 06 Thousands/µL                  XR chest 2 views   Final Result by Cindy Hamilton DO (11/29 2020)      Right basilar opacity and effusion  Right-sided central line extends to the cavoatrial junction  No pneumothorax  Workstation performed: [id number]                    Procedures  Procedures       ED Course  ED Course as of Dec 02 0459   Fri Nov 29, 2019   2214 Spoke to [facility] regarding transfer  Patient accepted for transfer, okay to hold CTA for elevated d dimer, as patient oxygenating well with 3L and otherwise stable, AAOx3  Per jian request, will obtain BC x 2 and start empiric ABX, vanc and cefepime                                     MDM  Number of Diagnoses or Management Options  Chills:   Generalized weakness:   Shortness of breath:   Diagnosis management comments: 64 y/o male with PMH significant for ROSALES cirrhosis and liver transplant, CKD, HTN, a fib, and sleep apnea who presents with complaint of SOB, chills, and B/L leg weakness x this afternoon  On exam, well-appearing male, mildly tachypneic oxygenating, nontoxic, visibly short of breath on speaking, must pause with speech to catch breath, decreased lung sounds in all right lung fields, no crackles, wheezes, or rhonchi, no abdominal ascites, exam otherwise unremarkable  Differential diagnosis includes but is not limited to:  Transplant rejection, fluid overload causing pleural effusion, pneumothorax, pneumonia, influenza, infection     Will obtain:  CBC to assess for anemia and leukocytosis  CMP to assess kidney function, liver function, electrolytes, and glucose  Troponin and EKG  Chest x-ray  UA   Lactate, blood cultures   Flu PCR  PT INR  BNP  D dimer   Ammonia         Risk of Complications, Morbidity, and/or Mortality  General comments: X-ray shows right basilar opacity suspicious for pleural effusion  CBC shows mildly elevated WBC with decreased hgb, appropriate for recent trend  CMP shows elevated Cr, decreased GFR  Elevated troponin and D-dimer, likely falsely elevated, per [place] consult  Coags, flu normal    Vanc and cefepime running  See ED course note for dispo  Patient stable for transfer  I reviewed and discussed all lab and imaging findings with the patient at bedside  I answered any and all questions the patient had regarding emergency department course of evaluation and treatment  The patient verbalized understanding of and agreement with plan            Patient Progress  Patient progress: stable      Disposition  Final diagnoses:   Generalized weakness   Chills   Shortness of breath     Time reflects when diagnosis was documented in both MDM as applicable and the Disposition within this note     Time User Action Codes Description Comment    11/30/2019 12:22 AM Joel Lines Add [R53 1] Generalized weakness     11/30/2019 12:22 AM Joel Lines Add [R68 83] Chills     11/30/2019 12:22 AM Joel Lines Add [R06 02] Shortness of breath       ED Disposition     ED Disposition Condition Date/Time Comment    Transfer to Another [address] [place] [address] Nov 30, 2019 12:22 AM Cristine Lara should be transferred out to [facility]          MD Documentation      Most Recent Value   Patient Condition  The patient has been stabilized such that within reasonable medical probability, no material deterioration of the patient condition or the condition of the unborn child(chasity) is likely to result from the transfer   Reason for Transfer  Level of Care needed not available at this facility   Benefits of Transfer  Specialized equipment and/or services available at the receiving facility (Include comment)________________________   Risks of Transfer  Potential for delay in receiving treatment, Potential deterioration of medical condition, Loss of IV, Increased discomfort during transfer, Possible worsening of condition or death during transfer   Accepting Physician  Dr Marcio Crocker Name, [facility]   Sending MD  [facility]   Provider Certification  General risk, such as traffic hazards, adverse weather conditions, rough terrain or turbulence, possible failure of equipment (including vehicle or aircraft), or consequences of actions of persons outside the control of the transport personnel, Unanticipated needs of medical equipment and personnel during transport, Risk of worsening condition, The possibility of a transport vehicle being unavailable      RN Documentation      Most [address] Name, [address] Assignment  [id number]   Report Given to  Marizol Wong [facility]      Follow-up Information    None Discharge Medication List as of 11/30/2019 12:58 AM      CONTINUE these medications which have NOT CHANGED    Details   albumin human (FLEXBUMIN) 25 % Infuse 100 mL (25 g total) into a venous catheter every hour as needed (For hypotension on dialysis), Starting Wed 9/25/2019, No Print      cyanocobalamin (VITAMIN B-12) 50 MCG tablet Take 1 tablet (50 mcg total) by mouth daily, Starting Thu 9/26/2019, No Print      folic acid (FOLVITE) 841 mcg tablet Take 1 tablet (400 mcg total) by mouth daily, Starting Thu 9/26/2019, No Print      lactulose 20 g/30 mL Take 30 mL (20 g total) by mouth 3 (three) times a day, Starting Wed 9/25/2019, No Print      midodrine (PROAMATINE) 5 mg tablet Take 3 tablets (15 mg total) by mouth 3 (three) times a day before meals, Starting Wed 9/25/2019, No Print      nystatin (MYCOSTATIN) cream Apply topically 2 (two) times a day, Starting Wed 9/25/2019, No Print      octreotide (SandoSTATIN) 100 mcg/mL Inject 1 mL (100 mcg total) under the skin every 8 (eight) hours, Starting Wed 9/25/2019, No Print      ondansetron (ZOFRAN) 4 mg/2 mL injection Infuse 2 mL (4 mg total) into a venous catheter every 6 (six) hours as needed for nausea or vomiting, Starting Wed 9/25/2019, No Print      pantoprazole (PROTONIX) 40 mg injection Infuse 40 mg into a venous catheter 2 (two) times a day, Starting Wed 9/25/2019, No Print           No discharge procedures on file      ED Provider  Electronically Signed by           Reddy Mckeon PA-C  12/02/19 1611

## 2019-11-30 NOTE — ED NOTES
Order for second lactic acid discontinued, first lactic acid within normal limits     Benjamín Velez, RN  11/30/19 0004

## 2019-11-30 NOTE — ED NOTES
Yuan Gorman from transfer center called, pick-up time changed to 12:45AM      Combs Res  11/30/19 0011

## 2019-11-30 NOTE — EMTALA/ACUTE CARE TRANSFER
[address] [place] [address] 20  [address] [place] [address]  Dept: [phone]      EMTALA TRANSFER CONSENT    NAME Chris Evans                                         1960                              MRN [id number]    I have been informed of my rights regarding examination, treatment, and transfer   by Dr Cam Antony MD    Benefits: Specialized equipment and/or services available at the receiving facility (Include comment)________________________    Risks: Potential for delay in receiving treatment, Potential deterioration of medical condition, Loss of IV, Increased discomfort during transfer, Possible worsening of condition or death during transfer      Transfer Request   I acknowledge that my medical condition has been evaluated and explained to me by the emergency department physician or other qualified medical person and/or my attending physician who has recommended and offered to me further medical examination and treatment  I understand the Hospital's obligation with respect to the treatment and stabilization of my emergency medical condition  I nevertheless request to be transferred  I release the Hospital, the doctor, and any other persons caring for me from all responsibility or liability for any injury or ill effects that may result from my transfer and agree to accept all responsibility for the consequences of my choice to transfer, rather than receive stabilizing treatment at the Hospital  I understand that because the transfer is my request, my insurance may not provide reimbursement for the services  The Hospital will assist and direct me and my family in how to make arrangements for transfer, but the hospital is not liable for any fees charged by the transport service    In spite of this understanding, I refuse to consent to further medical examination and treatment which has been offered to me, and request transfer to  [address], [address] [place] [address]  I authorize the performance of emergency medical procedures and treatments upon me in both transit and upon arrival at the receiving facility  Additionally, I authorize the release of any and all medical records to the receiving facility and request they be transported with me, if possible  I authorize the performance of emergency medical procedures and treatments upon me in both transit and upon arrival at the receiving facility  Additionally, I authorize the release of any and all medical records to the receiving facility and request they be transported with me, if possible  I understand that the safest mode of transportation during a medical emergency is an ambulance and that the Hospital advocates the use of this mode of transport  Risks of traveling to the receiving facility by car, including absence of medical control, life sustaining equipment, such as oxygen, and medical personnel has been explained to me and I fully understand them  (ELVIRA CORRECT BOX BELOW)  [  ]  I consent to the stated transfer and to be transported by ambulance/helicopter  [  ]  I consent to the stated transfer, but refuse transportation by ambulance and accept full responsibility for my transportation by car  I understand the risks of non-ambulance transfers and I exonerate the Hospital and its staff from any deterioration in my condition that results from this refusal     X___________________________________________    DATE  19  TIME________  Signature of patient or legally responsible individual signing on patient behalf           RELATIONSHIP TO PATIENT_________________________          Provider Certification    NAME Connor Broderick                                        [facility] 1960                              MRN [id number]    A medical screening exam was performed on the above named patient    Based on the examination:    Condition Necessitating Transfer The primary encounter diagnosis was Generalized weakness  Diagnoses of Chills and Shortness of breath were also pertinent to this visit  Patient Condition: The patient has been stabilized such that within reasonable medical probability, no material deterioration of the patient condition or the condition of the unborn child(chasity) is likely to result from the transfer    Reason for Transfer: Level of Care needed not available at this facility    Transfer Requirements: Coretta Tran   · Space available and qualified personnel available for treatment as acknowledged by    · Agreed to accept transfer and to provide appropriate medical treatment as acknowledged by          · Appropriate medical records of the examination and treatment of the patient are provided at the time of transfer   [address] [facility], [address] _______  · Transfer will be performed by qualified personnel from    and appropriate transfer equipment as required, including the use of necessary and appropriate life support measures      Provider Certification: I have examined the patient and explained the following risks and benefits of being transferred/refusing transfer to the patient/family:  General risk, such as traffic hazards, adverse weather conditions, rough terrain or turbulence, possible failure of equipment (including vehicle or aircraft), or consequences of actions of persons outside the control of the transport personnel, Unanticipated needs of medical equipment and personnel during transport, Risk of worsening condition, The possibility of a transport vehicle being unavailable      Based on these reasonable risks and benefits to the patient and/or the unborn child(chasity), and based upon the information available at the time of the patients examination, I certify that the medical benefits reasonably to be expected from the provision of appropriate medical treatments at another medical facility outweigh the increasing risks, if any, to the individuals medical condition, and in the case of labor to the unborn child, from effecting the transfer      X____________________________________________ DATE 11/30/19        TIME_______      ORIGINAL - SEND TO MEDICAL RECORDS   COPY - SEND WITH PATIENT DURING TRANSFER

## 2019-12-02 LAB — BACTERIA UR CULT: NORMAL

## 2019-12-05 LAB
BACTERIA BLD CULT: NORMAL
BACTERIA BLD CULT: NORMAL

## 2020-01-07 ENCOUNTER — TELEPHONE (OUTPATIENT)
Dept: INTERVENTIONAL RADIOLOGY/VASCULAR | Facility: HOSPITAL | Age: 60
End: 2020-01-07

## 2020-01-08 ENCOUNTER — TRANSCRIBE ORDERS (OUTPATIENT)
Dept: ADMINISTRATIVE | Facility: HOSPITAL | Age: 60
End: 2020-01-08

## 2020-01-08 DIAGNOSIS — G47.30 SLEEP APNEA, UNSPECIFIED TYPE: Primary | ICD-10-CM

## 2020-02-25 ENCOUNTER — CONSULT (OUTPATIENT)
Dept: PULMONOLOGY | Facility: CLINIC | Age: 60
End: 2020-02-25
Payer: COMMERCIAL

## 2020-02-25 VITALS
HEART RATE: 66 BPM | BODY MASS INDEX: 35.08 KG/M2 | OXYGEN SATURATION: 98 % | DIASTOLIC BLOOD PRESSURE: 90 MMHG | SYSTOLIC BLOOD PRESSURE: 160 MMHG | HEIGHT: 72 IN | WEIGHT: 259 LBS

## 2020-02-25 DIAGNOSIS — E66.9 OBESITY (BMI 30-39.9): ICD-10-CM

## 2020-02-25 DIAGNOSIS — G47.33 OSA (OBSTRUCTIVE SLEEP APNEA): ICD-10-CM

## 2020-02-25 DIAGNOSIS — G47.19 EXCESSIVE DAYTIME SLEEPINESS: Primary | ICD-10-CM

## 2020-02-25 PROCEDURE — 99204 OFFICE O/P NEW MOD 45 MIN: CPT | Performed by: INTERNAL MEDICINE

## 2020-02-25 RX ORDER — TACROLIMUS 1 MG/1
4 CAPSULE ORAL EVERY 12 HOURS SCHEDULED
COMMUNITY
Start: 2020-02-21

## 2020-02-25 RX ORDER — AMLODIPINE BESYLATE 2.5 MG/1
2.5 TABLET ORAL DAILY
COMMUNITY
Start: 2020-02-07

## 2020-02-25 NOTE — PROGRESS NOTES
Assessment/Plan:     Diagnoses and all orders for this visit:    Excessive daytime sleepiness    CROW (obstructive sleep apnea)  -     Split Study; Future    Obesity (BMI 30-39  9)    Other orders  -     tacrolimus (PROGRAF) 1 mg capsule; 3mg in the morning, 3mg in the evening  -     amLODIPine (NORVASC) 2 5 mg tablet        Excessive daytime sleepiness likely secondary to untreated sleep apnea  Prior history of obstructive sleep apnea on CPAP for more than 10 years currently not using it for the past 1 year  Patient has signs and symptoms of obstructive sleep apnea  Etiology pathogenesis of obstructive sleep apnea discussed in detail  Will get a split night study and follow-up  Split at [facility] greater than equal to 5  Also discussed the consequences of untreated sleep apnea with excessive daytime sleepiness, increased risk for myocardial infarction stroke atrial fibrillation discussed understands and verbalizes  Cautioned against driving when sleepy  Discussed that after the split night study will order the CPAP and he will start using it and also discussed the need for compliance with the CPAP machine and if any concerns he will give us a call  Follow-up with a CPAP compliance data in 3 months  Return in about 3 months (around 5/25/2020)  All questions are answered to the patient's satisfaction and understanding  He verbalizes understanding  He is encouraged to call with any further questions or concerns  Portions of the record may have been created with voice recognition software  Occasional wrong word or "sound a like" substitutions may have occurred due to the inherent limitations of voice recognition software  Read the chart carefully and recognize, using context, where substitutions have occurred  a    Electronically Signed by Amanda Schmitz MD    ______________________________________________________________________    Chief Complaint: No chief complaint on file         Patient ID: Elissa Matias is a 63 y  o  y o  male has a past medical history of Anasarca, Chronic pain disorder, CPAP (continuous positive airway pressure) dependence, Heart murmur, Hypertension, 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 [facility]), Myocardial infarction [facility]), Renal insufficiency (6/7/2019), Sleep apnea, and Vitamin D deficiency  2/25/2020  Patient presents today for initial visit  Patient is here for evaluation for obstructive sleep apnea  He is a very pleasant 23-year-old gentleman, who is with multiple medical problems, with history of hypertension, coronary artery disease chronic kidney disease, and cirrhosis of liver status post liver transplant recently less than 2 months ago, at [facility]  He also has history of obstructive sleep apnea and has been on CPAP for more than 10 years, he states for the past 1 year his machine has not been working  And has not been using it he was at [facility] as recently stated for retransplant when he was placed on the CPAP during his hospitalization, and he was asked to follow up and get re-evaluated for sleep apnea  He still does complain of snoring as described by his girlfriend who has accompanied this office visit, history of witnessed apneic spells, does not have any trouble falling asleep, has multiple nocturnal awakenings 4-5 times for nocturia, and has daytime tiredness and fatigue and sleepiness  Does complain of early morning headaches, no symptoms related to restless legs,      Occupational/Exposure history:  Pets/Enviroment:  Travel history:  Review of Systems   Constitutional: Positive for fatigue  HENT: Positive for postnasal drip  Eyes: Negative  Respiratory:        Snoring, witnessed apneic spells    Cardiovascular: Negative  Gastrointestinal: Negative  Endocrine: Negative  Genitourinary: Negative  Musculoskeletal:        Hip pain   Skin: Negative  Allergic/Immunologic: Negative  Hematological: Negative      Psychiatric/Behavioral: Positive for sleep disturbance  The patient is nervous/anxious  Social history: He reports that he quit smoking about 29 years ago  He has never used smokeless tobacco  He reports that he drinks alcohol  He reports that he does not use drugs  Past surgical history:   Past Surgical History:   Procedure Laterality Date    IR TEMP HD CATH  9/10/2019    IR TUBE PLACEMENT NEGRA  9/16/2019    KNEE ARTHROSCOPY Bilateral     KNEE SURGERY      IL COLONOSCOPY FLX DX W/COLLJ SPEC WHEN PFRMD N/A 11/15/2017    Procedure: COLONOSCOPY;  Surgeon: Melissa Negrete MD;  Location: [facility]; Service: Gastroenterology     Family history: No family history on file      Immunization History   Administered Date(s) Administered    Influenza Quadrivalent Preservative Free 3 years and older IM 09/05/2014    Influenza Quadrivalent, 6-35 Months IM 10/25/2017    Influenza TIV (IM) 10/07/2013     Current Outpatient Medications   Medication Sig Dispense Refill    amLODIPine (NORVASC) 2 5 mg tablet       tacrolimus (PROGRAF) 1 mg capsule 3mg in the morning, 3mg in the evening      albumin human (FLEXBUMIN) 25 % Infuse 100 mL (25 g total) into a venous catheter every hour as needed (For hypotension on dialysis) (Patient not taking: Reported on 2/25/2020) 50 mL 0    cyanocobalamin (VITAMIN B-12) 50 MCG tablet Take 1 tablet (50 mcg total) by mouth daily (Patient not taking: Reported on 2/25/2020) 30 tablet 0    folic acid (FOLVITE) 022 mcg tablet Take 1 tablet (400 mcg total) by mouth daily (Patient not taking: Reported on 2/25/2020) 30 tablet 0    lactulose 20 g/30 mL Take 30 mL (20 g total) by mouth 3 (three) times a day (Patient not taking: Reported on 2/25/2020) 236 mL 0    midodrine (PROAMATINE) 5 mg tablet Take 3 tablets (15 mg total) by mouth 3 (three) times a day before meals (Patient not taking: Reported on 2/25/2020) 90 tablet 0    nystatin (MYCOSTATIN) cream Apply topically 2 (two) times a day (Patient not taking: Reported on 2/25/2020) 30 g 0    octreotide (SandoSTATIN) 100 mcg/mL Inject 1 mL (100 mcg total) under the skin every 8 (eight) hours (Patient not taking: Reported on 2/25/2020) 1 mL 0    ondansetron (ZOFRAN) 4 mg/2 mL injection Infuse 2 mL (4 mg total) into a venous catheter every 6 (six) hours as needed for nausea or vomiting (Patient not taking: Reported on 2/25/2020) 2 mL 0    pantoprazole (PROTONIX) 40 mg injection Infuse 40 mg into a venous catheter 2 (two) times a day (Patient not taking: Reported on 2/25/2020) 1 each 0     No current facility-administered medications for this visit  Allergies: Lactose; Propranolol; and Torsemide    Objective:  Vitals:    02/25/20 0918   BP: 160/90   Pulse: 66   SpO2: 98%   Weight: 117 kg (259 lb)   Height: 6' (1 829 m)   Oxygen Therapy  SpO2: 98 %    Wt Readings from Last 3 Encounters:   02/25/20 117 kg (259 lb)   11/29/19 122 kg (270 lb)   11/29/19 122 kg (268 lb 15 4 oz)     Body mass index is 35 13 kg/m²  Physical Exam   Constitutional: He is oriented to person, place, and time  He appears well-developed and well-nourished  HENT:   Head: Normocephalic and atraumatic  Crowded oropharyngeal airways, Mallampati score 3   Eyes: Pupils are equal, round, and reactive to light  EOM are normal    Neck: Normal range of motion  Neck supple  Short and wide neck   Cardiovascular: Normal rate, regular rhythm and normal heart sounds  Pulmonary/Chest: Effort normal and breath sounds normal    Musculoskeletal: Normal range of motion  Neurological: He is alert and oriented to person, place, and time  Skin: Skin is warm and dry  Psychiatric: He has a normal mood and affect  His behavior is normal            Diagnostics:  I have personally reviewed pertinent reports           ESS: Total score: 18

## 2020-02-28 ENCOUNTER — TELEPHONE (OUTPATIENT)
Dept: PULMONOLOGY | Facility: CLINIC | Age: 60
End: 2020-02-28

## 2020-03-24 ENCOUNTER — HOSPITAL ENCOUNTER (EMERGENCY)
Facility: HOSPITAL | Age: 60
Discharge: HOME | End: 2020-03-24
Admitting: EMERGENCY MEDICINE
Payer: COMMERCIAL

## 2020-03-24 VITALS
HEART RATE: 64 BPM | DIASTOLIC BLOOD PRESSURE: 88 MMHG | BODY MASS INDEX: 34.98 KG/M2 | OXYGEN SATURATION: 100 % | RESPIRATION RATE: 14 BRPM | WEIGHT: 257.94 LBS | TEMPERATURE: 97.5 F | SYSTOLIC BLOOD PRESSURE: 161 MMHG

## 2020-03-24 DIAGNOSIS — Z49.01 ENCOUNTER FOR DIALYSIS CATHETER CARE (HCC): Primary | ICD-10-CM

## 2020-03-24 DIAGNOSIS — N18.6 ESRD (END STAGE RENAL DISEASE) (HCC): ICD-10-CM

## 2020-03-24 PROCEDURE — 99284 EMERGENCY DEPT VISIT MOD MDM: CPT | Performed by: PHYSICIAN ASSISTANT

## 2020-03-24 PROCEDURE — 99283 EMERGENCY DEPT VISIT LOW MDM: CPT

## 2020-03-24 NOTE — ED PROVIDER NOTES
History  Chief Complaint   Patient presents with    Vascular Access Problem     pt states that he has come to have his port removed     Nani Phoenix is a 62yo who presents to the ED today with Tunneled Dialysis Catheter over rigth chest removal  Was asked by Corinna Faust liver transplant specialist to remove it  Did mention has hx of hyperkalemia, but he no longer needs this [facility] and has a script from [address] Specialist ([facility]--gwen gomez NP) for its removal   Patient currently feels well  Denies any pain anywhere including, headache, chest pain, and abd pain  No palpations  No syncope  No cough  Nos ob  No  N/v  +diarrhea---secondary to lactulose  Prior to Admission Medications   Prescriptions Last Dose Informant Patient Reported? Taking?    albumin human (FLEXBUMIN) 25 %   No No   Sig: Infuse 100 mL (25 g total) into a venous catheter every hour as needed (For hypotension on dialysis)   Patient not taking: Reported on 2/25/2020   amLODIPine (NORVASC) 2 5 mg tablet   Yes No   cyanocobalamin (VITAMIN B-12) 50 MCG tablet   No No   Sig: Take 1 tablet (50 mcg total) by mouth daily   Patient not taking: Reported on 5/91/9604   folic acid (FOLVITE) 466 mcg tablet   No No   Sig: Take 1 tablet (400 mcg total) by mouth daily   Patient not taking: Reported on 2/25/2020   lactulose 20 g/30 mL   No No   Sig: Take 30 mL (20 g total) by mouth 3 (three) times a day   Patient not taking: Reported on 2/25/2020   midodrine (PROAMATINE) 5 mg tablet   No No   Sig: Take 3 tablets (15 mg total) by mouth 3 (three) times a day before meals   Patient not taking: Reported on 2/25/2020   nystatin (MYCOSTATIN) cream   No No   Sig: Apply topically 2 (two) times a day   Patient not taking: Reported on 2/25/2020   octreotide (SandoSTATIN) 100 mcg/mL   No No   Sig: Inject 1 mL (100 mcg total) under the skin every 8 (eight) hours   Patient not taking: Reported on 2/25/2020   ondansetron (ZOFRAN) 4 mg/2 mL injection   No No   Sig: Infuse 2 mL (4 mg total) into a venous catheter every 6 (six) hours as needed for nausea or vomiting   Patient not taking: Reported on 2020   pantoprazole (PROTONIX) 40 mg injection   No No   Sig: Infuse 40 mg into a venous catheter 2 (two) times a day   Patient not taking: Reported on 2020   tacrolimus (PROGRAF) 1 mg capsule   Yes No   Sig: 3mg in the morning, 3mg in the evening      Facility-Administered Medications: None       Past Medical History:   Diagnosis Date    Anasarca     Chronic pain disorder     lower back    CPAP (continuous positive airway pressure) dependence     Heart murmur     Hypertension     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 [address] [facility]     [facility] [address] [facility]     Renal insufficiency 2019    Sleep apnea     Vitamin D deficiency        Past Surgical History:   Procedure Laterality Date    IR TEMP HD CATH  9/10/2019    IR TUBE PLACEMENT NEGRA  2019    KNEE ARTHROSCOPY Bilateral     KNEE SURGERY      RI COLONOSCOPY FLX DX W/COLLJ SPEC WHEN PFRMD N/A 11/15/2017    Procedure: COLONOSCOPY;  Surgeon: Jessica Fair MD;  Location: [facility]; Service: Gastroenterology       History reviewed  No pertinent family history  I have reviewed and agree with the history as documented  E-Cigarette/Vaping    E-Cigarette Use Never User      E-Cigarette/Vaping Substances     Social History     Tobacco Use    Smoking status: Former Smoker     Last attempt to quit: 11/15/1990     Years since quittin 3    Smokeless tobacco: Never Used   Substance Use Topics    Alcohol use: Yes     Comment: RARE    Drug use: No       Review of Systems   Constitutional: Negative for fatigue and fever  HENT: Negative for sore throat  Eyes: Negative for visual disturbance  Respiratory: Negative for cough and shortness of breath  Cardiovascular: Negative for chest pain  Gastrointestinal: Positive for diarrhea  Negative for abdominal pain, nausea and vomiting  Allergic/Immunologic: Negative for immunocompromised state  Neurological: Negative for dizziness, syncope, weakness and headaches  Psychiatric/Behavioral: Negative for behavioral problems  All other systems reviewed and are negative  Physical Exam  Physical Exam   Constitutional: He is oriented to person, place, and time  He appears well-developed and well-nourished  No distress  HENT:   Head: Normocephalic and atraumatic  Mouth/Throat: Oropharynx is clear and moist    Eyes: Conjunctivae and EOM are normal    Neck: Normal range of motion  Neck supple  Cardiovascular: Normal rate and regular rhythm  Pulmonary/Chest: Effort normal and breath sounds normal    Abdominal: Soft  Bowel sounds are normal  There is no tenderness  Neurological: He is alert and oriented to person, place, and time  Skin: Skin is warm and dry  He is not diaphoretic    +right upper anterior chest wall: TDC in place, clean, no signs of infection   Psychiatric: He has a normal mood and affect  His behavior is normal    Nursing note and vitals reviewed        Vital Signs  ED Triage Vitals [03/24/20 0912]   Temperature Pulse Respirations Blood Pressure SpO2   97 5 °F (36 4 °C) 62 15 (!) 174/93 100 %      Temp Source Heart Rate Source Patient Position - Orthostatic VS BP Location FiO2 (%)   Oral Monitor Sitting Left arm --      Pain Score       --           Vitals:    03/24/20 0912 03/24/20 0949   BP: (!) 174/93 161/88   Pulse: 62 64   Patient Position - Orthostatic VS: Sitting Sitting         Visual Acuity      ED Medications  Medications - No data to display    Diagnostic Studies  Results Reviewed     None                 No orders to display              Procedures  Procedures         ED Course               Spoke with Ronen Crow NP from [address] [place] [address] care specialist  Patient has a known hyperkalemia, and will need dialysis,but this [facility] placed by [facility], after liver transplant, and was told by liver specialist to get it removed, and kidney specialist is okay with this  Tiger texted dr Can Sung, who came down to ED and spoke with patient regarding outpatient procedure  Spoke with Vargas Silva (scheduling) and zac ED charge nurse in IR as well who stated someone from scheduling will call patient  Patient notified  Patient also understands that his hyperkalemia is a high risk for sudden death  Pt states his family deals with this  and that he is not worried  MDM      Disposition  Final diagnoses:   None     ED Disposition     ED Disposition Condition Date/Time Comment    Discharge Stable Tue Mar 24, 2020 10:51 AM Luis Laboy discharge to home/self care              Follow-up Information     Follow up With Specialties Details Why MD [facility]   [address] [address]  [phone]            Discharge Medication List as of 3/24/2020 10:51 AM      CONTINUE these medications which have NOT CHANGED    Details   albumin human (FLEXBUMIN) 25 % Infuse 100 mL (25 g total) into a venous catheter every hour as needed (For hypotension on dialysis), Starting Wed 9/25/2019, No Print      amLODIPine (NORVASC) 2 5 mg tablet Starting Fri 2/7/2020, Historical Med      cyanocobalamin (VITAMIN B-12) 50 MCG tablet Take 1 tablet (50 mcg total) by mouth daily, Starting Thu 9/26/2019, No Print      folic acid (FOLVITE) 050 mcg tablet Take 1 tablet (400 mcg total) by mouth daily, Starting Thu 9/26/2019, No Print      lactulose 20 g/30 mL Take 30 mL (20 g total) by mouth 3 (three) times a day, Starting Wed 9/25/2019, No Print      midodrine (PROAMATINE) 5 mg tablet Take 3 tablets (15 mg total) by mouth 3 (three) times a day before meals, Starting Wed 9/25/2019, No Print      nystatin (MYCOSTATIN) cream Apply topically 2 (two) times a day, Starting Wed 9/25/2019, No Print      octreotide (SandoSTATIN) 100 mcg/mL Inject 1 mL (100 mcg total) under the skin every 8 (eight) hours, Starting MIRTA Davis 9/25/2019, No Print      ondansetron (ZOFRAN) 4 mg/2 mL injection Infuse 2 mL (4 mg total) into a venous catheter every 6 (six) hours as needed for nausea or vomiting, Starting Wed 9/25/2019, No Print      pantoprazole (PROTONIX) 40 mg injection Infuse 40 mg into a venous catheter 2 (two) times a day, Starting Wed 9/25/2019, No Print      tacrolimus (PROGRAF) 1 mg capsule 3mg in the morning, 3mg in the evening, Historical Med           No discharge procedures on file      PDMP Review     None          ED Provider  Electronically Signed by           Reva Mendez PA-C  03/24/20 1647

## 2020-03-24 NOTE — DISCHARGE INSTRUCTIONS
from [facility] marsammi CARRASCO will call later today to finish scheduling TDC removal

## 2020-03-27 ENCOUNTER — HOSPITAL ENCOUNTER (OUTPATIENT)
Dept: INTERVENTIONAL RADIOLOGY/VASCULAR | Facility: HOSPITAL | Age: 60
Discharge: HOME | End: 2020-03-27
Admitting: RADIOLOGY
Payer: COMMERCIAL

## 2020-03-27 DIAGNOSIS — N18.5 CKD (CHRONIC KIDNEY DISEASE), STAGE V (HCC): ICD-10-CM

## 2020-03-27 PROCEDURE — 36589 REMOVAL TUNNELED CV CATH: CPT

## 2020-03-27 PROCEDURE — 36589 REMOVAL TUNNELED CV CATH: CPT | Performed by: RADIOLOGY

## 2020-03-27 RX ORDER — LIDOCAINE WITH 8.4% SOD BICARB 0.9%(10ML)
SYRINGE (ML) INJECTION CODE/TRAUMA/SEDATION MEDICATION
Status: COMPLETED | OUTPATIENT
Start: 2020-03-27 | End: 2020-03-27

## 2020-03-27 RX ADMIN — Medication 5 ML: at 10:14

## 2020-03-27 NOTE — DISCHARGE INSTRUCTIONS
Perma-cath Removal   WHAT YOU NEED TO KNOW:   A perma-cath is a catheter placed through a vein into or near your right atrium  Your right atrium is the right upper chamber of your heart  A perma-cath is used for dialysis in an emergency or until a long-term device is ready to use  Or you no longer need dialysis  DISCHARGE INSTRUCTIONS:   Call 911 for any of the following:   · You feel lightheaded, short of breath, and have chest pain  · You start bleeding    Contact Interventional Radiology at [phone] Lisa PATIENTS: Contact Interventional Radiology at [phone]) [facility] PAT[facility]ENTS: Contact Interventional Radiology at [phone]) if:  · Blood soaks through your bandage  · You have new swelling in your arm, neck, face, or chest on your right side  · Your bruises or pain get worse  · You have a fever or chills  · Persistent nausea or vomiting  · Your incision is red, swollen, or draining pus  · You have questions or concerns about your condition or care  Self-care:   · Resume your normal diet  Small sips of flat soda will help with nausea  · Keep your dressings dry  Do not get your perma-cath site wet until the incision closes  You may take a tub bath, but do not get your dressings wet  Water in your wound can cause bacteria to grow and cause an infection  If your dressing gets wet, remove the wet dressing and apply a dry bandaid  Keep it covered until the incision closes  This should only take a few days to heal  Do not use soaps or ointments  · Immediately after your catheter is removed, no strenuous activity for twenty four hours and stay in an upright sitting position for two hours     Follow up with your healthcare provider as directed: Write down your questions so you remember to ask them during your visits

## 2020-04-27 ENCOUNTER — TELEPHONE (OUTPATIENT)
Dept: SLEEP CENTER | Facility: CLINIC | Age: 60
End: 2020-04-27

## 2020-04-27 ENCOUNTER — TELEPHONE (OUTPATIENT)
Dept: PULMONOLOGY | Facility: CLINIC | Age: 60
End: 2020-04-27

## 2020-04-28 ENCOUNTER — TELEPHONE (OUTPATIENT)
Dept: PULMONOLOGY | Facility: CLINIC | Age: 60
End: 2020-04-28

## 2020-04-28 DIAGNOSIS — G47.33 OSA (OBSTRUCTIVE SLEEP APNEA): Primary | ICD-10-CM

## 2020-04-29 ENCOUNTER — TRANSCRIBE ORDERS (OUTPATIENT)
Dept: SLEEP CENTER | Facility: CLINIC | Age: 60
End: 2020-04-29

## 2020-05-27 ENCOUNTER — HOSPITAL ENCOUNTER (OUTPATIENT)
Dept: SLEEP CENTER | Facility: CLINIC | Age: 60
Discharge: HOME | End: 2020-05-27

## 2020-05-27 ENCOUNTER — TELEPHONE (OUTPATIENT)
Dept: PULMONOLOGY | Facility: CLINIC | Age: 60
End: 2020-05-27

## 2020-05-27 DIAGNOSIS — G47.33 OSA (OBSTRUCTIVE SLEEP APNEA): ICD-10-CM

## 2020-05-29 ENCOUNTER — TELEPHONE (OUTPATIENT)
Dept: PULMONOLOGY | Facility: CLINIC | Age: 60
End: 2020-05-29

## 2020-05-29 DIAGNOSIS — G47.33 OSA (OBSTRUCTIVE SLEEP APNEA): Primary | ICD-10-CM

## 2020-06-04 ENCOUNTER — TELEPHONE (OUTPATIENT)
Dept: PULMONOLOGY | Facility: CLINIC | Age: 60
End: 2020-06-04

## 2020-07-01 ENCOUNTER — HOSPITAL ENCOUNTER (OUTPATIENT)
Facility: HOSPITAL | Age: 60
Setting detail: OBSERVATION
Discharge: HOME | End: 2020-07-02
Attending: EMERGENCY MEDICINE | Admitting: EMERGENCY MEDICINE
Payer: COMMERCIAL

## 2020-07-01 ENCOUNTER — APPOINTMENT (EMERGENCY)
Dept: RADIOLOGY | Facility: HOSPITAL | Age: 60
End: 2020-07-01
Payer: COMMERCIAL

## 2020-07-01 ENCOUNTER — APPOINTMENT (EMERGENCY)
Dept: CT IMAGING | Facility: HOSPITAL | Age: 60
End: 2020-07-01
Payer: COMMERCIAL

## 2020-07-01 DIAGNOSIS — D64.9 ANEMIA: ICD-10-CM

## 2020-07-01 DIAGNOSIS — R55 SYNCOPE: Primary | ICD-10-CM

## 2020-07-01 PROBLEM — N18.4 STAGE 4 CHRONIC KIDNEY DISEASE (HCC): Status: ACTIVE | Noted: 2020-07-01

## 2020-07-01 LAB
ABO GROUP BLD: NORMAL
ALBUMIN SERPL BCP-MCNC: 3 G/DL (ref 3.5–5)
ALP SERPL-CCNC: 111 U/L (ref 46–116)
ALT SERPL W P-5'-P-CCNC: 45 U/L (ref 12–78)
ANION GAP SERPL CALCULATED.3IONS-SCNC: 14 MMOL/L (ref 4–13)
AST SERPL W P-5'-P-CCNC: 28 U/L (ref 5–45)
BASOPHILS # BLD AUTO: 0 THOUSANDS/ΜL (ref 0–0.1)
BASOPHILS NFR BLD AUTO: 0 % (ref 0–1)
BILIRUB SERPL-MCNC: 1.2 MG/DL (ref 0.2–1)
BLD GP AB SCN SERPL QL: NEGATIVE
BUN SERPL-MCNC: 48 MG/DL (ref 5–25)
CALCIUM SERPL-MCNC: 8.5 MG/DL (ref 8.3–10.1)
CHLORIDE SERPL-SCNC: 109 MMOL/L (ref 100–108)
CO2 SERPL-SCNC: 17 MMOL/L (ref 21–32)
CREAT SERPL-MCNC: 2.89 MG/DL (ref 0.6–1.3)
EOSINOPHIL # BLD AUTO: 0.03 THOUSAND/ΜL (ref 0–0.61)
EOSINOPHIL NFR BLD AUTO: 0 % (ref 0–6)
ERYTHROCYTE [DISTWIDTH] IN BLOOD BY AUTOMATED COUNT: 15.5 % (ref 11.6–15.1)
FERRITIN SERPL-MCNC: 416 NG/ML (ref 8–388)
FOLATE SERPL-MCNC: 11.9 NG/ML (ref 3.1–17.5)
GFR SERPL CREATININE-BSD FRML MDRD: 23 ML/MIN/1.73SQ M
GLUCOSE SERPL-MCNC: 120 MG/DL (ref 65–140)
GLUCOSE SERPL-MCNC: 128 MG/DL (ref 65–140)
HCT VFR BLD AUTO: 22.3 % (ref 36.5–49.3)
HGB BLD-MCNC: 7 G/DL (ref 12–17)
IMM GRANULOCYTES # BLD AUTO: 0.1 THOUSAND/UL (ref 0–0.2)
IMM GRANULOCYTES NFR BLD AUTO: 1 % (ref 0–2)
IRON SATN MFR SERPL: 8 %
IRON SERPL-MCNC: 16 UG/DL (ref 65–175)
LYMPHOCYTES # BLD AUTO: 0.08 THOUSANDS/ΜL (ref 0.6–4.47)
LYMPHOCYTES NFR BLD AUTO: 1 % (ref 14–44)
MAGNESIUM SERPL-MCNC: 1.6 MG/DL (ref 1.6–2.6)
MCH RBC QN AUTO: 33.3 PG (ref 26.8–34.3)
MCHC RBC AUTO-ENTMCNC: 31.4 G/DL (ref 31.4–37.4)
MCV RBC AUTO: 106 FL (ref 82–98)
MONOCYTES # BLD AUTO: 0.72 THOUSAND/ΜL (ref 0.17–1.22)
MONOCYTES NFR BLD AUTO: 10 % (ref 4–12)
NEUTROPHILS # BLD AUTO: 6.24 THOUSANDS/ΜL (ref 1.85–7.62)
NEUTS SEG NFR BLD AUTO: 88 % (ref 43–75)
NRBC BLD AUTO-RTO: 0 /100 WBCS
PLATELET # BLD AUTO: 80 THOUSANDS/UL (ref 149–390)
PMV BLD AUTO: 11.4 FL (ref 8.9–12.7)
POTASSIUM SERPL-SCNC: 4.8 MMOL/L (ref 3.5–5.3)
PROT SERPL-MCNC: 6.5 G/DL (ref 6.4–8.2)
RBC # BLD AUTO: 2.1 MILLION/UL (ref 3.88–5.62)
RH BLD: NEGATIVE
SARS-COV-2 RNA RESP QL NAA+PROBE: NEGATIVE
SODIUM SERPL-SCNC: 140 MMOL/L (ref 136–145)
SPECIMEN EXPIRATION DATE: NORMAL
TIBC SERPL-MCNC: 203 UG/DL (ref 250–450)
TROPONIN I SERPL-MCNC: <0.02 NG/ML
TROPONIN I SERPL-MCNC: <0.02 NG/ML
VIT B12 SERPL-MCNC: 675 PG/ML (ref 100–900)
WBC # BLD AUTO: 7.17 THOUSAND/UL (ref 4.31–10.16)

## 2020-07-01 PROCEDURE — 86900 BLOOD TYPING SEROLOGIC ABO: CPT | Performed by: PHYSICIAN ASSISTANT

## 2020-07-01 PROCEDURE — 87635 SARS-COV-2 COVID-19 AMP PRB: CPT | Performed by: PHYSICIAN ASSISTANT

## 2020-07-01 PROCEDURE — 80053 COMPREHEN METABOLIC PANEL: CPT | Performed by: PHYSICIAN ASSISTANT

## 2020-07-01 PROCEDURE — 85025 COMPLETE CBC W/AUTO DIFF WBC: CPT | Performed by: PHYSICIAN ASSISTANT

## 2020-07-01 PROCEDURE — 99285 EMERGENCY DEPT VISIT HI MDM: CPT | Performed by: PHYSICIAN ASSISTANT

## 2020-07-01 PROCEDURE — 71045 X-RAY EXAM CHEST 1 VIEW: CPT

## 2020-07-01 PROCEDURE — 96360 HYDRATION IV INFUSION INIT: CPT

## 2020-07-01 PROCEDURE — 83735 ASSAY OF MAGNESIUM: CPT | Performed by: PHYSICIAN ASSISTANT

## 2020-07-01 PROCEDURE — 93005 ELECTROCARDIOGRAM TRACING: CPT

## 2020-07-01 PROCEDURE — 82746 ASSAY OF FOLIC ACID SERUM: CPT | Performed by: NURSE PRACTITIONER

## 2020-07-01 PROCEDURE — 84484 ASSAY OF TROPONIN QUANT: CPT | Performed by: STUDENT IN AN ORGANIZED HEALTH CARE EDUCATION/TRAINING PROGRAM

## 2020-07-01 PROCEDURE — 99220 PR INITIAL OBSERVATION CARE/DAY 70 MINUTES: CPT | Performed by: STUDENT IN AN ORGANIZED HEALTH CARE EDUCATION/TRAINING PROGRAM

## 2020-07-01 PROCEDURE — 84484 ASSAY OF TROPONIN QUANT: CPT | Performed by: PHYSICIAN ASSISTANT

## 2020-07-01 PROCEDURE — P9016 RBC LEUKOCYTES REDUCED: HCPCS

## 2020-07-01 PROCEDURE — 82948 REAGENT STRIP/BLOOD GLUCOSE: CPT

## 2020-07-01 PROCEDURE — 86923 COMPATIBILITY TEST ELECTRIC: CPT

## 2020-07-01 PROCEDURE — 99285 EMERGENCY DEPT VISIT HI MDM: CPT

## 2020-07-01 PROCEDURE — 83550 IRON BINDING TEST: CPT | Performed by: NURSE PRACTITIONER

## 2020-07-01 PROCEDURE — 82728 ASSAY OF FERRITIN: CPT | Performed by: NURSE PRACTITIONER

## 2020-07-01 PROCEDURE — 83540 ASSAY OF IRON: CPT | Performed by: NURSE PRACTITIONER

## 2020-07-01 PROCEDURE — 36430 TRANSFUSION BLD/BLD COMPNT: CPT

## 2020-07-01 PROCEDURE — 86901 BLOOD TYPING SEROLOGIC RH(D): CPT | Performed by: PHYSICIAN ASSISTANT

## 2020-07-01 PROCEDURE — 86850 RBC ANTIBODY SCREEN: CPT | Performed by: PHYSICIAN ASSISTANT

## 2020-07-01 PROCEDURE — 70450 CT HEAD/BRAIN W/O DYE: CPT

## 2020-07-01 PROCEDURE — 36415 COLL VENOUS BLD VENIPUNCTURE: CPT | Performed by: PHYSICIAN ASSISTANT

## 2020-07-01 PROCEDURE — 82607 VITAMIN B-12: CPT | Performed by: NURSE PRACTITIONER

## 2020-07-01 RX ORDER — ALBUTEROL SULFATE 90 UG/1
2 AEROSOL, METERED RESPIRATORY (INHALATION) EVERY 6 HOURS PRN
COMMUNITY

## 2020-07-01 RX ORDER — MYCOPHENOLATE MOFETIL 250 MG/1
1000 CAPSULE ORAL EVERY 12 HOURS SCHEDULED
Status: DISCONTINUED | OUTPATIENT
Start: 2020-07-01 | End: 2020-07-02 | Stop reason: HOSPADM

## 2020-07-01 RX ORDER — URSODIOL 300 MG/1
300 CAPSULE ORAL 3 TIMES DAILY
COMMUNITY

## 2020-07-01 RX ORDER — ALBUTEROL SULFATE 90 UG/1
2 AEROSOL, METERED RESPIRATORY (INHALATION) EVERY 6 HOURS PRN
Status: DISCONTINUED | OUTPATIENT
Start: 2020-07-01 | End: 2020-07-02 | Stop reason: HOSPADM

## 2020-07-01 RX ORDER — DAPSONE 100 MG/1
100 TABLET ORAL DAILY
Status: DISCONTINUED | OUTPATIENT
Start: 2020-07-02 | End: 2020-07-02 | Stop reason: HOSPADM

## 2020-07-01 RX ORDER — ACYCLOVIR 400 MG/1
400 TABLET ORAL 2 TIMES DAILY
COMMUNITY

## 2020-07-01 RX ORDER — FAMOTIDINE 20 MG/1
20 TABLET, FILM COATED ORAL DAILY
Status: DISCONTINUED | OUTPATIENT
Start: 2020-07-02 | End: 2020-07-02 | Stop reason: HOSPADM

## 2020-07-01 RX ORDER — SIROLIMUS 1 MG/1
6 TABLET, FILM COATED ORAL DAILY
COMMUNITY

## 2020-07-01 RX ORDER — MYCOPHENOLATE MOFETIL 500 MG/1
1000 TABLET ORAL EVERY 12 HOURS SCHEDULED
COMMUNITY

## 2020-07-01 RX ORDER — ACYCLOVIR 800 MG/1
400 TABLET ORAL 2 TIMES DAILY
Status: DISCONTINUED | OUTPATIENT
Start: 2020-07-01 | End: 2020-07-02 | Stop reason: HOSPADM

## 2020-07-01 RX ORDER — TACROLIMUS 1 MG/1
4 CAPSULE ORAL EVERY 12 HOURS SCHEDULED
Status: DISCONTINUED | OUTPATIENT
Start: 2020-07-01 | End: 2020-07-02 | Stop reason: HOSPADM

## 2020-07-01 RX ORDER — PREDNISONE 1 MG/1
5 TABLET ORAL DAILY
COMMUNITY

## 2020-07-01 RX ORDER — TACROLIMUS 1 MG/1
7 CAPSULE ORAL EVERY 12 HOURS SCHEDULED
Status: DISCONTINUED | OUTPATIENT
Start: 2020-07-01 | End: 2020-07-01

## 2020-07-01 RX ORDER — DAPSONE 100 MG/1
100 TABLET ORAL DAILY
COMMUNITY

## 2020-07-01 RX ORDER — FAMOTIDINE 20 MG/1
15 TABLET, FILM COATED ORAL DAILY
COMMUNITY

## 2020-07-01 RX ORDER — SODIUM BICARBONATE 650 MG/1
650 TABLET ORAL 2 TIMES DAILY
COMMUNITY

## 2020-07-01 RX ORDER — AMLODIPINE BESYLATE 2.5 MG/1
2.5 TABLET ORAL DAILY
Status: DISCONTINUED | OUTPATIENT
Start: 2020-07-02 | End: 2020-07-02 | Stop reason: HOSPADM

## 2020-07-01 RX ORDER — ONDANSETRON 2 MG/ML
4 INJECTION INTRAMUSCULAR; INTRAVENOUS EVERY 6 HOURS PRN
Status: DISCONTINUED | OUTPATIENT
Start: 2020-07-01 | End: 2020-07-02 | Stop reason: HOSPADM

## 2020-07-01 RX ADMIN — MYCOPHENOLATE MOFETIL 1000 MG: 250 CAPSULE ORAL at 21:36

## 2020-07-01 RX ADMIN — ACYCLOVIR 400 MG: 800 TABLET ORAL at 21:37

## 2020-07-01 RX ADMIN — TACROLIMUS 4 MG: 1 CAPSULE ORAL at 21:38

## 2020-07-01 RX ADMIN — SODIUM CHLORIDE 500 ML: 0.9 INJECTION, SOLUTION INTRAVENOUS at 11:16

## 2020-07-01 NOTE — ASSESSMENT & PLAN NOTE
Platelets noted to be 80 in the setting of liver transplant  · Monitor counts daily  · Avoid pharmacologic DVT prophylaxis

## 2020-07-01 NOTE — ASSESSMENT & PLAN NOTE
Hemoglobin is 7 0 which is slightly lower than baseline  Patient reports intermittent epistaxis but denies any active bleeding  Hemoccult negative in ED  Chronic kidney disease at baseline    · Will transfuse 1 unit PRBCs and monitor hemoglobin in the morning

## 2020-07-01 NOTE — ED PROVIDER NOTES
History  Chief Complaint   Patient presents with    Seizure - New Onset     Per EMS, pt was across the street at urgent care getting tested for covid when he had witnessed seizure like acitivity  Pt with no hx of seizures, Becca's 4 upon arrival      80-year-old male with history of liver transplant, atrial fibrillation, hypertension, and sleep apnea presenting via EMS after a syncopal episode  Patient was seen at urgent care this morning for evaluation of a cough and loss of taste  Patient was advised by his PCP to have COVID-19 testing  Prior to the test being collected, patient had a witnessed syncopal episode while sitting in a chair  Patient denies preceding dizziness or symptoms  He was unconscious for approximately 30 seconds  Per EMS report, there was some concern about possible seizure-like activity  Patient denies incontinence and tongue biting  After regaining consciousness, there was no post-ictal period and patient was immediately A&O  Patient currently is asymptomatic and states he feels fine  Patient denies chest pain, shortness of breath, headache, visual changes, numbness, tingling, facial droop, difficulty ambulating, nausea, vomiting, abdominal pain  Patient is currently taking tacrolimus, Cellcept, and prednisone  He received a blood transfusion 2 weeks ago and follows with [facility]  History provided by:  Patient, medical records and EMS personnel   used: No    Syncope   Episode history:  Single  Most recent episode:   Today  Duration:  30 seconds  Timing:  Rare  Chronicity:  New  Context: not standing up    Witnessed: yes    Relieved by:  Nothing  Worsened by:  Nothing  Ineffective treatments:  None tried  Associated symptoms: no chest pain, no confusion, no diaphoresis, no dizziness, no fever, no focal sensory loss, no focal weakness, no headaches, no malaise/fatigue, no nausea, no palpitations, no rectal bleeding, no shortness of breath, no vomiting and no weakness Prior to Admission Medications   Prescriptions Last Dose Informant Patient Reported? Taking?    albumin human (FLEXBUMIN) 25 %   No No   Sig: Infuse 100 mL (25 g total) into a venous catheter every hour as needed (For hypotension on dialysis)   Patient not taking: Reported on 2/25/2020   amLODIPine (NORVASC) 2 5 mg tablet   Yes No   cyanocobalamin (VITAMIN B-12) 50 MCG tablet   No No   Sig: Take 1 tablet (50 mcg total) by mouth daily   Patient not taking: Reported on 7/59/5250   folic acid (FOLVITE) 712 mcg tablet   No No   Sig: Take 1 tablet (400 mcg total) by mouth daily   Patient not taking: Reported on 2/25/2020   lactulose 20 g/30 mL   No No   Sig: Take 30 mL (20 g total) by mouth 3 (three) times a day   Patient not taking: Reported on 2/25/2020   midodrine (PROAMATINE) 5 mg tablet   No No   Sig: Take 3 tablets (15 mg total) by mouth 3 (three) times a day before meals   Patient not taking: Reported on 2/25/2020   nystatin (MYCOSTATIN) cream   No No   Sig: Apply topically 2 (two) times a day   Patient not taking: Reported on 2/25/2020   octreotide (SandoSTATIN) 100 mcg/mL   No No   Sig: Inject 1 mL (100 mcg total) under the skin every 8 (eight) hours   Patient not taking: Reported on 2/25/2020   ondansetron (ZOFRAN) 4 mg/2 mL injection   No No   Sig: Infuse 2 mL (4 mg total) into a venous catheter every 6 (six) hours as needed for nausea or vomiting   Patient not taking: Reported on 2/25/2020   pantoprazole (PROTONIX) 40 mg injection   No No   Sig: Infuse 40 mg into a venous catheter 2 (two) times a day   Patient not taking: Reported on 2/25/2020   tacrolimus (PROGRAF) 1 mg capsule   Yes No   Sig: 3mg in the morning, 3mg in the evening      Facility-Administered Medications: None       Past Medical History:   Diagnosis Date    Anasarca     Chronic pain disorder     lower back    CPAP (continuous positive airway pressure) dependence     Heart murmur     Hypertension     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 [facility] [address] [facility]     Myocardial infarction ([facility] [address]     Renal insufficiency 2019    Sleep apnea     Vitamin D deficiency        Past Surgical History:   Procedure Laterality Date    IR [address] REMOVAL  3/27/2020    IR TEMP HD CATH  9/10/2019    IR TUBE PLACEMENT NEGRA  2019    KNEE ARTHROSCOPY Bilateral     KNEE SURGERY      AR COLONOSCOPY FLX DX W/COLLJ SPEC WHEN PFRMD N/A 11/15/2017    Procedure: COLONOSCOPY;  Surgeon: Derrick Mendoza MD;  Location: [facility]; Service: Gastroenterology       History reviewed  No pertinent family history  I have reviewed and agree with the history as documented  E-Cigarette/Vaping    E-Cigarette Use Never User      E-Cigarette/Vaping Substances     Social History     Tobacco Use    Smoking status: Former Smoker     Last attempt to quit: 11/15/1990     Years since quittin 6    Smokeless tobacco: Never Used   Substance Use Topics    Alcohol use: Yes     Comment: RARE    Drug use: No       Review of Systems   Constitutional: Negative for chills, diaphoresis, fever and malaise/fatigue  HENT: Negative for congestion and drooling  Eyes: Negative for discharge and redness  Respiratory: Negative for shortness of breath and stridor  Cardiovascular: Positive for syncope  Negative for chest pain and palpitations  Gastrointestinal: Negative for anal bleeding, nausea and vomiting  Genitourinary: Negative for difficulty urinating and dysuria  Musculoskeletal: Negative for neck pain and neck stiffness  Skin: Negative for color change and rash  Allergic/Immunologic: Negative for immunocompromised state  Neurological: Positive for syncope  Negative for dizziness, focal weakness, weakness and headaches  Psychiatric/Behavioral: Negative for confusion  The patient is not nervous/anxious  All other systems reviewed and are negative  Physical Exam  Physical Exam   Constitutional: He appears well-developed and well-nourished  No distress  Non-toxic appearing    HENT:   Head: Normocephalic and atraumatic  Right Ear: External ear normal    Left Ear: External ear normal    Mouth/Throat: Oropharynx is clear and moist    Eyes: Pupils are equal, round, and reactive to light  Conjunctivae and EOM are normal  Right eye exhibits no discharge  Left eye exhibits no discharge  No scleral icterus  Neck: Normal range of motion  Neck supple  Cardiovascular: Normal rate, regular rhythm and normal heart sounds  No murmur heard  Pulmonary/Chest: Effort normal and breath sounds normal  No stridor  No respiratory distress  He has no wheezes  He has no rales  Abdominal: Soft  Bowel sounds are normal  He exhibits no distension  There is no tenderness  There is no guarding  Musculoskeletal: Normal range of motion  He exhibits no deformity  Neurological: He is alert  He is not disoriented  GCS eye subscore is 4  GCS verbal subscore is 5  GCS motor subscore is 6  No focal neurologic deficits  Normal strength throughout  Negative drift in all extremities  A&Ox3  Skin: Skin is warm and dry  He is not diaphoretic  Psychiatric: He has a normal mood and affect  His behavior is normal    Nursing note and vitals reviewed        Vital Signs  ED Triage Vitals [07/01/20 1044]   Temperature Pulse Respirations Blood Pressure SpO2   98 °F (36 7 °C) 71 16 141/77 97 %      Temp src Heart Rate Source Patient Position - Orthostatic VS BP Location FiO2 (%)   -- -- -- -- --      Pain Score       --           Vitals:    07/01/20 1044 07/01/20 1130 07/01/20 1230   BP: 141/77 151/76 169/77   Pulse: 71 71 71         Visual Acuity      ED Medications  Medications   sodium chloride 0 9 % bolus 500 mL (0 mL Intravenous Stopped 7/1/20 1241)       Diagnostic Studies  Results Reviewed     Procedure Component Value Units Date/Time    Comprehensive metabolic panel [[id number]]  (Abnormal) Collected:  07/01/20 1116    Lab Status:  Final result Specimen:  Blood from Arm, Right Updated: 07/01/20 1235     Sodium 140 mmol/L      Potassium 4 8 mmol/L      Chloride 109 mmol/L      CO2 17 mmol/L      ANION GAP 14 mmol/L      BUN 48 mg/dL      Creatinine 2 89 mg/dL      Glucose 128 mg/dL      Calcium 8 5 mg/dL      AST 28 U/L      ALT 45 U/L      Alkaline Phosphatase 111 U/L      Total Protein 6 5 g/dL      Albumin 3 0 g/dL      Total Bilirubin 1 20 mg/dL      eGFR 23 ml/min/1 73sq m     Narrative:       [place] guidelines for Chronic Kidney Disease (CKD):     Stage 1 with normal or high GFR (GFR > 90 mL/min/1 73 square meters)    Stage 2 Mild CKD (GFR = 60-89 mL/min/1 73 square meters)    Stage 3A Moderate CKD (GFR = 45-59 mL/min/1 73 square meters)    Stage 3B Moderate CKD (GFR = 30-44 mL/min/1 73 square meters)    Stage 4 Severe CKD (GFR = 15-29 mL/min/1 73 square meters)    Stage 5 End Stage CKD (GFR <15 mL/min/1 73 square meters)  Note: GFR calculation is accurate only with a steady state creatinine    Magnesium [[id number]]  (Normal) Collected:  07/01/20 1116    Lab Status:  Final result Specimen:  Blood from Arm, Right Updated:  07/01/20 1235     Magnesium 1 6 mg/dL     Novel Coronavirus (Covid-19),PCR [facility] [[id number]]  (Normal) Collected:  07/01/20 1116    Lab Status:  Final result Specimen:  Nares from Nose Updated:  07/01/20 1232     SARS-CoV-2 Negative    Narrative: The specimen collection materials, transport medium, and/or testing methodology utilized in the production of these test results have been proven to be reliable in a limited validation with an abbreviated program under the Emergency Utilization Authorization provided by the FDA  Testing reported as "Presumptive positive" will be confirmed with secondary testing with a reference laboratory to ensure result accuracy  Clinical caution and judgement should be used with the interpretation of these results with consideration of the clinical impression and other laboratory testing    Testing reported as "Positive" or "Negative" has been proven to be accurate according to standard laboratory validation requirements  All testing is performed with control materials showing appropriate reactivity at standard intervals  CBC and differential [[id number]]  (Abnormal) Collected:  07/01/20 1116    Lab Status:  Final result Specimen:  Blood from Arm, Right Updated:  07/01/20 1155     WBC 7 17 Thousand/uL      RBC 2 10 Million/uL      Hemoglobin 7 0 g/dL      Hematocrit 22 3 %       fL      MCH 33 3 pg      MCHC 31 4 g/dL      RDW 15 5 %      MPV 11 4 fL      Platelets 80 Thousands/uL      nRBC 0 /100 WBCs      Neutrophils Relative 88 %      Immat GRANS % 1 %      Lymphocytes Relative 1 %      Monocytes Relative 10 %      Eosinophils Relative 0 %      Basophils Relative 0 %      Neutrophils Absolute 6 24 Thousands/µL      Immature Grans Absolute 0 10 Thousand/uL      Lymphocytes Absolute 0 08 Thousands/µL      Monocytes Absolute 0 72 Thousand/µL      Eosinophils Absolute 0 03 Thousand/µL      Basophils Absolute 0 00 Thousands/µL     Troponin I [[id number]]  (Normal) Collected:  07/01/20 1116    Lab Status:  Final result Specimen:  Blood from Arm, Right Updated:  07/01/20 1147     Troponin I <0 02 ng/mL                  XR chest 1 view portable   Final Result by Pan Loving MD (07/01 1228)      No acute cardiopulmonary disease  Workstation performed: [id number]         CT head without contrast   Final Result by Monica Purvis MD (07/01 1136)      No acute intracranial abnormality  Workstation performed: [id number]                    Procedures  Procedures         ED Course  ED Course as of Jul 01 1328 Wed Jul 01, 2020   1201 Hemoglobin(!): 7 0   1304 Stool brown  Hemoccult negative  US AUDIT      Most Recent Value   Initial Alcohol Screen: US AUDIT-C    1  How often do you have a drink containing alcohol?  0 Filed at: 07/01/2020 1044   2   How many drinks containing alcohol do you have on a typical day you are drinking? 0 Filed at: 07/01/2020 1044   3a  Male UNDER 65: How often do you have five or more drinks on one occasion? 0 Filed at: 07/01/2020 1044   3b  FEMALE Any Age, or MALE 65+: How often do you have 4 or more drinks on one occassion? 0 Filed at: 07/01/2020 1044   Audit-C Score  0 Filed at: 07/01/2020 1044                  DARIEN/DAST-10      Most Recent Value   How many times in the past year have you    Used an illegal drug or used a prescription medication for non-medical reasons? Never Filed at: 07/01/2020 1045                                MDM  Number of Diagnoses or Management Options  Anemia: new and requires workup  Syncope: new and requires workup  Diagnosis management comments: 64yo male with a history of liver transplant presenting after a syncopal episode while at urgent care  There was reportedly witnessed seizure-like activity but no postictal period  He denies preceding symptoms including dizziness  Vital signs unremarkable  No neurologic deficits on exam  Differential diagnosis include but is not limited to: orthostatic hypotension, vasovagal event, cardiogenic syncope, seizure, dehydration, anemia    Initial ED plan: Check cardiac labs, magnesium, CXR, and CT head  IV fluid bolus and re-evaluate  Final assessment: Labs significant for anemia with hemoglobin of 7 0  Patient admits to intermittent epistaxis but denies active bleeding  Hemoccult negative  Creatinine actually better than his baseline  Troponin negative and EKG reveals NSR without ST changes  CT head and CXR negative  One unit PRBC ordered  Given lack of prodromal symptoms, concern for possible cardiogenic syncope  Patient admitted for further evaluation and management          Amount and/or Complexity of Data Reviewed  Clinical lab tests: ordered and reviewed  Tests in the radiology section of CPT®: ordered and reviewed  Review and summarize past medical records: yes  Discuss the patient with other providers: yes  Independent visualization of images, tracings, or specimens: yes    Risk of Complications, Morbidity, and/or Mortality  Presenting problems: high  Diagnostic procedures: high  Management options: high          Disposition  Final diagnoses:   Syncope   Anemia     Time reflects when diagnosis was documented in both MDM as applicable and the Disposition within this note     Time User Action Codes Description Comment    7/1/2020  1:10  [address] [place] [R55] Syncope     7/1/2020  1:11  [address] [D64 9] Anemia       ED Disposition     ED Disposition Condition Date/Time Comment    Admit Stable Wed Jul 1, 2020  1:28 PM Case was discussed with Dr Ese iRvera and the patient's admission status was agreed to be Admission Status: observation status to the service of Dr Ese Rivera   Follow-up Information    None         Patient's Medications   Discharge Prescriptions    No medications on file     No discharge procedures on file      PDMP Review     None          ED Provider  Electronically Signed by           Jeanmarie Nugent PA-C  07/01/20 5487

## 2020-07-01 NOTE — ASSESSMENT & PLAN NOTE
S/p recent liver transplant at [address] [place] [address]  Patient has close follow-up with [address] [place] [address]  · Continue Tacrolimus, Sirolimus, CellCept, Acyclovir, and Actigall  · Close outpatient follow-up - patient will make his [facility] representative Ana Rosa Garibay) aware of his admission

## 2020-07-01 NOTE — ASSESSMENT & PLAN NOTE
Creatinine 2 8 with a GFR of 23 which is near baseline  · Received 500 mL bolus of normal saline in ED  · Will transfuse 1 unit of PRBCs today  · Hold off on further IV hydration  Encourage oral intake  Avoid nephrotoxic agents and relative hypotension    · Monitor creatinine in the morning  · Will need close follow-up with outpatient nephrologist

## 2020-07-01 NOTE — H&P
H&P- Christinene Covert 1960, 63 y o  male MRN: [id number]    Unit/Bed#: ED 26 Encounter: [id number]    Primary Care Provider: Danish Arechiga DO   Date and time admitted to hospital: 7/1/2020 10:40 AM        * Syncope  Assessment & Plan  In the setting of decreased oral intake over the last week secondary to decreased appetite   Given lack of prodromal symptoms, concern for possible cardiogenic syncope  DD include: vasovagal in the setting of dehydration vs anemia vs seizure  CT head unremarkable  CXR unremarkable  Initial troponin negative  EKG unremarkable  Electrolytes stable  Echocardiogram from June 2019 showed an EF of 70%  · Received 500 mL bolus of normal saline in ED  · Hemoglobin 7 0 with a baseline between 8 and 9  · Will transfuse 1 unit of PRBCs  · Hold off on further IV hydration  · Will update an echocardiogram  · Encourage oral intake  · Monitor electrolytes  · Monitor on telemetry monitor  · PT/OT  · Consider need for neurology consult pending progress    Anemia  Assessment & Plan  Hemoglobin is 7 0 which is slightly lower than baseline  Patient reports intermittent epistaxis but denies any active bleeding  Hemoccult negative in ED  Chronic kidney disease at baseline  · Will transfuse 1 unit PRBCs and monitor hemoglobin in the morning    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 [facility])  Assessment & Plan  S/p recent liver transplant at [address] [place] [address]  Patient has close follow-up with [address] [place] [address]  · Continue Tacrolimus, Sirolimus, CellCept, Acyclovir, and Actigall  · Close outpatient follow-up - patient will make his [facility] representative Ricco Daugherty) aware of his admission    Stage 4 chronic kidney disease ([facility] [address] )  Assessment & Plan  Creatinine 2 8 with a GFR of 23 which is near baseline  · Received 500 mL bolus of normal saline in ED  · Will transfuse 1 unit of PRBCs today  · Hold off on further IV hydration  Encourage oral intake    Avoid nephrotoxic agents and relative hypotension  · Monitor creatinine in the morning  · Will need close follow-up with outpatient nephrologist    Thrombocytopenia [facility])  Assessment & Plan  Platelets noted to be 80 in the setting of liver transplant  · Monitor counts daily  · Avoid pharmacologic DVT prophylaxis    Essential hypertension  Assessment & Plan  Most recent blood pressure stable at 141/70  · Continue Norvasc 2 5 mg daily with holding parameter        VTE Prophylaxis: Pharmacologic VTE Prophylaxis contraindicated due to Thrombocytopenia  / sequential compression device   Code Status:  Full code, level 1  POLST: POLST form is not discussed and not completed at this time  Discussion with family:  Wife at bedside    Anticipated Length of Stay:  Patient will be admitted on an Observation basis with an anticipated length of stay of  less than 2 midnights  Justification for Hospital Stay:  Syncope    Total Time for Visit, including Counseling / Coordination of Care: 1 hour  Greater than 50% of this total time spent on direct patient counseling and coordination of care  Chief Complaint:   Syncope    History of Present Illness:    John Mayorga is a 63 y o  male with a past medical history including ROSALES status post liver transplant, hypertension, thrombocytopenia who presents with syncope  Patient had not been feeling well over the last several days as noted by a decreased appetite, decreased oral intake, and cough with white sputum production  He decided to seek medical attention at the urgent care center today  During his evaluation he was told that he should be tested for COVID, when he suddenly passed out while being examined  Patient reportedly lost consciousness for 30 seconds  EMS was called as there was some concern about possible seizure-like activity  Patient denied any urinary incontinence or tongue biting  After regaining consciousness there was no postictal period and patient was immediately alert and oriented x4  Presently, patient's lab work is near baseline aside from a reduced hemoglobin of 7 0  He is currently asymptomatic and feels fine  He is speaking full sentences without shortness of breath  He denies any headache, dizziness, lightheadedness, chest pain, shortness of breath, vision changes, numbness or tingling, facial droop, nausea, vomiting, abdominal pain, or difficulty ambulating  Patient reports he receives blood transfusions by [facility] and had a transfusion about 2 weeks ago  Patient states that he will update his [facility], Helio Leyva, about his episode today  Review of Systems:    Review of Systems   Constitutional: Positive for appetite change  Negative for chills and fever  HENT: Negative for congestion, postnasal drip, rhinorrhea and sore throat  Eyes: Negative for photophobia and visual disturbance  Respiratory: Negative for cough, chest tightness, shortness of breath and wheezing  Cardiovascular: Negative for chest pain, palpitations and leg swelling  Gastrointestinal: Negative for abdominal distention, abdominal pain, constipation, diarrhea, nausea and vomiting  Genitourinary: Negative for difficulty urinating, dysuria and hematuria  Musculoskeletal: Negative for arthralgias, gait problem and myalgias  Skin: Negative for rash and wound  Neurological: Positive for syncope  Negative for dizziness, weakness, light-headedness, numbness and headaches  Psychiatric/Behavioral: Negative for confusion  The patient is not nervous/anxious          Past Medical and Surgical History:     Past Medical History:   Diagnosis Date    Anasarca     Chronic pain disorder     lower back    CPAP (continuous positive airway pressure) dependence     Heart murmur     Hypertension     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 ([facility] [address] )     [facility]     Renal insufficiency 6/7/2019    Sleep apnea     Vitamin D deficiency        Past Surgical History:   Procedure Laterality Date    IR ANGELIQUE JAMESTHERAN HSPTL REMOVAL  3/27/2020    IR TEMP HD CATH  9/10/2019    IR TUBE PLACEMENT NEGRA  9/16/2019    KNEE ARTHROSCOPY Bilateral     KNEE SURGERY      UT COLONOSCOPY FLX DX W/COLLJ SPEC WHEN PFRMD N/A 11/15/2017    Procedure: COLONOSCOPY;  Surgeon: Carlin Car MD;  Location: [facility]; Service: Gastroenterology       Meds/Allergies:    Prior to Admission medications    Medication Sig Start Date End Date Taking?  Authorizing Provider   albumin human (FLEXBUMIN) 25 % Infuse 100 mL (25 g total) into a venous catheter every hour as needed (For hypotension on dialysis)  Patient not taking: Reported on 2/25/2020 9/25/19   Reita Record, CRALINA   amLODIPine (NORVASC) 2 5 mg tablet  2/7/20   Historical Provider, MD   cyanocobalamin (VITAMIN B-12) 50 MCG tablet Take 1 tablet (50 mcg total) by mouth daily  Patient not taking: Reported on 2/25/2020 9/26/19   Reita Record, CRNP   folic acid (FOLVITE) 359 mcg tablet Take 1 tablet (400 mcg total) by mouth daily  Patient not taking: Reported on 2/25/2020 9/26/19   Reita Record, CRNP   lactulose 20 g/30 mL Take 30 mL (20 g total) by mouth 3 (three) times a day  Patient not taking: Reported on 2/25/2020 9/25/19   Reita Record, CRNP   midodrine (PROAMATINE) 5 mg tablet Take 3 tablets (15 mg total) by mouth 3 (three) times a day before meals  Patient not taking: Reported on 2/25/2020 9/25/19   Reita Record, CRNP   nystatin (MYCOSTATIN) cream Apply topically 2 (two) times a day  Patient not taking: Reported on 2/25/2020 9/25/19   Reita Record, CRNP   octreotide (SandoSTATIN) 100 mcg/mL Inject 1 mL (100 mcg total) under the skin every 8 (eight) hours  Patient not taking: Reported on 2/25/2020 9/25/19   Reita Record, CRNP   ondansetron (ZOFRAN) 4 mg/2 mL injection Infuse 2 mL (4 mg total) into a venous catheter every 6 (six) hours as needed for nausea or vomiting  Patient not taking: Reported on 2/25/2020 9/25/19   Monisha Bah, MARKOS pantoprazole (PROTONIX) 40 mg injection Infuse 40 mg into a venous catheter 2 (two) times a day  Patient not taking: Reported on 2020   MARKOS Hunter   tacrolimus (PROGRAF) 1 mg capsule 3mg in the morning, 3mg in the evening 20   Historical Provider, MD     I have reviewed home medications with patient personally  Allergies: Allergies   Allergen Reactions    Lactose     Propranolol Eye Swelling    Spironolactone      He reports having hives and anaphylaxis while on lasix and Spinorolactone    Torsemide Eye Swelling       Social History:     Marital Status: /Civil Union   Patient Pre-hospital Living Situation:  Home with wife  Patient Pre-hospital Level of Mobility:  Independent  Patient Pre-hospital Diet Restrictions:  None  Substance Use History:   Social History     Substance and Sexual Activity   Alcohol Use Yes    Comment: RARE     Social History     Tobacco Use   Smoking Status Former Smoker    Last attempt to quit: 11/15/1990    Years since quittin 6   Smokeless Tobacco Never Used     Social History     Substance and Sexual Activity   Drug Use No       Family History:    History reviewed  No pertinent family history  Physical Exam:     Vitals:   Blood Pressure: 141/75 (20 1528)  Pulse: 75 (20 1528)  Temperature: 98 °F (36 7 °C) (20 1528)  Temp Source: Oral (20 1528)  Respirations: 16 (20 1528)  Weight - Scale: 117 kg (257 lb 15 oz) (20 1044)  SpO2: 96 % (20 1529)    Physical Exam   Constitutional: He is oriented to person, place, and time  He appears well-developed  No distress  HENT:   Head: Normocephalic  Neck: Normal range of motion  Cardiovascular: Normal rate and intact distal pulses  Pulmonary/Chest: Effort normal and breath sounds normal  No respiratory distress  He has no wheezes  He has no rhonchi  He has no rales  Abdominal: Soft  Bowel sounds are normal  He exhibits no distension   There is no tenderness  Musculoskeletal: Normal range of motion  He exhibits no edema or tenderness  Neurological: He is alert and oriented to person, place, and time  Skin: Skin is warm and dry  He is not diaphoretic  Psychiatric: He has a normal mood and affect  Judgment normal    Nursing note and vitals reviewed  Additional Data:     Lab Results: I have personally reviewed pertinent reports  Results from last 7 days   Lab Units 07/01/20  1116   WBC Thousand/uL 7 17   HEMOGLOBIN g/dL 7 0*   HEMATOCRIT % 22 3*   PLATELETS Thousands/uL 80*   NEUTROS PCT % 88*   LYMPHS PCT % 1*   MONOS PCT % 10   EOS PCT % 0     Results from last 7 days   Lab Units 07/01/20  1116   SODIUM mmol/L 140   POTASSIUM mmol/L 4 8   CHLORIDE mmol/L 109*   CO2 mmol/L 17*   BUN mg/dL 48*   CREATININE mg/dL 2 89*   ANION GAP mmol/L 14*   CALCIUM mg/dL 8 5   ALBUMIN g/dL 3 0*   TOTAL BILIRUBIN mg/dL 1 20*   ALK PHOS U/L 111   ALT U/L 45   AST U/L 28   GLUCOSE RANDOM mg/dL 128                       Imaging: I have personally reviewed pertinent reports  XR chest 1 view portable   Final Result by Ngoc Henderson MD (07/01 1228)      No acute cardiopulmonary disease  Workstation performed: [id number]         CT head without contrast   Final Result by Pj Escalona MD (07/01 1136)      No acute intracranial abnormality  Workstation performed: [id number]             EKG, Pathology, and Other Studies Reviewed on Admission:   · EKG:  Sinus    Allscripts / Epic Records Reviewed: Yes     ** Please Note: This note has been constructed using a voice recognition system   **

## 2020-07-01 NOTE — ED NOTES
CC - Syncope while being Nose Scrubbed for COVID due to loss of taste  Oriented - A/Ox4  Labs - Hemoglobin of 7  Elevated BUN/Creat  Finished 1st Unit of blood in ED  IV in RAC  No ISO - our swab was no findings    Skin is jaundiced  Ambulates independent    [id number]     Amira Ramirez, RN  07/01/20 [address] [facility] [address] RN  07/01/20 0423

## 2020-07-01 NOTE — ASSESSMENT & PLAN NOTE
In the setting of decreased oral intake over the last week secondary to decreased appetite   Given lack of prodromal symptoms, concern for possible cardiogenic syncope  DD include: vasovagal in the setting of dehydration vs anemia vs seizure  CT head unremarkable  CXR unremarkable  Initial troponin negative  EKG unremarkable  Electrolytes stable  Echocardiogram from June 2019 showed an EF of 70%  · Received 500 mL bolus of normal saline in ED  · Hemoglobin 7 0 with a baseline between 8 and 9  · Will transfuse 1 unit of PRBCs  · Hold off on further IV hydration  · Will update an echocardiogram  · Encourage oral intake  · Monitor electrolytes  · Monitor on telemetry monitor  · PT/OT  · Consider need for neurology consult pending progress

## 2020-07-02 ENCOUNTER — APPOINTMENT (OUTPATIENT)
Dept: NON INVASIVE DIAGNOSTICS | Facility: HOSPITAL | Age: 60
End: 2020-07-02
Payer: COMMERCIAL

## 2020-07-02 ENCOUNTER — APPOINTMENT (OUTPATIENT)
Dept: NEUROLOGY | Facility: HOSPITAL | Age: 60
End: 2020-07-02
Attending: PSYCHIATRY & NEUROLOGY
Payer: COMMERCIAL

## 2020-07-02 ENCOUNTER — TELEPHONE (OUTPATIENT)
Dept: FAMILY MEDICINE CLINIC | Facility: MEDICAL CENTER | Age: 60
End: 2020-07-02

## 2020-07-02 VITALS
RESPIRATION RATE: 18 BRPM | HEIGHT: 71 IN | DIASTOLIC BLOOD PRESSURE: 81 MMHG | TEMPERATURE: 98.4 F | SYSTOLIC BLOOD PRESSURE: 146 MMHG | BODY MASS INDEX: 31.48 KG/M2 | HEART RATE: 83 BPM | WEIGHT: 224.87 LBS | OXYGEN SATURATION: 91 %

## 2020-07-02 LAB
ABO GROUP BLD BPU: NORMAL
ALBUMIN SERPL BCP-MCNC: 2.8 G/DL (ref 3.5–5)
ALP SERPL-CCNC: 110 U/L (ref 46–116)
ALT SERPL W P-5'-P-CCNC: 41 U/L (ref 12–78)
ANION GAP SERPL CALCULATED.3IONS-SCNC: 13 MMOL/L (ref 4–13)
AST SERPL W P-5'-P-CCNC: 22 U/L (ref 5–45)
BASOPHILS # BLD AUTO: 0 THOUSANDS/ΜL (ref 0–0.1)
BASOPHILS NFR BLD AUTO: 0 % (ref 0–1)
BILIRUB SERPL-MCNC: 0.9 MG/DL (ref 0.2–1)
BPU ID: NORMAL
BUN SERPL-MCNC: 47 MG/DL (ref 5–25)
CALCIUM SERPL-MCNC: 8.5 MG/DL (ref 8.3–10.1)
CHLORIDE SERPL-SCNC: 112 MMOL/L (ref 100–108)
CO2 SERPL-SCNC: 17 MMOL/L (ref 21–32)
CREAT SERPL-MCNC: 2.61 MG/DL (ref 0.6–1.3)
CROSSMATCH: NORMAL
EOSINOPHIL # BLD AUTO: 0.01 THOUSAND/ΜL (ref 0–0.61)
EOSINOPHIL NFR BLD AUTO: 0 % (ref 0–6)
ERYTHROCYTE [DISTWIDTH] IN BLOOD BY AUTOMATED COUNT: 16.9 % (ref 11.6–15.1)
GFR SERPL CREATININE-BSD FRML MDRD: 26 ML/MIN/1.73SQ M
GLUCOSE P FAST SERPL-MCNC: 97 MG/DL (ref 65–99)
GLUCOSE SERPL-MCNC: 146 MG/DL (ref 65–140)
GLUCOSE SERPL-MCNC: 96 MG/DL (ref 65–140)
GLUCOSE SERPL-MCNC: 97 MG/DL (ref 65–140)
HCT VFR BLD AUTO: 23.8 % (ref 36.5–49.3)
HGB BLD-MCNC: 7.7 G/DL (ref 12–17)
IMM GRANULOCYTES # BLD AUTO: 0.09 THOUSAND/UL (ref 0–0.2)
IMM GRANULOCYTES NFR BLD AUTO: 1 % (ref 0–2)
LYMPHOCYTES # BLD AUTO: 0.1 THOUSANDS/ΜL (ref 0.6–4.47)
LYMPHOCYTES NFR BLD AUTO: 1 % (ref 14–44)
MAGNESIUM SERPL-MCNC: 1.6 MG/DL (ref 1.6–2.6)
MCH RBC QN AUTO: 33 PG (ref 26.8–34.3)
MCHC RBC AUTO-ENTMCNC: 32.4 G/DL (ref 31.4–37.4)
MCV RBC AUTO: 102 FL (ref 82–98)
MONOCYTES # BLD AUTO: 0.72 THOUSAND/ΜL (ref 0.17–1.22)
MONOCYTES NFR BLD AUTO: 10 % (ref 4–12)
NEUTROPHILS # BLD AUTO: 6.19 THOUSANDS/ΜL (ref 1.85–7.62)
NEUTS SEG NFR BLD AUTO: 88 % (ref 43–75)
NRBC BLD AUTO-RTO: 0 /100 WBCS
PHOSPHATE SERPL-MCNC: 2.8 MG/DL (ref 2.7–4.5)
PLATELET # BLD AUTO: 72 THOUSANDS/UL (ref 149–390)
PMV BLD AUTO: 11.7 FL (ref 8.9–12.7)
POTASSIUM SERPL-SCNC: 4 MMOL/L (ref 3.5–5.3)
PROT SERPL-MCNC: 6.2 G/DL (ref 6.4–8.2)
RBC # BLD AUTO: 2.33 MILLION/UL (ref 3.88–5.62)
SODIUM SERPL-SCNC: 142 MMOL/L (ref 136–145)
TROPONIN I SERPL-MCNC: <0.02 NG/ML
UNIT DISPENSE STATUS: NORMAL
UNIT PRODUCT CODE: NORMAL
UNIT RH: NORMAL
WBC # BLD AUTO: 7.11 THOUSAND/UL (ref 4.31–10.16)

## 2020-07-02 PROCEDURE — 95819 EEG AWAKE AND ASLEEP: CPT | Performed by: PSYCHIATRY & NEUROLOGY

## 2020-07-02 PROCEDURE — 99204 OFFICE O/P NEW MOD 45 MIN: CPT | Performed by: PSYCHIATRY & NEUROLOGY

## 2020-07-02 PROCEDURE — 82948 REAGENT STRIP/BLOOD GLUCOSE: CPT

## 2020-07-02 PROCEDURE — 93306 TTE W/DOPPLER COMPLETE: CPT

## 2020-07-02 PROCEDURE — 84484 ASSAY OF TROPONIN QUANT: CPT | Performed by: STUDENT IN AN ORGANIZED HEALTH CARE EDUCATION/TRAINING PROGRAM

## 2020-07-02 PROCEDURE — 85025 COMPLETE CBC W/AUTO DIFF WBC: CPT | Performed by: NURSE PRACTITIONER

## 2020-07-02 PROCEDURE — 83735 ASSAY OF MAGNESIUM: CPT | Performed by: NURSE PRACTITIONER

## 2020-07-02 PROCEDURE — 97165 OT EVAL LOW COMPLEX 30 MIN: CPT

## 2020-07-02 PROCEDURE — 84100 ASSAY OF PHOSPHORUS: CPT | Performed by: NURSE PRACTITIONER

## 2020-07-02 PROCEDURE — 95816 EEG AWAKE AND DROWSY: CPT

## 2020-07-02 PROCEDURE — 99217 PR OBSERVATION CARE DISCHARGE MANAGEMENT: CPT | Performed by: STUDENT IN AN ORGANIZED HEALTH CARE EDUCATION/TRAINING PROGRAM

## 2020-07-02 PROCEDURE — 97163 PT EVAL HIGH COMPLEX 45 MIN: CPT

## 2020-07-02 PROCEDURE — 80053 COMPREHEN METABOLIC PANEL: CPT | Performed by: NURSE PRACTITIONER

## 2020-07-02 PROCEDURE — 93306 TTE W/DOPPLER COMPLETE: CPT | Performed by: INTERNAL MEDICINE

## 2020-07-02 RX ADMIN — DAPSONE 100 MG: 100 TABLET ORAL at 09:24

## 2020-07-02 RX ADMIN — FAMOTIDINE 20 MG: 20 TABLET ORAL at 09:20

## 2020-07-02 RX ADMIN — ACYCLOVIR 400 MG: 800 TABLET ORAL at 09:20

## 2020-07-02 RX ADMIN — MYCOPHENOLATE MOFETIL 1000 MG: 250 CAPSULE ORAL at 09:19

## 2020-07-02 RX ADMIN — TACROLIMUS 4 MG: 1 CAPSULE ORAL at 09:21

## 2020-07-02 RX ADMIN — AMLODIPINE BESYLATE 2.5 MG: 2.5 TABLET ORAL at 09:20

## 2020-07-02 NOTE — PLAN OF CARE
Problem: METABOLIC, FLUID AND ELECTROLYTES - ADULT  Goal: Electrolytes maintained within normal limits  Description  INTERVENTIONS:  - Monitor labs and assess patient for signs and symptoms of electrolyte imbalances  - Administer electrolyte replacement as ordered  - Monitor response to electrolyte replacements, including repeat lab results as appropriate  - Instruct patient on fluid and nutrition as appropriate  Outcome: Progressing     Problem: Potential for Falls  Goal: Patient will remain free of falls  Description  INTERVENTIONS:  - Assess patient frequently for physical needs  -  Identify cognitive and physical deficits and behaviors that affect risk of falls    -  [place] fall precautions as indicated by assessment   - Educate patient/family on patient safety including physical limitations  - Instruct patient to call for assistance with activity based on assessment  - Modify environment to reduce risk of injury  - Consider OT/PT consult to assist with strengthening/mobility  Outcome: Progressing     Problem: INFECTION - ADULT  Goal: Absence or prevention of progression during hospitalization  Description  INTERVENTIONS:  - Assess and monitor for signs and symptoms of infection  - Monitor lab/diagnostic results  - Monitor all insertion sites, i e  indwelling lines, tubes, and drains  - [place] appropriate cooling/warming therapies per order  - Administer medications as ordered  - Instruct and encourage patient and family to use good hand hygiene technique  - Identify and instruct in appropriate isolation precautions for identified infection/condition   Outcome: Progressing

## 2020-07-02 NOTE — DISCHARGE SUMMARY
Discharge- Denton Arroyo 1960, 63 y o  male MRN: [id number]    Unit/Bed#: -01 Encounter: [id number]    Primary Care Provider: William Cuenca DO   Date and time admitted to hospital: 7/1/2020 10:40 AM        Stage 4 chronic kidney disease ([facility] [address] )  Assessment & Plan  Creatinine 2 8 with a GFR of 23 which is near baseline  · Encourage oral intake  Avoid nephrotoxic agents and relative hypotension  · Will need close follow-up with outpatient nephrologist  · Patient agrees  [facility]  Assessment & Plan  Platelets noted to be 80 in the setting of liver transplant  · Monitor counts daily  · Avoid pharmacologic DVT prophylaxis    Anemia  Assessment & Plan  · Hemoglobin improved from 7-7 7 after 1 transfusion in the ED  · Macrocytosis noted, will recommend further evaluation and management as per primary care physician  · No active bleeding    Essential hypertension  Assessment & Plan  Most recent blood pressure stable at 141/70  · Continue Norvasc 2 5 mg daily with holding parameter    Liver cirrhosis secondary to [facility])  Assessment & Plan  S/p recent liver transplant at [facility]  Patient has close follow-up with [address] [place] [address]  · Continue Tacrolimus, Sirolimus, CellCept, Acyclovir, and Actigall  · Close outpatient follow-up - patient will make his [facility] representative Uvaldone Idris) aware of his admission    * Syncope  Assessment & Plan  Likely vasovagal with underlying anemia as patient reports feeling warm before the episode  Echo results pending  Neurology on board-EEG recommended-looks benign  Orthostatics negative in the ED  Patient follow-up with Neurology and primary care physician in about 1-2 weeks upon discharge  Agrees with the plan, discussed with the family                Discharging Resident Physician: Le Sanchez MD  Attending: Roderick Lebron MD  PCP: William Cuenca DO  Admission Date: 7/1/2020  Discharge Date: 07/02/20    Disposition: Home    Reason for Admission:  Syncope    Consultations During Hospital Stay:  · Neurology    Procedures Performed:     · Eeg    Significant Findings / Test Results:     · None    Incidental Findings:   · None    Test Results Pending at Discharge (will require follow up): · None     Outpatient Tests Requested:  · None    Complications: On    Hospital Course:     Alberta Jerome is a 63 y o  male patient with past medical history of CKD stage 4, nonalcoholic steatohepatitis status post liver transplant who originally presented to the hospital on 7/1/2020 due to 1 episode of syncope which was witnessed by the wife  Patient reports that since past few weeks he was not feeling well in terms of generalized body aches, occasional shortness of breath and was visiting our outpatient clinic for a core test   Evaluating for the same patient started feeling warm and sweaty after lost consciousness  At the time patient was sitting on the chair and does not recall tripping over her regain consciousness and 30 seconds and had no postictal confusion  Upon initial evaluation in the ED patient was medically stable, labs showed anemia of 7 4 with macrocytosis, renal function was at 2 6 which is his baseline, head CT and chest x-ray were negative  EKG was benign  Neurology consult was placed and recommended EEG which was benign  Patient was recommended to follow up outpatient with Neurology  Since admission patient continued to free at his baseline and offered no new complaints  Patient was ambulating and tolerating diet well  Patient was clinically and hemodynamically stable for discharge  Patient was discharged home with self-care  Primary diagnosis-  Likely vasovagal syncope      Condition at Discharge: good     Discharge Day Visit / Exam:     Subjective:  Patient reports no new symptoms  Reports that since admission has been feeling almost at his usual baseline    Denies any further episodes of syncope or any isolated weakness or sensory deficit  No acute events overnight as discussed with the nurse  Ambulating independently and tolerating that well  Vitals: Blood Pressure: 146/81 (07/02/20 0717)  Pulse: 83 (07/02/20 0717)  Temperature: 98 4 °F (36 9 °C) (07/02/20 0717)  Temp Source: Oral (07/01/20 2002)  Respirations: 18 (07/02/20 0717)  Height: 5' 11" (180 3 cm) (07/01/20 2000)  Weight - Scale: 102 kg (224 lb 13 9 oz) (07/01/20 2000)  SpO2: 91 % (07/02/20 0717)  Exam:   Physical Exam  Constitutional: He is oriented to person, place, and time  He appears well-developed  No distress  HENT:   Head: Normocephalic  Neck: Normal range of motion  Cardiovascular: Normal rate and intact distal pulses  Pulmonary/Chest: Effort normal and breath sounds normal  No respiratory distress  He has no wheezes  He has no rhonchi  He has no rales  Abdominal: Soft  Bowel sounds are normal  He exhibits no distension  There is no tenderness  Musculoskeletal: Normal range of motion  He exhibits no edema or tenderness  Neurological: He is alert and oriented to person, place, and time  Skin: Skin is warm and dry  He is not diaphoretic  Psychiatric: He has a normal mood and affect  Judgment normal    Nursing note and vitals reviewed  Discussion with Family:  Done at bedside with the wife  Verbalizes understanding  Discharge instructions/Information to patient and family:   See after visit summary for information provided to patient and family  Provisions for Follow-Up Care:  See after visit summary for information related to follow-up care and any pertinent home health orders  Planned Readmission:  None     Discharge Medications:  See after visit summary for reconciled discharge medications provided to patient and family        ** Please Note: This note has been constructed using a voice recognition system **

## 2020-07-02 NOTE — UTILIZATION REVIEW
Initial Clinical Review    Admission: Date/Time/Statement: Admission Orders (From admission, onward)     Ordered        07/01/20 1328  Place in Observation  Once                   Orders Placed This Encounter   Procedures    Place in Observation     Standing Status:   Standing     Number of Occurrences:   1     Order Specific Question:   Admitting Physician     Answer:   Edwina Haer [[id number]]     Order Specific Question:   Level of Care     Answer:   Med Surg [[id number]]     ED Arrival Information     Expected Arrival Acuity Means of Arrival Escorted By Service Admission Type    - 7/1/2020 10:40 [facility]) General Medicine Emergency    Arrival Complaint    Seizure        Chief Complaint   Patient presents with    Seizure - New Onset     Per EMS, pt was across the street at urgent care getting tested for covid when he had witnessed seizure like acitivity  Pt with no hx of seizures, AxO's 4 upon arrival      Assessment/Plan: 62 yo male to ED via EMS w/ syncope   not been feeling well over the last several days as noted by a decreased appetite, decreased oral intake, and cough with white sputum production  went to Urgent Care center and was told should be tested for COVID and suddenly passed out   Concern for seizure like activity   No postictal period   Admitted OBS status for concern for possible cardiogenic syncope  Found to have hgb 7 0 , plan to transfuse 1 u PRBC , enc po intake , monitor lytes , tele , consider neuro consult   Cont meds for liver cirrhosis and HTN       7/2 Neuro consult  Syncope - etiology unclear   Suspect vasovagal from description of symptoms/event  DDX may include orthostatic vs vasovagal vs seizure  Will check EEG although low suspicion for seizure as there was no postictal period or confusion afterwards  7/1 orthostatic BPs: lying- 138/82, sitting- 137/81, standing- 139/85  On midodrine  Echo pending , routine EEG , orthostatics  Tele , PT OT      ED Triage Vitals Temperature Pulse Respirations Blood Pressure SpO2   07/01/20 1044 07/01/20 1044 07/01/20 1044 07/01/20 1044 07/01/20 1044   98 °F (36 7 °C) 71 16 141/77 97 %      Temp Source Heart Rate Source Patient Position - Orthostatic VS BP Location FiO2 (%)   07/01/20 1528 07/01/20 1433 07/01/20 1433 07/01/20 1433 --   Oral Monitor Lying Left arm       Pain Score       07/01/20 1902       No Pain        Wt Readings from Last 1 Encounters:   07/01/20 102 kg (224 lb 13 9 oz)     Additional Vital Signs:   07/02/20 07:17:22  98 4 °F (36 9 °C)  83  18  146/81  103  91 %       07/02/20 07:16:51  98 4 °F (36 9 °C)  80  18  146/81  103  92 %       07/02/20 0300    74  18  140/81    92 %  None (Room air)  Lying   07/01/20 20:02:42  97 6 °F (36 4 °C)  71  18  138/82  101  96 %  None (Room air)  Lying   07/01/20 20:01:42  97 6 °F (36 4 °C)  79  18  139/85  103  97 %    Standing   07/01/20 20:00:45  97 6 °F (36 4 °C)  71  18  137/81  100  96 %  None (Room air)  Sitting   07/01/20 18:39:33  97 5 °F (36 4 °C)  71  18  145/93  110  96 %  None (Room air)  Sitting   07/01/20 1715  98 3 °F (36 8 °C)  69  16  155/85    97 %  None (Room air)  Sitting   07/01/20 1615    75  16  142/78  102  97 %  None (Room air)     07/01/20 1529            96 %  None (Room air)     07/01/20 1528  98 °F (36 7 °C)  75  16  141/75    96 %  None (Room air)     07/01/20 1507  98 3 °F (36 8 °C)  79  16  142/78           07/01/20 1433    76  18  134/69    96 %  None (Room air)  Lying   07/01/20 1230    71  18  169/77  111  96 %       07/01/20 1130    71  16  151/76  107  95 %           Pertinent Labs/Diagnostic Test Results:   7/1 CT head - wnl   7/1 PCXR - wnl  7/1 echo - Normal left ventricular size with mild concentric hypertrophy and normal systolic function, ejection fraction 60%  Grade 1 diastolic dysfunction  Normal right ventricular size and systolic function  Normal aortic root  Moderate/severe left atrial dilation    Mild right atrial dilation  Increased blood velocity across the LVOT and aortic valve without aortic valve stenosis  Consistent with high flow state  No other significant valve abnormalities are observed  Normal IVC size and inspiratory collapse  RA pressure estimated at 3 mmHg  Mild pulmonary hypertension  PASP estimated at 42 mmHg    Results from last 7 days   Lab Units 07/01/20  1116   SARS-COV-2  Negative     Results from last 7 days   Lab Units 07/02/20  0611 07/01/20  1116   WBC Thousand/uL 7 11 7 17   HEMOGLOBIN g/dL 7 7* 7 0*   HEMATOCRIT % 23 8* 22 3*   PLATELETS Thousands/uL 72* 80*   NEUTROS ABS Thousands/µL 6 19 6 24     Results from last 7 days   Lab Units 07/02/20  0611 07/01/20  1116   SODIUM mmol/L 142 140   POTASSIUM mmol/L 4 0 4 8   CHLORIDE mmol/L 112* 109*   CO2 mmol/L 17* 17*   ANION GAP mmol/L 13 14*   BUN mg/dL 47* 48*   CREATININE mg/dL 2 61* 2 89*   EGFR ml/min/1 73sq m 26 23   CALCIUM mg/dL 8 5 8 5   MAGNESIUM mg/dL 1 6 1 6   PHOSPHORUS mg/dL 2 8  --      Results from last 7 days   Lab Units 07/02/20  0611 07/01/20  1116   AST U/L 22 28   ALT U/L 41 45   ALK PHOS U/L 110 111   TOTAL PROTEIN g/dL 6 2* 6 5   ALBUMIN g/dL 2 8* 3 0*   TOTAL BILIRUBIN mg/dL 0 90 1 20*     Results from last 7 days   Lab Units 07/02/20  0714 07/01/20  2148   POC GLUCOSE mg/dl 96 120     Results from last 7 days   Lab Units 07/02/20  0611 07/01/20  1116   GLUCOSE RANDOM mg/dL 97 128     Results from last 7 days   Lab Units 07/02/20  0125 07/01/20  2152 07/01/20  1116   TROPONIN I ng/mL <0 02 <0 02 <0 02     Results from last 7 days   Lab Units 07/01/20  1116   FERRITIN ng/mL 416*     ED Treatment:   Medication Administration from 07/01/2020 1040 to 07/01/2020 1833       Date/Time Order Dose Route Action     07/01/2020 1116 sodium chloride 0 9 % bolus 500 mL 500 mL Intravenous New Bag        Past Medical History:   Diagnosis Date    Anasarca     Chronic pain disorder     lower back    CPAP (continuous positive airway pressure) dependence     Heart murmur     Hypertension     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)     Myocardial infarction ([facility] [address] )     Renal insufficiency 6/7/2019    Sleep apnea     Vitamin D deficiency      Present on Admission:   Thrombocytopenia (HCC)   Anemia   Essential hypertension   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 [facility])      Admitting Diagnosis: Seizure ([facility] [address] ) [R56 9]  Syncope [R55]  Anemia [D64 9]  Age/Sex: 63 y o  male  Admission Orders:  Scheduled Medications:    Medications:  acyclovir 400 mg Oral BID   amLODIPine 2 5 mg Oral Daily   dapsone 100 mg Oral Daily   famotidine 20 mg Oral Daily   mycophenolate 1,000 mg Oral Q12H [address]   tacrolimus 4 mg Oral Q12H [address]     Continuous IV Infusions:     PRN Meds:    albuterol 2 puff Inhalation Q6H PRN   ondansetron 4 mg Intravenous Q6H PRN     Fingerstick ac and hs   Reg diet   Daily weight   I&O   orthostatic BP   Transfuse 1u PRBC   IP CONSULT TO NEUROLOGY    Network Utilization Review Department  [email or web address]  ATTENTION: Please call with any questions or concerns to [phone] and carefully listen to the prompts so that you are directed to the right person  All voicemails are confidential   [facility] all requests for admission clinical reviews, approved or denied determinations and any other requests to dedicated fax number below belonging to the campus where the patient is receiving treatment   List of dedicated fax numbers for the Facilities:  FACILITY NAME UR FAX NUMBER   ADMISSION DENIALS (Administrative/Medical Necessity) [phone]   [address] [place] [address] (Maternity/NICU/Pediatrics) [phone]   Poncho Felder [phone]   Joya Wilkerson [phone]   Efrem Glaser [phone]   Lianet Fleming [place] [address] [facility] [address] [place] [address] [facility] [phone]   [address] [place] [address] [phone]

## 2020-07-02 NOTE — ASSESSMENT & PLAN NOTE
Likely vasovagal with underlying anemia as patient reports feeling warm before the episode  Echo results pending  Neurology on board-EEG recommended-looks benign  Orthostatics negative in the ED  Patient follow-up with Neurology and primary care physician in about 1-2 weeks upon discharge  Agrees with the plan, discussed with the family

## 2020-07-02 NOTE — PLAN OF CARE
Problem: PHYSICAL THERAPY ADULT  Goal: Performs mobility at highest level of function for planned discharge setting  See evaluation for individualized goals  Description  Treatment/Interventions: Functional transfer training, Therapeutic exercise, Elevations, Gait training, Spoke to nursing, OT  Equipment Recommended: (anticipate no needs)       See flowsheet documentation for full assessment, interventions and recommendations  Note:   Prognosis: Good  Problem List: Impaired balance, Decreased mobility  Assessment: Pt is 63 y o  male seen for PT evaluation on 7/2/2020 s/p admit to [facility] on 7/1/2020 w/ Syncope  Pt has been not feeling well for last several days as noted decreased appetite, decreased oral intake, cough, white sputum production  Patient reports that decided to seek medical condition and went to urgent care center today  During his evaluation he was told that he should be tested for COVID-19, when he suddenly passed out while being examined without postictal state after waking up  Reported that event lasted for 30 seconds  EMS was called for possible concern for seizure-like activity but patient denied any urinary incontinence, tongue biting, postictal state  PT consulted to assess pt's functional mobility and d/c needs  Order placed for PT eval and tx, w/ up w/ A order  Performed at least 2 patient identifiers during session: Name and wristband  Comorbidities affecting pt's physical performance at time of assessment include: anasarca, chronic pain disorder, CPAP dependence, heart murmur, HTN, liver cirrhosis 2* ROSALES, MI, renal insufficiency, sleep apnea, vitamin D deficiency  PTA, pt was independent w/ all functional mobility w/ no AD/DME, ambulates unrestricted distances and all terrain and currently residing in an  with 3 MIKA   Please find objective findings from PT assessment regarding body systems outlined above with impairments and limitations including impaired balance, gait deviations, decreased activity tolerance and fall risk, as well as mobility assessment (need for cueing for mobility technique)  The following objective measures performed on IE also reveal limitations: Barthel Index: 65/100 and Modified [place]: 1 (no significant disability)  Pt's clinical presentation is currently unstable/unpredictable seen in pt's presentation of abnormal lab value(s), ongoing medical assessment and pending neuro consult  Pt to benefit from continued PT tx to address deficits as defined above and maximize level of functional independent mobility and consistency  From PT/mobility standpoint, recommendation at time of d/c would be anticipate no needs pending progress in order to facilitate return to PLOF  Barriers to Discharge: None     PT Discharge Recommendation: Return to previous environment with no needs          See flowsheet documentation for full assessment

## 2020-07-02 NOTE — DISCHARGE INSTR - AVS FIRST PAGE
Follow-up care primary care physician in about 1-2 weeks and discuss the events of the hospitalization and further management of anemia  Follow up with neurologist for description of EEG results and follow his recommendations thereafter  No new medications or no changes in medications made during this hospital stay

## 2020-07-02 NOTE — PROGRESS NOTES
Pt AAOx4 in bedside chair  Pt offers no c/o pain at this time  All AM medications given  Pt ate 100% of breakfast  Call bell in reach

## 2020-07-02 NOTE — CONSULTS
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 status post liver transplant, hypertension, CKD, chronic pain disorder who presented 7/1/20 after having a syncopal episode while be evaluated at urgent care  Patient also has not been feeling well over the last few days with cough sputum production  Syncope  -Etiology unclear at this time  Suspect vasovagal from description of symptoms/event  DDX may include orthostatic vs vasovagal vs seizure  Will check EEG although low suspicion for seizure as there was no postictal period or confusion afterwards  EEG may be done outpatient if needed  -CT head unremarkable  -Hemoglobin on presentation 7 0  Most recent level 7 7 today   -7/1 orthostatic BPs: lying- 138/82, sitting- 137/81, standing- 139/85  -loss of consciousness for 30 seconds with no postictal state or confusion afterwards  Patient states he felt hot and sweaty beforehand   -On Midodrine 5 mg TID at baseline    Plan:  -Echo pending  -Routine EEG; may be done outpatient if needed  -Orthostatic BPs  -Tele  -PT/OT  -Medical management and supportive care per primary team  -Monitor neuro exam; notify with any changes    Case reviewed with and patient seen with attending neurologist, Dr Willis Lux  Results:  -7/1 CT head: No acute intracranial abnormality   -7/1 CXR: No acute cardiopulmonary disease   -7/1 Hemoglobin: 7 0; creatinine: 2 89; COVID-19: negative  -7/2 Creatinine: 2 61; hemoglobin: 7 7    History of Present Illness     Reason for Consult / Principal Problem:  Syncope  Hx and PE limited by:   HPI: Parris Lerma is a 63 y o  male with vitamin-D deficiency, CKD, sleep apnea, prior MI, 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 s/p liver transplant, hypertension, chronic pain disorder who presents with syncope   Per H&P review, patient states he has not been feeling well over the last few days with decreased appetite, decreased oral intake, and cough with sputum production  Patient went to be evaluated at an urgent care yesterday and then suddenly passed out while being examined  Loss of consciousness was approximately 30 seconds  There was concern for possible seizure-like activity therefore EMS was called  No postictal state or confusion was noted after patient waking up  In speaking with patient, he has not been feeling well with dry cough for 2-3 days so he went to get evaluated at urgent care  While he was being evaluated on the exam table, he felt hot and sweaty and passed out for approximately 30 seconds  Patient states he was not confused or felt any different after waking up  He denies tongue bite, urinary incontinence, or bowel incontinence  He states this has never happened to him before  He states he has been eating and drinking appropriately  He has to adjust his diet and limit certain foods due to his liver transplant  He recently received a blood transfusion at [facility] two weeks ago and felt fine  Inpatient consult to Neurology  Consult performed by: MARKOS Tabor  Consult ordered by: MARKOS Samuel        Review of Systems   Constitutional: Positive for diaphoresis  Negative for appetite change, chills, fatigue and fever  HENT: Negative for trouble swallowing  Eyes: Negative for photophobia and visual disturbance  Respiratory: Positive for cough  Negative for shortness of breath  Cardiovascular: Negative for chest pain  Gastrointestinal: Negative for abdominal pain  Musculoskeletal: Negative for arthralgias, back pain, myalgias and neck pain  Skin: Negative for rash  Neurological: Positive for syncope  Negative for dizziness, tremors, seizures, facial asymmetry, speech difficulty, weakness, light-headedness, numbness and headaches     Psychiatric/Behavioral: Negative for agitation, confusion and hallucinations  The patient is not nervous/anxious  Historical Information   Past Medical History:   Diagnosis Date    Anasarca     Chronic pain disorder     lower back    CPAP (continuous positive airway pressure) dependence     Heart murmur     Hypertension     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 [address] [facility]     [facility] infarction [facility] [address] [facility]     Renal insufficiency 2019    Sleep apnea     Vitamin D deficiency      Past Surgical History:   Procedure Laterality Date    IR [facility] REMOVAL  3/27/2020    IR TEMP HD CATH  9/10/2019    IR TUBE PLACEMENT NEGRA  2019    KNEE ARTHROSCOPY Bilateral     KNEE SURGERY      CT COLONOSCOPY FLX DX W/COLLJ SPEC WHEN PFRMD N/A 11/15/2017    Procedure: COLONOSCOPY;  Surgeon: Monty Gallagher MD;  Location: [facility]; Service: Gastroenterology     Social History   Social History     Substance and Sexual Activity   Alcohol Use Never    Frequency: Never    Comment: RARE     Social History     Substance and Sexual Activity   Drug Use No     E-Cigarette/Vaping    E-Cigarette Use Never User      E-Cigarette/Vaping Substances     Social History     Tobacco Use   Smoking Status Former Smoker    Last attempt to quit: 11/15/1990    Years since quittin 6   Smokeless Tobacco Never Used     Family History:   Family History   Problem Relation Age of Onset    Heart attack Father        Review of previous medical records was completed       Meds/Allergies   all current active meds have been reviewed, current meds:   Current Facility-Administered Medications   Medication Dose Route Frequency    acyclovir (ZOVIRAX) tablet 400 mg  400 mg Oral BID    albuterol (PROVENTIL HFA,VENTOLIN HFA) inhaler 2 puff  2 puff Inhalation Q6H PRN    amLODIPine (NORVASC) tablet 2 5 mg  2 5 mg Oral Daily    dapsone tablet 100 mg  100 mg Oral Daily    famotidine (PEPCID) tablet 20 mg  20 mg Oral Daily    mycophenolate (CELLCEPT) capsule 1,000 mg  1,000 mg Oral Q12H [facility]    ondansetron (ZOFRAN) injection 4 mg  4 mg Intravenous Q6H PRN    tacrolimus (PROGRAF) capsule 4 mg  4 mg Oral Q12H [facility]    and PTA meds:   Prior to Admission Medications   Prescriptions Last Dose Informant Patient Reported?  Taking?   acyclovir (ZOVIRAX) 400 MG tablet 7/1/2020 at Unknown time  Yes Yes   Sig: Take 400 mg by mouth 2 (two) times a day   albumin human (FLEXBUMIN) 25 %   No No   Sig: Infuse 100 mL (25 g total) into a venous catheter every hour as needed (For hypotension on dialysis)   Patient not taking: Reported on 2/25/2020   albuterol (PROVENTIL HFA,VENTOLIN HFA) 90 mcg/act inhaler Not Taking at Unknown time  Yes No   Sig: Inhale 2 puffs every 6 (six) hours as needed for wheezing or shortness of breath   amLODIPine (NORVASC) 2 5 mg tablet 7/1/2020 at Unknown time  Yes Yes   cyanocobalamin (VITAMIN B-12) 50 MCG tablet Not Taking at Unknown time  No No   Sig: Take 1 tablet (50 mcg total) by mouth daily   Patient not taking: Reported on 2/25/2020   dapsone 100 mg tablet 7/1/2020 at Unknown time  Yes Yes   Sig: Take 100 mg by mouth daily   famotidine (PEPCID) 20 mg tablet 7/1/2020 at Unknown time Self Yes Yes   Sig: Take 15 mg by mouth daily    folic acid (FOLVITE) 486 mcg tablet Not Taking at Unknown time  No No   Sig: Take 1 tablet (400 mcg total) by mouth daily   Patient not taking: Reported on 2/25/2020   insulin aspart (NovoLOG) 100 units/mL injection  Spouse/Significant Other Yes Yes   Sig: Inject under the skin 3 (three) times a day before meals   lactulose 20 g/30 mL Not Taking at Unknown time  No No   Sig: Take 30 mL (20 g total) by mouth 3 (three) times a day   Patient not taking: Reported on 2/25/2020   magnesium oxide (MAG-OX) 400 mg  Spouse/Significant Other Yes Yes   Sig: Take 400 mg by mouth 2 (two) times a day   midodrine (PROAMATINE) 5 mg tablet Not Taking at Unknown time  No No   Sig: Take 3 tablets (15 mg total) by mouth 3 (three) times a day before meals   Patient not taking: Reported on 2/25/2020   mycophenolate (CELLCEPT) 500 mg tablet 7/1/2020 at Unknown time  Yes Yes   Sig: Take 1,000 mg by mouth every 12 (twelve) hours   nystatin (MYCOSTATIN) cream   No No   Sig: Apply topically 2 (two) times a day   Patient not taking: Reported on 2/25/2020   octreotide (SandoSTATIN) 100 mcg/mL   No No   Sig: Inject 1 mL (100 mcg total) under the skin every 8 (eight) hours   Patient not taking: Reported on 2/25/2020   ondansetron (ZOFRAN) 4 mg/2 mL injection   No No   Sig: Infuse 2 mL (4 mg total) into a venous catheter every 6 (six) hours as needed for nausea or vomiting   Patient not taking: Reported on 2/25/2020   pantoprazole (PROTONIX) 40 mg injection   No No   Sig: Infuse 40 mg into a venous catheter 2 (two) times a day   Patient not taking: Reported on 2/25/2020   predniSONE 1 mg tablet  Spouse/Significant Other Yes Yes   Sig: Take 5 mg by mouth daily   sirolimus (RAPAMUNE) 1 mg tablet 7/1/2020 at Unknown time  Yes Yes   Sig: Take 2 mg by mouth daily   sodium bicarbonate 650 mg tablet  Spouse/Significant Other Yes Yes   Sig: Take 650 mg by mouth 2 (two) times a day   tacrolimus (PROGRAF) 1 mg capsule 7/1/2020 at Unknown time  Yes Yes   Sig: 3mg in the morning, 3mg in the evening   ursodiol (ACTIGALL) 300 mg capsule 7/1/2020 at Unknown time Spouse/Significant Other Yes Yes   Sig: Take 300 mg by mouth 3 (three) times a day       Facility-Administered Medications: None       Allergies   Allergen Reactions    Lactose     Lasix [Furosemide] Hives    Propranolol Eye Swelling    Spironolactone      He reports having hives and anaphylaxis while on lasix and Spinorolactone    Torsemide Eye Swelling       Objective   Vitals:Blood pressure 146/81, pulse 83, temperature 98 4 °F (36 9 °C), resp  rate 18, height 5' 11" (1 803 m), weight 102 kg (224 lb 13 9 oz), SpO2 91 %  ,Body mass index is 31 36 kg/m²      Intake/Output Summary (Last 24 hours) at 7/2/2020 8211  Last data filed at 7/2/2020 0300  Gross per 24 hour   Intake 1295 ml   Output    Net 1295 ml       Invasive Devices: Invasive Devices     Peripheral Intravenous Line            Peripheral IV 07/01/20 Right Antecubital 1 day          Drain            Closed/Suction Drain Right RUQ Other (Comment) 8 5 Fr  289 days                Physical Exam   Constitutional: He is oriented to person, place, and time  He appears well-developed and well-nourished  No distress  HENT:   Head: Normocephalic and atraumatic  Eyes: Pupils are equal, round, and reactive to light  Conjunctivae and EOM are normal  Right eye exhibits no discharge  Left eye exhibits no discharge  No scleral icterus  Neck: Normal range of motion  Cardiovascular: Normal rate  Pulmonary/Chest: Effort normal  No respiratory distress  Abdominal: Soft  Musculoskeletal: Normal range of motion  Neurological: He is alert and oriented to person, place, and time  He has a normal Finger-Nose-Finger Test    Reflex Scores:       Bicep reflexes are 1+ on the right side and 1+ on the left side  Brachioradialis reflexes are 1+ on the right side and 1+ on the left side  Patellar reflexes are 1+ on the right side and 1+ on the left side  Skin: Skin is warm and dry  No rash noted  He is not diaphoretic  No erythema  Psychiatric: He has a normal mood and affect  His speech is normal and behavior is normal  Judgment and thought content normal    Nursing note and vitals reviewed  Neurologic Exam     Mental Status   Oriented to person, place, and time  Attention: normal  Concentration: normal    Speech: speech is normal   Level of consciousness: alert  Able to state his name, our location, today's date, and Trump as President  Able to have full conversation without dysarthria noted  Able to follow commands appropriately  Able to name objects and calculate correctly       Cranial Nerves     CN II   Visual acuity: normal  Right visual field deficit: none  Left visual field deficit: none     CN III, IV, VI   Pupils are equal, round, and reactive to light  Extraocular motions are normal    Nystagmus: none     CN V   Facial sensation intact  CN VII   Facial expression full, symmetric  CN VIII   CN VIII normal      CN XI   CN XI normal      CN XII   CN XII normal      Motor Exam   Muscle bulk: normal  Bilateral UE strength 5/5 deltoids, biceps, triceps, hand   Bilateral LE strength 5/5 hip flexion, knee flexion, knee extension     Sensory Exam   Light touch normal    Vibration normal      Gait, Coordination, and Reflexes     Coordination   Finger to nose coordination: normal    Tremor   Resting tremor: absent    Reflexes   Right brachioradialis: 1+  Left brachioradialis: 1+  Right biceps: 1+  Left biceps: 1+  Right patellar: 1+  Left patellar: 1+    Lab Results:   I have personally reviewed pertinent reports  , CBC:   Results from last 7 days   Lab Units 07/02/20  0611 07/01/20  1116   WBC Thousand/uL 7 11 7 17   RBC Million/uL 2 33* 2 10*   HEMOGLOBIN g/dL 7 7* 7 0*   HEMATOCRIT % 23 8* 22 3*   MCV fL 102* 106*   PLATELETS Thousands/uL 72* 80*   , BMP/CMP:   Results from last 7 days   Lab Units 07/02/20  0611 07/01/20  1116   SODIUM mmol/L 142 140   POTASSIUM mmol/L 4 0 4 8   CHLORIDE mmol/L 112* 109*   CO2 mmol/L 17* 17*   BUN mg/dL 47* 48*   CREATININE mg/dL 2 61* 2 89*   CALCIUM mg/dL 8 5 8 5   AST U/L 22 28   ALT U/L 41 45   ALK PHOS U/L 110 111   EGFR ml/min/1 73sq m 26 23   , Vitamin B12:   Results from last 7 days   Lab Units 07/01/20  1116   VITAMIN B 12 pg/mL 675       Imaging Studies: I have personally reviewed pertinent reports  and I have personally reviewed pertinent films in PACS  EKG, Pathology, and Other Studies: I have personally reviewed pertinent reports     and I have personally reviewed pertinent films in PACS  VTE Prophylaxis: Sequential compression device (Venodyne)
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
## 2020-07-02 NOTE — ASSESSMENT & PLAN NOTE
S/p recent liver transplant at [address] [place] [address]  Patient has close follow-up with [address] [place] [address]  · Continue Tacrolimus, Sirolimus, CellCept, Acyclovir, and Actigall  · Close outpatient follow-up - patient will make his [facility] representative Elisa Hill) aware of his admission
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
## 2020-07-02 NOTE — ASSESSMENT & PLAN NOTE
Creatinine 2 8 with a GFR of 23 which is near baseline  · Encourage oral intake  Avoid nephrotoxic agents and relative hypotension  · Will need close follow-up with outpatient nephrologist  · Patient agrees

## 2020-07-02 NOTE — ASSESSMENT & PLAN NOTE
· Hemoglobin improved from 7-7 7 after 1 transfusion in the ED  · Macrocytosis noted, will recommend further evaluation and management as per primary care physician    · No active bleeding

## 2020-07-02 NOTE — SOCIAL WORK
LOS 23 HOURS  RISK OF UNPLANNED READMISSION SCORE 22  30 DAY READMISSION: NO  BUNDLE: NO    Per rounding, patient is AAOx4, no complaints of pain  Patient self PTA, no CM needs identified during rounding  CM to follow and assess as needed

## 2020-07-04 LAB
ATRIAL RATE: 71 BPM
P AXIS: 60 DEGREES
PR INTERVAL: 198 MS
QRS AXIS: -56 DEGREES
QRSD INTERVAL: 114 MS
QT INTERVAL: 390 MS
QTC INTERVAL: 423 MS
T WAVE AXIS: 47 DEGREES
VENTRICULAR RATE: 71 BPM

## 2020-07-04 PROCEDURE — 93010 ELECTROCARDIOGRAM REPORT: CPT | Performed by: INTERNAL MEDICINE

## 2020-07-06 ENCOUNTER — TRANSITIONAL CARE MANAGEMENT (OUTPATIENT)
Dept: FAMILY MEDICINE CLINIC | Facility: MEDICAL CENTER | Age: 60
End: 2020-07-06

## 2020-07-06 ENCOUNTER — TELEPHONE (OUTPATIENT)
Dept: NEUROLOGY | Facility: CLINIC | Age: 60
End: 2020-07-06

## 2020-07-07 ENCOUNTER — APPOINTMENT (EMERGENCY)
Dept: RADIOLOGY | Facility: HOSPITAL | Age: 60
DRG: 683 | End: 2020-07-07
Payer: COMMERCIAL

## 2020-07-07 ENCOUNTER — HOSPITAL ENCOUNTER (INPATIENT)
Facility: HOSPITAL | Age: 60
LOS: 4 days | Discharge: ANOTHER HOSPITAL | DRG: 683 | End: 2020-07-11
Attending: EMERGENCY MEDICINE | Admitting: STUDENT IN AN ORGANIZED HEALTH CARE EDUCATION/TRAINING PROGRAM
Payer: COMMERCIAL

## 2020-07-07 DIAGNOSIS — E86.0 DEHYDRATION: Primary | ICD-10-CM

## 2020-07-07 DIAGNOSIS — N17.9 AKI (ACUTE KIDNEY INJURY) (HCC): ICD-10-CM

## 2020-07-07 DIAGNOSIS — D64.9 ANEMIA, UNSPECIFIED TYPE: ICD-10-CM

## 2020-07-07 DIAGNOSIS — R19.7 DIARRHEA OF PRESUMED INFECTIOUS ORIGIN: ICD-10-CM

## 2020-07-07 DIAGNOSIS — R09.02 HYPOXIA: ICD-10-CM

## 2020-07-07 LAB
ALBUMIN SERPL BCP-MCNC: 2.6 G/DL (ref 3.5–5)
ALP SERPL-CCNC: 252 U/L (ref 46–116)
ALT SERPL W P-5'-P-CCNC: 49 U/L (ref 12–78)
AMORPH URATE CRY URNS QL MICRO: ABNORMAL /HPF
ANION GAP SERPL CALCULATED.3IONS-SCNC: 17 MMOL/L (ref 4–13)
APTT PPP: 39 SECONDS (ref 23–37)
AST SERPL W P-5'-P-CCNC: 37 U/L (ref 5–45)
BACTERIA UR QL AUTO: ABNORMAL /HPF
BASOPHILS # BLD AUTO: 0.01 THOUSANDS/ΜL (ref 0–0.1)
BASOPHILS NFR BLD AUTO: 0 % (ref 0–1)
BILIRUB SERPL-MCNC: 1.5 MG/DL (ref 0.2–1)
BILIRUB UR QL STRIP: ABNORMAL
BLD SMEAR INTERP: NORMAL
BUN SERPL-MCNC: 59 MG/DL (ref 5–25)
CALCIUM SERPL-MCNC: 8.6 MG/DL (ref 8.3–10.1)
CHLORIDE SERPL-SCNC: 104 MMOL/L (ref 100–108)
CLARITY UR: ABNORMAL
CO2 SERPL-SCNC: 15 MMOL/L (ref 21–32)
COLOR UR: YELLOW
CREAT SERPL-MCNC: 6.45 MG/DL (ref 0.6–1.3)
EOSINOPHIL # BLD AUTO: 0.06 THOUSAND/ΜL (ref 0–0.61)
EOSINOPHIL NFR BLD AUTO: 1 % (ref 0–6)
ERYTHROCYTE [DISTWIDTH] IN BLOOD BY AUTOMATED COUNT: 15.7 % (ref 11.6–15.1)
GFR SERPL CREATININE-BSD FRML MDRD: 9 ML/MIN/1.73SQ M
GLUCOSE SERPL-MCNC: 131 MG/DL (ref 65–140)
GLUCOSE UR STRIP-MCNC: NEGATIVE MG/DL
HCT VFR BLD AUTO: 23.7 % (ref 36.5–49.3)
HGB BLD-MCNC: 7.3 G/DL (ref 12–17)
HGB UR QL STRIP.AUTO: ABNORMAL
IMM GRANULOCYTES # BLD AUTO: 0.14 THOUSAND/UL (ref 0–0.2)
IMM GRANULOCYTES NFR BLD AUTO: 2 % (ref 0–2)
INR PPP: 1.1 (ref 0.84–1.19)
KETONES UR STRIP-MCNC: NEGATIVE MG/DL
LACTATE SERPL-SCNC: 0.7 MMOL/L (ref 0.5–2)
LEUKOCYTE ESTERASE UR QL STRIP: ABNORMAL
LYMPHOCYTES # BLD AUTO: 0.08 THOUSANDS/ΜL (ref 0.6–4.47)
LYMPHOCYTES NFR BLD AUTO: 1 % (ref 14–44)
MCH RBC QN AUTO: 31.7 PG (ref 26.8–34.3)
MCHC RBC AUTO-ENTMCNC: 30.8 G/DL (ref 31.4–37.4)
MCV RBC AUTO: 103 FL (ref 82–98)
MONOCYTES # BLD AUTO: 0.71 THOUSAND/ΜL (ref 0.17–1.22)
MONOCYTES NFR BLD AUTO: 8 % (ref 4–12)
NEUTROPHILS # BLD AUTO: 7.62 THOUSANDS/ΜL (ref 1.85–7.62)
NEUTS SEG NFR BLD AUTO: 88 % (ref 43–75)
NITRITE UR QL STRIP: NEGATIVE
NON-SQ EPI CELLS URNS QL MICRO: ABNORMAL /HPF
NRBC BLD AUTO-RTO: 0 /100 WBCS
PH UR STRIP.AUTO: 5.5 [PH]
PLATELET # BLD AUTO: 121 THOUSANDS/UL (ref 149–390)
PMV BLD AUTO: 11.6 FL (ref 8.9–12.7)
POTASSIUM SERPL-SCNC: 3.9 MMOL/L (ref 3.5–5.3)
PROCALCITONIN SERPL-MCNC: 0.98 NG/ML
PROT SERPL-MCNC: 7.1 G/DL (ref 6.4–8.2)
PROT UR STRIP-MCNC: ABNORMAL MG/DL
PROTHROMBIN TIME: 14.4 SECONDS (ref 11.6–14.5)
RBC # BLD AUTO: 2.3 MILLION/UL (ref 3.88–5.62)
RBC #/AREA URNS AUTO: ABNORMAL /HPF
SARS-COV-2 RNA RESP QL NAA+PROBE: NEGATIVE
SODIUM SERPL-SCNC: 136 MMOL/L (ref 136–145)
SP GR UR STRIP.AUTO: 1.02 (ref 1–1.03)
UROBILINOGEN UR QL STRIP.AUTO: 0.2 E.U./DL
WBC # BLD AUTO: 8.62 THOUSAND/UL (ref 4.31–10.16)
WBC #/AREA URNS AUTO: ABNORMAL /HPF

## 2020-07-07 PROCEDURE — 94640 AIRWAY INHALATION TREATMENT: CPT

## 2020-07-07 PROCEDURE — 71045 X-RAY EXAM CHEST 1 VIEW: CPT

## 2020-07-07 PROCEDURE — 87635 SARS-COV-2 COVID-19 AMP PRB: CPT | Performed by: EMERGENCY MEDICINE

## 2020-07-07 PROCEDURE — 85610 PROTHROMBIN TIME: CPT | Performed by: EMERGENCY MEDICINE

## 2020-07-07 PROCEDURE — 36415 COLL VENOUS BLD VENIPUNCTURE: CPT | Performed by: EMERGENCY MEDICINE

## 2020-07-07 PROCEDURE — 96365 THER/PROPH/DIAG IV INF INIT: CPT

## 2020-07-07 PROCEDURE — 96361 HYDRATE IV INFUSION ADD-ON: CPT

## 2020-07-07 PROCEDURE — 85025 COMPLETE CBC W/AUTO DIFF WBC: CPT | Performed by: EMERGENCY MEDICINE

## 2020-07-07 PROCEDURE — 84145 PROCALCITONIN (PCT): CPT | Performed by: EMERGENCY MEDICINE

## 2020-07-07 PROCEDURE — 51798 US URINE CAPACITY MEASURE: CPT

## 2020-07-07 PROCEDURE — 83605 ASSAY OF LACTIC ACID: CPT | Performed by: EMERGENCY MEDICINE

## 2020-07-07 PROCEDURE — 99285 EMERGENCY DEPT VISIT HI MDM: CPT

## 2020-07-07 PROCEDURE — 81001 URINALYSIS AUTO W/SCOPE: CPT | Performed by: EMERGENCY MEDICINE

## 2020-07-07 PROCEDURE — 96360 HYDRATION IV INFUSION INIT: CPT

## 2020-07-07 PROCEDURE — 36430 TRANSFUSION BLD/BLD COMPNT: CPT

## 2020-07-07 PROCEDURE — 85730 THROMBOPLASTIN TIME PARTIAL: CPT | Performed by: EMERGENCY MEDICINE

## 2020-07-07 PROCEDURE — 83010 ASSAY OF HAPTOGLOBIN QUANT: CPT | Performed by: EMERGENCY MEDICINE

## 2020-07-07 PROCEDURE — 80053 COMPREHEN METABOLIC PANEL: CPT | Performed by: EMERGENCY MEDICINE

## 2020-07-07 PROCEDURE — 87040 BLOOD CULTURE FOR BACTERIA: CPT | Performed by: EMERGENCY MEDICINE

## 2020-07-07 RX ORDER — ALBUTEROL SULFATE 2.5 MG/3ML
5 SOLUTION RESPIRATORY (INHALATION) ONCE
Status: COMPLETED | OUTPATIENT
Start: 2020-07-07 | End: 2020-07-07

## 2020-07-07 RX ORDER — SODIUM CHLORIDE 9 MG/ML
3 INJECTION INTRAVENOUS
Status: DISCONTINUED | OUTPATIENT
Start: 2020-07-07 | End: 2020-07-11 | Stop reason: HOSPADM

## 2020-07-07 RX ADMIN — SODIUM BICARBONATE 125 ML/HR: 84 INJECTION, SOLUTION INTRAVENOUS at 22:16

## 2020-07-07 RX ADMIN — IPRATROPIUM BROMIDE 0.5 MG: 0.5 SOLUTION RESPIRATORY (INHALATION) at 20:41

## 2020-07-07 RX ADMIN — SODIUM CHLORIDE 1000 ML: 0.9 INJECTION, SOLUTION INTRAVENOUS at 21:55

## 2020-07-07 RX ADMIN — SODIUM CHLORIDE 1000 ML: 0.9 INJECTION, SOLUTION INTRAVENOUS at 19:45

## 2020-07-07 RX ADMIN — ALBUTEROL SULFATE 5 MG: 2.5 SOLUTION RESPIRATORY (INHALATION) at 20:39

## 2020-07-08 ENCOUNTER — APPOINTMENT (INPATIENT)
Dept: ULTRASOUND IMAGING | Facility: HOSPITAL | Age: 60
DRG: 683 | End: 2020-07-08
Payer: COMMERCIAL

## 2020-07-08 PROBLEM — N17.9 ACUTE RENAL FAILURE SUPERIMPOSED ON STAGE 4 CHRONIC KIDNEY DISEASE (HCC): Status: ACTIVE | Noted: 2020-07-01

## 2020-07-08 PROBLEM — R79.89 ELEVATED PROCALCITONIN: Status: ACTIVE | Noted: 2020-07-08

## 2020-07-08 LAB
ABO GROUP BLD: NORMAL
ALBUMIN SERPL BCP-MCNC: 2.1 G/DL (ref 3.5–5)
ALP SERPL-CCNC: 199 U/L (ref 46–116)
ALT SERPL W P-5'-P-CCNC: 37 U/L (ref 12–78)
ANION GAP SERPL CALCULATED.3IONS-SCNC: 15 MMOL/L (ref 4–13)
ANION GAP SERPL CALCULATED.3IONS-SCNC: 16 MMOL/L (ref 4–13)
ANION GAP SERPL CALCULATED.3IONS-SCNC: 16 MMOL/L (ref 4–13)
AST SERPL W P-5'-P-CCNC: 29 U/L (ref 5–45)
BASOPHILS # BLD AUTO: 0.01 THOUSANDS/ΜL (ref 0–0.1)
BASOPHILS NFR BLD AUTO: 0 % (ref 0–1)
BILIRUB SERPL-MCNC: 1.2 MG/DL (ref 0.2–1)
BLD GP AB SCN SERPL QL: NEGATIVE
BUN SERPL-MCNC: 61 MG/DL (ref 5–25)
BUN SERPL-MCNC: 61 MG/DL (ref 5–25)
BUN SERPL-MCNC: 63 MG/DL (ref 5–25)
CALCIUM SERPL-MCNC: 7.7 MG/DL (ref 8.3–10.1)
CALCIUM SERPL-MCNC: 7.9 MG/DL (ref 8.3–10.1)
CALCIUM SERPL-MCNC: 8.3 MG/DL (ref 8.3–10.1)
CHLORIDE SERPL-SCNC: 104 MMOL/L (ref 100–108)
CHLORIDE SERPL-SCNC: 105 MMOL/L (ref 100–108)
CHLORIDE SERPL-SCNC: 107 MMOL/L (ref 100–108)
CO2 SERPL-SCNC: 15 MMOL/L (ref 21–32)
CO2 SERPL-SCNC: 17 MMOL/L (ref 21–32)
CO2 SERPL-SCNC: 21 MMOL/L (ref 21–32)
CREAT SERPL-MCNC: 6.41 MG/DL (ref 0.6–1.3)
CREAT SERPL-MCNC: 6.69 MG/DL (ref 0.6–1.3)
CREAT SERPL-MCNC: 6.85 MG/DL (ref 0.6–1.3)
CREAT UR-MCNC: 136 MG/DL
EOSINOPHIL # BLD AUTO: 0.02 THOUSAND/ΜL (ref 0–0.61)
EOSINOPHIL NFR BLD AUTO: 0 % (ref 0–6)
ERYTHROCYTE [DISTWIDTH] IN BLOOD BY AUTOMATED COUNT: 15.5 % (ref 11.6–15.1)
GFR SERPL CREATININE-BSD FRML MDRD: 8 ML/MIN/1.73SQ M
GFR SERPL CREATININE-BSD FRML MDRD: 8 ML/MIN/1.73SQ M
GFR SERPL CREATININE-BSD FRML MDRD: 9 ML/MIN/1.73SQ M
GLUCOSE SERPL-MCNC: 133 MG/DL (ref 65–140)
GLUCOSE SERPL-MCNC: 140 MG/DL (ref 65–140)
GLUCOSE SERPL-MCNC: 158 MG/DL (ref 65–140)
HCT VFR BLD AUTO: 18.2 % (ref 36.5–49.3)
HCT VFR BLD AUTO: 27.2 % (ref 36.5–49.3)
HGB BLD-MCNC: 5.6 G/DL (ref 12–17)
HGB BLD-MCNC: 8.7 G/DL (ref 12–17)
IMM GRANULOCYTES # BLD AUTO: 0.13 THOUSAND/UL (ref 0–0.2)
IMM GRANULOCYTES NFR BLD AUTO: 2 % (ref 0–2)
LDH SERPL-CCNC: 289 U/L (ref 81–234)
LYMPHOCYTES # BLD AUTO: 0.07 THOUSANDS/ΜL (ref 0.6–4.47)
LYMPHOCYTES NFR BLD AUTO: 1 % (ref 14–44)
MCH RBC QN AUTO: 31.5 PG (ref 26.8–34.3)
MCHC RBC AUTO-ENTMCNC: 30.8 G/DL (ref 31.4–37.4)
MCV RBC AUTO: 102 FL (ref 82–98)
MONOCYTES # BLD AUTO: 0.69 THOUSAND/ΜL (ref 0.17–1.22)
MONOCYTES NFR BLD AUTO: 11 % (ref 4–12)
NEUTROPHILS # BLD AUTO: 5.42 THOUSANDS/ΜL (ref 1.85–7.62)
NEUTS SEG NFR BLD AUTO: 86 % (ref 43–75)
NRBC BLD AUTO-RTO: 0 /100 WBCS
PLATELET # BLD AUTO: 88 THOUSANDS/UL (ref 149–390)
PMV BLD AUTO: 11.4 FL (ref 8.9–12.7)
POTASSIUM SERPL-SCNC: 3.5 MMOL/L (ref 3.5–5.3)
POTASSIUM SERPL-SCNC: 3.7 MMOL/L (ref 3.5–5.3)
POTASSIUM SERPL-SCNC: 3.8 MMOL/L (ref 3.5–5.3)
PROCALCITONIN SERPL-MCNC: 1.14 NG/ML
PROT SERPL-MCNC: 5.7 G/DL (ref 6.4–8.2)
PROT UR-MCNC: 174 MG/DL
PROT/CREAT UR: 1.28 MG/G{CREAT} (ref 0–0.1)
RBC # BLD AUTO: 1.78 MILLION/UL (ref 3.88–5.62)
RH BLD: NEGATIVE
SODIUM SERPL-SCNC: 138 MMOL/L (ref 136–145)
SODIUM SERPL-SCNC: 138 MMOL/L (ref 136–145)
SODIUM SERPL-SCNC: 140 MMOL/L (ref 136–145)
SPECIMEN EXPIRATION DATE: NORMAL
TACROLIMUS BLD-MCNC: 11.6 NG/ML (ref 2–20)
WBC # BLD AUTO: 6.34 THOUSAND/UL (ref 4.31–10.16)

## 2020-07-08 PROCEDURE — 36415 COLL VENOUS BLD VENIPUNCTURE: CPT | Performed by: PHYSICIAN ASSISTANT

## 2020-07-08 PROCEDURE — NC001 PR NO CHARGE: Performed by: INTERNAL MEDICINE

## 2020-07-08 PROCEDURE — 30233N1 TRANSFUSION OF NONAUTOLOGOUS RED BLOOD CELLS INTO PERIPHERAL VEIN, PERCUTANEOUS APPROACH: ICD-10-PCS | Performed by: GENERAL PRACTICE

## 2020-07-08 PROCEDURE — 83010 ASSAY OF HAPTOGLOBIN QUANT: CPT | Performed by: INTERNAL MEDICINE

## 2020-07-08 PROCEDURE — 94760 N-INVAS EAR/PLS OXIMETRY 1: CPT

## 2020-07-08 PROCEDURE — 86645 CMV ANTIBODY IGM: CPT | Performed by: INTERNAL MEDICINE

## 2020-07-08 PROCEDURE — 80197 ASSAY OF TACROLIMUS: CPT | Performed by: INTERNAL MEDICINE

## 2020-07-08 PROCEDURE — 86900 BLOOD TYPING SEROLOGIC ABO: CPT | Performed by: INTERNAL MEDICINE

## 2020-07-08 PROCEDURE — 84145 PROCALCITONIN (PCT): CPT | Performed by: PHYSICIAN ASSISTANT

## 2020-07-08 PROCEDURE — P9016 RBC LEUKOCYTES REDUCED: HCPCS

## 2020-07-08 PROCEDURE — 85018 HEMOGLOBIN: CPT | Performed by: NURSE PRACTITIONER

## 2020-07-08 PROCEDURE — 94660 CPAP INITIATION&MGMT: CPT

## 2020-07-08 PROCEDURE — 99223 1ST HOSP IP/OBS HIGH 75: CPT | Performed by: INTERNAL MEDICINE

## 2020-07-08 PROCEDURE — 85025 COMPLETE CBC W/AUTO DIFF WBC: CPT | Performed by: PHYSICIAN ASSISTANT

## 2020-07-08 PROCEDURE — 86923 COMPATIBILITY TEST ELECTRIC: CPT

## 2020-07-08 PROCEDURE — 86850 RBC ANTIBODY SCREEN: CPT | Performed by: INTERNAL MEDICINE

## 2020-07-08 PROCEDURE — 85014 HEMATOCRIT: CPT | Performed by: NURSE PRACTITIONER

## 2020-07-08 PROCEDURE — 82570 ASSAY OF URINE CREATININE: CPT | Performed by: INTERNAL MEDICINE

## 2020-07-08 PROCEDURE — 86901 BLOOD TYPING SEROLOGIC RH(D): CPT | Performed by: INTERNAL MEDICINE

## 2020-07-08 PROCEDURE — 84156 ASSAY OF PROTEIN URINE: CPT | Performed by: INTERNAL MEDICINE

## 2020-07-08 PROCEDURE — 80048 BASIC METABOLIC PNL TOTAL CA: CPT | Performed by: INTERNAL MEDICINE

## 2020-07-08 PROCEDURE — 76770 US EXAM ABDO BACK WALL COMP: CPT

## 2020-07-08 PROCEDURE — P9051 BLOOD, L/R, CMV-NEG: HCPCS

## 2020-07-08 PROCEDURE — 99285 EMERGENCY DEPT VISIT HI MDM: CPT | Performed by: EMERGENCY MEDICINE

## 2020-07-08 PROCEDURE — 80053 COMPREHEN METABOLIC PANEL: CPT | Performed by: PHYSICIAN ASSISTANT

## 2020-07-08 PROCEDURE — 83615 LACTATE (LD) (LDH) ENZYME: CPT | Performed by: INTERNAL MEDICINE

## 2020-07-08 PROCEDURE — 99223 1ST HOSP IP/OBS HIGH 75: CPT | Performed by: GENERAL PRACTICE

## 2020-07-08 RX ORDER — DOCUSATE SODIUM 100 MG/1
100 CAPSULE, LIQUID FILLED ORAL 2 TIMES DAILY
Status: DISCONTINUED | OUTPATIENT
Start: 2020-07-08 | End: 2020-07-09

## 2020-07-08 RX ORDER — ALBUTEROL SULFATE 90 UG/1
2 AEROSOL, METERED RESPIRATORY (INHALATION) EVERY 6 HOURS PRN
Status: DISCONTINUED | OUTPATIENT
Start: 2020-07-08 | End: 2020-07-11 | Stop reason: HOSPADM

## 2020-07-08 RX ORDER — PREDNISONE 1 MG/1
5 TABLET ORAL DAILY
Status: DISCONTINUED | OUTPATIENT
Start: 2020-07-08 | End: 2020-07-11 | Stop reason: HOSPADM

## 2020-07-08 RX ORDER — HEPARIN SODIUM 5000 [USP'U]/ML
5000 INJECTION, SOLUTION INTRAVENOUS; SUBCUTANEOUS EVERY 8 HOURS SCHEDULED
Status: DISCONTINUED | OUTPATIENT
Start: 2020-07-08 | End: 2020-07-09

## 2020-07-08 RX ORDER — TACROLIMUS 1 MG/1
4 CAPSULE ORAL EVERY 12 HOURS SCHEDULED
Status: DISCONTINUED | OUTPATIENT
Start: 2020-07-08 | End: 2020-07-09

## 2020-07-08 RX ORDER — URSODIOL 300 MG/1
300 CAPSULE ORAL 3 TIMES DAILY
Status: DISCONTINUED | OUTPATIENT
Start: 2020-07-08 | End: 2020-07-11 | Stop reason: HOSPADM

## 2020-07-08 RX ORDER — AMLODIPINE BESYLATE 2.5 MG/1
2.5 TABLET ORAL DAILY
Status: DISCONTINUED | OUTPATIENT
Start: 2020-07-08 | End: 2020-07-11 | Stop reason: HOSPADM

## 2020-07-08 RX ORDER — ONDANSETRON 2 MG/ML
4 INJECTION INTRAMUSCULAR; INTRAVENOUS EVERY 6 HOURS PRN
Status: DISCONTINUED | OUTPATIENT
Start: 2020-07-08 | End: 2020-07-09

## 2020-07-08 RX ORDER — TACROLIMUS 1 MG/1
3 CAPSULE ORAL EVERY 12 HOURS SCHEDULED
Status: DISCONTINUED | OUTPATIENT
Start: 2020-07-08 | End: 2020-07-08

## 2020-07-08 RX ORDER — MYCOPHENOLATE MOFETIL 250 MG/1
1000 CAPSULE ORAL EVERY 12 HOURS SCHEDULED
Status: DISCONTINUED | OUTPATIENT
Start: 2020-07-08 | End: 2020-07-11 | Stop reason: HOSPADM

## 2020-07-08 RX ORDER — SIROLIMUS 1 MG/1
2 TABLET, FILM COATED ORAL DAILY
Status: DISCONTINUED | OUTPATIENT
Start: 2020-07-08 | End: 2020-07-08

## 2020-07-08 RX ORDER — SIROLIMUS 1 MG/1
6 TABLET, FILM COATED ORAL DAILY
Status: DISCONTINUED | OUTPATIENT
Start: 2020-07-08 | End: 2020-07-09

## 2020-07-08 RX ORDER — POTASSIUM CHLORIDE 14.9 MG/ML
20 INJECTION INTRAVENOUS ONCE
Status: COMPLETED | OUTPATIENT
Start: 2020-07-08 | End: 2020-07-08

## 2020-07-08 RX ORDER — ACYCLOVIR 800 MG/1
400 TABLET ORAL 2 TIMES DAILY
Status: DISCONTINUED | OUTPATIENT
Start: 2020-07-08 | End: 2020-07-11 | Stop reason: HOSPADM

## 2020-07-08 RX ORDER — DAPSONE 100 MG/1
100 TABLET ORAL DAILY
Status: DISCONTINUED | OUTPATIENT
Start: 2020-07-08 | End: 2020-07-11 | Stop reason: HOSPADM

## 2020-07-08 RX ORDER — FAMOTIDINE 20 MG/1
10 TABLET, FILM COATED ORAL DAILY
Status: DISCONTINUED | OUTPATIENT
Start: 2020-07-08 | End: 2020-07-11 | Stop reason: HOSPADM

## 2020-07-08 RX ADMIN — ACYCLOVIR 400 MG: 800 TABLET ORAL at 08:23

## 2020-07-08 RX ADMIN — MYCOPHENOLATE MOFETIL 1000 MG: 250 CAPSULE ORAL at 08:24

## 2020-07-08 RX ADMIN — PREDNISONE 5 MG: 5 TABLET ORAL at 08:18

## 2020-07-08 RX ADMIN — SIROLIMUS 6 MG: 1 TABLET ORAL at 08:26

## 2020-07-08 RX ADMIN — URSODIOL 300 MG: 300 CAPSULE ORAL at 08:26

## 2020-07-08 RX ADMIN — DAPSONE 100 MG: 100 TABLET ORAL at 08:24

## 2020-07-08 RX ADMIN — MYCOPHENOLATE MOFETIL 1000 MG: 250 CAPSULE ORAL at 22:44

## 2020-07-08 RX ADMIN — TACROLIMUS 4 MG: 1 CAPSULE ORAL at 08:25

## 2020-07-08 RX ADMIN — HEPARIN SODIUM 5000 UNITS: 5000 INJECTION INTRAVENOUS; SUBCUTANEOUS at 15:39

## 2020-07-08 RX ADMIN — AMLODIPINE BESYLATE 2.5 MG: 2.5 TABLET ORAL at 08:22

## 2020-07-08 RX ADMIN — URSODIOL 300 MG: 300 CAPSULE ORAL at 22:44

## 2020-07-08 RX ADMIN — MAGNESIUM GLUCONATE 500 MG ORAL TABLET 400 MG: 500 TABLET ORAL at 08:24

## 2020-07-08 RX ADMIN — ACYCLOVIR 400 MG: 800 TABLET ORAL at 17:40

## 2020-07-08 RX ADMIN — MAGNESIUM GLUCONATE 500 MG ORAL TABLET 400 MG: 500 TABLET ORAL at 17:40

## 2020-07-08 RX ADMIN — SODIUM BICARBONATE 125 ML/HR: 84 INJECTION, SOLUTION INTRAVENOUS at 18:38

## 2020-07-08 RX ADMIN — DOCUSATE SODIUM 100 MG: 100 CAPSULE, LIQUID FILLED ORAL at 08:18

## 2020-07-08 RX ADMIN — URSODIOL 300 MG: 300 CAPSULE ORAL at 17:39

## 2020-07-08 RX ADMIN — FAMOTIDINE 10 MG: 20 TABLET ORAL at 08:18

## 2020-07-08 RX ADMIN — POTASSIUM CHLORIDE 20 MEQ: 200 INJECTION, SOLUTION INTRAVENOUS at 15:39

## 2020-07-08 RX ADMIN — HEPARIN SODIUM 5000 UNITS: 5000 INJECTION INTRAVENOUS; SUBCUTANEOUS at 05:12
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
## 2020-07-08 NOTE — UTILIZATION REVIEW
Initial Clinical Review    Admission: Date/Time/Statement: Admission Orders (From admission, onward)     Ordered        07/07/20 2234  Inpatient Admission  Once                   Orders Placed This Encounter   Procedures    Inpatient Admission     Standing Status:   Standing     Number of Occurrences:   1     Order Specific Question:   Admitting Physician     Answer:   Efra Solis [[id number]]     Order Specific Question:   Level of Care     Answer:   Med Surg [16]     Order Specific Question:   Estimated length of stay     Answer:   More than 2 Midnights     Order Specific Question:   Certification     Answer:   I certify that inpatient services are medically necessary for this patient for a duration of greater than two midnights  See H&P and MD Progress Notes for additional information about the patient's course of treatment  ED Arrival Information     Expected Arrival Acuity Means of Arrival Escorted By Service Admission Type    - 7/7/2020 17:39 Urgent Walk-In Family Member Hospitalist Urgent    Arrival Complaint    Dehydration, cough        Chief Complaint   Patient presents with    Dehydration     Pt was referred to the ED by his transplant PCP who told them he is dehydrated and needs further blood work  Pt reports fatigue and cough for a couple days  Pt stated his liver transplant 10/2019     Assessment/Plan: 61 yo male to ED from home w/ elevated creat   recent admission 1 week ago for syncope  Since then has felt fatigued , overall malaise and a slight cough   Dec appetite and not eating or drinking much   Hx of liver transplant and follows w/ [facility]   Told to be seen in ED by transplant provider  Found to have creat 6 45 , was 2 6 6 days ago   Given 2l in ED   Admitted IP status for nephrology consult , started on bicarb 150/hr , ua pending   Anemia chronic hgb 7 3 baseline bw 7-8 cont to monitor  HTN cont home meds  Liver cirrhosis sec to ROSALES s/p transplant cont home meds       PE : dec BS fabrizio [place]     7/8 IM note   tacrolimus level pending, nephrology consult is pending; will check urine culture; with anemia checking hemloysis smear and will transfuse 2uprbcs  ED Triage Vitals   Temperature Pulse Respirations Blood Pressure SpO2   07/07/20 1749 07/07/20 1749 07/07/20 1749 07/07/20 1749 07/07/20 1749   98 1 °F (36 7 °C) 88 22 141/72 93 %      Temp Source Heart Rate Source Patient Position - Orthostatic VS BP Location FiO2 (%)   07/07/20 1749 07/07/20 1749 07/07/20 1749 07/07/20 1749 --   Oral Monitor Sitting Left arm       Pain Score       07/08/20 1000       No Pain        Wt Readings from Last 1 Encounters:   07/08/20 108 kg (238 lb 1 6 oz)     Additional Vital Signs:   07/08/20 1300  --  82  --  136/73  97  95 %  --  --  --  --   07/08/20 1245  --  83  --  136/77  98  94 %  32  3 L/min  Nasal cannula  --   07/08/20 1211  98 4 °F (36 9 °C)  80  18  120/71  --  --  --  --  --  --   07/08/20 0912  98 7 °F (37 1 °C)  75  20  133/70  --  91 %  --  --  None (Room air)  --   07/08/20 0853  98 8 °F (37 1 °C)  60  20  131/68  --  --  --  --  --  --   07/08/20 0807  98 8 °F (37 1 °C)  --  --  131/68  --  91 %  --  --  None (Room air)  Lying   07/08/20 0511  --  87  20  133/68  --  91 %  --  --  --   Lying   O2 Device: Cpap at 07/08/20 0511   07/08/20 0421  --  89  28Abnormal   --  --  91 %  --  --  --  --   07/08/20 0100  --  102  18  97/60  --  91 %  --  --  None (Room air)  Lying   07/08/20 0030  98 1 °F (36 7 °C)  98  16  133/66  88  93 %  --  --  None (Room air)  Lying   07/07/20 2100  --  84  20  137/62  89  99 %               Pertinent Labs/Diagnostic Test Results:   7/8 PCXR   No focal consolidation, pleural effusion, or pneumothorax        7/8 US kidney - pending     Results from last 7 days   Lab Units 07/07/20 2109   SARS-COV-2  Negative     Results from last 7 days   Lab Units 07/08/20  0558 07/07/20  1942 07/02/20  0611   WBC Thousand/uL 6 34 8 62 7 11   HEMOGLOBIN g/dL 5 6* 7 3* 7 7*   HEMATOCRIT % 18 2* 23 7* 23 8*   PLATELETS Thousands/uL 88* 121* 72*   NEUTROS ABS Thousands/µL 5 42 7 62 6 19     Results from last 7 days   Lab Units 07/08/20  0510 07/07/20  1942 07/02/20  0611   SODIUM mmol/L 138 136 142   POTASSIUM mmol/L 3 5 3 9 4 0   CHLORIDE mmol/L 107 104 112*   CO2 mmol/L 15* 15* 17*   ANION GAP mmol/L 16* 17* 13   BUN mg/dL 61* 59* 47*   CREATININE mg/dL 6 41* 6 45* 2 61*   EGFR ml/min/1 73sq m 9 9 26   CALCIUM mg/dL 7 7* 8 6 8 5   MAGNESIUM mg/dL  --   --  1 6   PHOSPHORUS mg/dL  --   --  2 8     Results from last 7 days   Lab Units 07/08/20  0510 07/07/20  1942 07/02/20  0611   AST U/L 29 37 22   ALT U/L 37 49 41   ALK PHOS U/L 199* 252* 110   TOTAL PROTEIN g/dL 5 7* 7 1 6 2*   ALBUMIN g/dL 2 1* 2 6* 2 8*   TOTAL BILIRUBIN mg/dL 1 20* 1 50* 0 90     Results from last 7 days   Lab Units 07/02/20  1106 07/02/20  0714 07/01/20  2148   POC GLUCOSE mg/dl 146* 96 120     Results from last 7 days   Lab Units 07/08/20  0510 07/07/20  1942 07/02/20  0611   GLUCOSE RANDOM mg/dL 133 131 97       Results from last 7 days   Lab Units 07/02/20  0125 07/01/20  2152   TROPONIN I ng/mL <0 02 <0 02     Results from last 7 days   Lab Units 07/07/20  1942   PROTIME seconds 14 4   INR  1 10   PTT seconds 39*     Results from last 7 days   Lab Units 07/08/20  0511 07/07/20  1942   PROCALCITONIN ng/ml 1 14* 0 98*     Results from last 7 days   Lab Units 07/07/20  1942   LACTIC ACID mmol/L 0 7       Results from last 7 days   Lab Units 07/07/20  2220   CLARITY UA  Cloudy   COLOR UA  Yellow   SPEC GRAV UA  1 020   PH UA  5 5   GLUCOSE UA mg/dl Negative   KETONES UA mg/dl Negative   BLOOD UA  Trace-Intact*   PROTEIN UA mg/dl 100 (2+)*   NITRITE UA  Negative   BILIRUBIN UA  Small*   UROBILINOGEN UA E U /dl 0 2   LEUKOCYTES UA  Trace*   WBC UA /hpf 2-4*   RBC UA /hpf 1-2*   BACTERIA UA /hpf Moderate*   EPITHELIAL CELLS WET PREP /hpf Occasional     Results from last 7 days   Lab Units 07/07/20 1942   BLOOD CULTURE  Received in Microbiology Lab  Culture in Progress  Received in Microbiology Lab  Culture in Progress       ED Treatment:   Medication Administration from 07/07/2020 1739 to 07/08/2020 0946       Date/Time Order Dose Route Action     07/07/2020 1945 sodium chloride 0 9 % bolus 1,000 mL 1,000 mL Intravenous New Bag     07/07/2020 2039 albuterol inhalation solution 5 mg 5 mg Nebulization Given     07/07/2020 2041 ipratropium (ATROVENT) 0 02 % inhalation solution 0 5 mg 0 5 mg Nebulization Given     07/07/2020 2155 sodium chloride 0 9 % bolus 1,000 mL 1,000 mL Intravenous New Bag     07/07/2020 2216 sodium bicarbonate 150 mEq in dextrose 5 % 1,000 mL infusion 125 mL/hr Intravenous New Bag     07/08/2020 0823 acyclovir (ZOVIRAX) tablet 400 mg 400 mg Oral Given     07/08/2020 0822 amLODIPine (NORVASC) tablet 2 5 mg 2 5 mg Oral Given     07/08/2020 0824 dapsone tablet 100 mg 100 mg Oral Given     07/08/2020 0818 famotidine (PEPCID) tablet 10 mg 10 mg Oral Given     07/08/2020 0818 predniSONE tablet 5 mg 5 mg Oral Given     07/08/2020 0824 mycophenolate (CELLCEPT) capsule 1,000 mg 1,000 mg Oral Given     07/08/2020 0826 ursodiol (ACTIGALL) capsule 300 mg 300 mg Oral Given     07/08/2020 0824 magnesium oxide (MAG-OX) tablet 400 mg 400 mg Oral Given     07/08/2020 0512 heparin (porcine) subcutaneous injection 5,000 Units 5,000 Units Subcutaneous Given     07/08/2020 0818 docusate sodium (COLACE) capsule 100 mg 100 mg Oral Given     07/08/2020 0826 sirolimus (RAPAMUNE) tablet 6 mg 6 mg Oral Given     07/08/2020 0825 tacrolimus (PROGRAF) capsule 4 mg 4 mg Oral Given        Past Medical History:   Diagnosis Date    Anasarca     Chronic pain disorder     lower back    CPAP (continuous positive airway pressure) dependence     Heart murmur     Hypertension     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 ([facility]     Myocardial infarction ([facility] )     Renal insufficiency 6/7/2019    Sleep apnea     Vitamin D deficiency      Present on Admission:   Acute renal failure superimposed on stage 4 chronic kidney disease ([facility] )   Essential hypertension   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)   Anemia      Admitting Diagnosis: Cough [R05]  Dehydration [E86 0]  BOBBY (acute kidney injury) ([facility] ) [N17 9]  Age/Sex: 62 y o  male  Admission Orders:  Scheduled Medications:    Medications:  acyclovir 400 mg Oral BID   amLODIPine 2 5 mg Oral Daily   dapsone 100 mg Oral Daily   docusate sodium 100 mg Oral BID   famotidine 10 mg Oral Daily   heparin (porcine) 5,000 Units Subcutaneous Q8H [address]   magnesium oxide 400 mg Oral BID   mycophenolate 1,000 mg Oral Q12H [address]   predniSONE 5 mg Oral Daily   sirolimus 6 mg Oral Daily   tacrolimus 4 mg Oral Q12H DANA   ursodiol 300 mg Oral TID     Continuous IV Infusions:    sodium bicarbonate infusion 125 mL/hr Intravenous Continuous     PRN Meds:    albuterol 2 puff Inhalation Q6H PRN   ondansetron 4 mg Intravenous Q6H PRN   sodium chloride (PF) 3 mL Intravenous Q1H PRN     Transfuse 2 u PRBC   Renal diet   I&O   Up w/ assist   Bladder scan   IP CONSULT TO NEPHROLOGY    Network Utilization Review Department  [email or web address]  ATTENTION: Please call with any questions or concerns to [phone] and carefully listen to the prompts so that you are directed to the right person  All voicemails are confidential   Oleksandr Vallejo all requests for admission clinical reviews, approved or denied determinations and any other requests to dedicated fax number below belonging to the campus where the patient is receiving treatment   List of dedicated fax numbers for the Facilities:  FACILITY NAME UR FAX NUMBER   ADMISSION DENIALS (Administrative/Medical Necessity) [phone]   [address]  (Maternity/NICU/Pediatrics) [phone]   [facility] [phone]   Ash Trejo [phone]   [facility] [phone]   [address] [place] [address]    [address] [place] [id number] [facility] [phone]   Marianela De La Vega [facility] [address] [phone]   [address] [facility] [phone]

## 2020-07-08 NOTE — ED PROVIDER NOTES
History  Chief Complaint   Patient presents with    Dehydration     Pt was referred to the ED by his transplant PCP who told them he is dehydrated and needs further blood work  Pt reports fatigue and cough for a couple days  Pt stated his liver transplant 10/2019     36-year-old male status post liver transplant presenting to the emergency department for evaluation of possible dehydration  Patient was recently admitted here over the weekend for syncopal episode  Had some repeat outpatient lab work done yesterday, his transplant coordinator called in because it showed worsening kidney function and she was worried about dehydration  Patient states that since he was discharged from the hospital his appetites been very poor, also has had some cough and shortness of breath  States that he has been eating or drinking anything and has not urinated much today  No further fevers or chills  No abdominal pain nausea or vomiting  Prior to Admission Medications   Prescriptions Last Dose Informant Patient Reported?  Taking?   acyclovir (ZOVIRAX) 400 MG tablet   Yes No   Sig: Take 400 mg by mouth 2 (two) times a day   albuterol (PROVENTIL HFA,VENTOLIN HFA) 90 mcg/act inhaler   Yes No   Sig: Inhale 2 puffs every 6 (six) hours as needed for wheezing or shortness of breath   amLODIPine (NORVASC) 2 5 mg tablet   Yes No   cyanocobalamin (VITAMIN B-12) 50 MCG tablet   No No   Sig: Take 1 tablet (50 mcg total) by mouth daily   Patient not taking: Reported on 2/25/2020   dapsone 100 mg tablet   Yes No   Sig: Take 100 mg by mouth daily   famotidine (PEPCID) 20 mg tablet  Self Yes No   Sig: Take 15 mg by mouth daily    folic acid (FOLVITE) 818 mcg tablet   No No   Sig: Take 1 tablet (400 mcg total) by mouth daily   Patient not taking: Reported on 2/25/2020   insulin aspart (NovoLOG) 100 units/mL injection  Spouse/Significant Other Yes No   Sig: Inject under the skin 3 (three) times a day before meals   magnesium oxide (MAG-OX) 400 mg  Spouse/Significant Other Yes No   Sig: Take 400 mg by mouth 2 (two) times a day   mycophenolate (CELLCEPT) 500 mg tablet   Yes No   Sig: Take 1,000 mg by mouth every 12 (twelve) hours   predniSONE 1 mg tablet  Spouse/Significant Other Yes No   Sig: Take 5 mg by mouth daily   sirolimus (RAPAMUNE) 1 mg tablet   Yes No   Sig: Take 2 mg by mouth daily   sodium bicarbonate 650 mg tablet  Spouse/Significant Other Yes No   Sig: Take 650 mg by mouth 2 (two) times a day   tacrolimus (PROGRAF) 1 mg capsule   Yes No   Sig: 3mg in the morning, 3mg in the evening   ursodiol (ACTIGALL) 300 mg capsule  Spouse/Significant Other Yes No   Sig: Take 300 mg by mouth 3 (three) times a day       Facility-Administered Medications: None       Past Medical History:   Diagnosis Date    Anasarca     Chronic pain disorder     lower back    CPAP (continuous positive airway pressure) dependence     Heart murmur     Hypertension     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)     Myocardial infarction ([facility] [address] )     Renal insufficiency 2019    Sleep apnea     Vitamin D deficiency        Past Surgical History:   Procedure Laterality Date    IR FROEDTERT MEM [facility] REMOVAL  3/27/2020    IR TEMP HD CATH  9/10/2019    IR TUBE PLACEMENT NEGRA  2019    KNEE ARTHROSCOPY Bilateral     KNEE SURGERY      UT COLONOSCOPY FLX DX W/COLLJ SPEC WHEN PFRMD N/A 11/15/2017    Procedure: COLONOSCOPY;  Surgeon: Sofie Benitez MD;  Location: [facility]; Service: Gastroenterology       Family History   Problem Relation Age of Onset    Heart attack Father      I have reviewed and agree with the history as documented      E-Cigarette/Vaping    E-Cigarette Use Never User      E-Cigarette/Vaping Substances     Social History     Tobacco Use    Smoking status: Former Smoker     Last attempt to quit: 11/15/1990     Years since quittin 6    Smokeless tobacco: Never Used   Substance Use Topics    Alcohol use: Never     Frequency: Never Comment: RARE    Drug use: No       Review of Systems   Constitutional: Positive for appetite change and fatigue  Negative for chills and fever  HENT: Negative for sneezing and sore throat  Eyes: Negative for visual disturbance  Respiratory: Positive for cough  Negative for choking, chest tightness, shortness of breath and wheezing  Cardiovascular: Negative for chest pain and palpitations  Gastrointestinal: Negative for abdominal pain, constipation, diarrhea, nausea and vomiting  Genitourinary: Negative for difficulty urinating and dysuria  Neurological: Negative for dizziness, weakness, light-headedness, numbness and headaches  All other systems reviewed and are negative  Physical Exam  Physical Exam   Constitutional: He is oriented to person, place, and time  He appears well-developed and well-nourished  No distress  HENT:   Head: Normocephalic and atraumatic  Mouth/Throat: Oropharynx is clear and moist    Eyes: Pupils are equal, round, and reactive to light  EOM are normal    Neck: No JVD present  No tracheal deviation present  Cardiovascular: Normal rate, regular rhythm, normal heart sounds and intact distal pulses  Exam reveals no gallop and no friction rub  No murmur heard  Pulmonary/Chest: Effort normal and breath sounds normal  No respiratory distress  He has no wheezes  He has no rales  Abdominal: Soft  Bowel sounds are normal  He exhibits no distension  There is no tenderness  There is no rebound and no guarding  Neurological: He is alert and oriented to person, place, and time  No cranial nerve deficit  He exhibits normal muscle tone  Skin: Skin is warm and dry  He is not diaphoretic  No pallor  Psychiatric: He has a normal mood and affect  His behavior is normal    Nursing note and vitals reviewed        Vital Signs  ED Triage Vitals [07/07/20 1749]   Temperature Pulse Respirations Blood Pressure SpO2   98 1 °F (36 7 °C) 88 22 141/72 93 %      Temp Source Heart Rate Source Patient Position - Orthostatic VS BP Location FiO2 (%)   Oral Monitor Sitting Left arm --      Pain Score       --           Vitals:    07/07/20 1749 07/07/20 2100   BP: 141/72 137/62   Pulse: 88 84   Patient Position - Orthostatic VS: Sitting Sitting         Visual Acuity      ED Medications  Medications   sodium chloride (PF) 0 9 % injection 3 mL (has no administration in time range)   sodium bicarbonate 150 mEq in dextrose 5 % 1,000 mL infusion (125 mL/hr Intravenous New Bag 7/7/20 2216)   sodium chloride 0 9 % bolus 1,000 mL (0 mL Intravenous Stopped 7/7/20 2120)   albuterol inhalation solution 5 mg (5 mg Nebulization Given 7/7/20 2039)   ipratropium (ATROVENT) 0 02 % inhalation solution 0 5 mg (0 5 mg Nebulization Given 7/7/20 2041)   sodium chloride 0 9 % bolus 1,000 mL (1,000 mL Intravenous New Bag 7/7/20 2155)       Diagnostic Studies  Results Reviewed     Procedure Component Value Units Date/Time    Blood culture #2 [[id number]] Collected:  07/07/20 1942    Lab Status:  Preliminary result Specimen:  Blood from Arm, Left Updated:  07/08/20 0002     Blood Culture Received in Microbiology Lab  Culture in Progress  Blood culture #1 [[id number]] Collected:  07/07/20 1942    Lab Status:  Preliminary result Specimen:  Blood from Arm, Right Updated:  07/08/20 0002     Blood Culture Received in Microbiology Lab  Culture in Progress      Procalcitonin [[id number]]  (Abnormal) Collected:  07/07/20 1942    Lab Status:  Final result Specimen:  Blood from Arm, Right Updated:  07/07/20 2356     Procalcitonin 0 98 ng/ml     Procalcitonin Reflex [[id number]]     Lab Status:  No result Specimen:  Blood     Hemolysis Smear [[id number]] Collected:  07/07/20 2221    Lab Status:  Final result Specimen:  Arm, Left Updated:  07/07/20 2338     Hemolysis Smear No Schistocytes or Helmet Cells noted    Urine Microscopic [[id number]]  (Abnormal) Collected:  07/07/20 2220    Lab Status:  Final result Specimen:  Urine, Clean Catch Updated:  07/07/20 2318     RBC, UA 1-2 /hpf      WBC, UA 2-4 /hpf      Epithelial Cells Occasional /hpf      Bacteria, UA Moderate /hpf      AMORPH URATES Occasional /hpf     Novel Coronavirus Edita DEVI [facility] [[id number]]  (Normal) Collected:  07/07/20 2109    Lab Status:  Final result Specimen:  Nares from Nose Updated:  07/07/20 2247     SARS-CoV-2 Negative    Narrative: The specimen collection materials, transport medium, and/or testing methodology utilized in the production of these test results have been proven to be reliable in a limited validation with an abbreviated program under the Emergency Utilization Authorization provided by the FDA  Testing reported as "Presumptive positive" will be confirmed with secondary testing with a reference laboratory to ensure result accuracy  Clinical caution and judgement should be used with the interpretation of these results with consideration of the clinical impression and other laboratory testing  Testing reported as "Positive" or "Negative" has been proven to be accurate according to standard laboratory validation requirements  All testing is performed with control materials showing appropriate reactivity at standard intervals  UA w Reflex to Microscopic w Reflex to Culture [[id number]]  (Abnormal) Collected:  07/07/20 2220    Lab Status:  Final result Specimen:  Urine, Clean Catch Updated:  07/07/20 2231     Color, UA Yellow     Clarity, UA Cloudy     Specific [place], UA 1 020     pH, UA 5 5     Leukocytes, UA Trace     Nitrite, UA Negative     Protein,  (2+) mg/dl      Glucose, UA Negative mg/dl      Ketones, UA Negative mg/dl      Urobilinogen, UA 0 2 E U /dl      Bilirubin, UA Small     Blood, UA Trace-Intact    Haptoglobin [[id number]] Collected:  07/07/20 2221    Lab Status:   In process Specimen:  Blood from Arm, Left Updated:  07/07/20 2225    Protime-INR [[id number]]  (Normal) Collected:  07/07/20 1942    Lab Status:  Final result Specimen:  Blood from Arm, Right Updated:  07/07/20 2117     Protime 14 4 seconds      INR 1 10    APTT [[id number]]  (Abnormal) Collected:  07/07/20 1942    Lab Status:  Final result Specimen:  Blood from Arm, Right Updated:  07/07/20 2117     PTT 39 seconds     Comprehensive metabolic panel [[id number]]  (Abnormal) Collected:  07/07/20 1942    Lab Status:  Final result Specimen:  Blood from Arm, Right Updated:  07/07/20 2057     Sodium 136 mmol/L      Potassium 3 9 mmol/L      Chloride 104 mmol/L      CO2 15 mmol/L      ANION GAP 17 mmol/L      BUN 59 mg/dL      Creatinine 6 45 mg/dL      Glucose 131 mg/dL      Calcium 8 6 mg/dL      AST 37 U/L      ALT 49 U/L      Alkaline Phosphatase 252 U/L      Total Protein 7 1 g/dL      Albumin 2 6 g/dL      Total Bilirubin 1 50 mg/dL      eGFR 9 ml/min/1 73sq m     Narrative:       [place] guidelines for Chronic Kidney Disease (CKD):     Stage 1 with normal or high GFR (GFR > 90 mL/min/1 73 square meters)    Stage 2 Mild CKD (GFR = 60-89 mL/min/1 73 square meters)    Stage 3A Moderate CKD (GFR = 45-59 mL/min/1 73 square meters)    Stage 3B Moderate CKD (GFR = 30-44 mL/min/1 73 square meters)    Stage 4 Severe CKD (GFR = 15-29 mL/min/1 73 square meters)    Stage 5 End Stage CKD (GFR <15 mL/min/1 73 square meters)  Note: GFR calculation is accurate only with a steady state creatinine    Lactic Acid [[id number]]  (Normal) Collected:  07/07/20 1942    Lab Status:  Final result Specimen:  Blood from Arm, Right Updated:  07/07/20 2056     LACTIC ACID 0 7 mmol/L     Narrative:       Result may be elevated if tourniquet was used during collection      CBC and differential [[id number]]  (Abnormal) Collected:  07/07/20 1942    Lab Status:  Final result Specimen:  Blood from Arm, Right Updated:  07/07/20 2035     WBC 8 62 Thousand/uL      RBC 2 30 Million/uL      Hemoglobin 7 3 g/dL      Hematocrit 23 7 %       fL      MCH 31 7 pg      MCHC 30 8 g/dL RDW 15 7 %      MPV 11 6 fL      Platelets 914 Thousands/uL      nRBC 0 /100 WBCs      Neutrophils Relative 88 %      Immat GRANS % 2 %      Lymphocytes Relative 1 %      Monocytes Relative 8 %      Eosinophils Relative 1 %      Basophils Relative 0 %      Neutrophils Absolute 7 62 Thousands/µL      Immature Grans Absolute 0 14 Thousand/uL      Lymphocytes Absolute 0 08 Thousands/µL      Monocytes Absolute 0 71 Thousand/µL      Eosinophils Absolute 0 06 Thousand/µL      Basophils Absolute 0 01 Thousands/µL                  XR chest 1 view portable    (Results Pending)              Procedures  Procedures         ED Course       US AUDIT      Most Recent Value   Initial Alcohol Screen: US AUDIT-C    1  How often do you have a drink containing alcohol?  0 Filed at: 07/08/2020 0021   2  How many drinks containing alcohol do you have on a typical day you are drinking? 0 Filed at: 07/08/2020 0021   3a  Male UNDER 65: How often do you have five or more drinks on one occasion? 0 Filed at: 07/08/2020 0021   Audit-C Score  0 Filed at: 07/08/2020 0021                  DARIEN/DAST-10      Most Recent Value   How many times in the past year have you    Used an illegal drug or used a prescription medication for non-medical reasons? Never Filed at: 07/07/2020 2330                                MDM  Number of Diagnoses or Management Options  BOBBY (acute kidney injury) [facility]):   Dehydration:   Diagnosis management comments: 60-year-old male with probable dehydration worsening kidney function, will repeat labs here, check liver functions, check chest x-ray give fluids and reassess  Laboratory work shows markedly worse renal function with creatinine over 6  I discussed this with the on-call nephrologist, will continue fluids also add bicarb drip, will admit to medicine          Disposition  Final diagnoses:   Dehydration   BOBBY (acute kidney injury) ([facility] [address] )     Time reflects when diagnosis was documented in both MDM as applicable and the Disposition within this note     Time User Action Codes Description Comment    7/7/2020 10:34 PM Tena Cuevas Add [E86 0] Dehydration     7/7/2020 10:34 PM Mer Ryan Add [N17 9] BOBBY (acute kidney injury) [facility])       ED Disposition     ED Disposition Condition Date/Time Comment    Admit Stable Tue Jul 7, 2020 10:34 PM Case was discussed with Felipe and the patient's admission status was agreed to be Admission Status: inpatient status to the service of Dr Nash Davidson   Follow-up Information    None         Patient's Medications   Discharge Prescriptions    No medications on file     No discharge procedures on file      PDMP Review     None          ED Provider  Electronically Signed by           Hill Lazaro MD  07/08/20 9360       Hill Lazaro MD  07/08/20 4063

## 2020-07-08 NOTE — ED NOTES
The patient inquired about taking his transplant anti-rejection medications, brought from home  Spoke with Dr Guido Stanley, who ok'ed the patient to take his scheduled transplant medications: tacrolimus, acyclovir, actigall, sirolimus       Cain Subramanian RN  07/08/20 9722 [facility] Benjie De Leon RN  07/08/20 3496

## 2020-07-08 NOTE — ASSESSMENT & PLAN NOTE
One week ago creatinine was 2 6 has risen to 6 45  Likely secondary to renal medullary hypoxia secondary to low hemoglobin  Other causes could be tacrolimus and volume depletion as patient has not been eating or drinking well  Patient was given 3 L of fluids and started on bicarb at 150 cc  Ordered a kidney ultrasound   Will follow results  Ordered tacrolimus level  Ordered BMP Q8H 1pm, 9pm today and in the AM

## 2020-07-08 NOTE — ASSESSMENT & PLAN NOTE
· Previous creatinine of 2 6 6 days ago, now increased to 6 45  · Patient reports since he was discharged a few days ago he has had decreased appetite, decreased urination, fatigue  · Per Nephrology, patient given 2 L in ED and started on bicarb 150 mL/hr  · Avoid NSAIDs, nephrotoxins, hypotension  · UA pending  · Nephrology consulted, appreciate recommendations

## 2020-07-08 NOTE — CONSULTS
Kalli Freitas 1960, 62 y o  male MRN: [id number]    Unit/Bed#:  Encounter: [id number]    Primary Care Provider: Priscilla Hays DO   Date and time admitted to hospital: 7/7/2020  7:09 PM      Consults    * Anemia  Assessment & Plan  Baseline hemoglobin 7-8 but has been as high as 11 in 2020  Yesterday was 7  Hemoglobin dropped to 5 6   2 units packed RBCs transfused  Follow repeat hemoglobin  Patient is unaware of any bleeding  He was transfused with 3 L of fluids possible cause could be hemodilution          Acute renal failure superimposed on stage 4 chronic kidney disease ([facility] [address] )  Assessment & Plan  One week ago creatinine was 2 6 has risen to 6 45  Likely secondary to renal medullary hypoxia secondary to low hemoglobin  Other causes could be tacrolimus and volume depletion as patient has not been eating or drinking well  Patient was given 3 L of fluids and started on bicarb at 150 cc  Ordered a kidney ultrasound  Will follow results  Ordered tacrolimus level  Ordered BMP Q8H 1pm, 9pm today and in the AM                  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 [facility])  Assessment & Plan  Check tacrolimus level  If therapeutic continue tacrolimus  Continue sirolimus, mycophenolate mofetil, acyclovir, ursodiol  Ordered urine protein to creatinine ration as proteinuria is a side effect of sirolimus      Thrombocytopenia (HCC)  Assessment & Plan  Likely secondary to hemodilution and cirrhosis        VTE Prophylaxis: Reason for no pharmacologic prophylaxis Severe anemia  / sequential compression device         Counseling / Coordination of Care Time: 30 minutes  Greater than 50% of total time spent on patient counseling and coordination of care  Collaboration of Care:  Were Recommendations Directly Discussed with Primary Treatment Team? - Yes     History of Present Illness:    Edita Browne is a 62 y o  male who is originally admitted to the internal medicine service due to fatigue and malaise with highly elevated creatinine  We are consulted for elevated creatinine and BOBBY on CKD  Jordan Rylan Review of Systems:    Review of Systems   Constitutional: Positive for appetite change and fatigue  HENT: Negative  Eyes: Negative  Respiratory: Positive for cough  Cardiovascular: Negative  Gastrointestinal: Positive for diarrhea and nausea  Endocrine: Negative  Genitourinary: Positive for decreased urine volume and difficulty urinating  Neurological: Negative  Hematological: Negative  Psychiatric/Behavioral: Negative  Past Medical and Surgical History:     Past Medical History:   Diagnosis Date    Anasarca     Chronic pain disorder     lower back    CPAP (continuous positive airway pressure) dependence     Heart murmur     Hypertension     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 ([facility] [address] )     [facility]     Renal insufficiency 2019    Sleep apnea     Vitamin D deficiency        Past Surgical History:   Procedure Laterality Date    IR FR[facility]TERT MEM [facility] REMOVAL  3/27/2020    IR TEMP HD CATH  9/10/2019    IR TUBE PLACEMENT NEGRA  2019    KNEE ARTHROSCOPY Bilateral     KNEE SURGERY      NE COLONOSCOPY FLX DX W/COLLJ SPEC WHEN PFRMD N/A 11/15/2017    Procedure: COLONOSCOPY;  Surgeon: Sofie Benitez MD;  Location: [facility]; Service: Gastroenterology       Meds/Allergies:    all medications and allergies reviewed    Allergies:    Allergies   Allergen Reactions    Lasix [Furosemide] Hives    Propranolol Eye Swelling    Spironolactone      He reports having hives and anaphylaxis while on lasix and Spinorolactone    Torsemide Eye Swelling       Social History:     Marital Status: /Civil Union    Substance Use History:   Social History     Substance and Sexual Activity   Alcohol Use Never    Frequency: Never    Comment: RARE     Social History     Tobacco Use   Smoking Status Former Smoker    Last attempt to quit: 11/15/1990    Years since quittin 6 Smokeless Tobacco Never Used     Social History     Substance and Sexual Activity   Drug Use No       Family History:    non-contributory    Physical Exam:     Vitals:   Blood Pressure: 136/73 (07/08/20 1300)  Pulse: 82 (07/08/20 1300)  Temperature: 98 4 °F (36 9 °C) (07/08/20 1211)  Temp Source: Oral (07/08/20 0912)  Respirations: 18 (07/08/20 1211)  Height: 5' 11" (180 3 cm) (07/08/20 0030)  Weight - Scale: 108 kg (238 lb 1 6 oz) (07/08/20 0030)  SpO2: 95 % (07/08/20 1300)    Physical Exam   Constitutional: He is oriented to person, place, and time  He appears well-developed and well-nourished  HENT:   Head: Normocephalic and atraumatic  Eyes: Pupils are equal, round, and reactive to light  EOM are normal    Neck: Normal range of motion  Cardiovascular: Normal rate, regular rhythm and normal heart sounds  Pulmonary/Chest: Effort normal and breath sounds normal    Abdominal: Soft  Bowel sounds are normal  He exhibits no distension and no mass  There is no tenderness  There is no guarding  Genitourinary: Penis normal    Musculoskeletal: Normal range of motion  Neurological: He is alert and oriented to person, place, and time  No cranial nerve deficit  Skin: Skin is warm and dry  Psychiatric: He has a normal mood and affect  His behavior is normal  Judgment and thought content normal            Additional Data:     Lab Results: I have personally reviewed pertinent reports        Results from last 7 days   Lab Units 07/08/20  0558   WBC Thousand/uL 6 34   HEMOGLOBIN g/dL 5 6*   HEMATOCRIT % 18 2*   PLATELETS Thousands/uL 88*   NEUTROS PCT % 86*   LYMPHS PCT % 1*   MONOS PCT % 11   EOS PCT % 0     Results from last 7 days   Lab Units 07/08/20  0510   SODIUM mmol/L 138   POTASSIUM mmol/L 3 5   CHLORIDE mmol/L 107   CO2 mmol/L 15*   BUN mg/dL 61*   CREATININE mg/dL 6 41*   ANION GAP mmol/L 16*   CALCIUM mg/dL 7 7*   ALBUMIN g/dL 2 1*   TOTAL BILIRUBIN mg/dL 1 20*   ALK PHOS U/L 199*   ALT U/L 37   AST U/L 29   GLUCOSE RANDOM mg/dL 133     Results from last 7 days   Lab Units 07/07/20  1942   INR  1 10     Results from last 7 days   Lab Units 07/02/20  0125 07/01/20  2152   TROPONIN I ng/mL <0 02 <0 02     Lab Results   Component Value Date/Time    HGBA1C 4 0 (L) 06/08/2019 05:36 AM    HGBA1C 4 4 10/25/2017 10:57 AM     Results from last 7 days   Lab Units 07/02/20  1106 07/02/20  0714 07/01/20  2148   POC GLUCOSE mg/dl 146* 96 120     Results from last 7 days   Lab Units 07/08/20  0511 07/07/20  1942   LACTIC ACID mmol/L  --  0 7   PROCALCITONIN ng/ml 1 14* 0 98*       Imaging: I have personally reviewed pertinent reports  and I have personally reviewed pertinent films in PACS    XR chest 1 view portable   Final Result by Lan Butterfield MD (07/08 0325)      No focal consolidation, pleural effusion, or pneumothorax  Workstation performed: [id number]         US kidney and bladder    (Results Pending)       EKG, Pathology, and Other Studies Reviewed on Admission:   · EKG: Normal sinus rhythm     ** Please Note: This note has been constructed using a voice recognition system   **

## 2020-07-08 NOTE — ASSESSMENT & PLAN NOTE
Baseline hemoglobin 7-8 but has been as high as 11 in 2020  Yesterday was 7  Hemoglobin dropped to 5 6   2 units packed RBCs transfused  Follow repeat hemoglobin  Patient is unaware of any bleeding  He was transfused with 3 L of fluids possible cause could be hemodilution

## 2020-07-08 NOTE — ASSESSMENT & PLAN NOTE
· Procalcitonin elevated at 0 98, in the past procalcitonin has been elevated to 0 4  · Patient does have acute renal failure which can cause false positives, however recommendations are that a cut off should be used of 0 75  · Patient does complain of productive cough, mild shortness of breath    He denies any fevers or chills  · Lactic acid negative  · UA negative for infection, patient denies any cellulitis  · Chest x-ray is negative for any acute abnormality  · As patient is immunocompromised, will start him on broad-spectrum antibiotics pending repeat procalcitonin and blood cultures  · Blood cultures, procalcitonin pending  · Sputum culture pending

## 2020-07-08 NOTE — ASSESSMENT & PLAN NOTE
Patient with chronic anemia, review of epic shows baseline between 7 and 8   Likely secondary to liver disease and/or chronic kidney disease  · Patient has received transfusions in the past  · Current hemoglobin of 7 3, no signs of bleeding  · Per previous notes, patient with macrocytosis, recommend outpatient follow-up

## 2020-07-08 NOTE — CONSULTS
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, development of acute cellular rejection in May 2020 s/p Solumedrol, BOBBY 2/2 HRS in 2019 requiring RRT until March 2020 and now on kidney transplant list at [facility], incidental Hepatocellular carcinoma on his explant biopsy with known lymphovascular and angioinvasion, anemia, paroxysmal atrial fibrillation, exudative pleural effusion, obesity who presents to our facility due to the etiology listed above  Patient was recently admitted at our facility due to syncope and was found to be profoundly anemic and required PRBC transfusion  Patient was d/c home however has been having diarrhea and decreased PO intake at home  Denies any vomiting  Admits to being compliant with his transplant medications  Denies any hematochezia, hematemesis  Patient usually follows up with [facility] kidney and outpatient labs on 7/6/20 revealed worsening creatinine up to ~ 4 and patient asked to check in ER  Patient was started on bicarbonate based fluid resuscitation due to having a Cr of 6 45 and serum HCO3 of 15  Patient seen and examined in the ICU where he is undergoing Renal US  Patient feels weak            PAST MEDICAL HISTORY     Past Medical History:   Diagnosis Date    Anasarca     Chronic pain disorder     lower back    CPAP (continuous positive airway pressure) dependence     Heart murmur     Hypertension     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 ([address]     My[facility] infarction [address] [address]     Renal insufficiency 6/7/2019    Sleep apnea     Vitamin D deficiency        PAST SURGICAL HISTORY     Past Surgical History:   Procedure Laterality Date    IR [address] REMOVAL  3/27/2020    IR TEMP HD CATH  9/10/2019    IR TUBE PLACEMENT NEGRA  2019    KNEE ARTHROSCOPY Bilateral     KNEE SURGERY      TN COLONOSCOPY FLX DX W/COLLJ SPEC WHEN PFRMD N/A 11/15/2017    Procedure: COLONOSCOPY;  Surgeon: Malissa Burt MD;  Location: [facility];   Service: Gastroenterology       ALLERGIES     Allergies   Allergen Reactions    Lasix [Furosemide] Hives    Propranolol Eye Swelling    Spironolactone      He reports having hives and anaphylaxis while on lasix and Spinorolactone    Torsemide Eye Swelling       SOCIAL HISTORY     Social History     Substance and Sexual Activity   Alcohol Use Never    Frequency: Never    Comment: RARE     Social History     Substance and Sexual Activity   Drug Use No     Social History     Tobacco Use   Smoking Status Former Smoker    Last attempt to quit: 11/15/1990    Years since quittin 6   Smokeless Tobacco Never Used       FAMILY HISTORY     Family History   Problem Relation Age of Onset    Heart attack Father        CURRENT MEDICATIONS       Current Facility-Administered Medications:     acyclovir (ZOVIRAX) tablet 400 mg, 400 mg, Oral, BID, Jerryl Tiffanie, PA-C, 400 mg at 20 0711    albuterol (PROVENTIL HFA,VENTOLIN HFA) inhaler 2 puff, 2 puff, Inhalation, Q6H PRN, Jerryl Tiffanie, PA-C    amLODIPine (NORVASC) tablet 2 5 mg, 2 5 mg, Oral, Daily, Jerryl Tiffanie, PA-C, 2 5 mg at 20 2905    dapsone tablet 100 mg, 100 mg, Oral, Daily, Jerryl Tiffanie, PA-C, 100 mg at 20 2673    docusate sodium (COLACE) capsule 100 mg, 100 mg, Oral, BID, Jerryl Tiffanie, PA-C, 100 mg at 20 0818    famotidine (PEPCID) tablet 10 mg, 10 mg, Oral, Daily, Jerryl Tiffanie, PA-C, 10 mg at 20 0818    heparin (porcine) subcutaneous injection 5,000 Units, 5,000 Units, Subcutaneous, Q8H [address], 5,000 Units at 07/08/20 1539 **AND** Platelet count, , , Once, Phyllistine Cocks, PA-C    magnesium oxide (MAG-OX) tablet 400 mg, 400 mg, Oral, BID, Phyllistine Cocks, PA-C, 400 mg at 07/08/20 7301    mycophenolate (CELLCEPT) capsule 1,000 mg, 1,000 mg, Oral, Q12H [address], Phyllistine Cocks, PA-C, 1,000 mg at 07/08/20 0824    ondansetron (ZOFRAN) injection 4 mg, 4 mg, Intravenous, Q6H PRN, Phyllistine Cocks, PA-C    potassium chloride 20 mEq IVPB (premix), 20 mEq, Intravenous, Once, Duran Burroughs MD, Last Rate: 50 mL/hr at 07/08/20 1539, 20 mEq at 07/08/20 1539    predniSONE tablet 5 mg, 5 mg, Oral, Daily, Phyllistine Cocks, PA-C, 5 mg at 07/08/20 0818    sirolimus (RAPAMUNE) tablet 6 mg, 6 mg, Oral, Daily, Phyllistine Cocks, PA-C, 6 mg at 07/08/20 5339    sodium bicarbonate 150 mEq in dextrose 5 % 1,000 mL infusion, 125 mL/hr, Intravenous, Continuous, Phyllistine Cocks, PA-C, Last Rate: 125 mL/hr at 07/07/20 2216, 125 mL/hr at 07/07/20 2216    sodium chloride (PF) 0 9 % injection 3 mL, 3 mL, Intravenous, Q1H PRN, Phyllistine Cocks, PA-C    tacrolimus (PROGRAF) capsule 4 mg, 4 mg, Oral, Q12H [address], Phyllistine Cocks, PA-C, 4 mg at 07/08/20 0825    ursodiol (ACTIGALL) capsule 300 mg, 300 mg, Oral, TID, Phyllistine Cocks, PA-C, 300 mg at 07/08/20 6407    REVIEW OF SYSTEMS     Review of Systems   Constitutional: Positive for appetite change and fatigue  HENT: Negative  Eyes: Negative  Respiratory: Negative  Cardiovascular: Negative  Gastrointestinal: Positive for diarrhea  Endocrine: Negative  Genitourinary: Negative  Musculoskeletal: Negative  Skin: Negative  Allergic/Immunologic: Negative  Neurological: Negative  Hematological: Negative  All other systems reviewed and are negative  OBJECTIVE     Current Weight: Weight - Scale: 108 kg (238 lb 1 6 oz)  Vitals:    07/08/20 1500   BP: 152/85   Pulse: 82   Resp:    Temp:    SpO2: 95%     Body mass index is 33 21 kg/m²    No intake or output data in the 24 hours ending 07/08/20 1623    PHYSICAL EXAMINATION     Physical Exam   Constitutional: He is oriented to person, place, and time  HENT:   Head: Normocephalic and atraumatic  Eyes: Pupils are equal, round, and reactive to light  Neck: Neck supple  No JVD present  Cardiovascular: Normal rate and regular rhythm  Exam reveals no friction rub  Murmur heard  Pulmonary/Chest: Effort normal and breath sounds normal    Abdominal: Soft  Bowel sounds are normal  He exhibits no distension  There is no tenderness  There is no rebound  Musculoskeletal: He exhibits no edema or tenderness  Neurological: He is alert and oriented to person, place, and time  Skin: Skin is dry  No rash noted  Psychiatric: He has a normal mood and affect  LAB RESULTS        Results from last 7 days   Lab Units 07/08/20  1542 07/08/20  0558 07/08/20  0510 07/07/20  1942 07/02/20  0611   WBC Thousand/uL  --  6 34  --  8 62 7 11   HEMOGLOBIN g/dL  --  5 6*  --  7 3* 7 7*   HEMATOCRIT %  --  18 2*  --  23 7* 23 8*   PLATELETS Thousands/uL  --  88*  --  121* 72*   POTASSIUM mmol/L 3 7  --  3 5 3 9 4 0   CHLORIDE mmol/L 105  --  107 104 112*   CO2 mmol/L 17*  --  15* 15* 17*   BUN mg/dL 61*  --  61* 59* 47*   CREATININE mg/dL 6 69*  --  6 41* 6 45* 2 61*   EGFR ml/min/1 73sq m 8  --  9 9 26   CALCIUM mg/dL 7 9*  --  7 7* 8 6 8 5   MAGNESIUM mg/dL  --   --   --   --  1 6   PHOSPHORUS mg/dL  --   --   --   --  2 8       I have personally reviewed the old medical records and patient's previously known baseline creatinine level is ~ 2 6-2 8    RADIOLOGY RESULTS     Results for orders placed during the hospital encounter of 07/07/20   XR chest 1 view portable    Narrative CHEST     INDICATION:   dehydration and fatigue  COMPARISON:  Chest radiograph 7/1/2020    EXAM PERFORMED/VIEWS:  XR CHEST PORTABLE      FINDINGS:    Cardiomediastinal silhouette appears unremarkable  The lungs are clear    No pneumothorax or pleural effusion  Osseous structures appear within normal limits for patient age  Impression No focal consolidation, pleural effusion, or pneumothorax  Workstation performed: [id number]       Results for orders placed in visit on 12/27/19   XR chest pa & lateral    Narrative 9 [phone] [id number]     No results found for this or any previous visit  Results for orders placed in visit on 12/27/19   CT abdomen pelvis w wo contrast    Narrative 1 1 386 [id number] 5 3 5 [id number]     Results for orders placed during the hospital encounter of 08/15/19   CT abdomen pelvis wo contrast    Narrative CT ABDOMEN AND PELVIS WITHOUT IV CONTRAST    INDICATION:   Abdominal distension  History of cirrhosis  Testicular swelling  COMPARISON:  6/7/2019    TECHNIQUE:  CT examination of the abdomen and pelvis was performed without intravenous contrast   Axial, sagittal, and coronal 2D reformatted images were created from the source data and submitted for interpretation  Radiation dose length product (DLP) for this visit:  1786 mGy-cm   This examination, like all CT scans performed in the [facility], was performed utilizing techniques to minimize radiation dose exposure, including the use of iterative   reconstruction and automated exposure control  No oral contrast     FINDINGS:    ABDOMEN    LOWER CHEST:  Small right-sided pleural effusion noted    LIVER/BILIARY TREE:  Changes of severe hepatic cirrhosis identified  There is no obvious focal mass appreciated on this unenhanced examination  GALLBLADDER:  There are gallstone(s) within the gallbladder, without pericholecystic inflammatory changes      SPLEEN:  The spleen is enlarged, measuring almost 20 cm in cephalocaudad length    PANCREAS:  Limited assessment without IV contrast   Although there is peripancreatic edema, this also involves the root of the mesentery, and was also present on the 6/7/2019 study  It is not felt to be related to pancreatitis  ADRENAL GLANDS:  Unremarkable  KIDNEYS/URETERS:  Bilateral nonobstructing nephrolithiasis  The largest individual calculus is seen in the right interpolar aspect measuring approximately 7 mm  STOMACH AND BOWEL:  Unremarkable  APPENDIX:  No findings to suggest appendicitis  ABDOMINOPELVIC CAVITY:  Perihepatic ascites  Retroperitoneal and mesenteric root edema  VESSELS:  No AAA    PELVIS    REPRODUCTIVE ORGANS:  Unremarkable for patient's age  URINARY BLADDER:  Unremarkable  ABDOMINAL WALL/INGUINAL REGIONS:  There is diffuse body wall edema  The scrotum is diffusely enlarged and of water density, which may represent a combination of both hydroceles and scrotal wall edema  Dilated anterior abdominal wall collateral vessels  OSSEOUS STRUCTURES:  Advanced multilevel degenerative changes throughout the lumbar spine  Grade 2 anterolisthesis L4-L5      Impression 1  Appearance of the liver consistent with severe cirrhotic change  Splenomegaly an additional findings suggestive of portal hypertension  2  Small amount of ascites  Diffuse body wall edema  Markedly enlargement of the scrotum likely related to hydroceles and scrotal wall edema and small right-sided pleural effusion  Retroperitoneal and mesenteric root edema  Findings most consistent   with anasarca  3  No evidence of bowel obstruction  4  Bilateral nephrolithiasis but no evidence of hydronephrosis  5  Cholelithiasis          Workstation performed: [id number]       No results found for this or any previous visit      PLAN / RECOMMENDATIONS      62 y o  M with PMH of CKD IV, BOBBY 2/2 HRS requiring RRT since Sept 2019 until March 2020, 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 CNI nephrotoxicity awaiting FK trough level  - TMA cannot be ruled out   - Renal US is unremarkable  - IVF in the form of D5W + 150 meq NaHCO3 @ 125 cc/hr has not resulted in any improvement in renal function  - UA with moderate bacteria and 2+ protein  - Check urine Culture  - consider RRT in AM if no further improvement in renal parameters and oliguria noted  2) Diarrhea: Ongoing diarrhea in the patient with infectious etiology need to be ruled out  - Check C-diff toxin, CMV IgM, stool for ova and parasite  - possibility of MMF causing diarrhea?  - will d/w [facility] liver transplant team to hold MMF    3) Acidosis: Blood gas not performed, however BMP revealing anion gap metabolic acidosis likely from # 1 + contribution from bicarbonate losses via diarrhea  On sodium bicarbonate drip w/o much effect  4) Anemia due to CKD: Presented with Hb of 7 and which dropped to 5 s/p 2 units of PRBC transfusion this afternoon  No schistocytes noted  Check stool guaic  Iron studies on 7/1/20 revealed iron deficiency     5) HTN due to CKD IV: BP stable while on Amlodipine 2 5 mg daily    6) ROSALES cirrhosis s/p OLT: Had liver transplant at [facility] in Oct 5790 complicated by ACR in May 2020 s/p solumedrol boluses  LFTs stable  On FK, Cellcept and Rapamune  Cellcept and Rapamune were added after ACR in May 2020    7) Proteinuria: Noted protein in UA  Will quantify proteinuria  8) Thrombocytopenia: Chronic  Pt count is 88 K  Thank you for the consultation to participate in patient's care  I have personally discussed my plan with the referring physician       Mechelle Vincent MD    7/8/2020

## 2020-07-08 NOTE — ASSESSMENT & PLAN NOTE
Status post recent liver transplant [facility]  · Continue tacrolimus, sirolimus, CellCept, acyclovir, Actigall  · LFTs show alk phos of 252, T bili at 1 5, slightly increased from previous

## 2020-07-08 NOTE — ED NOTES
Provider [facility] made aware of patients hemoglobin via [employer]       Yisel Hoff RN  07/08/20 0530

## 2020-07-08 NOTE — ASSESSMENT & PLAN NOTE
Check tacrolimus level  If therapeutic continue tacrolimus  Continue sirolimus, mycophenolate mofetil, acyclovir, ursodiol  Ordered urine protein to creatinine ration as proteinuria is a side effect of sirolimus

## 2020-07-08 NOTE — PLAN OF CARE
Problem: Potential for Falls  Goal: Patient will remain free of falls  Description  INTERVENTIONS:  - Assess patient frequently for physical needs  -  Identify cognitive and physical deficits and behaviors that affect risk of falls  -  [place] fall precautions as indicated by assessment   - Educate patient/family on patient safety including physical limitations  - Instruct patient to call for assistance with activity based on assessment  - Modify environment to reduce risk of injury  - Consider OT/PT consult to assist with strengthening/mobility  Outcome: Progressing     Problem: Prexisting or High Potential for Compromised Skin Integrity  Goal: Skin integrity is maintained or improved  Description  INTERVENTIONS:  - Identify patients at risk for skin breakdown  - Assess and monitor skin integrity  - Assess and monitor nutrition and hydration status  - Monitor labs   - Assess for incontinence   - Turn and reposition patient  - Assist with mobility/ambulation  - Relieve pressure over bony prominences  - Avoid friction and shearing  - Provide appropriate hygiene as needed including keeping skin clean and dry  - Evaluate need for skin moisturizer/barrier cream  - Collaborate with interdisciplinary team   - Patient/family teaching  - Consider wound care consult   Outcome: Progressing     Problem: Nutrition/Hydration-ADULT  Goal: Nutrient/Hydration intake appropriate for improving, restoring or maintaining nutritional needs  Description  Monitor and assess patient's nutrition/hydration status for malnutrition  Collaborate with interdisciplinary team and initiate plan and interventions as ordered  Monitor patient's weight and dietary intake as ordered or per policy  Utilize nutrition screening tool and intervene as necessary  Determine patient's food preferences and provide high-protein, high-caloric foods as appropriate       INTERVENTIONS:  - Monitor oral intake, urinary output, labs, and treatment plans  - Assess nutrition and hydration status and recommend course of action  - Evaluate amount of meals eaten  - Assist patient with eating if necessary   - Allow adequate time for meals  - Recommend/ encourage appropriate diets, oral nutritional supplements, and vitamin/mineral supplements  - Order, calculate, and assess calorie counts as needed  - Recommend, monitor, and adjust tube feedings and TPN/PPN based on assessed needs  - Assess need for intravenous fluids  - Provide specific nutrition/hydration education as appropriate  - Include patient/family/caregiver in decisions related to nutrition  Outcome: Progressing

## 2020-07-08 NOTE — ED NOTES
1  CC dehydration, anemia, kidney failure, pt s/p liver transplant  2  Medications/drip PRBC 1ST unit hanging  3  Abnormal labs/vitals/assessment hemoglobin 5 6  4  Medications/drips  5  Last time narcotics given n/a  6  IV/drains/ etc   20g rac, 20g lac  7  Isolation status covid negative  8  Skin jaundiced  9  Ambulation status assist 1  10   Phone number [phone]  11 trauma y/n no     Nila Sexton RN  07/08/20 1893

## 2020-07-09 PROBLEM — R19.7 DIARRHEA OF PRESUMED INFECTIOUS ORIGIN: Status: ACTIVE | Noted: 2020-07-09

## 2020-07-09 LAB
ABO GROUP BLD BPU: NORMAL
ABO GROUP BLD BPU: NORMAL
ANION GAP SERPL CALCULATED.3IONS-SCNC: 17 MMOL/L (ref 4–13)
BASOPHILS # BLD AUTO: 0.02 THOUSANDS/ΜL (ref 0–0.1)
BASOPHILS NFR BLD AUTO: 0 % (ref 0–1)
BPU ID: NORMAL
BPU ID: NORMAL
BUN SERPL-MCNC: 67 MG/DL (ref 5–25)
CALCIUM SERPL-MCNC: 8.8 MG/DL (ref 8.3–10.1)
CHLORIDE SERPL-SCNC: 101 MMOL/L (ref 100–108)
CO2 SERPL-SCNC: 21 MMOL/L (ref 21–32)
CREAT SERPL-MCNC: 7.08 MG/DL (ref 0.6–1.3)
CREAT UR-MCNC: 120 MG/DL
CROSSMATCH: NORMAL
CROSSMATCH: NORMAL
EOSINOPHIL # BLD AUTO: 0.04 THOUSAND/ΜL (ref 0–0.61)
EOSINOPHIL NFR BLD AUTO: 0 % (ref 0–6)
ERYTHROCYTE [DISTWIDTH] IN BLOOD BY AUTOMATED COUNT: 17.4 % (ref 11.6–15.1)
GFR SERPL CREATININE-BSD FRML MDRD: 8 ML/MIN/1.73SQ M
GLUCOSE SERPL-MCNC: 126 MG/DL (ref 65–140)
HAPTOGLOB SERPL-MCNC: 274 MG/DL (ref 29–370)
HCT VFR BLD AUTO: 28.8 % (ref 36.5–49.3)
HEMOCCULT STL QL: NEGATIVE
HGB BLD-MCNC: 9.3 G/DL (ref 12–17)
IMM GRANULOCYTES # BLD AUTO: 0.21 THOUSAND/UL (ref 0–0.2)
IMM GRANULOCYTES NFR BLD AUTO: 2 % (ref 0–2)
LYMPHOCYTES # BLD AUTO: 0.12 THOUSANDS/ΜL (ref 0.6–4.47)
LYMPHOCYTES NFR BLD AUTO: 1 % (ref 14–44)
MCH RBC QN AUTO: 31.3 PG (ref 26.8–34.3)
MCHC RBC AUTO-ENTMCNC: 32.3 G/DL (ref 31.4–37.4)
MCV RBC AUTO: 97 FL (ref 82–98)
MONOCYTES # BLD AUTO: 0.8 THOUSAND/ΜL (ref 0.17–1.22)
MONOCYTES NFR BLD AUTO: 8 % (ref 4–12)
NEGATIVE CONTROL: NEGATIVE
NEUTROPHILS # BLD AUTO: 8.68 THOUSANDS/ΜL (ref 1.85–7.62)
NEUTS SEG NFR BLD AUTO: 89 % (ref 43–75)
NRBC BLD AUTO-RTO: 0 /100 WBCS
PLATELET # BLD AUTO: 135 THOUSANDS/UL (ref 149–390)
PMV BLD AUTO: 11.2 FL (ref 8.9–12.7)
POSITIVE CONTROL: POSITIVE
POTASSIUM SERPL-SCNC: 3.5 MMOL/L (ref 3.5–5.3)
PROT UR-MCNC: 169 MG/DL
PROT/CREAT UR: 1.41 MG/G{CREAT} (ref 0–0.1)
RBC # BLD AUTO: 2.97 MILLION/UL (ref 3.88–5.62)
SODIUM 24H UR-SCNC: 31 MOL/L
SODIUM SERPL-SCNC: 139 MMOL/L (ref 136–145)
UNIT DISPENSE STATUS: NORMAL
UNIT DISPENSE STATUS: NORMAL
UNIT PRODUCT CODE: NORMAL
UNIT PRODUCT CODE: NORMAL
UNIT RH: NORMAL
UNIT RH: NORMAL
WBC # BLD AUTO: 9.87 THOUSAND/UL (ref 4.31–10.16)

## 2020-07-09 PROCEDURE — 84156 ASSAY OF PROTEIN URINE: CPT | Performed by: INTERNAL MEDICINE

## 2020-07-09 PROCEDURE — 85025 COMPLETE CBC W/AUTO DIFF WBC: CPT | Performed by: GENERAL PRACTICE

## 2020-07-09 PROCEDURE — 99223 1ST HOSP IP/OBS HIGH 75: CPT | Performed by: INTERNAL MEDICINE

## 2020-07-09 PROCEDURE — 87209 SMEAR COMPLEX STAIN: CPT | Performed by: GENERAL PRACTICE

## 2020-07-09 PROCEDURE — 99233 SBSQ HOSP IP/OBS HIGH 50: CPT | Performed by: INTERNAL MEDICINE

## 2020-07-09 PROCEDURE — 89055 LEUKOCYTE ASSESSMENT FECAL: CPT | Performed by: GENERAL PRACTICE

## 2020-07-09 PROCEDURE — 82570 ASSAY OF URINE CREATININE: CPT | Performed by: INTERNAL MEDICINE

## 2020-07-09 PROCEDURE — 87177 OVA AND PARASITES SMEARS: CPT | Performed by: GENERAL PRACTICE

## 2020-07-09 PROCEDURE — 82272 OCCULT BLD FECES 1-3 TESTS: CPT | Performed by: INTERNAL MEDICINE

## 2020-07-09 PROCEDURE — 99232 SBSQ HOSP IP/OBS MODERATE 35: CPT | Performed by: GENERAL PRACTICE

## 2020-07-09 PROCEDURE — 80048 BASIC METABOLIC PNL TOTAL CA: CPT | Performed by: GENERAL PRACTICE

## 2020-07-09 PROCEDURE — 84300 ASSAY OF URINE SODIUM: CPT | Performed by: INTERNAL MEDICINE

## 2020-07-09 PROCEDURE — 87505 NFCT AGENT DETECTION GI: CPT | Performed by: GENERAL PRACTICE

## 2020-07-09 RX ORDER — SODIUM CHLORIDE, SODIUM GLUCONATE, SODIUM ACETATE, POTASSIUM CHLORIDE, MAGNESIUM CHLORIDE, SODIUM PHOSPHATE, DIBASIC, AND POTASSIUM PHOSPHATE .53; .5; .37; .037; .03; .012; .00082 G/100ML; G/100ML; G/100ML; G/100ML; G/100ML; G/100ML; G/100ML
75 INJECTION, SOLUTION INTRAVENOUS CONTINUOUS
Status: DISCONTINUED | OUTPATIENT
Start: 2020-07-09 | End: 2020-07-10

## 2020-07-09 RX ORDER — TACROLIMUS 1 MG/1
4 CAPSULE ORAL EVERY 12 HOURS SCHEDULED
Status: DISCONTINUED | OUTPATIENT
Start: 2020-07-09 | End: 2020-07-11

## 2020-07-09 RX ORDER — SIROLIMUS 1 MG/1
6 TABLET, FILM COATED ORAL DAILY
Status: DISCONTINUED | OUTPATIENT
Start: 2020-07-09 | End: 2020-07-11 | Stop reason: HOSPADM

## 2020-07-09 RX ADMIN — SODIUM BICARBONATE 125 ML/HR: 84 INJECTION, SOLUTION INTRAVENOUS at 04:02

## 2020-07-09 RX ADMIN — URSODIOL 300 MG: 300 CAPSULE ORAL at 09:38

## 2020-07-09 RX ADMIN — ACYCLOVIR 400 MG: 800 TABLET ORAL at 17:24

## 2020-07-09 RX ADMIN — FAMOTIDINE 10 MG: 20 TABLET ORAL at 09:36

## 2020-07-09 RX ADMIN — PREDNISONE 5 MG: 5 TABLET ORAL at 09:36

## 2020-07-09 RX ADMIN — DAPSONE 100 MG: 100 TABLET ORAL at 09:38

## 2020-07-09 RX ADMIN — URSODIOL 300 MG: 300 CAPSULE ORAL at 21:40

## 2020-07-09 RX ADMIN — AMLODIPINE BESYLATE 2.5 MG: 2.5 TABLET ORAL at 09:32

## 2020-07-09 RX ADMIN — SODIUM CHLORIDE, SODIUM GLUCONATE, SODIUM ACETATE, POTASSIUM CHLORIDE, MAGNESIUM CHLORIDE, SODIUM PHOSPHATE, DIBASIC, AND POTASSIUM PHOSPHATE 75 ML/HR: .53; .5; .37; .037; .03; .012; .00082 INJECTION, SOLUTION INTRAVENOUS at 09:44

## 2020-07-09 RX ADMIN — ACYCLOVIR 400 MG: 800 TABLET ORAL at 09:38

## 2020-07-09 RX ADMIN — URSODIOL 300 MG: 300 CAPSULE ORAL at 17:24

## 2020-07-09 RX ADMIN — MYCOPHENOLATE MOFETIL 1000 MG: 250 CAPSULE ORAL at 09:32

## 2020-07-09 RX ADMIN — MAGNESIUM GLUCONATE 500 MG ORAL TABLET 400 MG: 500 TABLET ORAL at 09:32

## 2020-07-09 RX ADMIN — SIROLIMUS 6 MG: 1 TABLET ORAL at 13:23

## 2020-07-09 RX ADMIN — TACROLIMUS 4 MG: 1 CAPSULE ORAL at 21:41

## 2020-07-09 RX ADMIN — TACROLIMUS 4 MG: 1 CAPSULE ORAL at 13:23

## 2020-07-09 RX ADMIN — MYCOPHENOLATE MOFETIL 1000 MG: 250 CAPSULE ORAL at 21:40

## 2020-07-09 NOTE — PROGRESS NOTES
Progress Note Brent Charles 1960, 62 y o  male MRN: [id number]    Unit/Bed#: -01 Encounter: [id number]    Primary Care Provider: Nataliia Tate DO   Date and time admitted to hospital: 7/7/2020  7:09 PM        Diarrhea of presumed infectious origin  Assessment & Plan  Stool for c diff, cmv  Tacrolimus level wnl  fobt  Gi consult    Acute renal failure superimposed on stage 4 chronic kidney disease (HCC)  Assessment & Plan  · Previous creatinine of 2 6 6 days ago, now increased to 6 45  · Patient reports since he was discharged a few days ago he has had decreased appetite, decreased urination, fatigue  · Per Nephrology, patient given 2 L in ED and started on bicarb 150 mL/hr  · Avoid NSAIDs, nephrotoxins, hypotension  · UA pending  · Nephrology consulted, appreciate recommendations  · Possible ATN from diarrhea, anemia  · hgb improved with 2u prbc will follow    Essential hypertension  Assessment & Plan  · Continue home medications with holding parameters  · Monitor per unit protocol    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 [facility])  Assessment & Plan  Status post recent liver transplant [facility]  · Continue tacrolimus, sirolimus, CellCept, acyclovir, Actigall  · LFTs show alk phos of 252, T bili at 1 5, slightly increased from previous    CROW on CPAP  Assessment & Plan  · Continue CPAP at night    * Anemia  Assessment & Plan  Patient with chronic anemia, review of epic shows baseline between 7 and 8   Likely secondary to liver disease and/or chronic kidney disease  · Patient has received transfusions in the past  · Per previous notes, patient with macrocytosis, recommend outpatient follow-up  · Patient with significant anemia s/p 2u prbc  · Gi consult with diarrhea        VTE Pharmacologic Prophylaxis:   Pharmacologic: Pharmacologic VTE Prophylaxis contraindicated due to anemia required 2u prbc  Mechanical VTE Prophylaxis in Place: Yes    Patient Centered Rounds: I have performed bedside rounds with nursing staff today  Discussions with Specialists or Other Care Team Provider:     Education and Discussions with Family / Patient:     Time Spent for Care: 30 minutes  More than 50% of total time spent on counseling and coordination of care as described above  Current Length of Stay: 2 day(s)    Current Patient Status: Inpatient   Certification Statement: The patient will continue to require additional inpatient hospital stay due to [facility]    Discharge Plan: home    Code Status: Level 1 - Full Code      Subjective:   Feels much better-breathing better s/p 2u prbc  Objective:     Vitals:   Temp (24hrs), Av 5 °F (36 9 °C), Min:97 3 °F (36 3 °C), Max:99 8 °F (37 7 °C)    Temp:  [97 3 °F (36 3 °C)-99 8 °F (37 7 °C)] 97 3 °F (36 3 °C)  HR:  [60-83] 83  Resp:  [18-29] 19  BP: (120-166)/(68-92) 128/82  SpO2:  [87 %-96 %] 87 %  Body mass index is 33 27 kg/m²  Input and Output Summary (last 24 hours): Intake/Output Summary (Last 24 hours) at 2020 0834  Last data filed at 2020 7481  Gross per 24 hour   Intake 2545 83 ml   Output 550 ml   Net  83 ml       Physical Exam:     Physical Exam   Constitutional: He is oriented to person, place, and time  He appears well-developed and well-nourished  HENT:   Head: Normocephalic and atraumatic  Eyes: Pupils are equal, round, and reactive to light  Neck: Neck supple  Cardiovascular: Normal rate and regular rhythm  Pulmonary/Chest: Effort normal and breath sounds normal    Abdominal: Soft  He exhibits no distension  There is no tenderness  Musculoskeletal: He exhibits edema  Neurological: He is alert and oriented to person, place, and time  Skin: Skin is warm and dry  Psychiatric: He has a normal mood and affect   His behavior is normal          Additional Data:     Labs:    Results from last 7 days   Lab Units 20  2304 20  0558   WBC Thousand/uL  --  6 34   HEMOGLOBIN g/dL 8 7* 5 6*   HEMATOCRIT % 27 2* 18 2*   PLATELETS Thousands/uL  --  88*   NEUTROS PCT %  --  86*   LYMPHS PCT %  --  1*   MONOS PCT %  --  11   EOS PCT %  --  0     Results from last 7 days   Lab Units 07/08/20  1941 07/08/20  0510   SODIUM mmol/L 140   < > 138   POTASSIUM mmol/L 3 8   < > 3 5   CHLORIDE mmol/L 104   < > 107   CO2 mmol/L 21   < > 15*   BUN mg/dL 63*   < > 61*   CREATININE mg/dL 6 85*   < > 6 41*   ANION GAP mmol/L 15*   < > 16*   CALCIUM mg/dL 8 3   < > 7 7*   ALBUMIN g/dL  --   --  2 1*   TOTAL BILIRUBIN mg/dL  --   --  1 20*   ALK PHOS U/L  --   --  199*   ALT U/L  --   --  37   AST U/L  --   --  29   GLUCOSE RANDOM mg/dL 140   < > 133    < > = values in this interval not displayed  Results from last 7 days   Lab Units 07/07/20  1942   INR  1 10     Results from last 7 days   Lab Units 07/02/20  1106   POC GLUCOSE mg/dl 146*         Results from last 7 days   Lab Units 07/08/20  0511 07/07/20  1942   LACTIC ACID mmol/L  --  0 7   PROCALCITONIN ng/ml 1 14* 0 98*           * I Have Reviewed All Lab Data Listed Above  * Additional Pertinent Lab Tests Reviewed: All Labs Within Last 24 Hours Reviewed    Imaging:    Imaging Reports Reviewed Today Include:   Imaging Personally Reviewed by Myself Includes:      Recent Cultures (last 7 days):     Results from last 7 days   Lab Units 07/07/20 1942   BLOOD CULTURE  No Growth at 24 hrs  No Growth at 24 hrs         Last 24 Hours Medication List:     Current Facility-Administered Medications:  acyclovir 400 mg Oral BID BIENVENIDO Moya-MIRTA   albuterol 2 puff Inhalation Q6H PRN BIENVENIDO Moya-MIRTA   amLODIPine 2 5 mg Oral Daily BIENVENIDO Moya-MIRTA   dapsone 100 mg Oral Daily Riya [facility] Hold] docusate sodium 100 mg Oral BID BIENVENIDO Moya-MIRTA   famotidine 10 mg Oral Daily Lentner [facility] Hold] heparin (porcine) 5,000 Units Subcutaneous [facility] Riya Stokes PA-C   magnesium oxide 400 mg Oral BID Riya Stokes PA-C   multi-electrolyte 75 mL/hr Intravenous Continuous Juanita Vega MD   mycophenolate 1,000 mg Oral Q12H [address] [facility] ondansetron 4 mg Intravenous Q6H PRN Dave Sheriff PA-C   predniSONE 5 mg Oral Daily Dave Sheriff [facility] Hold] sirolimus 6 mg Oral Daily Dave Sheriff PA-C   sodium chloride (PF) 3 mL Intravenous Q1H PRN Dave Sheriff PA-C   [facility] Hold] tacrolimus 4 mg Oral Q12H MARILYN Naqvi   ursodiol 300 mg Oral TID Dave Sheriff PA-C        Today, Patient Was Seen By: Richi Huston MD    ** Please Note: Dictation voice to text software may have been used in the creation of this document   **

## 2020-07-09 NOTE — PLAN OF CARE
Problem: Potential for Falls  Goal: Patient will remain free of falls  Description  INTERVENTIONS:  - Assess patient frequently for physical needs  -  Identify cognitive and physical deficits and behaviors that affect risk of falls  -  [facility] fall precautions as indicated by assessment   - Educate patient/family on patient safety including physical limitations  - Instruct patient to call for assistance with activity based on assessment  - Modify environment to reduce risk of injury  - Consider OT/PT consult to assist with strengthening/mobility  Outcome: Progressing     Problem: Prexisting or High Potential for Compromised Skin Integrity  Goal: Skin integrity is maintained or improved  Description  INTERVENTIONS:  - Identify patients at risk for skin breakdown  - Assess and monitor skin integrity  - Assess and monitor nutrition and hydration status  - Monitor labs   - Assess for incontinence   - Turn and reposition patient  - Assist with mobility/ambulation  - Relieve pressure over bony prominences  - Avoid friction and shearing  - Provide appropriate hygiene as needed including keeping skin clean and dry  - Evaluate need for skin moisturizer/barrier cream  - Collaborate with interdisciplinary team   - Patient/family teaching  - Consider wound care consult   Outcome: Progressing     Problem: Nutrition/Hydration-ADULT  Goal: Nutrient/Hydration intake appropriate for improving, restoring or maintaining nutritional needs  Description  Monitor and assess patient's nutrition/hydration status for malnutrition  Collaborate with interdisciplinary team and initiate plan and interventions as ordered  Monitor patient's weight and dietary intake as ordered or per policy  Utilize nutrition screening tool and intervene as necessary  Determine patient's food preferences and provide high-protein, high-caloric foods as appropriate       INTERVENTIONS:  - Monitor oral intake, urinary output, labs, and treatment plans  - Assess nutrition and hydration status and recommend course of action  - Evaluate amount of meals eaten  - Assist patient with eating if necessary   - Allow adequate time for meals  - Recommend/ encourage appropriate diets, oral nutritional supplements, and vitamin/mineral supplements  - Order, calculate, and assess calorie counts as needed  - Recommend, monitor, and adjust tube feedings and TPN/PPN based on assessed needs  - Assess need for intravenous fluids  - Provide specific nutrition/hydration education as appropriate  - Include patient/family/caregiver in decisions related to nutrition  Outcome: Progressing

## 2020-07-09 NOTE — PROGRESS NOTES
NEPHROLOGY PROGRESS NOTE    Patient: Guillermina Escalona               Sex: male          DOA: 7/7/2020  7:09 PM   YOB: 1960        Age:  62 y o         LOS:  LOS: 2 days   7/9/2020    REASON FOR THE CONSULTATION:      Acute kidney injury on chronic kidney disease stage 4    SUBJECTIVE     Patient is seen and examined next to the bedside  No renal parameters have worsened  Patient denies any shortness of breath  Admits to urinating normally  250 cc urine output documented over the past 24 hours  One episode of diarrhea recorded      CURRENT MEDICATIONS       Current Facility-Administered Medications:     acyclovir (ZOVIRAX) tablet 400 mg, 400 mg, Oral, BID, Hildegarde Lanius, PA-C, 400 mg at 07/09/20 0938    albuterol (PROVENTIL HFA,VENTOLIN HFA) inhaler 2 puff, 2 puff, Inhalation, Q6H PRN, Hildegarde Lanius, PA-C    amLODIPine (NORVASC) tablet 2 5 mg, 2 5 mg, Oral, Daily, Hildegarde Lanius, PA-C, 2 5 mg at 07/09/20 0932    dapsone tablet 100 mg, 100 mg, Oral, Daily, Hildegarde Lanius, PA-C, 100 mg at 07/09/20 6359    famotidine (PEPCID) tablet 10 mg, 10 mg, Oral, Daily, Hildegarde Lanius, PA-C, 10 mg at 07/09/20 0936    magnesium oxide (MAG-OX) tablet 400 mg, 400 mg, Oral, BID, Hildegarde Lanius, PA-C, 400 mg at 07/09/20 0932    multi-electrolyte (PLASMALYTE-A/ISOLYTE-S PH 7 4) IV solution, 75 mL/hr, Intravenous, Continuous, Galina Mitchell MD, Last Rate: 75 mL/hr at 07/09/20 0944, 75 mL/hr at 07/09/20 0944    mycophenolate (CELLCEPT) capsule 1,000 mg, 1,000 mg, Oral, Q12H [address], Hildegarde Lanius, PA-C, 1,000 mg at 07/09/20 0932    predniSONE tablet 5 mg, 5 mg, Oral, Daily, Hildegarde Lanius, PA-C, 5 mg at 07/09/20 0936    [[facility] Hold] sirolimus (RAPAMUNE) tablet 6 mg, 6 mg, Oral, Daily, Carlos Sheehan PA-C, 6 mg at 07/08/20 8275    sodium chloride (PF) 0 9 % injection 3 mL, 3 mL, Intravenous, Q1H PRN, Carlos Sheehan PA-C    [facility] Hold] tacrolimus (PROGRAF) capsule 4 mg, 4 mg, Oral, Q12H [address], Alverta Ferny PA-C, 4 mg at 07/08/20 0825    ursodiol (ACTIGALL) capsule 300 mg, 300 mg, Oral, TID, Alverta Ferny, PA-C, 300 mg at 07/09/20 5809    REVIEW OF SYSTEMS     Review of Systems   Constitutional: Negative  HENT: Negative  Eyes: Negative  Respiratory: Negative  Cardiovascular: Negative  Gastrointestinal: Positive for diarrhea  Endocrine: Negative  Genitourinary: Negative  Musculoskeletal: Negative  Skin: Negative  Allergic/Immunologic: Negative  Neurological: Negative  Hematological: Negative  All other systems reviewed and are negative  OBJECTIVE     Current Weight: Weight - Scale: 108 kg (238 lb 8 6 oz)  Vitals:    07/09/20 0935   BP: 131/75   Pulse: 86   Resp:    Temp:    SpO2:      Body mass index is 33 27 kg/m²  Intake/Output Summary (Last 24 hours) at 7/9/2020 1057  Last data filed at 7/9/2020 0519  Gross per 24 hour   Intake 2545 83 ml   Output 450 ml   Net 2095 83 ml       PHYSICAL EXAMINATION     Physical Exam   Constitutional: He is oriented to person, place, and time  HENT:   Head: Normocephalic and atraumatic  Eyes: Pupils are equal, round, and reactive to light  Neck: Neck supple  No JVD present  Cardiovascular: Normal rate, regular rhythm and normal heart sounds  Exam reveals no friction rub  No murmur heard  Pulmonary/Chest: Effort normal and breath sounds normal    Abdominal: Soft  Bowel sounds are normal  He exhibits no distension  There is no tenderness  There is no rebound  Musculoskeletal: He exhibits no edema or tenderness  Neurological: He is alert and oriented to person, place, and time  Skin: Skin is dry  No rash noted  Psychiatric: He has a normal mood and affect           LAB RESULTS     Results from last 7 days   Lab Units 07/08/20  2304 07/08/20  1941 07/08/20  1542 07/08/20  0558 07/08/20  0510 07/07/20  1942   WBC Thousand/uL  --   --   --  6 34  --  8 62   HEMOGLOBIN g/dL 8 7*  --   --  5 6*  -- 7 3*   HEMATOCRIT % 27 2*  --   --  18 2*  --  23 7*   PLATELETS Thousands/uL  --   --   --  88*  --  121*   POTASSIUM mmol/L  --  3 8 3 7  --  3 5 3 9   CHLORIDE mmol/L  --  104 105  --  107 104   CO2 mmol/L  --  21 17*  --  15* 15*   BUN mg/dL  --  63* 61*  --  61* 59*   CREATININE mg/dL  --  6 85* 6 69*  --  6 41* 6 45*   EGFR ml/min/1 73sq m  --  8 8  --  9 9   CALCIUM mg/dL  --  8 3 7 9*  --  7 7* 8 6           RADIOLOGY RESULTS      Results for orders placed during the hospital encounter of 07/07/20   XR chest 1 view portable    Narrative CHEST     INDICATION:   dehydration and fatigue  COMPARISON:  Chest radiograph 7/1/2020    EXAM PERFORMED/VIEWS:  XR CHEST PORTABLE      FINDINGS:    Cardiomediastinal silhouette appears unremarkable  The lungs are clear  No pneumothorax or pleural effusion  Osseous structures appear within normal limits for patient age  Impression No focal consolidation, pleural effusion, or pneumothorax          Workstation performed: [id number]       Results for orders placed in visit on 12/27/19   XR chest pa & lateral    Narrative [phone] [id number]       ASSESSMENT/PLAN     18-year-old male with past medical history of 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 in October 2019 with 1 episode of acute cellular rejection in May 2020, acute kidney injury in September 2019 requiring renal placement therapy and then coming of hemodialysis in March 2020, paroxysmal atrial fibrillation, hepatocellular carcinoma in the explanted biopsy who presents with generalized weakness and abnormal renal parameters found to have acute kidney injury on top of chronic kidney disease stage 4     1  Acute kidney injury on chronic kidney disease stage 4:  Upon review of medical records Baseline serum creatinine  of 2 6-2 8   -presented with a serum creatinine of 6 4 and worse   -etiology suspected to be ischemic ATN from profuse diarrhea at home along with  Decreased p o  Intake   -thrombotic microangiopathy appears to have ruled out as haptoglobin, LDH are within normal limits; no schistocyte seen on peripheral smear either   -serum creatinine has worsened to 6 8 today   -no uremic symptoms noted  -urine sodium of 31 is low   -fluid switched to Plasmalyte   -no indication for renal placement therapy today despite worsening renal parameters and hoping for stabilization of serum creatinine  2  [place] cirrhosis status post OLT:  Status post orthotopic liver transplant after developing hepatorenal syndrome in September 2019   -status post 1 episode of acute cellular rejection in May 2020   -no signs of rejection at present time  -patient will be transferred to [facility] to be under hepatology service with nephrology on consult  3  Immunosuppression:  On tacrolimus, prednisone, CellCept and rapamycin   - TAC level is 11 which is reasonable given recent transplant    4  Diarrhea:  Etiology of diarrhea unknown  C diff toxin pending  5  Anemia due to CKD stage 4:  Hemoglobin dropped to 5 6 yesterday status post 2 units of packed red blood cells  -current hemoglobin is 8 7 and improved  -stool guaiac is negative  6  PCP prophylaxis:  On dapsone 100 mg daily  7  CMV prophylaxis:  On Zovirax  8  Hypertension due to CKD stage 4:  Blood pressure is within acceptable range this morning    Case discussed with jazmin Clark MD  Nephrology  7/9/2020

## 2020-07-09 NOTE — CONSULTS
Consultation - [address] [facility]  Daytontristan Anjum 62 y o  male MRN: [id number]  Unit/Bed#: -01 Encounter: [id number]        Consults    Reason for Consult / Principal Problem: Diarrhea, Anemia    HPI: Mr Alexsander Nye is a 61 yo M with a PMH of morbid obesity, HTN, CKD on transplant list at [facility], ROSALES cirrhosis s/p liver transplant at [facility] in October 0857 complicated by rejection in May 2020 requiring steroids, HCC found on his explant biopsy incidentally, pAfib, who presented on 7/8 due to weakness, fatigue, cough, SOB, and was found to have a significant BOBBY  He was admitted last week as well with an episode of syncope in the setting of anemia requiring transfusion  GI is consulted due to recurrent anemia and for diarrhea  Mr Alexsander Nye is a very poor historian  He reports new diarrhea for 1 week, only 2-3 episodes daily without melena or hematochezia  He feels like this happened when [facility] increased some of his medications (though he has no idea what medications) and now it is improving as they are decreasing the doses again  He does not have any associated abdominal pain, nausea, vomiting, or fevers  He was not on antibiotics recently  He had a colonoscopy in November 2017 with Dr Tio Gonzalez which showed moderate/severe diverticulosis in the descending and sigmoid colon  He has never had an EGD  He is unsure if he takes an NSAIDs or anticoagulants      REVIEW OF SYSTEMS: Negative except for as stated above      Historical Information   Past Medical History:   Diagnosis Date    Anasarca     Chronic pain disorder     lower back    CPAP (continuous positive airway pressure) dependence     Heart murmur     Hypertension     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 [facility]     Myocardial infarction ([facility]     Renal insufficiency 6/7/2019    Sleep apnea     Vitamin D deficiency      Past Surgical History:   Procedure Laterality Date    IR ANGELIQUE VALENTE [facility] REMOVAL  3/27/2020    IR TEMP HD CATH  9/10/2019    IR TUBE PLACEMENT NEGRA  2019    KNEE ARTHROSCOPY Bilateral     KNEE SURGERY      KY COLONOSCOPY FLX DX W/COLLJ SPEC WHEN PFRMD N/A 11/15/2017    Procedure: COLONOSCOPY;  Surgeon: Roderick Moses MD;  Location: [facility];   Service: Gastroenterology     Social History   Social History     Substance and Sexual Activity   Alcohol Use Never    Frequency: Never    Comment: RARE     Social History     Substance and Sexual Activity   Drug Use No     Social History     Tobacco Use   Smoking Status Former Smoker    Last attempt to quit: 11/15/1990    Years since quittin 6   Smokeless Tobacco Never Used     Family History   Problem Relation Age of Onset    Heart attack Father        Meds/Allergies     Medications Prior to Admission   Medication    acyclovir (ZOVIRAX) 400 MG tablet    albuterol (PROVENTIL HFA,VENTOLIN HFA) 90 mcg/act inhaler    amLODIPine (NORVASC) 2 5 mg tablet    cyanocobalamin (VITAMIN B-12) 50 MCG tablet    dapsone 100 mg tablet    famotidine (PEPCID) 20 mg tablet    folic acid (FOLVITE) 764 mcg tablet    insulin aspart (NovoLOG) 100 units/mL injection    magnesium oxide (MAG-OX) 400 mg    mycophenolate (CELLCEPT) 500 mg tablet    predniSONE 1 mg tablet    sirolimus (RAPAMUNE) 1 mg tablet    sodium bicarbonate 650 mg tablet    tacrolimus (PROGRAF) 1 mg capsule    ursodiol (ACTIGALL) 300 mg capsule     Current Facility-Administered Medications   Medication Dose Route Frequency    acyclovir (ZOVIRAX) tablet 400 mg  400 mg Oral BID    albuterol (PROVENTIL HFA,VENTOLIN HFA) inhaler 2 puff  2 puff Inhalation Q6H PRN    amLODIPine (NORVASC) tablet 2 5 mg  2 5 mg Oral Daily    dapsone tablet 100 mg  100 mg Oral Daily    famotidine (PEPCID) tablet 10 mg  10 mg Oral Daily    magnesium oxide (MAG-OX) tablet 400 mg  400 mg Oral BID    multi-electrolyte (PLASMALYTE-A/ISOLYTE-S PH 7 4) IV solution  75 mL/hr Intravenous Continuous    mycophenolate (CELLCEPT) capsule 1,000 mg 1,000 mg Oral Q12H [address]    predniSONE tablet 5 mg  5 mg Oral Daily    [MAR Hold] sirolimus (RAPAMUNE) tablet 6 mg  6 mg Oral Daily    sodium chloride (PF) 0 9 % injection 3 mL  3 mL Intravenous Q1H PRN    [MAR Hold] tacrolimus (PROGRAF) capsule 4 mg  4 mg Oral Q12H DANA    ursodiol (ACTIGALL) capsule 300 mg  300 mg Oral TID       Allergies   Allergen Reactions    Lasix [Furosemide] Hives    Propranolol Eye Swelling    Spironolactone      He reports having hives and anaphylaxis while on lasix and Spinorolactone    Torsemide Eye Swelling           Objective     Blood pressure 131/75, pulse 86, temperature (!) 97 3 °F (36 3 °C), temperature source Axillary, resp  rate 19, height 5' 11" (1 803 m), weight 108 kg (238 lb 8 6 oz), SpO2 (!) 87 %  Intake/Output Summary (Last 24 hours) at 7/9/2020 1059  Last data filed at 7/9/2020 1370  Gross per 24 hour   Intake 2545 83 ml   Output 450 ml   Net 2095 83 ml         PHYSICAL EXAM:      General Appearance:   Alert, oriented x3, no acute distress, nontoxic appearing   HEENT:   Normocephalic, atraumatic, anicteric      Neck:  Supple, symmetrical, trachea midline   Lungs:   Clear to auscultation bilaterally; no rales, rhonchi or wheezing; respirations unlabored    Heart[de-identified]   S1 and S2 normal; regular rate and rhythm; no murmur, rub, or gallop     Abdomen:   Soft, non-tender, non-distended; normal bowel sounds; no masses, no organomegaly    Rectal:   Deferred    Extremities:  No cyanosis, clubbing or edema    Pulses:  2+ and symmetric all extremities    Skin:  Skin color, texture, turgor normal, no rashes or lesions      Lab Results:   Results from last 7 days   Lab Units 07/08/20  2304 07/08/20  0558   WBC Thousand/uL  --  6 34   HEMOGLOBIN g/dL 8 7* 5 6*   HEMATOCRIT % 27 2* 18 2*   PLATELETS Thousands/uL  --  88*   NEUTROS PCT %  --  86*   LYMPHS PCT %  --  1*   MONOS PCT %  --  11   EOS PCT %  --  0     Results from last 7 days   Lab Units 07/08/20  1941 07/08/20  0510   POTASSIUM mmol/L 3 8   < > 3 5   CHLORIDE mmol/L 104   < > 107   CO2 mmol/L 21   < > 15*   BUN mg/dL 63*   < > 61*   CREATININE mg/dL 6 85*   < > 6 41*   CALCIUM mg/dL 8 3   < > 7 7*   ALK PHOS U/L  --   --  199*   ALT U/L  --   --  37   AST U/L  --   --  29    < > = values in this interval not displayed  Results from last 7 days   Lab Units 07/07/20  1942   INR  1 10           Imaging Studies: I have personally reviewed pertinent imaging studies  Xr Chest 1 View Portable  Result Date: 7/8/2020  Impression: No focal consolidation, pleural effusion, or pneumothorax  Workstation performed: [id number]     Us Kidney And Bladder  Result Date: 7/8/2020  Impression: No evidence of obstruction   Workstation performed: [id number]       ASSESSMENT and PLAN:      Acute Diarrhea  - He reports 2-3 episodes of diarrhea daily for 1 week without associated abdominal pain, nausea, vomiting, fevers, or leukocytosis but has a significant BOBBY on CKD and the diarrhea is suspected to be contributing  - Pt reports this change seemed to be associated with medication dose changes and it is improving now though he cannot tell me what medications were changes and is a very poor historian, his family is reporting much worse diarrhea than he is reporting  - Consider discontinuing mag oxide as this can cause diarrhea  - Low suspicion for infectious cause but will follow up stool studies  - He is on multiple other medications that could cause diarrhea such as acyclovir, cellcept, tacrolimus  - Discussed with nephrology; he is going to be transferred to [facility] hepatology service with renal consultation    Anemia  - Hb was 5 6 yesterday s/p 2 U PRBC with over reponse to 8 7  - No overt blood loss reported, hemoccult negative, hemolysis markers/smear negative  - Prior colonoscopy in 2017 showed moderate/severe diverticulosis in the descending and sigmoid  - No prior EGD; would consider EGD to rule out blood loss component and put him on a PPI daily    ROSALES Cirrhosis s/p Transplant October 2019  - S/P transplant at [facility] in October 2019  - Currently on tacrolimus, prednisone, cellcept, rapamycin  - Tacrolimus level 11  - Pt to be transferred to [facility] hepatology service with renal consultation due to worsening renal function    The patient will be seen and examined by Dr Maravilla Devoid

## 2020-07-09 NOTE — SOCIAL WORK
CM informed by MADIE Green that pt is to be transferred to [facility]  Confirmed same w/ Dr Ricky Curtis  CM called [facility] and spoke to Era NEAL/[phone] who stated that pt has been accepted but that there is no available bed at this time for this patient  Provided phone # for callback when bed is available  Updated CARLOS A Flynn via tiger text and spoke to & updated  Kendell Bojorquez

## 2020-07-09 NOTE — PROGRESS NOTES
Progress Note Susi Distance 1960, 62 y o  male MRN: [id number]    Unit/Bed#: -01 Encounter: [id number]    Primary Care Provider: Kendall Cornejo DO   Date and time admitted to hospital: 7/7/2020  7:09 PM        Diarrhea of presumed infectious origin  Assessment & Plan  Stool for c diff, cmv  Tacrolimus level wnl  Fobt  Stool o/p enteric panel  Gi consult with anemia    Acute renal failure superimposed on stage 4 chronic kidney disease (HCC)  Assessment & Plan  · Previous creatinine of 2 6 6 days ago, now increased to 6 45  · Patient reports since he was discharged a few days ago he has had decreased appetite, decreased urination, fatigue  · Per Nephrology, patient given 2 L in ED and started on bicarb 150 mL/hr  · Avoid NSAIDs, nephrotoxins, hypotension  · UA pending  · Nephrology consulted, appreciate recommendations  · Possible ATN from diarrhea, anemia  · hgb improved with 2u prbc will follow  · Case d/w dr Marian Ospina and [facility] liver transplant hepatologist Dr Melissa Hawkins who accepts patient in transfer for ongoing care    * Anemia  Assessment & Plan  Patient with chronic anemia, review of epic shows baseline between 7 and 8   Likely secondary to liver disease and/or chronic kidney disease  · Patient has received transfusions in the past  · Per previous notes, patient with macrocytosis, recommend outpatient follow-up  · Patient with significant anemia s/p 2u prbc  · Gi consult with diarrhea        Discharging Physician / Practitioner: Yolanda Larson MD  PCP: Kendall Cornejo DO  Admission Date:   Admission Orders (From admission, onward)     Ordered        07/07/20 2234  Inpatient Admission  Once                   Discharge Date: 07/09/20    Resolved Problems  Date Reviewed: 7/9/2020    None          Consultations During Hospital Stay:  · Nephrology, GI    Procedures Performed:   ·     Significant Findings / Test Results:   · BOBBY likely due to ATN    Incidental Findings:   ·      Test Results Pending at Discharge (will require follow up):   ·      Outpatient Tests Requested:  ·     Complications:      Reason for Admission: cough    Hospital Course:      Ramirez Ontiveros is a 62 y o  male with history of CKD, liver transplant, chronic anemia who presents with elevated creatinine  History is obtained from patient and wife at bedside  Patient was recently admitted 1 week ago for syncope  He had a full workup done, and was discharged the following day  Patient reports since then he has felt fatigued, overall malaise, and has a slight cough  He denies any fever chills  He reports he has had decreased appetite and has not been eating or drinking very much  He denies any abdominal pain, nausea vomiting or diarrhea  Denies any blood in his stool  Patient does have a history of a liver transplant, he follows closely with [facility]  He reports he has been compliant with all his medications  He reports he has been urinating much less frequently, but he is still urinating  Denies any pain or burning with urination  He denies any cellulitis or wounds that may be infected  Please see above list of diagnoses and related plan for additional information  Condition at Discharge: stable     Discharge Day Visit / Exam:     * Please refer to separate progress note for these details *    Discussion with Family:     Discharge instructions/Information to patient and family:   See after visit summary for information provided to patient and family  Provisions for Follow-Up Care:  See after visit summary for information related to follow-up care and any pertinent home health orders  Disposition:     [address] Transfer to [facility] to [facility] SNF:   · Not Applicable to this Patient - Not Applicable to this Patient    Planned Readmission:      Discharge Statement:  I spent 40 minutes discharging the patient  This time was spent on the day of discharge   I had direct contact with the patient on the day of discharge  Greater than 50% of the total time was spent examining patient, answering all patient questions, arranging and discussing plan of care with patient as well as directly providing post-discharge instructions  Additional time then spent on discharge activities  Discharge Medications:  See after visit summary for reconciled discharge medications provided to patient and family        ** Please Note: This note has been constructed using a voice recognition system **

## 2020-07-09 NOTE — ASSESSMENT & PLAN NOTE
· Previous creatinine of 2 6 6 days ago, now increased to 6 45  · Patient reports since he was discharged a few days ago he has had decreased appetite, decreased urination, fatigue  · Per Nephrology, patient given 2 L in ED and started on bicarb 150 mL/hr  · Avoid NSAIDs, nephrotoxins, hypotension  · UA pending  · Nephrology consulted, appreciate recommendations  · Possible ATN from diarrhea, anemia  · hgb improved with 2u prbc will follow  · Case d/w dr Liliya Goldstein and [facility] liver transplant hepatologist Dr Dorothy Hurley who accepts patient in transfer for ongoing care

## 2020-07-09 NOTE — ASSESSMENT & PLAN NOTE
· Previous creatinine of 2 6 6 days ago, now increased to 6 45  · Patient reports since he was discharged a few days ago he has had decreased appetite, decreased urination, fatigue  · Per Nephrology, patient given 2 L in ED and started on bicarb 150 mL/hr  · Avoid NSAIDs, nephrotoxins, hypotension  · UA pending  · Nephrology consulted, appreciate recommendations  · Possible ATN from diarrhea, anemia  · hgb improved with 2u prbc will follow

## 2020-07-09 NOTE — ASSESSMENT & PLAN NOTE
Patient with chronic anemia, review of epic shows baseline between 7 and 8   Likely secondary to liver disease and/or chronic kidney disease  · Patient has received transfusions in the past  · Per previous notes, patient with macrocytosis, recommend outpatient follow-up  · Patient with significant anemia s/p 2u prbc  · Gi consult with diarrhea

## 2020-07-10 ENCOUNTER — APPOINTMENT (INPATIENT)
Dept: DIALYSIS | Facility: HOSPITAL | Age: 60
DRG: 683 | End: 2020-07-10
Payer: COMMERCIAL

## 2020-07-10 ENCOUNTER — APPOINTMENT (INPATIENT)
Dept: INTERVENTIONAL RADIOLOGY/VASCULAR | Facility: HOSPITAL | Age: 60
DRG: 683 | End: 2020-07-10
Attending: INTERNAL MEDICINE
Payer: COMMERCIAL

## 2020-07-10 ENCOUNTER — APPOINTMENT (INPATIENT)
Dept: RADIOLOGY | Facility: HOSPITAL | Age: 60
DRG: 683 | End: 2020-07-10
Payer: COMMERCIAL

## 2020-07-10 PROBLEM — E87.20 METABOLIC ACIDOSIS: Status: ACTIVE | Noted: 2020-07-10

## 2020-07-10 PROBLEM — E87.2 METABOLIC ACIDOSIS: Status: ACTIVE | Noted: 2020-07-10

## 2020-07-10 LAB
ALBUMIN SERPL BCP-MCNC: 1.8 G/DL (ref 3.5–5)
ALP SERPL-CCNC: 205 U/L (ref 46–116)
ALT SERPL W P-5'-P-CCNC: 27 U/L (ref 12–78)
ANION GAP SERPL CALCULATED.3IONS-SCNC: 16 MMOL/L (ref 4–13)
AST SERPL W P-5'-P-CCNC: 24 U/L (ref 5–45)
BASOPHILS # BLD AUTO: 0.01 THOUSANDS/ΜL (ref 0–0.1)
BASOPHILS NFR BLD AUTO: 0 % (ref 0–1)
BILIRUB SERPL-MCNC: 1.4 MG/DL (ref 0.2–1)
BUN SERPL-MCNC: 72 MG/DL (ref 5–25)
CALCIUM SERPL-MCNC: 8.1 MG/DL (ref 8.3–10.1)
CAMPYLOBACTER DNA SPEC NAA+PROBE: NORMAL
CHLORIDE SERPL-SCNC: 103 MMOL/L (ref 100–108)
CMV IGG SERPL IA-ACNC: <0.6 U/ML (ref 0–0.59)
CMV IGM SERPL IA-ACNC: <30 AU/ML (ref 0–29.9)
CO2 SERPL-SCNC: 20 MMOL/L (ref 21–32)
CREAT SERPL-MCNC: 7.47 MG/DL (ref 0.6–1.3)
EOSINOPHIL # BLD AUTO: 0.06 THOUSAND/ΜL (ref 0–0.61)
EOSINOPHIL NFR BLD AUTO: 1 % (ref 0–6)
ERYTHROCYTE [DISTWIDTH] IN BLOOD BY AUTOMATED COUNT: 17.1 % (ref 11.6–15.1)
GFR SERPL CREATININE-BSD FRML MDRD: 7 ML/MIN/1.73SQ M
GLUCOSE SERPL-MCNC: 97 MG/DL (ref 65–140)
HAPTOGLOB SERPL-MCNC: 321 MG/DL (ref 29–370)
HCT VFR BLD AUTO: 25.5 % (ref 36.5–49.3)
HGB BLD-MCNC: 8.2 G/DL (ref 12–17)
IMM GRANULOCYTES # BLD AUTO: 0.18 THOUSAND/UL (ref 0–0.2)
IMM GRANULOCYTES NFR BLD AUTO: 2 % (ref 0–2)
LYMPHOCYTES # BLD AUTO: 0.1 THOUSANDS/ΜL (ref 0.6–4.47)
LYMPHOCYTES NFR BLD AUTO: 1 % (ref 14–44)
MCH RBC QN AUTO: 31.3 PG (ref 26.8–34.3)
MCHC RBC AUTO-ENTMCNC: 32.2 G/DL (ref 31.4–37.4)
MCV RBC AUTO: 97 FL (ref 82–98)
MONOCYTES # BLD AUTO: 0.8 THOUSAND/ΜL (ref 0.17–1.22)
MONOCYTES NFR BLD AUTO: 9 % (ref 4–12)
NEUTROPHILS # BLD AUTO: 7.42 THOUSANDS/ΜL (ref 1.85–7.62)
NEUTS SEG NFR BLD AUTO: 87 % (ref 43–75)
NRBC BLD AUTO-RTO: 0 /100 WBCS
PLATELET # BLD AUTO: 119 THOUSANDS/UL (ref 149–390)
PMV BLD AUTO: 11.1 FL (ref 8.9–12.7)
POTASSIUM SERPL-SCNC: 3.4 MMOL/L (ref 3.5–5.3)
PROT SERPL-MCNC: 5.9 G/DL (ref 6.4–8.2)
RBC # BLD AUTO: 2.62 MILLION/UL (ref 3.88–5.62)
SALMONELLA DNA SPEC QL NAA+PROBE: NORMAL
SHIGA TOXIN STX GENE SPEC NAA+PROBE: NORMAL
SHIGELLA DNA SPEC QL NAA+PROBE: NORMAL
SODIUM SERPL-SCNC: 139 MMOL/L (ref 136–145)
WBC # BLD AUTO: 8.57 THOUSAND/UL (ref 4.31–10.16)

## 2020-07-10 PROCEDURE — 76937 US GUIDE VASCULAR ACCESS: CPT

## 2020-07-10 PROCEDURE — 85025 COMPLETE CBC W/AUTO DIFF WBC: CPT | Performed by: GENERAL PRACTICE

## 2020-07-10 PROCEDURE — 80053 COMPREHEN METABOLIC PANEL: CPT | Performed by: GENERAL PRACTICE

## 2020-07-10 PROCEDURE — C1752 CATH,HEMODIALYSIS,SHORT-TERM: HCPCS

## 2020-07-10 PROCEDURE — 5A1D70Z PERFORMANCE OF URINARY FILTRATION, INTERMITTENT, LESS THAN 6 HOURS PER DAY: ICD-10-PCS | Performed by: INTERNAL MEDICINE

## 2020-07-10 PROCEDURE — 86706 HEP B SURFACE ANTIBODY: CPT | Performed by: INTERNAL MEDICINE

## 2020-07-10 PROCEDURE — 36556 INSERT NON-TUNNEL CV CATH: CPT | Performed by: RADIOLOGY

## 2020-07-10 PROCEDURE — 86803 HEPATITIS C AB TEST: CPT | Performed by: INTERNAL MEDICINE

## 2020-07-10 PROCEDURE — 99232 SBSQ HOSP IP/OBS MODERATE 35: CPT | Performed by: GENERAL PRACTICE

## 2020-07-10 PROCEDURE — 77001 FLUOROGUIDE FOR VEIN DEVICE: CPT

## 2020-07-10 PROCEDURE — C1769 GUIDE WIRE: HCPCS

## 2020-07-10 PROCEDURE — 77001 FLUOROGUIDE FOR VEIN DEVICE: CPT | Performed by: RADIOLOGY

## 2020-07-10 PROCEDURE — 86704 HEP B CORE ANTIBODY TOTAL: CPT | Performed by: INTERNAL MEDICINE

## 2020-07-10 PROCEDURE — 36556 INSERT NON-TUNNEL CV CATH: CPT

## 2020-07-10 PROCEDURE — 87340 HEPATITIS B SURFACE AG IA: CPT | Performed by: INTERNAL MEDICINE

## 2020-07-10 PROCEDURE — 76937 US GUIDE VASCULAR ACCESS: CPT | Performed by: RADIOLOGY

## 2020-07-10 PROCEDURE — 71045 X-RAY EXAM CHEST 1 VIEW: CPT

## 2020-07-10 PROCEDURE — 86705 HEP B CORE ANTIBODY IGM: CPT | Performed by: INTERNAL MEDICINE

## 2020-07-10 PROCEDURE — 90935 HEMODIALYSIS ONE EVALUATION: CPT | Performed by: INTERNAL MEDICINE

## 2020-07-10 RX ORDER — HEPARIN SODIUM 5000 [USP'U]/ML
5000 INJECTION, SOLUTION INTRAVENOUS; SUBCUTANEOUS EVERY 8 HOURS SCHEDULED
Status: DISCONTINUED | OUTPATIENT
Start: 2020-07-10 | End: 2020-07-11 | Stop reason: HOSPADM

## 2020-07-10 RX ORDER — FENTANYL CITRATE 50 UG/ML
INJECTION, SOLUTION INTRAMUSCULAR; INTRAVENOUS CODE/TRAUMA/SEDATION MEDICATION
Status: COMPLETED | OUTPATIENT
Start: 2020-07-10 | End: 2020-07-10

## 2020-07-10 RX ORDER — LIDOCAINE WITH 8.4% SOD BICARB 0.9%(10ML)
SYRINGE (ML) INJECTION CODE/TRAUMA/SEDATION MEDICATION
Status: COMPLETED | OUTPATIENT
Start: 2020-07-10 | End: 2020-07-10

## 2020-07-10 RX ORDER — HEPARIN SODIUM 1000 [USP'U]/ML
INJECTION, SOLUTION INTRAVENOUS; SUBCUTANEOUS CODE/TRAUMA/SEDATION MEDICATION
Status: COMPLETED | OUTPATIENT
Start: 2020-07-10 | End: 2020-07-10

## 2020-07-10 RX ADMIN — HEPARIN SODIUM 1100 UNITS: 1000 INJECTION INTRAVENOUS; SUBCUTANEOUS at 10:08

## 2020-07-10 RX ADMIN — TACROLIMUS 4 MG: 1 CAPSULE ORAL at 09:21

## 2020-07-10 RX ADMIN — URSODIOL 300 MG: 300 CAPSULE ORAL at 09:22

## 2020-07-10 RX ADMIN — SIROLIMUS 6 MG: 1 TABLET ORAL at 09:22

## 2020-07-10 RX ADMIN — HEPARIN SODIUM 5000 UNITS: 5000 INJECTION INTRAVENOUS; SUBCUTANEOUS at 16:20

## 2020-07-10 RX ADMIN — MYCOPHENOLATE MOFETIL 1000 MG: 250 CAPSULE ORAL at 22:12

## 2020-07-10 RX ADMIN — HEPARIN SODIUM 1400 UNITS: 1000 INJECTION INTRAVENOUS; SUBCUTANEOUS at 10:09

## 2020-07-10 RX ADMIN — HEPARIN SODIUM 5000 UNITS: 5000 INJECTION INTRAVENOUS; SUBCUTANEOUS at 22:12

## 2020-07-10 RX ADMIN — MYCOPHENOLATE MOFETIL 1000 MG: 250 CAPSULE ORAL at 09:18

## 2020-07-10 RX ADMIN — DAPSONE 100 MG: 100 TABLET ORAL at 09:22

## 2020-07-10 RX ADMIN — ACYCLOVIR 400 MG: 800 TABLET ORAL at 09:23

## 2020-07-10 RX ADMIN — URSODIOL 300 MG: 300 CAPSULE ORAL at 16:19

## 2020-07-10 RX ADMIN — Medication 5 ML: at 10:00

## 2020-07-10 RX ADMIN — ACYCLOVIR 400 MG: 800 TABLET ORAL at 18:23

## 2020-07-10 RX ADMIN — AMLODIPINE BESYLATE 2.5 MG: 2.5 TABLET ORAL at 09:19

## 2020-07-10 RX ADMIN — URSODIOL 300 MG: 300 CAPSULE ORAL at 22:13

## 2020-07-10 RX ADMIN — FAMOTIDINE 10 MG: 20 TABLET ORAL at 09:18

## 2020-07-10 RX ADMIN — TACROLIMUS 4 MG: 1 CAPSULE ORAL at 22:14

## 2020-07-10 RX ADMIN — FENTANYL CITRATE 25 MCG: 50 INJECTION, SOLUTION INTRAMUSCULAR; INTRAVENOUS at 10:02

## 2020-07-10 RX ADMIN — PREDNISONE 5 MG: 5 TABLET ORAL at 09:18

## 2020-07-10 NOTE — PROGRESS NOTES
NEPHROLOGY PROGRESS NOTE    Patient: Javad Cisse               Sex: male          DOA: 7/7/2020  7:09 PM   Date of Birth: @        Age: @        LOS:  LOS: 3 days   7/10/2020    REASON FOR THE CONSULTATION:  Further management of BOBBY  HPI     This is a 62 y o  male admitted for Anemia     SUBJECTIVE     - according to patient , his breathing was slightly worse today  Patient denies nausea, vomiting, headache or dizziness today    - Reviewed last 24 hrs events     CURRENT MEDICATIONS       Current Facility-Administered Medications:     acyclovir (ZOVIRAX) tablet 400 mg, 400 mg, Oral, BID, Omayra Patrick PA-C, 400 mg at 07/10/20 0923    albuterol (PROVENTIL HFA,VENTOLIN HFA) inhaler 2 puff, 2 puff, Inhalation, Q6H PRN, Omayra Patrick PA-C    amLODIPine (NORVASC) tablet 2 5 mg, 2 5 mg, Oral, Daily, Omayra Patrick PA-C, 2 5 mg at 07/10/20 0919    dapsone tablet 100 mg, 100 mg, Oral, Daily, Omayra Patrick PA-C, 100 mg at 07/10/20 1913    famotidine (PEPCID) tablet 10 mg, 10 mg, Oral, Daily, Omayra Patrick PA-C, 10 mg at 07/10/20 9498    heparin (porcine) subcutaneous injection 5,000 Units, 5,000 Units, Subcutaneous, Q8H [facility], Jasmin Miller MD    mycophenolate (CELLCEPT) capsule 1,000 mg, 1,000 mg, Oral, Q12H [facility], Omayra Patrick PA-C, 1,000 mg at 07/10/20 8624    predniSONE tablet 5 mg, 5 mg, Oral, Daily, Omayra Patrick PA-C, 5 mg at 07/10/20 1358    sirolimus (RAPAMUNE) tablet 6 mg, 6 mg, Oral, Daily, Jasmin Miller MD, 6 mg at 07/10/20 0950    sodium chloride (PF) 0 9 % injection 3 mL, 3 mL, Intravenous, Q1H PRN, Omayra Patrick PA-C    tacrolimus (PROGRAF) capsule 4 mg, 4 mg, Oral, Q12H [facility], Birtha Cecilio MD Paul, 4 mg at 07/10/20 1286    ursodiol (ACTIGALL) capsule 300 mg, 300 mg, Oral, TID, Omayra Patrick PA-C, 300 mg at 07/10/20 1084    OBJECTIVE     Current Weight: Weight - Scale: 112 kg (246 lb 4 1 oz)  /87 (BP Location: Right arm) Pulse 93   Temp 97 6 °F (36 4 °C) (Oral)   Resp 20   Ht 5' 11" (1 803 m)   Wt 112 kg (246 lb 4 1 oz)   SpO2 96%   BMI 34 35 kg/m²   Vitals:    07/10/20 1220   BP: 141/87   Pulse: 93   Resp: 20   Temp: 97 6 °F (36 4 °C)   SpO2:      Body mass index is 34 35 kg/m²  Intake/Output Summary (Last 24 hours) at 7/10/2020 1358  Last data filed at 7/10/2020 1230  Gross per 24 hour   Intake 1360 ml   Output 1075 ml   Net 285 ml       PHYSICAL EXAMINATION     Physical Exam   Constitutional: No distress  Obese   HENT:   Head: Normocephalic and atraumatic  Eyes: No scleral icterus  Neck: Neck supple  No JVD present  Cardiovascular: Normal rate  Pulmonary/Chest: No accessory muscle usage  He has no wheezes  He has rales  Abdominal: Soft  He exhibits no distension  Musculoskeletal: He exhibits edema  Right hand: He exhibits no laceration  Left hand: He exhibits no laceration  Neurological: He is alert  Skin: Skin is warm  No cyanosis  Psychiatric: He is not combative  He is communicative           LAB RESULTS     Results from last 7 days   Lab Units 07/10/20  0632 07/09/20  1131 07/08/20  2304 07/08/20  1941 07/08/20  1542 07/08/20  0558 07/08/20  0510 07/07/20 1942   WBC Thousand/uL 8 57 9 87  --   --   --  6 34  --  8 62   HEMOGLOBIN g/dL 8 2* 9 3* 8 7*  --   --  5 6*  --  7 3*   HEMATOCRIT % 25 5* 28 8* 27 2*  --   --  18 2*  --  23 7*   PLATELETS Thousands/uL 119* 135*  --   --   --  88*  --  121*   SODIUM mmol/L 139 139  --  140 138  --  138 136   POTASSIUM mmol/L 3 4* 3 5  --  3 8 3 7  --  3 5 3 9   CHLORIDE mmol/L 103 101  --  104 105  --  107 104   CO2 mmol/L 20* 21  --  21 17*  --  15* 15*   BUN mg/dL 72* 67*  --  63* 61*  --  61* 59*   CREATININE mg/dL 7 47* 7 08*  --  6 85* 6 69*  --  6 41* 6 45*   EGFR ml/min/1 73sq m 7 8  --  8 8  --  9 9   CALCIUM mg/dL 8 1* 8 8  --  8 3 7 9*  --  7 7* 8 6       I have personally reviewed the old medical records and patient's previously known baseline creatinine level is ~ 2 6-2 9    RADIOLOGY RESULTS      IR temp HD cath   Final Result by Nayana Mcdaniel DO (07/10 1024)   Impression: Successful placement of a temporary dialysis catheter via the right internal jugular vein               Workstation performed: [id number]         XR chest portable   Final Result by Parminder Bradshaw MD (07/10 9340)      Mild pulmonary venous congestion  Workstation performed: [id number]         US kidney and bladder   Final Result by Neela Reis MD (07/08 7732)      No evidence of obstruction  Workstation performed: [id number]         XR chest 1 view portable   Final Result by Nasreen Glover MD (07/08 5185)      No focal consolidation, pleural effusion, or pneumothorax  Workstation performed: [id number]           2D echocardiogram done on 7/2/2020 showed EF of 60% with grade 1 diastolic dysfunction  PLAN / RECOMMENDATIONS      1  BOBBY on top of CKD stage 4  Multifactorial, suspected due to ATN  Upon review of old medical records, previously known baseline creatinine was seems to be around 2 6-2 9  During the hospital stay patient's renal function has continued to worsened to current creatinine of 7 47 today  Patient has received liver transplant in the past and awaiting to get transferred to [facility]  Worsening BOBBY is severe and life-threatening without initiation of dialysis  I have discussed consideration of dialysis in length with patient today in the light of worsening renal function  Patient was agreeable for initiation of dialysis  Dialysis has been fully reviewed with the patient and written informed consent has been obtained  Consulted intervention radiologist for placement of temporary hemodialysis catheter  Will also check renal hepatitis panel    will plan 1st hemodialysis today  I saw and examined patient during hemodialysis treatment at 10:36 AM on 7/10/2020   The patient is receiving hemodialysis for treatment of acute kidney injury  I also reviewed vital signs, intake and output, lab results and recent events, and agree with today's dialysis orders  2  Metabolic acidosis  Multifactorial, patient was started on bicarb drip on admission and bicarb level has improved to 20  Patient is now started on hemodialysis, will plan to stop IV bicarb drip today  3  Hypertension in chronic kidney disease  Currently is under well control and monitor hypertension with amlodipine 2 5 mg PO daily  Overall above mentioned plan was also d/w Homer Cazares MD  Nephrology  7/10/2020        Portions of the record may have been created with voice recognition software  Occasional wrong word or "sound a like" substitutions may have occurred due to the inherent limitations of voice recognition software  Read the chart carefully and recognize, using context, where substitutions have occurred

## 2020-07-10 NOTE — ASSESSMENT & PLAN NOTE
Stool for c diff, cmv  Tacrolimus level wnl  Stool studies enteric path neg, CMV neg, fecal wbc and   Gi consult appreciated-will follow up stool studies o/p pending

## 2020-07-10 NOTE — ASSESSMENT & PLAN NOTE
Patient with chronic anemia, review of epic shows baseline between 7 and 8   Likely secondary to liver disease and/or chronic kidney disease  · Patient has received transfusions in the past  · Per previous notes, patient with macrocytosis, recommend outpatient follow-up  · Patient with significant anemia s/p 2u prbc  · Gi consult appreciated  · hgb stable

## 2020-07-10 NOTE — PROGRESS NOTES
Progress Note Manish Mcmullen 1960, 62 y o  male MRN: [id number]    Unit/Bed#: -01 Encounter: [id number]    Primary Care Provider: Mauricio Perry DO   Date and time admitted to hospital: 7/7/2020  7:09 PM        Diarrhea of presumed infectious origin  Assessment & Plan  Stool for c diff, cmv  Tacrolimus level wnl  Stool studies enteric path neg, CMV neg, fecal wbc and   Gi consult appreciated-will follow up stool studies o/p pending    Acute renal failure superimposed on stage 4 chronic kidney disease (HCC)  Assessment & Plan  · Previous creatinine of 2 6 6 days ago, now increased to 6 45  · Patient reports since he was discharged a few days ago he has had decreased appetite, decreased urination, fatigue  · Per Nephrology, patient given 2 L in ED and started on bicarb 150 mL/hr  · Avoid NSAIDs, nephrotoxins, hypotension  · UA pending  · Nephrology consulted, appreciate recommendations  · Possible ATN from diarrhea, anemia  · hgb improved with 2u prbc will follow  · Case d/w dr Jl Jacob and [facility] liver transplant hepatologist Dr Aretha Obrien who accepts patient in transfer for ongoing care-awaiting bed at [facility]  · Patient however 7/10/20 with worsening cr and tachypnea-patient to start HD today    Thrombocytopenia ([address] )  Assessment & Plan  stable    Essential hypertension  Assessment & Plan  · Continue home medications with holding parameters  · Monitor per unit protocol    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 [facility])  Assessment & Plan  Status post recent liver transplant [facility]  · Continue tacrolimus, sirolimus, CellCept, acyclovir, Actigall  · LFTs elevated will follow  · Awaiting transfer to [place]    CROW on CPAP  Assessment & Plan  · Continue CPAP at night    * Anemia  Assessment & Plan  Patient with chronic anemia, review of epic shows baseline between 7 and 8   Likely secondary to liver disease and/or chronic kidney disease  · Patient has received transfusions in the past  · Per previous notes, patient with macrocytosis, recommend outpatient follow-up  · Patient with significant anemia s/p 2u prbc  · Gi consult appreciated  · hgb stable        VTE Pharmacologic Prophylaxis:   Pharmacologic: Heparin  Mechanical VTE Prophylaxis in Place: Yes    Patient Centered Rounds: I have performed bedside rounds with nursing staff today  Discussions with Specialists or Other Care Team Provider:     Education and Discussions with Family / Patient:     Time Spent for Care: 30 minutes  More than 50% of total time spent on counseling and coordination of care as described above  Current Length of Stay: 3 day(s)    Current Patient Status: Inpatient   Certification Statement: The patient will continue to require additional inpatient hospital stay due to renal failure    Discharge Plan: tx to [facility]    Code Status: Level 1 - Full Code      Subjective:   Sleeping, rouses to voice  Denies shortness of breath or abdominal pain  Objective:     Vitals:   Temp (24hrs), Av 6 °F (37 °C), Min:98 2 °F (36 8 °C), Max:98 9 °F (37 2 °C)    Temp:  [98 2 °F (36 8 °C)-98 9 °F (37 2 °C)] 98 4 °F (36 9 °C)  HR:  [71-85] 71  Resp:  [18-25] 20  BP: (121-155)/(60-95) 139/76  SpO2:  [85 %-96 %] 96 %  Body mass index is 34 35 kg/m²  Input and Output Summary (last 24 hours): Intake/Output Summary (Last 24 hours) at 7/10/2020 1305  Last data filed at 7/10/2020 1220  Gross per 24 hour   Intake 1060 ml   Output 1075 ml   Net -15 ml       Physical Exam:     Physical Exam   Constitutional: He is oriented to person, place, and time  He appears well-developed and well-nourished  HENT:   Head: Normocephalic and atraumatic  Eyes: Pupils are equal, round, and reactive to light  Neck: Neck supple  Cardiovascular: Normal rate and regular rhythm  Pulmonary/Chest: No respiratory distress  He has no wheezes  He has rales  Abdominal: Soft  Bowel sounds are normal    Musculoskeletal: He exhibits edema     Neurological: He is alert and oriented to person, place, and time  Skin: Skin is warm and dry  Psychiatric: He has a normal mood and affect  His behavior is normal          Additional Data:     Labs:    Results from last 7 days   Lab Units 07/10/20  0632   WBC Thousand/uL 8 57   HEMOGLOBIN g/dL 8 2*   HEMATOCRIT % 25 5*   PLATELETS Thousands/uL 119*   NEUTROS PCT % 87*   LYMPHS PCT % 1*   MONOS PCT % 9   EOS PCT % 1     Results from last 7 days   Lab Units 07/10/20  0632   SODIUM mmol/L 139   POTASSIUM mmol/L 3 4*   CHLORIDE mmol/L 103   CO2 mmol/L 20*   BUN mg/dL 72*   CREATININE mg/dL 7 47*   ANION GAP mmol/L 16*   CALCIUM mg/dL 8 1*   ALBUMIN g/dL 1 8*   TOTAL BILIRUBIN mg/dL 1 40*   ALK PHOS U/L 205*   ALT U/L 27   AST U/L 24   GLUCOSE RANDOM mg/dL 97     Results from last 7 days   Lab Units 07/07/20  1942   INR  1 10             Results from last 7 days   Lab Units 07/08/20  0511 07/07/20 1942   LACTIC ACID mmol/L  --  0 7   PROCALCITONIN ng/ml 1 14* 0 98*           * I Have Reviewed All Lab Data Listed Above  * Additional Pertinent Lab Tests Reviewed: All Labs Within Last 24 Hours Reviewed    Imaging:    Imaging Reports Reviewed Today Include:   Imaging Personally Reviewed by Myself Includes:      Recent Cultures (last 7 days):     Results from last 7 days   Lab Units 07/07/20 1942   BLOOD CULTURE  No Growth at 48 hrs  No Growth at 48 hrs         Last 24 Hours Medication List:     Current Facility-Administered Medications:  acyclovir 400 mg Oral BID Rodode Sissy PA-C   albuterol 2 puff Inhalation Q6H PRN Deysidegarde Lansofia, PA-C   amLODIPine 2 5 mg Oral Daily Hildegarde Sissy, PA-C   dapsone 100 mg Oral Daily Hildegarde Sissy, PA-C   famotidine 10 mg Oral Daily Hildegarde Lansofia, PA-C   mycophenolate 1,000 mg Oral Q12H [facility] Hildegarde Sissy, PA-C   predniSONE 5 mg Oral Daily Hildegarde Lansofia, PA-C   sirolimus 6 mg Oral Daily Josefina Miller MD   sodium chloride (PF) 3 mL Intravenous Q1H PRN Carlos Sheehan PA-C   tacrolimus 4 mg Oral Q12H [address] Dany Miller MD   ursodiol 300 mg Oral TID Ganga Youssef PA-C        Today, Patient Was Seen By: Daniel Payne MD    ** Please Note: Dictation voice to text software may have been used in the creation of this document   **

## 2020-07-10 NOTE — PLAN OF CARE
Pt presented with [facility] for HD treatment  Access site is clean and dry patent  No issues with BFR  Guarded goal for first treatment of 0 5 kg  Pre weight   Celestina Blend    112 0kg  Post weight   Celestina Blend Celestina Blend   111 5 kg  Uf   0  5kg    Reported to Primary nurse    Celestina Blend Celestina Blend Celestina Blend  Maria L Goodwin RN          Problem: METABOLIC, FLUID AND ELECTROLYTES - ADULT  Goal: Electrolytes maintained within normal limits  Description  INTERVENTIONS:  - Monitor labs and assess patient for signs and symptoms of electrolyte imbalances  - Administer electrolyte replacement as ordered  - Monitor response to electrolyte replacements, including repeat lab results as appropriate  - Instruct patient on fluid and nutrition as appropriate  Outcome: Progressing  Goal: Fluid balance maintained  Description  INTERVENTIONS:  - Monitor labs   - Monitor I/O and WT  - Instruct patient on fluid and nutrition as appropriate  - Assess for signs & symptoms of volume excess or deficit  Outcome: Progressing     Problem: HEMATOLOGIC - ADULT  Goal: Maintains hematologic stability  Description  INTERVENTIONS  - Assess for signs and symptoms of bleeding or hemorrhage  - Monitor labs  - Administer supportive blood products/factors as ordered and appropriate  Outcome: Progressing

## 2020-07-10 NOTE — ASSESSMENT & PLAN NOTE
· Previous creatinine of 2 6 6 days ago, now increased to 6 45  · Patient reports since he was discharged a few days ago he has had decreased appetite, decreased urination, fatigue  · Per Nephrology, patient given 2 L in ED and started on bicarb 150 mL/hr  · Avoid NSAIDs, nephrotoxins, hypotension  · UA pending  · Nephrology consulted, appreciate recommendations  · Possible ATN from diarrhea, anemia  · hgb improved with 2u prbc will follow  · Case d/w dr Paz Nelson and [facility] liver transplant hepatologist Dr Lawson Avila who accepts patient in transfer for ongoing care-awaiting bed at [facility]  · Patient however 7/10/20 with worsening cr and tachypnea-patient to start HD today

## 2020-07-10 NOTE — ASSESSMENT & PLAN NOTE
Status post recent liver transplant [facility]  · Continue tacrolimus, sirolimus, CellCept, acyclovir, Actigall  · LFTs elevated will follow  · Awaiting transfer to [place]

## 2020-07-10 NOTE — SOCIAL WORK
CM called [facility] [phone] and spoke to Josh STEPHENS who transferred me to Manda Leu stated that they have insurance auth and are just awaiting available bed  CM gave contact # for followup,     1025  Updated [facility] [phone]  Updated [facility] [address]    0535  CM called [facility] [phone] and spoke to Manda Moyer stated that  are just awaiting available bed   CM gave contact # for followup,

## 2020-07-11 ENCOUNTER — APPOINTMENT (INPATIENT)
Dept: ULTRASOUND IMAGING | Facility: HOSPITAL | Age: 60
DRG: 683 | End: 2020-07-11
Payer: COMMERCIAL

## 2020-07-11 ENCOUNTER — APPOINTMENT (INPATIENT)
Dept: DIALYSIS | Facility: HOSPITAL | Age: 60
DRG: 683 | End: 2020-07-11
Payer: COMMERCIAL

## 2020-07-11 ENCOUNTER — APPOINTMENT (INPATIENT)
Dept: CT IMAGING | Facility: HOSPITAL | Age: 60
DRG: 683 | End: 2020-07-11
Payer: COMMERCIAL

## 2020-07-11 VITALS
HEIGHT: 71 IN | SYSTOLIC BLOOD PRESSURE: 138 MMHG | HEART RATE: 88 BPM | RESPIRATION RATE: 20 BRPM | WEIGHT: 246.25 LBS | DIASTOLIC BLOOD PRESSURE: 79 MMHG | BODY MASS INDEX: 34.48 KG/M2 | TEMPERATURE: 98.9 F | OXYGEN SATURATION: 91 %

## 2020-07-11 PROBLEM — R05.9 COUGH: Status: ACTIVE | Noted: 2020-07-11

## 2020-07-11 LAB
ANION GAP SERPL CALCULATED.3IONS-SCNC: 13 MMOL/L (ref 4–13)
BASOPHILS # BLD AUTO: 0.02 THOUSANDS/ΜL (ref 0–0.1)
BASOPHILS NFR BLD AUTO: 0 % (ref 0–1)
BUN SERPL-MCNC: 66 MG/DL (ref 5–25)
CALCIUM SERPL-MCNC: 8.2 MG/DL (ref 8.3–10.1)
CHLORIDE SERPL-SCNC: 101 MMOL/L (ref 100–108)
CO2 SERPL-SCNC: 22 MMOL/L (ref 21–32)
CREAT SERPL-MCNC: 7.09 MG/DL (ref 0.6–1.3)
EOSINOPHIL # BLD AUTO: 0.06 THOUSAND/ΜL (ref 0–0.61)
EOSINOPHIL NFR BLD AUTO: 1 % (ref 0–6)
ERYTHROCYTE [DISTWIDTH] IN BLOOD BY AUTOMATED COUNT: 16.9 % (ref 11.6–15.1)
GFR SERPL CREATININE-BSD FRML MDRD: 8 ML/MIN/1.73SQ M
GLUCOSE SERPL-MCNC: 108 MG/DL (ref 65–140)
HBV CORE AB SER QL: NORMAL
HBV CORE IGM SER QL: NORMAL
HBV SURFACE AB SER-ACNC: <3.1 MIU/ML
HBV SURFACE AG SER QL: NORMAL
HCT VFR BLD AUTO: 24.7 % (ref 36.5–49.3)
HCV AB SER QL: NORMAL
HGB BLD-MCNC: 7.9 G/DL (ref 12–17)
IMM GRANULOCYTES # BLD AUTO: 0.2 THOUSAND/UL (ref 0–0.2)
IMM GRANULOCYTES NFR BLD AUTO: 2 % (ref 0–2)
LYMPHOCYTES # BLD AUTO: 0.11 THOUSANDS/ΜL (ref 0.6–4.47)
LYMPHOCYTES NFR BLD AUTO: 1 % (ref 14–44)
MCH RBC QN AUTO: 31.7 PG (ref 26.8–34.3)
MCHC RBC AUTO-ENTMCNC: 32 G/DL (ref 31.4–37.4)
MCV RBC AUTO: 99 FL (ref 82–98)
MONOCYTES # BLD AUTO: 0.88 THOUSAND/ΜL (ref 0.17–1.22)
MONOCYTES NFR BLD AUTO: 9 % (ref 4–12)
NEUTROPHILS # BLD AUTO: 9.05 THOUSANDS/ΜL (ref 1.85–7.62)
NEUTS SEG NFR BLD AUTO: 87 % (ref 43–75)
NRBC BLD AUTO-RTO: 0 /100 WBCS
PLATELET # BLD AUTO: 128 THOUSANDS/UL (ref 149–390)
PMV BLD AUTO: 10.8 FL (ref 8.9–12.7)
POTASSIUM SERPL-SCNC: 3.3 MMOL/L (ref 3.5–5.3)
RBC # BLD AUTO: 2.49 MILLION/UL (ref 3.88–5.62)
SODIUM SERPL-SCNC: 136 MMOL/L (ref 136–145)
TACROLIMUS BLD-MCNC: 24.4 NG/ML (ref 2–20)
WBC # BLD AUTO: 10.32 THOUSAND/UL (ref 4.31–10.16)

## 2020-07-11 PROCEDURE — 5A1D70Z PERFORMANCE OF URINARY FILTRATION, INTERMITTENT, LESS THAN 6 HOURS PER DAY: ICD-10-PCS | Performed by: INTERNAL MEDICINE

## 2020-07-11 PROCEDURE — 94760 N-INVAS EAR/PLS OXIMETRY 1: CPT

## 2020-07-11 PROCEDURE — 93970 EXTREMITY STUDY: CPT | Performed by: SURGERY

## 2020-07-11 PROCEDURE — 87493 C DIFF AMPLIFIED PROBE: CPT | Performed by: GENERAL PRACTICE

## 2020-07-11 PROCEDURE — 94664 DEMO&/EVAL PT USE INHALER: CPT

## 2020-07-11 PROCEDURE — 74176 CT ABD & PELVIS W/O CONTRAST: CPT

## 2020-07-11 PROCEDURE — 90935 HEMODIALYSIS ONE EVALUATION: CPT | Performed by: INTERNAL MEDICINE

## 2020-07-11 PROCEDURE — 99232 SBSQ HOSP IP/OBS MODERATE 35: CPT | Performed by: GENERAL PRACTICE

## 2020-07-11 PROCEDURE — 71250 CT THORAX DX C-: CPT

## 2020-07-11 PROCEDURE — 85025 COMPLETE CBC W/AUTO DIFF WBC: CPT | Performed by: GENERAL PRACTICE

## 2020-07-11 PROCEDURE — 94660 CPAP INITIATION&MGMT: CPT

## 2020-07-11 PROCEDURE — 80197 ASSAY OF TACROLIMUS: CPT | Performed by: INTERNAL MEDICINE

## 2020-07-11 PROCEDURE — 93970 EXTREMITY STUDY: CPT

## 2020-07-11 PROCEDURE — 99239 HOSP IP/OBS DSCHRG MGMT >30: CPT | Performed by: GENERAL PRACTICE

## 2020-07-11 PROCEDURE — 80048 BASIC METABOLIC PNL TOTAL CA: CPT | Performed by: GENERAL PRACTICE

## 2020-07-11 RX ORDER — HYDROCODONE POLISTIREX AND CHLORPHENIRAMINE POLISTIREX 10; 8 MG/5ML; MG/5ML
5 SUSPENSION, EXTENDED RELEASE ORAL EVERY 12 HOURS PRN
Status: DISCONTINUED | OUTPATIENT
Start: 2020-07-11 | End: 2020-07-11 | Stop reason: HOSPADM

## 2020-07-11 RX ORDER — VANCOMYCIN HYDROCHLORIDE 1 G/200ML
1000 INJECTION, SOLUTION INTRAVENOUS
Qty: 200 ML | Refills: 0 | Status: SHIPPED | OUTPATIENT
Start: 2020-07-12 | End: 2020-07-22

## 2020-07-11 RX ORDER — CEFEPIME HYDROCHLORIDE 1 G/50ML
1000 INJECTION, SOLUTION INTRAVENOUS EVERY 12 HOURS
Status: DISCONTINUED | OUTPATIENT
Start: 2020-07-11 | End: 2020-07-11

## 2020-07-11 RX ORDER — TACROLIMUS 0.5 MG/1
2 CAPSULE ORAL EVERY 12 HOURS SCHEDULED
Status: DISCONTINUED | OUTPATIENT
Start: 2020-07-11 | End: 2020-07-11 | Stop reason: HOSPADM

## 2020-07-11 RX ORDER — CEFEPIME HYDROCHLORIDE 1 G/50ML
1000 INJECTION, SOLUTION INTRAVENOUS EVERY 24 HOURS
Status: DISCONTINUED | OUTPATIENT
Start: 2020-07-11 | End: 2020-07-11

## 2020-07-11 RX ORDER — VANCOMYCIN HYDROCHLORIDE 1 G/200ML
10 INJECTION, SOLUTION INTRAVENOUS EVERY 12 HOURS
Status: DISCONTINUED | OUTPATIENT
Start: 2020-07-11 | End: 2020-07-11

## 2020-07-11 RX ORDER — VANCOMYCIN HYDROCHLORIDE 1 G/200ML
10 INJECTION, SOLUTION INTRAVENOUS
Status: DISCONTINUED | OUTPATIENT
Start: 2020-07-12 | End: 2020-07-11 | Stop reason: HOSPADM

## 2020-07-11 RX ADMIN — ACYCLOVIR 400 MG: 800 TABLET ORAL at 04:00

## 2020-07-11 RX ADMIN — HEPARIN SODIUM 5000 UNITS: 5000 INJECTION INTRAVENOUS; SUBCUTANEOUS at 14:33

## 2020-07-11 RX ADMIN — SIROLIMUS 6 MG: 1 TABLET ORAL at 12:53

## 2020-07-11 RX ADMIN — ACYCLOVIR 400 MG: 800 TABLET ORAL at 12:49

## 2020-07-11 RX ADMIN — HEPARIN SODIUM 5000 UNITS: 5000 INJECTION INTRAVENOUS; SUBCUTANEOUS at 05:42

## 2020-07-11 RX ADMIN — FAMOTIDINE 10 MG: 20 TABLET ORAL at 12:59

## 2020-07-11 RX ADMIN — MYCOPHENOLATE MOFETIL 1000 MG: 250 CAPSULE ORAL at 12:59

## 2020-07-11 RX ADMIN — Medication 125 MG: at 18:27

## 2020-07-11 RX ADMIN — VANCOMYCIN HYDROCHLORIDE 1750 MG: 1 INJECTION, POWDER, LYOPHILIZED, FOR SOLUTION INTRAVENOUS at 14:18

## 2020-07-11 RX ADMIN — CEFEPIME HYDROCHLORIDE 1000 MG: 1 INJECTION, POWDER, FOR SOLUTION INTRAMUSCULAR; INTRAVENOUS at 11:09

## 2020-07-11 RX ADMIN — PREDNISONE 5 MG: 5 TABLET ORAL at 12:59

## 2020-07-11 RX ADMIN — AMLODIPINE BESYLATE 2.5 MG: 2.5 TABLET ORAL at 12:59

## 2020-07-11 RX ADMIN — TACROLIMUS 4 MG: 1 CAPSULE ORAL at 12:53

## 2020-07-11 RX ADMIN — DAPSONE 100 MG: 100 TABLET ORAL at 12:53

## 2020-07-11 RX ADMIN — URSODIOL 300 MG: 300 CAPSULE ORAL at 12:54

## 2020-07-11 NOTE — PROGRESS NOTES
Progress Note Desirae Lovell 1960, 62 y o  male MRN: [id number]    Unit/Bed#: -01 Encounter: [id number]    Primary Care Provider: Christie Mac DO   Date and time admitted to hospital: 7/7/2020  7:09 PM        Cough  Assessment & Plan  Patient c/o cough with green sputum production today  cxr yesterday with vascular congestion  Sputum gs/culture  Start cefepime and vanco  Case d/w ID      Diarrhea of presumed infectious origin  Assessment & Plan  Stool for c diff, cmv  Tacrolimus level wnl  Stool studies enteric path neg, CMV neg, fecal wbc and c diff pending  Start PO vanco, case d/w ID and consult placed  Gi consult appreciated-will follow up stool studies o/p pending    Acute renal failure superimposed on stage 4 chronic kidney disease (HCC)  Assessment & Plan  · Previous creatinine of 2 6 6 days ago, now increased to 6 45  · Patient reports since he was discharged a few days ago he has had decreased appetite, decreased urination, fatigue  · Per Nephrology, patient given 2 L in ED and started on bicarb 150 mL/hr  · Avoid NSAIDs, nephrotoxins, hypotension  · UA pending  · Nephrology consulted, appreciate recommendations  · Possible ATN from diarrhea, anemia  · hgb improved with 2u prbc will follow  · Case d/w dr Leodan Salinas and [facility] liver transplant hepatologist Dr Jose Prieto who accepts patient in transfer for ongoing care-awaiting bed at [facility]  · Patient however 7/10/20 with worsening cr and tachypnea  · Started HD 7/10/20    Thrombocytopenia ([address] )  Assessment & Plan  stable    Essential hypertension  Assessment & Plan  · Continue home medications with holding parameters  · Monitor per unit protocol    Liver cirrhosis secondary to RSOALES [facility])  Assessment & Plan  Status post recent liver transplant [facility]  · Continue tacrolimus, sirolimus, CellCept, acyclovir, Actigall  · LFTs elevated will follow  · Awaiting transfer to [place]    * Anemia  Assessment & Plan  Patient with chronic anemia, review of epic shows baseline between 7 and 8  Likely secondary to liver disease and/or chronic kidney disease  · Patient has received transfusions in the past  · Per previous notes, patient with macrocytosis, recommend outpatient follow-up  · Patient with significant anemia s/p 2u prbc  · Gi consult appreciated  · Will follow hgb trending down      VTE Pharmacologic Prophylaxis:   Pharmacologic: Heparin  Mechanical VTE Prophylaxis in Place: Yes    Patient Centered Rounds: I have performed bedside rounds with nursing staff today  Discussions with Specialists or Other Care Team Provider:     Education and Discussions with Family / Patient:     Time Spent for Care: 30 minutes  More than 50% of total time spent on counseling and coordination of care as described above  Current Length of Stay: 4 day(s)    Current Patient Status: Inpatient   Certification Statement: The patient will continue to require additional inpatient hospital stay due to [facility]    Discharge Plan: [place]    Code Status: Level 1 - Full Code      Subjective:   No c/o-denies chest pain or shortness of breath    Objective:     Vitals:   Temp (24hrs), Av 1 °F (37 3 °C), Min:98 1 °F (36 7 °C), Max:99 9 °F (37 7 °C)    Temp:  [98 1 °F (36 7 °C)-99 9 °F (37 7 °C)] 98 1 °F (36 7 °C)  HR:  [73-87] 75  Resp:  [17-24] 20  BP: (110-144)/(67-82) 123/70  SpO2:  [85 %-90 %] 90 %  Body mass index is 34 35 kg/m²  Input and Output Summary (last 24 hours): Intake/Output Summary (Last 24 hours) at 2020 1434  Last data filed at 2020 1145  Gross per 24 hour   Intake 1340 ml   Output 1500 ml   Net -160 ml       Physical Exam:     Physical Exam   Constitutional: He is oriented to person, place, and time  He appears well-developed and well-nourished  HENT:   Head: Normocephalic and atraumatic  Eyes: Pupils are equal, round, and reactive to light  Neck: Neck supple  Cardiovascular: Normal rate and regular rhythm     Pulmonary/Chest: Effort normal and breath sounds normal    Abdominal: Soft  Bowel sounds are normal    Musculoskeletal: He exhibits no edema  Neurological: He is alert and oriented to person, place, and time  Skin: Skin is warm and dry  Psychiatric: He has a normal mood and affect  His behavior is normal          Additional Data:     Labs:    Results from last 7 days   Lab Units 07/11/20  0902   WBC Thousand/uL 10 32*   HEMOGLOBIN g/dL 7 9*   HEMATOCRIT % 24 7*   PLATELETS Thousands/uL 128*   NEUTROS PCT % 87*   LYMPHS PCT % 1*   MONOS PCT % 9   EOS PCT % 1     Results from last 7 days   Lab Units 07/11/20  0902 07/10/20  0632   SODIUM mmol/L 136 139   POTASSIUM mmol/L 3 3* 3 4*   CHLORIDE mmol/L 101 103   CO2 mmol/L 22 20*   BUN mg/dL 66* 72*   CREATININE mg/dL 7 09* 7 47*   ANION GAP mmol/L 13 16*   CALCIUM mg/dL 8 2* 8 1*   ALBUMIN g/dL  --  1 8*   TOTAL BILIRUBIN mg/dL  --  1 40*   ALK PHOS U/L  --  205*   ALT U/L  --  27   AST U/L  --  24   GLUCOSE RANDOM mg/dL 108 97     Results from last 7 days   Lab Units 07/07/20  1942   INR  1 10             Results from last 7 days   Lab Units 07/08/20  0511 07/07/20  1942   LACTIC ACID mmol/L  --  0 7   PROCALCITONIN ng/ml 1 14* 0 98*           * I Have Reviewed All Lab Data Listed Above  * Additional Pertinent Lab Tests Reviewed: All Labs Within Last 24 Hours Reviewed    Imaging:    Imaging Reports Reviewed Today Include:   Imaging Personally Reviewed by Myself Includes:      Recent Cultures (last 7 days):     Results from last 7 days   Lab Units 07/07/20  1942   BLOOD CULTURE  No Growth at 72 hrs  No Growth at 72 hrs         Last 24 Hours Medication List:     Current Facility-Administered Medications:  acyclovir 400 mg Oral BID Lisbeth Warner PA-C    albuterol 2 puff Inhalation Q6H PRN Lisbeth Warner PA-C    amLODIPine 2 5 mg Oral Daily Lisbeth Warner PA-C    cefepime (MAXIPIME) 1 g in 50 mL D5W 1 g Intravenous Q24H Katy Miller MD Last Rate: 1,000 mg (07/11/20 1109) dapsone 100 mg Oral Daily Sunitha Blevins PA-C    famotidine 10 mg Oral Daily Sunitha Blevins PA-C    heparin (porcine) 5,000 Units Subcutaneous Q8H [address] Derrick Whiting MD    hydrocodone-chlorpheniramine polistirex 5 mL Oral Q12H PRN Andrew Means MD    mycophenolate 1,000 mg Oral Q12H [address] uSnitha Blevins PA-C    predniSONE 5 mg Oral Daily Leticia Blanco    sirolimus 6 mg Oral Daily Josefina A MD Paul    sodium chloride (PF) 3 mL Intravenous Q1H PRN Sunitha Blevins PA-C    tacrolimus 4 mg Oral Q12H [address] Abeba Miller MD    ursodiol 300 mg Oral TID Sunitha Blevins PA-C    vancomycin 20 mg/kg (Adjusted) Intravenous Once Derrick Whiting MD Last Rate: 1,750 mg (07/11/20 1418)   [START ON 7/12/2020] vancomycin 10 mg/kg (Adjusted) Intravenous After Dialysis Derrick Whiting MD    vancomycin 125 mg Oral Q6H [address] Derrick Whiting MD         Today, Patient Was Seen By: Derrick Whiting MD    ** Please Note: Dictation voice to text software may have been used in the creation of this document   **

## 2020-07-11 NOTE — ASSESSMENT & PLAN NOTE
Status post recent liver transplant [facility]  · Continue tacrolimus, sirolimus, CellCept, acyclovir, Actigall  · LFTs elevated will follow  · Awaiting transfer to [facility]

## 2020-07-11 NOTE — PLAN OF CARE
2 5 hour treatment completed utilizing a temporary HD catheter, ordered BFR maintained throughout treatment, Cefepime given post treatment, patient denies any new issues or complaints, patient transported back to assigned room  Report given to primary RN      Pre weight: 108 8kg  Post weight: 107 8kg  UF: 1500        Problem: METABOLIC, FLUID AND ELECTROLYTES - ADULT  Goal: Electrolytes maintained within normal limits  Description  INTERVENTIONS:  - Monitor labs and assess patient for signs and symptoms of electrolyte imbalances  - Administer electrolyte replacement as ordered  - Monitor response to electrolyte replacements, including repeat lab results as appropriate  - Instruct patient on fluid and nutrition as appropriate  Outcome: Progressing  Goal: Fluid balance maintained  Description  INTERVENTIONS:  - Monitor labs   - Monitor I/O and WT  - Instruct patient on fluid and nutrition as appropriate  - Assess for signs & symptoms of volume excess or deficit  Outcome: Progressing     Problem: HEMATOLOGIC - ADULT  Goal: Maintains hematologic stability  Description  INTERVENTIONS  - Assess for signs and symptoms of bleeding or hemorrhage  - Monitor labs  - Administer supportive blood products/factors as ordered and appropriate  Outcome: Progressing

## 2020-07-11 NOTE — NURSING NOTE
PT is sating in the high 80's on 3L of O2  Pt refused the CPAP as per respiratory  SLIM was made aware  Pt was explained the risk and benefits   Pt is sleeping now

## 2020-07-11 NOTE — PROGRESS NOTES
Vancomycin Assessment    Pool Dewey is a 62 y o  male who is currently receiving vancomycin 1000mg IV q12h for Pneumonia     Relevant clinical data and objective history reviewed:  Creatinine   Date Value Ref Range Status   07/11/2020 7 09 (H) 0 60 - 1 30 mg/dL Final     Comment:     Standardized to IDMS reference method   07/10/2020 7 47 (H) 0 60 - 1 30 mg/dL Final     Comment:     Standardized to IDMS reference method   07/09/2020 7 08 (H) 0 60 - 1 30 mg/dL Final     Comment:     Standardized to IDMS reference method     /70 (BP Location: Left arm)   Pulse 75   Temp 98 1 °F (36 7 °C) (Oral)   Resp 20   Ht 5' 11" (1 803 m)   Wt 112 kg (246 lb 4 1 oz)   SpO2 90%   BMI 34 35 kg/m²   I/O last 3 completed shifts: In: 1360 [P O :360; I V :500; Other:500]  Out: 975 [Urine:475; Other:500]  Lab Results   Component Value Date/Time    BUN 66 (H) 07/11/2020 09:02 AM    WBC 10 32 (H) 07/11/2020 09:02 AM    HGB 7 9 (L) 07/11/2020 09:02 AM    HCT 24 7 (L) 07/11/2020 09:02 AM    MCV 99 (H) 07/11/2020 09:02 AM     (L) 07/11/2020 09:02 AM     Temp Readings from Last 3 Encounters:   07/11/20 98 1 °F (36 7 °C) (Oral)   07/02/20 98 4 °F (36 9 °C)   03/24/20 97 5 °F (36 4 °C) (Oral)     Vancomycin Days of Therapy: 1    Assessment/Plan  The patient is currently on vancomycin utilizing scheduled dosing based on adjusted body weight (due to obesity)  Baseline risks associated with therapy include: pre-existing renal impairment  The patient is currently receiving 1000mg IV q12h and after clinical evaluation will be changed to 1750mg IV once followed by 1000mg IV post HD  Pharmacy will also follow closely for s/sx of nephrotoxicity, infusion reactions and appropriateness of therapy  BMP and CBC will be ordered per protocol  Plan for trough as patient approaches steady state, prior to the 3rd HD session  Due to infection severity, will target a trough of 15-20 (appropriate for most indications)     Pharmacy will continue to follow the patients culture results and clinical progress daily      Chary Theodore, Pharmacist

## 2020-07-11 NOTE — SOCIAL WORK
Cm contacted [facility] and spoke to Toby Webb regarding authorization for transport to [address] for higher level of care  CM was told that no auth was required for transport  Med Nec completed and placed in pt's chart

## 2020-07-11 NOTE — ASSESSMENT & PLAN NOTE
Patient with chronic anemia, review of epic shows baseline between 7 and 8   Likely secondary to liver disease and/or chronic kidney disease  · Patient has received transfusions in the past  · Per previous notes, patient with macrocytosis, recommend outpatient follow-up  · Patient with significant anemia s/p 2u prbc  · Gi consult appreciated  · Will follow hgb trending down

## 2020-07-11 NOTE — ASSESSMENT & PLAN NOTE
Patient c/o cough with green sputum production today  cxr yesterday with vascular congestion  Sputum gs/culture  Start cefepime and vanco  Case d/w ID

## 2020-07-11 NOTE — RESPIRATORY THERAPY NOTE
07/11/20 0443   Respiratory Assessment   Assessment Type Assess only   General Appearance Alert; Awake   Chest Assessment Chest expansion symmetrical   Bilateral Breath Sounds Diminished;Crackles;Scattered;Coarse   Cough Congested;Non-productive   Resp Comments called to assess patient to do low SpO2 increased pt's NC from 2l to 6L  Instructed patient on usage of flutter due to inability to cough up secreations  Coached patient to take slow deep breaths though his nose and out through pursed lips      O2 Device 6LNC   Additional Assessments   Pulse 86   Respirations (!) 24   SpO2 90 %

## 2020-07-11 NOTE — ASSESSMENT & PLAN NOTE
· Previous creatinine of 2 6 6 days ago, now increased to 6 45  · Patient reports since he was discharged a few days ago he has had decreased appetite, decreased urination, fatigue  · Per Nephrology, patient given 2 L in ED and started on bicarb 150 mL/hr  · Avoid NSAIDs, nephrotoxins, hypotension  · UA pending  · Nephrology consulted, appreciate recommendations  · Possible ATN from diarrhea, anemia  · hgb improved with 2u prbc will follow  · Case d/w dr Kaitlin Dash and [facility] liver transplant hepatologist Dr Nilesh Giron who accepts patient in transfer for ongoing care-awaiting bed at [facility]  · Patient however 7/10/20 with worsening cr and tachypnea  · Started HD 7/10/20

## 2020-07-11 NOTE — PROGRESS NOTES
HEMODIALYSIS ROUNDING NOTE    Patient: Micheal Long               Sex: male          DOA: 7/7/2020  7:09 PM   Date of Birth: @        Age: @        LOS:  LOS: 4 days   7/11/2020    REASON FOR THE CONSULTATION:  Further management of BOBBY    HPI     This is a 62 y o  male admitted for Anemia     SUBJECTIVE     - Patient was seen during 2nd hemodialysis today  According to patient his breathing has improved overnight   Patient denies nausea / vomiting / headache / dizziness / chest pain during hemodialysis    - Reviewed last 24 hrs events     CURRENT MEDICATIONS       Current Facility-Administered Medications:     acyclovir (ZOVIRAX) tablet 400 mg, 400 mg, Oral, BID, Phyllistine Cocks, PA-C, 400 mg at 07/10/20 1823    albuterol (PROVENTIL HFA,VENTOLIN HFA) inhaler 2 puff, 2 puff, Inhalation, Q6H PRN, Phyllistine Cocks, PA-C    amLODIPine (NORVASC) tablet 2 5 mg, 2 5 mg, Oral, Daily, Phyllistine Cocks, PA-C, 2 5 mg at 07/10/20 0919    cefepime (MAXIPIME) 1,000 mg in dextrose 5 % 50 mL IVPB, 1 g, Intravenous, Q24H, Josefina Miller MD, Last Rate: 100 mL/hr at 07/11/20 1109, 1,000 mg at 07/11/20 1109    dapsone tablet 100 mg, 100 mg, Oral, Daily, Phyllistine Cocks, PA-C, 100 mg at 07/10/20 9380    famotidine (PEPCID) tablet 10 mg, 10 mg, Oral, Daily, Phyllistine Cocks, PA-C, 10 mg at 07/10/20 3218    heparin (porcine) subcutaneous injection 5,000 Units, 5,000 Units, Subcutaneous, Q8H [address], Jose Alfredo Miller MD, 5,000 Units at 07/11/20 0542    hydrocodone-chlorpheniramine polistirex (TUSSIONEX) 10-8 mg/5 mL ER suspension 5 mL, 5 mL, Oral, Q12H PRN, Bruna Navarro MD    mycophenolate (CELLCEPT) capsule 1,000 mg, 1,000 mg, Oral, Q12H [address], Phyllistine Cocks, PA-C, 1,000 mg at 07/10/20 2212    predniSONE tablet 5 mg, 5 mg, Oral, Daily, Phyllistine MARILYN Smith, 5 mg at 07/10/20 8378    sirolimus (RAPAMUNE) tablet 6 mg, 6 mg, Oral, Daily, Jose Alfredo Miller MD, 6 mg at 07/10/20 8976    sodium chloride (PF) 0 9 % injection 3 mL, 3 mL, Intravenous, Q1H PRN, Greg Akers PA-C    tacrolimus (PROGRAF) capsule 4 mg, 4 mg, Oral, Q12H [facility] HOME, [facility] Jessica Miller MD, 4 mg at 07/10/20 2214    ursodiol (ACTIGALL) capsule 300 mg, 300 mg, Oral, TID, Greg Akers PA-C, 300 mg at 07/10/20 2213    vancomycin (VANCOCIN) 1,750 mg in sodium chloride 0 9 % 500 mL IVPB, 20 mg/kg (Adjusted), Intravenous, Once, Shannan Roa MD    OBJECTIVE     Current Weight: Weight - Scale: 112 kg (246 lb 4 1 oz)  /70 (BP Location: Left arm)   Pulse 75   Temp 98 1 °F (36 7 °C) (Oral)   Resp 20   Ht 5' 11" (1 803 m)   Wt 112 kg (246 lb 4 1 oz)   SpO2 90%   BMI 34 35 kg/m²   Vitals:    07/11/20 1145   BP: 123/70   Pulse: 75   Resp: 20   Temp: 98 1 °F (36 7 °C)   SpO2:      Body mass index is 34 35 kg/m²  Intake/Output Summary (Last 24 hours) at 7/11/2020 1207  Last data filed at 7/11/2020 1145  Gross per 24 hour   Intake 1700 ml   Output 2000 ml   Net -300 ml       PHYSICAL EXAMINATION     Physical Exam   Constitutional: No distress  Obese   HENT:   Head: Normocephalic and atraumatic  Eyes: No scleral icterus  Neck: Neck supple  No JVD present  Cardiovascular: Normal rate  Pulmonary/Chest: No accessory muscle usage  No respiratory distress  He has no wheezes  Abdominal: Soft  He exhibits no distension  Musculoskeletal:        Right hand: He exhibits no laceration  Left hand: He exhibits no laceration  Neurological: He is alert  Skin: Skin is warm  No cyanosis  Psychiatric: He is not combative  He is communicative         LAB RESULTS     Results from last 7 days   Lab Units 07/11/20  0902 07/10/20  0632 07/09/20  1131 07/08/20  2304 07/08/20  1941 07/08/20  1542 07/08/20  0558 07/08/20  0510 07/07/20  1942   WBC Thousand/uL 10 32* 8 57 9 87  --   --   --  6 34  --  8 62   HEMOGLOBIN g/dL 7 9* 8 2* 9 3* 8 7*  --   --  5 6*  --  7 3*   HEMATOCRIT % 24 7* 25 5* 28 8* 27 2*  -- --  18 2*  --  23 7*   PLATELETS Thousands/uL 128* 119* 135*  --   --   --  88*  --  121*   SODIUM mmol/L 136 139 139  --  140 138  --  138 136   POTASSIUM mmol/L 3 3* 3 4* 3 5  --  3 8 3 7  --  3 5 3 9   CHLORIDE mmol/L 101 103 101  --  104 105  --  107 104   CO2 mmol/L 22 20* 21  --  21 17*  --  15* 15*   BUN mg/dL 66* 72* 67*  --  63* 61*  --  61* 59*   CREATININE mg/dL 7 09* 7 47* 7 08*  --  6 85* 6 69*  --  6 41* 6 45*   EGFR ml/min/1 73sq m 8 7 8  --  8 8  --  9 9   CALCIUM mg/dL 8 2* 8 1* 8 8  --  8 3 7 9*  --  7 7* 8 6       RADIOLOGY RESULTS     IR temp HD cath   Final Result by Roxana Hernandes DO (07/10 1024)   Impression: Successful placement of a temporary dialysis catheter via the right internal jugular vein               Workstation performed: [id number]         XR chest portable   Final Result by Ann Santana MD (07/10 2974)      Mild pulmonary venous congestion  Workstation performed: [id number]         US kidney and bladder   Final Result by Wyatt Mckoy MD (07/08 1532)      No evidence of obstruction  Workstation performed: [id number]         XR chest 1 view portable   Final Result by Ivette Elmore MD (07/08 0325)      No focal consolidation, pleural effusion, or pneumothorax  Workstation performed: [id number]         CT chest wo contrast    (Results Pending)   VAS lower limb venous duplex study, complete bilateral    (Results Pending)       PLAN / RECOMMENDATIONS     1  BOBBY on top of CKD stage 4  Multifactorial, suspected due to ATN  Upon review of old medical records, previously known baseline creatinine was around 2 6-2 9  Patient was found to have worsening renal function and was started on hemodialysis yesterday  Plan 2nd hemodialysis today  At 10:01 AM on 7/11/2020, I saw and examined patient during hemodialysis treatment  The patient was receiving hemodialysis for treatment of acute kidney injury   I have also reviewed vital signs, intake and output, lab results and recent events, and agreed with today's dialysis orders  Access: No issue    Disposition:     Timothy Hinds MD  Nephrology  7/11/2020        Portions of the record may have been created with voice recognition software  Occasional wrong word or "sound a like" substitutions may have occurred due to the inherent limitations of voice recognition software  Read the chart carefully and recognize, using context, where substitutions have occurred

## 2020-07-11 NOTE — PLAN OF CARE
Problem: Potential for Falls  Goal: Patient will remain free of falls  Description  INTERVENTIONS:  - Assess patient frequently for physical needs  -  Identify cognitive and physical deficits and behaviors that affect risk of falls  -  [place] fall precautions as indicated by assessment   - Educate patient/family on patient safety including physical limitations  - Instruct patient to call for assistance with activity based on assessment  - Modify environment to reduce risk of injury  - Consider OT/PT consult to assist with strengthening/mobility  Outcome: Progressing     Problem: Prexisting or High Potential for Compromised Skin Integrity  Goal: Skin integrity is maintained or improved  Description  INTERVENTIONS:  - Identify patients at risk for skin breakdown  - Assess and monitor skin integrity  - Assess and monitor nutrition and hydration status  - Monitor labs   - Assess for incontinence   - Turn and reposition patient  - Assist with mobility/ambulation  - Relieve pressure over bony prominences  - Avoid friction and shearing  - Provide appropriate hygiene as needed including keeping skin clean and dry  - Evaluate need for skin moisturizer/barrier cream  - Collaborate with interdisciplinary team   - Patient/family teaching  - Consider wound care consult   Outcome: Progressing     Problem: Nutrition/Hydration-ADULT  Goal: Nutrient/Hydration intake appropriate for improving, restoring or maintaining nutritional needs  Description  Monitor and assess patient's nutrition/hydration status for malnutrition  Collaborate with interdisciplinary team and initiate plan and interventions as ordered  Monitor patient's weight and dietary intake as ordered or per policy  Utilize nutrition screening tool and intervene as necessary  Determine patient's food preferences and provide high-protein, high-caloric foods as appropriate       INTERVENTIONS:  - Monitor oral intake, urinary output, labs, and treatment plans  - Assess nutrition and hydration status and recommend course of action  - Evaluate amount of meals eaten  - Assist patient with eating if necessary   - Allow adequate time for meals  - Recommend/ encourage appropriate diets, oral nutritional supplements, and vitamin/mineral supplements  - Order, calculate, and assess calorie counts as needed  - Recommend, monitor, and adjust tube feedings and TPN/PPN based on assessed needs  - Assess need for intravenous fluids  - Provide nutrition/hydration education as appropriate  - Include patient/family/caregiver in decisions related to nutrition   Outcome: Progressing     Problem: METABOLIC, FLUID AND ELECTROLYTES - ADULT  Goal: Electrolytes maintained within normal limits  Description  INTERVENTIONS:  - Monitor labs and assess patient for signs and symptoms of electrolyte imbalances  - Administer electrolyte replacement as ordered  - Monitor response to electrolyte replacements, including repeat lab results as appropriate  - Instruct patient on fluid and nutrition as appropriate  Outcome: Progressing  Goal: Fluid balance maintained  Description  INTERVENTIONS:  - Monitor labs   - Monitor I/O and WT  - Instruct patient on fluid and nutrition as appropriate  - Assess for signs & symptoms of volume excess or deficit  Outcome: Progressing  Goal: Glucose maintained within target range  Description  INTERVENTIONS:  - Monitor Blood Glucose as ordered  - Assess for signs and symptoms of hyperglycemia and hypoglycemia  - Administer ordered medications to maintain glucose within target range  - Assess nutritional intake and initiate nutrition service referral as needed  Outcome: Progressing     Problem: HEMATOLOGIC - ADULT  Goal: Maintains hematologic stability  Description  INTERVENTIONS  - Assess for signs and symptoms of bleeding or hemorrhage  - Monitor labs  - Administer supportive blood products/factors as ordered and appropriate  Outcome: Progressing

## 2020-07-11 NOTE — ASSESSMENT & PLAN NOTE
Stool for c diff, cmv  Tacrolimus level wnl  Stool studies enteric path neg, CMV neg, fecal wbc and c diff pending  Start PO matthew, case d/w ID and consult placed  Gi consult appreciated-will follow up stool studies o/p pending

## 2020-07-11 NOTE — TRANSPORTATION MEDICAL NECESSITY
Section I - General Information    Name of Patient: Simon Valero                 : 1960    Medicare #: [id number]  Transport Date: 20 (PCS is valid for round trips on this date and for all repetitive trips in the 60-day range as noted below )  [facility] the pt's stay covered under Medicare Part A (PPS/DRG)   []   NO AUTH REQUIRED FOR TRANSPORT PER [facility]  Closest appropriate facility? If no, why is transport to more distant facility required? Yes  If hospice pt, is this transport related to pt's terminal illness? No       Section II - Medical Necessity Questionnaire  Ambulance transportation is medically necessary only if other means of transport are contraindicated or would be potentially harmful to the patient  To meet this requirement, the patient must either be "bed confined" or suffer from a condition such that transport by means other than ambulance is contraindicated by the patient's condition  The following questions must be answered by the medical professional signing below for this form to be valid:    1)  Describe the MEDICAL CONDITION (physical and/or mental) of this patient AT [address] [place] [address] that requires the patient to be transported in an ambulance and why transport by other means is contraindicated by the patient's condition:BOBBY, hx of liver transplant, anemia    2) Is the patient "bed confined" as defined below? Yes  To be "be confined" the patient must satisfy all three of the following conditions: (1) unable to get up from bed without Assistance; AND (2) unable to ambulate; AND (3) unable to sit in a chair or wheelchair  3) Can this patient safely be transported by car or wheelchair van (i e , seated during transport without a medical attendant or monitoring)?    No    4) In addition to completing questions 1-3 above, please check any of the following conditions that apply*:   *Note: supporting documentation for any boxes checked must be maintained in the patient's medical records  If hosp-hosp transfer, describe services needed at 2nd facility not available at 1st facility? Moderate/severe pain on movement   Medical attendant required   Unable to tolerate seated position for time needed to transport       Section III - Signature of Physician or Healthcare Professional  I certify that the above information is true and correct based on my evaluation of this patient, and represent that the patient requires transport by ambulance and that other forms of transport are contraindicated  I understand that this information will be used by the Centers for Medicare and Medicaid Services (CMS) to support the determination of medical necessity for ambulance services, and I represent that I have personal knowledge of the patient's condition at time of transport  []  If this box is checked, I also certify that the patient is physically or mentally incapable of signing the ambulance service's claim and that the institution with which I am affiliated has furnished care, services, or assistance to the patient  My signature below is made on behalf of the patient pursuant to 42 CFR §424 36(b)(4)   In accordance with 42 CFR §424 37, the specific reason(s) that the patient is physically or mentally incapable of signing the claim form is as follows: n/a      Signature of Physician* or Healthcare Professional______________________________________________________________  Signature Date 07/11/20 (For scheduled repetitive transports, this form is not valid for transports performed more than 60 days after this date)    Printed Name & Credentials of Physician or Healthcare Professional (MD, DO, RN, etc )__Romina Duron RN______________________________  *Form must be signed by patient's attending physician for scheduled, repetitive transports   For non-repetitive, unscheduled ambulance transports, if unable to obtain the signature of the attending physician, any of the following may sign (choose appropriate option below)  [] Physician Assistant []  Clinical Nurse Specialist [x]  Registered Nurse  []  Nurse Practitioner  [x] Discharge Planner

## 2020-07-12 LAB
BACTERIA BLD CULT: NORMAL
BACTERIA BLD CULT: NORMAL
C DIFF TOX GENS STL QL NAA+PROBE: NEGATIVE
CMV DNA SERPL NAA+PROBE-ACNC: NEGATIVE IU/ML
CMV DNA SERPL NAA+PROBE-LOG IU: NORMAL LOG10 IU/ML

## 2020-07-12 NOTE — ASSESSMENT & PLAN NOTE
Patient with chronic anemia, review of epic shows baseline between 7 and 8   Likely secondary to liver disease and/or chronic kidney disease  · Patient has received transfusions in the past  · Per previous notes, patient with macrocytosis, recommend outpatient follow-up  · Patient with significant anemia s/p 2u prbc  · Gi consult appreciated  · Will follow hgb trend

## 2020-07-12 NOTE — ASSESSMENT & PLAN NOTE
· Previous creatinine of 2 6 6 days ago, now increased to 6 45  · Patient reports since he was discharged a few days ago he has had decreased appetite, decreased urination, fatigue  · Per Nephrology, patient given 2 L in ED and started on bicarb 150 mL/hr  · Avoid NSAIDs, nephrotoxins, hypotension  · UA pending  · Nephrology consulted, appreciate recommendations  · Possible ATN from diarrhea, anemia  · hgb improved with 2u prbc will follow  · Case d/w dr Liliya Goldstein and [facility] liver transplant hepatologist Dr Duane Landrum who accepts patient in transfer for ongoing care-awaiting bed at [facility]  · Patient however 7/10/20 with worsening cr and tachypnea  · Started HD 7/10/20

## 2020-07-12 NOTE — ASSESSMENT & PLAN NOTE
Status post recent liver transplant [facility]  · Continue tacrolimus, sirolimus, CellCept, acyclovir, Actigall  · LFTs elevated will follow  · Transferred to [place]  · Ct a/p 7/11/20 with liver cirrhosis

## 2020-07-12 NOTE — DISCHARGE SUMMARY
Discharge- Nhung Hadley 1960, 62 y o  male MRN: [id number]    Unit/Bed#: -01 Encounter: [id number]    Primary Care Provider: Chani Torres DO   Date and time admitted to hospital: 7/7/2020  7:09 PM        Diarrhea of presumed infectious origin  Assessment & Plan  Stool for c diff, cmv  Tacrolimus level wnl  Stool studies enteric path neg, CMV neg, fecal wbc and c diff pending  Start PO vanco, case d/w ID and consult placed  Gi consult appreciated-will follow up stool studies o/p pending  Was on PO vanco awaiting stool studies    Acute renal failure superimposed on stage 4 chronic kidney disease (HCC)  Assessment & Plan  · Previous creatinine of 2 6 6 days ago, now increased to 6 45  · Patient reports since he was discharged a few days ago he has had decreased appetite, decreased urination, fatigue  · Per Nephrology, patient given 2 L in ED and started on bicarb 150 mL/hr  · Avoid NSAIDs, nephrotoxins, hypotension  · UA pending  · Nephrology consulted, appreciate recommendations  · Possible ATN from diarrhea, anemia  · hgb improved with 2u prbc will follow  · Case d/w dr Aliyah Cam and [facility] liver transplant hepatologist Dr Kane Kimble who accepts patient in transfer for ongoing care-awaiting bed at [facility]  · Patient however 7/10/20 with worsening cr and tachypnea  · Started HD 7/10/20    Essential hypertension  Assessment & Plan  · Continue home medications with holding parameters  · Monitor per unit protocol    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)  Assessment & Plan  Status post recent liver transplant [facility]  · Continue tacrolimus, sirolimus, CellCept, acyclovir, Actigall  · LFTs elevated will follow  · Transferred to [place]  · Ct a/p 7/11/20 with liver cirrhosis    CROW on CPAP  Assessment & Plan  · Continue CPAP at night    * Anemia  Assessment & Plan  Patient with chronic anemia, review of epic shows baseline between 7 and 8   Likely secondary to liver disease and/or chronic kidney disease  · Patient has received transfusions in the past  · Per previous notes, patient with macrocytosis, recommend outpatient follow-up  · Patient with significant anemia s/p 2u prbc  · Gi consult appreciated  · Will follow hgb trend          Discharging Physician / Practitioner: MD Davey  PCP: Calli Lovelace DO  Admission Date:   Admission Orders (From admission, onward)     Ordered        07/07/20 2234  Inpatient Admission  Once                   Discharge Date: 07/12/20    Resolved Problems  Date Reviewed: 7/12/2020    None          Consultations During Hospital Stay:  · GI, nephrology    Procedures Performed:   · Hemodialysis    Significant Findings / Test Results:   · Renal failure with ATN  · Status post liver transplant CT abdomen and pelvis showing cirrhosis    Incidental Findings:   ·  diarrhea    Test Results Pending at Discharge (will require follow up):   ·      Outpatient Tests Requested:  ·     Complications:      Reason for Admission: diarrhea    Hospital Course:     Nikita Montelongo is a 62 y o  male with history of CKD, liver transplant, chronic anemia who presents with elevated creatinine  History is obtained from patient and wife at bedside  Patient was recently admitted 1 week ago for syncope  He had a full workup done, and was discharged the following day  Patient reports since then he has felt fatigued, overall malaise, and has a slight cough  He denies any fever chills  He reports he has had decreased appetite and has not been eating or drinking very much  He denies any abdominal pain, nausea vomiting or diarrhea  Denies any blood in his stool  Patient does have a history of a liver transplant, he follows closely with [facility]  He reports he has been compliant with all his medications  He reports he has been urinating much less frequently, but he is still urinating  Denies any pain or burning with urination    He denies any cellulitis or wounds that may be infected            Please see above list of diagnoses and related plan for additional information  Condition at Discharge: stable     Discharge Day Visit / Exam:     * Please refer to separate progress note for these details *    Discussion with Family: wife    Discharge instructions/Information to patient and family:   See after visit summary for information provided to patient and family  Provisions for Follow-Up Care:  See after visit summary for information related to follow-up care and any pertinent home health orders  Disposition:     [address] [facility]  [address] Transfer to [address] [place] [address] to [facility] SNF:   · Not Applicable to this Patient - Not Applicable to this Patient    Planned Readmission:      Discharge Statement:  I spent 40 minutes discharging the patient  This time was spent on the day of discharge  I had direct contact with the patient on the day of discharge  Greater than 50% of the total time was spent examining patient, answering all patient questions, arranging and discussing plan of care with patient as well as directly providing post-discharge instructions  Additional time then spent on discharge activities  Discharge Medications:  See after visit summary for reconciled discharge medications provided to patient and family        ** Please Note: This note has been constructed using a voice recognition system **

## 2020-07-12 NOTE — ASSESSMENT & PLAN NOTE
Stool for c diff, cmv  Tacrolimus level wnl  Stool studies enteric path neg, CMV neg, fecal wbc and c diff pending  Start PO vanco, case d/w ID and consult placed  Gi consult appreciated-will follow up stool studies o/p pending  Was on PO vanco awaiting stool studies

## 2020-07-13 NOTE — UTILIZATION REVIEW
Notification of Discharge  This is a Notification of Discharge from our facility [address]  Please be advised that this patient has been discharge from our facility  Below you will find the admission and discharge date and time including the patients disposition  PRESENTATION DATE: 7/7/2020  7:09 PM  OBS ADMISSION DATE:   IP ADMISSION DATE: 7/7/20 2234   DISCHARGE DATE: 7/11/2020  9:11 PM  DISPOSITION: Non [facility] Non [facility]   Admission Orders listed below:  Admission Orders (From admission, onward)     Ordered        07/07/20 2234  Inpatient Admission  Once                   Please contact the UR Department if additional information is required to close this patient's authorization/case  [facility] Utilization Review Department  Main: [phone] x carefully listen to the prompts  All voicemails are confidential   [email or web address]  Send all requests for admission clinical reviews, approved or denied determinations and any other requests to dedicated fax number below belonging to the campus where the patient is receiving treatment   List of dedicated fax numbers:  [address] DENIALS (Administrative/Medical Necessity) [phone]   [address]  [facility] (Maternity/NICU/Pediatrics) [phone]   [facility] [phone]   [facility] [phone]   Clara Aguirre [phone]   [facility] [address] [place] [address] [phone]   [facility]  [phone]   [address] [facility] [address]  [facility] [facility] [address] [facility] [phone]

## 2020-07-14 LAB — O+P STL CONC: NORMAL

## 2020-07-15 LAB — WBC SPEC QL GRAM STN: NORMAL

## 2020-07-28 ENCOUNTER — HOSPITAL ENCOUNTER (EMERGENCY)
Facility: HOSPITAL | Age: 60
Discharge: HOME | End: 2020-07-28
Attending: EMERGENCY MEDICINE | Admitting: EMERGENCY MEDICINE
Payer: COMMERCIAL

## 2020-07-28 VITALS
HEART RATE: 72 BPM | DIASTOLIC BLOOD PRESSURE: 84 MMHG | TEMPERATURE: 98.2 F | RESPIRATION RATE: 18 BRPM | SYSTOLIC BLOOD PRESSURE: 149 MMHG | OXYGEN SATURATION: 97 %

## 2020-07-28 DIAGNOSIS — D64.9 ANEMIA, UNSPECIFIED TYPE: Primary | ICD-10-CM

## 2020-07-28 LAB
ABO GROUP BLD: NORMAL
BASOPHILS # BLD MANUAL: 0.08 THOUSAND/UL (ref 0–0.1)
BASOPHILS NFR MAR MANUAL: 2 % (ref 0–1)
BLD GP AB SCN SERPL QL: NEGATIVE
EOSINOPHIL # BLD MANUAL: 0.04 THOUSAND/UL (ref 0–0.4)
EOSINOPHIL NFR BLD MANUAL: 1 % (ref 0–6)
ERYTHROCYTE [DISTWIDTH] IN BLOOD BY AUTOMATED COUNT: 16 % (ref 11.6–15.1)
HCT VFR BLD AUTO: 21 % (ref 36.5–49.3)
HGB BLD-MCNC: 6.4 G/DL (ref 12–17)
LYMPHOCYTES # BLD AUTO: 0.12 THOUSAND/UL (ref 0.6–4.47)
LYMPHOCYTES # BLD AUTO: 3 % (ref 14–44)
MCH RBC QN AUTO: 30.3 PG (ref 26.8–34.3)
MCHC RBC AUTO-ENTMCNC: 30.5 G/DL (ref 31.4–37.4)
MCV RBC AUTO: 100 FL (ref 82–98)
METAMYELOCYTES NFR BLD MANUAL: 1 % (ref 0–1)
MONOCYTES # BLD AUTO: 0.44 THOUSAND/UL (ref 0–1.22)
MONOCYTES NFR BLD: 11 % (ref 4–12)
MYELOCYTES NFR BLD MANUAL: 4 % (ref 0–1)
NEUTROPHILS # BLD MANUAL: 3.14 THOUSAND/UL (ref 1.85–7.62)
NEUTS BAND NFR BLD MANUAL: 1 % (ref 0–8)
NEUTS SEG NFR BLD AUTO: 77 % (ref 43–75)
NRBC BLD AUTO-RTO: 0 /100 WBCS
PLATELET # BLD AUTO: 149 THOUSANDS/UL (ref 149–390)
PLATELET BLD QL SMEAR: ADEQUATE
PMV BLD AUTO: 10.8 FL (ref 8.9–12.7)
RBC # BLD AUTO: 2.11 MILLION/UL (ref 3.88–5.62)
RH BLD: NEGATIVE
SPECIMEN EXPIRATION DATE: NORMAL
TOTAL CELLS COUNTED SPEC: 100
WBC # BLD AUTO: 4.02 THOUSAND/UL (ref 4.31–10.16)

## 2020-07-28 PROCEDURE — 36415 COLL VENOUS BLD VENIPUNCTURE: CPT | Performed by: EMERGENCY MEDICINE

## 2020-07-28 PROCEDURE — 99284 EMERGENCY DEPT VISIT MOD MDM: CPT | Performed by: EMERGENCY MEDICINE

## 2020-07-28 PROCEDURE — 85007 BL SMEAR W/DIFF WBC COUNT: CPT | Performed by: EMERGENCY MEDICINE

## 2020-07-28 PROCEDURE — P9016 RBC LEUKOCYTES REDUCED: HCPCS

## 2020-07-28 PROCEDURE — 86922 COMPATIBILITY TEST ANTIGLOB: CPT

## 2020-07-28 PROCEDURE — 36430 TRANSFUSION BLD/BLD COMPNT: CPT

## 2020-07-28 PROCEDURE — 86921 COMPATIBILITY TEST INCUBATE: CPT

## 2020-07-28 PROCEDURE — 99284 EMERGENCY DEPT VISIT MOD MDM: CPT

## 2020-07-28 PROCEDURE — 85027 COMPLETE CBC AUTOMATED: CPT | Performed by: EMERGENCY MEDICINE

## 2020-07-28 PROCEDURE — 86850 RBC ANTIBODY SCREEN: CPT | Performed by: EMERGENCY MEDICINE

## 2020-07-28 PROCEDURE — 86900 BLOOD TYPING SEROLOGIC ABO: CPT | Performed by: EMERGENCY MEDICINE

## 2020-07-28 PROCEDURE — 86901 BLOOD TYPING SEROLOGIC RH(D): CPT | Performed by: EMERGENCY MEDICINE

## 2020-07-28 NOTE — ED NOTES
Pt tolerating blood transfusion well  transfusion rate increased from 125cc/hr to 200cc/hr  Transfusion continues via IV pump  Infusion will be complete about 1 hour from present time   Pt in no resp distress     Ash Cuenca RN  07/28/20 7346

## 2020-07-28 NOTE — ED PROVIDER NOTES
History  Chief Complaint   Patient presents with    Abnormal Lab     told his hgb was low and needs a blood transfusion  HPI  61 yo M presents with need for blood transfusion  Patient has chronic anemia 2/2 kidney and liver disease  He is followed by the liver transplant team at [facility]  Was sent from dialysis for hemoglobin 6 8 today, patient declined outpatient blood transfusion and requested to go to the ED, will need one unit leukocyte reduced PRBC and discharge  Patient has no complaints  Hemoccult negative stool today  Prior to Admission Medications   Prescriptions Last Dose Informant Patient Reported?  Taking?   acyclovir (ZOVIRAX) 400 MG tablet   Yes No   Sig: Take 400 mg by mouth 2 (two) times a day   albuterol (PROVENTIL HFA,VENTOLIN HFA) 90 mcg/act inhaler   Yes No   Sig: Inhale 2 puffs every 6 (six) hours as needed for wheezing or shortness of breath   amLODIPine (NORVASC) 2 5 mg tablet   Yes No   Sig: Take 2 5 mg by mouth daily    cyanocobalamin (VITAMIN B-12) 50 MCG tablet   No No   Sig: Take 1 tablet (50 mcg total) by mouth daily   dapsone 100 mg tablet   Yes No   Sig: Take 100 mg by mouth daily   famotidine (PEPCID) 20 mg tablet  Self Yes No   Sig: Take 15 mg by mouth daily    folic acid (FOLVITE) 570 mcg tablet   No No   Sig: Take 1 tablet (400 mcg total) by mouth daily   insulin aspart (NovoLOG) 100 units/mL injection  Spouse/Significant Other Yes No   Sig: Inject under the skin 3 (three) times a day before meals   magnesium oxide (MAG-OX) 400 mg  Spouse/Significant Other Yes No   Sig: Take 400 mg by mouth 2 (two) times a day   mycophenolate (CELLCEPT) 500 mg tablet   Yes No   Sig: Take 1,000 mg by mouth every 12 (twelve) hours   predniSONE 1 mg tablet  Spouse/Significant Other Yes No   Sig: Take 5 mg by mouth daily   sirolimus (RAPAMUNE) 1 mg tablet  Spouse/Significant Other Yes No   Sig: Take 6 mg by mouth daily    sodium bicarbonate 650 mg tablet  Spouse/Significant Other Yes No   Sig: Take 650 mg by mouth 2 (two) times a day   tacrolimus (PROGRAF) 1 mg capsule  Spouse/Significant Other Yes No   Sig: Take 4 mg by mouth every 12 (twelve) hours    ursodiol (ACTIGALL) 300 mg capsule  Spouse/Significant Other Yes No   Sig: Take 300 mg by mouth 3 (three) times a day       Facility-Administered Medications: None       Past Medical History:   Diagnosis Date    Anasarca     Chronic pain disorder     lower back    CPAP (continuous positive airway pressure) dependence     Heart murmur     Hypertension     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 [facility] [address] [facility]     [facility]     Renal insufficiency 2019    Sleep apnea     Vitamin D deficiency        Past Surgical History:   Procedure Laterality Date    IR [facility]T MEM [employer] [facility] REMOVAL  3/27/2020    IR TEMP HD CATH  9/10/2019    IR TEMP HD CATH  7/10/2020    IR TUBE PLACEMENT NEGRA  2019    KNEE ARTHROSCOPY Bilateral     KNEE SURGERY      MN COLONOSCOPY FLX DX W/COLLJ SPEC WHEN PFRMD N/A 11/15/2017    Procedure: COLONOSCOPY;  Surgeon: Derrick Mendoza MD;  Location: [facility]; Service: Gastroenterology       Family History   Problem Relation Age of Onset    Heart attack Father      I have reviewed and agree with the history as documented  E-Cigarette/Vaping    E-Cigarette Use Never User      E-Cigarette/Vaping Substances     Social History     Tobacco Use    Smoking status: Former Smoker     Last attempt to quit: 11/15/1990     Years since quittin 7    Smokeless tobacco: Never Used   Substance Use Topics    Alcohol use: Never     Frequency: Never     Comment: RARE    Drug use: No       Review of Systems   Constitutional: Negative for chills and fever  HENT: Negative for dental problem and ear pain  Eyes: Negative for pain and redness  Respiratory: Negative for cough and shortness of breath  Cardiovascular: Negative for chest pain and palpitations  Gastrointestinal: Negative for abdominal pain and nausea     Endocrine: Negative for polydipsia and polyphagia  Genitourinary: Negative for dysuria and frequency  Musculoskeletal: Negative for arthralgias and joint swelling  Skin: Negative for color change and rash  Neurological: Negative for dizziness and headaches  Psychiatric/Behavioral: Negative for behavioral problems and confusion  All other systems reviewed and are negative  Physical Exam  Physical Exam   Constitutional: He is oriented to person, place, and time  He appears well-developed and well-nourished  No distress  HENT:   Head: Atraumatic  Right Ear: External ear normal    Left Ear: External ear normal    Nose: Nose normal    Eyes: Pupils are equal, round, and reactive to light  Conjunctivae and EOM are normal    Neck: Normal range of motion  Neck supple  No JVD present  Cardiovascular: Normal rate, regular rhythm and normal heart sounds  No murmur heard  Pulmonary/Chest: Effort normal and breath sounds normal  No respiratory distress  He has no wheezes  Abdominal: Soft  Bowel sounds are normal  He exhibits no distension  There is no tenderness  Musculoskeletal: Normal range of motion  He exhibits no edema  Neurological: He is alert and oriented to person, place, and time  No cranial nerve deficit  Skin: Skin is warm and dry  Capillary refill takes less than 2 seconds  He is not diaphoretic  Psychiatric: He has a normal mood and affect  His behavior is normal    Nursing note and vitals reviewed        Vital Signs  ED Triage Vitals [07/28/20 1311]   Temperature Pulse Respirations Blood Pressure SpO2   98 3 °F (36 8 °C) 80 16 139/69 95 %      Temp Source Heart Rate Source Patient Position - Orthostatic VS BP Location FiO2 (%)   Oral Monitor Sitting Left arm --      Pain Score       --           Vitals:    07/28/20 1700 07/28/20 1715 07/28/20 1745 07/28/20 1830   BP: 147/79 139/73 141/74 149/84   Pulse: 76 77 74 72   Patient Position - Orthostatic VS: Sitting Sitting Sitting Sitting Visual Acuity      ED Medications  Medications - No data to display    Diagnostic Studies  Results Reviewed     Procedure Component Value Units Date/Time    CBC and differential [[id number]]  (Abnormal) Collected:  07/28/20 1335    Lab Status:  Final result Specimen:  Blood from Arm, Right Updated:  07/28/20 1420     WBC 4 02 Thousand/uL      RBC 2 11 Million/uL      Hemoglobin 6 4 g/dL      Hematocrit 21 0 %       fL      MCH 30 3 pg      MCHC 30 5 g/dL      RDW 16 0 %      MPV 10 8 fL      Platelets 544 Thousands/uL      nRBC 0 /100 WBCs                  No orders to display              Procedures  Procedures         ED Course                                             MDM  61 yo M presents for routine blood transfusion  Transfused 1 unit PRBC, patient tolerated well, asymptomatic currently, recommend follow up with his specialists outpatient  Return to ED wtih any signs of bleeding, lightheadedness, dizziness, chest pain or shortness of breath  Disposition  Final diagnoses:   Anemia, unspecified type     Time reflects when diagnosis was documented in both MDM as applicable and the Disposition within this note     Time User Action Codes Description Comment    7/28/2020  4:33 PM Norah Domínguez Add [D64 9] Anemia, unspecified type       ED Disposition     ED Disposition Condition Date/Time Comment    Discharge Stable Tue Jul 28, 2020  6:26 PM Pool Dewey discharge to home/self care              Follow-up Information     Follow up With Specialties Details Why Contact Info Additional Information    [facility] Emergency Department Emergency Medicine  As needed [address] [facility] [address]  [address] [place] [address] ED, [address] [place] [address], [id number]          Discharge Medication List as of 7/28/2020  6:40 PM      CONTINUE these medications which have NOT CHANGED    Details   acyclovir (ZOVIRAX) 400 MG tablet Take 400 mg by mouth 2 (two) times a day, Historical Med      albuterol (PROVENTIL HFA,VENTOLIN HFA) 90 mcg/act inhaler Inhale 2 puffs every 6 (six) hours as needed for wheezing or shortness of breath, Historical Med      amLODIPine (NORVASC) 2 5 mg tablet Take 2 5 mg by mouth daily , Starting Fri 2/7/2020, Historical Med      cyanocobalamin (VITAMIN B-12) 50 MCG tablet Take 1 tablet (50 mcg total) by mouth daily, Starting Thu 9/26/2019, No Print      dapsone 100 mg tablet Take 100 mg by mouth daily, Historical Med      famotidine (PEPCID) 20 mg tablet Take 15 mg by mouth daily , Historical Med      folic acid (FOLVITE) 161 mcg tablet Take 1 tablet (400 mcg total) by mouth daily, Starting Thu 9/26/2019, No Print      insulin aspart (NovoLOG) 100 units/mL injection Inject under the skin 3 (three) times a day before meals, Historical Med      magnesium oxide (MAG-OX) 400 mg Take 400 mg by mouth 2 (two) times a day, Historical Med      mycophenolate (CELLCEPT) 500 mg tablet Take 1,000 mg by mouth every 12 (twelve) hours, Historical Med      predniSONE 1 mg tablet Take 5 mg by mouth daily, Historical Med      sirolimus (RAPAMUNE) 1 mg tablet Take 6 mg by mouth daily , Historical Med      sodium bicarbonate 650 mg tablet Take 650 mg by mouth 2 (two) times a day, Historical Med      tacrolimus (PROGRAF) 1 mg capsule Take 4 mg by mouth every 12 (twelve) hours , Starting Fri 2/21/2020, Historical Med      ursodiol (ACTIGALL) 300 mg capsule Take 300 mg by mouth 3 (three) times a day , Historical Med           No discharge procedures on file      PDMP Review     None          ED Provider  Electronically Signed by           Shae Jara MD  07/28/20 5966       Shae Jara MD  07/28/20 2546

## 2020-07-28 NOTE — DISCHARGE INSTRUCTIONS
Please follow up with your doctors for recheck of your blood levels, return to ED if blood in stool, feeling weak, chest pain or trouble breathing

## 2020-07-28 NOTE — ED NOTES
Blood transfusion complete  No transfusion reaction suspected   Pt d/c'd     Shanique Garcia RN  07/28/20 0609

## 2020-07-28 NOTE — ED NOTES
No blood transfusion reaction suspected  Pt in no resp distress   Transfusion continues via IV pump     Gerard Oneal, RN  07/28/20 9836

## 2020-07-29 LAB
ABO GROUP BLD BPU: NORMAL
BPU ID: NORMAL
CROSSMATCH: NORMAL
UNIT DISPENSE STATUS: NORMAL
UNIT PRODUCT CODE: NORMAL
UNIT RH: NORMAL

## 2020-08-31 ENCOUNTER — TRANSCRIBE ORDERS (OUTPATIENT)
Dept: INTERVENTIONAL RADIOLOGY/VASCULAR | Facility: HOSPITAL | Age: 60
End: 2020-08-31

## 2020-08-31 DIAGNOSIS — K76.9 LIVER LESION: Chronic | ICD-10-CM

## 2020-08-31 DIAGNOSIS — N18.5 CKD (CHRONIC KIDNEY DISEASE), STAGE V (HCC): Primary | ICD-10-CM

## 2020-09-01 ENCOUNTER — HOSPITAL ENCOUNTER (OUTPATIENT)
Dept: INTERVENTIONAL RADIOLOGY/VASCULAR | Facility: HOSPITAL | Age: 60
Discharge: HOME | End: 2020-09-01
Attending: STUDENT IN AN ORGANIZED HEALTH CARE EDUCATION/TRAINING PROGRAM
Payer: COMMERCIAL

## 2020-09-01 DIAGNOSIS — N18.6 ESRD (END STAGE RENAL DISEASE) (HCC): ICD-10-CM

## 2020-09-01 PROCEDURE — 36589 REMOVAL TUNNELED CV CATH: CPT

## 2020-09-01 PROCEDURE — 36589 REMOVAL TUNNELED CV CATH: CPT | Performed by: RADIOLOGY

## 2020-09-01 RX ORDER — LIDOCAINE WITH 8.4% SOD BICARB 0.9%(10ML)
SYRINGE (ML) INJECTION CODE/TRAUMA/SEDATION MEDICATION
Status: COMPLETED | OUTPATIENT
Start: 2020-09-01 | End: 2020-09-01

## 2020-09-01 RX ADMIN — Medication 10 ML: at 12:53

## 2020-09-01 NOTE — BRIEF OP NOTE (RAD/CATH)
INTERVENTIONAL RADIOLOGY PROCEDURE NOTE    Date: 9/1/2020    Procedure: IR TUNNELED DIALYSIS CATHETER REMOVAL    Preoperative diagnosis:   1  ESRD (end stage renal disease) ([facility] [address] )         Postoperative diagnosis: Same  Surgeon: Shira Hernández MD     Assistant: None  No qualified resident was available  Blood loss: None    Specimens: None     Findings: Successful right IJV tunneled HD catheter removal     Complications: None immediate      Anesthesia: Local

## 2020-09-01 NOTE — H&P
Interventional Radiology  History and Physical 2020     Pool Dewey   1960   [id number]    Assessment/Plan:  80-year-old male with history of acute kidney injury has now recovered his kidney functions and no longer requires the tunneled hemodialysis catheter  Problem List Items Addressed This Visit     None      Visit Diagnoses     ESRD (end stage renal disease) ([facility]        Relevant Orders    IR tunneled dialysis catheter removal             Subjective:     Patient ID: Pool Dewey is a 62 y o  male  History of Present Illness  Patient with history of acute kidney injury underwent a non tunneled hemodialysis catheter placement on 07/10/2020  Patient was transferred to [address] [place] [address] for higher levels of care and had a tunneled hemodialysis catheter placed  Patient has recovered his kidney functions and returns for tunneled catheter removal     Review of Systems   Constitutional: Negative for chills and fever  Past Medical History:   Diagnosis Date    Anasarca     Chronic pain disorder     lower back    CPAP (continuous positive airway pressure) dependence     Heart murmur     Hypertension     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)     [facility]     Renal insufficiency 2019    Sleep apnea     Vitamin D deficiency         Past Surgical History:   Procedure Laterality Date    IR CHOLECYSTOSTOMY TUBE PLACEMENT  2019    IR TEMPORARY DIALYSIS CATHETER PLACEMENT  9/10/2019    IR TEMPORARY DIALYSIS CATHETER PLACEMENT  7/10/2020    IR TUNNELED DIALYSIS CATHETER REMOVAL  3/27/2020    KNEE ARTHROSCOPY Bilateral     KNEE SURGERY      TX COLONOSCOPY FLX DX W/COLLJ SPEC WHEN PFRMD N/A 11/15/2017    Procedure: COLONOSCOPY;  Surgeon: Chanda Gale MD;  Location: [facility];   Service: Gastroenterology        Social History     Tobacco Use   Smoking Status Former Smoker    Last attempt to quit: 11/15/1990    Years since quittin 8 Smokeless Tobacco Never Used        Social History     Substance and Sexual Activity   Alcohol Use Never    Frequency: Never    Comment: RARE        Social History     Substance and Sexual Activity   Drug Use No        Allergies   Allergen Reactions    Lasix [Furosemide] Hives    Propranolol Eye Swelling    Spironolactone      He reports having hives and anaphylaxis while on lasix and Spinorolactone    Torsemide Eye Swelling       Current Outpatient Medications   Medication Sig Dispense Refill    acyclovir (ZOVIRAX) 400 MG tablet Take 400 mg by mouth 2 (two) times a day      albuterol (PROVENTIL HFA,VENTOLIN HFA) 90 mcg/act inhaler Inhale 2 puffs every 6 (six) hours as needed for wheezing or shortness of breath      amLODIPine (NORVASC) 2 5 mg tablet Take 2 5 mg by mouth daily       cyanocobalamin (VITAMIN B-12) 50 MCG tablet Take 1 tablet (50 mcg total) by mouth daily 30 tablet 0    dapsone 100 mg tablet Take 100 mg by mouth daily      famotidine (PEPCID) 20 mg tablet Take 15 mg by mouth daily       folic acid (FOLVITE) 283 mcg tablet Take 1 tablet (400 mcg total) by mouth daily 30 tablet 0    insulin aspart (NovoLOG) 100 units/mL injection Inject under the skin 3 (three) times a day before meals      magnesium oxide (MAG-OX) 400 mg Take 400 mg by mouth 2 (two) times a day      mycophenolate (CELLCEPT) 500 mg tablet Take 1,000 mg by mouth every 12 (twelve) hours      predniSONE 1 mg tablet Take 5 mg by mouth daily      sirolimus (RAPAMUNE) 1 mg tablet Take 6 mg by mouth daily       sodium bicarbonate 650 mg tablet Take 650 mg by mouth 2 (two) times a day      tacrolimus (PROGRAF) 1 mg capsule Take 4 mg by mouth every 12 (twelve) hours       ursodiol (ACTIGALL) 300 mg capsule Take 300 mg by mouth 3 (three) times a day        Current Facility-Administered Medications   Medication Dose Route Frequency Provider Last Rate Last Dose    lidocaine 1% buffered   Infiltration Code/Trauma/Sedation Med Brad Blackwell MD   10 mL at 09/01/20 1253          Objective: There were no vitals filed for this visit  Physical Exam  Constitutional:       Appearance: Normal appearance  Pulmonary:      Effort: Pulmonary effort is normal    Chest:      Comments: Right IJV tunneled HD catheter in place          Lab Results   Component Value Date     (H) 11/14/2018      Lab Results   Component Value Date    WBC 4 02 (L) 07/28/2020    HGB 6 4 (LL) 07/28/2020    HCT 21 0 (L) 07/28/2020     (H) 07/28/2020     07/28/2020     Lab Results   Component Value Date    INR 1 10 07/07/2020    INR 1 23 (H) 11/29/2019    INR 2 67 (H) 09/25/2019    PROTIME 14 4 07/07/2020    PROTIME 15 5 (H) 11/29/2019    PROTIME 28 7 (H) 09/25/2019     Lab Results   Component Value Date    PTT 39 (H) 07/07/2020         I have personally reviewed pertinent imaging and laboratory results  This procedure has been fully reviewed with the patient and written informed consent has been obtained  Code Status: Prior  Advance Directive and Living Will:      Power of :    POLST:      This text is generated with voice recognition software  There may be translation, syntax, or grammatical errors  If you have any questions, please contact the dictating provider

## 2020-11-03 NOTE — SOCIAL WORK
Dr Coco Watson office called back and set up a TAMIKA appointment for Wednesday, 9/11/19 at 15:20    Dr Rosario Mckee wants pt to see Dr Ginna Wheeler first  no

## 2023-04-27 NOTE — PALLIATIVE CARE CONFERENCE
Palliative LSW updated on pt's situation by Dr Good Gustafson  Earlier this morning, both this writer and Dr Good Gustasfon met with pt at bedside  He was able to answer simple questions and seemed to understand the severity of his illness  He stated "I'm being strong, I want to keep going "  About 30 minutes later, LSW returned to ICU to offer support to pt's wife  Wife Graeth Lynn) and daughter Poppy Rodriguez) came to nursing station both stating "My son/brother is in there and we don't want him visiting!"  Wife stated her son Sherren Fang) is not supposed to be in pt's room because "he gets him all upset and he's the reason why we are homeless "  LSW provided listening and able to get his primary RN, Sophie Mar to join the conversation  Escobarzoya Evans explained that pt is A+O and he wanted his son to be in the room to visit  She also explained that any confrontation would be unacceptable in pt's room or hallway  Requested family leave ICU or go to the waiting room to have further conversations as not to upset pt  or others  Mohinder Campbell and Kike camara agreeable  They did enter pt's room and no issues were noted  Covering CM (Betsy Nyhan ), Dr Keily Swanson and Dr Good Gustafson updated of discussion  Undermining Type: Entire Wound

## 2023-06-19 ENCOUNTER — TELEPHONE (OUTPATIENT)
Dept: DERMATOLOGY | Facility: CLINIC | Age: 63
End: 2023-06-19

## 2023-06-19 NOTE — LETTER
Kiet Burnham     1960    [address]    Dear Kiet Burnham,    You are scheduled to have the MOHS procedure on August 21, 2023 at 11:30 am for left inferior postauricular with Christal Yobani  Our office is located in [facility] our address is [address] [place] [address], [address] [place] [address]  Once you arrive please check in with our front staff in [address] and they will escort you to the [place] [id number] waiting room  If you have someone bringing you to your appointment they may wait in the waiting room or accompany you in your visit  Below you will find some pre-op instructions along with some information regarding the MOHS procedure  If you have any questions please call our office at [phone]  Thank you,    [facility] MOHS Department         PRE-OPERATIVE INSTRUCTIONS - MOHS    Before your scheduled surgery, there are a number of important precautions and positive steps you should take to help prepare yourself for a successful treatment and speedy recovery  Some of the steps, which are listed below, may seem unnecessary and inconvenient, but they are important  For example, when you stop smoking, you increase your ability to heal  Occasionally, there may be valid reasons, personal or medical, why you can't comply  In such cases, please call the office so we can discuss possible ways to overcome any obstacles you may be encountering  If you have any questions about the surgery, or remember additional medical information that you forgot to mention to our staff, please contact the office prior to your surgery  GENERAL INFORMATION REGARDING MOHS MICROGRAPHIC SURGERY    Mohs surgery is a specialized technique for the removal of skin cancer developed by Dr Twilla Lama Mohs over 50 years ago to improve the cure rates of skin cancer   Traditionally, skin cancers are treated by destructive methods (radiation, freezing, scraping, and burning) or excision (cutting out the tissue with standards margins and sending it to an outside laboratory for testing)  These methods all yield cure rates between 65%-94%  However, for cancers located in cosmetically sensitive areas, large tumors, or tumors unsuccessfully treated by other means, Mohs surgery offers a higher cure rate  In most cases, Mohs surgery provides you with a 99% cure rate for primary (previously untreated) basal cell cancer and a 95% cure rate for primary squamous cell cancer  In Mohs surgery, tissue is removed and processed in a way that we are able to check 100% of the margins, giving the highest cure rate for any method of treating skin cancers while providing maximal preservation of normal skin  This allows the surgeon to produce an optimal cosmetic result for the patient by maximizing the amount of tissue removed yielding as small a scar as possible    On the day of surgery, you will be given local anesthesia only (similar to what was given to you during your initial biopsy)  You will remain awake  You will verify the location of the skin cancer prior to the onset of the surgery  Once the area is numb, the tissue containing the skin cancer will be removed, taking a small safety margin  This margin is usually smaller than what would be taken with a standard excision  Once the tissue is removed, it is marked and oriented  The first layer (“Stage I”) will be processed in our laboratory  The wound will be treated for bleeding and a bandage will be placed to keep you comfortable while you wait an approximate 45 minutes-1 hour (for the processing of the tissue) in your room  Your Mohs surgeon will examine the pathology in the lab, checking all the margins  If any tumor remains, you will need to take a second layer of skin (“Stage 2”)  The area will be re-anesthetized and your Mohs surgeon will remove more skin only in the area where the tumor exists   This process will continue until all the skin cancer is removed  Unfortunately, there is no method to predict how many layers or stages will be taken  Once the tumor has been removed completely, we will discuss the best ways to close the defect  Most wounds may be closed with stitches  A larger wound may require a skin graft or a flap  In rare instances, especially for cancers around the eye or for larger cancers, we may work with another surgeon (oculoplastic, ENT, plastics) with special skills to assist with reconstruction  Medications: Please take all your normal medications the morning of your surgery  If you are a diabetic, please bring your insulin or medications with you, as well as a snack to avoid having low blood sugar during your day with us  Blood Thinners    Ask the doctor (that prescribed the medication) if prior to surgery you should stop your prescribed blood thinners, such as Coumadin/Warfarin, Plavix, Eliquis, Pradaxa, Brilinta, Apixaban, Xarelto, Lovonox, Rivaroxaban, or Aggrenox  NEVER stop them without your doctor's permission or knowledge  If you have had a stroke, heart attack, or have an irregular heartbeat, your doctor may want you to continue your medication  We can still do your surgery  You may have more bruising  VERY IMPORTANT: If you take aspirin because you have had a stroke, heart attack, heart disease, other condition, or your physician has prescribed you to take it, please continue your aspirin  Most people should stop all non-steroidal anti-inflammatory medications (Motrin, Naproxen, Advil, Midol, Aleve, etc ) for 7 days prior to your scheduled surgery and 2 days after (unless instructed otherwise after surgery)  You may take Tylenol for pain  Antibiotics  If you usually require antibiotics prior to dental work, please let the office know at least 24 hours prior to your surgery   Medical conditions that sometimes require preoperative antibiotics include artificial heart valves, heart murmurs, artificial joints, and related problems  We will give you a different medication than the dentist, so please contact us for the correct antibiotic  If you were prescribed pre-operative antibiotics by our office, please take the medication 2 hours prior to your procedure  Vitamins and Supplements  Avoid taking any supplements with Vitamin E, Fish Oil, Gingko, Ginseng, and Garlic for 2 weeks before and 2 days after your surgery  These thin your blood    Alcohol: Avoid drinking alcohol for 2 days prior to your surgery, and for 2 days afterwards (it thins the blood and causes more bruising and swelling)  Smoking: Try to STOP or reduce smoking significantly the week before your surgery, and especially the week afterwards (it greatly improves how well you heal)  Tobacco smoke deprives the blood of oxygen, which is urgently needed by the wound during the healing process  Contact Lenses: Do not wear them on the day of the surgery  Instead, wear glasses and bring your case, in case we need to remove them  Clothing: Do not wear your nicest clothing on your surgery day  We recommend wearing a button down shirt that will not disrupt your post-operative dressing when changing later that night  Bathing: On the morning of your surgery, you may bathe or shower normally  If you get your hair done on a weekly basis, remember to get your hair washed the day before surgery    - You will need to keep your surgical site dry for a minimum of 48 hours after surgery  Makeup: If your surgery is on the face, please do not wear any makeup on the day of the surgery  Jewelry: Please try to avoid wearing jewelry on the day of surgery  Food: On the morning of surgery, have breakfast but limit your intake of caffeinated beverages  They are diuretic and may inconvenience you during surgery   If you are following up with another surgeon the same day as your Mohs surgery, you must receive permission to eat breakfast from that surgeon  What to bring with you on the day of your surgery:  Bring snacks - Since you could be at the office long, you may bring snacks and/or lunch with you  Some snacks and drinks are available at the office as well  Bring a sweater - Bring a sweater or jacket that buttons or zips down the front and will not disturb your wound dressing during removal   Bring something to do - You will be spending much of the day in our office  There will be 45-60 minute waiting periods  between layers/stages, and while there is a television with cable in every room, it is nice to have something to keep you occupied such as books, magazines, knitting, music, or work  Planning Ahead:  Other Appointments - It is important to realize that no matter how small the skin cancer appears to be, looks can be deceiving  Since your surgery may last the entire day, you should not schedule any other appointments that day  Special Occasions - Surgery often creates swelling and bruising  Also, the post-op dressing may be rather large and obvious  Keep this in mind as you arrange your social/work schedule  If an important event is already planned, please check with your referring physician or your Mohs surgeon to see if the surgery can be postponed  Activity Limits after Surgery - If surgery was performed on your face, we recommend that you keep your activity level to a minimum for 2-3 days (the blood pressure elevation related to exercise can lead to bleeding)  If you have stitches in an area that will be under tension or significant movement (neck, back, arms, legs), you will need to avoid heavy lifting (anything over 5 lbs) or exercise for at least 2 weeks and possibly longer  We also advise that you limit out of town travel for the first 7 days after surgery  You should also wait at least 7 days before going into a pool or the ocean    Housework - Since you will need to minimize activity after surgery, plan to do your groceries, laundry, gardening, and other heavy household chores prior to your surgery  Please make arrangements for assistance during the post-op period  If surgery is around your mouth area, you may need to eat soft foods, such as soup, milkshakes, or yogurt for 48 hours  Purchasing bandage supplies: Prior to surgery, please purchase the following items to care for your surgical wound properly  Cotton swabs (Q-tips)  Vaseline or Aquaphor  Telfa pads (or any non-stick dressing)  Paper tape or Hypafix tape  Gauze pads (3x3)      We will provide you with some bandage supplies after surgery to get you started  Transportation: It is often reassuring and comforting to have a  drive you to and from the surgery  He or she is welcome to wait in the office during the surgery  Please note that for safety reasons, only the patient is allowed in the procedure room during surgery  Thank you for your cooperation  Rescheduling: If you need to reschedule your surgery, please notify the office as soon as possible

## 2023-06-20 NOTE — TELEPHONE ENCOUNTER
Pre- operative Mohs Telephone Scheduling Note    Do you have a pacemaker, defibrillator, spinal or brain stimulator? no    Do you take antibiotics before skin or dental procedures? no  If yes, will likely require pre-operative antibiotics  Ask  the patient why they take the antibiotics (usually because of joint replacement)  Do you have a history of a joint replacements within the past 2 years? no   If yes, will likely require pre-operative antibiotics  Ask if orthopaedic surgeon has prescribed pre-operative antibiotics to take before procedures/dental work? Do you take any OTC medications that thin your blood (Aspirin, Aleve, Ibuprofen) or supplements that thin your blood (fish oil, garlic, vitamin E, Ginko Biloba)? no    Do you take any prescribed medications that thin your blood (Coumadin, Plavix, Xarelto, Eliquis or another prescribed blood thinner)? no    Do you have an allergy to lidocaine or epinephrine? no    Do you have allergies to Iodine? no    Do you wear a lidocaine patch? no    Have you ever been diagnosed with HIV, AIDS, Hep B and Hep C? no    Do you use a cane, walker or wheelchair? no    Is the patient from a nursing home? no If yes, Is there any special accommodations that is needed for patient n/a    Do you smoke? no      If yes,  patient to try and stop 2 days before surgery and 7 days after the surgery  Minimizing smoking as much as possible during this time will improve healing and the cosmetic result after surgery  Do you use supplemental oxygen? If so, how many liters and can you be off it for a short period of time? n/a    Date scheduled: August 21, 2023 @ 11:30 am with Dr Lynda Luna  let patient know if site worsens, bleeds, oozes etc  Call us     Coordination of Care with other provider ([facility], ENT) required? no   IF YES, PLEASE FORWARD TO APPROPRIATE PERSONNEL TO HELP COORDINATE      Are there remaining tumors to be scheduled? no    Was Prior Authorization obtained?  No (please use  [email or web address] to document prior auth)

## 2023-08-07 ENCOUNTER — PROCEDURE VISIT (OUTPATIENT)
Dept: DERMATOLOGY | Facility: CLINIC | Age: 63
End: 2023-08-07
Payer: COMMERCIAL

## 2023-08-07 VITALS
OXYGEN SATURATION: 95 % | HEART RATE: 60 BPM | TEMPERATURE: 97.8 F | DIASTOLIC BLOOD PRESSURE: 88 MMHG | WEIGHT: 299.4 LBS | SYSTOLIC BLOOD PRESSURE: 140 MMHG | BODY MASS INDEX: 41.76 KG/M2

## 2023-08-07 DIAGNOSIS — C44.92 SCC (SQUAMOUS CELL CARCINOMA): ICD-10-CM

## 2023-08-07 PROCEDURE — 17311 MOHS 1 STAGE H/N/HF/G: CPT | Performed by: DERMATOLOGY

## 2023-08-07 PROCEDURE — 12042 INTMD RPR N-HF/GENIT2.6-7.5: CPT | Performed by: DERMATOLOGY

## 2023-08-07 NOTE — PROGRESS NOTES
MOHS Procedure Note    Patient: Uvaldo Serrano  : 1960  MRN: [id number]  Date: 2023    History of Present Illness: The patient is a 59 y.o. 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 [facility])    • Myocardial infarction [facility])    • Renal insufficiency 2019   • Sleep apnea    • Squamous cell skin cancer 2023    Left inferior post auricular skin   • Vitamin D deficiency        Past Surgical History:   Procedure Laterality Date   • IR CHOLECYSTOSTOMY TUBE PLACEMENT  2019   • IR TEMPORARY DIALYSIS CATHETER PLACEMENT  09/10/2019   • IR TEMPORARY DIALYSIS CATHETER PLACEMENT  07/10/2020   • IR TUNNELED DIALYSIS CATHETER REMOVAL  2020   • IR TUNNELED DIALYSIS CATHETER REMOVAL  2020   • KNEE ARTHROSCOPY Bilateral    • KNEE SURGERY     • MOHS SURGERY Left 2023    SCC- Left inferior postauricular skin   • PA COLONOSCOPY FLX DX W/COLLJ SPEC WHEN PFRMD N/A 11/15/2017    Procedure: COLONOSCOPY;  Surgeon: Belkys Han MD;  Location: [facility];   Service: Gastroenterology         Current Outpatient Medications:   •  acyclovir (ZOVIRAX) 400 MG tablet, Take 400 mg by mouth 2 (two) times a day, Disp: , Rfl:   •  albuterol (PROVENTIL HFA,VENTOLIN HFA) 90 mcg/act inhaler, Inhale 2 puffs every 6 (six) hours as needed for wheezing or shortness of breath, Disp: , Rfl:   •  amLODIPine (NORVASC) 2.5 mg tablet, Take 2.5 mg by mouth daily , Disp: , Rfl:   •  cyanocobalamin (VITAMIN B-12) 50 MCG tablet, Take 1 tablet (50 mcg total) by mouth daily, Disp: 30 tablet, Rfl: 0  •  dapsone 100 mg tablet, Take 100 mg by mouth daily, Disp: , Rfl:   •  famotidine (PEPCID) 20 mg tablet, Take 15 mg by mouth daily , Disp: , Rfl: •  folic acid (FOLVITE) 970 mcg tablet, Take 1 tablet (400 mcg total) by mouth daily, Disp: 30 tablet, Rfl: 0  •  magnesium oxide (MAG-OX) 400 mg, Take 400 mg by mouth 2 (two) times a day, Disp: , Rfl:   •  mycophenolate (CELLCEPT) 500 mg tablet, Take 1,000 mg by mouth every 12 (twelve) hours, Disp: , Rfl:   •  predniSONE 1 mg tablet, Take 5 mg by mouth daily, Disp: , Rfl:   •  sirolimus (RAPAMUNE) 1 mg tablet, Take 6 mg by mouth daily , Disp: , Rfl:   •  sodium bicarbonate 650 mg tablet, Take 650 mg by mouth 2 (two) times a day, Disp: , Rfl:   •  tacrolimus (PROGRAF) 1 mg capsule, Take 4 mg by mouth every 12 (twelve) hours , Disp: , Rfl:   •  ursodiol (ACTIGALL) 300 mg capsule, Take 300 mg by mouth 3 (three) times a day , Disp: , Rfl:   •  insulin aspart (NovoLOG) 100 units/mL injection, Inject under the skin 3 (three) times a day before meals (Patient not taking: Reported on 8/7/2023), Disp: , Rfl:     Allergies   Allergen Reactions   • Lasix [Furosemide] Hives   • Propranolol Eye Swelling   • Spironolactone      He reports having hives and anaphylaxis while on lasix and Spinorolactone   • Torsemide Eye Swelling       Physical Exam:   Vitals:    08/07/23 1247   BP: 140/88   Pulse: 60   Temp: 97.8 °F (36.6 °C)   SpO2: 95%     General: Awake, Alert, Oriented x 3, Mood and affect appropriate  Respiratory: Respirations even and unlabored  Cardiovascular: Peripheral pulses intact; no edema  Musculoskeletal Exam: n/a    Assessment: 1 x 1 cm pink scar. 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      Plan: MOHS    Time of H&P Completion:1250 pm    MOHS Procedure Timeout    Flowsheet Row Most Recent Value   Timeout: 4210   Patient Identity Verified: Yes   Correct Site Verified: Yes   Correct Procedure Verified: Yes          MOHS Diagnosis/Indication/Location/ID    Flowsheet Row Most Recent Value   Pathology Type Squamous cell carcinoma   Anatomic Site --  [Left inferior post auricular] Indications for MOHS tumor location   MOHS ID [id number]          MOHS Site/Accession/Pre-Post    Flowsheet Row Most Recent Value   Original Site Identified (as submitted by referring clinician) Photo, Referral   Biopsy Accession/Specimen # (as submitted by referring clincian) [id number]   Pre-MOHS Size Length (cm) 1   Pre-MOHS Size Width (cm) 1   Post-MOHS Size-Length (cm) 1   Post MOHS Size-Width (cm) 1.5   Repair Type Intermediate layered closure   Suture Type Fast absorbing gut, Vicryl   Fast Absorbing Suture Size 5   Vicryl Suture Size 4   Final repair length (cm): 3   Anesthetic Used 1% Lidocaine with epinephrine          MOHS Tumor Stage 1 Information    Flowsheet Row Most Recent Value   Tissue Sections (blocks) 2   Microscopic Exam Section 1: No tumor identified in section. Microscopic Exam Section 2: No tumor identified in section. Tumor Clear After Stage I? Yes                    Patient identified, procedure verified, site identified and verified. Time out completed. Surgical removal of the lesion discussed with the patient (risks and benefits, including possibility of scarring, infection, recurrence or potential for further treatment)  I have specifically identified the site with the patient. I have discussed the fact that the patient will have a scar after the procedure regardless of granulation or repair with sutures. I have discussed that the repair options can range from granulation in some cases to linear or curvilinear closures to larger flaps or grafts. There are sometimes flaps or grafts used that require multiples stages of surgery and will not be completed today, rather be completed over a series of appointments. I have discussed that occasionally due to location, size or depth of the lesion I may recommend consultation with and transfer of care for further removal or the reconstruction to another provider such as ophthalmology surgery, plastic surgery, ENT surgery, or surgical oncology. There are cases in which other testing such as imaging with MRI or CT scan or testing of lymph nodes is recommended because of the nature/depth/location of tumor seen during the removal. There is a risk of injury to nerves causing temporary or permanent numbness or the inability to move muscles full such as the inability to lift eyebrows. Questions answered and verbal and written consent was obtained. The tumor qualifies for Mohs based on AUC criteria. Dr. Margarette Durbin served as the surgeon and pathologist during the procedure. With the patient in the supine position and under adequate local anesthesia with 1% lidocaine with epinephrine 1:100,000, the defect was scrubbed with Chlorhexidine. Sterile drapes were placed from the sterile tray. Because of the location of the surgical defect, an intermediate closure was judged to give the best possible cosmetic and functional result. The edges of the defect were carefully debrided removing any dead or coagulated tissue. Hemostasis was obtained by pinpoint electrocoagulation. Careful planning of removal of redundant tissue at either end of the defect was drawn out so that the suture lines would fall in the optimal orientation with regard to the relaxed skin tension lines. These were then removed with a #15 blade scalpel. The wound was then approximated by a deep layer of buried vertical mattress sutures and the cutaneous margins were approximated and closed by superficial sutures as noted above. Estimated blood loss was less than 5 mL. The patient tolerated the procedure well. The wound was dressed with petrolatum, a non-stick pad, and a compression dressing. Kallie Akers MD served as the surgeon and pathologist during the procedure. Postoperative care: Wound care discussed at length. I urged the patient to call us if any problems or question should arise.      Complications: none  Post-op medications: none  Patient condition after procedure: stable  Discharge plans: Plan for follow up as planned with general dermatology for skin checks or sooner if needed for healing Mohs site. SCC cleared with 1 stage of mohs and repaired with 3cm closure. Well tolerated. S/R not needed as all dissolving sutures used.   Scribe Attestation    I,:  Lidia Frazier MA am acting as a scribe while in the presence of the attending physician.:       I,:  Preethi Grant MD personally performed the services described in this documentation    as scribed in my presence.:

## 2023-08-07 NOTE — PATIENT INSTRUCTIONS
[facility] Microscopic Surgery After Care    WOUND CARE AFTER SURGERY:    Do NOT to remove the pressure bandage for 48 hours. Keep the area clean and dry while this bandage is on. After removing the bandage for the first time, gently clean the area with soap and water. If the bandage is difficult to remove, getting the bandage wet in the shower will sometimes help soften the adhesive and allow it to be removed more easily. You will now need to cleanse this area daily in the shower with gentle soap. There is no need to scrub the area. You will need to apply plain Vaseline ointment (this is over the counter and not a prescription) to the site for up to 2 weeks followed by a clean appropriately sized bandage to area. Non stick dressing and paper tape (or Hypafix) are recommended for sensitive skin but a bandaid is fine if it covers the area well. All your stitches will dissolve over the next two weeks. You will need to keep these moist with Vaseline and covered with a bandage over the next 2 weeks for them to dissolve appropriately. RESTRICTIONS:     For two DAYS:   - You will need to take it very easy as this time is highest risk for bleeding. Being a "couch potato" during these two days is generally recommended. - For surgeries on the face/neck/scalp: Avoid leaning down to pick things up off the floor as this brings blood up to your head. Instead, squat down to pick things up. For two WEEKS:   - No heavy lifting (anything greater than 10 pounds)   - You can start to do slow, gentle activities such as slow walking but nothing to increase your heart rate and blood pressure too much (such as cardiovascular exercise). It is important to take it easy as there is still a risk for bleeding and a high risk popping of stitches open during this time. MANAGING YOUR PAIN AFTER SURGERY     You can expect to have some pain after surgery.  This is normal. The pain is typically worse the first two days after surgery, and quickly begins to get better. The best strategy for controlling your pain after surgery is around the clock pain control. You can take over the counter Acetaminophen (Tylenol) for discomfort, if no contraindications. If you are taking this at the maximum dose, you can alternate this with Motrin (ibuprofen or Advil) as well. Alternating these medications with each other allows you to maximize your pain control. In addition to Tylenol and Motrin, you can use heating pads or ice packs on your incisions to help reduce your pain. How will I alternate your regular strength over-the-counter pain medication? You will take a dose of pain medication every three hours. Start by taking 650 mg of Tylenol (2 pills of 325 mg)   3 hours later take 600 mg of Motrin (3 pills of 200 mg)   3 hours after taking the Motrin take 650 mg of Tylenol   3 hours after that take 600 mg of Motrin. See example - if your first dose of Tylenol is at 12:00 PM     12:00 PM  Tylenol 650 mg (2 pills of 325 mg)    3:00 PM  Motrin 600 mg (3 pills of 200 mg)    6:00 PM  Tylenol 650 mg (2 pills of 325 mg)    9:00 PM  Motrin 600 mg (3 pills of 200 mg)    Continue alternating every 3 hours      Important:   Do not take more than 4000mg of Tylenol or 3200mg of Motrin in a 24-hour period. What if I still have pain? If you have pain that is not controlled with the over-the-counter pain medications (Tylenol and Motrin or Advil), don't hesitate to call our staff using the number provided. We will help make sure you are managing your pain in the best way possible, and if necessary, we can provide a prescription for additional pain medication. CALL OUR OFFICE IMMEDIATELY FOR ANY SIGNS OF INFECTION:    This includes fever, chills, increased redness, warmth, tenderness, severe discomfort/pain, or pus or foul smell coming from the wound.  If you are experiencing any of the above, please call [facility] directly at [phone] (SKIN)    IF BLEEDING IS NOTICED:    Place a clean cloth over the area and apply firm pressure for thirty minutes. Check the wound ONLY after 30 minutes of direct pressure; do not cheat and sneak a peak, as that does not count. If bleeding persists after 30 minutes of legitimate direct pressure, then try one more round of direct pressure to the area. Should the bleeding become heavier or not stop after the second attempt, call [facility] directly at [phone] (SKIN). Your call will get routed to the dermatology surgeon on call even after hours.

## 2023-08-11 ENCOUNTER — NURSE TRIAGE (OUTPATIENT)
Dept: OTHER | Facility: OTHER | Age: 63
End: 2023-08-11

## 2023-08-11 NOTE — TELEPHONE ENCOUNTER
Regarding: Post Squamous Cell Carcinoma Surgery Ear.  Needs advise for Dissolving Stitches  ----- Message from Kasia Dewey sent at 8/11/2023  7:05 PM EDT -----  " I had Surgery on my Ear for Squamous Cell Carcinoma removal, I need advise for the dissolving Stiches, Can I get them wet."

## 2023-08-12 NOTE — TELEPHONE ENCOUNTER
Reason for Disposition  • Health Information question, no triage required and triager able to answer question    Answer Assessment - Initial Assessment Questions  1. REASON FOR CALL or QUESTION: "What is your reason for calling today?" or "How can I best help you?" or "What question do you have that I can help answer?"      Can I get my stitches wet? Is it ok to go into the hot tub and pool.     Protocols used: INFORMATION ONLY CALL - NO TRIAGE[facility]

## 2023-08-14 NOTE — TELEPHONE ENCOUNTER
----- Message from An Jaden [address] sent at 7/2/2020  3:04 PM EDT -----  Regarding: HFU  Diagnosis/reason for follow-up: Syncope  Subspecialty for follow-up: General  Recommending timing for HFU: 4 weeks  Existing neurologist: None  Tests/labs/imaging ordered: None  Orders placed electronically: None  Additional notes: EEG pending read    Thank you! Prior authorization has been submitted via [facility].

## 2023-08-14 NOTE — TELEPHONE ENCOUNTER
Called patient and let him know he can get the stitches wet in he shower but we do not want them being submerged I pool or hot tube water. There is a lot of bacteria in that water so we want to wait until the area is completely healed and the stitches are dissolved. Patient understood and did not have anymore questions.

## 2024-02-07 ENCOUNTER — TELEPHONE (OUTPATIENT)
Age: 64
End: 2024-02-07

## 2024-02-07 NOTE — TELEPHONE ENCOUNTER
Patients GI provider:  Dr. Jones    Number to return call: [phone]    Reason for call: Alma from [facility] called requesting for pathology report and colonoscopy report fax to [phone]     Scheduled procedure/appointment date if applicable: Apt/procedure